# Patient Record
Sex: MALE | Race: BLACK OR AFRICAN AMERICAN | Employment: OTHER | URBAN - METROPOLITAN AREA
[De-identification: names, ages, dates, MRNs, and addresses within clinical notes are randomized per-mention and may not be internally consistent; named-entity substitution may affect disease eponyms.]

---

## 2017-04-27 PROBLEM — J96.90 RESPIRATORY FAILURE (HCC): Status: ACTIVE | Noted: 2017-04-27

## 2018-11-20 ENCOUNTER — HOSPITAL ENCOUNTER (OUTPATIENT)
Dept: GENERAL RADIOLOGY | Age: 68
Discharge: HOME | End: 2018-11-22
Payer: MEDICARE

## 2018-11-20 DIAGNOSIS — R06.09 OTHER FORM OF DYSPNEA: ICD-10-CM

## 2018-11-20 PROCEDURE — 76000 FLUOROSCOPY <1 HR PHYS/QHP: CPT

## 2019-02-07 DIAGNOSIS — N18.30 ANEMIA IN STAGE 3 CHRONIC KIDNEY DISEASE (HCC): Chronic | ICD-10-CM

## 2019-02-07 DIAGNOSIS — D63.1 ANEMIA IN STAGE 3 CHRONIC KIDNEY DISEASE (HCC): Chronic | ICD-10-CM

## 2019-02-07 RX ORDER — DIPHENHYDRAMINE HYDROCHLORIDE 50 MG/ML
50 INJECTION INTRAMUSCULAR; INTRAVENOUS ONCE
Status: CANCELLED | OUTPATIENT
Start: 2019-02-12 | End: 2019-02-07

## 2019-02-07 RX ORDER — SODIUM CHLORIDE 0.9 % (FLUSH) 0.9 %
10 SYRINGE (ML) INJECTION PRN
Status: CANCELLED | OUTPATIENT
Start: 2019-02-12

## 2019-02-07 RX ORDER — METHYLPREDNISOLONE SODIUM SUCCINATE 125 MG/2ML
125 INJECTION, POWDER, LYOPHILIZED, FOR SOLUTION INTRAMUSCULAR; INTRAVENOUS ONCE
Status: CANCELLED | OUTPATIENT
Start: 2019-02-12 | End: 2019-02-07

## 2019-02-12 ENCOUNTER — HOSPITAL ENCOUNTER (OUTPATIENT)
Dept: INFUSION THERAPY | Age: 69
Setting detail: INFUSION SERIES
Discharge: HOME | End: 2019-02-12
Payer: MEDICARE

## 2019-02-12 VITALS
RESPIRATION RATE: 20 BRPM | OXYGEN SATURATION: 99 % | SYSTOLIC BLOOD PRESSURE: 131 MMHG | HEART RATE: 55 BPM | TEMPERATURE: 98.6 F | DIASTOLIC BLOOD PRESSURE: 60 MMHG

## 2019-02-12 DIAGNOSIS — D63.1 ANEMIA IN STAGE 3 CHRONIC KIDNEY DISEASE (HCC): ICD-10-CM

## 2019-02-12 DIAGNOSIS — N18.30 ANEMIA IN STAGE 3 CHRONIC KIDNEY DISEASE (HCC): ICD-10-CM

## 2019-02-12 PROCEDURE — 2580000003 HC RX 258

## 2019-02-12 PROCEDURE — 2580000003 HC RX 258: Performed by: INTERNAL MEDICINE

## 2019-02-12 PROCEDURE — 6360000002 HC RX W HCPCS: Performed by: INTERNAL MEDICINE

## 2019-02-12 PROCEDURE — 96365 THER/PROPH/DIAG IV INF INIT: CPT

## 2019-02-12 RX ORDER — SODIUM CHLORIDE 0.9 % (FLUSH) 0.9 %
10 SYRINGE (ML) INJECTION PRN
Status: DISCONTINUED | OUTPATIENT
Start: 2019-02-12 | End: 2019-02-13 | Stop reason: HOSPADM

## 2019-02-12 RX ORDER — SODIUM CHLORIDE 0.9 % (FLUSH) 0.9 %
10 SYRINGE (ML) INJECTION PRN
Status: CANCELLED | OUTPATIENT
Start: 2019-02-12

## 2019-02-12 RX ORDER — DIPHENHYDRAMINE HYDROCHLORIDE 50 MG/ML
50 INJECTION INTRAMUSCULAR; INTRAVENOUS ONCE
Status: CANCELLED | OUTPATIENT
Start: 2019-02-12 | End: 2019-02-12

## 2019-02-12 RX ORDER — SODIUM CHLORIDE 0.9 % (FLUSH) 0.9 %
SYRINGE (ML) INJECTION
Status: COMPLETED
Start: 2019-02-12 | End: 2019-02-12

## 2019-02-12 RX ORDER — METHYLPREDNISOLONE SODIUM SUCCINATE 125 MG/2ML
125 INJECTION, POWDER, LYOPHILIZED, FOR SOLUTION INTRAMUSCULAR; INTRAVENOUS ONCE
Status: CANCELLED | OUTPATIENT
Start: 2019-02-12 | End: 2019-02-12

## 2019-02-12 RX ADMIN — Medication 10 ML: at 13:31

## 2019-02-12 RX ADMIN — FERUMOXYTOL 510 MG: 510 INJECTION INTRAVENOUS at 13:48

## 2019-02-20 ENCOUNTER — HOSPITAL ENCOUNTER (OUTPATIENT)
Dept: INFUSION THERAPY | Age: 69
Setting detail: INFUSION SERIES
End: 2019-02-20
Payer: MEDICARE

## 2019-02-26 ENCOUNTER — HOSPITAL ENCOUNTER (OUTPATIENT)
Dept: INFUSION THERAPY | Age: 69
Setting detail: INFUSION SERIES
Discharge: HOME | End: 2019-02-26
Payer: MEDICARE

## 2019-02-26 VITALS
DIASTOLIC BLOOD PRESSURE: 53 MMHG | RESPIRATION RATE: 18 BRPM | SYSTOLIC BLOOD PRESSURE: 121 MMHG | HEART RATE: 53 BPM | TEMPERATURE: 98.4 F

## 2019-02-26 DIAGNOSIS — D63.1 ANEMIA IN STAGE 3 CHRONIC KIDNEY DISEASE (HCC): ICD-10-CM

## 2019-02-26 DIAGNOSIS — N18.30 ANEMIA IN STAGE 3 CHRONIC KIDNEY DISEASE (HCC): ICD-10-CM

## 2019-02-26 PROCEDURE — 2580000003 HC RX 258: Performed by: INTERNAL MEDICINE

## 2019-02-26 PROCEDURE — 96365 THER/PROPH/DIAG IV INF INIT: CPT

## 2019-02-26 PROCEDURE — 6360000002 HC RX W HCPCS: Performed by: INTERNAL MEDICINE

## 2019-02-26 RX ORDER — METHYLPREDNISOLONE SODIUM SUCCINATE 125 MG/2ML
125 INJECTION, POWDER, LYOPHILIZED, FOR SOLUTION INTRAMUSCULAR; INTRAVENOUS ONCE
Status: CANCELLED | OUTPATIENT
Start: 2019-02-26 | End: 2019-02-26

## 2019-02-26 RX ORDER — SODIUM CHLORIDE 0.9 % (FLUSH) 0.9 %
10 SYRINGE (ML) INJECTION PRN
Status: DISCONTINUED | OUTPATIENT
Start: 2019-02-26 | End: 2019-02-27 | Stop reason: HOSPADM

## 2019-02-26 RX ORDER — DIPHENHYDRAMINE HYDROCHLORIDE 50 MG/ML
50 INJECTION INTRAMUSCULAR; INTRAVENOUS ONCE
Status: CANCELLED | OUTPATIENT
Start: 2019-02-26 | End: 2019-02-26

## 2019-02-26 RX ORDER — SODIUM CHLORIDE 0.9 % (FLUSH) 0.9 %
10 SYRINGE (ML) INJECTION PRN
Status: CANCELLED | OUTPATIENT
Start: 2019-02-26

## 2019-02-26 RX ADMIN — FERUMOXYTOL 510 MG: 510 INJECTION INTRAVENOUS at 13:26

## 2019-05-29 ENCOUNTER — APPOINTMENT (OUTPATIENT)
Dept: ULTRASOUND IMAGING | Age: 69
End: 2019-05-29
Payer: MEDICARE

## 2019-05-29 ENCOUNTER — APPOINTMENT (OUTPATIENT)
Dept: GENERAL RADIOLOGY | Age: 69
End: 2019-05-29
Payer: MEDICARE

## 2019-05-29 ENCOUNTER — HOSPITAL ENCOUNTER (EMERGENCY)
Age: 69
Discharge: HOME | End: 2019-05-29
Attending: EMERGENCY MEDICINE
Payer: MEDICARE

## 2019-05-29 VITALS
RESPIRATION RATE: 18 BRPM | HEIGHT: 70 IN | BODY MASS INDEX: 31.92 KG/M2 | SYSTOLIC BLOOD PRESSURE: 172 MMHG | HEART RATE: 50 BPM | WEIGHT: 223 LBS | TEMPERATURE: 98.2 F | DIASTOLIC BLOOD PRESSURE: 72 MMHG | OXYGEN SATURATION: 98 %

## 2019-05-29 DIAGNOSIS — R06.00 DYSPNEA AND RESPIRATORY ABNORMALITIES: ICD-10-CM

## 2019-05-29 DIAGNOSIS — D84.9 IMMUNOCOMPROMISED (HCC): ICD-10-CM

## 2019-05-29 DIAGNOSIS — R06.89 DYSPNEA AND RESPIRATORY ABNORMALITIES: ICD-10-CM

## 2019-05-29 DIAGNOSIS — M79.605 LEFT LEG PAIN: Primary | ICD-10-CM

## 2019-05-29 DIAGNOSIS — Z94.0 HISTORY OF KIDNEY TRANSPLANT: ICD-10-CM

## 2019-05-29 LAB
ALBUMIN SERPL-MCNC: 4 G/DL (ref 3.5–5.2)
ALP BLD-CCNC: 58 U/L (ref 40–129)
ALT SERPL-CCNC: 22 U/L (ref 0–40)
ANION GAP SERPL CALCULATED.3IONS-SCNC: 8 MMOL/L (ref 7–16)
AST SERPL-CCNC: 39 U/L (ref 0–39)
BASOPHILS ABSOLUTE: 0.02 E9/L (ref 0–0.2)
BASOPHILS RELATIVE PERCENT: 0.5 % (ref 0–2)
BILIRUB SERPL-MCNC: 0.2 MG/DL (ref 0–1.2)
BUN BLDV-MCNC: 36 MG/DL (ref 8–23)
CALCIUM SERPL-MCNC: 9 MG/DL (ref 8.6–10.2)
CHLORIDE BLD-SCNC: 105 MMOL/L (ref 98–107)
CO2: 25 MMOL/L (ref 22–29)
CREAT SERPL-MCNC: 2.2 MG/DL (ref 0.7–1.2)
EKG ATRIAL RATE: 47 BPM
EKG P AXIS: 8 DEGREES
EKG P-R INTERVAL: 178 MS
EKG Q-T INTERVAL: 480 MS
EKG QRS DURATION: 74 MS
EKG QTC CALCULATION (BAZETT): 424 MS
EKG R AXIS: -15 DEGREES
EKG T AXIS: 89 DEGREES
EKG VENTRICULAR RATE: 47 BPM
EOSINOPHILS ABSOLUTE: 0.12 E9/L (ref 0.05–0.5)
EOSINOPHILS RELATIVE PERCENT: 2.8 % (ref 0–6)
GFR AFRICAN AMERICAN: 36
GFR NON-AFRICAN AMERICAN: 36 ML/MIN/1.73
GLUCOSE BLD-MCNC: 110 MG/DL (ref 74–99)
HCT VFR BLD CALC: 39 % (ref 37–54)
HEMOGLOBIN: 12 G/DL (ref 12.5–16.5)
IMMATURE GRANULOCYTES #: 0.03 E9/L
IMMATURE GRANULOCYTES %: 0.7 % (ref 0–5)
LYMPHOCYTES ABSOLUTE: 0.78 E9/L (ref 1.5–4)
LYMPHOCYTES RELATIVE PERCENT: 18.3 % (ref 20–42)
MAGNESIUM: 2.3 MG/DL (ref 1.6–2.6)
MCH RBC QN AUTO: 27.7 PG (ref 26–35)
MCHC RBC AUTO-ENTMCNC: 30.8 % (ref 32–34.5)
MCV RBC AUTO: 90.1 FL (ref 80–99.9)
MONOCYTES ABSOLUTE: 0.5 E9/L (ref 0.1–0.95)
MONOCYTES RELATIVE PERCENT: 11.7 % (ref 2–12)
NEUTROPHILS ABSOLUTE: 2.82 E9/L (ref 1.8–7.3)
NEUTROPHILS RELATIVE PERCENT: 66 % (ref 43–80)
PDW BLD-RTO: 15.8 FL (ref 11.5–15)
PLATELET # BLD: 169 E9/L (ref 130–450)
PMV BLD AUTO: 9.6 FL (ref 7–12)
POTASSIUM SERPL-SCNC: 4.7 MMOL/L (ref 3.5–5)
POTASSIUM SERPL-SCNC: 5.9 MMOL/L (ref 3.5–5)
PRO-BNP: 1075 PG/ML (ref 0–125)
RBC # BLD: 4.33 E12/L (ref 3.8–5.8)
REASON FOR REJECTION: NORMAL
REJECTED TEST: NORMAL
SODIUM BLD-SCNC: 138 MMOL/L (ref 132–146)
TOTAL PROTEIN: 6.2 G/DL (ref 6.4–8.3)
TROPONIN: 0.01 NG/ML (ref 0–0.03)
WBC # BLD: 4.3 E9/L (ref 4.5–11.5)

## 2019-05-29 PROCEDURE — 83735 ASSAY OF MAGNESIUM: CPT

## 2019-05-29 PROCEDURE — 71045 X-RAY EXAM CHEST 1 VIEW: CPT

## 2019-05-29 PROCEDURE — 93005 ELECTROCARDIOGRAM TRACING: CPT | Performed by: EMERGENCY MEDICINE

## 2019-05-29 PROCEDURE — 84132 ASSAY OF SERUM POTASSIUM: CPT

## 2019-05-29 PROCEDURE — 36415 COLL VENOUS BLD VENIPUNCTURE: CPT

## 2019-05-29 PROCEDURE — 84484 ASSAY OF TROPONIN QUANT: CPT

## 2019-05-29 PROCEDURE — 94664 DEMO&/EVAL PT USE INHALER: CPT

## 2019-05-29 PROCEDURE — 83880 ASSAY OF NATRIURETIC PEPTIDE: CPT

## 2019-05-29 PROCEDURE — 93010 ELECTROCARDIOGRAM REPORT: CPT | Performed by: INTERNAL MEDICINE

## 2019-05-29 PROCEDURE — 99285 EMERGENCY DEPT VISIT HI MDM: CPT

## 2019-05-29 PROCEDURE — 80053 COMPREHEN METABOLIC PANEL: CPT

## 2019-05-29 PROCEDURE — 6370000000 HC RX 637 (ALT 250 FOR IP): Performed by: EMERGENCY MEDICINE

## 2019-05-29 PROCEDURE — 85025 COMPLETE CBC W/AUTO DIFF WBC: CPT

## 2019-05-29 PROCEDURE — 93971 EXTREMITY STUDY: CPT

## 2019-05-29 RX ORDER — SODIUM CHLORIDE 0.9 % (FLUSH) 0.9 %
10 SYRINGE (ML) INJECTION PRN
Status: DISCONTINUED | OUTPATIENT
Start: 2019-05-29 | End: 2019-05-29 | Stop reason: HOSPADM

## 2019-05-29 RX ORDER — IPRATROPIUM BROMIDE AND ALBUTEROL SULFATE 2.5; .5 MG/3ML; MG/3ML
1 SOLUTION RESPIRATORY (INHALATION) ONCE
Status: COMPLETED | OUTPATIENT
Start: 2019-05-29 | End: 2019-05-29

## 2019-05-29 RX ADMIN — IPRATROPIUM BROMIDE AND ALBUTEROL SULFATE 1 AMPULE: .5; 3 SOLUTION RESPIRATORY (INHALATION) at 17:17

## 2019-05-29 ASSESSMENT — ENCOUNTER SYMPTOMS
BACK PAIN: 0
BLOOD IN STOOL: 0
COUGH: 0
HEMOPTYSIS: 0
DIARRHEA: 0
WHEEZING: 0
CONSTIPATION: 0
SPUTUM PRODUCTION: 0
SHORTNESS OF BREATH: 1
NAUSEA: 0
VOMITING: 0
ABDOMINAL PAIN: 0

## 2019-05-29 ASSESSMENT — PAIN DESCRIPTION - ORIENTATION: ORIENTATION: LEFT

## 2019-05-29 ASSESSMENT — PAIN SCALES - GENERAL: PAINLEVEL_OUTOF10: 5

## 2019-05-29 ASSESSMENT — PAIN DESCRIPTION - PAIN TYPE
TYPE: ACUTE PAIN
TYPE: ACUTE PAIN

## 2019-05-29 ASSESSMENT — PAIN DESCRIPTION - LOCATION: LOCATION: LEG

## 2019-05-29 ASSESSMENT — PAIN DESCRIPTION - FREQUENCY: FREQUENCY: INTERMITTENT

## 2019-05-29 ASSESSMENT — PAIN DESCRIPTION - DESCRIPTORS: DESCRIPTORS: ACHING

## 2019-05-29 NOTE — ED PROVIDER NOTES
76 YOM with hx of renal transplant for diabetic nephropathy presents to ED with CC of left calf pain x 3 weeks. Says he's been SOB for one year, steadily worsening. Saw PCP today who instructed him to come to ED to rule out possible blood clot. Says he has been gaining more weight over the year. No SOB when lying flat. No cough or fever. Patient has no hx of blood. He is on anti rejection meds including Cellcept and prograf. Shortness of Breath   Severity:  Mild  Onset quality:  Gradual  Duration:  12 months  Timing:  Intermittent  Progression:  Worsening  Chronicity:  Chronic  Context: activity    Context: not URI    Relieved by:  Rest  Worsened by:  Nothing  Ineffective treatments:  None tried  Associated symptoms: no abdominal pain, no chest pain, no claudication, no cough, no diaphoresis, no fever, no headaches, no hemoptysis, no rash, no sputum production, no vomiting and no wheezing    Risk factors: no prolonged immobilization and no tobacco use        Review of Systems   Constitutional: Negative for chills, diaphoresis and fever. Respiratory: Positive for shortness of breath. Negative for cough, hemoptysis, sputum production and wheezing. Cardiovascular: Negative for chest pain and claudication. Gastrointestinal: Negative for abdominal pain, blood in stool, constipation, diarrhea, nausea and vomiting. Genitourinary: Negative for dysuria and frequency. Musculoskeletal: Negative for back pain. Left calf pain   Skin: Negative for rash and wound. Neurological: Negative for dizziness, weakness, light-headedness, numbness and headaches. Hematological: Negative for adenopathy. All other systems reviewed and are negative. Physical Exam   Constitutional: He is oriented to person, place, and time. He appears well-developed and well-nourished. No distress. HENT:   Head: Normocephalic and atraumatic. Eyes: Pupils are equal, round, and reactive to light.    Neck: Normal range of in his father. The patients home medications have been reviewed. Allergies: Patient has no known allergies.     -------------------------------------------------- RESULTS -------------------------------------------------  Labs:  Results for orders placed or performed during the hospital encounter of 05/29/19   CBC auto differential   Result Value Ref Range    WBC 4.3 (L) 4.5 - 11.5 E9/L    RBC 4.33 3.80 - 5.80 E12/L    Hemoglobin 12.0 (L) 12.5 - 16.5 g/dL    Hematocrit 39.0 37.0 - 54.0 %    MCV 90.1 80.0 - 99.9 fL    MCH 27.7 26.0 - 35.0 pg    MCHC 30.8 (L) 32.0 - 34.5 %    RDW 15.8 (H) 11.5 - 15.0 fL    Platelets 916 152 - 602 E9/L    MPV 9.6 7.0 - 12.0 fL    Neutrophils % 66.0 43.0 - 80.0 %    Immature Granulocytes % 0.7 0.0 - 5.0 %    Lymphocytes % 18.3 (L) 20.0 - 42.0 %    Monocytes % 11.7 2.0 - 12.0 %    Eosinophils % 2.8 0.0 - 6.0 %    Basophils % 0.5 0.0 - 2.0 %    Neutrophils # 2.82 1.80 - 7.30 E9/L    Immature Granulocytes # 0.03 E9/L    Lymphocytes # 0.78 (L) 1.50 - 4.00 E9/L    Monocytes # 0.50 0.10 - 0.95 E9/L    Eosinophils # 0.12 0.05 - 0.50 E9/L    Basophils # 0.02 0.00 - 0.20 E9/L   Comprehensive Metabolic Panel   Result Value Ref Range    Sodium 138 132 - 146 mmol/L    Potassium 5.9 (H) 3.5 - 5.0 mmol/L    Chloride 105 98 - 107 mmol/L    CO2 25 22 - 29 mmol/L    Anion Gap 8 7 - 16 mmol/L    Glucose 110 (H) 74 - 99 mg/dL    BUN 36 (H) 8 - 23 mg/dL    CREATININE 2.2 (H) 0.7 - 1.2 mg/dL    GFR Non-African American 36 >=60 mL/min/1.73    GFR African American 36     Calcium 9.0 8.6 - 10.2 mg/dL    Total Protein 6.2 (L) 6.4 - 8.3 g/dL    Alb 4.0 3.5 - 5.2 g/dL    Total Bilirubin 0.2 0.0 - 1.2 mg/dL    Alkaline Phosphatase 58 40 - 129 U/L    ALT 22 0 - 40 U/L    AST 39 0 - 39 U/L   Troponin   Result Value Ref Range    Troponin 0.01 0.00 - 0.03 ng/mL   Brain Natriuretic Peptide   Result Value Ref Range    Pro-BNP 1,075 (H) 0 - 125 pg/mL   Magnesium   Result Value Ref Range    Magnesium 2.3 1.6 - 2.6 mg/dL   SPECIMEN REJECTION   Result Value Ref Range    Rejected Test K     Reason for Rejection see below    Potassium   Result Value Ref Range    Potassium 4.7 3.5 - 5.0 mmol/L   EKG 12 Lead   Result Value Ref Range    Ventricular Rate 47 BPM    Atrial Rate 47 BPM    P-R Interval 178 ms    QRS Duration 74 ms    Q-T Interval 480 ms    QTc Calculation (Bazett) 424 ms    P Axis 8 degrees    R Axis -15 degrees    T Axis 89 degrees       Radiology:  XR CHEST PORTABLE   Final Result      NO ACUTE CARDIOPULMONARY PROCESS         US DUP LOWER EXTREMITY LEFT CHICO   Final Result   PATENT DEEP VENOUS SYSTEM OF THE LEFT LOWER EXTREMITY. NO EVIDENCE FOR DVT.          ------------------------- NURSING NOTES AND VITALS REVIEWED ---------------------------  Date / Time Roomed:  5/29/2019  2:31 PM  ED Bed Assignment:  23/23    The nursing notes within the ED encounter and vital signs as below have been reviewed. BP (!) 172/72   Pulse 50   Temp 98.2 °F (36.8 °C) (Oral)   Resp 18   Ht 5' 10\" (1.778 m)   Wt 223 lb (101.2 kg)   SpO2 98%   BMI 32.00 kg/m²   Oxygen Saturation Interpretation: Normal      ------------------------------------------ PROGRESS NOTES ------------------------------------------  ED Course as of May 31 0129   Wed May 29, 2019   1901 Ultrasound was unremarkable. Potassium within normal limits. BNP mildly elevated however patient has kidney transplant and chest x-ray showed no pleural effusions. Patient feels well and wants to go home. The patient is nontoxic appearing and stable for outpatient treatment and management. They were instructed to follow-up with their primary care physician and were given warning signs to return to the ED. All of their questions were answered at this time and are agreeable with discharge and early follow up.     [BM]      ED Course User Index  [BM] Brigette Wheeler DO         1:29 AM  I have spoken with the patient and discussed todays results, in addition to providing

## 2019-12-10 ENCOUNTER — HOSPITAL ENCOUNTER (OUTPATIENT)
Dept: INTERVENTIONAL RADIOLOGY/VASCULAR | Age: 69
Discharge: HOME | End: 2019-12-12
Payer: MEDICARE

## 2019-12-10 ENCOUNTER — HOSPITAL ENCOUNTER (OUTPATIENT)
Dept: ULTRASOUND IMAGING | Age: 69
Discharge: HOME | End: 2019-12-12
Payer: MEDICARE

## 2019-12-10 DIAGNOSIS — I73.9 PVD (PERIPHERAL VASCULAR DISEASE) (HCC): ICD-10-CM

## 2019-12-10 DIAGNOSIS — Z13.6 SCREENING FOR AAA (AORTIC ABDOMINAL ANEURYSM): ICD-10-CM

## 2019-12-10 DIAGNOSIS — I73.9 PERIPHERAL VASCULAR DISEASE, UNSPECIFIED (HCC): ICD-10-CM

## 2019-12-10 PROCEDURE — 76775 US EXAM ABDO BACK WALL LIM: CPT

## 2019-12-10 PROCEDURE — 93922 UPR/L XTREMITY ART 2 LEVELS: CPT

## 2020-08-19 ENCOUNTER — TELEPHONE (OUTPATIENT)
Dept: CARDIOLOGY CLINIC | Age: 70
End: 2020-08-19

## 2020-08-19 NOTE — TELEPHONE ENCOUNTER
[address] [place] [address]  to change his apt date. New apt given  Yearly visit (former Flaca Arreaga patient overdue) last seen by DR Flaca Arreaga 9 15 2017     He declined a NP visit for next monday. [facility] R Kidney transplant 12-12-14    Nephrology [facility]    [address] [place], [address] [place] [address], [address] [facility] [address]   [phone] (Fax)   Refill Request     DR Clinton locally for Nephrology. No recent labs in epic.    Was in ER 5 29 2019    Will request DR Clinton's last office note/ labs and med list.

## 2020-08-27 ENCOUNTER — HOSPITAL ENCOUNTER (OUTPATIENT)
Dept: NON INVASIVE DIAGNOSTICS | Age: 70
Discharge: HOME | End: 2020-08-27
Payer: MEDICARE

## 2020-08-27 VITALS
DIASTOLIC BLOOD PRESSURE: 77 MMHG | HEIGHT: 71 IN | SYSTOLIC BLOOD PRESSURE: 158 MMHG | TEMPERATURE: 99.8 F | BODY MASS INDEX: 34.3 KG/M2 | HEART RATE: 77 BPM | RESPIRATION RATE: 24 BRPM | WEIGHT: 245 LBS

## 2020-08-27 LAB
EKG ATRIAL RATE: 68 BPM
EKG P AXIS: 12 DEGREES
EKG P-R INTERVAL: 184 MS
EKG Q-T INTERVAL: 406 MS
EKG QRS DURATION: 74 MS
EKG QTC CALCULATION (BAZETT): 431 MS
EKG R AXIS: -8 DEGREES
EKG T AXIS: 93 DEGREES
EKG VENTRICULAR RATE: 68 BPM

## 2020-08-27 PROCEDURE — 93010 ELECTROCARDIOGRAM REPORT: CPT | Performed by: INTERNAL MEDICINE

## 2020-08-27 PROCEDURE — 93005 ELECTROCARDIOGRAM TRACING: CPT | Performed by: PHYSICIAN ASSISTANT

## 2020-08-27 PROCEDURE — 99211 OFF/OP EST MAY X REQ PHY/QHP: CPT

## 2020-08-27 PROCEDURE — 99214 OFFICE O/P EST MOD 30 MIN: CPT | Performed by: INTERNAL MEDICINE

## 2020-08-27 RX ORDER — ROSUVASTATIN CALCIUM 20 MG/1
20 TABLET, COATED ORAL DAILY
Qty: 90 TABLET | Refills: 3 | Status: SHIPPED
Start: 2020-08-27 | End: 2021-04-09

## 2020-08-27 NOTE — PROGRESS NOTES
[address] [facility]   Heart and V[facility] [place]   Clinic Note     Date: 08/27/20  Patient Skye Yeh  YOB: 1950  Age: 79 y.o. Primary Care Provider: DO Hermelinda Ramíerz     This is a 49-year-old -American male who was previously seen by Dr. Caitlyn Becerril. His main complaint is dyspnea on exertion with minimal activity such as walking a few steps or bending over to tie his shoes. This has been stable for the past few months although progressive over the last year. He underwent pulmonary work-up as below. He has no chest discomfort. He has no palpitations or syncope or presyncope. He has lower extremity edema but no orthopnea or PND. He reports compliance with his medications. A focused history review includes:  1. Chronic diastolic heart failure  2. History of abnormal Lexiscan MPS (with reported abnormalities in the anterior as well as lateral wall). There is mention of a normal coronary angiogram from January 2013 without the report being available. 3. Most recent TTE report from [facility] in April 2020: LVEF 67%, moderate LVH, normal RV size and systolic function, no significant valve disease, severely dilated left atrium  4. CKD status post kidney transplant in 2014 (underwent invasive evaluation of the transplanted renal artery at [facility] which was unremarkable)  1. Baseline creatinine 1.9-2.2 with an estimated GFR of 30-45  5. Type 2 diabetes  6. Mixed lung disease. Was seen by [facility] in April 2020.  1. Chest CT without contrast showed mild aortic and coronary calcification and a dilated main PA to 3.9 cm. There were no stigmata of interstitial lung disease but there was mild diffuse mosaic attenuation of the parenchyma and eventration of the right hemidiaphragm  2. PFTs showed a TLC of 48% and DLCO 48% predicted  7.  Hypertension    Past History    Past Medical History:         Diagnosis Date    Anemia mononitrate (IMDUR) 60 MG extended release tablet TAKE 1 TABLET DAILY 90 tablet 3    hydrALAZINE (APRESOLINE) 50 MG tablet Take 1 tablet by mouth every 8 hours (Patient taking differently: Take 75 mg by mouth every 8 hours ) 90 tablet 3    tacrolimus (PROGRAF) 1 MG capsule Take 2 capsules by mouth 2 times daily 60 capsule 3    metoprolol succinate (TOPROL XL) 50 MG extended release tablet Take 1 tablet by mouth 2 times daily 30 tablet 3    furosemide (LASIX) 40 MG tablet Take 1 tablet by mouth 2 times daily at 0800 and 1400 60 tablet 3    prednisoLONE 5 MG TABS Take 5 mg by mouth 2 times daily      sodium bicarbonate 650 MG tablet Take 650 mg by mouth daily      sulfamethoxazole-trimethoprim (BACTRIM;SEPTRA) 400-80 MG per tablet Take 1 tablet by mouth daily       Multiple Vitamin (MULTI VITAMIN DAILY PO) Take by mouth daily      Cholecalciferol (VITAMIN D3) 5000 UNITS TABS Take by mouth daily      famotidine (PEPCID) 20 MG tablet Take 20 mg by mouth daily      LANTUS SOLOSTAR 100 UNIT/ML SOPN Inject 15 Units into the skin daily       mycophenolate (CELLCEPT) 250 MG capsule   Take 1,000 mg by mouth 2 times daily       insulin lispro (HUMALOG KWIKPEN) 100 UNIT/ML SOPN Inject 5 Units into the skin daily (before lunch)      docusate sodium (COLACE) 100 MG capsule Take 1 capsule by mouth 2 times daily as needed for Constipation. (Patient taking differently: Take 100 mg by mouth daily as needed for Constipation ) 20 capsule 0    linagliptin (TRADJENTA) 5 MG tablet Take 5 mg by mouth daily.  oxyCODONE (OXY-IR) 15 MG immediate release tablet Take 15 mg by mouth every 6 hours as needed. Indications: Pain      aspirin 81 MG EC tablet Take 81 mg by mouth daily. No current facility-administered medications for this encounter.             Physical Examination      BP (!) 158/77 (Site: Left Upper Arm, Position: Sitting)   Pulse 77   Temp 99.8 °F (37.7 °C) (Temporal)   Resp 24   Ht 5' 11\" (1.803 m) Wt 245 lb (111.1 kg)   BMI 34.17 kg/m²     General: No acute distress, appears as stated age, nonicteric  Head: Atraumatic, no gross abnormalities or bruises  Neck: Supple and nontender, no carotid bruits. JVP is mildly elevated  Lungs: Clear to auscultation bilaterally, no wheezes, rales, or rhonchi  Heart: Regular rate and rhythm, no murmurs, rubs, or gallops  Abdomen: Soft, nontender, nondistended, normal bowel sounds  Extremities: No obvious deformities, no cyanosis. Trace pitting edema bilaterally  Neurological: Alert and oriented x3, EOMI, moving all extremities x4  Psychological: Normal mood and affect, cooperative  Skin: Color, texture, and turgor normal for age          Labs/Imaging/Diagnostics     Lab Results   Component Value Date     05/29/2019    K 4.7 05/29/2019     05/29/2019    CO2 25 05/29/2019    BUN 36 05/29/2019    CREATININE 2.2 05/29/2019    GLUCOSE 110 05/29/2019    GLUCOSE 124 02/21/2012    CALCIUM 9.0 05/29/2019        CrCl cannot be calculated (Patient's most recent lab result is older than the maximum 120 days allowed. ).     Lab Results   Component Value Date    WBC 4.3 (L) 05/29/2019    HGB 12.0 (L) 05/29/2019    HCT 39.0 05/29/2019    MCV 90.1 05/29/2019     05/29/2019       Lab Results   Component Value Date    ALT 22 05/29/2019    AST 39 05/29/2019    ALKPHOS 58 05/29/2019    BILITOT 0.2 05/29/2019       Lab Results   Component Value Date    LABALBU 4.0 05/29/2019       Lab Results   Component Value Date    CHOL 128 06/03/2014    CHOL 101 05/23/2013    CHOL 165 07/05/2011     Lab Results   Component Value Date    TRIG 50 06/03/2014    TRIG 160 (H) 05/23/2013    TRIG 72 07/05/2011     Lab Results   Component Value Date    HDL 51 06/03/2014    HDL 21.0 (A) 05/23/2013     Lab Results   Component Value Date    LDLCALC 67 06/03/2014    LDLCALC 48 05/23/2013     Lab Results   Component Value Date    LABVLDL 10 06/03/2014     No results found for: CHOLHDLRATIO    Lab Results   Component Value Date    CKTOTAL 197 07/25/2015    CKMB 4.5 07/25/2015    TROPONINI 0.01 05/29/2019       No results found for: BNP      Lab Results   Component Value Date    LABA1C 5.5 06/03/2014     No results found for: EAG     Pertinent Cardiovascular Studies:  EKG today personally reviewed:  Normal sinus rhythm with one junctional escape beat at the beginning of the tracing. Otherwise normal        Assessment and Plan: This is a 27-year-old -American male with limiting dyspnea on exertion without chest discomfort. He has history of chronic diastolic heart failure, status post kidney transplant in 2014 with baseline creatinine 1.9-2.2, mixed moderate lung disease, dilated PA on chest CT, hypertension, diabetes. He is slightly hypervolemic on exam.  I suspect he has pulmonary hypertension which may or may not be completely due to his left-sided diastolic heart failure. 1. Chronic diastolic heart failure, NYHA functional class III  2. Dilated PA  3. CKD stage IIIb in the setting of kidney transplant  4. Hypertension  5. Diabetes  6. Coronary atherosclerosis on CT    1. We will start with right heart cath given the suspicion for decompensated diastolic heart failure as well as pulmonary hypertension. His proBNP is elevated. 2. Start rosuvastatin 20 mg daily  3. no changes in BP medications at this point. He says his blood pressure at home is in the 796 systolic     Follow up with me in 6 months    We appreciate the opportunity to participate in His care!       Camryn Lees MD  Interventional Cardiology/Structural Heart Disease  Cell: [phone]

## 2020-08-31 ENCOUNTER — TELEPHONE (OUTPATIENT)
Dept: CARDIOLOGY CLINIC | Age: 70
End: 2020-08-31

## 2020-08-31 ENCOUNTER — HOSPITAL ENCOUNTER (OUTPATIENT)
Age: 70
Discharge: HOME | End: 2020-08-31
Payer: MEDICARE

## 2020-08-31 LAB
ALBUMIN SERPL-MCNC: 4.1 G/DL (ref 3.5–5.2)
ALP BLD-CCNC: 61 U/L (ref 40–129)
ALT SERPL-CCNC: 19 U/L (ref 0–40)
ANION GAP SERPL CALCULATED.3IONS-SCNC: 12 MMOL/L (ref 7–16)
AST SERPL-CCNC: 30 U/L (ref 0–39)
BILIRUB SERPL-MCNC: 0.3 MG/DL (ref 0–1.2)
BUN BLDV-MCNC: 32 MG/DL (ref 8–23)
CALCIUM SERPL-MCNC: 9.3 MG/DL (ref 8.6–10.2)
CHLORIDE BLD-SCNC: 106 MMOL/L (ref 98–107)
CO2: 23 MMOL/L (ref 22–29)
CREAT SERPL-MCNC: 2 MG/DL (ref 0.7–1.2)
GFR AFRICAN AMERICAN: 40
GFR NON-AFRICAN AMERICAN: 40 ML/MIN/1.73
GLUCOSE BLD-MCNC: 158 MG/DL (ref 74–99)
HCT VFR BLD CALC: 39.6 % (ref 37–54)
HEMOGLOBIN: 12 G/DL (ref 12.5–16.5)
INR BLD: 1
MCH RBC QN AUTO: 27.1 PG (ref 26–35)
MCHC RBC AUTO-ENTMCNC: 30.3 % (ref 32–34.5)
MCV RBC AUTO: 89.4 FL (ref 80–99.9)
PDW BLD-RTO: 14.5 FL (ref 11.5–15)
PLATELET # BLD: 219 E9/L (ref 130–450)
PMV BLD AUTO: 10 FL (ref 7–12)
POTASSIUM SERPL-SCNC: 4.9 MMOL/L (ref 3.5–5)
PROTHROMBIN TIME: 11.3 SEC (ref 9.3–12.4)
RBC # BLD: 4.43 E12/L (ref 3.8–5.8)
SODIUM BLD-SCNC: 141 MMOL/L (ref 132–146)
TOTAL PROTEIN: 6.4 G/DL (ref 6.4–8.3)
WBC # BLD: 4.5 E9/L (ref 4.5–11.5)

## 2020-08-31 PROCEDURE — 85027 COMPLETE CBC AUTOMATED: CPT

## 2020-08-31 PROCEDURE — 85610 PROTHROMBIN TIME: CPT

## 2020-08-31 PROCEDURE — 80053 COMPREHEN METABOLIC PANEL: CPT

## 2020-08-31 PROCEDURE — 36415 COLL VENOUS BLD VENIPUNCTURE: CPT

## 2020-08-31 NOTE — TELEPHONE ENCOUNTER
Called and reviewed  Addition of rosuvastatin (crestor) . He will go to [facility] to  med and also sit with pharmacist for consult for initial script . Covid prescreen questionnaire completed and faxed to Cath Lab. Carmen Null is managing [facility] auth, orders, instructions.

## 2020-09-02 ENCOUNTER — HOSPITAL ENCOUNTER (OUTPATIENT)
Dept: CARDIAC CATH/INVASIVE PROCEDURES | Age: 70
Discharge: HOME | End: 2020-09-02
Payer: MEDICARE

## 2020-09-02 VITALS
WEIGHT: 243 LBS | DIASTOLIC BLOOD PRESSURE: 71 MMHG | TEMPERATURE: 98.6 F | BODY MASS INDEX: 34.79 KG/M2 | SYSTOLIC BLOOD PRESSURE: 174 MMHG | RESPIRATION RATE: 20 BRPM | HEIGHT: 70 IN | OXYGEN SATURATION: 97 %

## 2020-09-02 PROCEDURE — 2500000003 HC RX 250 WO HCPCS

## 2020-09-02 PROCEDURE — C1751 CATH, INF, PER/CENT/MIDLINE: HCPCS

## 2020-09-02 PROCEDURE — 6360000002 HC RX W HCPCS

## 2020-09-02 PROCEDURE — 93451 RIGHT HEART CATH: CPT | Performed by: INTERNAL MEDICINE

## 2020-09-02 PROCEDURE — 93451 RIGHT HEART CATH: CPT

## 2020-09-02 PROCEDURE — C1894 INTRO/SHEATH, NON-LASER: HCPCS

## 2020-09-02 PROCEDURE — 2709999900 HC NON-CHARGEABLE SUPPLY

## 2020-09-02 RX ORDER — FUROSEMIDE 40 MG/1
60 TABLET ORAL 2 TIMES DAILY
Qty: 270 TABLET | Refills: 1 | Status: ON HOLD
Start: 2020-09-02 | End: 2021-04-20 | Stop reason: SDUPTHER

## 2020-09-02 NOTE — PROCEDURES
CARDIAC CATHETERIZATION REPORT    : Giuliana Patel     Date of procedure: 09/02/20       Indication:  1. Rule out pulmonary hypertension      Procedures:  Right heart catheterization     Sedation/analgesia:  none    Brief history:  78-year-old -American male with history of diastolic heart failure, normal angiogram 2013, kidney transplant in 2014, mixed lung disease, hypertension who has been having worsening shortness of breath that is not entirely explained by his comorbidities and his physical exam.  Of note he was noted to have a dilated PA on the CT chest.    Description of procedure: The patient presented to the Cath Lab in a(n) elective fashion. The patient was prepped and draped in a sterile manner. Timeout, airway and ASA assessment were completed. Local anesthesia with 1% lidocaine was administered and sedation/analgesia were administered as above. Access was obtained using the modified Seldinger technique and a micropuncture needle in the right internal jugular vein. A 7Fshort sheath was inserted over a wire. A [place] caheter was used for hem toodynamic measurements with the results below. The catheter was not left in place. RA: 13  RV: 91/14  PA: 91/25 with a mean of 48  PCW: 22  CO/CI (Jack): 5.6/2.5  CO/CI (thermodilution): 4.9/2.1    Hemostasis:  Manual pressure was used for Venous hemostasis. Complications: None  Estimated blood loss: 5 mL  Contrast used: 0 mL    Conclusions:  1. Moderately elevated right and left filling pressures  2. Severe pulmonary hypertension, predominantly precapillary. PVR approximately 5-5.5  3.  Gabrielle 5 consistent with normal RV function    PLAN:  · Increase Lasix to 60 mg p.o. twice daily and check BMP on Friday, September 4  · Refer to pulmonary hypertension center at [facility], MD  Interventional Cardiology/Structural Heart Disease  Cell: [phone]

## 2020-09-03 ENCOUNTER — TELEPHONE (OUTPATIENT)
Dept: CARDIOLOGY CLINIC | Age: 70
End: 2020-09-03

## 2020-09-03 LAB
B-TYPE NATRIURETIC PEPTIDE: NORMAL
BUN BLDV-MCNC: 30 MG/DL
CALCIUM SERPL-MCNC: NORMAL MG/DL
CHLORIDE BLD-SCNC: 108 MMOL/L
CO2: NORMAL
CREAT SERPL-MCNC: 2.03 MG/DL
GFR CALCULATED: NORMAL
GLUCOSE BLD-MCNC: NORMAL MG/DL
POTASSIUM SERPL-SCNC: 4.5 MMOL/L
SODIUM BLD-SCNC: 142 MMOL/L

## 2020-09-03 NOTE — TELEPHONE ENCOUNTER
Janna Borrero called this am  He spoke with you yesterday regarding labs he wanted done at [facility] lab. He did not have labs done ordered by DR Cira Carranza. Please address.     Vaishali Mares

## 2020-09-21 ENCOUNTER — TELEPHONE (OUTPATIENT)
Dept: CARDIOLOGY CLINIC | Age: 70
End: 2020-09-21

## 2020-09-21 NOTE — TELEPHONE ENCOUNTER
[facility] sent fax:  9 10 2020 11am apt with [facility]        09/28/2020 Office Visit CARDIOVASCULAR MEDICINE Abbie Renee [address] [place] [address] [phone] (Fax)

## 2021-04-09 ENCOUNTER — APPOINTMENT (OUTPATIENT)
Dept: GENERAL RADIOLOGY | Age: 71
DRG: 698 | End: 2021-04-09
Payer: MEDICARE

## 2021-04-09 ENCOUNTER — HOSPITAL ENCOUNTER (INPATIENT)
Age: 71
LOS: 10 days | Discharge: HOME | DRG: 698 | End: 2021-04-20
Attending: EMERGENCY MEDICINE | Admitting: INTERNAL MEDICINE
Payer: MEDICARE

## 2021-04-09 DIAGNOSIS — E86.0 DEHYDRATION: ICD-10-CM

## 2021-04-09 DIAGNOSIS — I50.31 ACUTE DIASTOLIC CONGESTIVE HEART FAILURE (HCC): ICD-10-CM

## 2021-04-09 DIAGNOSIS — N17.9 ACUTE KIDNEY INJURY (HCC): Primary | ICD-10-CM

## 2021-04-09 DIAGNOSIS — R50.9 FEVER, UNSPECIFIED FEVER CAUSE: ICD-10-CM

## 2021-04-09 LAB
ALBUMIN SERPL-MCNC: 3.6 G/DL (ref 3.5–5.2)
ALP BLD-CCNC: 49 U/L (ref 40–129)
ALT SERPL-CCNC: 16 U/L (ref 0–40)
ANION GAP SERPL CALCULATED.3IONS-SCNC: 11 MMOL/L (ref 7–16)
AST SERPL-CCNC: 44 U/L (ref 0–39)
BASOPHILS ABSOLUTE: 0.01 E9/L (ref 0–0.2)
BASOPHILS RELATIVE PERCENT: 0.3 % (ref 0–2)
BILIRUB SERPL-MCNC: 0.4 MG/DL (ref 0–1.2)
BUN BLDV-MCNC: 53 MG/DL (ref 8–23)
CALCIUM SERPL-MCNC: 8.7 MG/DL (ref 8.6–10.2)
CHLORIDE BLD-SCNC: 101 MMOL/L (ref 98–107)
CO2: 21 MMOL/L (ref 22–29)
CREAT SERPL-MCNC: 3.4 MG/DL (ref 0.7–1.2)
EOSINOPHILS ABSOLUTE: 0 E9/L (ref 0.05–0.5)
EOSINOPHILS RELATIVE PERCENT: 0 % (ref 0–6)
GFR AFRICAN AMERICAN: 22
GFR NON-AFRICAN AMERICAN: 22 ML/MIN/1.73
GLUCOSE BLD-MCNC: 63 MG/DL (ref 74–99)
HCT VFR BLD CALC: 36.6 % (ref 37–54)
HEMOGLOBIN: 11.4 G/DL (ref 12.5–16.5)
IMMATURE GRANULOCYTES #: 0.02 E9/L
IMMATURE GRANULOCYTES %: 0.5 % (ref 0–5)
INFLUENZA A BY PCR: NOT DETECTED
INFLUENZA B BY PCR: NOT DETECTED
LACTIC ACID: 0.8 MMOL/L (ref 0.5–2.2)
LIPASE: 52 U/L (ref 13–60)
LYMPHOCYTES ABSOLUTE: 0.67 E9/L (ref 1.5–4)
LYMPHOCYTES RELATIVE PERCENT: 17 % (ref 20–42)
MCH RBC QN AUTO: 25.9 PG (ref 26–35)
MCHC RBC AUTO-ENTMCNC: 31.1 % (ref 32–34.5)
MCV RBC AUTO: 83.2 FL (ref 80–99.9)
MONOCYTES ABSOLUTE: 0.67 E9/L (ref 0.1–0.95)
MONOCYTES RELATIVE PERCENT: 17 % (ref 2–12)
NEUTROPHILS ABSOLUTE: 2.57 E9/L (ref 1.8–7.3)
NEUTROPHILS RELATIVE PERCENT: 65.2 % (ref 43–80)
PDW BLD-RTO: 14.9 FL (ref 11.5–15)
PLATELET # BLD: 167 E9/L (ref 130–450)
PMV BLD AUTO: 9.9 FL (ref 7–12)
POTASSIUM REFLEX MAGNESIUM: 4.4 MMOL/L (ref 3.5–5)
RBC # BLD: 4.4 E12/L (ref 3.8–5.8)
REASON FOR REJECTION: NORMAL
REJECTED TEST: NORMAL
SODIUM BLD-SCNC: 133 MMOL/L (ref 132–146)
TOTAL PROTEIN: 6.3 G/DL (ref 6.4–8.3)
TROPONIN: 0.1 NG/ML (ref 0–0.03)
WBC # BLD: 3.9 E9/L (ref 4.5–11.5)

## 2021-04-09 PROCEDURE — 84484 ASSAY OF TROPONIN QUANT: CPT

## 2021-04-09 PROCEDURE — 2580000003 HC RX 258: Performed by: EMERGENCY MEDICINE

## 2021-04-09 PROCEDURE — 99284 EMERGENCY DEPT VISIT MOD MDM: CPT

## 2021-04-09 PROCEDURE — 83690 ASSAY OF LIPASE: CPT

## 2021-04-09 PROCEDURE — 96360 HYDRATION IV INFUSION INIT: CPT

## 2021-04-09 PROCEDURE — 85025 COMPLETE CBC W/AUTO DIFF WBC: CPT

## 2021-04-09 PROCEDURE — 6370000000 HC RX 637 (ALT 250 FOR IP): Performed by: EMERGENCY MEDICINE

## 2021-04-09 PROCEDURE — 71045 X-RAY EXAM CHEST 1 VIEW: CPT

## 2021-04-09 PROCEDURE — 93005 ELECTROCARDIOGRAM TRACING: CPT | Performed by: NURSE PRACTITIONER

## 2021-04-09 PROCEDURE — 80053 COMPREHEN METABOLIC PANEL: CPT

## 2021-04-09 PROCEDURE — 83605 ASSAY OF LACTIC ACID: CPT

## 2021-04-09 PROCEDURE — 87502 INFLUENZA DNA AMP PROBE: CPT

## 2021-04-09 RX ORDER — TIZANIDINE 4 MG/1
4 TABLET ORAL DAILY
COMMUNITY
End: 2021-06-11

## 2021-04-09 RX ORDER — 0.9 % SODIUM CHLORIDE 0.9 %
1000 INTRAVENOUS SOLUTION INTRAVENOUS ONCE
Status: COMPLETED | OUTPATIENT
Start: 2021-04-09 | End: 2021-04-09

## 2021-04-09 RX ORDER — ACETAMINOPHEN 500 MG
1000 TABLET ORAL ONCE
Status: COMPLETED | OUTPATIENT
Start: 2021-04-09 | End: 2021-04-09

## 2021-04-09 RX ORDER — SIMVASTATIN 10 MG
10 TABLET ORAL DAILY
COMMUNITY

## 2021-04-09 RX ADMIN — ACETAMINOPHEN 1000 MG: 500 TABLET ORAL at 22:18

## 2021-04-09 RX ADMIN — SODIUM CHLORIDE 1000 ML: 9 INJECTION, SOLUTION INTRAVENOUS at 22:18

## 2021-04-09 ASSESSMENT — PAIN SCALES - GENERAL
PAINLEVEL_OUTOF10: 0
PAINLEVEL_OUTOF10: 0

## 2021-04-10 PROBLEM — R19.7 VOMITING AND DIARRHEA: Status: ACTIVE | Noted: 2021-04-10

## 2021-04-10 PROBLEM — N17.9 AKI (ACUTE KIDNEY INJURY) (HCC): Status: ACTIVE | Noted: 2021-04-10

## 2021-04-10 PROBLEM — N17.9 ACUTE NONTRAUMATIC KIDNEY INJURY (HCC): Status: ACTIVE | Noted: 2021-04-10

## 2021-04-10 PROBLEM — R11.10 VOMITING AND DIARRHEA: Status: ACTIVE | Noted: 2021-04-10

## 2021-04-10 LAB
ANION GAP SERPL CALCULATED.3IONS-SCNC: 9 MMOL/L (ref 7–16)
BACTERIA: ABNORMAL /HPF
BILIRUBIN URINE: NEGATIVE
BLOOD, URINE: NEGATIVE
BUN BLDV-MCNC: 43 MG/DL (ref 8–23)
CALCIUM SERPL-MCNC: 8 MG/DL (ref 8.6–10.2)
CHLORIDE BLD-SCNC: 102 MMOL/L (ref 98–107)
CHLORIDE URINE RANDOM: <20 MMOL/L
CLARITY: CLEAR
CO2: 19 MMOL/L (ref 22–29)
COLOR: YELLOW
CREAT SERPL-MCNC: 2.4 MG/DL (ref 0.7–1.2)
CREATININE URINE: 143 MG/DL (ref 40–278)
CREATININE URINE: 96 MG/DL (ref 40–278)
EKG ATRIAL RATE: 82 BPM
EKG P AXIS: 59 DEGREES
EKG P-R INTERVAL: 134 MS
EKG Q-T INTERVAL: 378 MS
EKG QRS DURATION: 74 MS
EKG QTC CALCULATION (BAZETT): 441 MS
EKG R AXIS: -3 DEGREES
EKG T AXIS: 115 DEGREES
EKG VENTRICULAR RATE: 82 BPM
EPITHELIAL CELLS, UA: ABNORMAL /HPF
GFR AFRICAN AMERICAN: 32
GFR NON-AFRICAN AMERICAN: 32 ML/MIN/1.73
GLUCOSE BLD-MCNC: 87 MG/DL (ref 74–99)
GLUCOSE URINE: NEGATIVE MG/DL
HCT VFR BLD CALC: 33.7 % (ref 37–54)
HEMOGLOBIN: 10.4 G/DL (ref 12.5–16.5)
KETONES, URINE: NEGATIVE MG/DL
LEUKOCYTE ESTERASE, URINE: NEGATIVE
MCH RBC QN AUTO: 25.8 PG (ref 26–35)
MCHC RBC AUTO-ENTMCNC: 30.9 % (ref 32–34.5)
MCV RBC AUTO: 83.6 FL (ref 80–99.9)
METER GLUCOSE: 129 MG/DL (ref 74–99)
METER GLUCOSE: 73 MG/DL (ref 74–99)
METER GLUCOSE: 87 MG/DL (ref 74–99)
METER GLUCOSE: 97 MG/DL (ref 74–99)
NITRITE, URINE: NEGATIVE
OSMOLALITY URINE: 351 MOSM/KG (ref 300–900)
PDW BLD-RTO: 15.5 FL (ref 11.5–15)
PH UA: 5.5 (ref 5–9)
PHOSPHORUS: 3 MG/DL (ref 2.5–4.5)
PLATELET # BLD: 160 E9/L (ref 130–450)
PMV BLD AUTO: 9.7 FL (ref 7–12)
POTASSIUM SERPL-SCNC: 6.2 MMOL/L (ref 3.5–5)
PROTEIN UA: 100 MG/DL
RBC # BLD: 4.03 E12/L (ref 3.8–5.8)
RBC UA: ABNORMAL /HPF (ref 0–2)
REASON FOR REJECTION: NORMAL
REJECTED TEST: NORMAL
SODIUM BLD-SCNC: 130 MMOL/L (ref 132–146)
SODIUM URINE: 34 MMOL/L
SODIUM URINE: 39 MMOL/L
SPECIFIC GRAVITY UA: 1.02 (ref 1–1.03)
TROPONIN: 0.08 NG/ML (ref 0–0.03)
UROBILINOGEN, URINE: 0.2 E.U./DL
WBC # BLD: 2.7 E9/L (ref 4.5–11.5)
WBC UA: ABNORMAL /HPF (ref 0–5)

## 2021-04-10 PROCEDURE — 6360000002 HC RX W HCPCS: Performed by: PHYSICIAN ASSISTANT

## 2021-04-10 PROCEDURE — 81001 URINALYSIS AUTO W/SCOPE: CPT

## 2021-04-10 PROCEDURE — 87205 SMEAR GRAM STAIN: CPT

## 2021-04-10 PROCEDURE — 85027 COMPLETE CBC AUTOMATED: CPT

## 2021-04-10 PROCEDURE — 97161 PT EVAL LOW COMPLEX 20 MIN: CPT

## 2021-04-10 PROCEDURE — 6370000000 HC RX 637 (ALT 250 FOR IP): Performed by: PHYSICIAN ASSISTANT

## 2021-04-10 PROCEDURE — 36415 COLL VENOUS BLD VENIPUNCTURE: CPT

## 2021-04-10 PROCEDURE — 84100 ASSAY OF PHOSPHORUS: CPT

## 2021-04-10 PROCEDURE — 84484 ASSAY OF TROPONIN QUANT: CPT

## 2021-04-10 PROCEDURE — 2580000003 HC RX 258: Performed by: PHYSICIAN ASSISTANT

## 2021-04-10 PROCEDURE — 80048 BASIC METABOLIC PNL TOTAL CA: CPT

## 2021-04-10 PROCEDURE — 83935 ASSAY OF URINE OSMOLALITY: CPT

## 2021-04-10 PROCEDURE — 82436 ASSAY OF URINE CHLORIDE: CPT

## 2021-04-10 PROCEDURE — 6360000002 HC RX W HCPCS: Performed by: INTERNAL MEDICINE

## 2021-04-10 PROCEDURE — 93010 ELECTROCARDIOGRAM REPORT: CPT | Performed by: INTERNAL MEDICINE

## 2021-04-10 PROCEDURE — 82962 GLUCOSE BLOOD TEST: CPT

## 2021-04-10 PROCEDURE — 82570 ASSAY OF URINE CREATININE: CPT

## 2021-04-10 PROCEDURE — 84300 ASSAY OF URINE SODIUM: CPT

## 2021-04-10 PROCEDURE — 2060000000 HC ICU INTERMEDIATE R&B

## 2021-04-10 RX ORDER — SODIUM CHLORIDE 9 MG/ML
INJECTION, SOLUTION INTRAVENOUS CONTINUOUS
Status: DISCONTINUED | OUTPATIENT
Start: 2021-04-10 | End: 2021-04-12

## 2021-04-10 RX ORDER — SODIUM BICARBONATE 650 MG/1
650 TABLET ORAL DAILY
Status: DISCONTINUED | OUTPATIENT
Start: 2021-04-10 | End: 2021-04-12

## 2021-04-10 RX ORDER — ASPIRIN 81 MG/1
81 TABLET ORAL DAILY
Status: DISCONTINUED | OUTPATIENT
Start: 2021-04-10 | End: 2021-04-20 | Stop reason: HOSPADM

## 2021-04-10 RX ORDER — METOPROLOL SUCCINATE 25 MG/1
50 TABLET, EXTENDED RELEASE ORAL NIGHTLY
Status: DISCONTINUED | OUTPATIENT
Start: 2021-04-10 | End: 2021-04-20

## 2021-04-10 RX ORDER — PREDNISONE 1 MG/1
5 TABLET ORAL DAILY
Status: DISCONTINUED | OUTPATIENT
Start: 2021-04-10 | End: 2021-04-20 | Stop reason: HOSPADM

## 2021-04-10 RX ORDER — TIZANIDINE 4 MG/1
4 TABLET ORAL DAILY
Status: DISCONTINUED | OUTPATIENT
Start: 2021-04-10 | End: 2021-04-20 | Stop reason: HOSPADM

## 2021-04-10 RX ORDER — OXYCODONE HYDROCHLORIDE 5 MG/1
15 TABLET ORAL EVERY 6 HOURS PRN
Status: DISCONTINUED | OUTPATIENT
Start: 2021-04-10 | End: 2021-04-15

## 2021-04-10 RX ORDER — TACROLIMUS 1 MG/1
2 CAPSULE ORAL 2 TIMES DAILY
Status: DISCONTINUED | OUTPATIENT
Start: 2021-04-10 | End: 2021-04-20 | Stop reason: HOSPADM

## 2021-04-10 RX ORDER — SODIUM CHLORIDE 0.9 % (FLUSH) 0.9 %
5-40 SYRINGE (ML) INJECTION PRN
Status: DISCONTINUED | OUTPATIENT
Start: 2021-04-10 | End: 2021-04-20 | Stop reason: HOSPADM

## 2021-04-10 RX ORDER — ATORVASTATIN CALCIUM 10 MG/1
10 TABLET, FILM COATED ORAL NIGHTLY
Status: DISCONTINUED | OUTPATIENT
Start: 2021-04-10 | End: 2021-04-20 | Stop reason: HOSPADM

## 2021-04-10 RX ORDER — MYCOPHENOLATE MOFETIL 250 MG/1
1000 CAPSULE ORAL 2 TIMES DAILY
Status: DISCONTINUED | OUTPATIENT
Start: 2021-04-10 | End: 2021-04-20 | Stop reason: HOSPADM

## 2021-04-10 RX ORDER — ISOSORBIDE MONONITRATE 60 MG/1
60 TABLET, EXTENDED RELEASE ORAL DAILY
Status: DISCONTINUED | OUTPATIENT
Start: 2021-04-10 | End: 2021-04-12

## 2021-04-10 RX ORDER — ACETAMINOPHEN 325 MG/1
650 TABLET ORAL EVERY 6 HOURS PRN
Status: DISCONTINUED | OUTPATIENT
Start: 2021-04-10 | End: 2021-04-20 | Stop reason: HOSPADM

## 2021-04-10 RX ORDER — SODIUM CHLORIDE 9 MG/ML
25 INJECTION, SOLUTION INTRAVENOUS PRN
Status: DISCONTINUED | OUTPATIENT
Start: 2021-04-10 | End: 2021-04-20 | Stop reason: HOSPADM

## 2021-04-10 RX ORDER — ACETAMINOPHEN 650 MG/1
650 SUPPOSITORY RECTAL EVERY 6 HOURS PRN
Status: DISCONTINUED | OUTPATIENT
Start: 2021-04-10 | End: 2021-04-20 | Stop reason: HOSPADM

## 2021-04-10 RX ORDER — DEXTROSE MONOHYDRATE 25 G/50ML
12.5 INJECTION, SOLUTION INTRAVENOUS PRN
Status: DISCONTINUED | OUTPATIENT
Start: 2021-04-10 | End: 2021-04-20 | Stop reason: HOSPADM

## 2021-04-10 RX ORDER — POLYETHYLENE GLYCOL 3350 17 G/17G
17 POWDER, FOR SOLUTION ORAL DAILY PRN
Status: DISCONTINUED | OUTPATIENT
Start: 2021-04-10 | End: 2021-04-20 | Stop reason: HOSPADM

## 2021-04-10 RX ORDER — NICOTINE POLACRILEX 4 MG
15 LOZENGE BUCCAL PRN
Status: DISCONTINUED | OUTPATIENT
Start: 2021-04-10 | End: 2021-04-20 | Stop reason: HOSPADM

## 2021-04-10 RX ORDER — DEXTROSE MONOHYDRATE 50 MG/ML
100 INJECTION, SOLUTION INTRAVENOUS PRN
Status: DISCONTINUED | OUTPATIENT
Start: 2021-04-10 | End: 2021-04-20 | Stop reason: HOSPADM

## 2021-04-10 RX ORDER — SODIUM CHLORIDE 0.9 % (FLUSH) 0.9 %
5-40 SYRINGE (ML) INJECTION EVERY 12 HOURS SCHEDULED
Status: DISCONTINUED | OUTPATIENT
Start: 2021-04-10 | End: 2021-04-20 | Stop reason: HOSPADM

## 2021-04-10 RX ORDER — HEPARIN SODIUM 10000 [USP'U]/ML
5000 INJECTION, SOLUTION INTRAVENOUS; SUBCUTANEOUS EVERY 8 HOURS
Status: DISCONTINUED | OUTPATIENT
Start: 2021-04-10 | End: 2021-04-20 | Stop reason: HOSPADM

## 2021-04-10 RX ORDER — SIMVASTATIN 10 MG
10 TABLET ORAL DAILY
Status: DISCONTINUED | OUTPATIENT
Start: 2021-04-10 | End: 2021-04-10 | Stop reason: CLARIF

## 2021-04-10 RX ADMIN — Medication 10 ML: at 13:40

## 2021-04-10 RX ADMIN — TACROLIMUS 2 MG: 1 CAPSULE ORAL at 23:07

## 2021-04-10 RX ADMIN — METOPROLOL SUCCINATE 50 MG: 25 TABLET, EXTENDED RELEASE ORAL at 21:34

## 2021-04-10 RX ADMIN — ENOXAPARIN SODIUM 30 MG: 30 INJECTION, SOLUTION INTRAVENOUS; SUBCUTANEOUS at 09:26

## 2021-04-10 RX ADMIN — ISOSORBIDE MONONITRATE 60 MG: 60 TABLET, EXTENDED RELEASE ORAL at 10:19

## 2021-04-10 RX ADMIN — ATORVASTATIN CALCIUM 10 MG: 10 TABLET, FILM COATED ORAL at 21:34

## 2021-04-10 RX ADMIN — SODIUM CHLORIDE: 9 INJECTION, SOLUTION INTRAVENOUS at 03:02

## 2021-04-10 RX ADMIN — OXYCODONE HYDROCHLORIDE 15 MG: 5 TABLET ORAL at 21:35

## 2021-04-10 RX ADMIN — PREDNISONE 5 MG: 5 TABLET ORAL at 10:20

## 2021-04-10 RX ADMIN — MYCOPHENOLATE MOFETIL 1000 MG: 250 CAPSULE ORAL at 23:07

## 2021-04-10 RX ADMIN — ASPIRIN 81 MG: 81 TABLET, COATED ORAL at 09:26

## 2021-04-10 RX ADMIN — HYDRALAZINE HYDROCHLORIDE 75 MG: 50 TABLET ORAL at 17:14

## 2021-04-10 RX ADMIN — HYDRALAZINE HYDROCHLORIDE 75 MG: 50 TABLET ORAL at 06:25

## 2021-04-10 RX ADMIN — SODIUM BICARBONATE 650 MG: 650 TABLET ORAL at 10:19

## 2021-04-10 RX ADMIN — HYDRALAZINE HYDROCHLORIDE 75 MG: 50 TABLET ORAL at 21:34

## 2021-04-10 RX ADMIN — HEPARIN SODIUM 5000 UNITS: 10000 INJECTION INTRAVENOUS; SUBCUTANEOUS at 23:06

## 2021-04-10 RX ADMIN — MYCOPHENOLATE MOFETIL 1000 MG: 250 CAPSULE ORAL at 10:18

## 2021-04-10 ASSESSMENT — PAIN DESCRIPTION - LOCATION: LOCATION: BACK

## 2021-04-10 ASSESSMENT — PAIN SCALES - GENERAL
PAINLEVEL_OUTOF10: 8
PAINLEVEL_OUTOF10: 5

## 2021-04-10 ASSESSMENT — PAIN DESCRIPTION - FREQUENCY: FREQUENCY: INTERMITTENT

## 2021-04-10 ASSESSMENT — PAIN - FUNCTIONAL ASSESSMENT: PAIN_FUNCTIONAL_ASSESSMENT: PREVENTS OR INTERFERES SOME ACTIVE ACTIVITIES AND ADLS

## 2021-04-10 ASSESSMENT — PAIN DESCRIPTION - PROGRESSION: CLINICAL_PROGRESSION: GRADUALLY WORSENING

## 2021-04-10 ASSESSMENT — PAIN DESCRIPTION - PAIN TYPE: TYPE: CHRONIC PAIN

## 2021-04-10 ASSESSMENT — PAIN DESCRIPTION - ORIENTATION: ORIENTATION: LOWER

## 2021-04-10 NOTE — PROGRESS NOTES
Physical Therapy    Facility/Department: [address] INTERMEDIATE 1  Initial Assessment    NAME: Cortney Terry  : 1950  MRN: [id number]    Date of Service: 4/10/2021    Discharge Recommendations:           Assessment      REQUIRES PT FOLLOW UP: Yes       Patient Diagnosis(es): The primary encounter diagnosis was Acute kidney injury ([facility] [address] [address] Diagnoses of Dehydration and Fever, unspecified fever cause were also pertinent to this visit. has a past medical history of Anemia, Atrial fibrillation ([address] CHF (congestive heart failure) (HCC), Chronic diastolic congestive heart failure (HCC), Chronic foot pain, Chronic knee pain, Constipation, Depression, DM (diabetes mellitus) ([address] ESRD (end stage renal disease) ([address] H/O kidney transplant, Hyperlipidemia, Hypertension, Immunosuppression ([facility] [address] Kidney disease, and Pulmonary hypertension ([facility] [address] has a past surgical history that includes back surgery (); Upper gastrointestinal endoscopy (2007); [place] Gastric Bypass (2010); Cholecystectomy, laparoscopic (2013); ECHO Compl W Dop Color Flow (2013); Diagnostic Cardiac Cath Lab Procedure (01/15/2013); Foot surgery; Colonoscopy; Endoscopy, colon, diagnostic; Carpal tunnel release (Left, 2014); Kidney transplant (2014); Bladder surgery; and Kidney biopsy (2015). Evaluating Therapist: Lauri Man, PT     Referring Provider:   Dr. Zack Parker #: [id number]   DIAGNOSIS:  ALYSON  Additional Pertinent History: Admitted with fatigue   PRECAUTIONS:  Falls     Social:  Pt lives with  Spouse  in a  Bi level  floor plan   Prior to admission pt walked with  No AD, independent      Initial Evaluation  Date:  4/10/2021  Treatment      Short Term/ Long Term   Goals   Was pt agreeable to Eval/treatment? yes     Does pt have pain?  none reported      Bed Mobility  Rolling:  indepedent   Supine to sit:   Independent   Sit to supine: NT   Scooting:  Independent   Independent    Transfers Sit to medical history/social history and prior level of function, completion of standardized testing/informal observation of tasks, assessment of data and education on plan of care and goals.     CPT codes:  [x] Low Complexity PT evaluation 26734  [] Moderate Complexity PT evaluation 55492  [] High Complexity PT evaluation 17511  [] PT Re-evaluation 78431  [] Gait training 57484  minutes  [] Therapeutic activities 42290  minutes  [] Therapeutic exercises 46223  minutes  [] Neuromuscular reeducation 29274  minutes       Breann 18 number:  PT 5818

## 2021-04-10 NOTE — ED NOTES
Attempted to reconcile home medications with patient and wife, unable due to patient does not know medications and does not have his list from the [facility] with him.       Job Muñoz RN  04/09/21 2479

## 2021-04-10 NOTE — ED PROVIDER NOTES
HPI:  4/9/21,   Time: 8:49 PM EDT         Beulah Ormond is a 79 y.o. male presenting to the ED for nausea and weakness and fever and decreased appetite and increased urinary frequency and urgency, beginning 3 days ago. The complaint has been intermittent, moderate in severity, and worsened by nothing. No alleviating factors. No chest pain palpitations but complains of chronic shortness of breath but unchanged. No abdominal pain but does have upset stomach. ROS:   Pertinent positives and negatives are stated within HPI, all other systems reviewed and are negative.  --------------------------------------------- PAST HISTORY ---------------------------------------------  Past Medical History:  has a past medical history of Anemia, Atrial fibrillation ([facility] CHF (congestive heart failure) ([facility] [facility] Chronic foot pain, Chronic knee pain, Constipation, Depression, DM (diabetes mellitus) ([facility] [facility] ESRD (end stage renal disease) ([facility] [address] H/O kidney transplant, Hyperlipidemia, Hypertension, and Kidney disease. Past Surgical History:  has a past surgical history that includes back surgery (1982); Upper gastrointestinal endoscopy (12/21/2007); Tootie-en-Y Gastric Bypass (06/29/10); Cholecystectomy, laparoscopic (5-21-13); ECHO Compl W Dop Color Flow (5/22/2013); Diagnostic Cardiac Cath Lab Procedure (01/15/13 ); Foot surgery; Colonoscopy; Endoscopy, colon, diagnostic; Carpal tunnel release (Left, [id number]); Kidney transplant (12/12/2014); and Bladder surgery. Social History:  reports that he quit smoking about 31 years ago. His smoking use included cigarettes. He has a 20.00 pack-year smoking history. He has never used smokeless tobacco. He reports current alcohol use. He reports that he does not use drugs. Family History: family history includes Cancer in his father; Diabetes in his father and mother; Heart Disease in his father and mother; High Blood Pressure in his mother; Stroke in his father.      The patients home medications have been reviewed. Allergies: Patient has no known allergies. -------------------------------------------------- RESULTS -------------------------------------------------  All laboratory and radiology results have been personally reviewed by myself   LABS:  Results for orders placed or performed during the hospital encounter of 04/09/21   Rapid influenza A/B antigens    Specimen: Nares   Result Value Ref Range    Influenza A by PCR Not Detected Not Detected    Influenza B by PCR Not Detected Not Detected   CBC Auto Differential   Result Value Ref Range    WBC 3.9 (L) 4.5 - 11.5 E9/L    RBC 4.40 3.80 - 5.80 E12/L    Hemoglobin 11.4 (L) 12.5 - 16.5 g/dL    Hematocrit 36.6 (L) 37.0 - 54.0 %    MCV 83.2 80.0 - 99.9 fL    MCH 25.9 (L) 26.0 - 35.0 pg    MCHC 31.1 (L) 32.0 - 34.5 %    RDW 14.9 11.5 - 15.0 fL    Platelets 504 840 - 138 E9/L    MPV 9.9 7.0 - 12.0 fL    Neutrophils % 65.2 43.0 - 80.0 %    Immature Granulocytes % 0.5 0.0 - 5.0 %    Lymphocytes % 17.0 (L) 20.0 - 42.0 %    Monocytes % 17.0 (H) 2.0 - 12.0 %    Eosinophils % 0.0 0.0 - 6.0 %    Basophils % 0.3 0.0 - 2.0 %    Neutrophils Absolute 2.57 1.80 - 7.30 E9/L    Immature Granulocytes # 0.02 E9/L    Lymphocytes Absolute 0.67 (L) 1.50 - 4.00 E9/L    Monocytes Absolute 0.67 0.10 - 0.95 E9/L    Eosinophils Absolute 0.00 (L) 0.05 - 0.50 E9/L    Basophils Absolute 0.01 0.00 - 0.20 E9/L   Lactic Acid, Plasma   Result Value Ref Range    Lactic Acid 0.8 0.5 - 2.2 mmol/L   Lipase   Result Value Ref Range    Lipase 52 13 - 60 U/L   SPECIMEN REJECTION   Result Value Ref Range    Rejected Test cmpx. trop     Reason for Rejection see below    Comprehensive Metabolic Panel w/ Reflex to MG   Result Value Ref Range    Sodium 133 132 - 146 mmol/L    Potassium reflex Magnesium 4.4 3.5 - 5.0 mmol/L    Chloride 101 98 - 107 mmol/L    CO2 21 (L) 22 - 29 mmol/L    Anion Gap 11 7 - 16 mmol/L    Glucose 63 (L) 74 - 99 mg/dL    BUN 53 (H) 8 - 23 mg/dL    CREATININE MAKING----------------------  Medications   0.9 % sodium chloride bolus (0 mLs Intravenous Stopped 4/9/21 2303)   acetaminophen (TYLENOL) tablet 1,000 mg (1,000 mg Oral Given 4/9/21 2218)         Medical Decision Making:    Weakness and fever and urinary frequency rule out UTI or other source    Counseling: The emergency provider has spoken with the patient and discussed todays results, in addition to providing specific details for the plan of care and counseling regarding the diagnosis and prognosis. Questions are answered at this time and they are agreeable with the plan.      --------------------------------- IMPRESSION AND DISPOSITION ---------------------------------    IMPRESSION  1. Acute kidney injury ([facility] [address] New Problem   2. Dehydration New Problem   3.  Fever, unspecified fever cause New Problem       DISPOSITION  Disposition: Admit to telemetry  Patient condition is stable                  Anisha Dill MD  04/10/21 0010

## 2021-04-10 NOTE — ED NOTES
FIRST PROVIDER CONTACT ASSESSMENT NOTE      Department of Emergency Medicine   4/9/21  8:00 PM EDT    Chief Complaint: Fatigue (symptoms x2-3 days), Emesis, Diarrhea, and Anorexia      History of Present Illness:   Cory Corey is a 79 y.o. male who presents to the ED for weakness and fatigue for the last 2 to 3 days. He states he has no appetite had 1 episode of emesis and diarrhea. He denies any rectal bleeding, or chest pain. He denies any lightheadedness or dizziness. He denies any abdominal pain. Medical History:  has a past medical history of Anemia, Atrial fibrillation ([facility] CHF (congestive heart failure) ([facility] [address] Chronic foot pain, Chronic knee pain, Constipation, Depression, DM (diabetes mellitus) ([facility] [address] ESRD (end stage renal disease) ([facility] [address] H/O kidney transplant, Hyperlipidemia, Hypertension, and Kidney disease. Surgical History:  has a past surgical history that includes back surgery (1982); Upper gastrointestinal endoscopy (12/21/2007); Tootie-en-Y Gastric Bypass (06/29/10); Cholecystectomy, laparoscopic (5-21-13); ECHO Compl W Dop Color Flow (5/22/2013); Diagnostic Cardiac Cath Lab Procedure (01/15/13 ); Foot surgery; Colonoscopy; Endoscopy, colon, diagnostic; Carpal tunnel release (Left, [id number]); Kidney transplant (12/12/2014); and Bladder surgery. Social History:  reports that he quit smoking about 31 years ago. His smoking use included cigarettes. He has a 20.00 pack-year smoking history. He has never used smokeless tobacco. He reports current alcohol use. He reports that he does not use drugs. Family History: family history includes Cancer in his father; Diabetes in his father and mother; Heart Disease in his father and mother; High Blood Pressure in his mother; Stroke in his father. *ALLERGIES*     Patient has no known allergies.      Physical Exam:      VS:  /61   Pulse 85   Temp 100.9 °F (38.3 °C) (Oral)   Resp 17   Ht 5' 10\" (1.778 m)   Wt 238 lb (108 kg)   SpO2 94% BMI 34.15 kg/m²      Initial Plan of Care:  Initiate Treatment-Testing, Proceed toTreatment Area When Bed Available for ED Attending/MLP to Continue Care    -----------------END OF FIRST PROVIDER CONTACT ASSESSMENT NOTE--------------  Electronically signed by CHAI Liz CNP   DD: 4/9/21             CHAI Liz CNP  04/09/21 2001

## 2021-04-10 NOTE — H&P
History & Physical      PCP: GUIDO GERONIMO III, DO    Date of Admission: 4/9/2021    Date of Service: Pt seen/examined on 4/9/2021 and is   admitted to Inpatient with expected LOS greater than two midnights due to medical therapy. Chief Complaint:  had concerns including Fatigue (symptoms x2-3 days), Emesis, Diarrhea, and Anorexia. History Of Present Illness:    Mr. Mya Brooke, a 79y.o. year old male  who  has a past medical history of Anemia, Atrial fibrillation ([address] CHF (congestive heart failure) ([address] Chronic diastolic congestive heart failure ([address] Chronic foot pain, Chronic knee pain, Constipation, Depression, DM (diabetes mellitus) ([address] ESRD (end stage renal disease) ([address] H/O kidney transplant, Hyperlipidemia, Hypertension, Immunosuppression ([address] Kidney disease, and Pulmonary hypertension ([address] Patient has a known history of heart failure and he is followed with cardiology including having a heart catheterization last September. He has known history of chronic kidney disease stage III-IV. He had sudden decline in his appetite and appeared to not want to eat at all. He was seen in the ER and found to have a significant bump in his creatinine. He is now being brought in for further care.           Past Medical History:        Diagnosis Date    Anemia     Iron deficiency    Atrial fibrillation (HCC)     CHF (congestive heart failure) (HCC) 03/12/2008    Chronic diastolic congestive heart failure ([address] 03/12/2008    Chronic foot pain     Chronic knee pain     BILATERAL    Constipation     Depression     DM (diabetes mellitus) ([address]     ESRD (end stage renal disease) ([address]     H/O kidney transplant 12/12/2014    Hyperlipidemia     Hypertension     Immunosuppression ([address] 3/12/2008    Kidney disease     Pulmonary hypertension ([address]        Past Surgical History:        Procedure Laterality Date    BACK SURGERY  1982    BLADDER SURGERY      CARPAL TUNNEL RELEASE Left 06/23/2014    CHOLECYSTECTOMY, LAPAROSCOPIC  05/21/2013    COLONOSCOPY      DIAGNOSTIC CARDIAC CATH LAB PROCEDURE  01/15/2013    NORMAL    ECHO COMPL W DOP COLOR FLOW  05/22/2013         ENDOSCOPY, COLON, DIAGNOSTIC      FOOT SURGERY      BONE REMOVED    KIDNEY BIOPSY  06/09/2015    [facility] ; ALSO 4/14/2016, 4/5/2017    KIDNEY TRANSPLANT  12/12/2014    @ [facility]    EDUARDO-EN-Y GASTRIC BYPASS  06/29/2010    Laparoscopic / Dr. Ulysses Dauphin  12/21/2007    Dr. Yolie Calhoun       Medications Prior to Admission:      Prior to Admission medications    Medication Sig Start Date End Date Taking? Authorizing Provider   tiZANidine (ZANAFLEX) 4 MG tablet Take 4 mg by mouth daily   Yes Historical Provider, MD   simvastatin (ZOCOR) 10 MG tablet Take 10 mg by mouth daily   Yes Historical Provider, MD   furosemide (LASIX) 40 MG tablet Take 1.5 tablets by mouth 2 times daily  Patient taking differently: Take 60 mg by mouth daily Takes a second dose if needed for edema 9/2/20  Yes Rafa Blackmon MD   metoprolol succinate (TOPROL XL) 50 MG extended release tablet Take 1 tablet by mouth 2 times daily  Patient taking differently: Take 50 mg by mouth nightly  5/1/17  Yes Adrienne Pop MD   prednisoLONE 5 MG TABS Take 5 mg by mouth daily    Yes Historical Provider, MD   Multiple Vitamin (MULTI VITAMIN DAILY PO) Take by mouth daily   Yes Historical Provider, MD   Cholecalciferol (VITAMIN D3) 5000 UNITS TABS Take by mouth daily   Yes Historical Provider, MD   famotidine (PEPCID) 20 MG tablet Take 20 mg by mouth daily   Yes Historical Provider, MD   LANTUS SOLOSTAR 100 UNIT/ML SOPN Inject 17 Units into the skin daily  6/3/15  Yes Historical Provider, MD   insulin lispro (HUMALOG KWIKPEN) 100 UNIT/ML SOPN Inject 5 Units into the skin daily (before lunch)   Yes Historical Provider, MD   docusate sodium (COLACE) 100 MG capsule Take 1 capsule by mouth 2 times daily as needed for Constipation.   Patient taking differently: Take 100 mg by mouth daily as needed for Constipation  7/26/13  Yes Giorgio Nolasco MD   linagliptin (TRADJENTA) 5 MG tablet Take 5 mg by mouth daily. Yes Historical Provider, MD   aspirin 81 MG EC tablet Take 81 mg by mouth daily. Yes Historical Provider, MD   isosorbide mononitrate (IMDUR) 60 MG extended release tablet TAKE 1 TABLET DAILY 3/6/18   Randall Bee MD   hydrALAZINE (APRESOLINE) 50 MG tablet Take 1 tablet by mouth every 8 hours  Patient taking differently: Take 75 mg by mouth every 8 hours  5/1/17   Cee Kaiser MD   tacrolimus (PROGRAF) 1 MG capsule Take 2 capsules by mouth 2 times daily 5/1/17   Cee Kaiser MD   sodium bicarbonate 650 MG tablet Take 650 mg by mouth daily    Historical Provider, MD   mycophenolate (CELLCEPT) 250 MG capsule   Take 1,000 mg by mouth 2 times daily  6/18/15   Historical Provider, MD   oxyCODONE (OXY-IR) 15 MG immediate release tablet Take 15 mg by mouth every 6 hours as needed. Indications: Pain    Historical Provider, MD       Allergies:  Patient has no known allergies. Social History:    TOBACCO:   reports that he quit smoking about 31 years ago. His smoking use included cigarettes. He has a 20.00 pack-year smoking history. He has never used smokeless tobacco.  ETOH:   reports current alcohol use. Family History:    Reviewed in detail and negative for DM, CAD, Cancer, CVA. Positive as follows\"      Problem Relation Age of Onset    Diabetes Mother     High Blood Pressure Mother     Heart Disease Mother     Diabetes Father     Cancer Father     Stroke Father     Heart Disease Father        REVIEW OF SYSTEMS:   Pertinent positives as noted in the HPI. All other systems reviewed and negative.   Negative for fevers negative chills  Positive fatigue positive for weakness  Emesis positive diarrhea positive  Loss of appetite positive  Negative hemiparesis hemianesthesia    PHYSICAL EXAM:  BP (!) 143/83   Pulse 98   Temp 98 °F (36.7 °C) (Oral) Extended Emergency Contact Information  Primary Emergency Contact: Meghan Boucher  Address: [address] [place] [address], [address] [place] [address] [address] [facility] Phone: [phone]  Relation: Spouse  Secondary Emergency Contact: Savanah Odom [address] Phone: [phone]  Mobile Phone: [phone]  Relation: Child      DVT Prophylaxis: []Lovenox [x]Heparin []PCD [] [address] []Encouraged ambulation  Disposition: []Med/Surg [x] Intermediate [] ICU/CCU  Admit status: [] Observation [x] Inpatient     +++++++++++++++++++++++++++++++++++++++++++++++++  [address]  +++++++++++++++++++++++++++++++++++++++++++++++++  NOTE: This report was transcribed using voice recognition software. Every effort was made to ensure accuracy; however, inadvertent computerized transcription errors may be present.

## 2021-04-10 NOTE — ED NOTES
Assumed care of patient at this time, pt resting comfortably with no acute signs/symptoms of distress     Alvenia TARIK Prasad  04/10/21 6750

## 2021-04-11 ENCOUNTER — APPOINTMENT (OUTPATIENT)
Dept: ULTRASOUND IMAGING | Age: 71
DRG: 698 | End: 2021-04-11
Payer: MEDICARE

## 2021-04-11 LAB
ALBUMIN SERPL-MCNC: 3.5 G/DL (ref 3.5–5.2)
ALP BLD-CCNC: 47 U/L (ref 40–129)
ALT SERPL-CCNC: 15 U/L (ref 0–40)
ANION GAP SERPL CALCULATED.3IONS-SCNC: 10 MMOL/L (ref 7–16)
ANION GAP SERPL CALCULATED.3IONS-SCNC: 11 MMOL/L (ref 7–16)
AST SERPL-CCNC: 42 U/L (ref 0–39)
BASOPHILS ABSOLUTE: 0 E9/L (ref 0–0.2)
BASOPHILS RELATIVE PERCENT: 0 % (ref 0–2)
BILIRUB SERPL-MCNC: 0.4 MG/DL (ref 0–1.2)
BUN BLDV-MCNC: 43 MG/DL (ref 8–23)
BUN BLDV-MCNC: 43 MG/DL (ref 8–23)
CALCIUM SERPL-MCNC: 8.3 MG/DL (ref 8.6–10.2)
CALCIUM SERPL-MCNC: 8.4 MG/DL (ref 8.6–10.2)
CHLORIDE BLD-SCNC: 102 MMOL/L (ref 98–107)
CHLORIDE BLD-SCNC: 104 MMOL/L (ref 98–107)
CO2: 18 MMOL/L (ref 22–29)
CO2: 21 MMOL/L (ref 22–29)
CREAT SERPL-MCNC: 2.4 MG/DL (ref 0.7–1.2)
CREAT SERPL-MCNC: 2.5 MG/DL (ref 0.7–1.2)
EOSINOPHIL, URINE: 0 % (ref 0–1)
EOSINOPHILS ABSOLUTE: 0 E9/L (ref 0.05–0.5)
EOSINOPHILS RELATIVE PERCENT: 0 % (ref 0–6)
GFR AFRICAN AMERICAN: 31
GFR AFRICAN AMERICAN: 32
GFR NON-AFRICAN AMERICAN: 31 ML/MIN/1.73
GFR NON-AFRICAN AMERICAN: 32 ML/MIN/1.73
GLUCOSE BLD-MCNC: 157 MG/DL (ref 74–99)
GLUCOSE BLD-MCNC: 67 MG/DL (ref 74–99)
HCT VFR BLD CALC: 36.5 % (ref 37–54)
HEMOGLOBIN: 11.3 G/DL (ref 12.5–16.5)
IMMATURE GRANULOCYTES #: 0.02 E9/L
IMMATURE GRANULOCYTES %: 0.5 % (ref 0–5)
LYMPHOCYTES ABSOLUTE: 0.73 E9/L (ref 1.5–4)
LYMPHOCYTES RELATIVE PERCENT: 17.6 % (ref 20–42)
MAGNESIUM: 1.9 MG/DL (ref 1.6–2.6)
MCH RBC QN AUTO: 25.9 PG (ref 26–35)
MCHC RBC AUTO-ENTMCNC: 31 % (ref 32–34.5)
MCV RBC AUTO: 83.7 FL (ref 80–99.9)
METER GLUCOSE: 134 MG/DL (ref 74–99)
METER GLUCOSE: 155 MG/DL (ref 74–99)
METER GLUCOSE: 175 MG/DL (ref 74–99)
METER GLUCOSE: 61 MG/DL (ref 74–99)
MONOCYTES ABSOLUTE: 0.48 E9/L (ref 0.1–0.95)
MONOCYTES RELATIVE PERCENT: 11.6 % (ref 2–12)
NEUTROPHILS ABSOLUTE: 2.91 E9/L (ref 1.8–7.3)
NEUTROPHILS RELATIVE PERCENT: 70.3 % (ref 43–80)
PDW BLD-RTO: 14.9 FL (ref 11.5–15)
PHOSPHORUS: 2.7 MG/DL (ref 2.5–4.5)
PLATELET # BLD: 185 E9/L (ref 130–450)
PMV BLD AUTO: 10.2 FL (ref 7–12)
POTASSIUM REFLEX MAGNESIUM: 5.1 MMOL/L (ref 3.5–5)
POTASSIUM SERPL-SCNC: 4.9 MMOL/L (ref 3.5–5)
POTASSIUM SERPL-SCNC: 5.1 MMOL/L (ref 3.5–5)
PRO-BNP: 4233 PG/ML (ref 0–125)
RBC # BLD: 4.36 E12/L (ref 3.8–5.8)
SODIUM BLD-SCNC: 131 MMOL/L (ref 132–146)
SODIUM BLD-SCNC: 135 MMOL/L (ref 132–146)
TOTAL PROTEIN: 6.1 G/DL (ref 6.4–8.3)
WBC # BLD: 4.1 E9/L (ref 4.5–11.5)

## 2021-04-11 PROCEDURE — 36415 COLL VENOUS BLD VENIPUNCTURE: CPT

## 2021-04-11 PROCEDURE — 80197 ASSAY OF TACROLIMUS: CPT

## 2021-04-11 PROCEDURE — 6360000002 HC RX W HCPCS: Performed by: PHYSICIAN ASSISTANT

## 2021-04-11 PROCEDURE — 85025 COMPLETE CBC W/AUTO DIFF WBC: CPT

## 2021-04-11 PROCEDURE — 2060000000 HC ICU INTERMEDIATE R&B

## 2021-04-11 PROCEDURE — 84100 ASSAY OF PHOSPHORUS: CPT

## 2021-04-11 PROCEDURE — 83880 ASSAY OF NATRIURETIC PEPTIDE: CPT

## 2021-04-11 PROCEDURE — 83735 ASSAY OF MAGNESIUM: CPT

## 2021-04-11 PROCEDURE — 6370000000 HC RX 637 (ALT 250 FOR IP): Performed by: PHYSICIAN ASSISTANT

## 2021-04-11 PROCEDURE — 80048 BASIC METABOLIC PNL TOTAL CA: CPT

## 2021-04-11 PROCEDURE — 76770 US EXAM ABDO BACK WALL COMP: CPT

## 2021-04-11 PROCEDURE — 82962 GLUCOSE BLOOD TEST: CPT

## 2021-04-11 PROCEDURE — 2580000003 HC RX 258: Performed by: PHYSICIAN ASSISTANT

## 2021-04-11 PROCEDURE — 6360000002 HC RX W HCPCS: Performed by: INTERNAL MEDICINE

## 2021-04-11 PROCEDURE — 80053 COMPREHEN METABOLIC PANEL: CPT

## 2021-04-11 RX ADMIN — Medication 10 ML: at 08:59

## 2021-04-11 RX ADMIN — HEPARIN SODIUM 5000 UNITS: 10000 INJECTION INTRAVENOUS; SUBCUTANEOUS at 05:25

## 2021-04-11 RX ADMIN — Medication 10 ML: at 20:06

## 2021-04-11 RX ADMIN — PREDNISONE 5 MG: 5 TABLET ORAL at 08:59

## 2021-04-11 RX ADMIN — ACETAMINOPHEN 650 MG: 325 TABLET ORAL at 20:06

## 2021-04-11 RX ADMIN — TIZANIDINE 4 MG: 4 TABLET ORAL at 08:59

## 2021-04-11 RX ADMIN — HYDRALAZINE HYDROCHLORIDE 75 MG: 50 TABLET ORAL at 23:13

## 2021-04-11 RX ADMIN — ISOSORBIDE MONONITRATE 60 MG: 60 TABLET, EXTENDED RELEASE ORAL at 08:59

## 2021-04-11 RX ADMIN — SODIUM BICARBONATE 650 MG: 650 TABLET ORAL at 08:59

## 2021-04-11 RX ADMIN — TACROLIMUS 2 MG: 1 CAPSULE ORAL at 08:59

## 2021-04-11 RX ADMIN — HEPARIN SODIUM 5000 UNITS: 10000 INJECTION INTRAVENOUS; SUBCUTANEOUS at 20:08

## 2021-04-11 RX ADMIN — HYDRALAZINE HYDROCHLORIDE 75 MG: 50 TABLET ORAL at 06:47

## 2021-04-11 RX ADMIN — ATORVASTATIN CALCIUM 10 MG: 10 TABLET, FILM COATED ORAL at 20:06

## 2021-04-11 RX ADMIN — ASPIRIN 81 MG: 81 TABLET, COATED ORAL at 08:59

## 2021-04-11 RX ADMIN — TACROLIMUS 2 MG: 1 CAPSULE ORAL at 20:30

## 2021-04-11 RX ADMIN — HEPARIN SODIUM 5000 UNITS: 10000 INJECTION INTRAVENOUS; SUBCUTANEOUS at 12:03

## 2021-04-11 RX ADMIN — MYCOPHENOLATE MOFETIL 1000 MG: 250 CAPSULE ORAL at 08:59

## 2021-04-11 RX ADMIN — INSULIN LISPRO 1 UNITS: 100 INJECTION, SOLUTION INTRAVENOUS; SUBCUTANEOUS at 17:31

## 2021-04-11 RX ADMIN — HYDRALAZINE HYDROCHLORIDE 75 MG: 50 TABLET ORAL at 14:07

## 2021-04-11 RX ADMIN — METOPROLOL SUCCINATE 50 MG: 25 TABLET, EXTENDED RELEASE ORAL at 20:06

## 2021-04-11 RX ADMIN — MYCOPHENOLATE MOFETIL 1000 MG: 250 CAPSULE ORAL at 20:30

## 2021-04-11 RX ADMIN — INSULIN LISPRO 1 UNITS: 100 INJECTION, SOLUTION INTRAVENOUS; SUBCUTANEOUS at 12:04

## 2021-04-11 RX ADMIN — OXYCODONE HYDROCHLORIDE 15 MG: 5 TABLET ORAL at 23:13

## 2021-04-11 RX ADMIN — SODIUM CHLORIDE: 9 INJECTION, SOLUTION INTRAVENOUS at 01:34

## 2021-04-11 ASSESSMENT — PAIN SCALES - GENERAL
PAINLEVEL_OUTOF10: 6
PAINLEVEL_OUTOF10: 10
PAINLEVEL_OUTOF10: 0
PAINLEVEL_OUTOF10: 0

## 2021-04-11 ASSESSMENT — PAIN DESCRIPTION - LOCATION
LOCATION: BACK

## 2021-04-11 ASSESSMENT — PAIN DESCRIPTION - ORIENTATION
ORIENTATION: LOWER

## 2021-04-11 ASSESSMENT — PAIN DESCRIPTION - PAIN TYPE
TYPE: CHRONIC PAIN

## 2021-04-11 ASSESSMENT — PAIN DESCRIPTION - PROGRESSION
CLINICAL_PROGRESSION: GRADUALLY WORSENING
CLINICAL_PROGRESSION: GRADUALLY WORSENING

## 2021-04-11 ASSESSMENT — PAIN SCALES - WONG BAKER: WONGBAKER_NUMERICALRESPONSE: 0

## 2021-04-11 ASSESSMENT — PAIN DESCRIPTION - DESCRIPTORS
DESCRIPTORS: ACHING;DISCOMFORT;CONSTANT
DESCRIPTORS: CONSTANT;SHOOTING;TENDER
DESCRIPTORS: DISCOMFORT;ACHING;CONSTANT

## 2021-04-11 ASSESSMENT — PAIN DESCRIPTION - FREQUENCY: FREQUENCY: INTERMITTENT

## 2021-04-11 ASSESSMENT — PAIN - FUNCTIONAL ASSESSMENT
PAIN_FUNCTIONAL_ASSESSMENT: PREVENTS OR INTERFERES SOME ACTIVE ACTIVITIES AND ADLS
PAIN_FUNCTIONAL_ASSESSMENT: ACTIVITIES ARE NOT PREVENTED
PAIN_FUNCTIONAL_ASSESSMENT: PREVENTS OR INTERFERES SOME ACTIVE ACTIVITIES AND ADLS

## 2021-04-11 NOTE — PROGRESS NOTES
JESUS Moran   15 mg at 04/10/21 2135    sodium bicarbonate tablet 650 mg  650 mg Oral Daily Richy Garcia, [address]   650 mg at 04/11/21 0859    tacrolimus (PROGRAF) capsule 2 mg  2 mg Oral BID JESUS Moran   2 mg at 04/11/21 0859    tiZANidine (ZANAFLEX) tablet 4 mg  4 mg Oral Daily JESUS Moran   4 mg at 04/11/21 0859    0.9 % sodium chloride infusion   Intravenous Continuous Clifford Zhang  mL/hr at 04/11/21 0134 New Bag at 04/11/21 0134    sodium chloride flush 0.9 % injection 5-40 mL  5-40 mL Intravenous 2 times per day JESUS Moran   10 mL at 04/11/21 0859    sodium chloride flush 0.9 % injection 5-40 mL  5-40 mL Intravenous PRN JESUS Moran        0.9 % sodium chloride infusion  25 mL Intravenous PRN JESUS Moran        acetaminophen (TYLENOL) tablet 650 mg  650 mg Oral Q6H PRN JESUS Moran        Or    acetaminophen (TYLENOL) suppository 650 mg  650 mg Rectal Q6H PRN JESUS Moran        insulin lispro (HUMALOG) injection vial 0-6 Units  0-6 Units Subcutaneous TID WC JESUS Moran   1 Units at 04/11/21 1204    insulin lispro (HUMALOG) injection vial 0-3 Units  0-3 Units Subcutaneous Nightly JESUS Moran        glucose (GLUTOSE) 40 % oral gel 15 g  15 g Oral PRN JESUS Moran        dextrose 50 % IV solution  12.5 g Intravenous PRN JESUS Moran        glucagon (rDNA) injection 1 mg  1 mg Intramuscular PRN JESUS Moran        dextrose 5 % solution  100 mL/hr Intravenous PRN JESUS Moran        polyethylene glycol (GLYCOLAX) packet 17 g  17 g Oral Daily PRN JESUS Moran        trimethobenzamide (TIGAN) injection 200 mg  200 mg Intramuscular Q6H PRN JESUS Moran        predniSONE (DELTASONE) tablet 5 mg  5 mg Oral Daily JESUS Moran   5 mg at 04/11/21 0859    atorvastatin (LIPITOR) tablet 10 mg  10 mg Oral Nightly JESUS Moran   10 mg at 04/10/21 2134    heparin (porcine) injection 5,000 Units  5,000 Units Subcutaneous Q8H Derik Bedolla MD   5,000 Units at 04/11/21 1203       XR CHEST PORTABLE   Final Result   Possible left costophrenic angle opacity. US RETROPERITONEAL COMPLETE    (Results Pending)         Labs:    CBC:   Recent Labs     04/09/21  2102 04/10/21  2025 04/11/21  0322   WBC 3.9* 2.7* 4.1*   HGB 11.4* 10.4* 11.3*    160 185        Lab Results   Component Value Date    IRON 10 (L) 04/28/2017    TIBC 215 (L) 04/28/2017    FERRITIN 97 04/28/2017       Lab Results   Component Value Date     (H) 04/28/2017     (H) 06/03/2014    CALCIUM 8.4 (L) 04/11/2021    CALCIUM 8.0 (L) 04/10/2021    CALCIUM 8.7 04/09/2021    CAION 1.27 04/29/2017    CAION 1.21 04/28/2017    CAION 1.20 04/27/2017    PHOS 2.7 04/11/2021    PHOS 3.0 04/10/2021    PHOS 2.4 (L) 05/01/2017    MG 1.9 04/11/2021    MG 2.3 05/29/2019    MG 1.9 05/01/2017       BMP:   Recent Labs     04/09/21  2214 04/10/21  2209 04/11/21  0322    130* 135   K 4.4 6.2* 5.1*  5.1*    102 104   CO2 21* 19* 21*   BUN 53* 43* 43*   CREATININE 3.4* 2.4* 2.5*   GLUCOSE 63* 87 67*             Assessment: / Plan:    1. Acute kidney injury. Acute kidney injury secondary to volume depletion. Serum creatinine has improved. It is still above plateau. Will recheck again and if lower will scale back or discontinue IV fluids. 2.  Hypertension with chronic kidney disease stage G1 to G4. Blood pressure at target of less than 130/80 mmHg. 3.   Hyperkalemia. Hyperkalemia is resolved. 4.   Metabolic acidosis. Metabolic acidosis improved with improved renal function. Will follow. 5.  Renal transplant. Continue current immunosuppression. 6.   Anemia of chronic kidney disease. We'll check serum iron studies. Hemoglobin target 10 mg/dL. 7.   Hyponatremia. Patient with hypovolemic hyponatremia. Improved. Piotr Nieves MD  Nephrology  Electronically signed by Elyse Smith MD on 4/11/2021 at 2:57 PM      Note: This report was completed utilizing computer voice recognition software. Every effort has been made to ensure accuracy, however; inadvertent computerized transcription errors may be present.

## 2021-04-11 NOTE — PROGRESS NOTES
Internal Medicine Progress Note      Synopsis: Patient admitted on 4/9/2021    Subjective    Patient is in his room. IV fluids still on board. He is eating better he tells me  He wants to get out of here he tells me  Afebrile. Does not want a Suazo's catheter    Exam:  /70   Pulse 72   Temp 98.1 °F (36.7 °C) (Oral)   Resp 16   Ht 5' 10\" (1.778 m)   Wt 223 lb 8.7 oz (101.4 kg)   SpO2 94%   BMI 32.08 kg/m²   General appearance: No apparent distress, appears stated age and cooperative. HEENT: Normal cephalic, atraumatic without obvious deformity. Pupils equal, round, and reactive to light. Extra ocular muscles intact. Conjunctivae/corneas clear. Neck: Supple, with full range of motion. No jugular venous distention. Trachea midline. Respiratory: Decreased  Cardiovascular: Regular heart rate rhythm  Abdomen: Nontender  Musculoskeletal: No clubbing but skin is thick on bilateral lower extremities   skin: Normal skin color. No rashes or lesions. Neurologic:  Neurovascularly intact without any focal sensory/motor deficits.  Cranial nerves: II-XII intact, grossly non-focal.  Psychiatric: Alert and oriented, thought content appropriate, normal insight       Medications:  Reviewed    Infusion Medications    sodium chloride 100 mL/hr at 04/11/21 0134    sodium chloride      dextrose       Scheduled Medications    aspirin  81 mg Oral Daily    hydrALAZINE  75 mg Oral 3 times per day    isosorbide mononitrate  60 mg Oral Daily    metoprolol succinate  50 mg Oral Nightly    mycophenolate  1,000 mg Oral BID    sodium bicarbonate  650 mg Oral Daily    tacrolimus  2 mg Oral BID    tiZANidine  4 mg Oral Daily    sodium chloride flush  5-40 mL Intravenous 2 times per day    insulin lispro  0-6 Units Subcutaneous TID WC    insulin lispro  0-3 Units Subcutaneous Nightly    predniSONE  5 mg Oral Daily    atorvastatin  10 mg Oral Nightly    heparin (porcine)  5,000 Units Subcutaneous Q8H     PRN Meds: oxyCODONE, sodium chloride flush, sodium chloride, acetaminophen **OR** acetaminophen, glucose, dextrose, glucagon (rDNA), dextrose, polyethylene glycol, trimethobenzamide    I/O    Intake/Output Summary (Last 24 hours) at 4/11/2021 1431  Last data filed at 4/11/2021 1205  Gross per 24 hour   Intake 2054 ml   Output 600 ml   Net 1454 ml       Labs:   Recent Labs     04/09/21  2102 04/10/21  2025 04/11/21  0322   WBC 3.9* 2.7* 4.1*   HGB 11.4* 10.4* 11.3*   HCT 36.6* 33.7* 36.5*    160 185       Recent Labs     04/09/21  2214 04/10/21  2209 04/11/21  0322    130* 135   K 4.4 6.2* 5.1*  5.1*    102 104   CO2 21* 19* 21*   BUN 53* 43* 43*   CREATININE 3.4* 2.4* 2.5*   CALCIUM 8.7 8.0* 8.4*   PHOS  --  3.0 2.7       Recent Labs     04/09/21 2102 04/09/21 2214 04/11/21  0322   PROT  --  6.3* 6.1*   ALKPHOS  --  49 47   ALT  --  16 15   AST  --  44* 42*   BILITOT  --  0.4 0.4   LIPASE 52  --   --        No results for input(s): INR in the last 72 hours. Recent Labs     04/09/21  2214 04/10/21  0301   TROPONINI 0.10* 0.08*       Chronic labs:  Lab Results   Component Value Date    CHOL 128 06/03/2014    TRIG 50 06/03/2014    HDL 51 06/03/2014    LDLCALC 67 06/03/2014    TSH 0.996 06/03/2014    INR 1.0 08/31/2020    LABA1C 5.5 06/03/2014       Radiology:  Xr Chest Portable    Result Date: 4/9/2021  EXAMINATION: ONE XRAY VIEW OF THE CHEST 4/9/2021 8:59 pm COMPARISON: 05/29/2019 HISTORY: ORDERING SYSTEM PROVIDED HISTORY: Fever TECHNOLOGIST PROVIDED HISTORY: Reason for exam:->Fever FINDINGS: Possible left costophrenic angle opacity. There is no effusion or pneumothorax. The cardiomediastinal silhouette is without acute process. The osseous structures are without acute process. Possible left costophrenic angle opacity.      ASSESSMENT:    Principal Problem:    Acute nontraumatic kidney injury ([address]  Active Problems:    CKD (chronic kidney disease) stage 4, GFR 15-29 ml/min (HCC)    Diabetes mellitus, type 2 ([address]    HTN (hypertension), benign    ALYSON (acute kidney injury) ([address]    Vomiting and diarrhea    Chronic diastolic congestive heart failure (HCC)    Immunosuppression (HCC)  Resolved Problems:    * No resolved hospital problems. *       PLAN:    `1.  He has tolerated his iv fluids pretty well so far but we can decrease them with his known history of heart failure  2. Creatinine has normalized fairly fast close to his baseline  3. Taking an oral medications and food and fluids pretty well  4. May be able to discharge by tomorrow. 5.  Order renal ultrasound    Diet: DIET GENERAL; Low Potassium  Code Status: Full Code  PT/OT Eval Status:   Order  DVT Prophylaxis:   In place  Recommended disposition at discharge:   Home    +++++++++++++++++++++++++++++++++++++++++++++++++  Nova Mo MD   [facility].  +++++++++++++++++++++++++++++++++++++++++++++++++  NOTE: This report was transcribed using voice recognition software. Every effort was made to ensure accuracy; however, inadvertent computerized transcription errors may be present.

## 2021-04-11 NOTE — CARE COORDINATION
Met w/ patient. Explained role of  and plan of care. Lives alone in a bilevel house-no steps to entrance. Independent PTA. Drives. Has CPAP issued through [employer]. PCP is Dr. Graeme Tadeo and pharmacy is [facility]. PT am-pac 22. Per pt, plan is to return home on discharge- states has no needs.  Angelina Mckeon

## 2021-04-12 LAB
ANION GAP SERPL CALCULATED.3IONS-SCNC: 8 MMOL/L (ref 7–16)
BUN BLDV-MCNC: 41 MG/DL (ref 8–23)
CALCIUM SERPL-MCNC: 8.1 MG/DL (ref 8.6–10.2)
CHLORIDE BLD-SCNC: 108 MMOL/L (ref 98–107)
CO2: 19 MMOL/L (ref 22–29)
CREAT SERPL-MCNC: 2.3 MG/DL (ref 0.7–1.2)
GFR AFRICAN AMERICAN: 34
GFR NON-AFRICAN AMERICAN: 34 ML/MIN/1.73
GLUCOSE BLD-MCNC: 81 MG/DL (ref 74–99)
HCT VFR BLD CALC: 34.8 % (ref 37–54)
HEMOGLOBIN: 10.5 G/DL (ref 12.5–16.5)
MAGNESIUM: 1.9 MG/DL (ref 1.6–2.6)
MCH RBC QN AUTO: 25.7 PG (ref 26–35)
MCHC RBC AUTO-ENTMCNC: 30.2 % (ref 32–34.5)
MCV RBC AUTO: 85.1 FL (ref 80–99.9)
METER GLUCOSE: 125 MG/DL (ref 74–99)
METER GLUCOSE: 175 MG/DL (ref 74–99)
METER GLUCOSE: 224 MG/DL (ref 74–99)
METER GLUCOSE: 78 MG/DL (ref 74–99)
PDW BLD-RTO: 15.2 FL (ref 11.5–15)
PHOSPHORUS: 3 MG/DL (ref 2.5–4.5)
PLATELET # BLD: 187 E9/L (ref 130–450)
PMV BLD AUTO: 9.7 FL (ref 7–12)
POTASSIUM SERPL-SCNC: 4.6 MMOL/L (ref 3.5–5)
PRO-BNP: 4801 PG/ML (ref 0–125)
RBC # BLD: 4.09 E12/L (ref 3.8–5.8)
SODIUM BLD-SCNC: 135 MMOL/L (ref 132–146)
WBC # BLD: 3.4 E9/L (ref 4.5–11.5)

## 2021-04-12 PROCEDURE — 85027 COMPLETE CBC AUTOMATED: CPT

## 2021-04-12 PROCEDURE — 80048 BASIC METABOLIC PNL TOTAL CA: CPT

## 2021-04-12 PROCEDURE — 2580000003 HC RX 258: Performed by: PHYSICIAN ASSISTANT

## 2021-04-12 PROCEDURE — 83880 ASSAY OF NATRIURETIC PEPTIDE: CPT

## 2021-04-12 PROCEDURE — 2060000000 HC ICU INTERMEDIATE R&B

## 2021-04-12 PROCEDURE — 36415 COLL VENOUS BLD VENIPUNCTURE: CPT

## 2021-04-12 PROCEDURE — 6360000002 HC RX W HCPCS: Performed by: INTERNAL MEDICINE

## 2021-04-12 PROCEDURE — 82962 GLUCOSE BLOOD TEST: CPT

## 2021-04-12 PROCEDURE — 6370000000 HC RX 637 (ALT 250 FOR IP): Performed by: INTERNAL MEDICINE

## 2021-04-12 PROCEDURE — 84100 ASSAY OF PHOSPHORUS: CPT

## 2021-04-12 PROCEDURE — 6360000002 HC RX W HCPCS: Performed by: PHYSICIAN ASSISTANT

## 2021-04-12 PROCEDURE — 83735 ASSAY OF MAGNESIUM: CPT

## 2021-04-12 PROCEDURE — 2580000003 HC RX 258: Performed by: INTERNAL MEDICINE

## 2021-04-12 PROCEDURE — 6370000000 HC RX 637 (ALT 250 FOR IP): Performed by: PHYSICIAN ASSISTANT

## 2021-04-12 RX ORDER — HYDRALAZINE HYDROCHLORIDE 50 MG/1
100 TABLET, FILM COATED ORAL EVERY 8 HOURS SCHEDULED
Status: DISCONTINUED | OUTPATIENT
Start: 2021-04-12 | End: 2021-04-15

## 2021-04-12 RX ORDER — ISOSORBIDE MONONITRATE 60 MG/1
60 TABLET, EXTENDED RELEASE ORAL 2 TIMES DAILY
Status: DISCONTINUED | OUTPATIENT
Start: 2021-04-12 | End: 2021-04-20 | Stop reason: HOSPADM

## 2021-04-12 RX ORDER — SODIUM BICARBONATE 650 MG/1
1300 TABLET ORAL 2 TIMES DAILY
Status: DISCONTINUED | OUTPATIENT
Start: 2021-04-12 | End: 2021-04-20 | Stop reason: HOSPADM

## 2021-04-12 RX ADMIN — MYCOPHENOLATE MOFETIL 1000 MG: 250 CAPSULE ORAL at 13:56

## 2021-04-12 RX ADMIN — TACROLIMUS 2 MG: 1 CAPSULE ORAL at 21:16

## 2021-04-12 RX ADMIN — Medication 10 ML: at 22:14

## 2021-04-12 RX ADMIN — HEPARIN SODIUM 5000 UNITS: 10000 INJECTION INTRAVENOUS; SUBCUTANEOUS at 05:00

## 2021-04-12 RX ADMIN — SODIUM BICARBONATE 1300 MG: 650 TABLET ORAL at 21:16

## 2021-04-12 RX ADMIN — ASPIRIN 81 MG: 81 TABLET, COATED ORAL at 08:10

## 2021-04-12 RX ADMIN — OXYCODONE HYDROCHLORIDE 15 MG: 5 TABLET ORAL at 21:15

## 2021-04-12 RX ADMIN — HYDRALAZINE HYDROCHLORIDE 100 MG: 50 TABLET, FILM COATED ORAL at 22:14

## 2021-04-12 RX ADMIN — ISOSORBIDE MONONITRATE 60 MG: 60 TABLET, EXTENDED RELEASE ORAL at 08:10

## 2021-04-12 RX ADMIN — SODIUM BICARBONATE 650 MG: 650 TABLET ORAL at 08:12

## 2021-04-12 RX ADMIN — MYCOPHENOLATE MOFETIL 1000 MG: 250 CAPSULE ORAL at 22:14

## 2021-04-12 RX ADMIN — HYDRALAZINE HYDROCHLORIDE 75 MG: 50 TABLET ORAL at 06:44

## 2021-04-12 RX ADMIN — ISOSORBIDE MONONITRATE 60 MG: 60 TABLET, EXTENDED RELEASE ORAL at 21:16

## 2021-04-12 RX ADMIN — HEPARIN SODIUM 5000 UNITS: 10000 INJECTION INTRAVENOUS; SUBCUTANEOUS at 21:18

## 2021-04-12 RX ADMIN — SODIUM CHLORIDE: 9 INJECTION, SOLUTION INTRAVENOUS at 05:00

## 2021-04-12 RX ADMIN — ATORVASTATIN CALCIUM 10 MG: 10 TABLET, FILM COATED ORAL at 21:16

## 2021-04-12 RX ADMIN — PREDNISONE 5 MG: 5 TABLET ORAL at 08:10

## 2021-04-12 RX ADMIN — METOPROLOL SUCCINATE 50 MG: 25 TABLET, EXTENDED RELEASE ORAL at 21:15

## 2021-04-12 RX ADMIN — TACROLIMUS 2 MG: 1 CAPSULE ORAL at 08:10

## 2021-04-12 RX ADMIN — ACETAMINOPHEN 650 MG: 325 TABLET ORAL at 09:55

## 2021-04-12 RX ADMIN — INSULIN LISPRO 2 UNITS: 100 INJECTION, SOLUTION INTRAVENOUS; SUBCUTANEOUS at 15:54

## 2021-04-12 RX ADMIN — INSULIN LISPRO 1 UNITS: 100 INJECTION, SOLUTION INTRAVENOUS; SUBCUTANEOUS at 21:18

## 2021-04-12 RX ADMIN — HEPARIN SODIUM 5000 UNITS: 10000 INJECTION INTRAVENOUS; SUBCUTANEOUS at 13:55

## 2021-04-12 RX ADMIN — TIZANIDINE 4 MG: 4 TABLET ORAL at 08:10

## 2021-04-12 RX ADMIN — HYDRALAZINE HYDROCHLORIDE 100 MG: 50 TABLET, FILM COATED ORAL at 13:55

## 2021-04-12 ASSESSMENT — PAIN SCALES - GENERAL
PAINLEVEL_OUTOF10: 0
PAINLEVEL_OUTOF10: 7

## 2021-04-12 ASSESSMENT — PAIN DESCRIPTION - DESCRIPTORS: DESCRIPTORS: ACHING;CONSTANT;DISCOMFORT

## 2021-04-12 ASSESSMENT — PAIN DESCRIPTION - PAIN TYPE: TYPE: CHRONIC PAIN

## 2021-04-12 ASSESSMENT — PAIN SCALES - WONG BAKER: WONGBAKER_NUMERICALRESPONSE: 0

## 2021-04-12 ASSESSMENT — PAIN - FUNCTIONAL ASSESSMENT: PAIN_FUNCTIONAL_ASSESSMENT: PREVENTS OR INTERFERES SOME ACTIVE ACTIVITIES AND ADLS

## 2021-04-12 ASSESSMENT — PAIN DESCRIPTION - LOCATION: LOCATION: BACK

## 2021-04-12 ASSESSMENT — PAIN DESCRIPTION - PROGRESSION: CLINICAL_PROGRESSION: NOT CHANGED

## 2021-04-12 ASSESSMENT — PAIN DESCRIPTION - ONSET: ONSET: ON-GOING

## 2021-04-12 ASSESSMENT — PAIN DESCRIPTION - ORIENTATION: ORIENTATION: LOWER

## 2021-04-12 ASSESSMENT — PAIN DESCRIPTION - FREQUENCY: FREQUENCY: CONTINUOUS

## 2021-04-12 NOTE — PROGRESS NOTES
Internal Medicine Progress Note      Synopsis: Patient admitted on 4/9/2021    Subjective    Patient is in his room. IV fluids still on board. He is eating better he tells me  He wants to get out of here he tells me  Afebrile. Does not want a Suazo's catheter  April 12, 2021  Still requesting a discharge. Discussed with him regarding his right heart catheterization and he has no recollection. He thinks heart catheterization was not done due to concerns for IV dye having a negative renal impact. Exam:  BP (!) 191/92   Pulse 71   Temp 99 °F (37.2 °C) (Oral)   Resp 18   Ht 5' 10\" (1.778 m)   Wt 226 lb (102.5 kg)   SpO2 90%   BMI 32.43 kg/m²   General appearance: No apparent distress, appears stated age and cooperative. HEENT: Normal cephalic, atraumatic without obvious deformity. Pupils equal, round, and reactive to light. Extra ocular muscles intact. Conjunctivae/corneas clear. Periorbital edema noted  Neck: . Trachea midline. Respiratory: Decreased  Cardiovascular: Regular heart rate rhythm  Abdomen: Nontender  Musculoskeletal: No clubbing but skin is thick on bilateral lower extremities   skin: Normal skin color. No rashes or lesions. Neurologic:  Neurovascularly intact without any focal sensory/motor deficits.  Cranial nerves: II-XII intact, grossly non-focal.  Psychiatric: Alert and oriented, thought content appropriate, normal insight       Medications:  Reviewed    Infusion Medications    sodium chloride      dextrose       Scheduled Medications    aspirin  81 mg Oral Daily    hydrALAZINE  75 mg Oral 3 times per day    isosorbide mononitrate  60 mg Oral Daily    metoprolol succinate  50 mg Oral Nightly    mycophenolate  1,000 mg Oral BID    sodium bicarbonate  650 mg Oral Daily    tacrolimus  2 mg Oral BID    tiZANidine  4 mg Oral Daily    sodium chloride flush  5-40 mL Intravenous 2 times per day    insulin lispro  0-6 Units Subcutaneous TID WC    insulin lispro  0-3 Units Subcutaneous Nightly    predniSONE  5 mg Oral Daily    atorvastatin  10 mg Oral Nightly    heparin (porcine)  5,000 Units Subcutaneous Q8H     PRN Meds: oxyCODONE, sodium chloride flush, sodium chloride, acetaminophen **OR** acetaminophen, glucose, dextrose, glucagon (rDNA), dextrose, polyethylene glycol, trimethobenzamide    I/O    Intake/Output Summary (Last 24 hours) at 4/12/2021 1313  Last data filed at 4/12/2021 8704  Gross per 24 hour   Intake 1719 ml   Output 550 ml   Net 1169 ml       Labs:   Recent Labs     04/10/21  2025 04/11/21  0322 04/12/21  0510   WBC 2.7* 4.1* 3.4*   HGB 10.4* 11.3* 10.5*   HCT 33.7* 36.5* 34.8*    185 187       Recent Labs     04/10/21  2209 04/11/21  0322 04/11/21  1745 04/12/21  0510   * 135 131* 135   K 6.2* 5.1*  5.1* 4.9 4.6    104 102 108*   CO2 19* 21* 18* 19*   BUN 43* 43* 43* 41*   CREATININE 2.4* 2.5* 2.4* 2.3*   CALCIUM 8.0* 8.4* 8.3* 8.1*   PHOS 3.0 2.7  --  3.0       Recent Labs     04/09/21 2102 04/09/21 2214 04/11/21 0322   PROT  --  6.3* 6.1*   ALKPHOS  --  49 47   ALT  --  16 15   AST  --  44* 42*   BILITOT  --  0.4 0.4   LIPASE 52  --   --        No results for input(s): INR in the last 72 hours. Recent Labs     04/09/21  2214 04/10/21  0301   TROPONINI 0.10* 0.08*       Chronic labs:  Lab Results   Component Value Date    CHOL 128 06/03/2014    TRIG 50 06/03/2014    HDL 51 06/03/2014    LDLCALC 67 06/03/2014    TSH 0.996 06/03/2014    INR 1.0 08/31/2020    LABA1C 5.5 06/03/2014       Radiology:  Xr Chest Portable    Result Date: 4/9/2021  EXAMINATION: ONE XRAY VIEW OF THE CHEST 4/9/2021 8:59 pm COMPARISON: 05/29/2019 HISTORY: ORDERING SYSTEM PROVIDED HISTORY: Fever TECHNOLOGIST PROVIDED HISTORY: Reason for exam:->Fever FINDINGS: Possible left costophrenic angle opacity. There is no effusion or pneumothorax. The cardiomediastinal silhouette is without acute process. The osseous structures are without acute process.      Possible left costophrenic angle opacity. ASSESSMENT:    Principal Problem:    Acute nontraumatic kidney injury ([address]  Active Problems:    CKD (chronic kidney disease) stage 4, GFR 15-29 ml/min ([facility])    Diabetes mellitus, type 2 ([facility])    HTN (hypertension), benign    ALYSON (acute kidney injury) ([address]    Vomiting and diarrhea    Chronic diastolic congestive heart failure ([address]    Immunosuppression ([address]  Resolved Problems:    * No resolved hospital problems. *       PLAN:    `1.  He has tolerated his iv fluids pretty well so far but we can decrease them with his known history of heart failure  2. Creatinine has normalized fairly fast close to his baseline  3. Taking an oral medications and food and fluids pretty well  4. May be able to discharge by tomorrow. 5.  Order renal ultrasound  April 12, 2021  No overt signs and symptoms of heart failure. We can DC IV fluids. Labs from today show creatinine that is better yet. Slight leukopenia noted. Blood sugar is okay. BNP was elevated yesterday and another one has been ordered. Renal ultrasound only showed atrophic kidneys  His baseline creatinine has been reached. Wait for renal for final discharge planning. Cause of acute kidney insufficiency unclear yet  Diet: DIET GENERAL; Low Potassium  Code Status: Full Code  PT/OT Eval Status:   Order  DVT Prophylaxis:   In place  Recommended disposition at discharge:   Home    +++++++++++++++++++++++++++++++++++++++++++++++++  Derik Bedolla MD   [facility].  +++++++++++++++++++++++++++++++++++++++++++++++++  NOTE: This report was transcribed using voice recognition software. Every effort was made to ensure accuracy; however, inadvertent computerized transcription errors may be present.

## 2021-04-12 NOTE — PROGRESS NOTES
Progress Note  4/11/2021 8:56 PM  Subjective:   Admit Date: 4/9/2021  PCP: GUIDO GERONIMO III, DO  Interval History: Pt being seen for ESRD requiring KRT with LRD Allograft placed 12/12/14 4/12/21:Pt resting quietly in bed, no worsening of the SOB or the LE edema    Diet: DIET GENERAL; Low Potassium    Data:   Scheduled Meds:   aspirin  81 mg Oral Daily    hydrALAZINE  75 mg Oral 3 times per day    isosorbide mononitrate  60 mg Oral Daily    metoprolol succinate  50 mg Oral Nightly    mycophenolate  1,000 mg Oral BID    sodium bicarbonate  650 mg Oral Daily    tacrolimus  2 mg Oral BID    tiZANidine  4 mg Oral Daily    sodium chloride flush  5-40 mL Intravenous 2 times per day    insulin lispro  0-6 Units Subcutaneous TID WC    insulin lispro  0-3 Units Subcutaneous Nightly    predniSONE  5 mg Oral Daily    atorvastatin  10 mg Oral Nightly    heparin (porcine)  5,000 Units Subcutaneous Q8H     Continuous Infusions:   sodium chloride 50 mL/hr at 04/11/21 1518    sodium chloride      dextrose       PRN Meds:oxyCODONE, sodium chloride flush, sodium chloride, acetaminophen **OR** acetaminophen, glucose, dextrose, glucagon (rDNA), dextrose, polyethylene glycol, trimethobenzamide  I/O last 3 completed shifts: In: 2054 [P. O.:560; I.V.:1494]  Out: 600 [Urine:600]  I/O this shift:  In: 3948 [P.O.:120;  I.V.:909]  Out: -     Intake/Output Summary (Last 24 hours) at 4/11/2021 2056  Last data filed at 4/11/2021 2008  Gross per 24 hour   Intake 2459 ml   Output 300 ml   Net 2159 ml     CBC:   Recent Labs     04/09/21  2102 04/10/21  2025 04/11/21  0322   WBC 3.9* 2.7* 4.1*   HGB 11.4* 10.4* 11.3*    160 185     BMP:    Recent Labs     04/10/21  2209 04/11/21  0322 04/11/21  1745   * 135 131*   K 6.2* 5.1*  5.1* 4.9    104 102   CO2 19* 21* 18*   BUN 43* 43* 43*   CREATININE 2.4* 2.5* 2.4*   GLUCOSE 87 67* 157*     Hepatic:   Recent Labs     04/09/21  2214 04/11/21  0322   AST 44* 42* ALT 16 15   BILITOT 0.4 0.4   ALKPHOS 49 47     Troponin:   Recent Labs     04/09/21  2214 04/10/21  0301   TROPONINI 0.10* 0.08*     BNP: No results for input(s): BNP in the last 72 hours. Lipids: No results for input(s): CHOL, HDL in the last 72 hours. Invalid input(s): LDLCALCU  ABGs: No results found for: PHART, PO2ART, XHK8UAZ  INR: No results for input(s): INR in the last 72 hours. -----------------------------------------------------------------  RAD:   EXAMINATION:   RETROPERITONEAL ULTRASOUND OF THE KIDNEYS AND URINARY BLADDER       4/11/2021       COMPARISON:   None       HISTORY:   ORDERING SYSTEM PROVIDED HISTORY: arf   TECHNOLOGIST PROVIDED HISTORY:   Reason for exam:->arf   What reading provider will be dictating this exam?->CRC       FINDINGS:       Kidneys:       The right kidney measures 8.1 cm in length and the left kidney measures 7.9   cm in length.       Kidneys demonstrate increased cortical echogenicity.  No evidence of   hydronephrosis or intrarenal stones.           Bladder:       Unremarkable appearance of the bladder.  Bilateral ureteral jets are noted.           Impression   Atrophic and echogenic kidneys compatible with chronic renal parenchymal   disease.       Unremarkable bladder. EXAMINATION:   ONE XRAY VIEW OF THE CHEST       4/9/2021 8:59 pm       COMPARISON:   05/29/2019       HISTORY:   ORDERING SYSTEM PROVIDED HISTORY: Fever   TECHNOLOGIST PROVIDED HISTORY:   Reason for exam:->Fever       FINDINGS:   Possible left costophrenic angle opacity.  There is no effusion or   pneumothorax. The cardiomediastinal silhouette is without acute process. The   osseous structures are without acute process.           Impression   Possible left costophrenic angle opacity.          Objective:   Vitals: BP (!) 170/80   Pulse 74   Temp 98.2 °F (36.8 °C) (Oral)   Resp 16   Ht 5' 10\" (1.778 m)   Wt 223 lb 8.7 oz (101.4 kg)   SpO2 96%   BMI 32.08 kg/m²   General appearance: alert, appears stated age and cooperative   Neck: No JVD  Lungs: Breath sounds decreased at the bases. No rales or ronchi. Heart: Regular rate and rhythm. No S3 gallop. No  murmrur. Abdomen: Soft non distended, no tenderness over the renal allograft, non tender and normal bowel sounds. Extremeties: No edema, with skin to chronic venous stasis dermatitis    Assessment:   Patient Active Problem List:     GERD (gastroesophageal reflux disease)     CKD (chronic kidney disease) stage 4, GFR 15-29 ml/min ([facility])     Mixed hyperlipidemia     Diabetes mellitus, type 2 ([facility])     HTN (hypertension), benign     Junctional bradycardia     Anemia in stage 3 chronic kidney disease     CAD (coronary artery disease), native coronary artery     Acute CHF (congestive heart failure) ([facility])     Chest pain     Respiratory failure ([facility])     ALYSON (acute kidney injury) ([facility]     Vomiting and diarrhea     Acute nontraumatic kidney injury ([facility]     Chronic diastolic congestive heart failure ([facility]     H/O kidney transplant     Pulmonary hypertension ([facility]     Immunosuppression ([facility]    Plan:   1- CKD5D/ESRD with RRT as LRD Renal Allograft placed 12/12/14 with a baseline serum cr of 2.1-2.7mg/dl and an e-GFR=30-28ml/min. His current creatinine is at baseline  UA SG 1.020, protein 100mg/dl, blood (-). Ni (-), LE (-)  PLAN:1.Continue to monitor     2-Anemia in CKD-HgB at goal >/=10  PLAN:1.start  ADAM for HgB  <10  2. Follow H/H.    3-Anorexia-improved    4- HTN with CKD 5/ESRD-BP above goal <140/90   PLAN:1. Increase the hydralazine to 100mg po tid and isosorbide 60mg bid    5- Hyperkalemia in the setting of the DM2 with the CNI and possible hyporenin hypoaldo  K+ stable   PLAN:1. Continue to monitor. 6- Sec HPTH due to the CAN/CKD with Hypocalcemia  PO4 WNL  PLAN:1. Check PTH and Vit D     7-Non Anion Gap Metabolic Acidosis  HCO3 below goal of HCO3>/=22  PLAN:1.Increase the bicarbonate    8- Hyponatremia  Na+ improved  PLAN:1. Continue to

## 2021-04-12 NOTE — CONSULTS
[address] [address]                                  CONSULTATION    PATIENT NAME: Wanda Reynolds                      :        1950  MED REC NO:   [id number]                            ROOM:       0430  ACCOUNT NO:   [de-identified]                           ADMIT DATE: 2021  PROVIDER:     Malika Bonilla MD    CONSULT DATE:  04/10/2021    DATE OF ADMISSION:  2021    AGE OF THE PATIENT:  79years old. ADMITTING PHYSICIAN:  Beatrice Reddy MD.    REASON FOR CONSULTATION:  Acute kidney injury with underlying history of  renal transplant. HISTORY OF PRESENT ILLNESS:  The patient is being seen in consultation  at the request of Dr. Dionne Winters. I was not notified of the consult on this  patient and found the patient's name on my census when I came to the  hospital.  The patient is a 70-year-old Afro-American gentlemen who has  a prior history of living-related renal transplant in  and who has  chronic renal allograft dysfunction with a baseline serum creatinine of  over 2 mg/dL. He routinely follows with Dr. Adriana Clinton in our  practice as an outpatient on a longitudinal basis. His last serum  creatinine in the office notes was 2.72 mg/dL on 2021. The  patient presented to the emergency room at [address] [place] [address] with chief complaints of fatigue and weakness. The patient also gives history of having had very poor appetite for the  last three to four days prior to presentation along with emesis and  diarrhea. He continued to take his routine medications. He thinks he  may have noticed some fall in his urinary output. He denies any  associated abdominal pain. He denies any pain over the renal allograft. Upon presentation, the patient was found to have a serum creatinine of  3.4 mg/dL. He was started on IV fluids. No followup serum creatinine  has been done.   His lipase was normal.  His lactic acid was normal.    PAST MEDICAL HISTORY:  Significant for history of chronic kidney  disease, stage G-V, secondary to microvascular disease with diabetic  nephropathy. The patient is status post renal transplant in 2014. He  has history of chronic renal allograft dysfunction with a baseline serum  creatinine of 2.0 mg/dL reflecting chronic kidney disease, stage G-IIIB. In addition, the patient has a history of insulin-dependent diabetes  mellitus, history of obesity status post gastric bypass surgery, history  of secondary hyperparathyroidism due to renal insufficiency, history of  anemia of chronic kidney disease. He has history of hyperlipidemia,  history of depression, history of chronic atrial fibrillation. PAST SURGICAL HISTORY:  Significant for renal transplant in 2014. He  has had Tootie-en-Y gastric bypass surgery in 2010. History of  cholecystectomy. History of foot surgery. History of colonoscopy and  history of upper GI tract endoscopy. History of carpal tunnel release  surgery on the left. He has history of some form of surgery on his  bladder. SOCIAL HISTORY:  The patient is . He denies any tobacco or  alcohol use at the present time. He has a prior history of smoking and  has 20-pack-year history of smoking and quit smoking 31 years ago. FAMILY HISTORY:  Significant for diabetes in both his parents. Both his  parents also had atherosclerotic heart disease and hypertension. His  father had a cerebrovascular accident. ALLERGIES:  The patient has no known drug allergies. CURRENT MEDICATIONS:  The patient is on tacrolimus 2 mg twice a day.    The patient is on mycophenolate 1000 mg twice a day, Humalog insulin on  a sliding scale, hydralazine 75 mg three times a day, 0.9 normal saline  100 mL an hour, Zanaflex tablet 4 mg daily, prednisone 5 mg daily,  isosorbide mononitrate 60 mg daily, sodium bicarbonate 650 mg daily,  aspirin 81 mg daily, transplant. We will continue the patient current  immunosuppressant. Check tacrolimus levels keeping in mind possibility  of tacrolimus toxicity in a volume depleted patient. 3.  Hypertension with chronic kidney disease, stage I to stage IV. Blood pressure close to target of less than 130/80 mmHg. We will follow  closely. 4.  Anemia. Anemia of chronic kidney disease. Volume depletion may  have masked the severity of anemia. 5.  Mild metabolic acidosis. This may be reflection of chronic kidney  disease. We will follow. The patient is already on a bicarbonate  supplement. 6.  Mild hyponatremia. We will follow. 7.  Chronic kidney disease, stage G-IIIB, secondary to chronic renal  allograft dysfunction. PLAN:  Plan is to get a current set of labs. The patient wants to eat  as he is only on a clear liquid diet. We will advance his diet. Check  urine for sodium, creatinine, and chloride and urine for eosinophils. Rest of plans per orders. Thank you for allowing me to participate in the care of this patient. We will follow the patient with you.         Cole Ochoa MD    D: 04/10/2021 15:27:33       T: 04/10/2021 16:21:39     [id number]  Job#: [id number]     Doc#: [id number]

## 2021-04-12 NOTE — PROGRESS NOTES
Occupational Therapy  OT order received and chart reviewed. Pt and wife report pt is independent in all ADL/IADLs and has been managing independently in room/bathroom environment in the hospital. Pt declines OT needs, order discharger per pt request. Thank you.      Eric Pham OTR/L  [id number]

## 2021-04-13 ENCOUNTER — APPOINTMENT (OUTPATIENT)
Dept: CT IMAGING | Age: 71
DRG: 698 | End: 2021-04-13
Payer: MEDICARE

## 2021-04-13 ENCOUNTER — APPOINTMENT (OUTPATIENT)
Dept: GENERAL RADIOLOGY | Age: 71
DRG: 698 | End: 2021-04-13
Payer: MEDICARE

## 2021-04-13 LAB
ALBUMIN SERPL-MCNC: 3.1 G/DL (ref 3.5–5.2)
ALP BLD-CCNC: 38 U/L (ref 40–129)
ALT SERPL-CCNC: 10 U/L (ref 0–40)
ANION GAP SERPL CALCULATED.3IONS-SCNC: 11 MMOL/L (ref 7–16)
ANION GAP SERPL CALCULATED.3IONS-SCNC: 9 MMOL/L (ref 7–16)
AST SERPL-CCNC: 31 U/L (ref 0–39)
BILIRUB SERPL-MCNC: 0.3 MG/DL (ref 0–1.2)
BUN BLDV-MCNC: 44 MG/DL (ref 8–23)
BUN BLDV-MCNC: 45 MG/DL (ref 8–23)
CALCIUM IONIZED: 1.26 MMOL/L (ref 1.15–1.33)
CALCIUM SERPL-MCNC: 8.4 MG/DL (ref 8.6–10.2)
CALCIUM SERPL-MCNC: 8.4 MG/DL (ref 8.6–10.2)
CHLORIDE BLD-SCNC: 107 MMOL/L (ref 98–107)
CHLORIDE BLD-SCNC: 107 MMOL/L (ref 98–107)
CO2: 20 MMOL/L (ref 22–29)
CO2: 20 MMOL/L (ref 22–29)
CREAT SERPL-MCNC: 2.7 MG/DL (ref 0.7–1.2)
CREAT SERPL-MCNC: 2.7 MG/DL (ref 0.7–1.2)
GFR AFRICAN AMERICAN: 28
GFR AFRICAN AMERICAN: 28
GFR NON-AFRICAN AMERICAN: 28 ML/MIN/1.73
GFR NON-AFRICAN AMERICAN: 28 ML/MIN/1.73
GLUCOSE BLD-MCNC: 130 MG/DL (ref 74–99)
GLUCOSE BLD-MCNC: 131 MG/DL (ref 74–99)
HCT VFR BLD CALC: 32.2 % (ref 37–54)
HEMOGLOBIN: 9.8 G/DL (ref 12.5–16.5)
LV EF: 63 %
LVEF MODALITY: NORMAL
MAGNESIUM: 1.8 MG/DL (ref 1.6–2.6)
MCH RBC QN AUTO: 25.7 PG (ref 26–35)
MCHC RBC AUTO-ENTMCNC: 30.4 % (ref 32–34.5)
MCV RBC AUTO: 84.3 FL (ref 80–99.9)
METER GLUCOSE: 132 MG/DL (ref 74–99)
METER GLUCOSE: 178 MG/DL (ref 74–99)
METER GLUCOSE: 227 MG/DL (ref 74–99)
METER GLUCOSE: 90 MG/DL (ref 74–99)
PARATHYROID HORMONE INTACT: 279 PG/ML (ref 15–65)
PDW BLD-RTO: 15.3 FL (ref 11.5–15)
PHOSPHORUS: 2.6 MG/DL (ref 2.5–4.5)
PLATELET # BLD: 189 E9/L (ref 130–450)
PMV BLD AUTO: 9.7 FL (ref 7–12)
POTASSIUM SERPL-SCNC: 5.2 MMOL/L (ref 3.5–5)
POTASSIUM SERPL-SCNC: 5.2 MMOL/L (ref 3.5–5)
RBC # BLD: 3.82 E12/L (ref 3.8–5.8)
SODIUM BLD-SCNC: 136 MMOL/L (ref 132–146)
SODIUM BLD-SCNC: 138 MMOL/L (ref 132–146)
TOTAL PROTEIN: 5.5 G/DL (ref 6.4–8.3)
VITAMIN D 25-HYDROXY: 53 NG/ML (ref 30–100)
WBC # BLD: 5.1 E9/L (ref 4.5–11.5)

## 2021-04-13 PROCEDURE — 6360000002 HC RX W HCPCS: Performed by: PHYSICIAN ASSISTANT

## 2021-04-13 PROCEDURE — 6370000000 HC RX 637 (ALT 250 FOR IP): Performed by: PHYSICIAN ASSISTANT

## 2021-04-13 PROCEDURE — 6370000000 HC RX 637 (ALT 250 FOR IP): Performed by: INTERNAL MEDICINE

## 2021-04-13 PROCEDURE — 82330 ASSAY OF CALCIUM: CPT

## 2021-04-13 PROCEDURE — 93306 TTE W/DOPPLER COMPLETE: CPT

## 2021-04-13 PROCEDURE — 6360000002 HC RX W HCPCS: Performed by: INTERNAL MEDICINE

## 2021-04-13 PROCEDURE — 0202U NFCT DS 22 TRGT SARS-COV-2: CPT

## 2021-04-13 PROCEDURE — 83970 ASSAY OF PARATHORMONE: CPT

## 2021-04-13 PROCEDURE — 85027 COMPLETE CBC AUTOMATED: CPT

## 2021-04-13 PROCEDURE — 2580000003 HC RX 258: Performed by: PHYSICIAN ASSISTANT

## 2021-04-13 PROCEDURE — 80048 BASIC METABOLIC PNL TOTAL CA: CPT

## 2021-04-13 PROCEDURE — APPSS180 APP SPLIT SHARED TIME > 60 MINUTES: Performed by: NURSE PRACTITIONER

## 2021-04-13 PROCEDURE — 71250 CT THORAX DX C-: CPT

## 2021-04-13 PROCEDURE — 84100 ASSAY OF PHOSPHORUS: CPT

## 2021-04-13 PROCEDURE — 36415 COLL VENOUS BLD VENIPUNCTURE: CPT

## 2021-04-13 PROCEDURE — 71045 X-RAY EXAM CHEST 1 VIEW: CPT

## 2021-04-13 PROCEDURE — 82306 VITAMIN D 25 HYDROXY: CPT

## 2021-04-13 PROCEDURE — 6360000004 HC RX CONTRAST MEDICATION: Performed by: NURSE PRACTITIONER

## 2021-04-13 PROCEDURE — 82962 GLUCOSE BLOOD TEST: CPT

## 2021-04-13 PROCEDURE — 80053 COMPREHEN METABOLIC PANEL: CPT

## 2021-04-13 PROCEDURE — 2060000000 HC ICU INTERMEDIATE R&B

## 2021-04-13 PROCEDURE — 83735 ASSAY OF MAGNESIUM: CPT

## 2021-04-13 RX ORDER — FUROSEMIDE 10 MG/ML
20 INJECTION INTRAMUSCULAR; INTRAVENOUS ONCE
Status: COMPLETED | OUTPATIENT
Start: 2021-04-13 | End: 2021-04-13

## 2021-04-13 RX ADMIN — PREDNISONE 5 MG: 5 TABLET ORAL at 09:14

## 2021-04-13 RX ADMIN — OXYCODONE HYDROCHLORIDE 15 MG: 5 TABLET ORAL at 09:19

## 2021-04-13 RX ADMIN — FUROSEMIDE 20 MG: 10 INJECTION, SOLUTION INTRAVENOUS at 18:48

## 2021-04-13 RX ADMIN — METOPROLOL SUCCINATE 50 MG: 25 TABLET, EXTENDED RELEASE ORAL at 21:03

## 2021-04-13 RX ADMIN — MYCOPHENOLATE MOFETIL 1000 MG: 250 CAPSULE ORAL at 21:05

## 2021-04-13 RX ADMIN — HEPARIN SODIUM 5000 UNITS: 10000 INJECTION INTRAVENOUS; SUBCUTANEOUS at 21:09

## 2021-04-13 RX ADMIN — TACROLIMUS 2 MG: 1 CAPSULE ORAL at 21:04

## 2021-04-13 RX ADMIN — MYCOPHENOLATE MOFETIL 1000 MG: 250 CAPSULE ORAL at 09:15

## 2021-04-13 RX ADMIN — PERFLUTREN 1.65 MG: 6.52 INJECTION, SUSPENSION INTRAVENOUS at 15:01

## 2021-04-13 RX ADMIN — Medication 5 ML: at 21:05

## 2021-04-13 RX ADMIN — HYDRALAZINE HYDROCHLORIDE 100 MG: 50 TABLET, FILM COATED ORAL at 06:05

## 2021-04-13 RX ADMIN — SODIUM BICARBONATE 1300 MG: 650 TABLET ORAL at 09:15

## 2021-04-13 RX ADMIN — ISOSORBIDE MONONITRATE 60 MG: 60 TABLET, EXTENDED RELEASE ORAL at 09:15

## 2021-04-13 RX ADMIN — ASPIRIN 81 MG: 81 TABLET, COATED ORAL at 09:14

## 2021-04-13 RX ADMIN — ATORVASTATIN CALCIUM 10 MG: 10 TABLET, FILM COATED ORAL at 21:04

## 2021-04-13 RX ADMIN — SODIUM ZIRCONIUM CYCLOSILICATE 10 G: 10 POWDER, FOR SUSPENSION ORAL at 17:22

## 2021-04-13 RX ADMIN — HYDRALAZINE HYDROCHLORIDE 100 MG: 50 TABLET, FILM COATED ORAL at 21:04

## 2021-04-13 RX ADMIN — HEPARIN SODIUM 5000 UNITS: 10000 INJECTION INTRAVENOUS; SUBCUTANEOUS at 11:40

## 2021-04-13 RX ADMIN — INSULIN LISPRO 1 UNITS: 100 INJECTION, SOLUTION INTRAVENOUS; SUBCUTANEOUS at 21:09

## 2021-04-13 RX ADMIN — Medication 10 ML: at 09:15

## 2021-04-13 RX ADMIN — HEPARIN SODIUM 5000 UNITS: 10000 INJECTION INTRAVENOUS; SUBCUTANEOUS at 04:03

## 2021-04-13 RX ADMIN — ISOSORBIDE MONONITRATE 60 MG: 60 TABLET, EXTENDED RELEASE ORAL at 21:04

## 2021-04-13 RX ADMIN — INSULIN LISPRO 2 UNITS: 100 INJECTION, SOLUTION INTRAVENOUS; SUBCUTANEOUS at 17:25

## 2021-04-13 RX ADMIN — HYDRALAZINE HYDROCHLORIDE 100 MG: 50 TABLET, FILM COATED ORAL at 14:30

## 2021-04-13 RX ADMIN — TACROLIMUS 2 MG: 1 CAPSULE ORAL at 09:15

## 2021-04-13 RX ADMIN — OXYCODONE HYDROCHLORIDE 15 MG: 5 TABLET ORAL at 21:04

## 2021-04-13 RX ADMIN — SODIUM BICARBONATE 1300 MG: 650 TABLET ORAL at 21:04

## 2021-04-13 RX ADMIN — TIZANIDINE 4 MG: 4 TABLET ORAL at 09:15

## 2021-04-13 ASSESSMENT — PAIN DESCRIPTION - FREQUENCY: FREQUENCY: CONTINUOUS

## 2021-04-13 ASSESSMENT — PAIN DESCRIPTION - PROGRESSION: CLINICAL_PROGRESSION: NOT CHANGED

## 2021-04-13 ASSESSMENT — PAIN DESCRIPTION - ORIENTATION: ORIENTATION: LOWER

## 2021-04-13 ASSESSMENT — PAIN DESCRIPTION - DESCRIPTORS: DESCRIPTORS: ACHING;CONSTANT

## 2021-04-13 ASSESSMENT — PAIN DESCRIPTION - ONSET: ONSET: ON-GOING

## 2021-04-13 ASSESSMENT — PAIN DESCRIPTION - PAIN TYPE: TYPE: CHRONIC PAIN

## 2021-04-13 ASSESSMENT — PAIN SCALES - GENERAL
PAINLEVEL_OUTOF10: 8
PAINLEVEL_OUTOF10: 6

## 2021-04-13 ASSESSMENT — PAIN DESCRIPTION - LOCATION: LOCATION: BACK

## 2021-04-13 ASSESSMENT — PAIN - FUNCTIONAL ASSESSMENT: PAIN_FUNCTIONAL_ASSESSMENT: PREVENTS OR INTERFERES SOME ACTIVE ACTIVITIES AND ADLS

## 2021-04-13 NOTE — PLAN OF CARE
Problem: Pain:  Goal: Pain level will decrease  Description: Pain level will decrease  Outcome: Met This Shift  Goal: Control of chronic pain  Description: Control of chronic pain  Outcome: Met This Shift     Problem: Falls - Risk of:  Goal: Will remain free from falls  Description: Will remain free from falls  Outcome: Met This Shift  Goal: Absence of physical injury  Description: Absence of physical injury  Outcome: Met This Shift     Problem: Fluid Volume - Imbalance:  Goal: Absence of imbalanced fluid volume signs and symptoms  Description: Absence of imbalanced fluid volume signs and symptoms  Outcome: Met This Shift

## 2021-04-13 NOTE — CONSULTS
[facility]     H/O kidney transplant 12/12/2014    Hyperlipidemia     Hypertension     Immunosuppression ([facility] 3/12/2008    Kidney disease     Pulmonary hypertension ([facility]      Past Medical History:   1. Hypertension. 2. Diabetes. 3. GERD. 4. Hyperlipidemia. 5. NKDA. 6. Obesity. 7. End-stage renal disease. 8. Not a known smoker. 9. Echo 07/12/2012. LVH. EF 63%. Stage I diastolic dysfunction. No significant valvulopathy. 10. Stress Myoview 07/12/2012. Reversible ischemia in the mid anterior wall. EF 45%. 11. Lexiscan MPS 11/21/2013. Normal perfusion study. EF 63%.    12. Echo 07/20/2014. EF normal. Stage I diastolic dysfunction, otherwise no valvular heart disease. Mild LAE. 13. Status post kidney transplant 12/12/2014 at [facility]. 14. Echo, 07/21/2015. Stage I diastolic dysfunction. EF 65%. Normal sized LA. No valvular heart disease. Small circumferential pericardial effusion. 15. Lexiscan MPS, 07/26/2015: Small area of reversible ischemia in the lateral wall. EF 48%.    16. Renal artery US, [facility] - 08/13/2015: 60-99% stenosis right renal artery, may be overestimated due to vessel tortuosity.    17. Angiogram of the transplant right renal artery (anastomosis of the right common iliac artery). FFR and IVUS, 09/21/2015, Dr. Latrelle Aschoff, [facility]. The transplant renal artery was extremely tortuous, difficult to assess the severity of stenosis by angiography. IVUS of the transplant renal artery, proximally normal. Unable to pass into more distal segment.    18. Repeat procedure, 09/30/2015 from the left groin using an up-and-over approach. Dr. Latrelle Aschoff, [facility]. FFR 0.98. Peak-to-peak gradient of 8-10 mm. IVUS, no significant atherosclerosis, plaque, or fibrosis. 19. Echocardiogram, 4/27/2017, Dr. Svitlana Montalvo. EF 55-65%. Mild aortic sclerosis. No other significant valvular heart disease. 20. Mixed lung disease. Was seen by [facility] in April 2020.   · Chest CT without contrast showed mild aortic and coronary calcification and a dilated main PA to 3.9 cm. There were no stigmata of interstitial lung disease but there was mild diffuse mosaic attenuation of the parenchyma and eventration of the right hemidiaphragm  · PFTs showed a TLC of 48% and DLCO 48% predicted  21. OLINDA - on CPAP  22. Has a history of carpal tunnel (although he is unsure if it is) with a prior surgical procedure 6/2014. Nuclear imaging performed for cardiac amyloidosis ([facility]) and found to be negative with AL serologies unremarkable as well. 23. RHC (9/2/2020) RA: 13. RV: 91/14. PA: 91/25 with a mean of 48. PCW: 22. CO/CI (Jack): 5.6/2.5. CO/CI (thermodilution): 4.9/2.1 Conclusions: 1. Moderately elevated right and left filling pressures. 2. Severe pulmonary hypertension, predominantly precapillary. PVR approximately 5-5.5. 3. Gabrielle 5 consistent with normal RV function. Refer to pulmonary hypertension center at [facility]. Pulmonary hypertension evaluation at [facility] >> (CpC-PH, WHO group 2) - TPG 26 and PVR of 5. PCWP was 22 at time of cardiac catheterization. Given that this is largely a group 2 component, would first try to optimize his medications as best as possible (reduce beta blockade given bradycardia and likely take off ISMN given ill effects in HFpEF patients). PDE-5 inhibitors have limited data in HFpEF with none being beneficial.   25. TTE (4/3/2020, [facility]) CONCLUSIONS: Technically difficult exam due to body habitus. Exam indication: Evaluation of known heart failure to guide therapy. The left ventricle is normal in size. There is moderate left ventricular hypertrophy. Left ventricular systolic function is normal. EF = 67 ± 5% (2D   biplane) Grade II left ventricular diastolic dysfunction. Global strain not reported due to poor tracking. The right ventricle is normal in size. Right ventricular systolic function is normal. The left atrial cavity is severely dilated.  Exam was compared with the prior  echocardiographic exam performed on 10/19/2016. Similar findings. Past Surgical History:    Past Surgical History:   Procedure Laterality Date    BACK SURGERY  1982    BLADDER SURGERY      CARPAL TUNNEL RELEASE Left 06/23/2014    CHOLECYSTECTOMY, LAPAROSCOPIC  05/21/2013    COLONOSCOPY      DIAGNOSTIC CARDIAC CATH LAB PROCEDURE  01/15/2013    NORMAL    ECHO COMPL W DOP COLOR FLOW  05/22/2013         ENDOSCOPY, COLON, DIAGNOSTIC      FOOT SURGERY      BONE REMOVED    KIDNEY BIOPSY  06/09/2015    [facility] ; ALSO 4/14/2016, 4/5/2017    KIDNEY TRANSPLANT  12/12/2014    @ [facility]    EDUARDO-EN-Y GASTRIC BYPASS  06/29/2010    Laparoscopic / Dr. Pranay Oliveira  12/21/2007    Dr. Langston Brochure       Medications Prior to admit:  Prior to Admission medications    Medication Sig Start Date End Date Taking?  Authorizing Provider   tiZANidine (ZANAFLEX) 4 MG tablet Take 4 mg by mouth daily   Yes Historical Provider, MD   simvastatin (ZOCOR) 10 MG tablet Take 10 mg by mouth daily   Yes Historical Provider, MD   furosemide (LASIX) 40 MG tablet Take 1.5 tablets by mouth 2 times daily  Patient taking differently: Take 60 mg by mouth daily Takes a second dose if needed for edema 9/2/20  Yes Janet Kee MD   metoprolol succinate (TOPROL XL) 50 MG extended release tablet Take 1 tablet by mouth 2 times daily  Patient taking differently: Take 50 mg by mouth nightly  5/1/17  Yes Freddie Ty MD   prednisoLONE 5 MG TABS Take 5 mg by mouth daily    Yes Historical Provider, MD   Multiple Vitamin (MULTI VITAMIN DAILY PO) Take by mouth daily   Yes Historical Provider, MD   Cholecalciferol (VITAMIN D3) 5000 UNITS TABS Take by mouth daily   Yes Historical Provider, MD   famotidine (PEPCID) 20 MG tablet Take 20 mg by mouth daily   Yes Historical Provider, MD   LANTUS SOLOSTAR 100 UNIT/ML SOPN Inject 17 Units into the skin daily  6/3/15  Yes Historical Provider, MD   insulin lispro (HUMALOG KWIKPEN) 100 UNIT/ML SOPN Inject 5 Units into the skin daily (before lunch)   Yes Historical Provider, MD   docusate sodium (COLACE) 100 MG capsule Take 1 capsule by mouth 2 times daily as needed for Constipation. Patient taking differently: Take 100 mg by mouth daily as needed for Constipation  7/26/13  Yes Raffy Anderson MD   linagliptin (TRADJENTA) 5 MG tablet Take 5 mg by mouth daily. Yes Historical Provider, MD   aspirin 81 MG EC tablet Take 81 mg by mouth daily. Yes Historical Provider, MD   isosorbide mononitrate (IMDUR) 60 MG extended release tablet TAKE 1 TABLET DAILY 3/6/18   Carla Thurston MD   hydrALAZINE (APRESOLINE) 50 MG tablet Take 1 tablet by mouth every 8 hours  Patient taking differently: Take 75 mg by mouth every 8 hours  5/1/17   Michelle Stark MD   tacrolimus (PROGRAF) 1 MG capsule Take 2 capsules by mouth 2 times daily 5/1/17   Michelle Stark MD   sodium bicarbonate 650 MG tablet Take 650 mg by mouth daily    Historical Provider, MD   mycophenolate (CELLCEPT) 250 MG capsule   Take 1,000 mg by mouth 2 times daily  6/18/15   Historical Provider, MD   oxyCODONE (OXY-IR) 15 MG immediate release tablet Take 15 mg by mouth every 6 hours as needed.  Indications: Pain    Historical Provider, MD       Current Medications:    Current Facility-Administered Medications: sodium bicarbonate tablet 1,300 mg, 1,300 mg, Oral, BID  isosorbide mononitrate (IMDUR) extended release tablet 60 mg, 60 mg, Oral, BID  hydrALAZINE (APRESOLINE) tablet 100 mg, 100 mg, Oral, 3 times per day  aspirin EC tablet 81 mg, 81 mg, Oral, Daily  metoprolol succinate (TOPROL XL) extended release tablet 50 mg, 50 mg, Oral, Nightly  mycophenolate (CELLCEPT) capsule 1,000 mg, 1,000 mg, Oral, BID  oxyCODONE (ROXICODONE) immediate release tablet 15 mg, 15 mg, Oral, Q6H PRN  tacrolimus (PROGRAF) capsule 2 mg, 2 mg, Oral, BID  tiZANidine (ZANAFLEX) tablet 4 mg, 4 mg, Oral, Daily  sodium chloride flush 0.9 % injection 5-40 mL, 5-40 mL, Intravenous, 2 times per day  sodium chloride flush 0.9 % injection 5-40 mL, 5-40 mL, Intravenous, PRN  0.9 % sodium chloride infusion, 25 mL, Intravenous, PRN  acetaminophen (TYLENOL) tablet 650 mg, 650 mg, Oral, Q6H PRN **OR** acetaminophen (TYLENOL) suppository 650 mg, 650 mg, Rectal, Q6H PRN  insulin lispro (HUMALOG) injection vial 0-6 Units, 0-6 Units, Subcutaneous, TID WC  insulin lispro (HUMALOG) injection vial 0-3 Units, 0-3 Units, Subcutaneous, Nightly  glucose (GLUTOSE) 40 % oral gel 15 g, 15 g, Oral, PRN  dextrose 50 % IV solution, 12.5 g, Intravenous, PRN  glucagon (rDNA) injection 1 mg, 1 mg, Intramuscular, PRN  dextrose 5 % solution, 100 mL/hr, Intravenous, PRN  polyethylene glycol (GLYCOLAX) packet 17 g, 17 g, Oral, Daily PRN  trimethobenzamide (TIGAN) injection 200 mg, 200 mg, Intramuscular, Q6H PRN  predniSONE (DELTASONE) tablet 5 mg, 5 mg, Oral, Daily  atorvastatin (LIPITOR) tablet 10 mg, 10 mg, Oral, Nightly  heparin (porcine) injection 5,000 Units, 5,000 Units, Subcutaneous, Q8H    Allergies:  Patient has no known allergies. Social History: Former smoker. Quit   Denies etoh and illicit drugs     Social History     Socioeconomic History    Marital status:      Spouse name: Not on file    Number of children: Not on file    Years of education: Not on file    Highest education level: Not on file   Occupational History    Not on file   Social Needs    Financial resource strain: Not on file    Food insecurity     Worry: Not on file     Inability: Not on file    Transportation needs     Medical: Not on file     Non-medical: Not on file   Tobacco Use    Smoking status: Former Smoker     Packs/day: 1.00     Years: 20.00     Pack years: 20.00     Types: Cigarettes     Quit date: 1990     Years since quittin.3    Smokeless tobacco: Never Used   Substance and Sexual Activity    Alcohol use:  Yes     Alcohol/week: 0.0 standard drinks     Comment: rare glass of wine;  drinks 2-3 cups of coffee daily    Drug use: No    Sexual activity: Not Currently   Lifestyle    Physical activity     Days per week: Not on file     Minutes per session: Not on file    Stress: Not on file   Relationships    Social connections     Talks on phone: Not on file     Gets together: Not on file     Attends Hinduism service: Not on file     Active member of club or organization: Not on file     Attends meetings of clubs or organizations: Not on file     Relationship status: Not on file    Intimate partner violence     Fear of current or ex partner: Not on file     Emotionally abused: Not on file     Physically abused: Not on file     Forced sexual activity: Not on file   Other Topics Concern    Not on file   Social History Narrative    Not on file       Family History: Limited given advanced age   Family History   Problem Relation Age of Onset    Diabetes Mother     High Blood Pressure Mother     Heart Disease Mother     Diabetes Father     Cancer Father     Stroke Father     Heart Disease Father        REVIEW OF SYSTEMS:     · Constitutional: ++fatigue, fevers, denies chills or night sweats  · Eyes: Denies visual changes or drainage  · ENT: Denies headaches or hearing loss. No mouth sores or sore throat. No epistaxis   · Cardiovascular: Denies chest pain, pressure or palpitations. No lower extremity swelling. · Respiratory: Denies PINEDO, cough, orthopnea or PND. No hemoptysis   · Gastrointestinal: Denies hematemesis or anorexia. No hematochezia or melena    · Genitourinary: Denies urgency, dysuria or hematuria. · Musculoskeletal: Denies gait disturbance, ++weakness or joint complaints  · Integumentary: Denies rash, hives or pruritis   · Neurological: Denies dizziness, headaches or seizures. No numbness or tingling  · Psychiatric: Denies anxiety or depression. · Endocrine: Denies temperature intolerance. No recent weight change. · Hematologic/Lymphatic: Denies abnormal bruising or bleeding.  No swollen lymph nodes    PHYSICAL EXAM:   BP (!) 111/53   Pulse 66 Temp 99.1 °F (37.3 °C) (Temporal)   Resp 18   Ht 5' 10\" (1.778 m)   Wt 226 lb 8 oz (102.7 kg)   SpO2 97%   BMI 32.50 kg/m²      CONST:  Well developed, Obese  male who appears of stated age. Awake, alert but lethargic. HEENT:   Head- Normocephalic, atraumatic   Eyes- Conjunctivae pink, anicteric  Throat- Oral mucosa pink and moist  Neck-  No stridor, trachea midline, no jugular venous distention. No carotid bruit. CHEST: Chest symmetrical and non-tender to palpation. No accessory muscle use or intercostal retractions  RESPIRATORY: Lung sounds - clear throughout fields   CARDIOVASCULAR:     Heart Inspection- shows no noted pulsations  Heart Palpation- no heaves or thrills; PMI is non-displaced   Heart Ausculation- Regular rate and rhythm, no murmur. No s3, s4 or rub   PV: No lower extremity edema. No varicosities. Pedal pulses palpable, no clubbing or cyanosis   ABDOMEN: Obese, soft, non-tender to light palpation. Bowel sounds present. No palpable masses no organomegaly; no abdominal bruit  MS: Good muscle strength and tone. No atrophy or abnormal movements. : Deferred  SKIN: Warm and dry no statis dermatitis or ulcers   NEURO / PSYCH: Oriented to person and place. DATA:    ECG / Tele strips: please see HPI   Diagnostic:    CXR (4/9/2021)  FINDINGS:  Possible left costophrenic angle opacity.  There is no effusion or pneumothorax. The cardiomediastinal silhouette is without acute process. The osseous structures are without acute process. Impression:  Possible left costophrenic angle opacity. US Retroperitoneal (4/11/2021)  Impression:  Atrophic and echogenic kidneys compatible with chronic renal parenchymal disease. Unremarkable bladder.         Intake/Output Summary (Last 24 hours) at 4/13/2021 1246  Last data filed at 4/13/2021 1146  Gross per 24 hour   Intake 240 ml   Output 625 ml   Net -385 ml       Labs:   CBC:   Recent Labs     04/12/21  0510 04/13/21  1100   WBC 3.4* 5. 1   HGB 10.5* 9.8*   HCT 34.8* 32.2*    189     BMP:   Recent Labs     04/12/21  0510 04/13/21  1100    138  136   K 4.6 5.2*  5.2*   CO2 19* 20*  20*   BUN 41* 45*  44*   CREATININE 2.3* 2.7*  2.7*   LABGLOM 34 28  28   CALCIUM 8.1* 8.4*  8.4*     Mag:   Recent Labs     04/12/21  0510 04/13/21  1100   MG 1.9 1.8     Phos:   Recent Labs     04/12/21  0510 04/13/21  1100   PHOS 3.0 2.6     TFT:   Lab Results   Component Value Date    TSH 0.996 06/03/2014    FT3 1.6 (L) 05/22/2013    T4FREE 0.91 05/22/2013      HgA1c:   Lab Results   Component Value Date    LABA1C 5.5 06/03/2014     proBNP:   Recent Labs     04/11/21  1745 04/12/21  0510   PROBNP 4,233* 4,801*     FASTING LIPID PANEL:  Lab Results   Component Value Date    CHOL 128 06/03/2014    HDL 51 06/03/2014    LDLCALC 67 06/03/2014    TRIG 50 06/03/2014     LIVER PROFILE:  Recent Labs     04/11/21  0322 04/13/21  1100   AST 42* 31   ALT 15 10   LABALBU 3.5 3.1*     Assessment and plan to follow per Dr. Olivia Parker     Electronically signed by CHAI Sosa CNP on 4/13/2021 at 12:46 PM     ______________________________________________________________________  I independently interviewed and examined the patient. I have reviewed the above documentation completed by the AUDIE. Please see my additional contributions to the HPI, physical exam, and assessment / medical decision making.     HPI, ROS, PMH, PSH, FMH, SH, and medications independently reviewed (agree; see above documentation)    Review of Systems:  Cardiac: As per HPI  General: No fever, chills  Pulmonary: As per HPI  GI: No nausea, vomiting  Musculoskeletal: TREVINO x 4, no focal motor deficits  Skin: Intact, no rashes  Neuro/Psych: No headache or seizures    Physical Exam:  BP (!) 187/77   Pulse 85   Temp 99.5 °F (37.5 °C) (Oral)   Resp 20   Ht 5' 10\" (1.778 m)   Wt 235 lb 6.4 oz (106.8 kg)   SpO2 98%   BMI 33.78 kg/m²   Appearance: Awake, alert, no acute respiratory distress  Skin: Intact, no rash  Head: Normocephalic, atraumatic  Neck: Supple, no carotid bruits  Lungs: Clear to auscultation bilaterally. No wheezes, rales, or rhonchi. Cardiac: Regular rate and rhythm, +S1S2, no murmurs apparent  Abdomen: Soft, +bowel sounds  Extremities: Moves all extremities x 4, no lower extremity edema  Neurologic: No focal motor deficits apparent, normal mood and affect    Assessment/Plan:  A 79year old male with medical history of HFpEF, HTN, ESRD post kidney transplant in 2014 presents with nausea, weakness and poor oral intake found to have ALYSON improved initially with IVF. We are consulted over possible CHF. Recommendations:  Cardiac exam and echocardiographic findings show no signs of fluid overload.       Shaheen Ceron MD  [facility] Cardiology

## 2021-04-13 NOTE — PROGRESS NOTES
Progress Note  4/13/2021 1:25 PM  Subjective:   Admit Date: 4/9/2021  PCP: GUIDO GERONIMO III, DO  Interval History: Pt being seen for ESRD requiring KRT with LRD Allograft placed 12/12/14 4/13/21:Pt resting quietly in bed, no worsening of the SOB or the LE edema    Diet: DIET GENERAL; Low Potassium    Data:   Scheduled Meds:   sodium bicarbonate  1,300 mg Oral BID    isosorbide mononitrate  60 mg Oral BID    hydrALAZINE  100 mg Oral 3 times per day    aspirin  81 mg Oral Daily    metoprolol succinate  50 mg Oral Nightly    mycophenolate  1,000 mg Oral BID    tacrolimus  2 mg Oral BID    tiZANidine  4 mg Oral Daily    sodium chloride flush  5-40 mL Intravenous 2 times per day    insulin lispro  0-6 Units Subcutaneous TID WC    insulin lispro  0-3 Units Subcutaneous Nightly    predniSONE  5 mg Oral Daily    atorvastatin  10 mg Oral Nightly    heparin (porcine)  5,000 Units Subcutaneous Q8H     Continuous Infusions:   sodium chloride      dextrose       PRN Meds:oxyCODONE, sodium chloride flush, sodium chloride, acetaminophen **OR** acetaminophen, glucose, dextrose, glucagon (rDNA), dextrose, polyethylene glycol, trimethobenzamide  I/O last 3 completed shifts: In: 240 [P.O.:240]  Out: 625 [Urine:625]  No intake/output data recorded.     Intake/Output Summary (Last 24 hours) at 4/13/2021 1325  Last data filed at 4/13/2021 1146  Gross per 24 hour   Intake 240 ml   Output 625 ml   Net -385 ml     CBC:   Recent Labs     04/11/21  0322 04/12/21  0510 04/13/21  1100   WBC 4.1* 3.4* 5.1   HGB 11.3* 10.5* 9.8*    187 189     BMP:    Recent Labs     04/11/21  1745 04/12/21  0510 04/13/21  1100   * 135 138  136   K 4.9 4.6 5.2*  5.2*    108* 107  107   CO2 18* 19* 20*  20*   BUN 43* 41* 45*  44*   CREATININE 2.4* 2.3* 2.7*  2.7*   GLUCOSE 157* 81 131*  130*     Hepatic:   Recent Labs     04/11/21  0322 04/13/21  1100   AST 42* 31   ALT 15 10   BILITOT 0.4 0.3   ALKPHOS 47 38* Troponin:   No results for input(s): TROPONINI in the last 72 hours. BNP: No results for input(s): BNP in the last 72 hours. Lipids: No results for input(s): CHOL, HDL in the last 72 hours. Invalid input(s): LDLCALCU  ABGs: No results found for: PHART, PO2ART, FHU8IBP  INR: No results for input(s): INR in the last 72 hours. -----------------------------------------------------------------  RAD:   EXAMINATION:   RETROPERITONEAL ULTRASOUND OF THE KIDNEYS AND URINARY BLADDER       4/11/2021       COMPARISON:   None       HISTORY:   ORDERING SYSTEM PROVIDED HISTORY: arf   TECHNOLOGIST PROVIDED HISTORY:   Reason for exam:->arf   What reading provider will be dictating this exam?->CRC       FINDINGS:       Kidneys:       The right kidney measures 8.1 cm in length and the left kidney measures 7.9   cm in length.       Kidneys demonstrate increased cortical echogenicity.  No evidence of   hydronephrosis or intrarenal stones.           Bladder:       Unremarkable appearance of the bladder.  Bilateral ureteral jets are noted.           Impression   Atrophic and echogenic kidneys compatible with chronic renal parenchymal   disease.       Unremarkable bladder. EXAMINATION:   ONE XRAY VIEW OF THE CHEST       4/9/2021 8:59 pm       COMPARISON:   05/29/2019       HISTORY:   ORDERING SYSTEM PROVIDED HISTORY: Fever   TECHNOLOGIST PROVIDED HISTORY:   Reason for exam:->Fever       FINDINGS:   Possible left costophrenic angle opacity.  There is no effusion or   pneumothorax. The cardiomediastinal silhouette is without acute process. The   osseous structures are without acute process.           Impression   Possible left costophrenic angle opacity.          Objective:   Vitals: BP (!) 111/53   Pulse 66   Temp 99.1 °F (37.3 °C) (Temporal)   Resp 18   Ht 5' 10\" (1.778 m)   Wt 226 lb 8 oz (102.7 kg)   SpO2 97%   BMI 32.50 kg/m²   General appearance: alert, appears stated age and cooperative   Neck: No JVD  Lungs: Breath sounds decreased at the bases. No rales or ronchi. Heart: Regular rate and rhythm. No S3 gallop. No  murmrur. Abdomen: Soft non distended, no tenderness over the renal allograft, non tender and normal bowel sounds. Extremeties: No edema, with skin to chronic venous stasis dermatitis    Assessment:   Patient Active Problem List:     GERD (gastroesophageal reflux disease)     CKD (chronic kidney disease) stage 4, GFR 15-29 ml/min ([facility])     Mixed hyperlipidemia     Diabetes mellitus, type 2 ([facility])     HTN (hypertension), benign     Junctional bradycardia     Anemia in stage 3 chronic kidney disease     CAD (coronary artery disease), native coronary artery     Acute CHF (congestive heart failure) ([facility])     Chest pain     Respiratory failure ([facility])     ALYSON (acute kidney injury) ([facility]     Vomiting and diarrhea     Acute nontraumatic kidney injury ([facility]     Chronic diastolic congestive heart failure ([facility]     H/O kidney transplant     Pulmonary hypertension ([facility]     Immunosuppression ([facility]    Plan:   1- CKD5D/ESRD with RRT as LRD Renal Allograft placed 12/12/14 with a baseline serum cr of 2.1-2.7mg/dl and an e-GFR=30-28ml/min. His current creatinine is at upper limit of previous baseline  UA SG 1.020, protein 100mg/dl, blood (-). Ni (-), LE (-)  PLAN:1.Continue to monitor     2-Anemia in CKD-HgB at goal >/=10  PLAN:1. Continue ADAM for HgB  <10  2. Follow H/H.    3-Anorexia-improved    4- HTN with CKD 5/ESRD-BP above goal <140/90   PLAN:1. Follow BP on  the hydralazine to 100mg po tid and isosorbide 60mg bid, metoprolol succinate 50QHS    5- Hyperkalemia in the setting of the DM2 with the CNI and possible hyporenin hypoaldo  K+ 5.2  PLAN:1. Dose with lokelma     6- Sec HPTH due to the CAN/CKD with Hypocalcemia  PO4 WNL  PLAN:1.  Await PTH and Vit D     7-Non Anion Gap Metabolic Acidosis  HCO3 below goal of HCO3>/=22  PLAN:1. Continue the present dose of  bicarbonate    8- Hyponatremia  Na+ improved  PLAN:1. Continue to monitor    Thank you  for allowing us to participate in care of Juju Calloway

## 2021-04-13 NOTE — PROGRESS NOTES
Internal Medicine Progress Note      Synopsis: Patient admitted on 4/9/2021    Subjective    Patient is in his room. IV fluids still on board. He is eating better he tells me  He wants to get out of here he tells me  Afebrile. Does not want a Suazo's catheter  April 12, 2021  Still requesting a discharge. Discussed with him regarding his right heart catheterization and he has no recollection. He thinks heart catheterization was not done due to concerns for IV dye having a negative renal impact. April 13 th 2021   He wants to go home ! Patient has needed O2 this morning. He also has refused therapy and occupational and physical.  He was running a low-grade fever up to yesterday  Exam:  BP (!) 173/76   Pulse 82   Temp 98.9 °F (37.2 °C) (Oral)   Resp 16   Ht 5' 10\" (1.778 m)   Wt 226 lb 8 oz (102.7 kg)   SpO2 95%   BMI 32.50 kg/m²   General appearance: No apparent distress, appears stated age and cooperative. HEENT: Normal cephalic, atraumatic without obvious deformity. Pupils equal, round, and reactive to light. Extra ocular muscles intact. Conjunctivae/corneas clear. Periorbital edema notedAnd some conjunctival edema noted to  Neck: . Trachea midline. Respiratory: Decreased  Cardiovascular: Regular heart rate rhythm  Abdomen: Nontender  Musculoskeletal: No clubbing but skin is thick on bilateral lower extremities   skin: Normal skin color. No rashes or lesions. Neurologic:  Neurovascularly intact without any focal sensory/motor deficits.  Cranial nerves: II-XII intact, grossly non-focal.  Psychiatric: Alert and oriented, thought content appropriate, normal insight       Medications:  Reviewed    Infusion Medications    sodium chloride      dextrose       Scheduled Medications    sodium bicarbonate  1,300 mg Oral BID    isosorbide mononitrate  60 mg Oral BID    hydrALAZINE  100 mg Oral 3 times per day    aspirin  81 mg Oral Daily    metoprolol succinate  50 mg Oral Nightly    mycophenolate  1,000 mg Oral BID    tacrolimus  2 mg Oral BID    tiZANidine  4 mg Oral Daily    sodium chloride flush  5-40 mL Intravenous 2 times per day    insulin lispro  0-6 Units Subcutaneous TID WC    insulin lispro  0-3 Units Subcutaneous Nightly    predniSONE  5 mg Oral Daily    atorvastatin  10 mg Oral Nightly    heparin (porcine)  5,000 Units Subcutaneous Q8H     PRN Meds: oxyCODONE, sodium chloride flush, sodium chloride, acetaminophen **OR** acetaminophen, glucose, dextrose, glucagon (rDNA), dextrose, polyethylene glycol, trimethobenzamide    I/O    Intake/Output Summary (Last 24 hours) at 4/13/2021 1139  Last data filed at 4/13/2021 0404  Gross per 24 hour   Intake 240 ml   Output 625 ml   Net -385 ml       Labs:   Recent Labs     04/11/21  0322 04/12/21  0510 04/13/21  1100   WBC 4.1* 3.4* 5.1   HGB 11.3* 10.5* 9.8*   HCT 36.5* 34.8* 32.2*    187 189       Recent Labs     04/10/21  2209 04/11/21  0322 04/11/21  1745 04/12/21  0510   * 135 131* 135   K 6.2* 5.1*  5.1* 4.9 4.6    104 102 108*   CO2 19* 21* 18* 19*   BUN 43* 43* 43* 41*   CREATININE 2.4* 2.5* 2.4* 2.3*   CALCIUM 8.0* 8.4* 8.3* 8.1*   PHOS 3.0 2.7  --  3.0       Recent Labs     04/11/21 0322   PROT 6.1*   ALKPHOS 47   ALT 15   AST 42*   BILITOT 0.4       No results for input(s): INR in the last 72 hours. No results for input(s): Grace Au in the last 72 hours. Chronic labs:  Lab Results   Component Value Date    CHOL 128 06/03/2014    TRIG 50 06/03/2014    HDL 51 06/03/2014    LDLCALC 67 06/03/2014    TSH 0.996 06/03/2014    INR 1.0 08/31/2020    LABA1C 5.5 06/03/2014       Radiology:  Xr Chest Portable    Result Date: 4/9/2021  EXAMINATION: ONE XRAY VIEW OF THE CHEST 4/9/2021 8:59 pm COMPARISON: 05/29/2019 HISTORY: ORDERING SYSTEM PROVIDED HISTORY: Fever TECHNOLOGIST PROVIDED HISTORY: Reason for exam:->Fever FINDINGS: Possible left costophrenic angle opacity.   There is no effusion or pneumothorax. The cardiomediastinal silhouette is without acute process. The osseous structures are without acute process. Possible left costophrenic angle opacity. ASSESSMENT:    Principal Problem:    Acute nontraumatic kidney injury ([address]  Active Problems:    CKD (chronic kidney disease) stage 4, GFR 15-29 ml/min ([facility])    Diabetes mellitus, type 2 ([facility])    HTN (hypertension), benign    ALYSON (acute kidney injury) ([address]    Vomiting and diarrhea    Chronic diastolic congestive heart failure ([address]    Immunosuppression ([facility]  Resolved Problems:    * No resolved hospital problems. *       PLAN:    `1.  He has tolerated his iv fluids pretty well so far but we can decrease them with his known history of heart failure  2. Creatinine has normalized fairly fast close to his baseline  3. Taking an oral medications and food and fluids pretty well  4. May be able to discharge by tomorrow. 5.  Order renal ultrasound  April 12, 2021  No overt signs and symptoms of heart failure. We can DC IV fluids. Labs from today show creatinine that is better yet. Slight leukopenia noted. Blood sugar is okay. BNP was elevated yesterday and another one has been ordered. Renal ultrasound only showed atrophic kidneys  His baseline creatinine has been reached. Wait for renal for final discharge planning. Cause of acute kidney insufficiency unclear yet  April 13, 2021  Off of IV fluids he has increased need for O2 today. BNP remains elevated. I do feel he has early heart failure yet. When I questioned the patient it appears that he has no knowledge of any kind of heart catheterization or heart failure workup in the past.  He remains in a table and he has refused therapy occupational and physical.  Patient's blood pressure control is suboptimal.  His creatinine has returned to his normal  I do not feel patient is safe for discharge today he is seen by cardiology.   He may need diuresis which may bump up his creatinine yet again  For now at least he is eating  Diet: DIET GENERAL; Low Potassium  Code Status: Full Code  PT/OT Eval Status:   Order  DVT Prophylaxis:   In place  Recommended disposition at discharge:   Home    +++++++++++++++++++++++++++++++++++++++++++++++++  Damon Chowdhury MD   [facility].  +++++++++++++++++++++++++++++++++++++++++++++++++  NOTE: This report was transcribed using voice recognition software. Every effort was made to ensure accuracy; however, inadvertent computerized transcription errors may be present.

## 2021-04-13 NOTE — PLAN OF CARE
Problem: Pain:  Goal: Pain level will decrease  Description: Pain level will decrease  4/13/2021 1043 by Bartolome Ramirez RN  Outcome: Ongoing     Problem: Pain:  Goal: Control of acute pain  Description: Control of acute pain  Outcome: Ongoing     Problem: Pain:  Goal: Control of chronic pain  Description: Control of chronic pain  4/13/2021 1043 by Bratolome Ramirez RN  Outcome: Ongoing     Problem: Falls - Risk of:  Goal: Will remain free from falls  Description: Will remain free from falls  4/13/2021 1043 by Bartolome Ramirez RN  Outcome: Ongoing     Problem: Falls - Risk of:  Goal: Absence of physical injury  Description: Absence of physical injury  4/13/2021 1043 by Bartolome Ramirez RN  Outcome: Ongoing     Problem: Fluid Volume - Imbalance:  Goal: Absence of imbalanced fluid volume signs and symptoms  Description: Absence of imbalanced fluid volume signs and symptoms  4/13/2021 1043 by Bartolome Ramirez RN  Outcome: Ongoing

## 2021-04-14 LAB
ADENOVIRUS BY PCR: NOT DETECTED
ALBUMIN SERPL-MCNC: 3 G/DL (ref 3.5–5.2)
ALBUMIN SERPL-MCNC: 3.4 G/DL (ref 3.5–5.2)
ALP BLD-CCNC: 39 U/L (ref 40–129)
ALP BLD-CCNC: 42 U/L (ref 40–129)
ALT SERPL-CCNC: 11 U/L (ref 0–40)
ALT SERPL-CCNC: 13 U/L (ref 0–40)
ANION GAP SERPL CALCULATED.3IONS-SCNC: 10 MMOL/L (ref 7–16)
ANION GAP SERPL CALCULATED.3IONS-SCNC: 14 MMOL/L (ref 7–16)
AST SERPL-CCNC: 28 U/L (ref 0–39)
AST SERPL-CCNC: 39 U/L (ref 0–39)
BILIRUB SERPL-MCNC: 0.3 MG/DL (ref 0–1.2)
BILIRUB SERPL-MCNC: 0.4 MG/DL (ref 0–1.2)
BORDETELLA PARAPERTUSSIS BY PCR: NOT DETECTED
BORDETELLA PERTUSSIS BY PCR: NOT DETECTED
BUN BLDV-MCNC: 51 MG/DL (ref 8–23)
BUN BLDV-MCNC: 54 MG/DL (ref 8–23)
C-REACTIVE PROTEIN: 7.6 MG/DL (ref 0–0.4)
CALCIUM IONIZED: 1.31 MMOL/L (ref 1.15–1.33)
CALCIUM SERPL-MCNC: 8.4 MG/DL (ref 8.6–10.2)
CALCIUM SERPL-MCNC: 8.9 MG/DL (ref 8.6–10.2)
CHLAMYDOPHILIA PNEUMONIAE BY PCR: NOT DETECTED
CHLORIDE BLD-SCNC: 103 MMOL/L (ref 98–107)
CHLORIDE BLD-SCNC: 105 MMOL/L (ref 98–107)
CO2: 19 MMOL/L (ref 22–29)
CO2: 22 MMOL/L (ref 22–29)
CORONAVIRUS 229E BY PCR: NOT DETECTED
CORONAVIRUS HKU1 BY PCR: NOT DETECTED
CORONAVIRUS NL63 BY PCR: NOT DETECTED
CORONAVIRUS OC43 BY PCR: NOT DETECTED
CREAT SERPL-MCNC: 3.1 MG/DL (ref 0.7–1.2)
CREAT SERPL-MCNC: 3.1 MG/DL (ref 0.7–1.2)
D DIMER: 651 NG/ML DDU
GFR AFRICAN AMERICAN: 24
GFR AFRICAN AMERICAN: 24
GFR NON-AFRICAN AMERICAN: 24 ML/MIN/1.73
GFR NON-AFRICAN AMERICAN: 24 ML/MIN/1.73
GLUCOSE BLD-MCNC: 112 MG/DL (ref 74–99)
GLUCOSE BLD-MCNC: 175 MG/DL (ref 74–99)
HCT VFR BLD CALC: 37 % (ref 37–54)
HEMOGLOBIN: 11.1 G/DL (ref 12.5–16.5)
HUMAN METAPNEUMOVIRUS BY PCR: NOT DETECTED
HUMAN RHINOVIRUS/ENTEROVIRUS BY PCR: NOT DETECTED
INFLUENZA A BY PCR: NOT DETECTED
INFLUENZA B BY PCR: NOT DETECTED
MAGNESIUM: 2 MG/DL (ref 1.6–2.6)
MCH RBC QN AUTO: 25.5 PG (ref 26–35)
MCHC RBC AUTO-ENTMCNC: 30 % (ref 32–34.5)
MCV RBC AUTO: 84.9 FL (ref 80–99.9)
METER GLUCOSE: 109 MG/DL (ref 74–99)
METER GLUCOSE: 121 MG/DL (ref 74–99)
METER GLUCOSE: 158 MG/DL (ref 74–99)
METER GLUCOSE: 473 MG/DL (ref 74–99)
MYCOPLASMA PNEUMONIAE BY PCR: NOT DETECTED
PARAINFLUENZA VIRUS 1 BY PCR: NOT DETECTED
PARAINFLUENZA VIRUS 2 BY PCR: NOT DETECTED
PARAINFLUENZA VIRUS 3 BY PCR: NOT DETECTED
PARAINFLUENZA VIRUS 4 BY PCR: NOT DETECTED
PDW BLD-RTO: 15.6 FL (ref 11.5–15)
PHOSPHORUS: 3.5 MG/DL (ref 2.5–4.5)
PLATELET # BLD: 240 E9/L (ref 130–450)
PMV BLD AUTO: 9.9 FL (ref 7–12)
POTASSIUM REFLEX MAGNESIUM: 6 MMOL/L (ref 3.5–5)
POTASSIUM SERPL-SCNC: 5.2 MMOL/L (ref 3.5–5)
PROCALCITONIN: 0.37 NG/ML (ref 0–0.08)
PROCALCITONIN: 0.45 NG/ML (ref 0–0.08)
RBC # BLD: 4.36 E12/L (ref 3.8–5.8)
RESPIRATORY SYNCYTIAL VIRUS BY PCR: NOT DETECTED
SARS-COV-2, PCR: DETECTED
SEDIMENTATION RATE, ERYTHROCYTE: 20 MM/HR (ref 0–15)
SODIUM BLD-SCNC: 136 MMOL/L (ref 132–146)
SODIUM BLD-SCNC: 137 MMOL/L (ref 132–146)
TACROLIMUS BLOOD: 14.5 NG/ML
TOTAL PROTEIN: 5.9 G/DL (ref 6.4–8.3)
TOTAL PROTEIN: 5.9 G/DL (ref 6.4–8.3)
WBC # BLD: 5 E9/L (ref 4.5–11.5)

## 2021-04-14 PROCEDURE — 80053 COMPREHEN METABOLIC PANEL: CPT

## 2021-04-14 PROCEDURE — 83735 ASSAY OF MAGNESIUM: CPT

## 2021-04-14 PROCEDURE — 82962 GLUCOSE BLOOD TEST: CPT

## 2021-04-14 PROCEDURE — 84145 PROCALCITONIN (PCT): CPT

## 2021-04-14 PROCEDURE — 2580000003 HC RX 258: Performed by: SPECIALIST

## 2021-04-14 PROCEDURE — 85027 COMPLETE CBC AUTOMATED: CPT

## 2021-04-14 PROCEDURE — 2580000003 HC RX 258: Performed by: PHYSICIAN ASSISTANT

## 2021-04-14 PROCEDURE — 6360000002 HC RX W HCPCS: Performed by: INTERNAL MEDICINE

## 2021-04-14 PROCEDURE — 84100 ASSAY OF PHOSPHORUS: CPT

## 2021-04-14 PROCEDURE — 87305 ASPERGILLUS AG IA: CPT

## 2021-04-14 PROCEDURE — 86359 T CELLS TOTAL COUNT: CPT

## 2021-04-14 PROCEDURE — 99221 1ST HOSP IP/OBS SF/LOW 40: CPT | Performed by: STUDENT IN AN ORGANIZED HEALTH CARE EDUCATION/TRAINING PROGRAM

## 2021-04-14 PROCEDURE — 6370000000 HC RX 637 (ALT 250 FOR IP): Performed by: INTERNAL MEDICINE

## 2021-04-14 PROCEDURE — 85651 RBC SED RATE NONAUTOMATED: CPT

## 2021-04-14 PROCEDURE — 82330 ASSAY OF CALCIUM: CPT

## 2021-04-14 PROCEDURE — 87449 NOS EACH ORGANISM AG IA: CPT

## 2021-04-14 PROCEDURE — 86140 C-REACTIVE PROTEIN: CPT

## 2021-04-14 PROCEDURE — 6360000002 HC RX W HCPCS: Performed by: PHYSICIAN ASSISTANT

## 2021-04-14 PROCEDURE — XW033E5 INTRODUCTION OF REMDESIVIR ANTI-INFECTIVE INTO PERIPHERAL VEIN, PERCUTANEOUS APPROACH, NEW TECHNOLOGY GROUP 5: ICD-10-PCS | Performed by: STUDENT IN AN ORGANIZED HEALTH CARE EDUCATION/TRAINING PROGRAM

## 2021-04-14 PROCEDURE — 6370000000 HC RX 637 (ALT 250 FOR IP): Performed by: PHYSICIAN ASSISTANT

## 2021-04-14 PROCEDURE — 86360 T CELL ABSOLUTE COUNT/RATIO: CPT

## 2021-04-14 PROCEDURE — 2060000000 HC ICU INTERMEDIATE R&B

## 2021-04-14 PROCEDURE — 36415 COLL VENOUS BLD VENIPUNCTURE: CPT

## 2021-04-14 PROCEDURE — 2580000003 HC RX 258: Performed by: INTERNAL MEDICINE

## 2021-04-14 PROCEDURE — 85378 FIBRIN DEGRADE SEMIQUANT: CPT

## 2021-04-14 PROCEDURE — 82784 ASSAY IGA/IGD/IGG/IGM EACH: CPT

## 2021-04-14 PROCEDURE — 2500000003 HC RX 250 WO HCPCS: Performed by: SPECIALIST

## 2021-04-14 PROCEDURE — 2700000000 HC OXYGEN THERAPY PER DAY

## 2021-04-14 RX ORDER — DEXAMETHASONE 4 MG/1
4 TABLET ORAL EVERY 12 HOURS SCHEDULED
Status: DISCONTINUED | OUTPATIENT
Start: 2021-04-14 | End: 2021-04-18

## 2021-04-14 RX ORDER — DEXTROSE MONOHYDRATE 25 G/50ML
37.5 INJECTION, SOLUTION INTRAVENOUS ONCE
Status: COMPLETED | OUTPATIENT
Start: 2021-04-14 | End: 2021-04-14

## 2021-04-14 RX ORDER — 0.9 % SODIUM CHLORIDE 0.9 %
30 INTRAVENOUS SOLUTION INTRAVENOUS PRN
Status: DISCONTINUED | OUTPATIENT
Start: 2021-04-14 | End: 2021-04-20 | Stop reason: HOSPADM

## 2021-04-14 RX ADMIN — PREDNISONE 5 MG: 5 TABLET ORAL at 11:09

## 2021-04-14 RX ADMIN — DEXTROSE MONOHYDRATE 37.5 G: 25 INJECTION, SOLUTION INTRAVENOUS at 20:56

## 2021-04-14 RX ADMIN — SODIUM BICARBONATE 1300 MG: 650 TABLET ORAL at 11:09

## 2021-04-14 RX ADMIN — SODIUM CHLORIDE, PRESERVATIVE FREE 10 ML: 5 INJECTION INTRAVENOUS at 17:09

## 2021-04-14 RX ADMIN — HEPARIN SODIUM 5000 UNITS: 10000 INJECTION INTRAVENOUS; SUBCUTANEOUS at 20:54

## 2021-04-14 RX ADMIN — DEXAMETHASONE 4 MG: 4 TABLET ORAL at 20:55

## 2021-04-14 RX ADMIN — SODIUM ZIRCONIUM CYCLOSILICATE 10 G: 10 POWDER, FOR SUSPENSION ORAL at 16:14

## 2021-04-14 RX ADMIN — MYCOPHENOLATE MOFETIL 1000 MG: 250 CAPSULE ORAL at 11:14

## 2021-04-14 RX ADMIN — ISOSORBIDE MONONITRATE 60 MG: 60 TABLET, EXTENDED RELEASE ORAL at 11:09

## 2021-04-14 RX ADMIN — MYCOPHENOLATE MOFETIL 1000 MG: 250 CAPSULE ORAL at 20:53

## 2021-04-14 RX ADMIN — TACROLIMUS 2 MG: 1 CAPSULE ORAL at 20:53

## 2021-04-14 RX ADMIN — TIZANIDINE 4 MG: 4 TABLET ORAL at 11:09

## 2021-04-14 RX ADMIN — HYDRALAZINE HYDROCHLORIDE 100 MG: 50 TABLET, FILM COATED ORAL at 05:42

## 2021-04-14 RX ADMIN — ASPIRIN 81 MG: 81 TABLET, COATED ORAL at 11:09

## 2021-04-14 RX ADMIN — REMDESIVIR 200 MG: 100 INJECTION, POWDER, LYOPHILIZED, FOR SOLUTION INTRAVENOUS at 16:13

## 2021-04-14 RX ADMIN — INSULIN LISPRO 1 UNITS: 100 INJECTION, SOLUTION INTRAVENOUS; SUBCUTANEOUS at 16:14

## 2021-04-14 RX ADMIN — Medication 10 ML: at 11:09

## 2021-04-14 RX ADMIN — HYDRALAZINE HYDROCHLORIDE 100 MG: 50 TABLET, FILM COATED ORAL at 20:54

## 2021-04-14 RX ADMIN — Medication 10 ML: at 20:56

## 2021-04-14 RX ADMIN — TACROLIMUS 2 MG: 1 CAPSULE ORAL at 11:13

## 2021-04-14 RX ADMIN — INSULIN LISPRO 3 UNITS: 100 INJECTION, SOLUTION INTRAVENOUS; SUBCUTANEOUS at 20:54

## 2021-04-14 RX ADMIN — HYDRALAZINE HYDROCHLORIDE 100 MG: 50 TABLET, FILM COATED ORAL at 16:44

## 2021-04-14 RX ADMIN — ISOSORBIDE MONONITRATE 60 MG: 60 TABLET, EXTENDED RELEASE ORAL at 20:54

## 2021-04-14 RX ADMIN — HEPARIN SODIUM 5000 UNITS: 10000 INJECTION INTRAVENOUS; SUBCUTANEOUS at 16:13

## 2021-04-14 RX ADMIN — DEXAMETHASONE 4 MG: 4 TABLET ORAL at 11:09

## 2021-04-14 RX ADMIN — METOPROLOL SUCCINATE 50 MG: 25 TABLET, EXTENDED RELEASE ORAL at 20:54

## 2021-04-14 RX ADMIN — SODIUM CHLORIDE, PRESERVATIVE FREE 10 ML: 5 INJECTION INTRAVENOUS at 16:13

## 2021-04-14 RX ADMIN — ATORVASTATIN CALCIUM 10 MG: 10 TABLET, FILM COATED ORAL at 20:54

## 2021-04-14 RX ADMIN — SODIUM BICARBONATE 1300 MG: 650 TABLET ORAL at 20:54

## 2021-04-14 RX ADMIN — HEPARIN SODIUM 5000 UNITS: 10000 INJECTION INTRAVENOUS; SUBCUTANEOUS at 05:43

## 2021-04-14 RX ADMIN — ACETAMINOPHEN 650 MG: 325 TABLET ORAL at 11:09

## 2021-04-14 RX ADMIN — OXYCODONE HYDROCHLORIDE 15 MG: 5 TABLET ORAL at 11:08

## 2021-04-14 RX ADMIN — SODIUM CHLORIDE, PRESERVATIVE FREE 10 ML: 5 INJECTION INTRAVENOUS at 23:15

## 2021-04-14 RX ADMIN — Medication 6 UNITS: at 20:54

## 2021-04-14 ASSESSMENT — PAIN DESCRIPTION - LOCATION: LOCATION: BACK

## 2021-04-14 ASSESSMENT — PAIN DESCRIPTION - DIRECTION: RADIATING_TOWARDS: BLE

## 2021-04-14 ASSESSMENT — PAIN DESCRIPTION - PAIN TYPE: TYPE: CHRONIC PAIN

## 2021-04-14 ASSESSMENT — PAIN SCALES - GENERAL
PAINLEVEL_OUTOF10: 0
PAINLEVEL_OUTOF10: 7
PAINLEVEL_OUTOF10: 0
PAINLEVEL_OUTOF10: 7
PAINLEVEL_OUTOF10: 4
PAINLEVEL_OUTOF10: 7

## 2021-04-14 ASSESSMENT — PAIN DESCRIPTION - ORIENTATION: ORIENTATION: RIGHT;LEFT

## 2021-04-14 ASSESSMENT — PAIN DESCRIPTION - FREQUENCY: FREQUENCY: CONTINUOUS

## 2021-04-14 ASSESSMENT — PAIN DESCRIPTION - PROGRESSION: CLINICAL_PROGRESSION: NOT CHANGED

## 2021-04-14 ASSESSMENT — PAIN DESCRIPTION - ONSET: ONSET: ON-GOING

## 2021-04-14 NOTE — ACP (ADVANCE CARE PLANNING)
Advance Care Planning     Advance Care Planning Activator (Inpatient)  Conversation Note      Date of ACP Conversation: 4/9/2021    Conversation Conducted with: Luís Avitia (Wife)    ACP attempted with patient but he did not answer questions appropriately    ACP Activator: [id number] Decision Maker:     Current Designated Health Care Decision Maker:     Primary Decision Maker: Silvanaamy Mercer - [phone]    Secondary Decision Maker: Ginna Corrales - [facility] - [phone]  Click here to complete Healthcare Decision Makers including section of the Healthcare Decision Maker Relationship (ie \"Primary\")      Care Preferences    Ventilation: \"If you were in your present state of health and suddenly became very ill and were unable to breathe on your own, what would your preference be about the use of a ventilator (breathing machine) if it were available to you? \"      Would the patient desire the use of ventilator (breathing machine)?: yes    \"If your health worsens and it becomes clear that your chance of recovery is unlikely, what would your preference be about the use of a ventilator (breathing machine) if it were available to you? \"     Would the patient desire the use of ventilator (breathing machine)?: No      Resuscitation  \"CPR works best to restart the heart when there is a sudden event, like a heart attack, in someone who is otherwise healthy. Unfortunately, CPR does not typically restart the heart for people who have serious health conditions or who are very sick. \"    \"In the event your heart stopped as a result of an underlying serious health condition, would you want attempts to be made to restart your heart (answer \"yes\" for attempt to resuscitate) or would you prefer a natural death (answer \"no\" for do not attempt to resuscitate)? \" yes       [x] Yes   [] No   Educated Patient / Juan Luis Zamarripa regarding differences between Advance Directives and portable DNR orders.     Length of ACP Conversation in minutes:  15 minutes    Conversation Outcomes:  [x] ACP discussion completed  [] Existing advance directive reviewed with patient; no changes to patient's previously recorded wishes  [] New Advance Directive completed  [] Portable Do Not Rescitate prepared for Provider review and signature  [] POLST/POST/MOLST/MOST prepared for Provider review and signature      Follow-up plan:    [x] Schedule follow-up conversation to continue planning  [] Referred individual to Provider for additional questions/concerns   [] Advised patient/agent/surrogate to review completed ACP document and update if needed with changes in condition, patient preferences or care setting    [] This note routed to one or more involved healthcare providers

## 2021-04-14 NOTE — PROGRESS NOTES
P Quality Flow/Interdisciplinary Rounds Progress Note        Quality Flow Rounds held on April 14, 2021    Disciplines Attending:  Bedside Nurse, ,  and Nursing Unit [address] was admitted on 4/9/2021  8:31 PM    Anticipated Discharge Date:       Disposition:    Dariel Score:  Dariel Scale Score: 20    Readmission Risk              Risk of Unplanned Readmission:        26           Discussed patient goal for the day, patient clinical progression, and barriers to discharge.   The following Goal(s) of the Day/Commitment(s) have been identified:  wean oxygen      Anoop Linn  April 14, 2021

## 2021-04-14 NOTE — CONSULTS
[address] [place] [address] [facility]    Consultation Note     Admit Date: 4/9/2021  8:31 PM    Reason for Consult:   Covid pneumonia    Attending Physician:  Nova Mo MD     Chief Complaint: Shortness of breath and fatigue    HISTORY OF PRESENT ILLNESS:   The patient is a 79 y.o. back man  known to the Infectious Diseases service. The patient is   Admitted on 4/9/2021 with fatigue emesis loose bowel movements and anorexia. He is a renal transplant although his kidneys have suffered to some extent and his creatinine runs between 2 and a half to -3. He is not on hemodialysis. He also has other issues in occluding CHF depression diabetes and immunocompromised obviously on immunosuppressive drugs. Through his admission he has become progressively short of breath and his O2 sats were 9992 as low as 82 on room air. He is now on 3 L she is oxygenating fairly well and transferred to the Covid unit. ID was asked to evaluate.   I discussed the case with renal and they felt he was okay to get remdesivir      4/27/2017 seen by infectious disease to rule out pneumonia and immunocompromised host.  Patient was treated with Levaquin and continued on Bactrim at that time the urine was negative for histoplasmosis and CMV was not detected    Past Medical History:        Diagnosis Date    Anemia     Iron deficiency    Atrial fibrillation (HCC)     CHF (congestive heart failure) ([address] 03/12/2008    Chronic diastolic congestive heart failure ([address] 03/12/2008    Chronic foot pain     Chronic knee pain     BILATERAL    Constipation     Depression     DM (diabetes mellitus) ([address]     ESRD (end stage renal disease) ([address]     H/O kidney transplant 12/12/2014    Hyperlipidemia     Hypertension     Immunosuppression ([address] 3/12/2008    Kidney disease     Pulmonary hypertension ([address]      Past Surgical History:        Procedure Laterality Date    BACK SURGERY  1982    BLADDER SURGERY      CARPAL Tobacco Use    Smoking status: Former Smoker     Packs/day: 1.00     Years: 20.00     Pack years: 20.00     Types: Cigarettes     Quit date: 1990     Years since quittin.3    Smokeless tobacco: Never Used   Substance and Sexual Activity    Alcohol use: Yes     Alcohol/week: 0.0 standard drinks     Comment: rare glass of wine;  drinks 2-3 cups of coffee daily    Drug use: No    Sexual activity: Not Currently   Lifestyle    Physical activity     Days per week: None     Minutes per session: None    Stress: None   Relationships    Social connections     Talks on phone: None     Gets together: None     Attends Orthodox service: None     Active member of club or organization: None     Attends meetings of clubs or organizations: None     Relationship status: None    Intimate partner violence     Fear of current or ex partner: None     Emotionally abused: None     Physically abused: None     Forced sexual activity: None   Other Topics Concern    None   Social History Narrative    None     Family History:       Problem Relation Age of Onset    Diabetes Mother     High Blood Pressure Mother     Heart Disease Mother     Diabetes Father     Cancer Father     Stroke Father     Heart Disease Father    . Otherwise non-pertinent to the chief complaint. REVIEW OF SYSTEMS:    CONSTITUTIONAL:  No chills, fevers or night sweats. No loss of weight. EYES:  No double vision or drainage from eyes, ears or throat. HEENT:  No neck stiffness. No dysphagia. No drainage from eyes, ears or throat  RESPIRATORY: Positive cough, productive sputum or hemoptysis. CARDIOVASCULAR:  No chest pain, palpitations,+ orthopnea + dyspnea on exertion. GASTROINTESTINAL:  No nausea, vomiting, positive diarrhea or constipation or hematochezia   GENITOURINARY:  No frequency burning dysuria or hematuria. INTEGUMENT/BREAST:  No rash or breast masses. HEMATOLOGIC/LYMPHATIC:  No lymphadenopathy or blood dyscrasics. ALLERGIC/IMMUNOLOGIC:  No anaphylaxis. ENDOCRINE:  No polyuria or polydipsia or temperature intolerance. MUSCULOSKELETAL:  No myalgia or arthralgia. Full ROM. NEUROLOGICAL:  No focal motor sensory deficit. BEHAVIOR/PSYCH:  No psychosis. PHYSICAL EXAM:    Vitals:    BP (!) 155/72   Pulse 85   Temp 101.5 °F (38.6 °C) (Oral)   Resp 20   Ht 5' 10\" (1.778 m)   Wt 235 lb 6.4 oz (106.8 kg)   SpO2 98%   BMI 33.78 kg/m²   Constitutional: The patient is awake, alert, and oriented. Skin: Warm and dry. No rashes were noted. No jaundice. HEENT: Eyes show round, and reactive pupils. Moist mucous membranes, no ulcerations, no thrush. Neck: Supple to movements. No lymphadenopathy. Chest: No use of accessory muscles to breathe. Symmetrical expansion. Auscultation reveals no wheezing, crackles, or rhonchi. Poor aeration  Cardiovascular: S1 and S2 are rhythmic and regular. No murmurs appreciated. Abdomen: Positive bowel sounds to auscultation. Benign to palpation. No masses felt. No hepatosplenomegaly. Genitourinary: Male  Extremities: No clubbing, no cyanosis, no edema.   Musculoskeletal: Equal and symmetrical  Neurological: No focal  Lines: peripheral      CBC+dif:  Recent Labs     04/12/21  0510 04/13/21  1100 04/14/21  0707   WBC 3.4* 5.1 5.0   HGB 10.5* 9.8* 11.1*   HCT 34.8* 32.2* 37.0   MCV 85.1 84.3 84.9    189 240     Lab Results   Component Value Date    CRP 7.6 (H) 04/14/2021    CRP 2.1 (H) 06/03/2013    CRP 5.8 (H) 05/27/2013     No results found for: CRPHS  Lab Results   Component Value Date    SEDRATE 20 (H) 04/14/2021    SEDRATE 48 (H) 06/03/2013    SEDRATE 40 (H) 05/27/2013     Lab Results   Component Value Date    ALT 11 04/14/2021    AST 28 04/14/2021    ALKPHOS 42 04/14/2021    BILITOT 0.3 04/14/2021     Lab Results   Component Value Date     04/14/2021    K 5.2 04/14/2021    K 5.1 04/11/2021     04/14/2021    CO2 22 04/14/2021    BUN 51 04/14/2021    CREATININE 3.1 BC in the last 72 hours. No results for input(s): ORG in the last 72 hours. No results for input(s): Felecia Blazing in the last 72 hours. No results for input(s): STREPNEUMAGU in the last 72 hours. No results for input(s): LP1UAG in the last 72 hours. No results for input(s): ASO in the last 72 hours. No results for input(s): CULTRESP in the last 72 hours. Assessment:  · Covid pneumonia in a immunocompromised host  · WORK UP OTHER ETIOLOGIES      Plan:    · Cont remdesivir/ dexamethasone  · Check immunocompromise state  · galactomannen and fungitell  · IgG and CD4  · Check cultures  · Baseline ESR, CRP  · Monitor labs  · Will follow with you    Thank you for having us see this patient in consultation. I will be discussing this case with the treating physicians.       Electronically signed by Kaity Covarrubias MD on 4/14/2021 at 2:59 PM

## 2021-04-14 NOTE — CARE COORDINATION
Social Work/Discharge Planning:  Patient tested positive for COVID 4/13 and was moved to [place] [id number] today. This worker attempted to call patient room phone to confirm discharge plan and to complete Advanced Care Planning and no answer. Chart reviewed. Patient requiring three liters of oxygen. Will attempt to call patient at a later time.   Electronically signed by DARYL John on 4/14/2021 at 11:42 AM

## 2021-04-14 NOTE — PROGRESS NOTES
Internal Medicine Progress Note      Synopsis: Patient admitted on 4/9/2021    Subjective    Patient is in his room. IV fluids still on board. He is eating better he tells me  He wants to get out of here he tells me  Afebrile. Does not want a Suazo's catheter  April 12, 2021  Still requesting a discharge. Discussed with him regarding his right heart catheterization and he has no recollection. He thinks heart catheterization was not done due to concerns for IV dye having a negative renal impact. April 13 th 2021   He wants to go home ! Patient has needed O2 this morning. He also has refused therapy and occupational and physical.  He was running a low-grade fever up to yesterday  April 14, 2021  Patient turn positive for SARS-CoV-2  He is now on a separate floor. He still hypoxemic but only requiring 3 L  Exam:  BP (!) 143/68   Pulse 76   Temp 98 °F (36.7 °C) (Oral)   Resp 20   Ht 5' 10\" (1.778 m)   Wt 235 lb 6.4 oz (106.8 kg)   SpO2 98%   BMI 33.78 kg/m²   General appearance: No apparent distress, appears stated age and cooperative. HEENT: Normal cephalic, atraumatic without obvious deformity. Pupils equal, round, and reactive to light. Extra ocular muscles intact. Conjunctivae/corneas clear. Periorbital edema notedAnd some conjunctival edema noted to  Neck: . Trachea midline. Respiratory: Decreased  Cardiovascular: Regular heart rate rhythm  Abdomen: Nontender  Musculoskeletal: No clubbing but skin is thick on bilateral lower extremities   skin: Normal skin color. No rashes or lesions. Neurologic:  Neurovascularly intact without any focal sensory/motor deficits.  Cranial nerves: II-XII intact, grossly non-focal.  Psychiatric: Alert and oriented, thought content appropriate, normal insight       Medications:  Reviewed    Infusion Medications    sodium chloride      dextrose       Scheduled Medications    sodium bicarbonate  1,300 mg Oral BID    isosorbide mononitrate  60 mg Oral BID    hydrALAZINE  100 mg Oral 3 times per day    aspirin  81 mg Oral Daily    metoprolol succinate  50 mg Oral Nightly    mycophenolate  1,000 mg Oral BID    tacrolimus  2 mg Oral BID    tiZANidine  4 mg Oral Daily    sodium chloride flush  5-40 mL Intravenous 2 times per day    insulin lispro  0-6 Units Subcutaneous TID WC    insulin lispro  0-3 Units Subcutaneous Nightly    predniSONE  5 mg Oral Daily    atorvastatin  10 mg Oral Nightly    heparin (porcine)  5,000 Units Subcutaneous Q8H     PRN Meds: oxyCODONE, sodium chloride flush, sodium chloride, acetaminophen **OR** acetaminophen, glucose, dextrose, glucagon (rDNA), dextrose, polyethylene glycol, trimethobenzamide    I/O    Intake/Output Summary (Last 24 hours) at 4/14/2021 1058  Last data filed at 4/14/2021 0544  Gross per 24 hour   Intake 240 ml   Output 400 ml   Net -160 ml       Labs:   Recent Labs     04/12/21  0510 04/13/21  1100 04/14/21  0707   WBC 3.4* 5.1 5.0   HGB 10.5* 9.8* 11.1*   HCT 34.8* 32.2* 37.0    189 240       Recent Labs     04/12/21  0510 04/13/21  1100 04/14/21  0707    138  136 137   K 4.6 5.2*  5.2* 5.2*   * 107  107 105   CO2 19* 20*  20* 22   BUN 41* 45*  44* 51*   CREATININE 2.3* 2.7*  2.7* 3.1*   CALCIUM 8.1* 8.4*  8.4* 8.9   PHOS 3.0 2.6 3.5       Recent Labs     04/13/21  1100 04/14/21  0707   PROT 5.5* 5.9*   ALKPHOS 38* 42   ALT 10 11   AST 31 28   BILITOT 0.3 0.3       No results for input(s): INR in the last 72 hours. No results for input(s): Kennedy King in the last 72 hours.     Chronic labs:  Lab Results   Component Value Date    CHOL 128 06/03/2014    TRIG 50 06/03/2014    HDL 51 06/03/2014    LDLCALC 67 06/03/2014    TSH 0.996 06/03/2014    INR 1.0 08/31/2020    LABA1C 5.5 06/03/2014       Radiology:  Xr Chest Portable    Result Date: 4/9/2021  EXAMINATION: ONE XRAY VIEW OF THE CHEST 4/9/2021 8:59 pm COMPARISON: 05/29/2019 HISTORY: ORDERING SYSTEM PROVIDED HISTORY: Fever TECHNOLOGIST PROVIDED HISTORY: Reason for exam:->Fever FINDINGS: Possible left costophrenic angle opacity. There is no effusion or pneumothorax. The cardiomediastinal silhouette is without acute process. The osseous structures are without acute process. Possible left costophrenic angle opacity. ASSESSMENT:    Principal Problem:    Acute nontraumatic kidney injury ([address]  Active Problems:    CKD (chronic kidney disease) stage 4, GFR 15-29 ml/min ([facility])    Diabetes mellitus, type 2 ([facility])    HTN (hypertension), benign    ALYSON (acute kidney injury) ([address]    Vomiting and diarrhea    Chronic diastolic congestive heart failure ([address]    Immunosuppression ([address]  Resolved Problems:    * No resolved hospital problems. *       PLAN:    `1.  He has tolerated his iv fluids pretty well so far but we can decrease them with his known history of heart failure  2. Creatinine has normalized fairly fast close to his baseline  3. Taking an oral medications and food and fluids pretty well  4. May be able to discharge by tomorrow. 5.  Order renal ultrasound  April 12, 2021  No overt signs and symptoms of heart failure. We can DC IV fluids. Labs from today show creatinine that is better yet. Slight leukopenia noted. Blood sugar is okay. BNP was elevated yesterday and another one has been ordered. Renal ultrasound only showed atrophic kidneys  His baseline creatinine has been reached. Wait for renal for final discharge planning. Cause of acute kidney insufficiency unclear yet  April 13, 2021  Off of IV fluids he has increased need for O2 today. BNP remains elevated. I do feel he has early heart failure yet.   When I questioned the patient it appears that he has no knowledge of any kind of heart catheterization or heart failure workup in the past.  He remains in a table and he has refused therapy occupational and physical.  Patient's blood pressure control is suboptimal.  His creatinine has returned to his normal  I do not feel patient is safe for discharge today he is seen by cardiology. He may need diuresis which may bump up his creatinine yet again  For now at least he is eating  April 14, 2021  Await ID recommendations. Creatinine is still going back up. Pharmacy consult for appropriate treatment for COVID-19  Patient is chronically on prednisone. We can add a low-dose of Decadron on to allow for additional support for COVID-19  Diet: DIET GENERAL; Low Potassium  Code Status: Full Code  PT/OT Eval Status:   Order  DVT Prophylaxis:   In place  Recommended disposition at discharge:   Home    +++++++++++++++++++++++++++++++++++++++++++++++++  Nova Mo MD   [facility].  +++++++++++++++++++++++++++++++++++++++++++++++++  NOTE: This report was transcribed using voice recognition software. Every effort was made to ensure accuracy; however, inadvertent computerized transcription errors may be present.

## 2021-04-14 NOTE — PROGRESS NOTES
Progress Note  4/14/2021 2:39 PM  Subjective:   Admit Date: 4/9/2021  PCP: GUIDO GERONIMO III, DO  Interval History: Pt being seen for ESRD requiring KRT with LRD Allograft placed 12/12/14 4/14/21:Pt resting quietly in bed, SARs-COV-2 test (+) and pt now in isolation    Diet: DIET GENERAL; Low Potassium    Data:   Scheduled Meds:   dexamethasone  4 mg Oral 2 times per day    sodium bicarbonate  1,300 mg Oral BID    isosorbide mononitrate  60 mg Oral BID    hydrALAZINE  100 mg Oral 3 times per day    aspirin  81 mg Oral Daily    metoprolol succinate  50 mg Oral Nightly    mycophenolate  1,000 mg Oral BID    tacrolimus  2 mg Oral BID    tiZANidine  4 mg Oral Daily    sodium chloride flush  5-40 mL Intravenous 2 times per day    insulin lispro  0-6 Units Subcutaneous TID WC    insulin lispro  0-3 Units Subcutaneous Nightly    predniSONE  5 mg Oral Daily    atorvastatin  10 mg Oral Nightly    heparin (porcine)  5,000 Units Subcutaneous Q8H     Continuous Infusions:   sodium chloride      dextrose       PRN Meds:oxyCODONE, sodium chloride flush, sodium chloride, acetaminophen **OR** acetaminophen, glucose, dextrose, glucagon (rDNA), dextrose, polyethylene glycol, trimethobenzamide  I/O last 3 completed shifts: In: 240 [P.O.:240]  Out: 400 [Urine:400]  No intake/output data recorded.     Intake/Output Summary (Last 24 hours) at 4/14/2021 1439  Last data filed at 4/14/2021 0544  Gross per 24 hour   Intake 120 ml   Output 400 ml   Net -280 ml     CBC:   Recent Labs     04/12/21  0510 04/13/21  1100 04/14/21  0707   WBC 3.4* 5.1 5.0   HGB 10.5* 9.8* 11.1*    189 240     BMP:    Recent Labs     04/12/21  0510 04/13/21  1100 04/14/21  0707    138  136 137   K 4.6 5.2*  5.2* 5.2*   * 107  107 105   CO2 19* 20*  20* 22   BUN 41* 45*  44* 51*   CREATININE 2.3* 2.7*  2.7* 3.1*   GLUCOSE 81 131*  130* 112*     Hepatic:   Recent Labs     04/13/21  1100 04/14/21  0707   AST 31 28 ALT 10 11   BILITOT 0.3 0.3   ALKPHOS 38* 42     Troponin:   No results for input(s): TROPONINI in the last 72 hours. BNP: No results for input(s): BNP in the last 72 hours. Lipids: No results for input(s): CHOL, HDL in the last 72 hours. Invalid input(s): LDLCALCU  ABGs: No results found for: PHART, PO2ART, [id number]  INR: No results for input(s): INR in the last 72 hours. -----------------------------------------------------------------  RAD:   EXAMINATION:   RETROPERITONEAL ULTRASOUND OF THE KIDNEYS AND URINARY BLADDER       4/11/2021       COMPARISON:   None       HISTORY:   ORDERING SYSTEM PROVIDED HISTORY: arf   TECHNOLOGIST PROVIDED HISTORY:   Reason for exam:->arf   What reading provider will be dictating this exam?->CRC       FINDINGS:       Kidneys:       The right kidney measures 8.1 cm in length and the left kidney measures 7.9   cm in length.       Kidneys demonstrate increased cortical echogenicity.  No evidence of   hydronephrosis or intrarenal stones.           Bladder:       Unremarkable appearance of the bladder.  Bilateral ureteral jets are noted.           Impression   Atrophic and echogenic kidneys compatible with chronic renal parenchymal   disease.       Unremarkable bladder. EXAMINATION:   ONE XRAY VIEW OF THE CHEST       4/9/2021 8:59 pm       COMPARISON:   05/29/2019       HISTORY:   ORDERING SYSTEM PROVIDED HISTORY: Fever   TECHNOLOGIST PROVIDED HISTORY:   Reason for exam:->Fever       FINDINGS:   Possible left costophrenic angle opacity.  There is no effusion or   pneumothorax. The cardiomediastinal silhouette is without acute process. The   osseous structures are without acute process.           Impression   Possible left costophrenic angle opacity. 4/13/21 2 D ECHO       Summary    Normal left ventricular chamber size.    Normal left ventricular systolic function.    Visually estimated LVEF is 60-65 %.    No wall motion abnormalities.    Stage I diastolic function.  Normal right ventricle structure and function.    No significant valvular abnormalities. Objective:   Vitals: BP (!) 155/72   Pulse 85   Temp 101.5 °F (38.6 °C) (Oral)   Resp 20   Ht 5' 10\" (1.778 m)   Wt 235 lb 6.4 oz (106.8 kg)   SpO2 98%   BMI 33.78 kg/m²    PE from 4/13/21 visit  General appearance: alert, appears stated age and cooperative   Neck: No JVD  Lungs: Breath sounds decreased at the bases. No rales or ronchi. Heart: Regular rate and rhythm. No S3 gallop. No  murmrur. Abdomen: Soft non distended, no tenderness over the renal allograft, non tender and normal bowel sounds. Extremeties: No edema, with skin to chronic venous stasis dermatitis    Assessment:   Patient Active Problem List:     GERD (gastroesophageal reflux disease)     CKD (chronic kidney disease) stage 4, GFR 15-29 ml/min ([facility])     Mixed hyperlipidemia     Diabetes mellitus, type 2 ([facility])     HTN (hypertension), benign     Junctional bradycardia     Anemia in stage 3 chronic kidney disease     CAD (coronary artery disease), native coronary artery     Acute CHF (congestive heart failure) ([facility])     Chest pain     Respiratory failure ([facility])     ALYSON (acute kidney injury) ([address]     Vomiting and diarrhea     Acute nontraumatic kidney injury ([address]     Chronic diastolic congestive heart failure ([address]     H/O kidney transplant     Pulmonary hypertension ([address]     Immunosuppression ([address]    Plan:   1- CKD5D/ESRD with RRT as LRD Renal Allograft placed 12/12/14 with a baseline serum cr of 2.1-2.7mg/dl and an e-GFR=30-28ml/min. His current creatinine is above baseline  UA SG 1.020, protein 100mg/dl, blood (-). Ni (-), LE (-)  PLAN:1.Start IVF  2. Continue to monitor     2-Anemia in CKD-HgB at goal >/=10  PLAN:1. Continue ADAM for HgB  <10  2.  Follow H/H.    3-Anorexia-improved    4- HTN with CKD 5/ESRD-BP above goal <140/90   PLAN:1. Follow BP on  the hydralazine to 100mg po tid and isosorbide 60mg bid, metoprolol succinate 50QHS    5-

## 2021-04-14 NOTE — PROGRESS NOTES
Dr Phoenix Romero notified of positive covid result on respiratory array and pt to be transferred to [facility] when bed available.

## 2021-04-15 ENCOUNTER — APPOINTMENT (OUTPATIENT)
Dept: ULTRASOUND IMAGING | Age: 71
DRG: 698 | End: 2021-04-15
Payer: MEDICARE

## 2021-04-15 ENCOUNTER — APPOINTMENT (OUTPATIENT)
Dept: CT IMAGING | Age: 71
DRG: 698 | End: 2021-04-15
Payer: MEDICARE

## 2021-04-15 ENCOUNTER — APPOINTMENT (OUTPATIENT)
Dept: GENERAL RADIOLOGY | Age: 71
DRG: 698 | End: 2021-04-15
Payer: MEDICARE

## 2021-04-15 LAB
ALBUMIN SERPL-MCNC: 3 G/DL (ref 3.5–5.2)
ALP BLD-CCNC: 44 U/L (ref 40–129)
ALT SERPL-CCNC: 12 U/L (ref 0–40)
ANION GAP SERPL CALCULATED.3IONS-SCNC: 10 MMOL/L (ref 7–16)
ANION GAP SERPL CALCULATED.3IONS-SCNC: 12 MMOL/L (ref 7–16)
AST SERPL-CCNC: 27 U/L (ref 0–39)
BILIRUB SERPL-MCNC: 0.3 MG/DL (ref 0–1.2)
BUN BLDV-MCNC: 58 MG/DL (ref 8–23)
BUN BLDV-MCNC: 60 MG/DL (ref 8–23)
C-REACTIVE PROTEIN: 8.6 MG/DL (ref 0–0.4)
CALCIUM IONIZED: 1.24 MMOL/L (ref 1.15–1.33)
CALCIUM SERPL-MCNC: 8.3 MG/DL (ref 8.6–10.2)
CALCIUM SERPL-MCNC: 8.8 MG/DL (ref 8.6–10.2)
CHLORIDE BLD-SCNC: 103 MMOL/L (ref 98–107)
CHLORIDE BLD-SCNC: 105 MMOL/L (ref 98–107)
CO2: 21 MMOL/L (ref 22–29)
CO2: 22 MMOL/L (ref 22–29)
CREAT SERPL-MCNC: 3 MG/DL (ref 0.7–1.2)
CREAT SERPL-MCNC: 3.2 MG/DL (ref 0.7–1.2)
D DIMER: 414 NG/ML DDU
FERRITIN: 437 NG/ML
FIBRINOGEN: 617 MG/DL (ref 225–540)
GFR AFRICAN AMERICAN: 23
GFR AFRICAN AMERICAN: 25
GFR NON-AFRICAN AMERICAN: 23 ML/MIN/1.73
GFR NON-AFRICAN AMERICAN: 25 ML/MIN/1.73
GLUCOSE BLD-MCNC: 218 MG/DL (ref 74–99)
GLUCOSE BLD-MCNC: 250 MG/DL (ref 74–99)
HCT VFR BLD CALC: 33.2 % (ref 37–54)
HEMOGLOBIN: 10 G/DL (ref 12.5–16.5)
LACTATE DEHYDROGENASE: 373 U/L (ref 135–225)
MAGNESIUM: 2.1 MG/DL (ref 1.6–2.6)
MCH RBC QN AUTO: 25.5 PG (ref 26–35)
MCHC RBC AUTO-ENTMCNC: 30.1 % (ref 32–34.5)
MCV RBC AUTO: 84.7 FL (ref 80–99.9)
METER GLUCOSE: 213 MG/DL (ref 74–99)
METER GLUCOSE: 268 MG/DL (ref 74–99)
METER GLUCOSE: 277 MG/DL (ref 74–99)
METER GLUCOSE: 304 MG/DL (ref 74–99)
METER GLUCOSE: 344 MG/DL (ref 74–99)
PDW BLD-RTO: 15.7 FL (ref 11.5–15)
PHOSPHORUS: 4.5 MG/DL (ref 2.5–4.5)
PLATELET # BLD: 227 E9/L (ref 130–450)
PMV BLD AUTO: 9.9 FL (ref 7–12)
POTASSIUM REFLEX MAGNESIUM: 5.7 MMOL/L (ref 3.5–5)
POTASSIUM SERPL-SCNC: 5.2 MMOL/L (ref 3.5–5)
POTASSIUM SERPL-SCNC: 5.2 MMOL/L (ref 3.5–5)
POTASSIUM SERPL-SCNC: 5.7 MMOL/L (ref 3.5–5)
RBC # BLD: 3.92 E12/L (ref 3.8–5.8)
SODIUM BLD-SCNC: 135 MMOL/L (ref 132–146)
SODIUM BLD-SCNC: 138 MMOL/L (ref 132–146)
TOTAL PROTEIN: 5.5 G/DL (ref 6.4–8.3)
WBC # BLD: 4.1 E9/L (ref 4.5–11.5)

## 2021-04-15 PROCEDURE — 71045 X-RAY EXAM CHEST 1 VIEW: CPT

## 2021-04-15 PROCEDURE — 83735 ASSAY OF MAGNESIUM: CPT

## 2021-04-15 PROCEDURE — 82330 ASSAY OF CALCIUM: CPT

## 2021-04-15 PROCEDURE — 85384 FIBRINOGEN ACTIVITY: CPT

## 2021-04-15 PROCEDURE — 6360000002 HC RX W HCPCS

## 2021-04-15 PROCEDURE — 94660 CPAP INITIATION&MGMT: CPT

## 2021-04-15 PROCEDURE — 6360000002 HC RX W HCPCS: Performed by: INTERNAL MEDICINE

## 2021-04-15 PROCEDURE — 84100 ASSAY OF PHOSPHORUS: CPT

## 2021-04-15 PROCEDURE — 82962 GLUCOSE BLOOD TEST: CPT

## 2021-04-15 PROCEDURE — 86140 C-REACTIVE PROTEIN: CPT

## 2021-04-15 PROCEDURE — 2580000003 HC RX 258: Performed by: SPECIALIST

## 2021-04-15 PROCEDURE — 2060000000 HC ICU INTERMEDIATE R&B

## 2021-04-15 PROCEDURE — 6370000000 HC RX 637 (ALT 250 FOR IP): Performed by: INTERNAL MEDICINE

## 2021-04-15 PROCEDURE — 6360000002 HC RX W HCPCS: Performed by: PHYSICIAN ASSISTANT

## 2021-04-15 PROCEDURE — 2500000003 HC RX 250 WO HCPCS: Performed by: SPECIALIST

## 2021-04-15 PROCEDURE — 80048 BASIC METABOLIC PNL TOTAL CA: CPT

## 2021-04-15 PROCEDURE — 93970 EXTREMITY STUDY: CPT

## 2021-04-15 PROCEDURE — 36415 COLL VENOUS BLD VENIPUNCTURE: CPT

## 2021-04-15 PROCEDURE — 2580000003 HC RX 258: Performed by: PHYSICIAN ASSISTANT

## 2021-04-15 PROCEDURE — 2700000000 HC OXYGEN THERAPY PER DAY

## 2021-04-15 PROCEDURE — 85378 FIBRIN DEGRADE SEMIQUANT: CPT

## 2021-04-15 PROCEDURE — 70450 CT HEAD/BRAIN W/O DYE: CPT

## 2021-04-15 PROCEDURE — 85027 COMPLETE CBC AUTOMATED: CPT

## 2021-04-15 PROCEDURE — 80053 COMPREHEN METABOLIC PANEL: CPT

## 2021-04-15 PROCEDURE — 83615 LACTATE (LD) (LDH) ENZYME: CPT

## 2021-04-15 PROCEDURE — 84132 ASSAY OF SERUM POTASSIUM: CPT

## 2021-04-15 PROCEDURE — 82728 ASSAY OF FERRITIN: CPT

## 2021-04-15 PROCEDURE — 2580000003 HC RX 258: Performed by: INTERNAL MEDICINE

## 2021-04-15 PROCEDURE — 6370000000 HC RX 637 (ALT 250 FOR IP): Performed by: PHYSICIAN ASSISTANT

## 2021-04-15 RX ORDER — HYDRALAZINE HYDROCHLORIDE 50 MG/1
50 TABLET, FILM COATED ORAL EVERY 8 HOURS SCHEDULED
Status: DISCONTINUED | OUTPATIENT
Start: 2021-04-15 | End: 2021-04-19

## 2021-04-15 RX ORDER — NALOXONE HYDROCHLORIDE 0.4 MG/ML
INJECTION, SOLUTION INTRAMUSCULAR; INTRAVENOUS; SUBCUTANEOUS
Status: COMPLETED
Start: 2021-04-15 | End: 2021-04-15

## 2021-04-15 RX ORDER — NALOXONE HYDROCHLORIDE 0.4 MG/ML
0.4 INJECTION, SOLUTION INTRAMUSCULAR; INTRAVENOUS; SUBCUTANEOUS PRN
Status: DISCONTINUED | OUTPATIENT
Start: 2021-04-15 | End: 2021-04-20 | Stop reason: HOSPADM

## 2021-04-15 RX ORDER — NALOXONE HYDROCHLORIDE 0.4 MG/ML
0.4 INJECTION, SOLUTION INTRAMUSCULAR; INTRAVENOUS; SUBCUTANEOUS ONCE
Status: COMPLETED | OUTPATIENT
Start: 2021-04-15 | End: 2021-04-15

## 2021-04-15 RX ORDER — SODIUM CHLORIDE, SODIUM LACTATE, POTASSIUM CHLORIDE, CALCIUM CHLORIDE 600; 310; 30; 20 MG/100ML; MG/100ML; MG/100ML; MG/100ML
INJECTION, SOLUTION INTRAVENOUS CONTINUOUS
Status: DISCONTINUED | OUTPATIENT
Start: 2021-04-15 | End: 2021-04-20 | Stop reason: HOSPADM

## 2021-04-15 RX ADMIN — NALXONE HYDROCHLORIDE 0.4 MG: 0.4 INJECTION INTRAMUSCULAR; INTRAVENOUS; SUBCUTANEOUS at 12:39

## 2021-04-15 RX ADMIN — INSULIN LISPRO 2 UNITS: 100 INJECTION, SOLUTION INTRAVENOUS; SUBCUTANEOUS at 22:11

## 2021-04-15 RX ADMIN — HYDRALAZINE HYDROCHLORIDE 100 MG: 50 TABLET, FILM COATED ORAL at 13:59

## 2021-04-15 RX ADMIN — ATORVASTATIN CALCIUM 10 MG: 10 TABLET, FILM COATED ORAL at 22:06

## 2021-04-15 RX ADMIN — REMDESIVIR 100 MG: 100 INJECTION, POWDER, LYOPHILIZED, FOR SOLUTION INTRAVENOUS at 13:54

## 2021-04-15 RX ADMIN — INSULIN LISPRO 2 UNITS: 100 INJECTION, SOLUTION INTRAVENOUS; SUBCUTANEOUS at 08:36

## 2021-04-15 RX ADMIN — DEXAMETHASONE 4 MG: 4 TABLET ORAL at 08:38

## 2021-04-15 RX ADMIN — TACROLIMUS 2 MG: 1 CAPSULE ORAL at 22:08

## 2021-04-15 RX ADMIN — HEPARIN SODIUM 5000 UNITS: 10000 INJECTION INTRAVENOUS; SUBCUTANEOUS at 06:03

## 2021-04-15 RX ADMIN — TIZANIDINE 4 MG: 4 TABLET ORAL at 08:38

## 2021-04-15 RX ADMIN — INSULIN LISPRO 3 UNITS: 100 INJECTION, SOLUTION INTRAVENOUS; SUBCUTANEOUS at 17:32

## 2021-04-15 RX ADMIN — PREDNISONE 5 MG: 5 TABLET ORAL at 08:38

## 2021-04-15 RX ADMIN — ASPIRIN 81 MG: 81 TABLET, COATED ORAL at 08:38

## 2021-04-15 RX ADMIN — INSULIN LISPRO 4 UNITS: 100 INJECTION, SOLUTION INTRAVENOUS; SUBCUTANEOUS at 12:39

## 2021-04-15 RX ADMIN — METOPROLOL SUCCINATE 50 MG: 25 TABLET, EXTENDED RELEASE ORAL at 22:06

## 2021-04-15 RX ADMIN — MYCOPHENOLATE MOFETIL 1000 MG: 250 CAPSULE ORAL at 22:08

## 2021-04-15 RX ADMIN — SODIUM ZIRCONIUM CYCLOSILICATE 10 G: 10 POWDER, FOR SUSPENSION ORAL at 13:59

## 2021-04-15 RX ADMIN — SODIUM CHLORIDE, POTASSIUM CHLORIDE, SODIUM LACTATE AND CALCIUM CHLORIDE: 600; 310; 30; 20 INJECTION, SOLUTION INTRAVENOUS at 15:20

## 2021-04-15 RX ADMIN — ISOSORBIDE MONONITRATE 60 MG: 60 TABLET, EXTENDED RELEASE ORAL at 08:38

## 2021-04-15 RX ADMIN — DEXAMETHASONE 4 MG: 4 TABLET ORAL at 22:06

## 2021-04-15 RX ADMIN — SODIUM ZIRCONIUM CYCLOSILICATE 10 G: 10 POWDER, FOR SUSPENSION ORAL at 08:38

## 2021-04-15 RX ADMIN — HYDRALAZINE HYDROCHLORIDE 100 MG: 50 TABLET, FILM COATED ORAL at 05:41

## 2021-04-15 RX ADMIN — ISOSORBIDE MONONITRATE 60 MG: 60 TABLET, EXTENDED RELEASE ORAL at 22:06

## 2021-04-15 RX ADMIN — NALXONE HYDROCHLORIDE 0.4 MG: 0.4 INJECTION INTRAMUSCULAR; INTRAVENOUS; SUBCUTANEOUS at 10:03

## 2021-04-15 RX ADMIN — TACROLIMUS 2 MG: 1 CAPSULE ORAL at 08:38

## 2021-04-15 RX ADMIN — SODIUM BICARBONATE 1300 MG: 650 TABLET ORAL at 22:07

## 2021-04-15 RX ADMIN — SODIUM BICARBONATE 1300 MG: 650 TABLET ORAL at 08:37

## 2021-04-15 RX ADMIN — NALOXONE HYDROCHLORIDE 0.4 MG: 0.4 INJECTION, SOLUTION INTRAMUSCULAR; INTRAVENOUS; SUBCUTANEOUS at 10:03

## 2021-04-15 RX ADMIN — NALOXONE HYDROCHLORIDE 0.4 MG: 0.4 INJECTION, SOLUTION INTRAMUSCULAR; INTRAVENOUS; SUBCUTANEOUS at 12:39

## 2021-04-15 RX ADMIN — OXYCODONE HYDROCHLORIDE 15 MG: 5 TABLET ORAL at 08:37

## 2021-04-15 RX ADMIN — HEPARIN SODIUM 5000 UNITS: 10000 INJECTION INTRAVENOUS; SUBCUTANEOUS at 22:13

## 2021-04-15 RX ADMIN — Medication 10 ML: at 08:38

## 2021-04-15 RX ADMIN — SODIUM CHLORIDE 25 ML: 9 INJECTION, SOLUTION INTRAVENOUS at 13:55

## 2021-04-15 RX ADMIN — MYCOPHENOLATE MOFETIL 1000 MG: 250 CAPSULE ORAL at 08:37

## 2021-04-15 RX ADMIN — HEPARIN SODIUM 5000 UNITS: 10000 INJECTION INTRAVENOUS; SUBCUTANEOUS at 13:59

## 2021-04-15 ASSESSMENT — PAIN SCALES - GENERAL: PAINLEVEL_OUTOF10: 6

## 2021-04-15 ASSESSMENT — PAIN DESCRIPTION - LOCATION: LOCATION: BACK;LEG

## 2021-04-15 ASSESSMENT — PAIN DESCRIPTION - PAIN TYPE: TYPE: CHRONIC PAIN

## 2021-04-15 NOTE — CONSULTS
2015    [facility] ; ALSO 2016, 2017    KIDNEY TRANSPLANT  2014    @ [facility]    EDUARDO-EN-Y GASTRIC BYPASS  2010    Laparoscopic / Dr. Victoriano Ortega  2007    Dr. Alecia Ferrell       Family History   Problem Relation Age of Onset    Diabetes Mother     High Blood Pressure Mother     Heart Disease Mother     Diabetes Father     Cancer Father     Stroke Father     Heart Disease Father        Social History:   Social History     Socioeconomic History    Marital status:      Spouse name: Not on file    Number of children: Not on file    Years of education: Not on file    Highest education level: Not on file   Occupational History    Not on file   Social Needs    Financial resource strain: Not on file    Food insecurity     Worry: Not on file     Inability: Not on file    Transportation needs     Medical: Not on file     Non-medical: Not on file   Tobacco Use    Smoking status: Former Smoker     Packs/day: 1.00     Years: 20.00     Pack years: 20.00     Types: Cigarettes     Quit date: 1990     Years since quittin.3    Smokeless tobacco: Never Used   Substance and Sexual Activity    Alcohol use:  Yes     Alcohol/week: 0.0 standard drinks     Comment: rare glass of wine;  drinks 2-3 cups of coffee daily    Drug use: No    Sexual activity: Not Currently   Lifestyle    Physical activity     Days per week: Not on file     Minutes per session: Not on file    Stress: Not on file   Relationships    Social connections     Talks on phone: Not on file     Gets together: Not on file     Attends Christianity service: Not on file     Active member of club or organization: Not on file     Attends meetings of clubs or organizations: Not on file     Relationship status: Not on file    Intimate partner violence     Fear of current or ex partner: Not on file     Emotionally abused: Not on file     Physically abused: Not on file     Forced sexual activity: Not taking differently: Take 75 mg by mouth every 8 hours )  tacrolimus (PROGRAF) 1 MG capsule, Take 2 capsules by mouth 2 times daily  sodium bicarbonate 650 MG tablet, Take 650 mg by mouth daily  mycophenolate (CELLCEPT) 250 MG capsule,  Take 1,000 mg by mouth 2 times daily   oxyCODONE (OXY-IR) 15 MG immediate release tablet, Take 15 mg by mouth every 6 hours as needed. Indications: Pain    CURRENT MEDS :  Scheduled Meds:   dexamethasone  4 mg Oral 2 times per day    sodium zirconium cyclosilicate  10 g Oral Daily    remdesivir IVPB  100 mg Intravenous Q24H    sodium bicarbonate  1,300 mg Oral BID    isosorbide mononitrate  60 mg Oral BID    hydrALAZINE  100 mg Oral 3 times per day    aspirin  81 mg Oral Daily    metoprolol succinate  50 mg Oral Nightly    mycophenolate  1,000 mg Oral BID    tacrolimus  2 mg Oral BID    tiZANidine  4 mg Oral Daily    sodium chloride flush  5-40 mL Intravenous 2 times per day    insulin lispro  0-6 Units Subcutaneous TID WC    insulin lispro  0-3 Units Subcutaneous Nightly    predniSONE  5 mg Oral Daily    atorvastatin  10 mg Oral Nightly    heparin (porcine)  5,000 Units Subcutaneous Q8H       Continuous Infusions:   sodium chloride      dextrose         No Known Allergies    REVIEW OF SYSTEMS:  Constitutional: Denies fever, weight loss, night sweats, +weakness and fatigue  Skin: Denies pigmentation, dark lesions, and rashes   HEENT: Denies hearing loss, tinnitus, ear drainage, epistaxis, sore throat, and hoarseness. Cardiovascular: Denies palpitations, chest pain, and chest pressure.   Respiratory: +cough, dyspnea  Gastrointestinal: +nausea, decreased appetite  Genitourinary: Denies dysuria, frequency, urgency or hematuria  Musculoskeletal: Denies myalgias, muscle weakness, and bone pain  Neurological: Denies dizziness, vertigo, headache, and focal weakness  Psychological: Denies anxiety and depression  Endocrine: Denies heat intolerance and cold intolerance  Hematopoietic/Lymphatic: Denies bleeding problems and blood transfusions    OBJECTIVE:   BP (!) 146/70   Pulse 90   Temp 98.3 °F (36.8 °C) (Oral)   Resp 20   Ht 5' 10\" (1.778 m)   Wt 235 lb 6.4 oz (106.8 kg)   SpO2 97%   BMI 33.78 kg/m²   SpO2 Readings from Last 1 Encounters:   04/14/21 97%        I/O:    Intake/Output Summary (Last 24 hours) at 4/15/2021 0812  Last data filed at 4/14/2021 1730  Gross per 24 hour   Intake 240 ml   Output --   Net 240 ml     Vent Information  SpO2: 97 %                Physical Exam:  General: The patient is lying in bed comfortably without any distress. Breathing is not labored  HEENT: Pupils are equal round and reactive to light, there are no oral lesions and no post-nasal drip   Neck: supple without adenopathy  Cardiovascular: irregular rate and rhythm a fib without murmur or gallop  Respiratory: Clear to auscultation bilaterally without wheezing or crackles. Air entry is symmetric  Abdomen: soft, non-tender, non-distended, normal bowel sounds  Extremities: warm, no edema, no clubbing  Skin: no rash or lesion  Neurologic: CN II-XII grossly intact, no focal deficits    Pulmonary Function Testing personally reviewed and interpreted      Imaging personally reviewed:  FINDINGS:   Exam is limited without intravenous contrast.  Small pericardial effusion. Heart size is normal.  No significant pleural effusion.  Scattered vascular   calcifications.  Normal-size lymph nodes without adenopathy by size criteria. Visualized portions of the upper abdomen demonstrate bilateral renal atrophy.    5 mm nonobstructing left renal stone.  Postoperative changes of previous   gastric bypass.  Cholecystectomy clips.       No pneumothorax.  There are areas of linear atelectasis in the lower lung   zones.  Multifocal airspace and ground-glass opacities are otherwise   scattered in each lung relatively diffusely and slightly greater towards the   left.  These demonstrate no significant change compared to the recent chest   radiograph.  Degenerative changes are scattered in the spine.           Impression   Bilateral parenchymal infiltrates.  Again, findings are nonspecific and could   be related to pulmonary edema or multifocal pneumonia.  Developing ARDS not   excluded.  Continued follow-up recommended.       Small pericardial effusion.             Echo:      Summary   Normal left ventricular chamber size. Normal left ventricular systolic function. Visually estimated LVEF is 60-65 %. No wall motion abnormalities. Stage I diastolic function. Normal right ventricle structure and function. No significant valvular abnormalities. RHC (9/2/2020) RA: 13. RV: 91/14. PA: 91/25 with a mean of 48. PCW: 22. CO/CI (Jack): 5.6/2.5. CO/CI (thermodilution): 4.9/2.1 Conclusions: 1. Moderately elevated right and left filling pressures. 2. Severe pulmonary hypertension, predominantly precapillary. PVR approximately 5-5.5. 3. Gabrielle 5 consistent with normal RV function. Refer to pulmonary hypertension center at [address] [place] [address] study 2018  Severe REM predominant OLINDA  AHI 8.6. Labs:  Lab Results   Component Value Date    WBC 4.1 04/15/2021    HGB 10.0 04/15/2021    HCT 33.2 04/15/2021    MCV 84.7 04/15/2021    MCH 25.5 04/15/2021    MCHC 30.1 04/15/2021    RDW 15.7 04/15/2021     04/15/2021    MPV 9.9 04/15/2021     Lab Results   Component Value Date     04/15/2021    K 5.7 04/15/2021    K 5.7 04/15/2021     04/15/2021    CO2 21 04/15/2021    BUN 60 04/15/2021    CREATININE 3.2 04/15/2021    LABALBU 3.0 04/15/2021    LABALBU 3.8 07/05/2011    CALCIUM 8.8 04/15/2021    GFRAA 23 04/15/2021    LABGLOM 23 04/15/2021     Lab Results   Component Value Date    PROTIME 11.3 08/31/2020    INR 1.0 08/31/2020     No results for input(s): PROBNP in the last 72 hours. No results for input(s): TROPONINI in the last 72 hours.   Recent Labs     04/14/21  9000 PROCAL 0.37*     This SmartLink has not been configured with any valid records. Micro:  No results for input(s): CULTRESP in the last 72 hours. No results for input(s): LABGRAM in the last 72 hours. No results for input(s): LEGUR in the last 72 hours. No results for input(s): STREPNEUMAGU in the last 72 hours. No results for input(s): LP1UAG in the last 72 hours. Assessment:  1. Acute respiratory failure with hypoxia  2. Covid 19 pneumonia moderate     3. 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 Shweta Luna at [facility]  4. Mild OLINDA on auto CPAP 5-12 at Home, compliant  5. Chronic diastolic heart failure, HFpEF Stage I DD follows with Dr. Escobar Frankel at [facility]  6. Hx Junctional bradycardia  7. Worsening ALYSON-Hx renal transplant at [facility]  8. Immunocompromised host  9. Nausea, vomiting, diarrhea  10. Hx nicotine dependence, in remission. 20 pk yr smoker     Plan:  1. Oxygen therapy 5 liters wean to keep >92% discussed with nursing  2. Nocturnal CPAP   3. Incentive spirometer  4. Dillon Hernandez for remdesivir per nephrology. ID following. fungitell galactomannan pending  5. Decadron 4 mg po bid  6. US BLE assess for DVT, pending  7. Immunosuppression-prograf, bactrim, cellcept, and daily pred 5 mg per nephrology  8. DVT, GI prophylaxis   9. Limit narcotic use would recommend stopping oxycodone. Received Narcan x2 today. Thank you for allowing me to participate in the care of Teresa Nuno. Please feel free to call with questions. This plan of care was reviewed in collaboration with Dr. Maile Oleary  Electronically signed by CHAI Gandara CNP on 4/15/2021 at 8:12 AM      Note: This report was completed utilizing computer voice recognition software.  Every effort has been made to ensure accuracy, however; inadvertent computerized transcription errors may be present      Addendum:    I personally saw, examined and

## 2021-04-15 NOTE — PROGRESS NOTES
Newly placed Left FA IV site assessed. Found to have brisk blood return and flush without difficulty. Pt tolerated well and no complaints were voiced. Line is cleared for use. Primary RN notified. Tanvir Pop

## 2021-04-15 NOTE — PROGRESS NOTES
Date: 4/15/2021    Time: 12:53 PM    Patient Placed On BIPAP/CPAP/ Non-Invasive Ventilation? Yes    If no must comment. Facial area red/color change? No           If YES are Blister/Lesion present? No   If yes must notify nursing staff  BIPAP/CPAP skin barrier? Yes    Skin barrier type:mepilexlite       Comments:  Pt tried on CPAP 10 and was not getting appropriate volumes, patient was then switched to bipap 12/8 and 40%.  Pt is getting volumes and saturation is 100%      Krystal Santana

## 2021-04-15 NOTE — CARE COORDINATION
CM NOTE: Per QFR---covid positive. Day #2 IV RDV. On O2 3L which he does not have at home. Plan is home at discharge. Declines [facility].

## 2021-04-15 NOTE — PROGRESS NOTES
Upon arriving to deliver lunch time meds and lunch patient found lethargic again and would not follow any commands. Vital signs stable glucose elevated respiratory notified to place patient on BIPAP. Dr. Babs Banegas notified orders obtained one time dose of Narcan given. Patient A&OX4. Dr. Vida Howell present at bedside to see consult instructed to place patient on bipap check patient in 30 minutes to check mentation if mentation declines again instructed to obtain ABGs.  Notified Dr. Babs Banegas of patients improvement ok to change CT to routine and ordered PRN Narcan

## 2021-04-15 NOTE — PROGRESS NOTES
[address] [place] [address] [facility]  Progress Note      Chief Complaint   Patient presents with    Fatigue     symptoms x2-3 days    Emesis    Diarrhea    Anorexia       SUBJECTIVE:  Patient is tolerating medications. No reported adverse drug reactions. No nausea, vomiting, diarrhea. Patient was obtunded when I entered the room today and spilled juice all over him and after investigating with nursing he apparently had some OxyContin. 1 amp of Narcan revised him; his blood sugars were 300. Review of systems:  As stated above in the chief complaint, otherwise negative. Medications:  Scheduled Meds:   dexamethasone  4 mg Oral 2 times per day    sodium zirconium cyclosilicate  10 g Oral Daily    remdesivir IVPB  100 mg Intravenous Q24H    sodium bicarbonate  1,300 mg Oral BID    isosorbide mononitrate  60 mg Oral BID    hydrALAZINE  100 mg Oral 3 times per day    aspirin  81 mg Oral Daily    metoprolol succinate  50 mg Oral Nightly    mycophenolate  1,000 mg Oral BID    tacrolimus  2 mg Oral BID    tiZANidine  4 mg Oral Daily    sodium chloride flush  5-40 mL Intravenous 2 times per day    insulin lispro  0-6 Units Subcutaneous TID WC    insulin lispro  0-3 Units Subcutaneous Nightly    predniSONE  5 mg Oral Daily    atorvastatin  10 mg Oral Nightly    heparin (porcine)  5,000 Units Subcutaneous Q8H     Continuous Infusions:   sodium chloride      dextrose       PRN Meds:sodium chloride, oxyCODONE, sodium chloride flush, sodium chloride, acetaminophen **OR** acetaminophen, glucose, dextrose, glucagon (rDNA), dextrose, polyethylene glycol, trimethobenzamide    OBJECTIVE:  /63   Pulse 61   Temp 97.7 °F (36.5 °C) (Oral)   Resp 16   Ht 5' 10\" (1.778 m)   Wt 235 lb 6.4 oz (106.8 kg)   SpO2 100%   BMI 33.78 kg/m²   Temp  Av.2 °F (37.3 °C)  Min: 97.7 °F (36.5 °C)  Max: 101.5 °F (38.6 °C)  Constitutional: The patient is awake, alert, and oriented.    Skin: Warm and dry. No rashes were noted. HEENT: Round and reactive pupils. Moist mucous membranes. No ulcerations or thrush. Neck: Supple to movements. Chest: No use of accessory muscles to breathe. Symmetrical expansion. No wheezing, crackles or rhonchi. Poor air exchange some coarse breath sounds upper airways  Cardiovascular: S1 and S2 are rhythmic and regular. No murmurs appreciated. Abdomen: Positive bowel sounds to auscultation. Benign to palpation. No masses felt. No hepatosplenomegaly. Genitourinary: Male  Extremities: No clubbing, no cyanosis, no edema.   Lines: peripheral    Laboratory and Tests Review:  Lab Results   Component Value Date    WBC 4.1 (L) 04/15/2021    WBC 5.0 04/14/2021    WBC 5.1 04/13/2021    HGB 10.0 (L) 04/15/2021    HCT 33.2 (L) 04/15/2021    MCV 84.7 04/15/2021     04/15/2021     Lab Results   Component Value Date    NEUTROABS 2.91 04/11/2021    NEUTROABS 2.57 04/09/2021    NEUTROABS 2.82 05/29/2019     No results found for: [facility]  Lab Results   Component Value Date    ALT 12 04/15/2021    AST 27 04/15/2021    ALKPHOS 44 04/15/2021    BILITOT 0.3 04/15/2021     Lab Results   Component Value Date     04/15/2021    K 5.7 04/15/2021    K 5.7 04/15/2021     04/15/2021    CO2 21 04/15/2021    BUN 60 04/15/2021    CREATININE 3.2 04/15/2021    CREATININE 3.0 04/15/2021    CREATININE 3.1 04/14/2021    GFRAA 23 04/15/2021    LABGLOM 23 04/15/2021    GLUCOSE 218 04/15/2021    GLUCOSE 124 02/21/2012    PROT 5.5 04/15/2021    LABALBU 3.0 04/15/2021    LABALBU 3.8 07/05/2011    CALCIUM 8.8 04/15/2021    BILITOT 0.3 04/15/2021    ALKPHOS 44 04/15/2021    AST 27 04/15/2021    ALT 12 04/15/2021     Lab Results   Component Value Date    CRP 8.6 (H) 04/15/2021    CRP 7.6 (H) 04/14/2021    CRP 2.1 (H) 06/03/2013     Lab Results   Component Value Date    SEDRATE 20 (H) 04/14/2021    SEDRATE 48 (H) 06/03/2013    SEDRATE 40 (H) 05/27/2013     Radiology:  Reviewed    Microbiology:   Lab Results   Component Value Date    BC 5 Days- no growth 04/27/2017     Lab Results   Component Value Date    BLOODCULT2 5 Days- no growth 04/27/2017     No results found for: WNDABS  Smear, Respiratory   Date Value Ref Range Status   04/27/2017   Final    Group 6: <25 PMN's/LPF and <25 Epithelial cells/LPF  Few Polymorphonuclear leukocytes  Epithelial cells not seen  No organisms seen       No results found for: MPNEUMO, CLAMYDCU, LABLEGI, AFBCX, FUNGSM, LABFUNG  CULTURE, RESPIRATORY   Date Value Ref Range Status   04/27/2017 Oral Pharyngeal [place] reduced  Final     No results found for: CXCATHTIP  No results found for: BFCS  No results found for: CXSURG  Urine Culture, Routine   Date Value Ref Range Status   04/27/2017 Growth not present  Final   07/27/2015 <10,000 CFU/mL  Gram positive cocci    Final   06/02/2014 <10,000 CFU/mL  Mixed gram positive organisms  Final     MRSA Culture Only   Date Value Ref Range Status   04/27/2017 Methicillin resistant Staph aureus not isolated  Final       ASSESSMENT:  · Covid pneumonia  · Immunocompromised host status post kidney transplant  · Chronic kidney injury  · Narcotic overdose    PLAN:  · Continue  remdesivir  · Stat chest x-ray rule out aspiration  · Check final cultures  · Monitor labs    Lea Herbert  10:08 AM  4/15/2021

## 2021-04-15 NOTE — PROGRESS NOTES
Patient was given Oxycodone 15mg at 0837 patient was found extremely lethargic would only arouse to painful stimuli. Patient was given Narcan at 1003 per Dr. Luis Alberto Barraza order, patient recovered and is A&Ox4 vitals stable.  Dr. Gumaro Flores notified of above and d/c order

## 2021-04-15 NOTE — PROGRESS NOTES
lesions. Neurologic:  Neurovascularly intact without any focal sensory/motor deficits.  Cranial nerves: II-XII intact, grossly non-focal.         Medications:  Reviewed    Infusion Medications    lactated ringers 75 mL/hr at 04/15/21 1520    sodium chloride 25 mL (04/15/21 1355)    dextrose       Scheduled Medications    hydrALAZINE  50 mg Oral 3 times per day    dexamethasone  4 mg Oral 2 times per day    sodium zirconium cyclosilicate  10 g Oral Daily    remdesivir IVPB  100 mg Intravenous Q24H    sodium bicarbonate  1,300 mg Oral BID    isosorbide mononitrate  60 mg Oral BID    aspirin  81 mg Oral Daily    metoprolol succinate  50 mg Oral Nightly    mycophenolate  1,000 mg Oral BID    tacrolimus  2 mg Oral BID    tiZANidine  4 mg Oral Daily    sodium chloride flush  5-40 mL Intravenous 2 times per day    insulin lispro  0-6 Units Subcutaneous TID WC    insulin lispro  0-3 Units Subcutaneous Nightly    predniSONE  5 mg Oral Daily    atorvastatin  10 mg Oral Nightly    heparin (porcine)  5,000 Units Subcutaneous Q8H     PRN Meds: naloxone, sodium chloride, sodium chloride flush, sodium chloride, acetaminophen **OR** acetaminophen, glucose, dextrose, glucagon (rDNA), dextrose, polyethylene glycol, trimethobenzamide    I/O  No intake or output data in the 24 hours ending 04/15/21 1750    Labs:   Recent Labs     04/13/21  1100 04/14/21  0707 04/15/21  0550   WBC 5.1 5.0 4.1*   HGB 9.8* 11.1* 10.0*   HCT 32.2* 37.0 33.2*    240 227       Recent Labs     04/13/21  1100 04/14/21  0707 04/14/21  1650 04/15/21  0115 04/15/21  0550     136 137 136 135 138   K 5.2*  5.2* 5.2* 6.0* 5.2* 5.7*  5.7*     107 105 103 103 105   CO2 20*  20* 22 19* 22 21*   BUN 45*  44* 51* 54* 58* 60*   CREATININE 2.7*  2.7* 3.1* 3.1* 3.0* 3.2*   CALCIUM 8.4*  8.4* 8.9 8.4* 8.3* 8.8   PHOS 2.6 3.5  --   --  4.5       Recent Labs     04/14/21  0707 04/14/21  1650 04/15/21  0550   PROT 5.9* 5.9* 5.5* ALKPHOS 42 39* 44   ALT 11 13 12   AST 28 39 27   BILITOT 0.3 0.4 0.3       No results for input(s): INR in the last 72 hours. No results for input(s): Brandan Journey in the last 72 hours. Chronic labs:  Lab Results   Component Value Date    CHOL 128 06/03/2014    TRIG 50 06/03/2014    HDL 51 06/03/2014    LDLCALC 67 06/03/2014    TSH 0.996 06/03/2014    INR 1.0 08/31/2020    LABA1C 5.5 06/03/2014       Radiology:  Xr Chest Portable    Result Date: 4/9/2021  EXAMINATION: ONE XRAY VIEW OF THE CHEST 4/9/2021 8:59 pm COMPARISON: 05/29/2019 HISTORY: ORDERING SYSTEM PROVIDED HISTORY: Fever TECHNOLOGIST PROVIDED HISTORY: Reason for exam:->Fever FINDINGS: Possible left costophrenic angle opacity. There is no effusion or pneumothorax. The cardiomediastinal silhouette is without acute process. The osseous structures are without acute process. Possible left costophrenic angle opacity. ASSESSMENT:    Principal Problem:    Acute nontraumatic kidney injury ([address]  Active Problems:    CKD (chronic kidney disease) stage 4, GFR 15-29 ml/min ([facility])    Diabetes mellitus, type 2 ([facility])    HTN (hypertension), benign    Acute kidney injury ([address]    Vomiting and diarrhea    Chronic diastolic congestive heart failure ([address]    Immunosuppression ([address]  Resolved Problems:    * No resolved hospital problems. *  Sedation due to narcotics     PLAN:    `1.  He has tolerated his iv fluids pretty well so far but we can decrease them with his known history of heart failure  2. Creatinine has normalized fairly fast close to his baseline  3. Taking an oral medications and food and fluids pretty well  4. May be able to discharge by tomorrow. 5.  Order renal ultrasound  April 12, 2021  No overt signs and symptoms of heart failure. We can DC IV fluids. Labs from today show creatinine that is better yet. Slight leukopenia noted. Blood sugar is okay. BNP was elevated yesterday and another one has been ordered.   Renal ultrasound only showed atrophic kidneys  His baseline creatinine has been reached. Wait for renal for final discharge planning. Cause of acute kidney insufficiency unclear yet  April 13, 2021  Off of IV fluids he has increased need for O2 today. BNP remains elevated. I do feel he has early heart failure yet. When I questioned the patient it appears that he has no knowledge of any kind of heart catheterization or heart failure workup in the past.  He remains in a table and he has refused therapy occupational and physical.  Patient's blood pressure control is suboptimal.  His creatinine has returned to his normal  I do not feel patient is safe for discharge today he is seen by cardiology. He may need diuresis which may bump up his creatinine yet again  For now at least he is eating  April 14, 2021  Await ID recommendations. Creatinine is still going back up. Pharmacy consult for appropriate treatment for COVID-19  Patient is chronically on prednisone. We can add a low-dose of Decadron on to allow for additional support for COVID-19    April 15, 2021  CT scan is negative which was done urgently after he had 2 episodes of decreased alertness reversed with Narcan  He has been given remdesivir as per infectious disease recommendations  Pulmonary critical care input much appreciated  [place] to have Covid pneumonia as the main cause but being watched closely by infectious disease  Blood sugars are running higher. These will need controlled  Bilateral lower extremities are negative  Monitor for D-dimer  Diet: DIET GENERAL; Low Potassium  Code Status: Full Code  PT/OT Eval Status:   Order  DVT Prophylaxis:   In place  Recommended disposition at discharge:   Home    +++++++++++++++++++++++++++++++++++++++++++++++++  Yara Gomez MD   [facility].  +++++++++++++++++++++++++++++++++++++++++++++++++  NOTE: This report was transcribed using voice recognition software.  Every effort was made to ensure accuracy; however, inadvertent computerized transcription errors may be present.

## 2021-04-15 NOTE — PROGRESS NOTES
Progress Note  4/15/2021 3:06 PM  Subjective:   Admit Date: 4/9/2021  PCP: GUIDO GERONIMO III, DO  Interval History: Pt being seen for ESRD requiring KRT with LRD Allograft placed 12/12/14    4/15/21:Pt resting quietly in bed, SARs-COV-2 test (+) and pt now in isolation-this AM pt with decreased LOC. He was treated with Narcan and transiently improved and then again with a decreased LOC and retreated with Narcan. CT Scan of the brain no acute changes. Opioids placed on hold    Diet: DIET GENERAL; Low Potassium    Data:   Scheduled Meds:   dexamethasone  4 mg Oral 2 times per day    sodium zirconium cyclosilicate  10 g Oral Daily    remdesivir IVPB  100 mg Intravenous Q24H    sodium bicarbonate  1,300 mg Oral BID    isosorbide mononitrate  60 mg Oral BID    hydrALAZINE  100 mg Oral 3 times per day    aspirin  81 mg Oral Daily    metoprolol succinate  50 mg Oral Nightly    mycophenolate  1,000 mg Oral BID    tacrolimus  2 mg Oral BID    tiZANidine  4 mg Oral Daily    sodium chloride flush  5-40 mL Intravenous 2 times per day    insulin lispro  0-6 Units Subcutaneous TID WC    insulin lispro  0-3 Units Subcutaneous Nightly    predniSONE  5 mg Oral Daily    atorvastatin  10 mg Oral Nightly    heparin (porcine)  5,000 Units Subcutaneous Q8H     Continuous Infusions:   sodium chloride 25 mL (04/15/21 1355)    dextrose       PRN Meds:naloxone, sodium chloride, sodium chloride flush, sodium chloride, acetaminophen **OR** acetaminophen, glucose, dextrose, glucagon (rDNA), dextrose, polyethylene glycol, trimethobenzamide  No intake/output data recorded. No intake/output data recorded.     Intake/Output Summary (Last 24 hours) at 4/15/2021 1506  Last data filed at 4/14/2021 1730  Gross per 24 hour   Intake 0 ml   Output --   Net 0 ml     CBC:   Recent Labs     04/13/21  1100 04/14/21  0707 04/15/21  0550   WBC 5.1 5.0 4.1*   HGB 9.8* 11.1* 10.0*    240 227     BMP:    Recent Labs 04/14/21  1650 04/15/21  0115 04/15/21  0550    135 138   K 6.0* 5.2* 5.7*  5.7*    103 105   CO2 19* 22 21*   BUN 54* 58* 60*   CREATININE 3.1* 3.0* 3.2*   GLUCOSE 175* 250* 218*     Hepatic:   Recent Labs     04/14/21  0707 04/14/21  1650 04/15/21  0550   AST 28 39 27   ALT 11 13 12   BILITOT 0.3 0.4 0.3   ALKPHOS 42 39* 44     Troponin:   No results for input(s): TROPONINI in the last 72 hours. BNP: No results for input(s): BNP in the last 72 hours. Lipids: No results for input(s): CHOL, HDL in the last 72 hours. Invalid input(s): LDLCALCU  ABGs: No results found for: PHART, PO2ART, XRH4UUU  INR: No results for input(s): INR in the last 72 hours. -----------------------------------------------------------------  RAD:   EXAMINATION:   RETROPERITONEAL ULTRASOUND OF THE KIDNEYS AND URINARY BLADDER       4/11/2021       COMPARISON:   None       HISTORY:   ORDERING SYSTEM PROVIDED HISTORY: arf   TECHNOLOGIST PROVIDED HISTORY:   Reason for exam:->arf   What reading provider will be dictating this exam?->CRC       FINDINGS:       Kidneys:       The right kidney measures 8.1 cm in length and the left kidney measures 7.9   cm in length.       Kidneys demonstrate increased cortical echogenicity.  No evidence of   hydronephrosis or intrarenal stones.           Bladder:       Unremarkable appearance of the bladder.  Bilateral ureteral jets are noted.           Impression   Atrophic and echogenic kidneys compatible with chronic renal parenchymal   disease.       Unremarkable bladder. EXAMINATION:   ONE XRAY VIEW OF THE CHEST       4/9/2021 8:59 pm       COMPARISON:   05/29/2019       HISTORY:   ORDERING SYSTEM PROVIDED HISTORY: Fever   TECHNOLOGIST PROVIDED HISTORY:   Reason for exam:->Fever       FINDINGS:   Possible left costophrenic angle opacity.  There is no effusion or   pneumothorax. The cardiomediastinal silhouette is without acute process.  The   osseous structures are without acute process.     IVF  2. Continue to monitor     2-Anemia in CKD-HgB at goal >/=10  PLAN:1. Continue ADAM for HgB  <10  2. Follow H/H.    3-Anorexia-improved    4- HTN with CKD 5/ESRD-BP above goal <140/90   PLAN:1. Decrease hydralazine to 50mg tid    5- Hyperkalemia in the setting of the DM2 with the CNI and possible hyporenin hypoaldo-possible exacerbated by increased cell turnover in the setting of the SARs-CoV-2  K+ 5.7  PLAN:1. Dose with lokelma 10 x 2 today  2.  Recheck K+ this PM    6- Sec HPTH due to the CAN/CKD with Hypocalcemia  PO4 WNL    Vit D 53  PLAN:1. Follow Ca++ &  q Po4    7-Non Anion Gap Metabolic Acidosis  HCO3 at goal of HCO3>/=22  PLAN:1. Continue the present dose of  bicarbonate    8- Hyponatremia  Na+ WNL  PLAN:1. Continue to monitor    Thank you  for allowing us to participate in care of Wanda Calloway

## 2021-04-15 NOTE — PLAN OF CARE
Problem: Falls - Risk of:  Goal: Will remain free from falls  Description: Will remain free from falls  4/15/2021 1100 by Reji Frias RN  Outcome: Met This Shift  4/15/2021 0209 by Olesya Hinton RN  Outcome: Met This Shift  Goal: Absence of physical injury  Description: Absence of physical injury  4/15/2021 0209 by Olesya Hinton RN  Outcome: Met This Shift     Problem: Airway Clearance - Ineffective  Goal: Achieve or maintain patent airway  4/15/2021 1100 by Reji Frias RN  Outcome: Met This Shift  4/15/2021 0209 by Olesya Hinton RN  Outcome: Met This Shift     Problem: Gas Exchange - Impaired  Goal: Absence of hypoxia  4/15/2021 1100 by Reji Frias RN  Outcome: Met This Shift  4/15/2021 0209 by Olesya Hinton RN  Outcome: Met This Shift  Goal: Promote optimal lung function  4/15/2021 1100 by Reji Frias RN  Outcome: Met This Shift  4/15/2021 0209 by Olesya Hinton RN  Outcome: Met This Shift     Problem: Breathing Pattern - Ineffective  Goal: Ability to achieve and maintain a regular respiratory rate  4/15/2021 0209 by Olesya Hinton RN  Outcome: Met This Shift     Problem:  Body Temperature -  Risk of, Imbalanced  Goal: Ability to maintain a body temperature within defined limits  4/15/2021 1100 by Reji Frias RN  Outcome: Met This Shift  4/15/2021 0209 by Olesya Hinton RN  Outcome: Met This Shift     Problem: Isolation Precautions - Risk of Spread of Infection  Goal: Prevent transmission of infection  4/15/2021 0209 by Olesya Hinton RN  Outcome: Met This Shift

## 2021-04-16 LAB
ALBUMIN SERPL-MCNC: 2.7 G/DL (ref 3.5–5.2)
ALP BLD-CCNC: 41 U/L (ref 40–129)
ALT SERPL-CCNC: 10 U/L (ref 0–40)
ANION GAP SERPL CALCULATED.3IONS-SCNC: 12 MMOL/L (ref 7–16)
AST SERPL-CCNC: 23 U/L (ref 0–39)
BILIRUB SERPL-MCNC: 0.3 MG/DL (ref 0–1.2)
BUN BLDV-MCNC: 73 MG/DL (ref 8–23)
C-REACTIVE PROTEIN: 5.5 MG/DL (ref 0–0.4)
CALCIUM SERPL-MCNC: 8.4 MG/DL (ref 8.6–10.2)
CHLORIDE BLD-SCNC: 105 MMOL/L (ref 98–107)
CO2: 20 MMOL/L (ref 22–29)
CREAT SERPL-MCNC: 3.3 MG/DL (ref 0.7–1.2)
D DIMER: 466 NG/ML DDU
GFR AFRICAN AMERICAN: 22
GFR NON-AFRICAN AMERICAN: 22 ML/MIN/1.73
GLUCOSE BLD-MCNC: 352 MG/DL (ref 74–99)
HCT VFR BLD CALC: 32.3 % (ref 37–54)
HEMOGLOBIN: 10 G/DL (ref 12.5–16.5)
IGA: 191 MG/DL (ref 70–400)
IGG: 593 MG/DL (ref 700–1600)
IGM: 33 MG/DL (ref 40–230)
MAGNESIUM: 2.1 MG/DL (ref 1.6–2.6)
MCH RBC QN AUTO: 25.4 PG (ref 26–35)
MCHC RBC AUTO-ENTMCNC: 31 % (ref 32–34.5)
MCV RBC AUTO: 82.2 FL (ref 80–99.9)
METER GLUCOSE: 229 MG/DL (ref 74–99)
METER GLUCOSE: 317 MG/DL (ref 74–99)
METER GLUCOSE: 344 MG/DL (ref 74–99)
METER GLUCOSE: 355 MG/DL (ref 74–99)
PDW BLD-RTO: 15.6 FL (ref 11.5–15)
PHOSPHORUS: 4.9 MG/DL (ref 2.5–4.5)
PLATELET # BLD: 300 E9/L (ref 130–450)
PMV BLD AUTO: 9.5 FL (ref 7–12)
POTASSIUM REFLEX MAGNESIUM: 4.9 MMOL/L (ref 3.5–5)
RBC # BLD: 3.93 E12/L (ref 3.8–5.8)
SODIUM BLD-SCNC: 137 MMOL/L (ref 132–146)
TOTAL PROTEIN: 5.1 G/DL (ref 6.4–8.3)
WBC # BLD: 5.6 E9/L (ref 4.5–11.5)

## 2021-04-16 PROCEDURE — 2580000003 HC RX 258: Performed by: PHYSICIAN ASSISTANT

## 2021-04-16 PROCEDURE — 2500000003 HC RX 250 WO HCPCS: Performed by: SPECIALIST

## 2021-04-16 PROCEDURE — 86140 C-REACTIVE PROTEIN: CPT

## 2021-04-16 PROCEDURE — 2580000003 HC RX 258: Performed by: INTERNAL MEDICINE

## 2021-04-16 PROCEDURE — 2060000000 HC ICU INTERMEDIATE R&B

## 2021-04-16 PROCEDURE — 6360000002 HC RX W HCPCS: Performed by: PHYSICIAN ASSISTANT

## 2021-04-16 PROCEDURE — 2700000000 HC OXYGEN THERAPY PER DAY

## 2021-04-16 PROCEDURE — 36415 COLL VENOUS BLD VENIPUNCTURE: CPT

## 2021-04-16 PROCEDURE — 6360000002 HC RX W HCPCS: Performed by: INTERNAL MEDICINE

## 2021-04-16 PROCEDURE — 85027 COMPLETE CBC AUTOMATED: CPT

## 2021-04-16 PROCEDURE — 84100 ASSAY OF PHOSPHORUS: CPT

## 2021-04-16 PROCEDURE — 6370000000 HC RX 637 (ALT 250 FOR IP): Performed by: INTERNAL MEDICINE

## 2021-04-16 PROCEDURE — 99291 CRITICAL CARE FIRST HOUR: CPT | Performed by: INTERNAL MEDICINE

## 2021-04-16 PROCEDURE — 83735 ASSAY OF MAGNESIUM: CPT

## 2021-04-16 PROCEDURE — 6360000002 HC RX W HCPCS

## 2021-04-16 PROCEDURE — 82962 GLUCOSE BLOOD TEST: CPT

## 2021-04-16 PROCEDURE — 80053 COMPREHEN METABOLIC PANEL: CPT

## 2021-04-16 PROCEDURE — 94660 CPAP INITIATION&MGMT: CPT

## 2021-04-16 PROCEDURE — 6370000000 HC RX 637 (ALT 250 FOR IP): Performed by: PHYSICIAN ASSISTANT

## 2021-04-16 PROCEDURE — 85378 FIBRIN DEGRADE SEMIQUANT: CPT

## 2021-04-16 PROCEDURE — 2580000003 HC RX 258: Performed by: SPECIALIST

## 2021-04-16 RX ORDER — HYDRALAZINE HYDROCHLORIDE 20 MG/ML
INJECTION INTRAMUSCULAR; INTRAVENOUS
Status: COMPLETED
Start: 2021-04-16 | End: 2021-04-16

## 2021-04-16 RX ADMIN — MYCOPHENOLATE MOFETIL 1000 MG: 250 CAPSULE ORAL at 10:28

## 2021-04-16 RX ADMIN — NALXONE HYDROCHLORIDE 0.4 MG: 0.4 INJECTION INTRAMUSCULAR; INTRAVENOUS; SUBCUTANEOUS at 21:15

## 2021-04-16 RX ADMIN — HEPARIN SODIUM 5000 UNITS: 10000 INJECTION INTRAVENOUS; SUBCUTANEOUS at 21:28

## 2021-04-16 RX ADMIN — HEPARIN SODIUM 5000 UNITS: 10000 INJECTION INTRAVENOUS; SUBCUTANEOUS at 12:52

## 2021-04-16 RX ADMIN — SODIUM BICARBONATE 1300 MG: 650 TABLET ORAL at 10:28

## 2021-04-16 RX ADMIN — ISOSORBIDE MONONITRATE 60 MG: 60 TABLET, EXTENDED RELEASE ORAL at 10:28

## 2021-04-16 RX ADMIN — ASPIRIN 81 MG: 81 TABLET, COATED ORAL at 10:29

## 2021-04-16 RX ADMIN — Medication 10 ML: at 21:25

## 2021-04-16 RX ADMIN — DEXAMETHASONE 4 MG: 4 TABLET ORAL at 21:23

## 2021-04-16 RX ADMIN — INSULIN LISPRO 2 UNITS: 100 INJECTION, SOLUTION INTRAVENOUS; SUBCUTANEOUS at 09:00

## 2021-04-16 RX ADMIN — INSULIN LISPRO 4 UNITS: 100 INJECTION, SOLUTION INTRAVENOUS; SUBCUTANEOUS at 12:00

## 2021-04-16 RX ADMIN — MYCOPHENOLATE MOFETIL 1000 MG: 250 CAPSULE ORAL at 21:23

## 2021-04-16 RX ADMIN — HYDRALAZINE HYDROCHLORIDE 50 MG: 50 TABLET ORAL at 14:56

## 2021-04-16 RX ADMIN — SODIUM CHLORIDE, POTASSIUM CHLORIDE, SODIUM LACTATE AND CALCIUM CHLORIDE: 600; 310; 30; 20 INJECTION, SOLUTION INTRAVENOUS at 04:59

## 2021-04-16 RX ADMIN — ATORVASTATIN CALCIUM 10 MG: 10 TABLET, FILM COATED ORAL at 21:24

## 2021-04-16 RX ADMIN — INSULIN LISPRO 5 UNITS: 100 INJECTION, SOLUTION INTRAVENOUS; SUBCUTANEOUS at 17:22

## 2021-04-16 RX ADMIN — ISOSORBIDE MONONITRATE 60 MG: 60 TABLET, EXTENDED RELEASE ORAL at 21:24

## 2021-04-16 RX ADMIN — TACROLIMUS 2 MG: 1 CAPSULE ORAL at 21:24

## 2021-04-16 RX ADMIN — DEXAMETHASONE 4 MG: 4 TABLET ORAL at 10:29

## 2021-04-16 RX ADMIN — SODIUM BICARBONATE 1300 MG: 650 TABLET ORAL at 21:23

## 2021-04-16 RX ADMIN — METOPROLOL SUCCINATE 50 MG: 25 TABLET, EXTENDED RELEASE ORAL at 21:23

## 2021-04-16 RX ADMIN — HYDRALAZINE HYDROCHLORIDE 20 MG: 20 INJECTION INTRAMUSCULAR; INTRAVENOUS at 21:17

## 2021-04-16 RX ADMIN — HEPARIN SODIUM 5000 UNITS: 10000 INJECTION INTRAVENOUS; SUBCUTANEOUS at 04:59

## 2021-04-16 RX ADMIN — TIZANIDINE 4 MG: 4 TABLET ORAL at 10:29

## 2021-04-16 RX ADMIN — TACROLIMUS 2 MG: 1 CAPSULE ORAL at 10:28

## 2021-04-16 RX ADMIN — SODIUM CHLORIDE, POTASSIUM CHLORIDE, SODIUM LACTATE AND CALCIUM CHLORIDE: 600; 310; 30; 20 INJECTION, SOLUTION INTRAVENOUS at 17:17

## 2021-04-16 RX ADMIN — HYDRALAZINE HYDROCHLORIDE 50 MG: 50 TABLET ORAL at 04:59

## 2021-04-16 RX ADMIN — SODIUM ZIRCONIUM CYCLOSILICATE 10 G: 10 POWDER, FOR SUSPENSION ORAL at 10:33

## 2021-04-16 RX ADMIN — Medication 10 ML: at 10:29

## 2021-04-16 RX ADMIN — INSULIN LISPRO 2 UNITS: 100 INJECTION, SOLUTION INTRAVENOUS; SUBCUTANEOUS at 21:29

## 2021-04-16 RX ADMIN — REMDESIVIR 100 MG: 100 INJECTION, POWDER, LYOPHILIZED, FOR SOLUTION INTRAVENOUS at 14:56

## 2021-04-16 ASSESSMENT — PAIN SCALES - GENERAL
PAINLEVEL_OUTOF10: 0
PAINLEVEL_OUTOF10: 0

## 2021-04-16 NOTE — PROGRESS NOTES
movements. Chest: No use of accessory muscles to breathe. Symmetrical expansion. No wheezing, crackles or rhonchi. Poor air exchange some coarse breath sounds upper airways  Cardiovascular: S1 and S2 are rhythmic and regular. No murmurs appreciated. Abdomen: Positive bowel sounds to auscultation. Benign to palpation. No masses felt. No hepatosplenomegaly. Genitourinary: Male  Extremities: No clubbing, no cyanosis, no edema.   Lines: peripheral    Laboratory and Tests Review:  Lab Results   Component Value Date    WBC 5.6 04/16/2021    WBC 4.1 (L) 04/15/2021    WBC 5.0 04/14/2021    HGB 10.0 (L) 04/16/2021    HCT 32.3 (L) 04/16/2021    MCV 82.2 04/16/2021     04/16/2021     Lab Results   Component Value Date    NEUTROABS 2.91 04/11/2021    NEUTROABS 2.57 04/09/2021    NEUTROABS 2.82 05/29/2019     No results found for: [facility]  Lab Results   Component Value Date    ALT 10 04/16/2021    AST 23 04/16/2021    ALKPHOS 41 04/16/2021    BILITOT 0.3 04/16/2021     Lab Results   Component Value Date     04/16/2021    K 4.9 04/16/2021     04/16/2021    CO2 20 04/16/2021    BUN 73 04/16/2021    CREATININE 3.3 04/16/2021    CREATININE 3.2 04/15/2021    CREATININE 3.0 04/15/2021    GFRAA 22 04/16/2021    LABGLOM 22 04/16/2021    GLUCOSE 352 04/16/2021    GLUCOSE 124 02/21/2012    PROT 5.1 04/16/2021    LABALBU 2.7 04/16/2021    LABALBU 3.8 07/05/2011    CALCIUM 8.4 04/16/2021    BILITOT 0.3 04/16/2021    ALKPHOS 41 04/16/2021    AST 23 04/16/2021    ALT 10 04/16/2021     Lab Results   Component Value Date    CRP 5.5 (H) 04/16/2021    CRP 8.6 (H) 04/15/2021    CRP 7.6 (H) 04/14/2021     Lab Results   Component Value Date    SEDRATE 20 (H) 04/14/2021    SEDRATE 48 (H) 06/03/2013    SEDRATE 40 (H) 05/27/2013     Radiology:  Reviewed    Microbiology:   Lab Results   Component Value Date    BC 5 Days- no growth 04/27/2017     Lab Results   Component Value Date    BLOODCULT2 5 Days- no growth 04/27/2017     No results found for: WNDABS  Smear, Respiratory   Date Value Ref Range Status   04/27/2017   Final    Group 6: <25 PMN's/LPF and <25 Epithelial cells/LPF  Few Polymorphonuclear leukocytes  Epithelial cells not seen  No organisms seen       No results found for: MPNEUMO, CLAMYDCU, LABLEGI, AFBCX, FUNGSM, LABFUNG  CULTURE, RESPIRATORY   Date Value Ref Range Status   04/27/2017 Oral Pharyngeal [place] reduced  Final     No results found for: CXCATHTIP  No results found for: BFCS  No results found for: CXSURG  Urine Culture, Routine   Date Value Ref Range Status   04/27/2017 Growth not present  Final   07/27/2015 <10,000 CFU/mL  Gram positive cocci    Final   06/02/2014 <10,000 CFU/mL  Mixed gram positive organisms  Final     MRSA Culture Only   Date Value Ref Range Status   04/27/2017 Methicillin resistant Staph aureus not isolated  Final       ASSESSMENT:  · Covid pneumonia-some improvement  · Immunocompromised host status post kidney transplant  · Chronic kidney injury  · Narcotic overdose    PLAN:  · Continue  remdesivir day 3  · Check final cultures occluding Fungitell and galactomannan  · Monitor labs    Hernandez Marie  4:40 PM  4/16/2021

## 2021-04-16 NOTE — PROGRESS NOTES
[facility] Quality Flow/Interdisciplinary Rounds Progress Note        Quality Flow Rounds held on April 16, 2021    Disciplines Attending:  Bedside Nurse, ,  and Nursing Unit [address] was admitted on 4/9/2021  8:31 PM    Anticipated Discharge Date:       Disposition:    Dariel Score:  Dariel Scale Score: 20    Readmission Risk              Risk of Unplanned Readmission:        27           Discussed patient goal for the day, patient clinical progression, and barriers to discharge.   The following Goal(s) of the Day/Commitment(s) have been identified:  monitor labs, iv fluids, wean O2, discharge planning      Az Melendez  April 16, 2021

## 2021-04-16 NOTE — PROGRESS NOTES
Aaron Jenkins M.D.,[facility]  Daniel Beck D.O., F.A.C.O.I., Marielos Recinos M.D. Marco Ricketts M.D., Ghassan Farrar M.D. Ruth Ramirez D.O. Daily Pulmonary Progress Note    Patient:  Brooke Obrien 79 y.o. male MRN: [id number]     Date of Service: 4/16/2021      Synopsis     We are following patient for increased O2 needs with covid pneumonia    \"CC\" shortness of breath     Code status: full     Subjective      Patient was seen and examined. Sitting up in chair. No complaints. Oxygen now weaned to 2 liters saturation 97%. Used bipap last HS for respiratory support. Mentation improved. Narcan  yesterday after oxycodone given. IV fluids for ALYSON. Review of Systems:   Constitutional: Denies fever, weight loss, night sweats, +weakness and fatigue  Skin: Denies pigmentation, dark lesions, and rashes   HEENT: Denies hearing loss, tinnitus, ear drainage, epistaxis, sore throat, and hoarseness. Cardiovascular: Denies palpitations, chest pain, and chest pressure.   Respiratory: +cough, dyspnea  Gastrointestinal: +nausea, decreased appetite  Genitourinary: Denies dysuria, frequency, urgency or hematuria  Musculoskeletal: Denies myalgias, muscle weakness, and bone pain  Neurological: Denies dizziness, vertigo, headache, and focal weakness  Psychological: Denies anxiety and depression  Endocrine: Denies heat intolerance and cold intolerance  Hematopoietic/Lymphatic: Denies bleeding problems and blood transfusions    24-hour events:  None     Objective   Vitals: /61   Pulse 60   Temp 98 °F (36.7 °C) (Oral)   Resp 16   Ht 5' 10\" (1.778 m)   Wt 235 lb 6.4 oz (106.8 kg)   SpO2 97%   BMI 33.78 kg/m²     I/O:  No intake or output data in the 24 hours ending 04/16/21 1515    Vent Information  Skin Assessment: Clean, dry, & intact  FiO2 : 40 %  SpO2: 97 %  Mask Type: Full face mask  Mask Size: Medium       IPAP: 12 cmH20  CPAP/EPAP: 8 cmH2O     CURRENT MEDS :  Scheduled Meds:   04/16/2021     Lab Results   Component Value Date    PROTIME 11.3 08/31/2020    INR 1.0 08/31/2020     No results for input(s): PROBNP in the last 72 hours. Recent Labs     04/14/21  1650   PROCAL 0.37*     This SmartLink has not been configured with any valid records. Micro:  No results for input(s): CULTRESP in the last 72 hours. No results for input(s): LABGRAM in the last 72 hours. No results for input(s): LEGUR in the last 72 hours. No results for input(s): STREPNEUMAGU in the last 72 hours. No results for input(s): LP1UAG in the last 72 hours. Assessment:    1. Acute respiratory failure with hypoxia  2. Covid 19 pneumonia moderate     3. 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 Paris Oliva at [facility]  4. Mild OLINDA on auto CPAP 5-12 at Home, compliant  5. Chronic diastolic heart failure, HFpEF Stage I DD follows with Dr. Gregorio Izaguirre at [facility]  6. Hx Junctional bradycardia  7. Worsening ALYSON-Hx renal transplant at [facility]  8. Immunocompromised host  9. Nausea, vomiting, diarrhea  10. Hx nicotine dependence, in remission. 20 pk yr smoker      Plan:   11. Oxygen therapy  down to 2 liters wean to keep >92% discussed with nursing  12. Nocturnal CPAP   13. Incentive spirometer  14. [id number] Liz Rosario for remdesivir per nephrology. ID following. 15. Decadron 4 mg po bid  16. US BLE assess for DVT, negative. D dimer 466  17. Immunosuppression-prograf, bactrim, cellcept, and daily pred 5 mg per nephrology- IV fluids for ALYSON  18. DVT, GI prophylaxis   19. Limit narcotic use stopped oxycodone. Received Narcan 4/15      This plan of care was reviewed in collaboration with Dr. Patrizia Rivero   Electronically signed by CHAI Blake - CNP on 4/16/2021 at 3:15 PM    I personally saw, examined, and cared for the patient. Labs, medications, radiographs reviewed. I agree with history exam and plans detailed in NP note.   Robert FORBES

## 2021-04-16 NOTE — PROGRESS NOTES
Internal Medicine Progress Note      Synopsis: Patient admitted on 4/9/2021    Subjective    Patient is in his room. IV fluids still on board. He is eating better he tells me  He wants to get out of here he tells me  Afebrile. Does not want a Suazo's catheter  April 12, 2021  Still requesting a discharge. Discussed with him regarding his right heart catheterization and he has no recollection. He thinks heart catheterization was not done due to concerns for IV dye having a negative renal impact. April 13 th 2021   He wants to go home ! Patient has needed O2 this morning. He also has refused therapy and occupational and physical.  He was running a low-grade fever up to yesterday  April 14, 2021  Patient turned positive for SARS-CoV-2  He is now on a separate floor. He still hypoxemic but only requiring 3 L  April 15, 1981  Patient was seen by me in the early hours of morning about 6:45 in the morning. As the day has gone on he has had significant events including significant sedation and altered mental status. He was given Narcan with significant success and then needed another dose of Narcan. Narcotics have been [address]. Pulmonary services were also asked to consult due to hypoxemia that had gone from 3 L to 6 L  April 16, 2021  Patient seen at about [phone] this morning. He did not appear to have any confusion or agitation. States he was feeling okay  Exam:  /61   Pulse 60   Temp 98 °F (36.7 °C) (Oral)   Resp 16   Ht 5' 10\" (1.778 m)   Wt 235 lb 6.4 oz (106.8 kg)   SpO2 97%   BMI 33.78 kg/m²   General appearance: No apparent distress, appears stated age and cooperative. HEENT: Normal cephalic, atraumatic without obvious deformity. Pupils equal, round, and reactive to light. Extra ocular muscles intact. Conjunctivae/corneas clear. Periorbital edema notedAnd some conjunctival edema noted to  Neck: . Trachea midline.   Respiratory: Decreased  Cardiovascular: Regular heart rate rhythm  Abdomen: Nontender  Musculoskeletal: No clubbing but skin is thick on bilateral lower extremities   skin: Normal skin color. No rashes or lesions. Neurologic:  Neurovascularly intact without any focal sensory/motor deficits.  Cranial nerves: II-XII intact, grossly non-focal.         Medications:  Reviewed    Infusion Medications    lactated ringers 75 mL/hr at 04/16/21 1717    sodium chloride 25 mL (04/15/21 1355)    dextrose       Scheduled Medications    hydrALAZINE  50 mg Oral 3 times per day    dexamethasone  4 mg Oral 2 times per day    remdesivir IVPB  100 mg Intravenous Q24H    sodium bicarbonate  1,300 mg Oral BID    isosorbide mononitrate  60 mg Oral BID    aspirin  81 mg Oral Daily    metoprolol succinate  50 mg Oral Nightly    mycophenolate  1,000 mg Oral BID    tacrolimus  2 mg Oral BID    tiZANidine  4 mg Oral Daily    sodium chloride flush  5-40 mL Intravenous 2 times per day    insulin lispro  0-6 Units Subcutaneous TID WC    insulin lispro  0-3 Units Subcutaneous Nightly    [Held by provider] predniSONE  5 mg Oral Daily    atorvastatin  10 mg Oral Nightly    heparin (porcine)  5,000 Units Subcutaneous Q8H     PRN Meds: naloxone, sodium chloride, sodium chloride flush, sodium chloride, acetaminophen **OR** acetaminophen, glucose, dextrose, glucagon (rDNA), dextrose, polyethylene glycol, trimethobenzamide    I/O    Intake/Output Summary (Last 24 hours) at 4/16/2021 1812  Last data filed at 4/16/2021 1759  Gross per 24 hour   Intake 2356 ml   Output --   Net 2356 ml       Labs:   Recent Labs     04/14/21  0707 04/15/21  0550 04/16/21  1315   WBC 5.0 4.1* 5.6   HGB 11.1* 10.0* 10.0*   HCT 37.0 33.2* 32.3*    227 300       Recent Labs     04/14/21  0707 04/14/21  0707 04/15/21  0115 04/15/21  0550 04/15/21  1734 04/16/21  1315      < > 135 138  --  137   K 5.2*   < > 5.2* 5.7*  5.7* 5.2* 4.9      < > 103 105  --  105   CO2 22   < > 22 21*  --  20*   BUN 51*   < > 58* 60*  -- 73*   CREATININE 3.1*   < > 3.0* 3.2*  --  3.3*   CALCIUM 8.9   < > 8.3* 8.8  --  8.4*   PHOS 3.5  --   --  4.5  --  4.9*    < > = values in this interval not displayed. Recent Labs     04/14/21  1650 04/15/21  0550 04/16/21  1315   PROT 5.9* 5.5* 5.1*   ALKPHOS 39* 44 41   ALT 13 12 10   AST 39 27 23   BILITOT 0.4 0.3 0.3       No results for input(s): INR in the last 72 hours. No results for input(s): Javier Killings in the last 72 hours. Chronic labs:  Lab Results   Component Value Date    CHOL 128 06/03/2014    TRIG 50 06/03/2014    HDL 51 06/03/2014    LDLCALC 67 06/03/2014    TSH 0.996 06/03/2014    INR 1.0 08/31/2020    LABA1C 5.5 06/03/2014       Radiology:  Xr Chest Portable    Result Date: 4/9/2021  EXAMINATION: ONE XRAY VIEW OF THE CHEST 4/9/2021 8:59 pm COMPARISON: 05/29/2019 HISTORY: ORDERING SYSTEM PROVIDED HISTORY: Fever TECHNOLOGIST PROVIDED HISTORY: Reason for exam:->Fever FINDINGS: Possible left costophrenic angle opacity. There is no effusion or pneumothorax. The cardiomediastinal silhouette is without acute process. The osseous structures are without acute process. Possible left costophrenic angle opacity. ASSESSMENT:    Principal Problem:    Acute nontraumatic kidney injury ([address]  Active Problems:    CKD (chronic kidney disease) stage 4, GFR 15-29 ml/min ([facility])    Diabetes mellitus, type 2 ([facility])    HTN (hypertension), benign    Acute kidney injury ([address]    Vomiting and diarrhea    Chronic diastolic congestive heart failure ([address]    Immunosuppression ([address]  Resolved Problems:    * No resolved hospital problems. *  Sedation due to narcotics     PLAN:    `1.  He has tolerated his iv fluids pretty well so far but we can decrease them with his known history of heart failure  2. Creatinine has normalized fairly fast close to his baseline  3. Taking an oral medications and food and fluids pretty well  4. May be able to discharge by tomorrow.   5.  Order renal ultrasound  April 12, 2021  No overt signs and symptoms of heart failure. We can DC IV fluids. Labs from today show creatinine that is better yet. Slight leukopenia noted. Blood sugar is okay. BNP was elevated yesterday and another one has been ordered. Renal ultrasound only showed atrophic kidneys  His baseline creatinine has been reached. Wait for renal for final discharge planning. Cause of acute kidney insufficiency unclear yet  April 13, 2021  Off of IV fluids he has increased need for O2 today. BNP remains elevated. I do feel he has early heart failure yet. When I questioned the patient it appears that he has no knowledge of any kind of heart catheterization or heart failure workup in the past.  He remains in a table and he has refused therapy occupational and physical.  Patient's blood pressure control is suboptimal.  His creatinine has returned to his normal  I do not feel patient is safe for discharge today he is seen by cardiology. He may need diuresis which may bump up his creatinine yet again  For now at least he is eating  April 14, 2021  Await ID recommendations. Creatinine is still going back up. Pharmacy consult for appropriate treatment for COVID-19  Patient is chronically on prednisone. We can add a low-dose of Decadron on to allow for additional support for COVID-19    April 15, 2021  CT scan is negative which was done urgently after he had 2 episodes of decreased alertness reversed with Narcan  He has been given remdesivir as per infectious disease recommendations  Pulmonary critical care input much appreciated  Shepherd to have Covid pneumonia as the main cause but being watched closely by infectious disease  Blood sugars are running higher. These will need controlled  Bilateral lower extremities are negative  Monitor for D-dimer  April 16, 2021  Maintain on remdesivir  Maintained on steroids  O2 needs appear to be slightly less. Decadron will stay so we can add low-dose Lantus.   Continue to monitor creatinine which is state of  Patient's OxyIR has been completely discontinued  D-dimer noted. Diet: DIET GENERAL; Low Potassium  Code Status: Full Code  PT/OT Eval Status:   Order  DVT Prophylaxis:   In place  Recommended disposition at discharge:   Home    +++++++++++++++++++++++++++++++++++++++++++++++++  Clifford Zhang MD   [facility].  +++++++++++++++++++++++++++++++++++++++++++++++++  NOTE: This report was transcribed using voice recognition software. Every effort was made to ensure accuracy; however, inadvertent computerized transcription errors may be present.

## 2021-04-16 NOTE — CARE COORDINATION
CM NOTE: covid positive in droplet plus isolation. Day #3 IV RDV. Using O2 3L which he does not have at home. WILL NEED O2 TESTING PRIOR TO DISCHARGE. RENAL following---IVF started yesterday. Plan is home. Declines HHC. Will continue to follow pt.

## 2021-04-16 NOTE — PROGRESS NOTES
04/14/21  1650 04/15/21  0550 04/16/21  1315   AST 39 27 23   ALT 13 12 10   BILITOT 0.4 0.3 0.3   ALKPHOS 39* 44 41     Troponin:   No results for input(s): TROPONINI in the last 72 hours. BNP: No results for input(s): BNP in the last 72 hours. Lipids: No results for input(s): CHOL, HDL in the last 72 hours. Invalid input(s): LDLCALCU  ABGs: No results found for: PHART, PO2ART, FHG7RWI  INR: No results for input(s): INR in the last 72 hours. -----------------------------------------------------------------  RAD:   EXAMINATION:   RETROPERITONEAL ULTRASOUND OF THE KIDNEYS AND URINARY BLADDER       4/11/2021       COMPARISON:   None       HISTORY:   ORDERING SYSTEM PROVIDED HISTORY: arf   TECHNOLOGIST PROVIDED HISTORY:   Reason for exam:->arf   What reading provider will be dictating this exam?->CRC       FINDINGS:       Kidneys:       The right kidney measures 8.1 cm in length and the left kidney measures 7.9   cm in length.       Kidneys demonstrate increased cortical echogenicity.  No evidence of   hydronephrosis or intrarenal stones.           Bladder:       Unremarkable appearance of the bladder.  Bilateral ureteral jets are noted.           Impression   Atrophic and echogenic kidneys compatible with chronic renal parenchymal   disease.       Unremarkable bladder. EXAMINATION:   ONE XRAY VIEW OF THE CHEST       4/9/2021 8:59 pm       COMPARISON:   05/29/2019       HISTORY:   ORDERING SYSTEM PROVIDED HISTORY: Fever   TECHNOLOGIST PROVIDED HISTORY:   Reason for exam:->Fever       FINDINGS:   Possible left costophrenic angle opacity.  There is no effusion or   pneumothorax. The cardiomediastinal silhouette is without acute process. The   osseous structures are without acute process.           Impression   Possible left costophrenic angle opacity. 4/13/21 2 D ECHO       Summary    Normal left ventricular chamber size.    Normal left ventricular systolic function.    Visually estimated LVEF is 60-65 %.  No wall motion abnormalities.    Stage I diastolic function.    Normal right ventricle structure and function.    No significant valvular abnormalities. Objective:   Vitals: /61   Pulse 60   Temp 98 °F (36.7 °C) (Oral)   Resp 16   Ht 5' 10\" (1.778 m)   Wt 235 lb 6.4 oz (106.8 kg)   SpO2 97%   BMI 33.78 kg/m²      NAD  Confused  No edema  Overall exam limited by COVID19    Assessment:   Patient Active Problem List:     GERD (gastroesophageal reflux disease)     CKD (chronic kidney disease) stage 4, GFR 15-29 ml/min ([facility])     Mixed hyperlipidemia     Diabetes mellitus, type 2 ([facility])     HTN (hypertension), benign     Junctional bradycardia     Anemia in stage 3 chronic kidney disease     CAD (coronary artery disease), native coronary artery     Acute CHF (congestive heart failure) ([facility])     Chest pain     Respiratory failure ([facility])     ALYSON (acute kidney injury) ([facility])     Vomiting and diarrhea     Acute nontraumatic kidney injury ([address]     Chronic diastolic congestive heart failure ([facility]     H/O kidney transplant     Pulmonary hypertension ([facility]     Immunosuppression ([facility]    Plan:   1- CKD5D/ESRD with RRT as LRD Renal Allograft placed 12/12/14 with a baseline serum cr of 2.1-2.7mg/dl and an e-GFR=30-28ml/min. His current creatinine is above baseline  UA SG 1.020, protein 100mg/dl, blood (-). Ni (-), LE (-)    Give IV fluids  Check prograf level  Follow renal function      2-Anemia in CKD-HgB at goal >/=10  PLAN:1. Give ADAM as needed  2.  Follow H/H.    3- HTN with CKD 5/ESRD  Follow BP on decreased dose of hydralazine at 50mg tid    4- Hyperkalemia in the setting of the DM2 with the CNI and possible hyporenin hypoaldo-possible exacerbated by increased cell turnover in the setting of the SARs-CoV-2    Follow potassium and treat medically further as needed    5- Sec HPTH due to the CAN/CKD with Hypocalcemia  PO4 WNL    Vit D 53  PLAN:1. Follow Ca++ and Po4    6-Non Anion Gap Metabolic Acidosis  HCO3 at goal of HCO3>/=22  PLAN:1. Continue the present dose of  bicarbonate    7- Hyponatremia  Na+ WNL  PLAN:1. Continue to monitor        Thank you  for allowing us to participate in care of Ashish Cui MD

## 2021-04-17 ENCOUNTER — APPOINTMENT (OUTPATIENT)
Dept: ULTRASOUND IMAGING | Age: 71
DRG: 698 | End: 2021-04-17
Payer: MEDICARE

## 2021-04-17 LAB
(1,3)-BETA-D-GLUCAN (FUNGITELL) INTERPRETATION: NEGATIVE
(1,3)-BETA-D-GLUCAN (FUNGITELL): <31 PG/ML
ABSOLUTE CD 3: 415 CELLS/UL (ref 660–2200)
ABSOLUTE CD 4 HELPER: 59 CELLS/UL (ref 490–1600)
ABSOLUTE CD 8 (SUPP): 348 CELLS/UL (ref 150–1050)
ALBUMIN SERPL-MCNC: 3.1 G/DL (ref 3.5–5.2)
ALP BLD-CCNC: 50 U/L (ref 40–129)
ALT SERPL-CCNC: 12 U/L (ref 0–40)
AMMONIA: 20.3 UMOL/L (ref 16–60)
ANION GAP SERPL CALCULATED.3IONS-SCNC: 12 MMOL/L (ref 7–16)
ASPERGILLUS GALACTO AG: NEGATIVE
ASPERGILLUS GALACTO INDEX: 0.05
AST SERPL-CCNC: 26 U/L (ref 0–39)
B.E.: -4.2 MMOL/L (ref -3–3)
BILIRUB SERPL-MCNC: 0.4 MG/DL (ref 0–1.2)
BUN BLDV-MCNC: 78 MG/DL (ref 8–23)
C-REACTIVE PROTEIN: 4.4 MG/DL (ref 0–0.4)
CALCIUM SERPL-MCNC: 8.8 MG/DL (ref 8.6–10.2)
CD4/CD8 RATIO: 0.17 RATIO (ref 0.8–6.17)
CHLORIDE BLD-SCNC: 105 MMOL/L (ref 98–107)
CO2: 21 MMOL/L (ref 22–29)
COHB: 0.8 % (ref 0–1.5)
CREAT SERPL-MCNC: 3.4 MG/DL (ref 0.7–1.2)
CRITICAL: ABNORMAL
D DIMER: 433 NG/ML DDU
DATE ANALYZED: ABNORMAL
DATE OF COLLECTION: ABNORMAL
GFR AFRICAN AMERICAN: 22
GFR NON-AFRICAN AMERICAN: 22 ML/MIN/1.73
GLUCOSE BLD-MCNC: 255 MG/DL (ref 74–99)
HCO3: 20.5 MMOL/L (ref 22–26)
HCT VFR BLD CALC: 30.7 % (ref 37–54)
HEMOGLOBIN: 9.3 G/DL (ref 12.5–16.5)
HHB: 3.7 % (ref 0–5)
LAB: ABNORMAL
LYMPH SUBSET INFORMATION: ABNORMAL
Lab: ABNORMAL
MAGNESIUM: 2.2 MG/DL (ref 1.6–2.6)
MCH RBC QN AUTO: 25.4 PG (ref 26–35)
MCHC RBC AUTO-ENTMCNC: 30.3 % (ref 32–34.5)
MCV RBC AUTO: 83.9 FL (ref 80–99.9)
METER GLUCOSE: 235 MG/DL (ref 74–99)
METER GLUCOSE: 252 MG/DL (ref 74–99)
METER GLUCOSE: 263 MG/DL (ref 74–99)
METER GLUCOSE: 298 MG/DL (ref 74–99)
METER GLUCOSE: 298 MG/DL (ref 74–99)
METHB: 0.3 % (ref 0–1.5)
MODE: ABNORMAL
O2 CONTENT: 14.2 ML/DL
O2 SATURATION: 96.3 % (ref 92–98.5)
O2HB: 95.2 % (ref 94–97)
OPERATOR ID: 3114
PATIENT TEMP: 37 C
PCO2: 35.8 MMHG (ref 35–45)
PDW BLD-RTO: 15.5 FL (ref 11.5–15)
PH BLOOD GAS: 7.38 (ref 7.35–7.45)
PHOSPHORUS: 4.2 MG/DL (ref 2.5–4.5)
PLATELET # BLD: 263 E9/L (ref 130–450)
PMV BLD AUTO: 8.8 FL (ref 7–12)
PO2: 94.1 MMHG (ref 75–100)
POTASSIUM REFLEX MAGNESIUM: 4.7 MMOL/L (ref 3.5–5)
POTASSIUM SERPL-SCNC: 4.7 MMOL/L (ref 3.5–5)
RBC # BLD: 3.66 E12/L (ref 3.8–5.8)
SODIUM BLD-SCNC: 138 MMOL/L (ref 132–146)
SOURCE, BLOOD GAS: ABNORMAL
THB: 10.5 G/DL (ref 11.5–16.5)
TIME ANALYZED: 1309
TOTAL PROTEIN: 5.5 G/DL (ref 6.4–8.3)
WBC # BLD: 5.9 E9/L (ref 4.5–11.5)

## 2021-04-17 PROCEDURE — 82140 ASSAY OF AMMONIA: CPT

## 2021-04-17 PROCEDURE — 2580000003 HC RX 258: Performed by: SPECIALIST

## 2021-04-17 PROCEDURE — 80053 COMPREHEN METABOLIC PANEL: CPT

## 2021-04-17 PROCEDURE — 85378 FIBRIN DEGRADE SEMIQUANT: CPT

## 2021-04-17 PROCEDURE — 6370000000 HC RX 637 (ALT 250 FOR IP): Performed by: PHYSICIAN ASSISTANT

## 2021-04-17 PROCEDURE — 6360000002 HC RX W HCPCS: Performed by: PHYSICIAN ASSISTANT

## 2021-04-17 PROCEDURE — 84100 ASSAY OF PHOSPHORUS: CPT

## 2021-04-17 PROCEDURE — 82805 BLOOD GASES W/O2 SATURATION: CPT

## 2021-04-17 PROCEDURE — 36415 COLL VENOUS BLD VENIPUNCTURE: CPT

## 2021-04-17 PROCEDURE — 86140 C-REACTIVE PROTEIN: CPT

## 2021-04-17 PROCEDURE — 6370000000 HC RX 637 (ALT 250 FOR IP): Performed by: INTERNAL MEDICINE

## 2021-04-17 PROCEDURE — 2500000003 HC RX 250 WO HCPCS: Performed by: SPECIALIST

## 2021-04-17 PROCEDURE — 6360000002 HC RX W HCPCS: Performed by: INTERNAL MEDICINE

## 2021-04-17 PROCEDURE — 94660 CPAP INITIATION&MGMT: CPT

## 2021-04-17 PROCEDURE — 80197 ASSAY OF TACROLIMUS: CPT

## 2021-04-17 PROCEDURE — 76770 US EXAM ABDO BACK WALL COMP: CPT

## 2021-04-17 PROCEDURE — 2580000003 HC RX 258: Performed by: PHYSICIAN ASSISTANT

## 2021-04-17 PROCEDURE — 85027 COMPLETE CBC AUTOMATED: CPT

## 2021-04-17 PROCEDURE — 2700000000 HC OXYGEN THERAPY PER DAY

## 2021-04-17 PROCEDURE — 82962 GLUCOSE BLOOD TEST: CPT

## 2021-04-17 PROCEDURE — 83735 ASSAY OF MAGNESIUM: CPT

## 2021-04-17 PROCEDURE — 2580000003 HC RX 258: Performed by: INTERNAL MEDICINE

## 2021-04-17 PROCEDURE — 80048 BASIC METABOLIC PNL TOTAL CA: CPT

## 2021-04-17 PROCEDURE — 2060000000 HC ICU INTERMEDIATE R&B

## 2021-04-17 RX ORDER — FUROSEMIDE 10 MG/ML
80 INJECTION INTRAMUSCULAR; INTRAVENOUS ONCE
Status: COMPLETED | OUTPATIENT
Start: 2021-04-17 | End: 2021-04-17

## 2021-04-17 RX ADMIN — TACROLIMUS 2 MG: 1 CAPSULE ORAL at 22:18

## 2021-04-17 RX ADMIN — MYCOPHENOLATE MOFETIL 1000 MG: 250 CAPSULE ORAL at 22:18

## 2021-04-17 RX ADMIN — ASPIRIN 81 MG: 81 TABLET, COATED ORAL at 10:22

## 2021-04-17 RX ADMIN — HEPARIN SODIUM 5000 UNITS: 10000 INJECTION INTRAVENOUS; SUBCUTANEOUS at 05:32

## 2021-04-17 RX ADMIN — INSULIN LISPRO 3 UNITS: 100 INJECTION, SOLUTION INTRAVENOUS; SUBCUTANEOUS at 10:27

## 2021-04-17 RX ADMIN — DEXAMETHASONE 4 MG: 4 TABLET ORAL at 10:23

## 2021-04-17 RX ADMIN — SODIUM CHLORIDE, PRESERVATIVE FREE 10 ML: 5 INJECTION INTRAVENOUS at 19:13

## 2021-04-17 RX ADMIN — REMDESIVIR 100 MG: 100 INJECTION, POWDER, LYOPHILIZED, FOR SOLUTION INTRAVENOUS at 19:16

## 2021-04-17 RX ADMIN — TACROLIMUS 2 MG: 1 CAPSULE ORAL at 10:22

## 2021-04-17 RX ADMIN — HEPARIN SODIUM 5000 UNITS: 10000 INJECTION INTRAVENOUS; SUBCUTANEOUS at 22:31

## 2021-04-17 RX ADMIN — ISOSORBIDE MONONITRATE 60 MG: 60 TABLET, EXTENDED RELEASE ORAL at 10:23

## 2021-04-17 RX ADMIN — FUROSEMIDE 80 MG: 10 INJECTION, SOLUTION INTRAMUSCULAR; INTRAVENOUS at 05:28

## 2021-04-17 RX ADMIN — HEPARIN SODIUM 5000 UNITS: 10000 INJECTION INTRAVENOUS; SUBCUTANEOUS at 13:02

## 2021-04-17 RX ADMIN — SODIUM CHLORIDE, POTASSIUM CHLORIDE, SODIUM LACTATE AND CALCIUM CHLORIDE: 600; 310; 30; 20 INJECTION, SOLUTION INTRAVENOUS at 05:28

## 2021-04-17 RX ADMIN — METOPROLOL SUCCINATE 50 MG: 25 TABLET, EXTENDED RELEASE ORAL at 22:17

## 2021-04-17 RX ADMIN — HYDRALAZINE HYDROCHLORIDE 50 MG: 50 TABLET ORAL at 22:17

## 2021-04-17 RX ADMIN — SODIUM BICARBONATE 1300 MG: 650 TABLET ORAL at 22:17

## 2021-04-17 RX ADMIN — SODIUM BICARBONATE 1300 MG: 650 TABLET ORAL at 10:23

## 2021-04-17 RX ADMIN — POLYETHYLENE GLYCOL 3350 17 G: 17 POWDER, FOR SOLUTION ORAL at 17:32

## 2021-04-17 RX ADMIN — SODIUM CHLORIDE, PRESERVATIVE FREE 10 ML: 5 INJECTION INTRAVENOUS at 13:01

## 2021-04-17 RX ADMIN — INSULIN LISPRO 3 UNITS: 100 INJECTION, SOLUTION INTRAVENOUS; SUBCUTANEOUS at 13:02

## 2021-04-17 RX ADMIN — TIZANIDINE 4 MG: 4 TABLET ORAL at 10:23

## 2021-04-17 RX ADMIN — DEXAMETHASONE 4 MG: 4 TABLET ORAL at 22:18

## 2021-04-17 RX ADMIN — INSULIN LISPRO 3 UNITS: 100 INJECTION, SOLUTION INTRAVENOUS; SUBCUTANEOUS at 17:38

## 2021-04-17 RX ADMIN — HYDRALAZINE HYDROCHLORIDE 50 MG: 50 TABLET ORAL at 15:51

## 2021-04-17 RX ADMIN — Medication 10 ML: at 10:24

## 2021-04-17 RX ADMIN — ATORVASTATIN CALCIUM 10 MG: 10 TABLET, FILM COATED ORAL at 22:17

## 2021-04-17 RX ADMIN — Medication 10 ML: at 22:18

## 2021-04-17 RX ADMIN — ISOSORBIDE MONONITRATE 60 MG: 60 TABLET, EXTENDED RELEASE ORAL at 22:18

## 2021-04-17 RX ADMIN — MYCOPHENOLATE MOFETIL 1000 MG: 250 CAPSULE ORAL at 10:22

## 2021-04-17 RX ADMIN — HYDRALAZINE HYDROCHLORIDE 50 MG: 50 TABLET ORAL at 05:27

## 2021-04-17 RX ADMIN — INSULIN LISPRO 1 UNITS: 100 INJECTION, SOLUTION INTRAVENOUS; SUBCUTANEOUS at 22:30

## 2021-04-17 ASSESSMENT — PAIN SCALES - GENERAL: PAINLEVEL_OUTOF10: 0

## 2021-04-17 NOTE — PROGRESS NOTES
Date: 4/17/2021    Time: 3:09 PM    Patient Placed On BIPAP/CPAP/ Non-Invasive Ventilation? No    If no must comment. Facial area red/color change? No           If YES are Blister/Lesion present? No   If yes must notify nursing staff  BIPAP/CPAP skin barrier? Yes    Skin barrier type:mepilexlite       Comments: found on at this time.         Roberto Spangler

## 2021-04-17 NOTE — PROGRESS NOTES
Flu consent signed by patient. Flu vaccine administered.   Notified Dr. Velma Reyes regarding the patient not voiding and on fluids via secure message. A bladder scan read \"12ml\" in his bladder. Dr. Velma Reyes read the message at 1772.  Awaiting response

## 2021-04-17 NOTE — PROGRESS NOTES
Date: 4/16/2021    Time: 9:27 PM    Patient Placed On BIPAP/CPAP/ Non-Invasive Ventilation? No    If no must comment. Facial area red/color change? No           If YES are Blister/Lesion present? No   If yes must notify nursing staff  BIPAP/CPAP skin barrier?   Yes    Skin barrier type:na       Comments: Pt continues to refuse his BiPAP        Ryane Pu

## 2021-04-17 NOTE — PROGRESS NOTES
Nephrology Progress Note  4/17/2021 11:19 AM  Subjective:   Admit Date: 4/9/2021  PCP: GUIDO GERONIMO III, DO  Interval History: Pt being seen for ESRD requiring KRT with LRD Allograft placed 12/12/14 4/16/21  Seen and examined. Awake. Sitting in chair. Does not answer most questions. 4/17/21: seen through window as he is +covid, report obtained from nurse and chart reviewed. No current concerns per nursing. Diet: DIET GENERAL; Low Potassium    Data:   Scheduled Meds:   hydrALAZINE  50 mg Oral 3 times per day    dexamethasone  4 mg Oral 2 times per day    remdesivir IVPB  100 mg Intravenous Q24H    sodium bicarbonate  1,300 mg Oral BID    isosorbide mononitrate  60 mg Oral BID    aspirin  81 mg Oral Daily    metoprolol succinate  50 mg Oral Nightly    mycophenolate  1,000 mg Oral BID    tacrolimus  2 mg Oral BID    tiZANidine  4 mg Oral Daily    sodium chloride flush  5-40 mL Intravenous 2 times per day    insulin lispro  0-6 Units Subcutaneous TID WC    insulin lispro  0-3 Units Subcutaneous Nightly    [Held by provider] predniSONE  5 mg Oral Daily    atorvastatin  10 mg Oral Nightly    heparin (porcine)  5,000 Units Subcutaneous Q8H     Continuous Infusions:   lactated ringers 75 mL/hr at 04/17/21 0528    sodium chloride 25 mL (04/15/21 1355)    dextrose       PRN Meds:naloxone, sodium chloride, sodium chloride flush, sodium chloride, acetaminophen **OR** acetaminophen, glucose, dextrose, glucagon (rDNA), dextrose, polyethylene glycol, trimethobenzamide  I/O last 3 completed shifts: In: 3681 [I.V.:2356]  Out: 550 [Urine:550]  No intake/output data recorded.     Intake/Output Summary (Last 24 hours) at 4/17/2021 1118  Last data filed at 4/17/2021 0603  Gross per 24 hour   Intake 2356 ml   Output 550 ml   Net 1806 ml     CBC:   Recent Labs     04/15/21  0550 04/16/21  1315 04/17/21  0510   WBC 4.1* 5.6 5.9   HGB 10.0* 10.0* 9.3*    300 263     BMP:    Recent Labs 04/15/21  0550 04/15/21  0550 04/16/21  1315 04/17/21  0510     --  137 138   K 5.7*  5.7*   < > 4.9 4.7  4.7     --  105 105   CO2 21*  --  20* 21*   BUN 60*  --  73* 78*   CREATININE 3.2*  --  3.3* 3.4*   GLUCOSE 218*  --  352* 255*    < > = values in this interval not displayed. Hepatic:   Recent Labs     04/15/21  0550 04/16/21  1315 04/17/21  0510   AST 27 23 26   ALT 12 10 12   BILITOT 0.3 0.3 0.4   ALKPHOS 44 41 50     Troponin:   No results for input(s): TROPONINI in the last 72 hours. BNP: No results for input(s): BNP in the last 72 hours. Lipids: No results for input(s): CHOL, HDL in the last 72 hours. Invalid input(s): LDLCALCU  ABGs: No results found for: PHART, PO2ART, QWB2BFA  INR: No results for input(s): INR in the last 72 hours. -----------------------------------------------------------------  RAD:   EXAMINATION:   RETROPERITONEAL ULTRASOUND OF THE KIDNEYS AND URINARY BLADDER       4/11/2021       COMPARISON:   None       HISTORY:   ORDERING SYSTEM PROVIDED HISTORY: arf   TECHNOLOGIST PROVIDED HISTORY:   Reason for exam:->arf   What reading provider will be dictating this exam?->CRC       FINDINGS:       Kidneys:       The right kidney measures 8.1 cm in length and the left kidney measures 7.9   cm in length.       Kidneys demonstrate increased cortical echogenicity.  No evidence of   hydronephrosis or intrarenal stones.           Bladder:       Unremarkable appearance of the bladder.  Bilateral ureteral jets are noted.           Impression   Atrophic and echogenic kidneys compatible with chronic renal parenchymal   disease.       Unremarkable bladder. EXAMINATION:   ONE XRAY VIEW OF THE CHEST       4/9/2021 8:59 pm       COMPARISON:   05/29/2019       HISTORY:   ORDERING SYSTEM PROVIDED HISTORY: Fever   TECHNOLOGIST PROVIDED HISTORY:   Reason for exam:->Fever       FINDINGS:   Possible left costophrenic angle opacity.  There is no effusion or   pneumothorax.  The cardiomediastinal silhouette is without acute process. The   osseous structures are without acute process.           Impression   Possible left costophrenic angle opacity. 4/13/21 2 D ECHO       Summary    Normal left ventricular chamber size.    Normal left ventricular systolic function.    Visually estimated LVEF is 60-65 %.    No wall motion abnormalities.    Stage I diastolic function.    Normal right ventricle structure and function.    No significant valvular abnormalities. Objective:   Vitals: BP (!) 142/74   Pulse 92   Temp 98.2 °F (36.8 °C) (Oral)   Resp 18   Ht 5' 10\" (1.778 m)   Wt 235 lb 6.4 oz (106.8 kg)   SpO2 95%   BMI 33.78 kg/m²      NAD  Confused  No edema  Overall exam limited by COVID19    Assessment:   Patient Active Problem List:     GERD (gastroesophageal reflux disease)     CKD (chronic kidney disease) stage 4, GFR 15-29 ml/min ([facility])     Mixed hyperlipidemia     Diabetes mellitus, type 2 (HCC)     HTN (hypertension), benign     Junctional bradycardia     Anemia in stage 3 chronic kidney disease     CAD (coronary artery disease), native coronary artery     Acute CHF (congestive heart failure) ([facility])     Chest pain     Respiratory failure ([facility])     ALYSON (acute kidney injury) ([facility])     Vomiting and diarrhea     Acute nontraumatic kidney injury ([address]     Chronic diastolic congestive heart failure ([address]     H/O kidney transplant     Pulmonary hypertension ([address]     Immunosuppression ([address]    Plan:   1- CKD5D/ESRD with RRT as LRD Renal Allograft placed 12/12/14 with a baseline serum cr of 2.1-2.7mg/dl and an e-GFR=30-28ml/min. His current creatinine is above baseline  UA SG 1.020, protein 100mg/dl, blood (-). Ni (-), LE (-)    Give IV fluids  Check prograf level  Follow renal function    2-Anemia in CKD-HgB at goal >/=10  PLAN:1. Give ADAM as needed  2.  Follow H/H.    3- HTN with CKD 5/ESRD  Follow BP on decreased dose of hydralazine at 50mg tid    4- Hyperkalemia in the setting of the DM2 with the CNI and possible hyporenin hypoaldo-possible exacerbated by increased cell turnover in the setting of the SARs-CoV-2    Follow potassium and treat medically further as needed    5- Sec HPTH due to the CAN/CKD with Hypocalcemia  PO4 WNL    Vit D 53  Ca+ 8.8,  PO4 4.2  PLAN:1. Follow     6-Non Anion Gap Metabolic Acidosis  HCO3 at/near goal of HCO3>/=22  PLAN:1. Continue the present dose of  bicarbonate    7- Hyponatremia  Na+ WNL  PLAN:1. Continue to monitor        Thank you  for allowing us to participate in care of Max SteeleCHAI-CNP  4/17/2021  11:30 AM    ===============    Renal Attending Addendum  Seen via window today - resting.   Discussed with primary nurse  Full exam deferred due to [address]    Renal function with mild worsening  Continue IV fluids  Had decreased urine output but responded to dose of IV lasix  Nurse reports that some urine was spilled and thus not documented  Repeat tacrolimus level pending - may need to decrease dose       Gus Cook MD

## 2021-04-17 NOTE — CODE DOCUMENTATION
RRT called for unresponsiveness, His vitals normal and oxygen saturation normal, he received oxycodone 15 mg PO day before yesterday, narcan 0.4 mg I given. He responded to my command. His BP was high and hydralazine iv given 10 mg. He was feeling cold, 2 warm blankets given. I spent 35 minutes.

## 2021-04-17 NOTE — PROGRESS NOTES
When this RN entered the patient's room the patient stated that he felt \"weird\". At this time he was sitting in the chair. The patient was returned to the bed where his mentation worsen and was not following commands. Upon assessment of his vitals his blood pressure was elevated. At this times an RRT was called.

## 2021-04-17 NOTE — PROGRESS NOTES
Internal Medicine Progress Note      Synopsis: Patient admitted on 4/9/2021    Subjective    Patient is in his room. IV fluids still on board. He is eating better he tells me  He wants to get out of here he tells me  Afebrile. Does not want a Suazo's catheter  April 12, 2021  Still requesting a discharge. Discussed with him regarding his right heart catheterization and he has no recollection. He thinks heart catheterization was not done due to concerns for IV dye having a negative renal impact. April 13 th 2021   He wants to go home ! Patient has needed O2 this morning. He also has refused therapy and occupational and physical.  He was running a low-grade fever up to yesterday  April 14, 2021  Patient turned positive for SARS-CoV-2  He is now on a separate floor. He still hypoxemic but only requiring 3 L  April 15, 1981  Patient was seen by me in the early hours of morning about 6:45 in the morning. As the day has gone on he has had significant events including significant sedation and altered mental status. He was given Narcan with significant success and then needed another dose of Narcan. Narcotics have been [facility]. Pulmonary services were also asked to consult due to hypoxemia that had gone from 3 L to 6 L  April 16, 2021  Patient seen at about [phone] this morning. He did not appear to have any confusion or agitation. States he was feeling okay  April 17, 2021  Appeared to have an RRT late in the night. Had extremely elevated blood pressure. 1 dose of IV hydralazine was given. Patient has not worn his BiPAP as much and he now appears to be a little bit more lethargic. He does wake up  And his vitals are stable    Exam:  BP (!) 142/74   Pulse 92   Temp 98.2 °F (36.8 °C) (Oral)   Resp 18   Ht 5' 10\" (1.778 m)   Wt 235 lb 6.4 oz (106.8 kg)   SpO2 95%   BMI 33.78 kg/m²   General appearance: No apparent distress, appears stated age and cooperative.   Though appears slow  HEENT: Normal cephalic, atraumatic without obvious deformity. Pupils equal, round, and reactive to light. Extra ocular muscles intact. Conjunctivae/corneas clear. Periorbital edema notedAnd some conjunctival edema noted to  Neck: . Trachea midline. Respiratory: Decreased  Cardiovascular: Regular heart rate rhythm  Abdomen: Nontender  Musculoskeletal: No clubbing but skin is thick on bilateral lower extremities   skin: Normal skin color. No rashes or lesions. Neurologic:  Neurovascularly intact without any focal sensory/motor deficits.  Cranial nerves: II-XII intact, grossly non-focal.  Though he is weak         Medications:  Reviewed    Infusion Medications    lactated ringers 75 mL/hr at 04/17/21 1308    sodium chloride 25 mL (04/15/21 1355)    dextrose       Scheduled Medications    hydrALAZINE  50 mg Oral 3 times per day    dexamethasone  4 mg Oral 2 times per day    remdesivir IVPB  100 mg Intravenous Q24H    sodium bicarbonate  1,300 mg Oral BID    isosorbide mononitrate  60 mg Oral BID    aspirin  81 mg Oral Daily    metoprolol succinate  50 mg Oral Nightly    mycophenolate  1,000 mg Oral BID    tacrolimus  2 mg Oral BID    tiZANidine  4 mg Oral Daily    sodium chloride flush  5-40 mL Intravenous 2 times per day    insulin lispro  0-6 Units Subcutaneous TID WC    insulin lispro  0-3 Units Subcutaneous Nightly    [Held by provider] predniSONE  5 mg Oral Daily    atorvastatin  10 mg Oral Nightly    heparin (porcine)  5,000 Units Subcutaneous Q8H     PRN Meds: naloxone, sodium chloride, sodium chloride flush, sodium chloride, acetaminophen **OR** acetaminophen, glucose, dextrose, glucagon (rDNA), dextrose, polyethylene glycol, trimethobenzamide    I/O    Intake/Output Summary (Last 24 hours) at 4/17/2021 1329  Last data filed at 4/17/2021 1254  Gross per 24 hour   Intake 2356 ml   Output 500 ml   Net 1856 ml       Labs:   Recent Labs     04/15/21  0550 04/16/21  1315 04/17/21  0510   WBC 4.1* 5.6 5.9   HGB 10.0* 10.0* 9.3*   HCT 33.2* 32.3* 30.7*    300 263       Recent Labs     04/15/21  0550 04/15/21  0550 04/16/21  1315 04/17/21  0510     --  137 138   K 5.7*  5.7*   < > 4.9 4.7  4.7     --  105 105   CO2 21*  --  20* 21*   BUN 60*  --  73* 78*   CREATININE 3.2*  --  3.3* 3.4*   CALCIUM 8.8  --  8.4* 8.8   PHOS 4.5  --  4.9* 4.2    < > = values in this interval not displayed. Recent Labs     04/15/21  0550 04/16/21  1315 04/17/21  0510   PROT 5.5* 5.1* 5.5*   ALKPHOS 44 41 50   ALT 12 10 12   AST 27 23 26   BILITOT 0.3 0.3 0.4       No results for input(s): INR in the last 72 hours. No results for input(s): Rejeana Kemps in the last 72 hours. Chronic labs:  Lab Results   Component Value Date    CHOL 128 06/03/2014    TRIG 50 06/03/2014    HDL 51 06/03/2014    LDLCALC 67 06/03/2014    TSH 0.996 06/03/2014    INR 1.0 08/31/2020    LABA1C 5.5 06/03/2014       Radiology:  Xr Chest Portable    Result Date: 4/9/2021  EXAMINATION: ONE XRAY VIEW OF THE CHEST 4/9/2021 8:59 pm COMPARISON: 05/29/2019 HISTORY: ORDERING SYSTEM PROVIDED HISTORY: Fever TECHNOLOGIST PROVIDED HISTORY: Reason for exam:->Fever FINDINGS: Possible left costophrenic angle opacity. There is no effusion or pneumothorax. The cardiomediastinal silhouette is without acute process. The osseous structures are without acute process. Possible left costophrenic angle opacity. ASSESSMENT:    Principal Problem:    Acute nontraumatic kidney injury ([facility]  Active Problems:    CKD (chronic kidney disease) stage 4, GFR 15-29 ml/min (HCC)    Diabetes mellitus, type 2 (HCC)    HTN (hypertension), benign    Acute kidney injury ([address]    Vomiting and diarrhea    Chronic diastolic congestive heart failure ([facility]    Immunosuppression ([facility]  Resolved Problems:    * No resolved hospital problems.  *  Sedation due to narcotics     PLAN:    `1.  He has tolerated his iv fluids pretty well so far but we can decrease them with his known history of heart failure  2. Creatinine has normalized fairly fast close to his baseline  3. Taking an oral medications and food and fluids pretty well  4. May be able to discharge by tomorrow. 5.  Order renal ultrasound  April 12, 2021  No overt signs and symptoms of heart failure. We can DC IV fluids. Labs from today show creatinine that is better yet. Slight leukopenia noted. Blood sugar is okay. BNP was elevated yesterday and another one has been ordered. Renal ultrasound only showed atrophic kidneys  His baseline creatinine has been reached. Wait for renal for final discharge planning. Cause of acute kidney insufficiency unclear yet  April 13, 2021  Off of IV fluids he has increased need for O2 today. BNP remains elevated. I do feel he has early heart failure yet. When I questioned the patient it appears that he has no knowledge of any kind of heart catheterization or heart failure workup in the past.  He remains in a table and he has refused therapy occupational and physical.  Patient's blood pressure control is suboptimal.  His creatinine has returned to his normal  I do not feel patient is safe for discharge today he is seen by cardiology. He may need diuresis which may bump up his creatinine yet again  For now at least he is eating  April 14, 2021  Await ID recommendations. Creatinine is still going back up. Pharmacy consult for appropriate treatment for COVID-19  Patient is chronically on prednisone. We can add a low-dose of Decadron on to allow for additional support for COVID-19    April 15, 2021  CT scan is negative which was done urgently after he had 2 episodes of decreased alertness reversed with Narcan  He has been given remdesivir as per infectious disease recommendations  Pulmonary critical care input much appreciated  [place] to have Covid pneumonia as the main cause but being watched closely by infectious disease  Blood sugars are running higher.   These will need controlled  Bilateral lower extremities are negative  Monitor for D-dimer  April 16, 2021  Maintain on remdesivir  Maintained on steroids  O2 needs appear to be slightly less. Decadron will stay so we can add low-dose Lantus. Continue to monitor creatinine which is state of  Patient's OxyIR has been completely discontinued  D-dimer noted. April 17, 2021  Maintaining on Decadron and remdesivir. He appears to be more somnolent. Gases did not reveal significant hypoxemia. D-dimer is not high. IV fluids need to be resumed since he took his IV fluid catheter out and they have been off. Creatinine slightly higher. Discussed with nursing staff to please let pulmonary know  if he starts to refuse to wear his BiPAP  Diet: DIET GENERAL; Low Potassium  Code Status: Full Code  PT/OT Eval Status:   Order  DVT Prophylaxis:   In place  Recommended disposition at discharge:   Home    +++++++++++++++++++++++++++++++++++++++++++++++++  Cecilia Mejia MD   [facility].  +++++++++++++++++++++++++++++++++++++++++++++++++  NOTE: This report was transcribed using voice recognition software. Every effort was made to ensure accuracy; however, inadvertent computerized transcription errors may be present.

## 2021-04-17 NOTE — PROGRESS NOTES
Comprehensive Nutrition Assessment    Type and Reason for Visit:  Initial, RD Nutrition Re-Screen/LOS    Nutrition Recommendations/Plan: Continue Diet. Will Start Glucerna Diabetic ONS BID. Nutrition Assessment:  Pt adm w/ n/v/d and fatigue x ~2-3d pta likely 2/2 COVID19+ w/ ALYSON on CKD (5) and Metabolic Acidosis per EMR review. Noted possible narcotic overdose w/ multiple RRT's and further decline x past few days d/t lethargy/AMS and worsening respiratory function. At risk d/t poor appetite/intake w/ lethargy/AMS/SOB x past few days of adm. Will Start ONS to aid in PO intake and will monitor. Malnutrition Assessment:  Malnutrition Status: At risk for malnutrition (Comment)    Context:  Acute Illness     Findings of the 6 clinical characteristics of malnutrition:  Energy Intake:  1 - 75% or less of estimated energy requirements for 7 or more days  Weight Loss:  Unable to assess(2/2 poor EMR wt hx pta)     Body Fat Loss:  Unable to assess(2/2 COVID19 Iso)     Muscle Mass Loss:  Unable to assess    Fluid Accumulation:  No significant fluid accumulation     Strength:  Not Performed    Estimated Daily Nutrient Needs:  Energy (kcal):  8572-9347(MSJ x 1.3 SF); Weight Used for Energy Requirements:  Admission     Protein (g):  (1.2-1.3 gm/kg per IBW as tolerated w/ ALYSON/CKD5); Weight Used for Protein Requirements:  Ideal        Fluid (ml/day):  1072-3067; Method Used for Fluid Requirements:  1 ml/kcal      Nutrition Related Findings:   AMSx3-4, somnolent, dentition WNL, Abd/BS WDL, No Edema, +BiPAP, +I/O's      Wounds:  None       Current Nutrition Therapies:    DIET GENERAL; Low Potassium    Anthropometric Measures:  · Height: 5' 10\" (177.8 cm)  · Current Body Weight: 235 lb (106.6 kg)(bed 4/14)   · Admission Body Weight: 222 lb (100.7 kg)(stand scale 4/10)    · Usual Body Weight: (UTO UBW 2/2 poor EMR wt hx pta; unknown wt 243# 9/2/20; possible fluid shifts likely as well)     · Ideal Body Weight:

## 2021-04-17 NOTE — PROGRESS NOTES
Reginald Bentley M.D.,[facility]  Bety Figueroa D.O., F.A.C.O.I., Dariana Padilla M.D. Erick Varela M.D., Vince Alvarez M.D. Solo Terrell D.O. Daily Pulmonary Progress Note    Patient:  Mindy Merritt 79 y.o. male MRN: [id number]     Date of Service: 4/17/2021      Synopsis     We are following patient for increased O2 needs with covid pneumonia    \"CC\" shortness of breath     Code status: full     Subjective      Patient was seen and examined. He is laying in bed lethargic but opens eyes upon calling his name is able to answer questions yes and no. I did obtain an ABG myself at the bedside. Results came back pH 7.375/PCO2 of 35.8/PO2 of 94.1 with a bicarb of 20.5. Review of Systems:   Constitutional: Denies fever, weight loss, night sweats, +weakness and fatigue  Skin: Denies pigmentation, dark lesions, and rashes   HEENT: Denies hearing loss, tinnitus, ear drainage, epistaxis, sore throat, and hoarseness. Cardiovascular: Denies palpitations, chest pain, and chest pressure. Respiratory: Intermittent cough.   Denies any shortness of breath  Gastrointestinal: +nausea, decreased appetite  Genitourinary: Denies dysuria, frequency, urgency or hematuria  Musculoskeletal: Denies myalgias, muscle weakness, and bone pain  Neurological: Denies dizziness, vertigo, headache, and focal weakness  Psychological: Denies anxiety and depression  Endocrine: Denies heat intolerance and cold intolerance  Hematopoietic/Lymphatic: Denies bleeding problems and blood transfusions    24-hour events:  None     Objective   Vitals: BP (!) 142/74   Pulse 92   Temp 98.2 °F (36.8 °C) (Oral)   Resp 18   Ht 5' 10\" (1.778 m)   Wt 235 lb 6.4 oz (106.8 kg)   SpO2 95%   BMI 33.78 kg/m²     I/O:    Intake/Output Summary (Last 24 hours) at 4/17/2021 1339  Last data filed at 4/17/2021 1254  Gross per 24 hour   Intake 2356 ml   Output 500 ml   Net 1856 ml       Vent Information  Skin Assessment: Clean, dry, & intact  FiO2 : 40 %  SpO2: 95 %  Mask Type: Full face mask  Mask Size: Medium       IPAP: 12 cmH20  CPAP/EPAP: 8 cmH2O     CURRENT MEDS :  Scheduled Meds:   hydrALAZINE  50 mg Oral 3 times per day    dexamethasone  4 mg Oral 2 times per day    remdesivir IVPB  100 mg Intravenous Q24H    sodium bicarbonate  1,300 mg Oral BID    isosorbide mononitrate  60 mg Oral BID    aspirin  81 mg Oral Daily    metoprolol succinate  50 mg Oral Nightly    mycophenolate  1,000 mg Oral BID    tacrolimus  2 mg Oral BID    tiZANidine  4 mg Oral Daily    sodium chloride flush  5-40 mL Intravenous 2 times per day    insulin lispro  0-6 Units Subcutaneous TID WC    insulin lispro  0-3 Units Subcutaneous Nightly    [Held by provider] predniSONE  5 mg Oral Daily    atorvastatin  10 mg Oral Nightly    heparin (porcine)  5,000 Units Subcutaneous Q8H       Physical Exam:  General Appearance: appears comfortable in no acute distress. Lethargic but arousable  HEENT: Normocephalic atraumatic without obvious abnormality   Neck: Lips, mucosa, and tongue normal.  Supple, symmetrical, trachea midline, no adenopathy;thyroid:  no enlargement/tenderness/nodules or JVD. Lung: Breath sounds CTA. Respirations   unlabored. Symmetrical expansion. Heart: RRR, normal S1, S2. No MRG  Abdomen: Soft, NT, ND. BS present x 4 quadrants. No bruit or organomegaly. Extremities: Pedal pulses 2+ symmetric b/l. Extremities normal, no cyanosis, clubbing, or edema. Musculokeletal: No joint swelling, no muscle tenderness. ROM normal in all joints of extremities. Neurologic: Cranial nerves II through XII are grossly intact  . Pertinent/ New Labs and Imaging Studies     Imaging Personally Reviewed:  FINDINGS:   Exam is limited without intravenous contrast.  Small pericardial effusion. Heart size is normal.  No significant pleural effusion.  Scattered vascular   calcifications.  Normal-size lymph nodes without adenopathy by size criteria. 04/17/2021     04/17/2021    CO2 21 04/17/2021    BUN 78 04/17/2021    CREATININE 3.4 04/17/2021    LABALBU 3.1 04/17/2021    LABALBU 3.8 07/05/2011    CALCIUM 8.8 04/17/2021    GFRAA 22 04/17/2021    LABGLOM 22 04/17/2021     Lab Results   Component Value Date    PROTIME 11.3 08/31/2020    INR 1.0 08/31/2020     No results for input(s): PROBNP in the last 72 hours. Recent Labs     04/14/21  1650   PROCAL 0.37*     This SmartLink has not been configured with any valid records. Micro:  No results for input(s): CULTRESP in the last 72 hours. No results for input(s): LABGRAM in the last 72 hours. No results for input(s): LEGUR in the last 72 hours. No results for input(s): STREPNEUMAGU in the last 72 hours. No results for input(s): LP1UAG in the last 72 hours. Assessment:    1. Acute respiratory failure with hypoxia  2. Covid 19 pneumonia moderate     3. 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 Donato Mcadams at [facility]  4. Mild OLINDA on auto CPAP 5-12 at Home, compliant  5. Chronic diastolic heart failure, HFpEF Stage I DD follows with Dr. Adrian Armenta at [facility]  6. Hx Junctional bradycardia  7. Worsening ALYSON-Hx renal transplant at [facility]  8. Immunocompromised host  9. Nausea, vomiting, diarrhea  10. Hx nicotine dependence, in remission. 20 pk yr smoker      Plan:   11.  We will continue to use BiPAP as needed and nightly. 12. Oxygen therapy  down to 2 liters wean to keep >92% discussed with nursing  13. Incentive spirometer  14. Continue remdesivir per infectious disease today is day 4  15. Decadron 4 mg po bid  16. US BLE assess for DVT, negative. D dimer 466  17. Immunosuppression-prograf, bactrim, cellcept, and daily pred 5 mg per nephrology- IV fluids for ALYSON  18. DVT, GI prophylaxis   19. Limit narcotic use stopped oxycodone.   Received Narcan 4/15        Electronically signed by Mina Tavarez MD on 4/17/2021 at 1:39 PM

## 2021-04-17 NOTE — PROGRESS NOTES
[address] [place] [address] [facility]  Progress Note    Face to face encounter   Chief Complaint   Patient presents with    Fatigue     symptoms x2-3 days    Emesis    Diarrhea    Anorexia       SUBJECTIVE:  Patient is tolerating medications. No reported adverse drug reactions. No nausea, vomiting, diarrhea. Oxygenating at 95% on 2 L  Afebrile  Laying on right side. No acute distress    Review of systems:  As stated above in the chief complaint, otherwise negative. Medications:  Scheduled Meds:   hydrALAZINE  50 mg Oral 3 times per day    dexamethasone  4 mg Oral 2 times per day    remdesivir IVPB  100 mg Intravenous Q24H    sodium bicarbonate  1,300 mg Oral BID    isosorbide mononitrate  60 mg Oral BID    aspirin  81 mg Oral Daily    metoprolol succinate  50 mg Oral Nightly    mycophenolate  1,000 mg Oral BID    tacrolimus  2 mg Oral BID    tiZANidine  4 mg Oral Daily    sodium chloride flush  5-40 mL Intravenous 2 times per day    insulin lispro  0-6 Units Subcutaneous TID WC    insulin lispro  0-3 Units Subcutaneous Nightly    [Held by provider] predniSONE  5 mg Oral Daily    atorvastatin  10 mg Oral Nightly    heparin (porcine)  5,000 Units Subcutaneous Q8H     Continuous Infusions:   lactated ringers 75 mL/hr at 21 0528    sodium chloride 25 mL (04/15/21 1355)    dextrose       PRN Meds:naloxone, sodium chloride, sodium chloride flush, sodium chloride, acetaminophen **OR** acetaminophen, glucose, dextrose, glucagon (rDNA), dextrose, polyethylene glycol, trimethobenzamide    OBJECTIVE:  BP (!) 142/74   Pulse 92   Temp 98.2 °F (36.8 °C) (Oral)   Resp 18   Ht 5' 10\" (1.778 m)   Wt 235 lb 6.4 oz (106.8 kg)   SpO2 95%   BMI 33.78 kg/m²   Temp  Av.9 °F (36.6 °C)  Min: 97.6 °F (36.4 °C)  Max: 98.2 °F (36.8 °C)  Constitutional: The patient is awake, alert, laying on right side. Skin: Warm and dry. No rashes were noted. HEENT: Round and reactive pupils. Moist mucous membranes. No ulcerations or thrush. Neck: Supple to movements. Chest: No use of accessory muscles to breathe. Symmetrical expansion. No wheezing, crackles or rhonchi. Poor air exchange some coarse breath sounds upper airways  Cardiovascular: S1 and S2 are rhythmic and regular. No murmurs appreciated. Abdomen: Positive bowel sounds to auscultation. Benign to palpation. Genitourinary: Male  Extremities: No clubbing, no cyanosis, no edema.   Lines: peripheral    Laboratory and Tests Review:  Lab Results   Component Value Date    WBC 5.9 04/17/2021    WBC 5.6 04/16/2021    WBC 4.1 (L) 04/15/2021    HGB 9.3 (L) 04/17/2021    HCT 30.7 (L) 04/17/2021    MCV 83.9 04/17/2021     04/17/2021     Lab Results   Component Value Date    NEUTROABS 2.91 04/11/2021    NEUTROABS 2.57 04/09/2021    NEUTROABS 2.82 05/29/2019     No results found for: [facility]  Lab Results   Component Value Date    ALT 12 04/17/2021    AST 26 04/17/2021    ALKPHOS 50 04/17/2021    BILITOT 0.4 04/17/2021     Lab Results   Component Value Date     04/17/2021    K 4.7 04/17/2021    K 4.7 04/17/2021     04/17/2021    CO2 21 04/17/2021    BUN 78 04/17/2021    CREATININE 3.4 04/17/2021    CREATININE 3.3 04/16/2021    CREATININE 3.2 04/15/2021    GFRAA 22 04/17/2021    LABGLOM 22 04/17/2021    GLUCOSE 255 04/17/2021    GLUCOSE 124 02/21/2012    PROT 5.5 04/17/2021    LABALBU 3.1 04/17/2021    LABALBU 3.8 07/05/2011    CALCIUM 8.8 04/17/2021    BILITOT 0.4 04/17/2021    ALKPHOS 50 04/17/2021    AST 26 04/17/2021    ALT 12 04/17/2021     Lab Results   Component Value Date    CRP 4.4 (H) 04/17/2021    CRP 5.5 (H) 04/16/2021    CRP 8.6 (H) 04/15/2021     Lab Results   Component Value Date    SEDRATE 20 (H) 04/14/2021    SEDRATE 48 (H) 06/03/2013    SEDRATE 40 (H) 05/27/2013      SARS-COV-2 biomarkers    Recent Labs     04/14/21  1650 04/14/21  1650 04/15/21  0550 04/16/21  1315 04/17/21  0510   CRP  --   --  8.6* 5.5* 4.4* advanced practice nurse. Labs, cultures, and radiographs reviewed. Face to Face encounter occurred. Changes made as necessary.      Larissa Aburto MD

## 2021-04-18 LAB
ALBUMIN SERPL-MCNC: 3 G/DL (ref 3.5–5.2)
ALP BLD-CCNC: 47 U/L (ref 40–129)
ALT SERPL-CCNC: 16 U/L (ref 0–40)
ANION GAP SERPL CALCULATED.3IONS-SCNC: 12 MMOL/L (ref 7–16)
AST SERPL-CCNC: 35 U/L (ref 0–39)
BILIRUB SERPL-MCNC: 0.4 MG/DL (ref 0–1.2)
BUN BLDV-MCNC: 82 MG/DL (ref 8–23)
CALCIUM SERPL-MCNC: 8.6 MG/DL (ref 8.6–10.2)
CHLORIDE BLD-SCNC: 104 MMOL/L (ref 98–107)
CO2: 23 MMOL/L (ref 22–29)
CREAT SERPL-MCNC: 2.8 MG/DL (ref 0.7–1.2)
GFR AFRICAN AMERICAN: 27
GFR NON-AFRICAN AMERICAN: 27 ML/MIN/1.73
GLUCOSE BLD-MCNC: 179 MG/DL (ref 74–99)
HCT VFR BLD CALC: 31.9 % (ref 37–54)
HEMOGLOBIN: 9.9 G/DL (ref 12.5–16.5)
MAGNESIUM: 2.1 MG/DL (ref 1.6–2.6)
MCH RBC QN AUTO: 25.2 PG (ref 26–35)
MCHC RBC AUTO-ENTMCNC: 31 % (ref 32–34.5)
MCV RBC AUTO: 81.2 FL (ref 80–99.9)
METER GLUCOSE: 198 MG/DL (ref 74–99)
METER GLUCOSE: 200 MG/DL (ref 74–99)
METER GLUCOSE: 202 MG/DL (ref 74–99)
METER GLUCOSE: 219 MG/DL (ref 74–99)
METER GLUCOSE: 235 MG/DL (ref 74–99)
PDW BLD-RTO: 15.5 FL (ref 11.5–15)
PHOSPHORUS: 3.6 MG/DL (ref 2.5–4.5)
PLATELET # BLD: 307 E9/L (ref 130–450)
PMV BLD AUTO: 9.2 FL (ref 7–12)
POTASSIUM REFLEX MAGNESIUM: 4.8 MMOL/L (ref 3.5–5)
POTASSIUM SERPL-SCNC: 4.8 MMOL/L (ref 3.5–5)
PROCALCITONIN: 0.34 NG/ML (ref 0–0.08)
RBC # BLD: 3.93 E12/L (ref 3.8–5.8)
SODIUM BLD-SCNC: 139 MMOL/L (ref 132–146)
TOTAL PROTEIN: 5.2 G/DL (ref 6.4–8.3)
WBC # BLD: 7.1 E9/L (ref 4.5–11.5)

## 2021-04-18 PROCEDURE — 84100 ASSAY OF PHOSPHORUS: CPT

## 2021-04-18 PROCEDURE — 82962 GLUCOSE BLOOD TEST: CPT

## 2021-04-18 PROCEDURE — 2700000000 HC OXYGEN THERAPY PER DAY

## 2021-04-18 PROCEDURE — 2500000003 HC RX 250 WO HCPCS: Performed by: SPECIALIST

## 2021-04-18 PROCEDURE — 80053 COMPREHEN METABOLIC PANEL: CPT

## 2021-04-18 PROCEDURE — 36415 COLL VENOUS BLD VENIPUNCTURE: CPT

## 2021-04-18 PROCEDURE — 6370000000 HC RX 637 (ALT 250 FOR IP): Performed by: PHYSICIAN ASSISTANT

## 2021-04-18 PROCEDURE — 84145 PROCALCITONIN (PCT): CPT

## 2021-04-18 PROCEDURE — 80048 BASIC METABOLIC PNL TOTAL CA: CPT

## 2021-04-18 PROCEDURE — 6360000002 HC RX W HCPCS: Performed by: PHYSICIAN ASSISTANT

## 2021-04-18 PROCEDURE — 2580000003 HC RX 258: Performed by: SPECIALIST

## 2021-04-18 PROCEDURE — 6370000000 HC RX 637 (ALT 250 FOR IP): Performed by: INTERNAL MEDICINE

## 2021-04-18 PROCEDURE — 6360000002 HC RX W HCPCS: Performed by: INTERNAL MEDICINE

## 2021-04-18 PROCEDURE — 83735 ASSAY OF MAGNESIUM: CPT

## 2021-04-18 PROCEDURE — 85027 COMPLETE CBC AUTOMATED: CPT

## 2021-04-18 PROCEDURE — 94660 CPAP INITIATION&MGMT: CPT

## 2021-04-18 PROCEDURE — 2060000000 HC ICU INTERMEDIATE R&B

## 2021-04-18 RX ORDER — DEXAMETHASONE 4 MG/1
4 TABLET ORAL DAILY
Status: DISCONTINUED | OUTPATIENT
Start: 2021-04-19 | End: 2021-04-20 | Stop reason: HOSPADM

## 2021-04-18 RX ADMIN — MYCOPHENOLATE MOFETIL 1000 MG: 250 CAPSULE ORAL at 20:59

## 2021-04-18 RX ADMIN — INSULIN LISPRO 2 UNITS: 100 INJECTION, SOLUTION INTRAVENOUS; SUBCUTANEOUS at 09:13

## 2021-04-18 RX ADMIN — INSULIN LISPRO 2 UNITS: 100 INJECTION, SOLUTION INTRAVENOUS; SUBCUTANEOUS at 12:30

## 2021-04-18 RX ADMIN — METOPROLOL SUCCINATE 50 MG: 25 TABLET, EXTENDED RELEASE ORAL at 20:58

## 2021-04-18 RX ADMIN — TACROLIMUS 2 MG: 1 CAPSULE ORAL at 20:59

## 2021-04-18 RX ADMIN — INSULIN LISPRO 1 UNITS: 100 INJECTION, SOLUTION INTRAVENOUS; SUBCUTANEOUS at 21:00

## 2021-04-18 RX ADMIN — HYDRALAZINE HYDROCHLORIDE 50 MG: 50 TABLET ORAL at 21:30

## 2021-04-18 RX ADMIN — SODIUM BICARBONATE 1300 MG: 650 TABLET ORAL at 09:06

## 2021-04-18 RX ADMIN — ASPIRIN 81 MG: 81 TABLET, COATED ORAL at 09:06

## 2021-04-18 RX ADMIN — HEPARIN SODIUM 5000 UNITS: 10000 INJECTION INTRAVENOUS; SUBCUTANEOUS at 22:00

## 2021-04-18 RX ADMIN — SODIUM BICARBONATE 1300 MG: 650 TABLET ORAL at 20:58

## 2021-04-18 RX ADMIN — REMDESIVIR 100 MG: 100 INJECTION, POWDER, LYOPHILIZED, FOR SOLUTION INTRAVENOUS at 18:56

## 2021-04-18 RX ADMIN — HYDRALAZINE HYDROCHLORIDE 50 MG: 50 TABLET ORAL at 14:28

## 2021-04-18 RX ADMIN — TACROLIMUS 2 MG: 1 CAPSULE ORAL at 09:06

## 2021-04-18 RX ADMIN — MYCOPHENOLATE MOFETIL 1000 MG: 250 CAPSULE ORAL at 09:06

## 2021-04-18 RX ADMIN — ISOSORBIDE MONONITRATE 60 MG: 60 TABLET, EXTENDED RELEASE ORAL at 20:59

## 2021-04-18 RX ADMIN — HEPARIN SODIUM 5000 UNITS: 10000 INJECTION INTRAVENOUS; SUBCUTANEOUS at 06:19

## 2021-04-18 RX ADMIN — HEPARIN SODIUM 5000 UNITS: 10000 INJECTION INTRAVENOUS; SUBCUTANEOUS at 14:00

## 2021-04-18 RX ADMIN — HYDRALAZINE HYDROCHLORIDE 50 MG: 50 TABLET ORAL at 06:19

## 2021-04-18 RX ADMIN — DEXAMETHASONE 4 MG: 4 TABLET ORAL at 09:06

## 2021-04-18 RX ADMIN — INSULIN LISPRO 2 UNITS: 100 INJECTION, SOLUTION INTRAVENOUS; SUBCUTANEOUS at 16:17

## 2021-04-18 RX ADMIN — TIZANIDINE 4 MG: 4 TABLET ORAL at 09:06

## 2021-04-18 RX ADMIN — ATORVASTATIN CALCIUM 10 MG: 10 TABLET, FILM COATED ORAL at 20:58

## 2021-04-18 RX ADMIN — ISOSORBIDE MONONITRATE 60 MG: 60 TABLET, EXTENDED RELEASE ORAL at 09:06

## 2021-04-18 ASSESSMENT — PAIN SCALES - GENERAL
PAINLEVEL_OUTOF10: 0

## 2021-04-18 NOTE — PROGRESS NOTES
Internal Medicine Progress Note      Synopsis: Patient admitted on 4/9/2021    Subjective    Patient is in his room. IV fluids still on board. He is eating better he tells me  He wants to get out of here he tells me  Afebrile. Does not want a Suazo's catheter  April 12, 2021  Still requesting a discharge. Discussed with him regarding his right heart catheterization and he has no recollection. He thinks heart catheterization was not done due to concerns for IV dye having a negative renal impact. April 13 th 2021   He wants to go home ! Patient has needed O2 this morning. He also has refused therapy and occupational and physical.  He was running a low-grade fever up to yesterday  April 14, 2021  Patient turned positive for SARS-CoV-2  He is now on a separate floor. He still hypoxemic but only requiring 3 L  April 15, 1981  Patient was seen by me in the early hours of morning about 6:45 in the morning. As the day has gone on he has had significant events including significant sedation and altered mental status. He was given Narcan with significant success and then needed another dose of Narcan. Narcotics have been [address]. Pulmonary services were also asked to consult due to hypoxemia that had gone from 3 L to 6 L  April 16, 2021  Patient seen at about [phone] this morning. He did not appear to have any confusion or agitation. States he was feeling okay  April 17, 2021  Appeared to have an RRT late in the night. Had extremely elevated blood pressure. 1 dose of IV hydralazine was given. Patient has not worn his BiPAP as much and he now appears to be a little bit more lethargic. He does wake up  And his vitals are stable  April 18 2021  Labs look slightly better. Less hypoxemic. Finishing his remdesivir today. Still on O2 only 2 L now. More alert.       Exam:  BP (!) 140/68   Pulse 70   Temp 96.8 °F (36 °C) (Axillary)   Resp 20   Ht 5' 10\" (1.778 m)   Wt 237 lb 3.2 oz (107.6 kg)   SpO2 95%   BMI Output 1150 ml   Net 598 ml       Labs:   Recent Labs     04/16/21  1315 04/17/21  0510 04/18/21  0310   WBC 5.6 5.9 7.1   HGB 10.0* 9.3* 9.9*   HCT 32.3* 30.7* 31.9*    263 307       Recent Labs     04/16/21  1315 04/17/21  0510 04/18/21  0310    138 139   K 4.9 4.7  4.7 4.8  4.8    105 104   CO2 20* 21* 23   BUN 73* 78* 82*   CREATININE 3.3* 3.4* 2.8*   CALCIUM 8.4* 8.8 8.6   PHOS 4.9* 4.2 3.6       Recent Labs     04/16/21  1315 04/17/21  0510 04/18/21  0310   PROT 5.1* 5.5* 5.2*   ALKPHOS 41 50 47   ALT 10 12 16   AST 23 26 35   BILITOT 0.3 0.4 0.4       No results for input(s): INR in the last 72 hours. No results for input(s): Joanie Union in the last 72 hours. Chronic labs:  Lab Results   Component Value Date    CHOL 128 06/03/2014    TRIG 50 06/03/2014    HDL 51 06/03/2014    LDLCALC 67 06/03/2014    TSH 0.996 06/03/2014    INR 1.0 08/31/2020    LABA1C 5.5 06/03/2014       Radiology:  Xr Chest Portable    Result Date: 4/9/2021  EXAMINATION: ONE XRAY VIEW OF THE CHEST 4/9/2021 8:59 pm COMPARISON: 05/29/2019 HISTORY: ORDERING SYSTEM PROVIDED HISTORY: Fever TECHNOLOGIST PROVIDED HISTORY: Reason for exam:->Fever FINDINGS: Possible left costophrenic angle opacity. There is no effusion or pneumothorax. The cardiomediastinal silhouette is without acute process. The osseous structures are without acute process. Possible left costophrenic angle opacity. ASSESSMENT:    Principal Problem:    Acute nontraumatic kidney injury ([address]  Active Problems:    CKD (chronic kidney disease) stage 4, GFR 15-29 ml/min (HCC)    Diabetes mellitus, type 2 (HCC)    HTN (hypertension), benign    Acute kidney injury ([address]    Vomiting and diarrhea    Chronic diastolic congestive heart failure ([facility]    Immunosuppression ([facility]  Resolved Problems:    * No resolved hospital problems.  *  Sedation due to narcotics     PLAN:    `1.  He has tolerated his iv fluids pretty well so far but we can decrease need controlled  Bilateral lower extremities are negative  Monitor for D-dimer  April 16, 2021  Maintain on remdesivir  Maintained on steroids  O2 needs appear to be slightly less. Decadron will stay so we can add low-dose Lantus. Continue to monitor creatinine which is state of  Patient's OxyIR has been completely discontinued  D-dimer noted. April 17, 2021  Maintaining on Decadron and remdesivir. He appears to be more somnolent. Gases did not reveal significant hypoxemia. D-dimer is not high. IV fluids need to be resumed since he took his IV fluid catheter out and they have been off. Creatinine slightly higher. Discussed with nursing staff to please let pulmonary know  if he starts to refuse to wear his BiPAP   April 18, 2021  Long discussion with his wife. Likely the steroids are making him slightly emotional.  He states he would like to go home before he has a catastrophic event! Assured him that his labs look better and we have 24 to 48 hours that we should be able to discharge him. Patient was immunized completely for COVID-19 in the month of January and February  Follow-up D-dimer today  Over a lot better. Slight hyperglycemia from steroids. We can lower his steroids      Diet: DIET GENERAL; Low Potassium  Dietary Nutrition Supplements: Diabetic Oral Supplement  Code Status: Full Code  PT/OT Eval Status:   Order  DVT Prophylaxis:   In place  Recommended disposition at discharge:   Home    +++++++++++++++++++++++++++++++++++++++++++++++++  Mellisa Abad MD   [facility].  +++++++++++++++++++++++++++++++++++++++++++++++++  NOTE: This report was transcribed using voice recognition software. Every effort was made to ensure accuracy; however, inadvertent computerized transcription errors may be present. 9

## 2021-04-18 NOTE — PROGRESS NOTES
IPAP: 12 cmH20  CPAP/EPAP: 6 cmH2O     CURRENT MEDS :  Scheduled Meds:   [START ON 4/19/2021] dexamethasone  4 mg Oral Daily    hydrALAZINE  50 mg Oral 3 times per day    remdesivir IVPB  100 mg Intravenous Q24H    sodium bicarbonate  1,300 mg Oral BID    isosorbide mononitrate  60 mg Oral BID    aspirin  81 mg Oral Daily    metoprolol succinate  50 mg Oral Nightly    mycophenolate  1,000 mg Oral BID    tacrolimus  2 mg Oral BID    tiZANidine  4 mg Oral Daily    sodium chloride flush  5-40 mL Intravenous 2 times per day    insulin lispro  0-6 Units Subcutaneous TID WC    insulin lispro  0-3 Units Subcutaneous Nightly    [Held by provider] predniSONE  5 mg Oral Daily    atorvastatin  10 mg Oral Nightly    heparin (porcine)  5,000 Units Subcutaneous Q8H       Physical Exam:  General Appearance: appears comfortable in no acute distress. Lethargic but arousable  HEENT: Normocephalic atraumatic without obvious abnormality   Neck: Lips, mucosa, and tongue normal.  Supple, symmetrical, trachea midline, no adenopathy;thyroid:  no enlargement/tenderness/nodules or JVD. Lung: Breath sounds CTA. Respirations   unlabored. Symmetrical expansion. Heart: RRR, normal S1, S2. No MRG  Abdomen: Soft, NT, ND. BS present x 4 quadrants. No bruit or organomegaly. Extremities: Pedal pulses 2+ symmetric b/l. Extremities normal, no cyanosis, clubbing, or edema. Musculokeletal: No joint swelling, no muscle tenderness. ROM normal in all joints of extremities. Neurologic: Cranial nerves II through XII are grossly intact  . Pertinent/ New Labs and Imaging Studies     Imaging Personally Reviewed:  FINDINGS:   Exam is limited without intravenous contrast.  Small pericardial effusion. Heart size is normal.  No significant pleural effusion.  Scattered vascular   calcifications.  Normal-size lymph nodes without adenopathy by size criteria. Visualized portions of the upper abdomen demonstrate bilateral renal atrophy. 5 mm nonobstructing left renal stone.  Postoperative changes of previous   gastric bypass.  Cholecystectomy clips.       No pneumothorax.  There are areas of linear atelectasis in the lower lung   zones.  Multifocal airspace and ground-glass opacities are otherwise   scattered in each lung relatively diffusely and slightly greater towards the   left.  These demonstrate no significant change compared to the recent chest   radiograph.  Degenerative changes are scattered in the spine.           Impression   Bilateral parenchymal infiltrates.  Again, findings are nonspecific and could   be related to pulmonary edema or multifocal pneumonia.  Developing ARDS not   excluded.  Continued follow-up recommended.       Small pericardial effusion.               Echo:      Summary   Normal left ventricular chamber size.   Normal left ventricular systolic function.   Visually estimated LVEF is 60-65 %.   No wall motion abnormalities.   Stage I diastolic function.   Normal right ventricle structure and function.   No significant valvular abnormalities.        RHC (9/2/2020) RA: 13. RV: 91/14. PA: 91/25 with a mean of 48. PCW: 22. CO/CI (Jack): 5.6/2.5. CO/CI (thermodilution): 4.9/2.1 Conclusions: 1. Moderately elevated right and left filling pressures. 2. Severe pulmonary hypertension, predominantly precapillary. PVR approximately 5-5.5. 3. Gabrielle 5 consistent with normal RV function. Refer to pulmonary hypertension center at [facility] study 2018  Severe REM predominant OLINDA  AHI 8.6.         Labs:  Lab Results   Component Value Date    WBC 7.1 04/18/2021    HGB 9.9 04/18/2021    HCT 31.9 04/18/2021    MCV 81.2 04/18/2021    MCH 25.2 04/18/2021    MCHC 31.0 04/18/2021    RDW 15.5 04/18/2021     04/18/2021    MPV 9.2 04/18/2021     Lab Results   Component Value Date     04/18/2021    K 4.8 04/18/2021    K 4.8 04/18/2021     04/18/2021    CO2 23 04/18/2021    BUN 82 04/18/2021

## 2021-04-18 NOTE — PROGRESS NOTES
[address] [place] [address] [facility]  Progress Note    Face to face encounter   Chief Complaint   Patient presents with    Fatigue     symptoms x2-3 days    Emesis    Diarrhea    Anorexia       SUBJECTIVE:  Patient is tolerating medications. No reported adverse drug reactions. No nausea, vomiting, diarrhea. Confused and slow to respond. + sob. Little cough. No pain. No fevers overnight. Review of systems:  As stated above in the chief complaint, otherwise negative. Medications:  Scheduled Meds:   hydrALAZINE  50 mg Oral 3 times per day    dexamethasone  4 mg Oral 2 times per day    remdesivir IVPB  100 mg Intravenous Q24H    sodium bicarbonate  1,300 mg Oral BID    isosorbide mononitrate  60 mg Oral BID    aspirin  81 mg Oral Daily    metoprolol succinate  50 mg Oral Nightly    mycophenolate  1,000 mg Oral BID    tacrolimus  2 mg Oral BID    tiZANidine  4 mg Oral Daily    sodium chloride flush  5-40 mL Intravenous 2 times per day    insulin lispro  0-6 Units Subcutaneous TID WC    insulin lispro  0-3 Units Subcutaneous Nightly    [Held by provider] predniSONE  5 mg Oral Daily    atorvastatin  10 mg Oral Nightly    heparin (porcine)  5,000 Units Subcutaneous Q8H     Continuous Infusions:   lactated ringers 75 mL/hr at 21 1308    sodium chloride 25 mL (04/15/21 1355)    dextrose       PRN Meds:naloxone, sodium chloride, sodium chloride flush, sodium chloride, acetaminophen **OR** acetaminophen, glucose, dextrose, glucagon (rDNA), dextrose, polyethylene glycol, trimethobenzamide    OBJECTIVE:  BP (!) 150/70   Pulse 78   Temp 96.8 °F (36 °C) (Axillary)   Resp 22   Ht 5' 10\" (1.778 m)   Wt 237 lb 3.2 oz (107.6 kg)   SpO2 95%   BMI 34.03 kg/m²   Temp  Av.8 °F (36.6 °C)  Min: 96.8 °F (36 °C)  Max: 98.4 °F (36.9 °C)  Constitutional: The patient is awake, alert, oriented only to person and place. very slow to respond. lying in bed in NAD. Skin: Warm and dry. No rashes were noted. HEENT: Round and reactive pupils. Moist mucous membranes. No ulcerations or thrush. Neck: Supple to movements. Chest: No use of accessory muscles to breathe. Symmetrical expansion. Diminished b/l with crackles bases  Cardiovascular: S1 and S2 are rhythmic and regular. No murmurs appreciated. Abdomen: Positive bowel sounds to auscultation. Benign to palpation. Extremities: No clubbing, no cyanosis, no edema.  Peripheral pulses intact  Lines: peripheral    Laboratory and Tests Review:  Lab Results   Component Value Date    WBC 7.1 04/18/2021    WBC 5.9 04/17/2021    WBC 5.6 04/16/2021    HGB 9.9 (L) 04/18/2021    HCT 31.9 (L) 04/18/2021    MCV 81.2 04/18/2021     04/18/2021     Lab Results   Component Value Date    NEUTROABS 2.91 04/11/2021    NEUTROABS 2.57 04/09/2021    NEUTROABS 2.82 05/29/2019     No results found for: [facility]  Lab Results   Component Value Date    ALT 16 04/18/2021    AST 35 04/18/2021    ALKPHOS 47 04/18/2021    BILITOT 0.4 04/18/2021     Lab Results   Component Value Date     04/18/2021    K 4.8 04/18/2021    K 4.8 04/18/2021     04/18/2021    CO2 23 04/18/2021    BUN 82 04/18/2021    CREATININE 2.8 04/18/2021    CREATININE 3.4 04/17/2021    CREATININE 3.3 04/16/2021    GFRAA 27 04/18/2021    LABGLOM 27 04/18/2021    GLUCOSE 179 04/18/2021    GLUCOSE 124 02/21/2012    PROT 5.2 04/18/2021    LABALBU 3.0 04/18/2021    LABALBU 3.8 07/05/2011    CALCIUM 8.6 04/18/2021    BILITOT 0.4 04/18/2021    ALKPHOS 47 04/18/2021    AST 35 04/18/2021    ALT 16 04/18/2021     Lab Results   Component Value Date    CRP 4.4 (H) 04/17/2021    CRP 5.5 (H) 04/16/2021    CRP 8.6 (H) 04/15/2021     Lab Results   Component Value Date    SEDRATE 20 (H) 04/14/2021    SEDRATE 48 (H) 06/03/2013    SEDRATE 40 (H) 05/27/2013      SARS-COV-2 biomarkers    Recent Labs     04/16/21  1315 04/17/21  0510 04/18/21  0310   CRP 5.5* 4.4*  --    DDIMER 466 433  --    AST 23 26 35   ALT 10 12 16     Lab Results   Component Value Date    CHOL 128 06/03/2014    TRIG 50 06/03/2014    HDL 51 06/03/2014    LDLCALC 67 06/03/2014    LABVLDL 10 06/03/2014     Lab Results   Component Value Date/Time    VITD25 53 04/13/2021 11:00 AM      IGG- 593  IGM- 33     Radiology:  Renal u/s   Impression:        1.  The native kidneys are atrophied.  No renal masses or cysts in this area. No hydronephrosis. 2.  Right lower quadrant transplant kidney.  14 mm cystic structure appears   to be present in the lower pole.  No hydronephrosis or perinephric fluid   collection.  No abnormal vascularity given the limitations described. RECOMMENDATIONS:   Somewhat limited evaluation of the right lower quadrant transplant kidney. No gross abnormality.  Suggest complete repeat transplant kidney ultrasound   in the next 3-5 days or sooner if clinical presentation continues to worsen.           Microbiology:   Lab Results   Component Value Date    BC 5 Days- no growth 04/27/2017     Lab Results   Component Value Date    BLOODCULT2 5 Days- no growth 04/27/2017     No results found for: WNDABS  Smear, Respiratory   Date Value Ref Range Status   04/27/2017   Final    Group 6: <25 PMN's/LPF and <25 Epithelial cells/LPF  Few Polymorphonuclear leukocytes  Epithelial cells not seen  No organisms seen       No results found for: MPNEUMO, CLAMYDCU, LABLEGI, AFBCX, FUNGSM, LABFUNG  CULTURE, RESPIRATORY   Date Value Ref Range Status   04/27/2017 Oral Pharyngeal [place] reduced  Final     No results found for: CXCATHTIP  No results found for: BFCS  No results found for: CXSURG  Urine Culture, Routine   Date Value Ref Range Status   04/27/2017 Growth not present  Final   07/27/2015 <10,000 CFU/mL  Gram positive cocci    Final   06/02/2014 <10,000 CFU/mL  Mixed gram positive organisms  Final     MRSA Culture Only   Date Value Ref Range Status   04/27/2017 Methicillin resistant Staph aureus not isolated  Final     ASSESSMENT:  · Covid pneumonia-some improvement  · Immunocompromised host status post kidney transplant  · Chronic kidney injury  · Narcotic overdose in house    PLAN:  · Completing 5 days remdesivir today  · Fungitell- negative  · galactomannan neg  · Monitor labs     April [place] Sunshine Denson  10:27 AM  4/18/2021   Pt seen and examined. Above discussed agree with advanced practice nurse. Labs, cultures, and radiographs reviewed. Face to Face encounter occurred. Changes made as necessary.      Rachana Paul MD

## 2021-04-18 NOTE — PROGRESS NOTES
recorded. Intake/Output Summary (Last 24 hours) at 4/18/2021 1339  Last data filed at 4/17/2021 2229  Gross per 24 hour   Intake 1748 ml   Output 1150 ml   Net 598 ml     CBC:   Recent Labs     04/16/21  1315 04/17/21  0510 04/18/21  0310   WBC 5.6 5.9 7.1   HGB 10.0* 9.3* 9.9*    263 307     BMP:    Recent Labs     04/16/21  1315 04/17/21  0510 04/18/21  0310    138 139   K 4.9 4.7  4.7 4.8  4.8    105 104   CO2 20* 21* 23   BUN 73* 78* 82*   CREATININE 3.3* 3.4* 2.8*   GLUCOSE 352* 255* 179*     Hepatic:   Recent Labs     04/16/21  1315 04/17/21  0510 04/18/21  0310   AST 23 26 35   ALT 10 12 16   BILITOT 0.3 0.4 0.4   ALKPHOS 41 50 47     Troponin:   No results for input(s): TROPONINI in the last 72 hours. BNP: No results for input(s): BNP in the last 72 hours. Lipids: No results for input(s): CHOL, HDL in the last 72 hours. Invalid input(s): LDLCALCU  ABGs: No results found for: PHART, PO2ART, CDU6LJJ  INR: No results for input(s): INR in the last 72 hours. -----------------------------------------------------------------  RAD:   EXAMINATION:   RETROPERITONEAL ULTRASOUND OF THE KIDNEYS AND URINARY BLADDER       4/11/2021       COMPARISON:   None       HISTORY:   ORDERING SYSTEM PROVIDED HISTORY: arf   TECHNOLOGIST PROVIDED HISTORY:   Reason for exam:->arf   What reading provider will be dictating this exam?->CRC       FINDINGS:       Kidneys:       The right kidney measures 8.1 cm in length and the left kidney measures 7.9   cm in length.       Kidneys demonstrate increased cortical echogenicity.  No evidence of   hydronephrosis or intrarenal stones.           Bladder:       Unremarkable appearance of the bladder.  Bilateral ureteral jets are noted.           Impression   Atrophic and echogenic kidneys compatible with chronic renal parenchymal   disease.       Unremarkable bladder.      EXAMINATION:   ONE XRAY VIEW OF THE CHEST       4/9/2021 8:59 pm       COMPARISON:   05/29/2019     HISTORY:   ORDERING SYSTEM PROVIDED HISTORY: Fever   TECHNOLOGIST PROVIDED HISTORY:   Reason for exam:->Fever       FINDINGS:   Possible left costophrenic angle opacity.  There is no effusion or   pneumothorax. The cardiomediastinal silhouette is without acute process. The   osseous structures are without acute process.           Impression   Possible left costophrenic angle opacity. 4/13/21 2 D ECHO       Summary    Normal left ventricular chamber size.    Normal left ventricular systolic function.    Visually estimated LVEF is 60-65 %.    No wall motion abnormalities.    Stage I diastolic function.    Normal right ventricle structure and function.    No significant valvular abnormalities. Objective:   Vitals: BP (!) 140/68   Pulse 70   Temp 96.8 °F (36 °C) (Axillary)   Resp 20   Ht 5' 10\" (1.778 m)   Wt 237 lb 3.2 oz (107.6 kg)   SpO2 95%   BMI 34.03 kg/m²      NAD  Confused  No edema  Overall exam limited by COVID19    Assessment:   Patient Active Problem List:     GERD (gastroesophageal reflux disease)     CKD (chronic kidney disease) stage 4, GFR 15-29 ml/min (HCC)     Mixed hyperlipidemia     Diabetes mellitus, type 2 (HCC)     HTN (hypertension), benign     Junctional bradycardia     Anemia in stage 3 chronic kidney disease     CAD (coronary artery disease), native coronary artery     Acute CHF (congestive heart failure) (HCC)     Chest pain     Respiratory failure (HCC)     ALYSON (acute kidney injury) (HCC)     Vomiting and diarrhea     Acute nontraumatic kidney injury (HCC)     Chronic diastolic congestive heart failure (HCC)     H/O kidney transplant     Pulmonary hypertension ([facility] [address]     Immunosuppression ([facility] [address]    Plan:   1- CKD5D/ESRD with RRT as LRD Renal Allograft placed 12/12/14 with a baseline serum cr of 2.1-2.7mg/dl and an e-GFR=30-28ml/min. His current creatinine is improving, slightly above baseline  UA SG 1.020, protein 100mg/dl, blood (-).  Ni (-), LE (-)  Repeat tacrolimus level pending from Friday. Cont  IV fluids  Follow renal function    2-Anemia in CKD-HgB at/near goal >/=10  PLAN:1. Give ADAM as needed  2. Follow H/H.    3- HTN with CKD 5/ESRD  Follow BP on decreased dose of hydralazine at 50mg tid    4- Hyperkalemia in the setting of the DM2 with the CNI and possible hyporenin hypoaldo-possible exacerbated by increased cell turnover in the setting of the SARs-CoV-2    Follow potassium and treat medically further as needed    5- Sec HPTH due to the CAN/CKD with Hypocalcemia  PO4 WNL    Vit D 53  Ca+ 8.8,  PO4 4.2  PLAN:1. Follow     6-Non Anion Gap Metabolic Acidosis  HCO3 at goal of HCO3>/=22  PLAN:1. Continue the present dose of  bicarbonate    7- Hyponatremia  Na+ WNL  PLAN:1. Continue to monitor        Thank you  for allowing us to participate in care of Socorro Campos, CHAI-CNP  4/17/2021  11:30 AM    ===============    Renal Attending Addendum  Seen via window today - resting.   Discussed with primary nurse  Full exam deferred due to [address]     Renal function starting to improve with improving urine output   Continue IV fluids   Repeat tacrolimus level pending - may need to decrease dose   Renal ultrasound from April 17 with possible cystic structure in transplanted kidney and recommendation of repeat renal ultrasound - will repeat at this time          Noy Ribera MD

## 2021-04-19 ENCOUNTER — APPOINTMENT (OUTPATIENT)
Dept: ULTRASOUND IMAGING | Age: 71
DRG: 698 | End: 2021-04-19
Payer: MEDICARE

## 2021-04-19 PROBLEM — J12.82 PNEUMONIA DUE TO COVID-19 VIRUS: Status: ACTIVE | Noted: 2021-04-19

## 2021-04-19 PROBLEM — U07.1 PNEUMONIA DUE TO COVID-19 VIRUS: Status: ACTIVE | Noted: 2021-04-19

## 2021-04-19 LAB
ALBUMIN SERPL-MCNC: 3.1 G/DL (ref 3.5–5.2)
ALP BLD-CCNC: 51 U/L (ref 40–129)
ALT SERPL-CCNC: 18 U/L (ref 0–40)
ANION GAP SERPL CALCULATED.3IONS-SCNC: 12 MMOL/L (ref 7–16)
AST SERPL-CCNC: 31 U/L (ref 0–39)
BILIRUB SERPL-MCNC: 0.5 MG/DL (ref 0–1.2)
BUN BLDV-MCNC: 76 MG/DL (ref 8–23)
CALCIUM SERPL-MCNC: 8.9 MG/DL (ref 8.6–10.2)
CHLORIDE BLD-SCNC: 105 MMOL/L (ref 98–107)
CO2: 24 MMOL/L (ref 22–29)
CREAT SERPL-MCNC: 2.4 MG/DL (ref 0.7–1.2)
GFR AFRICAN AMERICAN: 32
GFR NON-AFRICAN AMERICAN: 32 ML/MIN/1.73
GLUCOSE BLD-MCNC: 175 MG/DL (ref 74–99)
HCT VFR BLD CALC: 32 % (ref 37–54)
HEMOGLOBIN: 10.1 G/DL (ref 12.5–16.5)
MAGNESIUM: 2 MG/DL (ref 1.6–2.6)
MCH RBC QN AUTO: 25.6 PG (ref 26–35)
MCHC RBC AUTO-ENTMCNC: 31.6 % (ref 32–34.5)
MCV RBC AUTO: 81.2 FL (ref 80–99.9)
METER GLUCOSE: 196 MG/DL (ref 74–99)
METER GLUCOSE: 204 MG/DL (ref 74–99)
METER GLUCOSE: 205 MG/DL (ref 74–99)
METER GLUCOSE: 206 MG/DL (ref 74–99)
PDW BLD-RTO: 15.5 FL (ref 11.5–15)
PHOSPHORUS: 2.8 MG/DL (ref 2.5–4.5)
PLATELET # BLD: 336 E9/L (ref 130–450)
PMV BLD AUTO: 9.3 FL (ref 7–12)
POTASSIUM REFLEX MAGNESIUM: 4.8 MMOL/L (ref 3.5–5)
POTASSIUM SERPL-SCNC: 4.8 MMOL/L (ref 3.5–5)
RBC # BLD: 3.94 E12/L (ref 3.8–5.8)
SODIUM BLD-SCNC: 141 MMOL/L (ref 132–146)
TOTAL PROTEIN: 5.4 G/DL (ref 6.4–8.3)
WBC # BLD: 9.3 E9/L (ref 4.5–11.5)

## 2021-04-19 PROCEDURE — 6360000002 HC RX W HCPCS: Performed by: PHYSICIAN ASSISTANT

## 2021-04-19 PROCEDURE — 6360000002 HC RX W HCPCS: Performed by: INTERNAL MEDICINE

## 2021-04-19 PROCEDURE — 2580000003 HC RX 258: Performed by: PHYSICIAN ASSISTANT

## 2021-04-19 PROCEDURE — 6370000000 HC RX 637 (ALT 250 FOR IP): Performed by: INTERNAL MEDICINE

## 2021-04-19 PROCEDURE — 6370000000 HC RX 637 (ALT 250 FOR IP): Performed by: NURSE PRACTITIONER

## 2021-04-19 PROCEDURE — 83735 ASSAY OF MAGNESIUM: CPT

## 2021-04-19 PROCEDURE — 84100 ASSAY OF PHOSPHORUS: CPT

## 2021-04-19 PROCEDURE — 76776 US EXAM K TRANSPL W/DOPPLER: CPT

## 2021-04-19 PROCEDURE — 6370000000 HC RX 637 (ALT 250 FOR IP): Performed by: PHYSICIAN ASSISTANT

## 2021-04-19 PROCEDURE — 2580000003 HC RX 258: Performed by: INTERNAL MEDICINE

## 2021-04-19 PROCEDURE — 80048 BASIC METABOLIC PNL TOTAL CA: CPT

## 2021-04-19 PROCEDURE — 2060000000 HC ICU INTERMEDIATE R&B

## 2021-04-19 PROCEDURE — 80053 COMPREHEN METABOLIC PANEL: CPT

## 2021-04-19 PROCEDURE — 93975 VASCULAR STUDY: CPT

## 2021-04-19 PROCEDURE — 85027 COMPLETE CBC AUTOMATED: CPT

## 2021-04-19 PROCEDURE — 94660 CPAP INITIATION&MGMT: CPT

## 2021-04-19 PROCEDURE — 82962 GLUCOSE BLOOD TEST: CPT

## 2021-04-19 PROCEDURE — 2700000000 HC OXYGEN THERAPY PER DAY

## 2021-04-19 PROCEDURE — 36415 COLL VENOUS BLD VENIPUNCTURE: CPT

## 2021-04-19 PROCEDURE — 97161 PT EVAL LOW COMPLEX 20 MIN: CPT

## 2021-04-19 RX ADMIN — INSULIN LISPRO 1 UNITS: 100 INJECTION, SOLUTION INTRAVENOUS; SUBCUTANEOUS at 21:21

## 2021-04-19 RX ADMIN — HEPARIN SODIUM 5000 UNITS: 10000 INJECTION INTRAVENOUS; SUBCUTANEOUS at 14:25

## 2021-04-19 RX ADMIN — Medication 10 ML: at 07:54

## 2021-04-19 RX ADMIN — ISOSORBIDE MONONITRATE 60 MG: 60 TABLET, EXTENDED RELEASE ORAL at 07:55

## 2021-04-19 RX ADMIN — SODIUM CHLORIDE, POTASSIUM CHLORIDE, SODIUM LACTATE AND CALCIUM CHLORIDE: 600; 310; 30; 20 INJECTION, SOLUTION INTRAVENOUS at 00:53

## 2021-04-19 RX ADMIN — ISOSORBIDE MONONITRATE 60 MG: 60 TABLET, EXTENDED RELEASE ORAL at 21:16

## 2021-04-19 RX ADMIN — INSULIN LISPRO 2 UNITS: 100 INJECTION, SOLUTION INTRAVENOUS; SUBCUTANEOUS at 07:55

## 2021-04-19 RX ADMIN — HEPARIN SODIUM 5000 UNITS: 10000 INJECTION INTRAVENOUS; SUBCUTANEOUS at 05:49

## 2021-04-19 RX ADMIN — INSULIN LISPRO 2 UNITS: 100 INJECTION, SOLUTION INTRAVENOUS; SUBCUTANEOUS at 11:34

## 2021-04-19 RX ADMIN — ASPIRIN 81 MG: 81 TABLET, COATED ORAL at 07:55

## 2021-04-19 RX ADMIN — ATORVASTATIN CALCIUM 10 MG: 10 TABLET, FILM COATED ORAL at 21:16

## 2021-04-19 RX ADMIN — MYCOPHENOLATE MOFETIL 1000 MG: 250 CAPSULE ORAL at 07:54

## 2021-04-19 RX ADMIN — HEPARIN SODIUM 5000 UNITS: 10000 INJECTION INTRAVENOUS; SUBCUTANEOUS at 21:22

## 2021-04-19 RX ADMIN — TIZANIDINE 4 MG: 4 TABLET ORAL at 07:55

## 2021-04-19 RX ADMIN — METOPROLOL SUCCINATE 50 MG: 25 TABLET, EXTENDED RELEASE ORAL at 21:16

## 2021-04-19 RX ADMIN — HYDRALAZINE HYDROCHLORIDE 50 MG: 50 TABLET ORAL at 05:51

## 2021-04-19 RX ADMIN — SODIUM CHLORIDE, POTASSIUM CHLORIDE, SODIUM LACTATE AND CALCIUM CHLORIDE: 600; 310; 30; 20 INJECTION, SOLUTION INTRAVENOUS at 14:09

## 2021-04-19 RX ADMIN — SODIUM BICARBONATE 1300 MG: 650 TABLET ORAL at 07:55

## 2021-04-19 RX ADMIN — SODIUM BICARBONATE 1300 MG: 650 TABLET ORAL at 21:16

## 2021-04-19 RX ADMIN — HYDRALAZINE HYDROCHLORIDE 75 MG: 50 TABLET, FILM COATED ORAL at 14:08

## 2021-04-19 RX ADMIN — MYCOPHENOLATE MOFETIL 1000 MG: 250 CAPSULE ORAL at 21:16

## 2021-04-19 RX ADMIN — INSULIN LISPRO 1 UNITS: 100 INJECTION, SOLUTION INTRAVENOUS; SUBCUTANEOUS at 16:42

## 2021-04-19 RX ADMIN — TACROLIMUS 2 MG: 1 CAPSULE ORAL at 21:17

## 2021-04-19 RX ADMIN — TACROLIMUS 2 MG: 1 CAPSULE ORAL at 07:54

## 2021-04-19 RX ADMIN — DEXAMETHASONE 4 MG: 4 TABLET ORAL at 07:55

## 2021-04-19 RX ADMIN — HYDRALAZINE HYDROCHLORIDE 75 MG: 50 TABLET, FILM COATED ORAL at 21:16

## 2021-04-19 ASSESSMENT — PAIN SCALES - GENERAL
PAINLEVEL_OUTOF10: 0

## 2021-04-19 NOTE — PROGRESS NOTES
[address] [place] [address] [facility]  Progress Note    Face to face encounter   Chief Complaint   Patient presents with    Fatigue     symptoms x2-3 days    Emesis    Diarrhea    Anorexia       SUBJECTIVE:  Patient is tolerating medications. No N/V/D. Afebrile overnight  2LNC. No cough. Minimal dyspnea. Says he feels good today. Review of systems:  As stated above in the chief complaint, otherwise negative. Medications:  Scheduled Meds:   dexamethasone  4 mg Oral Daily    hydrALAZINE  50 mg Oral 3 times per day    sodium bicarbonate  1,300 mg Oral BID    isosorbide mononitrate  60 mg Oral BID    aspirin  81 mg Oral Daily    metoprolol succinate  50 mg Oral Nightly    mycophenolate  1,000 mg Oral BID    tacrolimus  2 mg Oral BID    tiZANidine  4 mg Oral Daily    sodium chloride flush  5-40 mL Intravenous 2 times per day    insulin lispro  0-6 Units Subcutaneous TID WC    insulin lispro  0-3 Units Subcutaneous Nightly    [Held by provider] predniSONE  5 mg Oral Daily    atorvastatin  10 mg Oral Nightly    heparin (porcine)  5,000 Units Subcutaneous Q8H     Continuous Infusions:   lactated ringers 75 mL/hr at 21 0053    sodium chloride 25 mL (04/15/21 1355)    dextrose       PRN Meds:naloxone, sodium chloride, sodium chloride flush, sodium chloride, acetaminophen **OR** acetaminophen, glucose, dextrose, glucagon (rDNA), dextrose, polyethylene glycol, trimethobenzamide    OBJECTIVE:  BP (!) 175/90   Pulse 90   Temp 98.9 °F (37.2 °C) (Oral)   Resp 22   Ht 5' 10\" (1.778 m)   Wt 237 lb 3.2 oz (107.6 kg)   SpO2 93%   BMI 34.03 kg/m²   Temp  Av.8 °F (36.6 °C)  Min: 96.8 °F (36 °C)  Max: 98.9 °F (37.2 °C)  Constitutional: The patient is awake, alert. Speech is slow but appropriate. No distress. Skin: Warm and dry. No rashes were noted. HEENT: Round and reactive pupils. Moist mucous membranes. No ulcerations or thrush. Neck: Supple to movements.    Chest: No use of accessory muscles to breathe. diminished bibasilar. 2LNC  Cardiovascular: S1 and S2 are rhythmic and regular. No murmurs appreciated. Abdomen: Positive bowel sounds to auscultation. Benign to palpation. Extremities: No edema.  Peripheral pulses intact  Lines: peripheral    Laboratory and Tests Review:  Lab Results   Component Value Date    WBC 9.3 04/19/2021    WBC 7.1 04/18/2021    WBC 5.9 04/17/2021    HGB 10.1 (L) 04/19/2021    HCT 32.0 (L) 04/19/2021    MCV 81.2 04/19/2021     04/19/2021     Lab Results   Component Value Date    NEUTROABS 2.91 04/11/2021    NEUTROABS 2.57 04/09/2021    NEUTROABS 2.82 05/29/2019     No results found for: [facility]  Lab Results   Component Value Date    ALT 18 04/19/2021    AST 31 04/19/2021    ALKPHOS 51 04/19/2021    BILITOT 0.5 04/19/2021     Lab Results   Component Value Date     04/19/2021    K 4.8 04/19/2021    K 4.8 04/19/2021     04/19/2021    CO2 24 04/19/2021    BUN 76 04/19/2021    CREATININE 2.4 04/19/2021    CREATININE 2.8 04/18/2021    CREATININE 3.4 04/17/2021    GFRAA 32 04/19/2021    LABGLOM 32 04/19/2021    GLUCOSE 175 04/19/2021    GLUCOSE 124 02/21/2012    PROT 5.4 04/19/2021    LABALBU 3.1 04/19/2021    LABALBU 3.8 07/05/2011    CALCIUM 8.9 04/19/2021    BILITOT 0.5 04/19/2021    ALKPHOS 51 04/19/2021    AST 31 04/19/2021    ALT 18 04/19/2021     Lab Results   Component Value Date    CRP 4.4 (H) 04/17/2021    CRP 5.5 (H) 04/16/2021    CRP 8.6 (H) 04/15/2021     Lab Results   Component Value Date    SEDRATE 20 (H) 04/14/2021    SEDRATE 48 (H) 06/03/2013    SEDRATE 40 (H) 05/27/2013      SARS-COV-2 biomarkers    Recent Labs     04/16/21  1315 04/17/21  0510 04/18/21  0310 04/19/21  0210   CRP 5.5* 4.4*  --   --    PROCAL  --   --  0.34*  --    DDIMER 466 433  --   --    AST 23 26 35 31   ALT 10 12 16 18     Lab Results   Component Value Date    CHOL 128 06/03/2014    TRIG 50 06/03/2014    HDL 51 06/03/2014    LDLCALC 67 06/03/2014 LABVLDL 10 06/03/2014     Lab Results   Component Value Date/Time    VITD25 53 04/13/2021 11:00 AM      IGG- 593  IGM- 33     Radiology:  Renal u/s   Impression:        1.  The native kidneys are atrophied.  No renal masses or cysts in this area. No hydronephrosis. 2.  Right lower quadrant transplant kidney.  14 mm cystic structure appears   to be present in the lower pole.  No hydronephrosis or perinephric fluid   collection.  No abnormal vascularity given the limitations described. RECOMMENDATIONS:   Somewhat limited evaluation of the right lower quadrant transplant kidney. No gross abnormality.  Suggest complete repeat transplant kidney ultrasound   in the next 3-5 days or sooner if clinical presentation continues to worsen.           Microbiology:   Lab Results   Component Value Date    BC 5 Days- no growth 04/27/2017     Lab Results   Component Value Date    BLOODCULT2 5 Days- no growth 04/27/2017     No results found for: WNDABS  Smear, Respiratory   Date Value Ref Range Status   04/27/2017   Final    Group 6: <25 PMN's/LPF and <25 Epithelial cells/LPF  Few Polymorphonuclear leukocytes  Epithelial cells not seen  No organisms seen       No results found for: MPNEUMO, CLAMYDCU, LABLEGI, AFBCX, FUNGSM, LABFUNG  CULTURE, RESPIRATORY   Date Value Ref Range Status   04/27/2017 Oral Pharyngeal [place] reduced  Final     No results found for: CXCATHTIP  No results found for: BFCS  No results found for: CXSURG  Urine Culture, Routine   Date Value Ref Range Status   04/27/2017 Growth not present  Final   07/27/2015 <10,000 CFU/mL  Gram positive cocci    Final   06/02/2014 <10,000 CFU/mL  Mixed gram positive organisms  Final     MRSA Culture Only   Date Value Ref Range Status   04/27/2017 Methicillin resistant Staph aureus not isolated  Final     ASSESSMENT:  · Covid pneumonia-some improvement  · Immunocompromised host status post kidney transplant  · Chronic kidney injury - labs improving   · Narcotic overdose in house    PLAN:  · Completed 5 days of Remdesivir  · Continues on Decadron   · Supportive care  · Monitor labs     Loraine Cargo  9:51 AM  4/19/2021       Pt seen and examined. Above discussed agree with advanced practice nurse. Labs, cultures, and radiographs reviewed. Face to Face encounter occurred. Changes made as necessary.      Michael Rodriguez MD

## 2021-04-19 NOTE — CARE COORDINATION
Attempted to call pt via phone to discuss plan of care and discharge planning- no answer. Call placed to pt's wife- no answer. Per previous note from care coordination- pt resides at home with his wife and discharge plan remains for him to transition back home. Pt's wife previously chose [employer] for home O2. Call placed to [facility] [place] [facility] ([phone])- referral made. [facility] [place] will need notified when pt is going to be discharged so that O2 tank can be delivered. Pulse ox testing and Home O2 orders needed.

## 2021-04-19 NOTE — PROGRESS NOTES
transplant kidney. No gross abnormality. Suggest complete repeat transplant kidney ultrasound   in the next 3-5 days or sooner if clinical presentation continues to worsen. CT HEAD WO CONTRAST   Final Result   No acute intracranial abnormality. There is age-appropriate atrophy and small-vessel ischemic disease. There is mucosal thickening in the right maxillary and ethmoid sinuses. US DUP LOWER EXTREMITIES BILATERAL VENOUS   Final Result   No evidence of DVT in either lower extremity. XR CHEST PORTABLE   Final Result   There is no interval change in the multifocal bilateral pneumonia when   compared with the prior CT scan of 04/13/2021. CT CHEST WO CONTRAST   Final Result   Bilateral parenchymal infiltrates. Again, findings are nonspecific and could   be related to pulmonary edema or multifocal pneumonia. Developing ARDS not   excluded. Continued follow-up recommended. Small pericardial effusion. XR CHEST PORTABLE   Final Result   New moderate-to-severe diffuse interstitial and alveolar opacities throughout   the lungs bilaterally. The appearance is nonspecific and may be due to   pulmonary edema, ARDS, or multilobar pneumonia, including an active viral   pneumonitis. Cardiomegaly. No pneumothorax or significant pleural effusion. US RETROPERITONEAL COMPLETE   Final Result   Atrophic and echogenic kidneys compatible with chronic renal parenchymal   disease. Unremarkable bladder. XR CHEST PORTABLE   Final Result   Possible left costophrenic angle opacity.          US RETROPERITONEAL COMPLETE    (Results Pending)       Assessment    Principal Problem:    Acute nontraumatic kidney injury ([address]  Active Problems:    CKD (chronic kidney disease) stage 4, GFR 15-29 ml/min (HCC)    Diabetes mellitus, type 2 (HCC)    HTN (hypertension), benign    Acute kidney injury ([address]    Vomiting and diarrhea    Chronic diastolic congestive heart failure ([facility] [address]    H/O kidney transplant    Immunosuppression ([facility] [address]    Pneumonia due to COVID-19 virus  Resolved Problems:    * No resolved hospital problems.  *      Plan:  77-year-old male history of kidney transplant admitted with ALYSON COVID-19 pneumonia    Completed remdesivir  Continue Decadron  Continue immunosuppressive medications for kidney transplant  ID, nephrology, pulmonology consult reviewed and appreciated    PT/OT  DVT PPx  DC planning anticipate discharge 24 to 48 hours plus or minus home O2    Electronically signed by Joe Corado MD on 4/19/2021 at 8:30 AM

## 2021-04-19 NOTE — PROGRESS NOTES
Graham Vega M.D.,[facility]  Reggie Vasquez D.O., F.A.C.OAnaisI., Dilcia Valenzuela M.D. Corrina Fermin M.D., Tello Leslie M.D. Ferdinand Mooney D.O. Daily Pulmonary Progress Note    Patient:  Montserrat Mullins 79 y.o. male MRN: [id number]     Date of Service: 4/19/2021      Synopsis     We are following patient for increased O2 needs with covid pneumonia    \"CC\" shortness of breath     Code status: full     Subjective      Patient was seen and examined. Sitting up in the bed. Awake and alert at his baseline . Remains on oxygen 2 L nasal cannula. Occasional cough no mucus. Review of Systems:   Constitutional: Denies fever, weight loss, night sweats, +weakness and fatigue  Skin: Denies pigmentation, dark lesions, and rashes   HEENT: Denies hearing loss, tinnitus, ear drainage, epistaxis, sore throat, and hoarseness. Cardiovascular: Denies palpitations, chest pain, and chest pressure. Respiratory: Intermittent cough.   Denies any shortness of breath  Gastrointestinal:  Has any nausea vomiting no abdominal pain  Genitourinary: Denies dysuria, frequency, urgency or hematuria  Musculoskeletal: Denies myalgias, muscle weakness, and bone pain  Neurological: Denies dizziness, vertigo, headache, and focal weakness  Psychological: Denies anxiety and depression  Endocrine: Denies heat intolerance and cold intolerance  Hematopoietic/Lymphatic: Denies bleeding problems and blood transfusions    24-hour events:  None     Objective   Vitals: BP (!) 170/89   Pulse 65   Temp 98.2 °F (36.8 °C) (Oral)   Resp 20   Ht 5' 10\" (1.778 m)   Wt 237 lb 3.2 oz (107.6 kg)   SpO2 95%   BMI 34.03 kg/m²     I/O:    Intake/Output Summary (Last 24 hours) at 4/19/2021 1631  Last data filed at 4/19/2021 1405  Gross per 24 hour   Intake 1034 ml   Output 1650 ml   Net -616 ml       Vent Information  Skin Assessment: Clean, dry, & intact  Vt Ordered: 500 mL  FiO2 : 40 %  SpO2: 95 %  SpO2/FiO2 ratio: 250  Mask Type: Full face mask  Mask Size: Medium       IPAP: 12 cmH20  CPAP/EPAP: 6 cmH2O     CURRENT MEDS :  Scheduled Meds:   hydrALAZINE  75 mg Oral 3 times per day    dexamethasone  4 mg Oral Daily    sodium bicarbonate  1,300 mg Oral BID    isosorbide mononitrate  60 mg Oral BID    aspirin  81 mg Oral Daily    metoprolol succinate  50 mg Oral Nightly    mycophenolate  1,000 mg Oral BID    tacrolimus  2 mg Oral BID    tiZANidine  4 mg Oral Daily    sodium chloride flush  5-40 mL Intravenous 2 times per day    insulin lispro  0-6 Units Subcutaneous TID WC    insulin lispro  0-3 Units Subcutaneous Nightly    [Held by provider] predniSONE  5 mg Oral Daily    atorvastatin  10 mg Oral Nightly    heparin (porcine)  5,000 Units Subcutaneous Q8H       Physical Exam:  General Appearance: appears comfortable in no acute distress. Lethargic but arousable  HEENT: Normocephalic atraumatic without obvious abnormality   Neck: Lips, mucosa, and tongue normal.  Supple, symmetrical, trachea midline, no adenopathy;thyroid:  no enlargement/tenderness/nodules or JVD. Lung: Breath sounds CTA. Respirations   unlabored. Symmetrical expansion. Heart: RRR, normal S1, S2. No MRG  Abdomen: Soft, NT, ND. BS present x 4 quadrants. No bruit or organomegaly. Extremities: Pedal pulses 2+ symmetric b/l. Extremities normal, no cyanosis, clubbing, or edema. Musculokeletal: No joint swelling, no muscle tenderness. ROM normal in all joints of extremities. Neurologic: Cranial nerves II through XII are grossly intact  . Pertinent/ New Labs and Imaging Studies     Imaging Personally Reviewed:  FINDINGS:   Exam is limited without intravenous contrast.  Small pericardial effusion. Heart size is normal.  No significant pleural effusion.  Scattered vascular   calcifications.  Normal-size lymph nodes without adenopathy by size criteria. Visualized portions of the upper abdomen demonstrate bilateral renal atrophy.    5 mm nonobstructing left

## 2021-04-19 NOTE — PROGRESS NOTES
left ventricular chamber size.    Normal left ventricular systolic function.    Visually estimated LVEF is 60-65 %.    No wall motion abnormalities.    Stage I diastolic function.    Normal right ventricle structure and function.    No significant valvular abnormalities. Objective:   Vitals: BP (!) 175/90   Pulse 90   Temp 98.9 °F (37.2 °C) (Oral)   Resp 22   Ht 5' 10\" (1.778 m)   Wt 237 lb 3.2 oz (107.6 kg)   SpO2 93%   BMI 34.03 kg/m²      Not personally examined due to Covid +     Assessment:   Patient Active Problem List:     GERD (gastroesophageal reflux disease)     CKD (chronic kidney disease) stage 4, GFR 15-29 ml/min ([facility])     Mixed hyperlipidemia     Diabetes mellitus, type 2 ([facility])     HTN (hypertension), benign     Junctional bradycardia     Anemia in stage 3 chronic kidney disease     CAD (coronary artery disease), native coronary artery     Acute CHF (congestive heart failure) ([facility])     Chest pain     Respiratory failure ([facility])     ALYSON (acute kidney injury) ([facility])     Vomiting and diarrhea     Acute nontraumatic kidney injury ([address]     Chronic diastolic congestive heart failure ([address]     H/O kidney transplant     Pulmonary hypertension ([address]     Immunosuppression ([address]    Plan:   1- CKD5D/ESRD with RRT as LRD Renal Allograft placed 12/12/14 with a baseline serum cr of 2.1-2.7mg/dl and an e-GFR=30-28ml/min. His current creatinine s now at baseline  UA SG 1.020, protein 100mg/dl, blood (-). Ni (-), LE (-)  Renal ultrasound from April 17 with possible cystic structure in transplanted kidney and recommendation of repeat renal ultrasound - repeat pending  at this time  Repeat tacrolimus level pending from 4/16. Last from 4/11 was 14.5  Cont  IV fluids as po intake is poor as recorded  Follow renal function    2-Anemia in CKD-HgB at/near goal >/=10  PLAN:1. Give ADAM as needed  2.  Follow H/H.    3- HTN with CKD 5/ESRD  BP above goal <130/80  Had been on lower dose hydralazine, will increase to 75 mg po tid and follow      4- Hyperkalemia in the setting of the DM2 with the CNI and possible hyporenin hypoaldo-possible exacerbated by increased cell turnover in the setting of the SARs-CoV-2  Follow potassium and treat medically further as needed    5- Sec HPTH due to the CAN/CKD with Hypocalcemia  PO4 WNL    Vit D 53  Ca+ 8.9,  PO4 2.8  PLAN:1. Follow     6-Non Anion Gap Metabolic Acidosis  HCO3 at goal of HCO3>/=22  PLAN:1. Continue the present dose of  bicarbonate    7- Hyponatremia  Na+ WNL  Resolved       Thank you  for allowing us to participate in care of 79 W[address] Mirandaalokotilioluis ane, APRN - CNP   Pt remains in COVID Isolation  All Labs reviewed  A/P:  1.  CKD G5/ESRD with KRT as LRD with Chronic Allograft dysfunction  Cr down to 2.4mg/dl which is in usual baseline range  TXP US  No hydro with 1cm complex renal cyst, no MARI  PLAN:1. Continue to monitor    Agree with the remainder as above    YOSELIN Calloway MD

## 2021-04-19 NOTE — PLAN OF CARE
Problem: Pain:  Goal: Pain level will decrease  Description: Pain level will decrease  Outcome: Met This Shift     Problem: Pain:  Goal: Control of acute pain  Description: Control of acute pain  Outcome: Met This Shift     Problem: Pain:  Goal: Control of chronic pain  Description: Control of chronic pain  Outcome: Met This Shift     Problem: Falls - Risk of:  Goal: Will remain free from falls  Description: Will remain free from falls  Outcome: Met This Shift     Problem: Falls - Risk of:  Goal: Absence of physical injury  Description: Absence of physical injury  Outcome: Met This Shift     Problem: Fluid Volume - Imbalance:  Goal: Absence of imbalanced fluid volume signs and symptoms  Description: Absence of imbalanced fluid volume signs and symptoms  Outcome: Met This Shift     Problem: Airway Clearance - Ineffective  Goal: Achieve or maintain patent airway  Outcome: Met This Shift

## 2021-04-19 NOTE — PROGRESS NOTES
Physical Therapy    Facility/Department: [address] INTERMEDIATE  Initial Assessment    NAME: Lisa Núñez  : 1950  MRN: [id number]    Date of Service: 2021      Patient Diagnosis(es): The primary encounter diagnosis was Acute kidney injury [facility]). Diagnoses of Dehydration and Fever, unspecified fever cause were also pertinent to this visit. has a past medical history of Anemia, Atrial fibrillation ([facility] CHF (congestive heart failure) (HCC), Chronic diastolic congestive heart failure (HCC), Chronic foot pain, Chronic knee pain, Constipation, Depression, DM (diabetes mellitus) ([facility] ESRD (end stage renal disease) ([facility] H/O kidney transplant, Hyperlipidemia, Hypertension, Immunosuppression ([facility] Kidney disease, and Pulmonary hypertension ([facility] has a past surgical history that includes back surgery (); Upper gastrointestinal endoscopy (2007); [place] Gastric Bypass (2010); Cholecystectomy, laparoscopic (2013); ECHO Compl W Dop Color Flow (2013); Diagnostic Cardiac Cath Lab Procedure (01/15/2013); Foot surgery; Colonoscopy; Endoscopy, colon, diagnostic; Carpal tunnel release (Left, 2014); Kidney transplant (2014); Bladder surgery; and Kidney biopsy (2015). Referring Provider:  Delmy Huff MD      Evaluating Therapist: Marti HERNANDEZ        Room #:633  DIAGNOSIS: Acute Kidney Injury  Additional Pertinent History:CHF, A-fib  PRECAUTIONS: falls, O2, droplet plus isolation    Social:  Pt lives with  Spouse  in a  Bi level  floor plan   Prior to admission pt walked with  No AD, independent      Initial Evaluation  Date: 21 Treatment      Short Term/ Long Term   Goals   Was pt agreeable to Eval/treatment? yes     Does pt have pain?  No c/o pain     Bed Mobility  Rolling: SBA  Supine to sit: min assist  Sit to supine: min assist  Scooting: min assist  independent   Transfers Sit to stand: CGA  Stand to sit: CGA  Stand pivot: NT  independent [id number]

## 2021-04-19 NOTE — PROGRESS NOTES
P Quality Flow/Interdisciplinary Rounds Progress Note        Quality Flow Rounds held on April 19, 2021    Disciplines Attending:  Bedside Nurse and     La Connelly was admitted on 4/9/2021  8:31 PM    Anticipated Discharge Date:       Disposition:    Dariel Score:  Dariel Scale Score: 16    Readmission Risk              Risk of Unplanned Readmission:        28           Discussed patient goal for the day, patient clinical progression, and barriers to discharge. The following Goal(s) of the Day/Commitment(s) have been identified:  wean oxygen.       Tequila Rice  April 19, 2021

## 2021-04-20 VITALS
DIASTOLIC BLOOD PRESSURE: 68 MMHG | HEIGHT: 70 IN | HEART RATE: 83 BPM | BODY MASS INDEX: 33.96 KG/M2 | OXYGEN SATURATION: 98 % | TEMPERATURE: 98 F | SYSTOLIC BLOOD PRESSURE: 154 MMHG | WEIGHT: 237.2 LBS | RESPIRATION RATE: 16 BRPM

## 2021-04-20 LAB
ALBUMIN SERPL-MCNC: 3 G/DL (ref 3.5–5.2)
ALP BLD-CCNC: 49 U/L (ref 40–129)
ALT SERPL-CCNC: 15 U/L (ref 0–40)
ANION GAP SERPL CALCULATED.3IONS-SCNC: 13 MMOL/L (ref 7–16)
AST SERPL-CCNC: 26 U/L (ref 0–39)
BILIRUB SERPL-MCNC: 0.7 MG/DL (ref 0–1.2)
BUN BLDV-MCNC: 60 MG/DL (ref 8–23)
CALCIUM SERPL-MCNC: 8.9 MG/DL (ref 8.6–10.2)
CHLORIDE BLD-SCNC: 106 MMOL/L (ref 98–107)
CO2: 24 MMOL/L (ref 22–29)
CREAT SERPL-MCNC: 2 MG/DL (ref 0.7–1.2)
GFR AFRICAN AMERICAN: 40
GFR NON-AFRICAN AMERICAN: 40 ML/MIN/1.73
GLUCOSE BLD-MCNC: 204 MG/DL (ref 74–99)
HCT VFR BLD CALC: 30.9 % (ref 37–54)
HEMOGLOBIN: 9.3 G/DL (ref 12.5–16.5)
MAGNESIUM: 2 MG/DL (ref 1.6–2.6)
MCH RBC QN AUTO: 25.1 PG (ref 26–35)
MCHC RBC AUTO-ENTMCNC: 30.1 % (ref 32–34.5)
MCV RBC AUTO: 83.3 FL (ref 80–99.9)
METER GLUCOSE: 206 MG/DL (ref 74–99)
METER GLUCOSE: 264 MG/DL (ref 74–99)
PDW BLD-RTO: 15.5 FL (ref 11.5–15)
PHOSPHORUS: 2.4 MG/DL (ref 2.5–4.5)
PLATELET # BLD: 279 E9/L (ref 130–450)
PMV BLD AUTO: 9.8 FL (ref 7–12)
POTASSIUM REFLEX MAGNESIUM: 4.8 MMOL/L (ref 3.5–5)
POTASSIUM SERPL-SCNC: 4.8 MMOL/L (ref 3.5–5)
RBC # BLD: 3.71 E12/L (ref 3.8–5.8)
SODIUM BLD-SCNC: 143 MMOL/L (ref 132–146)
TOTAL PROTEIN: 5.3 G/DL (ref 6.4–8.3)
WBC # BLD: 6.6 E9/L (ref 4.5–11.5)

## 2021-04-20 PROCEDURE — 2580000003 HC RX 258: Performed by: PHYSICIAN ASSISTANT

## 2021-04-20 PROCEDURE — 6370000000 HC RX 637 (ALT 250 FOR IP): Performed by: PHYSICIAN ASSISTANT

## 2021-04-20 PROCEDURE — 82962 GLUCOSE BLOOD TEST: CPT

## 2021-04-20 PROCEDURE — 6360000002 HC RX W HCPCS: Performed by: INTERNAL MEDICINE

## 2021-04-20 PROCEDURE — 83735 ASSAY OF MAGNESIUM: CPT

## 2021-04-20 PROCEDURE — 36415 COLL VENOUS BLD VENIPUNCTURE: CPT

## 2021-04-20 PROCEDURE — 84100 ASSAY OF PHOSPHORUS: CPT

## 2021-04-20 PROCEDURE — 80053 COMPREHEN METABOLIC PANEL: CPT

## 2021-04-20 PROCEDURE — 2500000003 HC RX 250 WO HCPCS: Performed by: NURSE PRACTITIONER

## 2021-04-20 PROCEDURE — 6360000002 HC RX W HCPCS: Performed by: PHYSICIAN ASSISTANT

## 2021-04-20 PROCEDURE — 2700000000 HC OXYGEN THERAPY PER DAY

## 2021-04-20 PROCEDURE — 94660 CPAP INITIATION&MGMT: CPT

## 2021-04-20 PROCEDURE — 6360000002 HC RX W HCPCS: Performed by: NURSE PRACTITIONER

## 2021-04-20 PROCEDURE — 2580000003 HC RX 258: Performed by: NURSE PRACTITIONER

## 2021-04-20 PROCEDURE — 85027 COMPLETE CBC AUTOMATED: CPT

## 2021-04-20 PROCEDURE — 80048 BASIC METABOLIC PNL TOTAL CA: CPT

## 2021-04-20 PROCEDURE — 6370000000 HC RX 637 (ALT 250 FOR IP): Performed by: NURSE PRACTITIONER

## 2021-04-20 PROCEDURE — 97165 OT EVAL LOW COMPLEX 30 MIN: CPT

## 2021-04-20 PROCEDURE — 97535 SELF CARE MNGMENT TRAINING: CPT

## 2021-04-20 PROCEDURE — 6370000000 HC RX 637 (ALT 250 FOR IP): Performed by: INTERNAL MEDICINE

## 2021-04-20 RX ORDER — DEXAMETHASONE 4 MG/1
4 TABLET ORAL DAILY
Qty: 5 TABLET | Refills: 0 | Status: ON HOLD
Start: 2021-04-21 | End: 2021-04-29 | Stop reason: HOSPADM

## 2021-04-20 RX ORDER — HYDRALAZINE HYDROCHLORIDE 50 MG/1
100 TABLET, FILM COATED ORAL EVERY 8 HOURS SCHEDULED
Status: DISCONTINUED | OUTPATIENT
Start: 2021-04-20 | End: 2021-04-20 | Stop reason: HOSPADM

## 2021-04-20 RX ORDER — METOPROLOL SUCCINATE 100 MG/1
100 TABLET, EXTENDED RELEASE ORAL NIGHTLY
Status: DISCONTINUED | OUTPATIENT
Start: 2021-04-20 | End: 2021-04-20 | Stop reason: HOSPADM

## 2021-04-20 RX ORDER — FUROSEMIDE 40 MG/1
40 TABLET ORAL DAILY
Qty: 60 TABLET | Refills: 1 | Status: ON HOLD | OUTPATIENT
Start: 2021-04-20 | End: 2021-06-14 | Stop reason: HOSPADM

## 2021-04-20 RX ORDER — METOPROLOL SUCCINATE 100 MG/1
100 TABLET, EXTENDED RELEASE ORAL NIGHTLY
Qty: 30 TABLET | Refills: 3 | Status: SHIPPED | OUTPATIENT
Start: 2021-04-20 | End: 2021-06-11

## 2021-04-20 RX ORDER — CLONIDINE HYDROCHLORIDE 0.1 MG/1
0.1 TABLET ORAL ONCE
Status: COMPLETED | OUTPATIENT
Start: 2021-04-20 | End: 2021-04-20

## 2021-04-20 RX ORDER — SODIUM BICARBONATE 650 MG/1
1300 TABLET ORAL 2 TIMES DAILY
Qty: 120 TABLET | Refills: 2 | Status: SHIPPED | OUTPATIENT
Start: 2021-04-20 | End: 2021-04-20 | Stop reason: HOSPADM

## 2021-04-20 RX ADMIN — DEXAMETHASONE 4 MG: 4 TABLET ORAL at 09:33

## 2021-04-20 RX ADMIN — CLONIDINE HYDROCHLORIDE 0.1 MG: 0.1 TABLET ORAL at 09:51

## 2021-04-20 RX ADMIN — ASPIRIN 81 MG: 81 TABLET, COATED ORAL at 09:34

## 2021-04-20 RX ADMIN — MYCOPHENOLATE MOFETIL 1000 MG: 250 CAPSULE ORAL at 09:34

## 2021-04-20 RX ADMIN — INSULIN LISPRO 3 UNITS: 100 INJECTION, SOLUTION INTRAVENOUS; SUBCUTANEOUS at 11:46

## 2021-04-20 RX ADMIN — HEPARIN SODIUM 5000 UNITS: 10000 INJECTION INTRAVENOUS; SUBCUTANEOUS at 14:25

## 2021-04-20 RX ADMIN — HYDRALAZINE HYDROCHLORIDE 75 MG: 50 TABLET, FILM COATED ORAL at 05:23

## 2021-04-20 RX ADMIN — TACROLIMUS 2 MG: 1 CAPSULE ORAL at 09:35

## 2021-04-20 RX ADMIN — INSULIN LISPRO 2 UNITS: 100 INJECTION, SOLUTION INTRAVENOUS; SUBCUTANEOUS at 09:33

## 2021-04-20 RX ADMIN — Medication 10 ML: at 09:35

## 2021-04-20 RX ADMIN — SODIUM PHOSPHATE, MONOBASIC, MONOHYDRATE AND SODIUM PHOSPHATE, DIBASIC, ANHYDROUS 15 MMOL: 276; 142 INJECTION, SOLUTION INTRAVENOUS at 09:33

## 2021-04-20 RX ADMIN — SODIUM BICARBONATE 1300 MG: 650 TABLET ORAL at 09:34

## 2021-04-20 RX ADMIN — HEPARIN SODIUM 5000 UNITS: 10000 INJECTION INTRAVENOUS; SUBCUTANEOUS at 05:24

## 2021-04-20 RX ADMIN — HYDRALAZINE HYDROCHLORIDE 100 MG: 50 TABLET, FILM COATED ORAL at 14:24

## 2021-04-20 RX ADMIN — TIZANIDINE 4 MG: 4 TABLET ORAL at 09:34

## 2021-04-20 RX ADMIN — EPOETIN ALFA-EPBX 10000 UNITS: 10000 INJECTION, SOLUTION INTRAVENOUS; SUBCUTANEOUS at 09:35

## 2021-04-20 RX ADMIN — ISOSORBIDE MONONITRATE 60 MG: 60 TABLET, EXTENDED RELEASE ORAL at 09:34

## 2021-04-20 ASSESSMENT — PAIN SCALES - GENERAL: PAINLEVEL_OUTOF10: 0

## 2021-04-20 NOTE — PROGRESS NOTES
Nephrology Progress Note  4/20/2021 8:38 AM  Subjective:   Admit Date: 4/9/2021  PCP: GUIDO GERONIMO III, DO  Interval History: Pt being seen for ESRD requiring KRT with LRD Allograft placed 12/12/14 4/20/21- not personally examined due to Covid +. Discussed with nurse taking care of him. Poor po intake remains an issue     Diet: DIET GENERAL; Low Potassium  Dietary Nutrition Supplements: Diabetic Oral Supplement    Data:   Scheduled Meds:   hydrALAZINE  75 mg Oral 3 times per day    dexamethasone  4 mg Oral Daily    sodium bicarbonate  1,300 mg Oral BID    isosorbide mononitrate  60 mg Oral BID    aspirin  81 mg Oral Daily    metoprolol succinate  50 mg Oral Nightly    mycophenolate  1,000 mg Oral BID    tacrolimus  2 mg Oral BID    tiZANidine  4 mg Oral Daily    sodium chloride flush  5-40 mL Intravenous 2 times per day    insulin lispro  0-6 Units Subcutaneous TID WC    insulin lispro  0-3 Units Subcutaneous Nightly    [Held by provider] predniSONE  5 mg Oral Daily    atorvastatin  10 mg Oral Nightly    heparin (porcine)  5,000 Units Subcutaneous Q8H     Continuous Infusions:   lactated ringers 75 mL/hr at 04/19/21 1409    sodium chloride 25 mL (04/15/21 1355)    dextrose       PRN Meds:naloxone, sodium chloride, sodium chloride flush, sodium chloride, acetaminophen **OR** acetaminophen, glucose, dextrose, glucagon (rDNA), dextrose, polyethylene glycol, trimethobenzamide  I/O last 3 completed shifts: In: 680 [I.V.:680]  Out: 900 [Urine:900]  No intake/output data recorded.     Intake/Output Summary (Last 24 hours) at 4/20/2021 0838  Last data filed at 4/19/2021 2110  Gross per 24 hour   Intake 680 ml   Output 900 ml   Net -220 ml     CBC:   Recent Labs     04/18/21  0310 04/19/21  0210 04/20/21  0530   WBC 7.1 9.3 6.6   HGB 9.9* 10.1* 9.3*    336 279     BMP:    Recent Labs     04/18/21  0310 04/19/21  0210 04/20/21  0530    141 143   K 4.8  4.8 4.8  4.8 4.8  4.8     Normal left ventricular chamber size.    Normal left ventricular systolic function.    Visually estimated LVEF is 60-65 %.    No wall motion abnormalities.    Stage I diastolic function.    Normal right ventricle structure and function.    No significant valvular abnormalities. Objective:   Vitals: BP (!) 178/84   Pulse 76   Temp 97.7 °F (36.5 °C) (Oral)   Resp 16   Ht 5' 10\" (1.778 m)   Wt 237 lb 3.2 oz (107.6 kg)   SpO2 97%   BMI 34.03 kg/m²      Not personally examined due to Covid +     Assessment:   Patient Active Problem List:     GERD (gastroesophageal reflux disease)     CKD (chronic kidney disease) stage 4, GFR 15-29 ml/min ([facility])     Mixed hyperlipidemia     Diabetes mellitus, type 2 ([facility])     HTN (hypertension), benign     Junctional bradycardia     Anemia in stage 3 chronic kidney disease     CAD (coronary artery disease), native coronary artery     Acute CHF (congestive heart failure) ([facility])     Chest pain     Respiratory failure ([facility])     ALYSON (acute kidney injury) ([facility])     Vomiting and diarrhea     Acute nontraumatic kidney injury ([address]     Chronic diastolic congestive heart failure ([address]     H/O kidney transplant     Pulmonary hypertension ([address]     Immunosuppression ([address]    Plan:   1- CKD5D/ESRD with RRT as LRD Renal Allograft placed 12/12/14 with a baseline serum cr of 2.1-2.7mg/dl and an e-GFR=30-28ml/min. His current creatinine s now at baseline  UA SG 1.020, protein 100mg/dl, blood (-). Ni (-), LE (-)  TXP US  No hydro with 1cm complex renal cyst, no MARI  Repeat tacrolimus level pending from 4/16. Last from 4/11 was 14.5  Cont  IV fluids as po intake is poor as recorded and per nursing  Creatinine 2.0    2-Anemia in CKD-HgB below goal >/=10  PLAN:1. Dose  ADAM as Hgb 9.3  2.  Follow H/H.    3- HTN with CKD 5/ESRD  BP above goal <130/80  Increase hydralazine to 100 mg po tid    4- Hyperkalemia in the setting of the DM2 with the CNI and possible hyporenin hypoaldo-possible exacerbated by increased cell turnover in the setting of the SARs-CoV-2  Follow potassium and treat medically further as needed  K stable past several days    5- Sec HPTH due to the CAN/CKD with Hypocalcemia  PO4 WNL    Vit D 53  Ca+ 8.9,  PO4 2.4- supplement 15 mmol sodium phos as trying to avoid K load with po replete  PLAN:1. Follow     6-Non Anion Gap Metabolic Acidosis  HCO3 at goal of HCO3>/=22  PLAN:1. Continue the present dose of  bicarbonate    7- Hyponatremia  Na+ WNL  Resolved       Thank you  for allowing us to participate in care of 79 Wanda Samuelsla nenane, APRN - CNP   Pt remains in COVID Isolation. He is for D/C this PM. Reviewed with Dr. Davion Garcia and will restart Furosemide at 40mg po QD.  Creatinine down to 2.0mg/dl  Will followup labs as an outpt    Agree with the remainder as above    YOSELIN Calloway MD

## 2021-04-20 NOTE — PROGRESS NOTES
Medium       IPAP: 12 cmH20  CPAP/EPAP: 6 cmH2O     CURRENT MEDS :  Scheduled Meds:   hydrALAZINE  100 mg Oral 3 times per day    epoetin rowan-epbx  10,000 Units Subcutaneous Q7 Days    metoprolol succinate  100 mg Oral Nightly    dexamethasone  4 mg Oral Daily    sodium bicarbonate  1,300 mg Oral BID    isosorbide mononitrate  60 mg Oral BID    aspirin  81 mg Oral Daily    mycophenolate  1,000 mg Oral BID    tacrolimus  2 mg Oral BID    tiZANidine  4 mg Oral Daily    sodium chloride flush  5-40 mL Intravenous 2 times per day    insulin lispro  0-6 Units Subcutaneous TID WC    insulin lispro  0-3 Units Subcutaneous Nightly    [Held by provider] predniSONE  5 mg Oral Daily    atorvastatin  10 mg Oral Nightly    heparin (porcine)  5,000 Units Subcutaneous Q8H       Physical Exam:  Due to the current efforts to prevent transmission of COVID-19 and also the need to preserve PPE for other caregivers, a face-to-face encounter with the patient was not performed. That being said, all relevant records and diagnostic tests were reviewed, including laboratory results and imaging. Please reference any relevant documentation elsewhere. Care will be coordinated with the primary service. Pertinent/ New Labs and Imaging Studies     Imaging Personally Reviewed:  FINDINGS:   Exam is limited without intravenous contrast.  Small pericardial effusion. Heart size is normal.  No significant pleural effusion.  Scattered vascular   calcifications.  Normal-size lymph nodes without adenopathy by size criteria. Visualized portions of the upper abdomen demonstrate bilateral renal atrophy.    5 mm nonobstructing left renal stone.  Postoperative changes of previous   gastric bypass.  Cholecystectomy clips.       No pneumothorax.  There are areas of linear atelectasis in the lower lung   zones.  Multifocal airspace and ground-glass opacities are otherwise   scattered in each lung relatively diffusely and slightly greater towards the   left.  These demonstrate no significant change compared to the recent chest   radiograph.  Degenerative changes are scattered in the spine.           Impression   Bilateral parenchymal infiltrates.  Again, findings are nonspecific and could   be related to pulmonary edema or multifocal pneumonia.  Developing ARDS not   excluded.  Continued follow-up recommended.       Small pericardial effusion.               Echo:      Summary   Normal left ventricular chamber size.   Normal left ventricular systolic function.   Visually estimated LVEF is 60-65 %.   No wall motion abnormalities.   Stage I diastolic function.   Normal right ventricle structure and function.   No significant valvular abnormalities.        RHC (9/2/2020) RA: 13. RV: 91/14. PA: 91/25 with a mean of 48. PCW: 22. CO/CI (Jack): 5.6/2.5. CO/CI (thermodilution): 4.9/2.1 Conclusions: 1. Moderately elevated right and left filling pressures. 2. Severe pulmonary hypertension, predominantly precapillary. PVR approximately 5-5.5. 3. Gabrielle 5 consistent with normal RV function. Refer to pulmonary hypertension center at [facility] study 2018  Severe REM predominant OLINDA  AHI 8.6. Labs:  Lab Results   Component Value Date    WBC 6.6 04/20/2021    HGB 9.3 04/20/2021    HCT 30.9 04/20/2021    MCV 83.3 04/20/2021    MCH 25.1 04/20/2021    MCHC 30.1 04/20/2021    RDW 15.5 04/20/2021     04/20/2021    MPV 9.8 04/20/2021     Lab Results   Component Value Date     04/20/2021    K 4.8 04/20/2021    K 4.8 04/20/2021     04/20/2021    CO2 24 04/20/2021    BUN 60 04/20/2021    CREATININE 2.0 04/20/2021    LABALBU 3.0 04/20/2021    LABALBU 3.8 07/05/2011    CALCIUM 8.9 04/20/2021    GFRAA 40 04/20/2021    LABGLOM 40 04/20/2021     Lab Results   Component Value Date    PROTIME 11.3 08/31/2020    INR 1.0 08/31/2020     No results for input(s): PROBNP in the last 72 hours.   Recent Labs     04/18/21  0310   PROCAL PM    I personally saw, examined, and cared for the patient. Labs, medications, radiographs reviewed. I agree with history exam and plans detailed in NP note.         Electronically signed by Ezequiel Vivas DO on 4/20/2021 at 10:37 PM

## 2021-04-20 NOTE — PROGRESS NOTES
[address] [place] [address] [facility]  Progress Note    Face to face encounter   Chief Complaint   Patient presents with    Fatigue     symptoms x2-3 days    Emesis    Diarrhea    Anorexia       SUBJECTIVE:  Patient is up to chair. Resting head on bedside table, would not look up during my exam but answered my questions. 2LNC. No fevers. Review of systems:  As stated above in the chief complaint, otherwise negative. Medications:  Scheduled Meds:   hydrALAZINE  100 mg Oral 3 times per day    epoetin rowan-epbx  10,000 Units Subcutaneous Q7 Days    sodium phosphate IVPB  15 mmol Intravenous Once    metoprolol succinate  100 mg Oral Nightly    dexamethasone  4 mg Oral Daily    sodium bicarbonate  1,300 mg Oral BID    isosorbide mononitrate  60 mg Oral BID    aspirin  81 mg Oral Daily    mycophenolate  1,000 mg Oral BID    tacrolimus  2 mg Oral BID    tiZANidine  4 mg Oral Daily    sodium chloride flush  5-40 mL Intravenous 2 times per day    insulin lispro  0-6 Units Subcutaneous TID WC    insulin lispro  0-3 Units Subcutaneous Nightly    [Held by provider] predniSONE  5 mg Oral Daily    atorvastatin  10 mg Oral Nightly    heparin (porcine)  5,000 Units Subcutaneous Q8H     Continuous Infusions:   lactated ringers 75 mL/hr at 21 1409    sodium chloride 25 mL (04/15/21 1355)    dextrose       PRN Meds:naloxone, sodium chloride, sodium chloride flush, sodium chloride, acetaminophen **OR** acetaminophen, glucose, dextrose, glucagon (rDNA), dextrose, polyethylene glycol, trimethobenzamide    OBJECTIVE:  BP (!) 180/82   Pulse 83   Temp 98 °F (36.7 °C) (Oral)   Resp 16   Ht 5' 10\" (1.778 m)   Wt 237 lb 3.2 oz (107.6 kg)   SpO2 98%   BMI 34.03 kg/m²   Temp  Av °F (36.7 °C)  Min: 97.7 °F (36.5 °C)  Max: 98.2 °F (36.8 °C)  Constitutional: The patient is sitting up in chair, resting head on bedside table. Answers questions   Skin: Warm and dry.  No rashes were 67 06/03/2014    LABVLDL 10 06/03/2014     Lab Results   Component Value Date/Time    VITD25 53 04/13/2021 11:00 AM      IGG- 593  IGM- 33     Radiology:  Renal u/s   Impression:        1.  The native kidneys are atrophied.  No renal masses or cysts in this area. No hydronephrosis. 2.  Right lower quadrant transplant kidney.  14 mm cystic structure appears   to be present in the lower pole.  No hydronephrosis or perinephric fluid   collection.  No abnormal vascularity given the limitations described. RECOMMENDATIONS:   Somewhat limited evaluation of the right lower quadrant transplant kidney. No gross abnormality.  Suggest complete repeat transplant kidney ultrasound   in the next 3-5 days or sooner if clinical presentation continues to worsen.           Microbiology:   Lab Results   Component Value Date    BC 5 Days- no growth 04/27/2017     Lab Results   Component Value Date    BLOODCULT2 5 Days- no growth 04/27/2017     No results found for: WNDABS  Smear, Respiratory   Date Value Ref Range Status   04/27/2017   Final    Group 6: <25 PMN's/LPF and <25 Epithelial cells/LPF  Few Polymorphonuclear leukocytes  Epithelial cells not seen  No organisms seen       No results found for: MPNEUMO, CLAMYDCU, LABLEGI, AFBCX, FUNGSM, LABFUNG  CULTURE, RESPIRATORY   Date Value Ref Range Status   04/27/2017 Oral Pharyngeal [place] reduced  Final     No results found for: CXCATHTIP  No results found for: BFCS  No results found for: CXSURG  Urine Culture, Routine   Date Value Ref Range Status   04/27/2017 Growth not present  Final   07/27/2015 <10,000 CFU/mL  Gram positive cocci    Final   06/02/2014 <10,000 CFU/mL  Mixed gram positive organisms  Final     MRSA Culture Only   Date Value Ref Range Status   04/27/2017 Methicillin resistant Staph aureus not isolated  Final     ASSESSMENT:  · Covid pneumonia- improved  · Immunocompromised host status post kidney transplant  · Chronic kidney injury - labs improving · Narcotic overdose in house    PLAN:  · Completed 5 days of Remdesivir  · Continues on Decadron   · [facility]  · Monitor labs   · Can discharge from ID standpoint. Sherra Mohs  11:20 AM  4/20/2021   Pt seen and examined. Above discussed agree with advanced practice nurse. Labs, cultures, and radiographs reviewed. Face to Face encounter occurred. Changes made as necessary.      Karen Hahn MD

## 2021-04-20 NOTE — PROGRESS NOTES
Occupational Therapy  OCCUPATIONAL THERAPY INITIAL EVALUATION      Date:2021  Patient Name: Beulah Ormond  MRN: [id number]  : 1950  Room: [address] [place] [address]    Referring Provider: Abby Jones MD    Evaluating OT: Flex Sanchez OTR/L [id number]    AM-PAC Daily Activity Raw Score: 16    Recommended Adaptive Equipment: shower chair    Diagnosis: non traumatic ALYSON. COVID +     Pertinent Medical History: CHF, DM, HTN, afib   Precautions:  Falls, droplet plus isolation COVID +, O2     Home Living: Pt lives with spouse in a bilevel home with B/B on upper level. Bathroom setup: walk in shower, standard commode     Prior Level of Function: Independent with ADLs, Independent with IADLs; completed functional mobility no AD. No home O2. Driving: Yes    Pain Level: no reported pain just extreme fatigue/exhaustion    Cognition: A&O: 3-4/4. Pt is oriented but easily confused. Lethargic throughout session. Increased processing time.     Problem solving:  fair   Judgement/safety:  fair     Functional Assessment:   Initial Eval Status  Date: 21 Treatment session:  Short Term Goals     Feeding Set up     Grooming Min A  Set up   [facility] gown  Set up   LB Dressing Mod A  Management of B socks seated in armchair  SBA    Bathing Mod A  SBA   Toileting Min A  Incontinent of BM upon standing at EOB  Minimal assist to complete will care to ensure cleanliness  SBA   Bed Mobility  Sit to Supine: SBA     Functional Transfers STS: CGA  Independent   Functional Mobility CGA with HHA  Household distance  Limited further d/t SOB and extreme fatigue  Mod I during ADLs   Balance Sitting: fair plus    Standing: fair     Activity Tolerance Poor plus  O2 2L  Restin%  With exertion: 90%  Min recovery time in sitting with education on pursed lip breathing  Standing ami x6-7 min with fair plus balance during self care tasks           ADL comments: Pt educated on pacing, ECT and fall prevention in home environment. Pt reports wife available to assist PRN. Educated pt [facility] for night use as needed with verbalized understanding. Recommended seat for shower initially for energy conservation with verbalized understanding reporting his wife can get this. Treatment: Patient educated on techniques for completion of ADL, safe functional transfers and functional mobility. Patient required cues for follow through with proper hand/foot placement, pacing, safety, compensatory strategies, breathing and technique in bed mobility, functional transfers, functional mobility, post toileting care and LB dressing in preparation for maximum independence in all self care tasks.      Hand Dominance: Right []  Left []   Strength ROM Additional Info:    RUE  4/5 WFL good  and FMC/dexterity noted during ADL tasks     LUE 4/5 WFL good  and FMC/dexterity noted during ADL tasks         Hearing: [place]   Vision: WFL   Sensation:  No c/o numbness or tingling   Tone: WFL   Edema: none                             Long Term Goal (1-3 wks): Pt will maximize functional performance in all self care tasks/functional transfers with good follow through of all trained techniques for safe transition to next level of care    Assessment of current deficits   Functional mobility [x]  ADLs [x] Strength [x]  Cognition []  Functional transfers  [x] IADLs [x] Safety Awareness [x]  Endurance [x]  Fine Motor Coordination [] Balance [x] Vision/perception [] Sensation []   Gross Motor Coordination [] ROM [] Delirium []                  Motor Control []    Plan of Care: 1-4 days/week for 1-2 weeks PRN   [x]ADL retraining/adaptive techniques and AE recommendations to increase functional independence within precautions                    [x]Energy conservation techniques to improve tolerance for ADL/IADLs  [x]Functional transfer/mobility training/DME recommendations for increased independence, safety and fall prevention [x]Patient/family education to increase safety and functional independence during daily routine          [x]Environmental modifications for safe mobility and completion of ADLs                             []Cognitive retraining to improve problem solving skills & safe participation in ADLs/IADLs     []Sensory re-education techniques to improve extremity awareness, maintain skin integrity and improve hand function                             []Visual/Perceptual retraining to improve body awareness and safety during transfers and ADLs  []Splinting/positioning needs to maintain joint/skin integrity and contracture prevention  [x]Therapeutic activity to improve functional performance during ADLs                                        [x]Therapeutic exercise to improve tolerance and functional strength for ADLs   [x]Balance retraining/tolerance tasks for facilitation of postural control with dynamic challenges during ADLs  []Neuromuscular re-education to facilitate righting/equilibrium reactions, midline orientation, scapular stability/mobility, normalize muscle tone and facilitate active functional movement                        []Delirium prevention/treatment    [x]Positioning to improve functional independence and decrease risk of skin breakdown  []Other:     Rehab Potential: Good for established goals     Patient/Family Goal: To get home. Patient and/or family were instructed on functional diagnosis, prognosis/goals and OT plan of care. Pt verbalized understanding. Upon arrival, patient seated in reclining chair. At end of session, patient supine in bed with call light and phone within reach, all lines and tubes intact. Pt would benefit from continued skilled OT to increase safety and independence with completion of ADL/IADL tasks for functional independence and quality of life.  Bed/chair alarm: ON    Low Evaluation 63068  Time In: 1320   Time Out: 1345       Min Units   Therapeutic Ex 57845 Therapeutic Activities 68218 3    ADL/Self Care 51484 9 1   Orthotic Management 26787     Neuro Re-Ed 24757     TOTAL TIMED TREATMENT 12 1       Evaluation time includes thorough review of current medical information, gathering information on past medical history/social history and prior level of function, completion of standardized testing/informal observation of tasks, assessment of data, and development of POC/Goals    Eligha Osgood OTR/L  [id number]

## 2021-04-20 NOTE — PROGRESS NOTES
Message sent to Dr. Lili Segovia via [employer] re: discharge recommendations. Response- patient okay for discharge from nephro POV.

## 2021-04-20 NOTE — PROGRESS NOTES
P Quality Flow/Interdisciplinary Rounds Progress Note        Quality Flow Rounds held on April 20, 2021    Disciplines Attending:  Bedside Nurse and     Cortney Terry was admitted on 4/9/2021  8:31 PM    Anticipated Discharge Date:       Disposition:    Dariel Score:  Dariel Scale Score: 17    Readmission Risk              Risk of Unplanned Readmission:        29           Discussed patient goal for the day, patient clinical progression, and barriers to discharge. The following Goal(s) of the Day/Commitment(s) have been identified:  Wean oxygen.       Aurora Herrera  April 20, 2021

## 2021-04-20 NOTE — PROGRESS NOTES
Date: 4/19/2021    Time: 8:52 PM    Patient Placed On BIPAP/CPAP/ Non-Invasive Ventilation? No    If no must comment. Facial area red/color change? No           If YES are Blister/Lesion present? No   If yes must notify nursing staff  BIPAP/CPAP skin barrier? No    Skin barrier type:none       Comments:    Pt refused bipap at this time.     Mickael Fabry

## 2021-04-20 NOTE — CARE COORDINATION
Spoke to Nova Perdue at [facility]-informed him that patient will discharge home today. Home O2 orders and Pulse ox testing documentation in Epic. Nova Perdue stated O2 will be delivered to hospital this afternoon.  Orders and documentation faxed to [facility] [phone]    Charge TARIK Patrick notified

## 2021-04-20 NOTE — PROGRESS NOTES
Subjective:  Feeling better   No CP or SOB  No fever or chills   No uncontrolled pain  No vomiting or diarrhea     Objective:    BP (!) 178/84   Pulse 76   Temp 97.7 °F (36.5 °C) (Oral)   Resp 16   Ht 5' 10\" (1.778 m)   Wt 237 lb 3.2 oz (107.6 kg)   SpO2 97%   BMI 34.03 kg/m²     24HR INTAKE/OUTPUT:      Intake/Output Summary (Last 24 hours) at 4/20/2021 0755  Last data filed at 4/19/2021 2110  Gross per 24 hour   Intake 680 ml   Output 900 ml   Net -220 ml       General appearance: NAD, conversant  Neck: FROM, supple   Lungs: Clear bilaterally no wheezes, no rhonchi, no crackles  CV: RRR, no MRGs; normal carotid upstroke and amplitude without Bruits  Abdomen: Soft, non-tender; no masses or HSM  Extremities: No edema, no cyanosis, no clubbing  Skin: Intact no rash, no lesions, no ulcers    Psych: Alert and oriented normal affect  Neuro: Nonfocal  Most Recent Labs  Lab Results   Component Value Date    WBC 6.6 04/20/2021    HGB 9.3 (L) 04/20/2021    HCT 30.9 (L) 04/20/2021     04/20/2021     04/20/2021    K 4.8 04/20/2021    K 4.8 04/20/2021     04/20/2021    CREATININE 2.0 (H) 04/20/2021    BUN 60 (H) 04/20/2021    CO2 24 04/20/2021    GLUCOSE 204 (H) 04/20/2021    ALT 15 04/20/2021    AST 26 04/20/2021    INR 1.0 08/31/2020    TSH 0.996 06/03/2014    LABA1C 5.5 06/03/2014    LABMICR 150.7 (H) 06/02/2014     Recent Labs     04/20/21  0530   MG 2.0     Lab Results   Component Value Date    CALCIUM 8.9 04/20/2021    PHOS 2.4 (L) 04/20/2021        US KIDNEY TRANSPLANT WITH COLOR DOPPLER   Final Result   Evaluation remains limited by poor sonographic window. Transplant kidney is without hydronephrosis, perinephric collection, or   notable nephrolithiasis. 1 cm complex cyst suggested at the lower pole. Interrogated arterial and venous vasculature remains patent without   thrombosis. No elevated arterial peak systolic velocities to suggest renal   artery stenosis.   Elevated

## 2021-04-20 NOTE — CARE COORDINATION
Attempted to speak to pt via phone to discuss plan of care and discharge planning- no answer. Call placed to pt's wife- no answer- Voicemail left- wait return call.

## 2021-04-20 NOTE — DISCHARGE SUMMARY
Physician Discharge Summary     Patient ID:  Brant Simon  [id number]  79 y.o.  1950    Admit date: 4/9/2021    Discharge date and time:  4/20/2021    Discharge Diagnoses: Principal Problem:    Acute nontraumatic kidney injury [facility])  Active Problems:    CKD (chronic kidney disease) stage 4, GFR 15-29 ml/min ([facility])    Diabetes mellitus, type 2 (HCC)    HTN (hypertension), benign    Acute kidney injury ([facility]    Vomiting and diarrhea    Chronic diastolic congestive heart failure ([facility] [address] [facility]    H/O kidney transplant    Immunosuppression ([facility]    Pneumonia due to COVID-19 virus  Resolved Problems:    * No resolved hospital problems.  *      Consults: IP CONSULT TO INTERNAL MEDICINE  IP CONSULT TO NEPHROLOGY  IP CONSULT TO SOCIAL WORK  IP CONSULT TO IV TEAM  IP CONSULT TO IV TEAM  IP CONSULT TO CARDIOLOGY  IP CONSULT TO INFECTIOUS DISEASES  IP CONSULT TO IV TEAM  IP CONSULT TO IV TEAM  IP CONSULT TO PHARMACY  IP CONSULT TO PHARMACY  IP CONSULT TO IV TEAM  IP CONSULT TO IV TEAM  IP CONSULT TO PULMONOLOGY  IP CONSULT TO IV TEAM  IP CONSULT TO IV TEAM  IP CONSULT TO IV TEAM    Procedures: See below    Hospital Course:   70-year-old male history of kidney transplant admitted with ALYSON COVID-19 pneumonia    Completed remdesivir  Continue Decadron for 5 more days then resume chronic prednisone dose  Continue immunosuppressive medications for kidney transplant  ID, nephrology, pulmonology consult reviewed and appreciated    PT/OT  DVT PPx  DC planning anticipate discharge 24 to 48 hours with home O2      Discharge Exam:  See progress note from today    Condition:  Stable    Disposition: home    Patient Instructions:   Current Discharge Medication List      START taking these medications    Details   dexamethasone (DECADRON) 4 MG tablet Take 1 tablet by mouth daily for 5 days  Qty: 5 tablet, Refills: 0         CONTINUE these medications which have CHANGED    Details   !! sodium bicarbonate 650 MG tablet Take 2 tablets by mouth 2 times medications       rosuvastatin (CRESTOR) 20 MG tablet Comments:   Reason for Stopping:         oxyCODONE (OXY-IR) 15 MG immediate release tablet Comments:   Reason for Stopping:             Activity: activity as tolerated  Diet: renal diet    Follow-up with 1 week PCP 2 weeks nephrology    Note that over 30 minutes was spent in preparing discharge papers, discussing discharge with patient, staff, consultants, medication review, arranging follow up, etc.    Signed:  Aditya Ann MD  4/20/2021  2:58 PM

## 2021-04-21 ENCOUNTER — CARE COORDINATION (OUTPATIENT)
Dept: CASE MANAGEMENT | Age: 71
End: 2021-04-21

## 2021-04-21 LAB — TACROLIMUS BLOOD: 18.8 NG/ML

## 2021-04-21 NOTE — CARE COORDINATION
[facility] Transitions Initial Follow Up Call    Call within 2 business days of discharge: Yes    Patient: Gaby Choi Patient : 1950   MRN: [id number]  Reason for Admission: Acute nontraumatic kidney injury/COVID-19+  Discharge Date: 21 RARS: Readmission Risk Score: 28      Last Discharge [address] [address]       Complaint Diagnosis Description Type Department Provider    21 Fatigue; Emesis; Diarrhea; Anorexia Acute kidney injury ([facility] . .. ED to Hosp-Admission (Discharged) (ADMITTED) TANIA JustinS Valarie Roldan MD; Paige Crouch. .. Challenges to be reviewed by the provider   Additional needs identified to be addressed with provider No  none             Method of communication with provider : none    Discussed COVID-19 related testing which was available at this time. Test results were positive. Patient informed of results, if available? Yes    Advance Care Planning:   Does patient have an Advance Directive:  decision maker updated. Was this a readmission? No  Patient stated reason for admission: weakness, nausea/vomiting, and diarrhea  Patients top risk factors for readmission: functional physical ability, medical condition-CKD, DM, CHF, PNA, Immunosupressed, and Hx of kidney transplant and polypharmacy    Care Transition Nurse (CTN) contacted the family by telephone to perform post hospital discharge assessment. Verified name and  with family as identifiers. Provided introduction to self, and explanation of the CTN role. CTN reviewed discharge instructions, medical action plan and red flags with family who verbalized understanding. Family given an opportunity to ask questions and does not have any further questions or concerns at this time. Were discharge instructions available to patient? Yes. Reviewed appropriate site of care based on symptoms and resources available to patient including: PCP, Specialist, Urgent care clinics, When to call 911 and Condition related references. The family agrees to contact the PCP office for questions related to their healthcare. Medication reconciliation was performed with family, who verbalizes understanding of administration of home medications. Advised obtaining a 90-day supply of all daily and as-needed medications. Covid Risk Education    Patient has following risk factors of: heart failure, pneumonia, immunocompromised, diabetes, chronic kidney disease and History of kidney transplant. Education provided regarding infection prevention, and signs and symptoms of COVID-19 and when to seek medical attention with family who verbalized understanding. Discussed exposure protocols and quarantine From CDC: Are you at higher risk for severe illness?   and given an opportunity for questions and concerns. The family agrees to contact the COVID-19 hotline [phone] or PCP office for questions related to COVID-19. For more information on steps you can take to protect yourself, see CDC's How to Protect Yourself     Was patient discharged with a pulse oximeter? Yes Discussed and confirmed pulse oximeter discharge instructions and when to notify provider or seek emergency care. Patient/family/caregiver given information for [facility] and agrees to enroll no  Patient's preferred e-mail: declines  Patient's preferred phone number: declines    Discussed follow-up appointments. If no appointment was previously scheduled, appointment scheduling offered: Yes. Is follow up appointment scheduled within 7 days of discharge? No  Non-[facility] follow up appointment(s): CTN confirmed with DTR Ana Holley) her mother will call and scheduled follow up with Dr. Isidor Dubin (PCP). Declines needing CTN assistance. Plan for follow-up call in 7-10 days based on severity of symptoms and risk factors. Plan for next call: symptom management-Has weakness, SOB and poor appetite improved? and follow up appointment-Did patient get scheudled for PCP follow up? Non-face-to-face services provided:  Scheduled appointment with PCP-CTN confirmed with DTR Primitivo Baum) that her Mom will call and schedule follow up with Dr. Patti Murdock (PCP) and declines needing any CTN assistance. Obtained and reviewed discharge summary and/or continuity of care documents  Education of patient/family/caregiver/guardian to support self-management-Discussed DM/CHF zone tools and knowing when to seek medical attention,   Assessment and support for treatment adherence and medication management-Advised for patient to take Decadron as directed until completely finished     Care Transitions 24 Hour Call    Schedule Follow Up Appointment with PCP: Declined  Do you have any ongoing symptoms?: Yes  Patient-reported symptoms: Fatigue, Shortness of Breath, Other (Comment: poor appetite)  Do you have a copy of your discharge instructions?: Yes  Do you have all of your prescriptions and are they filled?: Yes  Have you been contacted by a [facility] Pharmacist?: No  Have you scheduled your follow up appointment?: No  Were you discharged with any Home Care or Post Acute Services: Yes  Post Acute Services: Outpatient/Community Services (Comment: [employer]- Home oxygen)  Do you feel like you have everything you need to keep you well at home?: Yes  Care Transitions Interventions    Registered Dietician: Completed         Spoke with patient DTR Primitivo Baum) today 4/21/21 for hospital discharge/COVID-19+ follow up. Ruthanna Ormond reports patient having an ongoing poor appetite. [facility] patient is tolerating drinking fluids to keep hydrated. Advised to try protein shake for supplementation and CTN will make referral for clinical dietician for further assistance. States patient is up ambulating independently without issues but still is fatigued and sleeping a lot. States patient has shortness of breath with exertion and obtained home oxygen last night which he is wearing 2 lpm continuous.  Denies any chest pain, chest discomfort, abdominal pain, LE swelling, sudden weight gain,  nausea, vomiting, diarrhea, chills or fever. Noted patient tested positive for COVID on 4/13/21. Confirmed with Luz Corporal that patient obtained Decadron newly ordered on discharge. Advised for patient to take Decadron as directed until completely finished. States wife monitors BG three times daily and admits FBS today was 136. CTN advised that Decadron may increase BG d/t being a steroid which [place] understanding. Discussed DM/CHF zone tools and COVID precautions knowing when to seek medical attention. Confirmed patient wife will call and schedule follow up with Dr. Henry Telles (PCP) and declines needing any CTN assistance. Denies any other complaints or concerns at this time. CTN will continue to follow for Care Transition. Follow Up  No future appointments. CTN provided contact information for future needs.     CHAI Vinson

## 2021-04-23 ENCOUNTER — CARE COORDINATION (OUTPATIENT)
Dept: CARE COORDINATION | Age: 71
End: 2021-04-23

## 2021-04-23 NOTE — CARE COORDINATION
[address] and left voicemail regarding Dietitian referral. Left call back number and will follow up as appropriate.          [address] [facility], [facility] [address]

## 2021-04-26 ENCOUNTER — APPOINTMENT (OUTPATIENT)
Dept: GENERAL RADIOLOGY | Age: 71
DRG: 177 | End: 2021-04-26
Payer: MEDICARE

## 2021-04-26 ENCOUNTER — HOSPITAL ENCOUNTER (INPATIENT)
Age: 71
LOS: 3 days | Discharge: HOME | DRG: 177 | End: 2021-04-29
Attending: EMERGENCY MEDICINE | Admitting: INTERNAL MEDICINE
Payer: MEDICARE

## 2021-04-26 DIAGNOSIS — J12.82 PNEUMONIA DUE TO COVID-19 VIRUS: ICD-10-CM

## 2021-04-26 DIAGNOSIS — R06.00 DYSPNEA, UNSPECIFIED TYPE: ICD-10-CM

## 2021-04-26 DIAGNOSIS — K92.1 MELENA: ICD-10-CM

## 2021-04-26 DIAGNOSIS — U07.1 PNEUMONIA DUE TO COVID-19 VIRUS: ICD-10-CM

## 2021-04-26 DIAGNOSIS — K92.2 GASTROINTESTINAL HEMORRHAGE, UNSPECIFIED GASTROINTESTINAL HEMORRHAGE TYPE: ICD-10-CM

## 2021-04-26 DIAGNOSIS — N28.9 ACUTE RENAL INSUFFICIENCY: Primary | ICD-10-CM

## 2021-04-26 LAB
ABO/RH: NORMAL
ACANTHOCYTES: ABNORMAL
ALBUMIN SERPL-MCNC: 3 G/DL (ref 3.5–5.2)
ALP BLD-CCNC: 45 U/L (ref 40–129)
ALT SERPL-CCNC: 11 U/L (ref 0–40)
ANION GAP SERPL CALCULATED.3IONS-SCNC: 9 MMOL/L (ref 7–16)
ANISOCYTOSIS: ABNORMAL
ANTIBODY SCREEN: NORMAL
APTT: 24.4 SEC (ref 24.5–35.1)
AST SERPL-CCNC: 19 U/L (ref 0–39)
BASOPHILS ABSOLUTE: 0.01 E9/L (ref 0–0.2)
BASOPHILS RELATIVE PERCENT: 0.1 % (ref 0–2)
BILIRUB SERPL-MCNC: 0.4 MG/DL (ref 0–1.2)
BUN BLDV-MCNC: 36 MG/DL (ref 6–23)
CALCIUM SERPL-MCNC: 8.9 MG/DL (ref 8.6–10.2)
CHLORIDE BLD-SCNC: 99 MMOL/L (ref 98–107)
CO2: 27 MMOL/L (ref 22–29)
CREAT SERPL-MCNC: 2.6 MG/DL (ref 0.7–1.2)
EOSINOPHILS ABSOLUTE: 0.03 E9/L (ref 0.05–0.5)
EOSINOPHILS RELATIVE PERCENT: 0.4 % (ref 0–6)
GFR AFRICAN AMERICAN: 30
GFR NON-AFRICAN AMERICAN: 30 ML/MIN/1.73
GLUCOSE BLD-MCNC: 396 MG/DL (ref 74–99)
HCT VFR BLD CALC: 25.5 % (ref 37–54)
HEMOGLOBIN: 7.5 G/DL (ref 12.5–16.5)
IMMATURE GRANULOCYTES #: 0.18 E9/L
IMMATURE GRANULOCYTES %: 2.4 % (ref 0–5)
LIPASE: 24 U/L (ref 13–60)
LYMPHOCYTES ABSOLUTE: 0.53 E9/L (ref 1.5–4)
LYMPHOCYTES RELATIVE PERCENT: 7 % (ref 20–42)
MCH RBC QN AUTO: 25.8 PG (ref 26–35)
MCHC RBC AUTO-ENTMCNC: 29.4 % (ref 32–34.5)
MCV RBC AUTO: 87.6 FL (ref 80–99.9)
METER GLUCOSE: 359 MG/DL (ref 74–99)
MONOCYTES ABSOLUTE: 0.55 E9/L (ref 0.1–0.95)
MONOCYTES RELATIVE PERCENT: 7.2 % (ref 2–12)
NEUTROPHILS ABSOLUTE: 6.31 E9/L (ref 1.8–7.3)
NEUTROPHILS RELATIVE PERCENT: 82.9 % (ref 43–80)
OVALOCYTES: ABNORMAL
PDW BLD-RTO: 15.9 FL (ref 11.5–15)
PLATELET # BLD: 207 E9/L (ref 130–450)
PMV BLD AUTO: 11 FL (ref 7–12)
POIKILOCYTES: ABNORMAL
POLYCHROMASIA: ABNORMAL
POTASSIUM REFLEX MAGNESIUM: 4.9 MMOL/L (ref 3.5–5)
PRO-BNP: 5086 PG/ML (ref 0–125)
RBC # BLD: 2.91 E12/L (ref 3.8–5.8)
SARS-COV-2, NAAT: DETECTED
SCHISTOCYTES: ABNORMAL
SODIUM BLD-SCNC: 135 MMOL/L (ref 132–146)
TOTAL PROTEIN: 5.1 G/DL (ref 6.4–8.3)
TROPONIN: 0.07 NG/ML (ref 0–0.03)
WBC # BLD: 7.6 E9/L (ref 4.5–11.5)

## 2021-04-26 PROCEDURE — 2700000000 HC OXYGEN THERAPY PER DAY

## 2021-04-26 PROCEDURE — 93005 ELECTROCARDIOGRAM TRACING: CPT | Performed by: STUDENT IN AN ORGANIZED HEALTH CARE EDUCATION/TRAINING PROGRAM

## 2021-04-26 PROCEDURE — 6360000002 HC RX W HCPCS: Performed by: STUDENT IN AN ORGANIZED HEALTH CARE EDUCATION/TRAINING PROGRAM

## 2021-04-26 PROCEDURE — 87635 SARS-COV-2 COVID-19 AMP PRB: CPT

## 2021-04-26 PROCEDURE — 83880 ASSAY OF NATRIURETIC PEPTIDE: CPT

## 2021-04-26 PROCEDURE — 71045 X-RAY EXAM CHEST 1 VIEW: CPT

## 2021-04-26 PROCEDURE — 99283 EMERGENCY DEPT VISIT LOW MDM: CPT

## 2021-04-26 PROCEDURE — 85025 COMPLETE CBC W/AUTO DIFF WBC: CPT

## 2021-04-26 PROCEDURE — 2140000000 HC CCU INTERMEDIATE R&B

## 2021-04-26 PROCEDURE — 86901 BLOOD TYPING SEROLOGIC RH(D): CPT

## 2021-04-26 PROCEDURE — P9016 RBC LEUKOCYTES REDUCED: HCPCS

## 2021-04-26 PROCEDURE — 86923 COMPATIBILITY TEST ELECTRIC: CPT

## 2021-04-26 PROCEDURE — 2580000003 HC RX 258: Performed by: FAMILY MEDICINE

## 2021-04-26 PROCEDURE — C9113 INJ PANTOPRAZOLE SODIUM, VIA: HCPCS | Performed by: STUDENT IN AN ORGANIZED HEALTH CARE EDUCATION/TRAINING PROGRAM

## 2021-04-26 PROCEDURE — 83690 ASSAY OF LIPASE: CPT

## 2021-04-26 PROCEDURE — 86900 BLOOD TYPING SEROLOGIC ABO: CPT

## 2021-04-26 PROCEDURE — 2580000003 HC RX 258: Performed by: STUDENT IN AN ORGANIZED HEALTH CARE EDUCATION/TRAINING PROGRAM

## 2021-04-26 PROCEDURE — 84484 ASSAY OF TROPONIN QUANT: CPT

## 2021-04-26 PROCEDURE — 80053 COMPREHEN METABOLIC PANEL: CPT

## 2021-04-26 PROCEDURE — 82962 GLUCOSE BLOOD TEST: CPT

## 2021-04-26 PROCEDURE — 85730 THROMBOPLASTIN TIME PARTIAL: CPT

## 2021-04-26 PROCEDURE — 86850 RBC ANTIBODY SCREEN: CPT

## 2021-04-26 RX ORDER — METOPROLOL SUCCINATE 50 MG/1
50 TABLET, EXTENDED RELEASE ORAL NIGHTLY
Status: DISCONTINUED | OUTPATIENT
Start: 2021-04-27 | End: 2021-04-27

## 2021-04-26 RX ORDER — DEXAMETHASONE 4 MG/1
4 TABLET ORAL DAILY
Status: DISCONTINUED | OUTPATIENT
Start: 2021-04-27 | End: 2021-04-27

## 2021-04-26 RX ORDER — PROMETHAZINE HYDROCHLORIDE 25 MG/1
12.5 TABLET ORAL EVERY 6 HOURS PRN
Status: DISCONTINUED | OUTPATIENT
Start: 2021-04-26 | End: 2021-04-29 | Stop reason: HOSPADM

## 2021-04-26 RX ORDER — PANTOPRAZOLE SODIUM 40 MG/1
40 TABLET, DELAYED RELEASE ORAL
Status: DISCONTINUED | OUTPATIENT
Start: 2021-04-27 | End: 2021-04-29 | Stop reason: HOSPADM

## 2021-04-26 RX ORDER — ACETAMINOPHEN 325 MG/1
650 TABLET ORAL EVERY 6 HOURS PRN
Status: DISCONTINUED | OUTPATIENT
Start: 2021-04-26 | End: 2021-04-29 | Stop reason: HOSPADM

## 2021-04-26 RX ORDER — SODIUM CHLORIDE 9 MG/ML
INJECTION, SOLUTION INTRAVENOUS CONTINUOUS
Status: DISCONTINUED | OUTPATIENT
Start: 2021-04-26 | End: 2021-04-29 | Stop reason: HOSPADM

## 2021-04-26 RX ORDER — ACETAMINOPHEN 650 MG/1
650 SUPPOSITORY RECTAL EVERY 6 HOURS PRN
Status: DISCONTINUED | OUTPATIENT
Start: 2021-04-26 | End: 2021-04-29 | Stop reason: HOSPADM

## 2021-04-26 RX ORDER — SODIUM CHLORIDE 0.9 % (FLUSH) 0.9 %
5-40 SYRINGE (ML) INJECTION EVERY 12 HOURS SCHEDULED
Status: DISCONTINUED | OUTPATIENT
Start: 2021-04-26 | End: 2021-04-29 | Stop reason: HOSPADM

## 2021-04-26 RX ORDER — INSULIN GLARGINE 100 [IU]/ML
17 INJECTION, SOLUTION SUBCUTANEOUS NIGHTLY
Status: DISCONTINUED | OUTPATIENT
Start: 2021-04-27 | End: 2021-04-29 | Stop reason: HOSPADM

## 2021-04-26 RX ORDER — TACROLIMUS 1 MG/1
2 CAPSULE ORAL 2 TIMES DAILY
Status: DISCONTINUED | OUTPATIENT
Start: 2021-04-26 | End: 2021-04-29 | Stop reason: HOSPADM

## 2021-04-26 RX ORDER — SODIUM BICARBONATE 650 MG/1
650 TABLET ORAL DAILY
Status: DISCONTINUED | OUTPATIENT
Start: 2021-04-27 | End: 2021-04-29 | Stop reason: HOSPADM

## 2021-04-26 RX ORDER — ATORVASTATIN CALCIUM 10 MG/1
10 TABLET, FILM COATED ORAL DAILY
Status: DISCONTINUED | OUTPATIENT
Start: 2021-04-27 | End: 2021-04-29 | Stop reason: HOSPADM

## 2021-04-26 RX ORDER — SODIUM CHLORIDE 0.9 % (FLUSH) 0.9 %
5-40 SYRINGE (ML) INJECTION PRN
Status: DISCONTINUED | OUTPATIENT
Start: 2021-04-26 | End: 2021-04-29 | Stop reason: HOSPADM

## 2021-04-26 RX ORDER — HYDRALAZINE HYDROCHLORIDE 25 MG/1
100 TABLET, FILM COATED ORAL EVERY 8 HOURS SCHEDULED
Status: DISCONTINUED | OUTPATIENT
Start: 2021-04-27 | End: 2021-04-29 | Stop reason: HOSPADM

## 2021-04-26 RX ORDER — NICOTINE POLACRILEX 4 MG
15 LOZENGE BUCCAL PRN
Status: DISCONTINUED | OUTPATIENT
Start: 2021-04-26 | End: 2021-04-29 | Stop reason: HOSPADM

## 2021-04-26 RX ORDER — HYDRALAZINE HYDROCHLORIDE 25 MG/1
50 TABLET, FILM COATED ORAL EVERY 8 HOURS SCHEDULED
Status: DISCONTINUED | OUTPATIENT
Start: 2021-04-26 | End: 2021-04-26

## 2021-04-26 RX ORDER — INSULIN GLARGINE 100 [IU]/ML
17 INJECTION, SOLUTION SUBCUTANEOUS NIGHTLY
Status: DISCONTINUED | OUTPATIENT
Start: 2021-04-26 | End: 2021-04-26

## 2021-04-26 RX ORDER — DEXTROSE MONOHYDRATE 50 MG/ML
100 INJECTION, SOLUTION INTRAVENOUS PRN
Status: DISCONTINUED | OUTPATIENT
Start: 2021-04-26 | End: 2021-04-29 | Stop reason: HOSPADM

## 2021-04-26 RX ORDER — SODIUM CHLORIDE 9 MG/ML
25 INJECTION, SOLUTION INTRAVENOUS PRN
Status: DISCONTINUED | OUTPATIENT
Start: 2021-04-26 | End: 2021-04-29 | Stop reason: HOSPADM

## 2021-04-26 RX ORDER — MYCOPHENOLATE MOFETIL 250 MG/1
1000 CAPSULE ORAL 2 TIMES DAILY
Status: DISCONTINUED | OUTPATIENT
Start: 2021-04-26 | End: 2021-04-29 | Stop reason: HOSPADM

## 2021-04-26 RX ORDER — ISOSORBIDE MONONITRATE 60 MG/1
60 TABLET, EXTENDED RELEASE ORAL DAILY
Status: DISCONTINUED | OUTPATIENT
Start: 2021-04-27 | End: 2021-04-29 | Stop reason: HOSPADM

## 2021-04-26 RX ORDER — METOPROLOL SUCCINATE 100 MG/1
100 TABLET, EXTENDED RELEASE ORAL NIGHTLY
Status: DISCONTINUED | OUTPATIENT
Start: 2021-04-26 | End: 2021-04-26

## 2021-04-26 RX ORDER — ONDANSETRON 2 MG/ML
4 INJECTION INTRAMUSCULAR; INTRAVENOUS EVERY 6 HOURS PRN
Status: DISCONTINUED | OUTPATIENT
Start: 2021-04-26 | End: 2021-04-29 | Stop reason: HOSPADM

## 2021-04-26 RX ORDER — DEXTROSE MONOHYDRATE 25 G/50ML
12.5 INJECTION, SOLUTION INTRAVENOUS PRN
Status: DISCONTINUED | OUTPATIENT
Start: 2021-04-26 | End: 2021-04-29 | Stop reason: HOSPADM

## 2021-04-26 RX ORDER — FAMOTIDINE 20 MG/1
20 TABLET, FILM COATED ORAL DAILY
Status: DISCONTINUED | OUTPATIENT
Start: 2021-04-27 | End: 2021-04-26

## 2021-04-26 RX ORDER — DOCUSATE SODIUM 100 MG/1
100 CAPSULE, LIQUID FILLED ORAL 2 TIMES DAILY PRN
Status: DISCONTINUED | OUTPATIENT
Start: 2021-04-26 | End: 2021-04-29 | Stop reason: HOSPADM

## 2021-04-26 RX ADMIN — PANTOPRAZOLE SODIUM 80 MG: 40 INJECTION, POWDER, FOR SOLUTION INTRAVENOUS at 20:56

## 2021-04-26 RX ADMIN — SODIUM CHLORIDE: 9 INJECTION, SOLUTION INTRAVENOUS at 23:18

## 2021-04-26 ASSESSMENT — ENCOUNTER SYMPTOMS
CONSTIPATION: 0
EYE PAIN: 0
PHOTOPHOBIA: 0
WHEEZING: 0
NAUSEA: 0
COUGH: 0
SHORTNESS OF BREATH: 1
SORE THROAT: 0
APNEA: 0
RHINORRHEA: 0
EYE REDNESS: 0
DIARRHEA: 0
ABDOMINAL PAIN: 0
TROUBLE SWALLOWING: 0
BACK PAIN: 0
CHEST TIGHTNESS: 0
VOMITING: 0

## 2021-04-26 ASSESSMENT — PAIN SCALES - GENERAL: PAINLEVEL_OUTOF10: 0

## 2021-04-26 NOTE — ED NOTES
Bed: 18B-18  Expected date: 4/26/21  Expected time:   Means of arrival: [facility]  Comments:  [facility]  04/26/21 0935

## 2021-04-26 NOTE — ED PROVIDER NOTES
[address]      Pt Name: Shamika Valle  MRN: [id number]  [place] 1950  Date of evaluation: 4/26/2021      CHIEF COMPLAINT       Chief Complaint   Patient presents with    Shortness of Breath     Started 4 days ago when he was discharged from the hospital.         HPI  Shamika Valle is a 79 y.o. male with a history of COVID-19, on 2 L nasal cannula at baseline. Severe pulmonary hypertension, diastolic heart failure, status post renal transplant from [facility], recently discharged from the hospital after presenting in renal failure and worsening respiratory status with recent COVID-19 viral pneumonia who presents to the emergency department for worsening shortness of breath. Patient reports that since his discharge home has had gradually worsening dyspnea. He states that he feels short of breath even at rest.  He has not required increased nasal cannula oxygen. He states that he feels a tightness in his chest.  He describes symptoms as constant, worsening with time, nothing makes it better or worse. He states he has had mild leg swelling which is unchanged. He denies any headache, injury, fall. Patient reports that he is having black stools also. He states he has multiple black bowel movements every day since his discharge. He reports associated fatigue and lightheadedness. Except as noted above the remainder of the review of systems was reviewed and negative. Review of Systems   Constitutional: Positive for fatigue. Negative for activity change, chills, diaphoresis and fever. HENT: Negative for rhinorrhea, sore throat and trouble swallowing. Eyes: Negative for photophobia, pain and redness. Respiratory: Positive for shortness of breath. Negative for apnea, cough, chest tightness and wheezing. Cardiovascular: Positive for chest pain and leg swelling. Negative for palpitations.    Gastrointestinal: Negative for abdominal pain, constipation, diarrhea, nausea and vomiting. Endocrine: Negative for polyuria. Genitourinary: Negative for difficulty urinating and dysuria. Musculoskeletal: Negative for back pain, neck pain and neck stiffness. Skin: Negative for pallor and rash. Neurological: Negative for dizziness, syncope, weakness, light-headedness and numbness. Psychiatric/Behavioral: Negative for confusion. The patient is not nervous/anxious. Physical Exam  Vitals signs and nursing note reviewed. Constitutional:       General: He is not in acute distress. Appearance: He is well-developed. Comments: Awake and alert. Sitting in the gurney in no obvious distress. HENT:      Head: Normocephalic and atraumatic. Mouth/Throat:      Mouth: Mucous membranes are moist.      Pharynx: No oropharyngeal exudate. Eyes:      General: No scleral icterus. Pupils: Pupils are equal, round, and reactive to light. Neck:      Musculoskeletal: Normal range of motion and neck supple. Cardiovascular:      Rate and Rhythm: Normal rate and regular rhythm. Heart sounds: Normal heart sounds. No murmur. Comments: 2+ radial and dorsal pedis pulses bilaterally  Pulmonary:      Effort: No respiratory distress. Breath sounds: No wheezing. Comments: Decreased breath sounds bilaterally. Nonhypoxic on 3 L nasal cannula. Abdominal:      Palpations: Abdomen is soft. Tenderness: There is no abdominal tenderness. There is no guarding or rebound. Musculoskeletal: Normal range of motion. General: No tenderness or deformity. Right lower leg: No edema. Left lower leg: No edema. Skin:     General: Skin is warm and dry. Capillary Refill: Capillary refill takes less than 2 seconds. Findings: No rash. Neurological:      General: No focal deficit present. Mental Status: He is alert and oriented to person, place, and time. Cranial Nerves:  No cranial nerve deficit. Sensory: No sensory deficit. Motor: No weakness or abnormal muscle tone. Psychiatric:         Mood and Affect: Mood normal.         Behavior: Behavior normal.          Procedures     MDM   This is a 66-year-old male with a history of GERD, CKD, hypertension, coronary artery disease, CHF, pulmonary hypertension, recent COVID-19 viral pneumonia on 2 L nasal cannula oxygen since discharge several days ago who presents with increased weakness dyspnea and melena. In the emergency department the patient is awake and alert, hemodynamic stable. He is afebrile. He is in no respiratory distress on baseline 2 L nasal cannula oxygen. Lab work notable for drop in hemoglobin to 7.5 from previous. Patient with melena. Creatinine 2.6 elevated from previous concerning for acute renal insufficiency. Electrolytes otherwise normal.  BNP 5000, troponin 0.07. EKG showed nonspecific T wave changes. No ST segment elevation or depression. Patient typed and screened. Concern for GI bleed. Started patient on Protonix. No leukocytosis or signs of underlying bacterial etiology of his symptoms. Discussed location with [place] practitioner working with Dr. Ashly Telles exacerbation for further evaluation in the hospital.                  --------------------------------------------- PAST HISTORY ---------------------------------------------  Past Medical History:  has a past medical history of Anemia, Atrial fibrillation ([address] CHF (congestive heart failure) ([address] Chronic diastolic congestive heart failure ([address] Chronic foot pain, Chronic knee pain, Constipation, Depression, DM (diabetes mellitus) ([address] ESRD (end stage renal disease) ([address] H/O kidney transplant, Hyperlipidemia, Hypertension, Immunosuppression ([address] Kidney disease, and Pulmonary hypertension ([address] Past Surgical History:  has a past surgical history that includes back surgery (1982);  Upper gastrointestinal endoscopy (12/21/2007); E9/L    Monocytes Absolute 0.55 0.10 - 0.95 E9/L    Eosinophils Absolute 0.03 (L) 0.05 - 0.50 E9/L    Basophils Absolute 0.01 0.00 - 0.20 E9/L    Anisocytosis 1+     Polychromasia 1+     Poikilocytes 2+     Schistocytes 1+     Acanthocytes 2+     Ovalocytes 1+    Comprehensive Metabolic Panel w/ Reflex to MG   Result Value Ref Range    Sodium 135 132 - 146 mmol/L    Potassium reflex Magnesium 4.9 3.5 - 5.0 mmol/L    Chloride 99 98 - 107 mmol/L    CO2 27 22 - 29 mmol/L    Anion Gap 9 7 - 16 mmol/L    Glucose 396 (H) 74 - 99 mg/dL    BUN 36 (H) 6 - 23 mg/dL    CREATININE 2.6 (H) 0.7 - 1.2 mg/dL    GFR Non-African American 30 >=60 mL/min/1.73    GFR African American 30     Calcium 8.9 8.6 - 10.2 mg/dL    Total Protein 5.1 (L) 6.4 - 8.3 g/dL    Albumin 3.0 (L) 3.5 - 5.2 g/dL    Total Bilirubin 0.4 0.0 - 1.2 mg/dL    Alkaline Phosphatase 45 40 - 129 U/L    ALT 11 0 - 40 U/L    AST 19 0 - 39 U/L   Lipase   Result Value Ref Range    Lipase 24 13 - 60 U/L   Troponin   Result Value Ref Range    Troponin 0.07 (H) 0.00 - 0.03 ng/mL   Brain Natriuretic Peptide   Result Value Ref Range    Pro-BNP 5,086 (H) 0 - 125 pg/mL   APTT   Result Value Ref Range    aPTT 24.4 (L) 24.5 - 35.1 sec   TYPE AND SCREEN   Result Value Ref Range    ABO/Rh O POS     Antibody Screen NEG        RADIOLOGY:  XR CHEST PORTABLE   Final Result   Decreased bilateral airspace opacities suggesting decreasing edema or   pneumonia      Bibasilar hypoaeration             EKG:  This EKG is signed and interpreted by me. Rate: 102bpm  Rhythm: sinus  Interpretation: Normal axis. Nonspecific T wave changes in high lateral and lateral leads. Nonspecific inversion in lead II. No ST segment elevation or depression. LVH by criteria in aVL. Comparison: Compared to previous from April 9, 2021 nonspecific T wave changes in lateral leads.       ------------------------- NURSING NOTES AND VITALS REVIEWED ---------------------------  Date / Time Roomed:  4/26/2021  5:51 PM  ED Bed Assignment:  LINDA/LINDA    The nursing notes within the ED encounter and vital signs as below have been reviewed. Patient Vitals for the past 24 hrs:   BP Temp Temp src Pulse Resp SpO2 Weight   04/26/21 2123 133/83 97.1 °F (36.2 °C) -- 92 20 100 % --   04/26/21 1753 129/65 97.1 °F (36.2 °C) Temporal 88 19 100 % 237 lb (107.5 kg)       Oxygen Saturation Interpretation: Abnormal - but at baseline    ------------------------------------------ PROGRESS NOTES ------------------------------------------    Counseling:  I have spoken with the patient and discussed todays results, in addition to providing specific details for the plan of care and counseling regarding the diagnosis and prognosis. Their questions are answered at this time and they are agreeable with the plan of admission.    --------------------------------- ADDITIONAL PROVIDER NOTES ---------------------------------  Consultations:  Spoke with Shala Sandoval np working with Dr. Caryn Roberts. Discussed case. They will admit the patient. This patient's ED course included: a personal history and physicial examination, re-evaluation prior to disposition, multiple bedside re-evaluations, IV medications, cardiac monitoring and continuous pulse oximetry    This patient has remained hemodynamically stable during their ED course. Diagnosis:  1. Acute renal insufficiency    2. Dyspnea, unspecified type    3. Melena    4. Gastrointestinal hemorrhage, unspecified gastrointestinal hemorrhage type    5. Pneumonia due to COVID-19 virus        Disposition:  Patient's disposition: Admit to telemetry  Patient's condition is stable.          Deshawn Quiroga DO  Resident  04/26/21 9565

## 2021-04-27 PROBLEM — D62 ACUTE BLOOD LOSS ANEMIA: Status: ACTIVE | Noted: 2021-04-27

## 2021-04-27 LAB
ALBUMIN SERPL-MCNC: 2.7 G/DL (ref 3.5–5.2)
ANION GAP SERPL CALCULATED.3IONS-SCNC: 6 MMOL/L (ref 7–16)
BUN BLDV-MCNC: 29 MG/DL (ref 6–23)
CALCIUM SERPL-MCNC: 8.8 MG/DL (ref 8.6–10.2)
CHLORIDE BLD-SCNC: 108 MMOL/L (ref 98–107)
CO2: 29 MMOL/L (ref 22–29)
CREAT SERPL-MCNC: 2.4 MG/DL (ref 0.7–1.2)
CREATININE URINE: 77 MG/DL (ref 40–278)
EKG ATRIAL RATE: 102 BPM
EKG Q-T INTERVAL: 360 MS
EKG QRS DURATION: 70 MS
EKG QTC CALCULATION (BAZETT): 469 MS
EKG T AXIS: -177 DEGREES
EKG VENTRICULAR RATE: 102 BPM
GFR AFRICAN AMERICAN: 32
GFR NON-AFRICAN AMERICAN: 32 ML/MIN/1.73
GLUCOSE BLD-MCNC: 147 MG/DL (ref 74–99)
HCT VFR BLD CALC: 26.1 % (ref 37–54)
HEMOGLOBIN: 7.8 G/DL (ref 12.5–16.5)
MCH RBC QN AUTO: 26.5 PG (ref 26–35)
MCHC RBC AUTO-ENTMCNC: 29.9 % (ref 32–34.5)
MCV RBC AUTO: 88.8 FL (ref 80–99.9)
METER GLUCOSE: 141 MG/DL (ref 74–99)
METER GLUCOSE: 169 MG/DL (ref 74–99)
METER GLUCOSE: 192 MG/DL (ref 74–99)
METER GLUCOSE: 246 MG/DL (ref 74–99)
METER GLUCOSE: 307 MG/DL (ref 74–99)
PDW BLD-RTO: 16.2 FL (ref 11.5–15)
PHOSPHORUS: 2.3 MG/DL (ref 2.5–4.5)
PLATELET # BLD: 177 E9/L (ref 130–450)
PMV BLD AUTO: 10.8 FL (ref 7–12)
POTASSIUM SERPL-SCNC: 4.6 MMOL/L (ref 3.5–5)
RBC # BLD: 2.94 E12/L (ref 3.8–5.8)
SODIUM BLD-SCNC: 143 MMOL/L (ref 132–146)
SODIUM URINE: 87 MMOL/L
UREA NITROGEN, UR: 511 MG/DL (ref 800–1666)
WBC # BLD: 6.1 E9/L (ref 4.5–11.5)

## 2021-04-27 PROCEDURE — 84540 ASSAY OF URINE/UREA-N: CPT

## 2021-04-27 PROCEDURE — 6370000000 HC RX 637 (ALT 250 FOR IP): Performed by: STUDENT IN AN ORGANIZED HEALTH CARE EDUCATION/TRAINING PROGRAM

## 2021-04-27 PROCEDURE — 6360000002 HC RX W HCPCS: Performed by: FAMILY MEDICINE

## 2021-04-27 PROCEDURE — 2700000000 HC OXYGEN THERAPY PER DAY

## 2021-04-27 PROCEDURE — 84300 ASSAY OF URINE SODIUM: CPT

## 2021-04-27 PROCEDURE — 97535 SELF CARE MNGMENT TRAINING: CPT

## 2021-04-27 PROCEDURE — 97166 OT EVAL MOD COMPLEX 45 MIN: CPT

## 2021-04-27 PROCEDURE — 36415 COLL VENOUS BLD VENIPUNCTURE: CPT

## 2021-04-27 PROCEDURE — 80069 RENAL FUNCTION PANEL: CPT

## 2021-04-27 PROCEDURE — 97530 THERAPEUTIC ACTIVITIES: CPT

## 2021-04-27 PROCEDURE — 6370000000 HC RX 637 (ALT 250 FOR IP): Performed by: NURSE PRACTITIONER

## 2021-04-27 PROCEDURE — 6370000000 HC RX 637 (ALT 250 FOR IP): Performed by: FAMILY MEDICINE

## 2021-04-27 PROCEDURE — 82570 ASSAY OF URINE CREATININE: CPT

## 2021-04-27 PROCEDURE — 2580000003 HC RX 258: Performed by: FAMILY MEDICINE

## 2021-04-27 PROCEDURE — 2140000000 HC CCU INTERMEDIATE R&B

## 2021-04-27 PROCEDURE — 97162 PT EVAL MOD COMPLEX 30 MIN: CPT

## 2021-04-27 PROCEDURE — 82962 GLUCOSE BLOOD TEST: CPT

## 2021-04-27 PROCEDURE — 6360000002 HC RX W HCPCS: Performed by: NURSE PRACTITIONER

## 2021-04-27 PROCEDURE — 85027 COMPLETE CBC AUTOMATED: CPT

## 2021-04-27 RX ORDER — HYDRALAZINE HYDROCHLORIDE 20 MG/ML
5 INJECTION INTRAMUSCULAR; INTRAVENOUS EVERY 6 HOURS PRN
Status: DISCONTINUED | OUTPATIENT
Start: 2021-04-27 | End: 2021-04-29 | Stop reason: HOSPADM

## 2021-04-27 RX ORDER — METOPROLOL SUCCINATE 50 MG/1
50 TABLET, EXTENDED RELEASE ORAL NIGHTLY
Status: DISCONTINUED | OUTPATIENT
Start: 2021-04-27 | End: 2021-04-29 | Stop reason: HOSPADM

## 2021-04-27 RX ADMIN — INSULIN LISPRO 3 UNITS: 100 INJECTION, SOLUTION INTRAVENOUS; SUBCUTANEOUS at 00:00

## 2021-04-27 RX ADMIN — METOPROLOL SUCCINATE 50 MG: 50 TABLET, EXTENDED RELEASE ORAL at 22:21

## 2021-04-27 RX ADMIN — INSULIN LISPRO 1 UNITS: 100 INJECTION, SOLUTION INTRAVENOUS; SUBCUTANEOUS at 12:23

## 2021-04-27 RX ADMIN — SODIUM BICARBONATE 650 MG: 650 TABLET ORAL at 10:07

## 2021-04-27 RX ADMIN — TACROLIMUS 2 MG: 1 CAPSULE ORAL at 22:10

## 2021-04-27 RX ADMIN — ACETAMINOPHEN 650 MG: 325 TABLET ORAL at 10:56

## 2021-04-27 RX ADMIN — HYDRALAZINE HYDROCHLORIDE 5 MG: 20 INJECTION INTRAMUSCULAR; INTRAVENOUS at 03:10

## 2021-04-27 RX ADMIN — PANTOPRAZOLE SODIUM 40 MG: 40 TABLET, DELAYED RELEASE ORAL at 15:56

## 2021-04-27 RX ADMIN — PANTOPRAZOLE SODIUM 40 MG: 40 TABLET, DELAYED RELEASE ORAL at 06:11

## 2021-04-27 RX ADMIN — TACROLIMUS 2 MG: 1 CAPSULE ORAL at 00:27

## 2021-04-27 RX ADMIN — INSULIN LISPRO 2 UNITS: 100 INJECTION, SOLUTION INTRAVENOUS; SUBCUTANEOUS at 15:52

## 2021-04-27 RX ADMIN — MYCOPHENOLATE MOFETIL 1000 MG: 250 CAPSULE ORAL at 00:28

## 2021-04-27 RX ADMIN — TACROLIMUS 2 MG: 1 CAPSULE ORAL at 10:07

## 2021-04-27 RX ADMIN — MYCOPHENOLATE MOFETIL 1000 MG: 250 CAPSULE ORAL at 10:07

## 2021-04-27 RX ADMIN — ATORVASTATIN CALCIUM 10 MG: 10 TABLET, FILM COATED ORAL at 10:56

## 2021-04-27 RX ADMIN — HYDRALAZINE HYDROCHLORIDE 100 MG: 25 TABLET, FILM COATED ORAL at 14:51

## 2021-04-27 RX ADMIN — HYDRALAZINE HYDROCHLORIDE 100 MG: 25 TABLET, FILM COATED ORAL at 06:07

## 2021-04-27 RX ADMIN — INSULIN GLARGINE 17 UNITS: 100 INJECTION, SOLUTION SUBCUTANEOUS at 12:25

## 2021-04-27 RX ADMIN — METOPROLOL SUCCINATE 50 MG: 50 TABLET, EXTENDED RELEASE ORAL at 00:44

## 2021-04-27 RX ADMIN — SODIUM CHLORIDE: 9 INJECTION, SOLUTION INTRAVENOUS at 10:56

## 2021-04-27 RX ADMIN — ISOSORBIDE MONONITRATE 60 MG: 60 TABLET ORAL at 10:07

## 2021-04-27 RX ADMIN — MYCOPHENOLATE MOFETIL 1000 MG: 250 CAPSULE ORAL at 21:39

## 2021-04-27 RX ADMIN — SODIUM CHLORIDE: 9 INJECTION, SOLUTION INTRAVENOUS at 21:39

## 2021-04-27 RX ADMIN — SODIUM CHLORIDE, PRESERVATIVE FREE 10 ML: 5 INJECTION INTRAVENOUS at 10:07

## 2021-04-27 RX ADMIN — HYDRALAZINE HYDROCHLORIDE 100 MG: 25 TABLET, FILM COATED ORAL at 22:10

## 2021-04-27 ASSESSMENT — PAIN SCALES - WONG BAKER
WONGBAKER_NUMERICALRESPONSE: 0

## 2021-04-27 ASSESSMENT — PAIN SCALES - GENERAL
PAINLEVEL_OUTOF10: 0

## 2021-04-27 ASSESSMENT — PAIN DESCRIPTION - PAIN TYPE: TYPE: ACUTE PAIN;CHRONIC PAIN

## 2021-04-27 NOTE — PROGRESS NOTES
Called and Updated patients wife \"G\" @ [phone], verified patients name and , vital signs stable at this time, planning an EGD in AM, suspected GI bleed, positive fecal occult, denies further questions or concerns at this time, will continue to monitor.

## 2021-04-27 NOTE — CARE COORDINATION
SOCIAL WORK/DISCHARGE PLANNING;  Pt is a readmission. He was discharged from [facility] after a stay from 04/-09 to 04-20-21. Pt was able to return home at that time with home 02 from [facility]. Pt was offered HHC  But declined. I again spoke with pt about HHC and he declined. He does not feel it's needed. Pt reports that he lives alone in a bi-level home. He denied having any problems with steps. Pt is independent. He states plan is again to return home. His son or daughter will transport at discharge. Pt's pcp is Dr. Sonia Rivero and pharmacy is [facility].    [facility]  [phone]

## 2021-04-27 NOTE — PROGRESS NOTES
Dr. Antoinette Fitzpatrick  Attending on call for [facility] and Dr. Shabana Barnes resident on call notified of consult for GI bleed via perfect serve.   Spoke to Antoinette Fitzpatrick telephone with read back

## 2021-04-27 NOTE — CONSULTS
60 tablet 1    tiZANidine (ZANAFLEX) 4 MG tablet Take 4 mg by mouth daily      simvastatin (ZOCOR) 10 MG tablet Take 10 mg by mouth daily      isosorbide mononitrate (IMDUR) 60 MG extended release tablet TAKE 1 TABLET DAILY 90 tablet 3    hydrALAZINE (APRESOLINE) 50 MG tablet Take 1 tablet by mouth every 8 hours (Patient taking differently: Take 100 mg by mouth every 8 hours ) 90 tablet 3    tacrolimus (PROGRAF) 1 MG capsule Take 2 capsules by mouth 2 times daily 60 capsule 3    prednisoLONE 5 MG TABS Take 5 mg by mouth daily       sodium bicarbonate 650 MG tablet Take 650 mg by mouth daily      Multiple Vitamin (MULTI VITAMIN DAILY PO) Take by mouth daily      Cholecalciferol (VITAMIN D3) 5000 UNITS TABS Take by mouth daily      famotidine (PEPCID) 20 MG tablet Take 20 mg by mouth daily      LANTUS SOLOSTAR 100 UNIT/ML SOPN Inject 17 Units into the skin daily At noon   Had today      mycophenolate (CELLCEPT) 250 MG capsule   Take 1,000 mg by mouth 2 times daily       insulin lispro (HUMALOG KWIKPEN) 100 UNIT/ML SOPN Inject 5 Units into the skin daily (before lunch) Patient takes with his biggest meal      docusate sodium (COLACE) 100 MG capsule Take 1 capsule by mouth 2 times daily as needed for Constipation. (Patient taking differently: Take 100 mg by mouth daily as needed for Constipation ) 20 capsule 0    linagliptin (TRADJENTA) 5 MG tablet Take 5 mg by mouth daily.  aspirin 81 MG EC tablet Take 81 mg by mouth daily. Allergies:    Patient has no known allergies. Social History:     reports that he quit smoking about 31 years ago. His smoking use included cigarettes. He has a 20.00 pack-year smoking history. He has never used smokeless tobacco. He reports current alcohol use. He reports that he does not use drugs.     Family History:         Problem Relation Age of Onset    Diabetes Mother     High Blood Pressure Mother     Heart Disease Mother     Diabetes Father     Cancer Father     Stroke Father     Heart Disease Father        ROS:     General: no fever, chills   Heent: no nasal congestion, sore throat   Resp: no cough, sob , hemoptysis  Cardiac: no cp , le edema, palpitations  Gi: no nausea, vomiting, positive for melena  Gu: no hematuria, dysuria   Neruo: no numbness, weakness, headache, blurry vision   Endocrine:  no h/o dm  Derm: no rash , petechia  Heme: no epistaxis, bruising  All other sx negative     Physical Exam:      Patient Vitals for the past 24 hrs:   BP Temp Temp src Pulse Resp SpO2 Height Weight   04/27/21 0607 (!) 167/81 -- -- 97 -- -- -- --   04/27/21 0304 (!) 174/85 98.4 °F (36.9 °C) Infrared 96 20 97 % -- --   04/27/21 0019 137/84 98.8 °F (37.1 °C) Oral 96 20 95 % -- --   04/26/21 2200 (!) 162/88 97.6 °F (36.4 °C) Infrared 93 20 96 % 5' 11\" (1.803 m) 212 lb 11.9 oz (96.5 kg)   04/26/21 2123 133/83 97.1 °F (36.2 °C) -- 92 20 100 % -- --   04/26/21 1753 129/65 97.1 °F (36.2 °C) Temporal 88 19 100 % -- 237 lb (107.5 kg)         Intake/Output Summary (Last 24 hours) at 4/27/2021 0906  Last data filed at 4/27/2021 9389  Gross per 24 hour   Intake 780 ml   Output 275 ml   Net 505 ml       Physical exam: Deferred due to strict Covid isolation      Data:    Recent Labs     04/26/21  1817 04/27/21  0808   WBC 7.6 6.1   HGB 7.5* 7.8*   HCT 25.5* 26.1*   MCV 87.6 88.8    177       Recent Labs     04/26/21 1817      K 4.9   CL 99   CO2 27   CREATININE 2.6*   BUN 36*   LABGLOM 30   GLUCOSE 396*   CALCIUM 8.9       Vit D, 25-Hydroxy   Date Value Ref Range Status   04/13/2021 53 30 - 100 ng/mL Final     Comment:     <20 ng/mL. ........... [employer] Deficient  20-30 ng/mL. ......... [employer] Insufficient   ng/mL. ........ [employer] Sufficient  >100 ng/mL. .......... [employer] Toxic         PTH   Date Value Ref Range Status   04/13/2021 279 (H) 15 - 65 pg/mL Final       Recent Labs     04/26/21 1817   ALT 11   AST 19   ALKPHOS 45   BILITOT 0.4       Recent Labs     04/26/21 1817   LABALBU 3.0* Ferritin   Date Value Ref Range Status   04/15/2021 437 ng/mL Final     Comment:     FERRITIN Reference Ranges:  Adult Males   20 - 60 years:    30 - 400 ng/mL  Adult females 16 - 61 years:    15 - 150 ng/mL  Adults greater than 60 years:   no established reference range  Pediatrics:                     no established reference range       Iron   Date Value Ref Range Status   04/28/2017 10 (L) 59 - 158 mcg/mL Final     TIBC   Date Value Ref Range Status   04/28/2017 215 (L) 250 - 450 mcg/dL Final       Vitamin B-12   Date Value Ref Range Status   04/28/2017 281 211 - 946 pg/mL Final       Folate   Date Value Ref Range Status   04/28/2017 7.4 4.8 - 24.2 ng/mL Final         Lab Results   Component Value Date    COLORU Yellow 04/10/2021    NITRU Negative 04/10/2021    GLUCOSEU Negative 04/10/2021    KETUA Negative 04/10/2021    UROBILINOGEN 0.2 04/10/2021    BILIRUBINUR Negative 04/10/2021       Lab Results   Component Value Date/Time    OSMOU 351 04/10/2021 08:25 PM       No components found for: URIC    No results found for: LIPIDPAN      Assessment and Plan:    1- CKD5D/ESRD with RRT as LRD Renal Allograft placed 12/12/14 with a baseline serum cr of 2.1-2.7mg/dl and an e-GFR=30-28ml/min.   UA (4/10/21)SG 1.020, protein 100mg/dl, blood (-).  Ni (-), LE (-)  TXP US (4/19/21) No hydro with 1cm complex renal cyst, no MARI  Tacrolimus level 18.8 (from 4/17/21)  Cont  IV fluids as po intake is poor as recorded and per nursing  Strict intake and output, daily labs     2 -Anemia in CKD - HgB below goal >/=10  Concern for GI bleeding  Surgery consulted  EGD pending     3- HTN with CKD 5/ESRD  BP above goal <130/80  Follow until condition stabilizes     4- Sec HPTH due to the CAN/CKD with Hypocalcemia  PO4 WNL   (4/13/21)  Vit D 53 (4/13/21)  Follow      5- Hyponatremia  Na+ WNL  Resolved         Thank you  for allowing us to participate in care of Chris Acuña   Pt seen and examined agree with above  Renal fn stable at baseline  Sp kidney txp 2014  covid pna  Trial of ivf  Ellis Dickens MD

## 2021-04-27 NOTE — PROGRESS NOTES
Pt has been NPO and vitals are stable. No bleeding reported overnight. Will recheck CBC and if stable plan for clears today and EGD tomorrow.     Electronically signed by Maisha Dong MD on 4/27/2021 at 6:55 AM  Electronically signed by Janna Rodarte MD on 4/27/2021 at 7:11 AM

## 2021-04-27 NOTE — PROGRESS NOTES
Sri Miguel notified that medication list was updated and apresoline and toprol XL doses have changed. Patient's POCT blood glucose is elevated at 359. Heart rate is high 90's to 108. The plan is for EGD in am with general surgery.   The patient is NPO  Please see new orders

## 2021-04-27 NOTE — PROGRESS NOTES
Occupational Therapy  OCCUPATIONAL THERAPY INITIAL EVALUATION      Date:2021  Patient Name: Ginny Anguiano  MRN: [id number]  : 1950  Room: [address] [place] [address]    Referring Provider:  CHAI Ricks CNP    Evaluating OT: Thelma Daniels OTR/L #[id number]     AM-PAC Daily Activity Raw Score:     Recommended Adaptive Equipment:  TBD     Diagnosis: Acute Renal Insufficiency      Patient presented to the hospital d/t SOB    Pertinent Medical History:    Past Medical History:   Diagnosis Date    Anemia     Iron deficiency    At[facility] ([facility]     CHF (congestive heart failure) ([facility] 2008    Chronic diastolic congestive heart failure ([facility] 2008    Chronic foot pain     Chronic knee pain     BILATERAL    Constipation     Depression     DM (diabetes mellitus) ([facility]     ESRD (end stage renal disease) ([facility]     H/O kidney transplant 2014    Hyperlipidemia     Hypertension     Immunosuppression ([facility] 3/12/2008    Kidney disease     Pulmonary hypertension ([facility]       Precautions:  Falls, Droplet plus (COVID-19),  O2, TAPS, bed alarm     Home Living: Pt lives alone talib bi-level home with 1 FILOMENA and no hand rails.  5 steps with 1 hand rail to 2nd floor  Bathroom setup: tub/shower combo   Equipment owned: shower chair    Prior Level of Function: Independent with ADLs , Independent with IADLs; ambulated without AD  Driving: yes  Occupation: Not Stated    Pain Level: Pt denies pain this session    Cognition: A&O: 4/4; Follows 2 step directions   Memory:  good   Sequencing:  good   Problem solving:  fair   Judgement/safety:  fair     Functional Assessment:   Initial Eval Status  Date: 21 Treatment Status  Date: Short Term Goals = Long Term Goals  Treatment frequency: 1-4x/wk; PRN   Feeding Independent       Grooming Stand by Assist     Seated EOB  Independent    UB Dressing Stand by Assist   Independent    LB Dressing Minimal Assist   Mod I   Bathing Minimal Assist  Independent Toileting Minimal Assist   Independent    Bed Mobility  Supine to sit: Stand by Assist   Sit to supine: NT   Supine to sit: Independent   Sit to supine: Independent    Functional Transfers Minimal Assist     Sit<>Stand to/from chair/bed/toilet  Modified [place]     Functional Mobility Minimal Assist     Functional ambulation with w/w to/from bathroom  Modified [place]    Balance Sitting:     Static:  Supervision    Dynamic:SBA  Standing: Min A  Sitting:     Static:  Independent    Dynamic:Independent  Standing: Mod I   Activity Tolerance Fair  Good   Visual/  Perceptual Glasses: at times  wfl                  Vitals:  3L O2 via NC  Rest: O2 sat 95%, HR 79 bpm  Activity: O2 sat 89-92%, HR  bpm  Rest: O2 sat 94%, HR 80 bpm      Hand dominance: right     Strength ROM Additional Info:    RUE  4+/5  WFL good  and wfl FMC/dexterity noted during ADL tasks       LUE 4+/5  WFL good  and wfl FMC/dexterity noted during ADL tasks         Hearing: WFL   Sensation:  No c/o numbness or tingling   Tone: WFL   Edema: WFL                            Comments: Nursing clearance obtained prior to session. Upon arrival, patient supine in bed and agreeable to OT session. Pt demonstrating fair understanding of education/techniques, requiring additional training / education. At end of session, patient seated in chair with call light and phone within reach, all lines and tubes intact. Pt would benefit from continued skilled OT to increase safety,  independence and quality of life. Treatment:  OT services provided: Therapist educated pt on role of OT. Therapist facilitated bed mobility (supine to sit, scooting), functional transfers from multiple surfaces (sit<>stand to/from bed, chair, toilet), graded functional activities (static/dynamic sitting at EOB/chair, static/dynamic standing, functional reaching) to address activity tolerance, and functional ambulation using w/w (to/from bathroom).  Therapist provided moderate verbal cueing on w/w management, hand placement, body mechanics, posture, breathing techniques, energy conservation, and safety. Therapist facilitated ADL retraining (UB dressing (don/doff robe), LB dressing (don/doff sock), grooming task (B standing to wipe hands), toileting in bathroom). Therapist provided moderate verbal cueing on w/w management, hand placement, body mechanics, posture, breathing techniques, energy conservation, and safety. Therapist educated pt on energy conservation techniques, breathing techniques, and safety. Skilled monitoring of O2 sats, HR, and pt response throughout treatment.      Assessment of current deficits    Functional mobility [x]  ADLs [x] Strength [x]  Cognition []  Functional transfers  [x] IADLs [x] Safety Awareness [x]  Endurance [x]  Fine Motor Coordination [] Balance [x] Vision/perception [] Sensation []   Gross Motor Coordination [] ROM [] Delirium []                  Motor Control []      Plan of Care:  1-4 days/wk for 1-2 weeks PRN   Instruction/training on adapted ADL techniques and AE recommendations to increase functional independence within precautions  Training on energy conservation strategies/techniques to improve independence/tolerance for self-care routine  Functional transfer/mobility training/DME recommendations for increased independence, safety, and fall prevention  Patient/Family education to increase follow through with safety techniques and functional independence  Recommendation of environmental modifications for increased safety with functional transfers/mobility and ADLs  Therapeutic exercise to improve motor endurance, ROM, and functional strength for ADLs/functional transfers  Therapeutic activities to facilitate/challenge dynamic balance, stand tolerance, fine motor dexterity/in-hand manipulation for increased independence with ADLs      Rehab Potential: Good for established goals     Patient / Family Goal: Not Stated      Patient and/or

## 2021-04-27 NOTE — PROGRESS NOTES
Comprehensive Nutrition Assessment    Type and Reason for Visit:  Initial, Consult    Nutrition Recommendations/Plan: Advance diet when medically appropriate. Recommend and start Glucerna BID and [place] wound healing supplement BID when diet advances to help meet increased nutritional needs and to help assist with proper wound healing. Nutrition Assessment:  Patient adm w/ poor po intake PTA 2/2 fatigue and SOB ; currently on clear liquids for planned EGD ; adm w/ upper GI bleed ; pt also COVID-19 positive ; pt at nutritional risk d/t increased needs from wound healing ; hx of DM/CHF ; noted ALYSON on CKD ; hx of renal transplant ; will start ONS when diet advances    Malnutrition Assessment:  Malnutrition Status: At risk for malnutrition (Comment)    Context:  Acute Illness     Findings of the 6 clinical characteristics of malnutrition:  Energy Intake:  Mild decrease in energy intake (Comment)(x 2 day since admission)  Weight Loss:  Unable to assess(d/t possible fluid shifts ; hx of CHF)     Body Fat Loss:  Unable to assess(d/t COVID isolation)     Muscle Mass Loss:  Unable to assess(d/t COVID isolation)    Fluid Accumulation:  No significant fluid accumulation     Strength:  Not Performed    Estimated Daily Nutrient Needs:  Energy (kcal):  4443-7636 (REE 1746 x 1.2 SF); Weight Used for Energy Requirements:  Current     Protein (g):  100-120 (1.3-1.5g/kg IBW ; noted ALYSON);  Weight Used for Protein Requirements:  Ideal        Fluid (ml/day):  per renal; Method Used for Fluid Requirements:  Standard Renal      Nutrition Related Findings:  I&Os WNL, 1+ edema, active BS, rounded/distended abd, U/L dentures, A&O x 4, [place], SOB, fatigue, redness to heels, excoriation to buttocks      Wounds:  Multiple, Open Wounds, Wound Consult Pending(wounds x 2)       Current Nutrition Therapies:    DIET CLEAR LIQUID;  Diet NPO, After Midnight Exceptions are: Sips of Water with Meds    Anthropometric Measures:  · Height: 5' 11\" (180.3 cm)  · Current Body Weight: 212 lb (96.2 kg)(4/26, bedscale)   · Usual Body Weight: (EMR shows past weights of 237# bedscale on 4/9/21 and 245# no method on 8/27/20)     · Ideal Body Weight: 172 lbs; % Ideal Body Weight 123.3 %   · BMI: 29.6   · BMI Categories: Overweight (BMI 25.0-29. 9)       Nutrition Diagnosis:   · Inadequate oral intake related to inadequate protein-energy intake(2/2 COVID-19) as evidenced by poor intake prior to admission, GI abnormality(adm w/ GI bleed)      Nutrition Interventions:   Food and/or Nutrient Delivery:  Continue Current Diet  Nutrition Education/Counseling:  Education not indicated   Coordination of Nutrition Care:  Continue to monitor while inpatient    Goals:  Advance diet when medically appropriate       Nutrition Monitoring and Evaluation:   Behavioral-Environmental Outcomes:  Beliefs and Attitutes   Food/Nutrient Intake Outcomes:  Diet Advancement/Tolerance  Physical Signs/Symptoms Outcomes:  Biochemical Data, Chewing or Swallowing, GI Status, Fluid Status or Edema, Hemodynamic Status, Meal Time Behavior, Nutrition Focused Physical Findings, Skin, Weight     Discharge Planning:     Too soon to determine     Electronically signed by Jaymie Torres RD, LD on 4/27/21 at 12:37 PM EDT    Contact: [phone]

## 2021-04-27 NOTE — PLAN OF CARE
Problem: Airway Clearance - Ineffective  Goal: Achieve or maintain patent airway  Outcome: Met This Shift     Problem: Gas Exchange - Impaired  Goal: Absence of hypoxia  Outcome: Met This Shift  Goal: Promote optimal lung function  Outcome: Met This Shift     Problem: Gas Exchange - Impaired  Goal: Promote optimal lung function  Outcome: Met This Shift

## 2021-04-27 NOTE — PROGRESS NOTES
Physical Therapy  Physical Therapy Initial Assessment     Name: Mindy Merritt  : 1950  MRN: [id number]    Referring Provider: CHAI Foley CNP    Date of Service: 2021    Evaluating PT:  Taniya Tate, PT   Room #:  4405/0860-Z  Diagnosis: Acute renal insufficiency [N28.9]     PMHx/PSHx:   has a past medical history of Anemia, Atrial fibrillation ([facility] [address] CHF (congestive heart failure) ([facility] [address] Chronic diastolic congestive heart failure ([facility] [address] Chronic foot pain, Chronic knee pain, Constipation, Depression, DM (diabetes mellitus) ([facility] [address] ESRD (end stage renal disease) ([facility] [address] H/O kidney transplant, Hyperlipidemia, Hypertension, Immunosuppression ([facility] [address] Kidney disease, and Pulmonary hypertension ([facility] [address] has a past surgical history that includes back surgery (); Upper gastrointestinal endoscopy (2007); [place] Gastric Bypass (2010); Cholecystectomy, laparoscopic (2013); ECHO Compl W Dop Color Flow (2013); Diagnostic Cardiac Cath Lab Procedure (01/15/2013); Foot surgery; Colonoscopy; Endoscopy, colon, diagnostic; Carpal tunnel release (Left, 2014); Kidney transplant (2014); Bladder surgery; and Kidney biopsy (2015). Procedure/Surgery:  None  Precautions:  Falls, Droplet plus (COVID-19),  O2, TAPS, bed alarm  Equipment Needs: To be determined      SUBJECTIVE:    Patient lives alone  in a bilevel home  with 1 step to enter Rail  Bed is on 2 floor and bath is on 2 floor requires 5 steps to 2nd floor. Patient ambulated independently  PTA. Equipment owned: None,      OBJECTIVE:   Initial Evaluation  Date: 21 Treatment Short Term/ Long Term   Goals   AM-PAC 6 Clicks 83/07     Was pt agreeable to Eval/treatment? yes     Does pt have pain? None noted. Bed Mobility  Rolling: SBA  Supine to sit: SBA  Sit to supine: SBA  Scooting: SBA  Rolling: Ind  Supine to sit:  Ind  Sit to supine: Ind  Scooting: Ind   Transfers Sit to stand: Min  Stand to sit: Min  Stand pivot: Min  Sit to stand: Mod Ind  Stand to sit: Mod Ind  Stand pivot: Mod Ind   Ambulation    25 feet with fww for support Min completed 2 reps. 50+ feet with device if needed Ind   Stair negotiation: ascended and descended  NT   7 steps with 1 rail Ind   ROM BUE: wfl  BLE:  wfl     Strength BUE: 4+/5  RLE:  4+/5  LLE:   4+/5  5/5   Balance Sitting EOB:  Ind  Dynamic Standing:  Min   Sitting EOB:  Ind  Dynamic Standing:  Ind     Patient is Alert & Oriented x person, place, time and situation and follows directions   Sensation:  Pt denies numbness and tingling to extremities  Edema:  none    Vitals:  3L  Rest: O2 sat 95%, HR 82 bpm  Seated unsupported: O2 sat 92%, HR 93 bpm  Gait 25 feet:  O2 90% recover to 92% 30 seconds    recover to 80bpm  Gait 25 feet with w/w: O2 89% recover to 93% in 1 minute  Rest: O2 sat 94%, HR 79 bpm  Therapeutic Exercises:  AROM for BLE while seated EOB. Patient education  Patient educated on role of Physical Therapy, risks of immobility, safety and plan of care, energy conservation, importance of positional changes for oxygen exchange and  importance of mobility while in hospital      Patient response to education:   Pt verbalized understanding Pt demonstrated skill Pt requires further education in this area   yes yes reminders     ASSESSMENT:    Comments:  Patient was seen this date for PT evaluation. . Patient was agreeable to intervention. Results of the functional assessment are noted above. Upon entering the room patient was found supine in bed. Sat EOB x 10 minutes to increase dynamic sitting balance and activity tolerance. Gait completed in room with fww for support. Cues required for sequencing. At end of session, patient in bed with  call light and phone within reach,  all lines and tubes intact, nursing notified. This patient can benefit from the continuation of skilled PT  to maximize functional level and return to PLOF.      Treatment:  Patient Re-evaluation [id number]  [] Gait training 63775 - minutes  [] Manual therapy 70020 - minutes  [x] Therapeutic activities 69050 23 minutes  [] Therapeutic exercises 97481 - minutes  [] Neuromuscular reeducation 73221 - minutes       Ashlie Govea, [id number] [facility]

## 2021-04-27 NOTE — PROGRESS NOTES
Patient remains NPO   Dr. Lexi Borrego notified EGD likely cancelled for today. Awaiting CBC stat for surgery to review H&H and progress diet. At this time insulin held for FBS  169.

## 2021-04-27 NOTE — CONSULTS
GENERAL SURGERY  CONSULT NOTE  4/26/2021    Physician Consulted: Dr. Hiram Toro  Reason for Consult: Melena, anemia    CC: SOB    HPI  Anahi Felder is a 79 y.o. male who presents for evaluation of increased fatigue and shortness of breath. Patient has a history of renal transplant at the [facility] and is on tacrolimus and mycophenolate. Patient was recently admitted with Covid pneumonia on 4/9-4/20. Patient did receive a course of steroids during that admission. Patient started noticing black stool from that point on and at home. Hemoglobin on discharge was 9.3. Today hemoglobin is 7.5. Patient denies history of GERD but does take Pepcid he states ever since he had his kidney transplant. Patient reports previous colonoscopy about 4 years ago at [facility] surgery, significant for 3 polyps which were tubular adenoma and villous adenoma. Patient has never had an EGD in the past. Patient denies abdominal pain nausea or vomiting. Patient denies pain with defecation.       Past Medical History:   Diagnosis Date    Anemia     Iron deficiency    Atrial fibrillation (HCC)     CHF (congestive heart failure) (HCC) 03/12/2008    Chronic diastolic congestive heart failure ([address] 03/12/2008    Chronic foot pain     Chronic knee pain     BILATERAL    Constipation     Depression     DM (diabetes mellitus) ([address]     ESRD (end stage renal disease) ([address]     H/O kidney transplant 12/12/2014    Hyperlipidemia     Hypertension     Immunosuppression ([address] 3/12/2008    Kidney disease     Pulmonary hypertension ([facility] [address]        Past Surgical History:   Procedure Laterality Date    BACK SURGERY  1982    BLADDER SURGERY      CARPAL TUNNEL RELEASE Left 06/23/2014    CHOLECYSTECTOMY, LAPAROSCOPIC  05/21/2013    COLONOSCOPY      DIAGNOSTIC CARDIAC CATH LAB PROCEDURE  01/15/2013    NORMAL    ECHO COMPL W DOP COLOR FLOW  05/22/2013         ENDOSCOPY, COLON, DIAGNOSTIC      FOOT SURGERY      BONE REMOVED    KIDNEY BIOPSY 06/09/2015    [facility] ; ALSO 4/14/2016, 4/5/2017    KIDNEY TRANSPLANT  12/12/2014    @ [facility]    EDUARDO-EN-Y GASTRIC BYPASS  06/29/2010    Laparoscopic / Dr. Lindsey Rollins  12/21/2007    Dr. Silver Shaw       Medications Prior to Admission:    Prior to Admission medications    Medication Sig Start Date End Date Taking?  Authorizing Provider   metoprolol succinate (TOPROL XL) 100 MG extended release tablet Take 1 tablet by mouth nightly  Patient taking differently: Take 50 mg by mouth nightly  4/20/21  Yes Hawa Thomas MD   dexamethasone (DECADRON) 4 MG tablet Take 1 tablet by mouth daily for 5 days 4/21/21 4/26/21 Yes Hawa Thomas MD   furosemide (LASIX) 40 MG tablet Take 1 tablet by mouth daily Monitor weight and swelling may take additional dose as needed 4/20/21  Yes Hawa Thomas MD   tiZANidine (ZANAFLEX) 4 MG tablet Take 4 mg by mouth daily   Yes Historical Provider, MD   simvastatin (ZOCOR) 10 MG tablet Take 10 mg by mouth daily   Yes Historical Provider, MD   isosorbide mononitrate (IMDUR) 60 MG extended release tablet TAKE 1 TABLET DAILY 3/6/18  Yes Jonathon Rosa MD   hydrALAZINE (APRESOLINE) 50 MG tablet Take 1 tablet by mouth every 8 hours  Patient taking differently: Take 100 mg by mouth every 8 hours  5/1/17  Yes Esteban Kemp MD   tacrolimus (PROGRAF) 1 MG capsule Take 2 capsules by mouth 2 times daily 5/1/17  Yes Esteban Kemp MD   prednisoLONE 5 MG TABS Take 5 mg by mouth daily    Yes Historical Provider, MD   sodium bicarbonate 650 MG tablet Take 650 mg by mouth daily   Yes Historical Provider, MD   Multiple Vitamin (MULTI VITAMIN DAILY PO) Take by mouth daily   Yes Historical Provider, MD   Cholecalciferol (VITAMIN D3) 5000 UNITS TABS Take by mouth daily   Yes Historical Provider, MD   famotidine (PEPCID) 20 MG tablet Take 20 mg by mouth daily   Yes Historical Provider, MD   LANTUS SOLOSTAR 100 UNIT/ML SOPN Inject 17 Units into the skin daily At noon   Had today 6/3/15  Yes Historical Provider, MD   mycophenolate (CELLCEPT) 250 MG capsule   Take 1,000 mg by mouth 2 times daily  6/18/15  Yes Historical Provider, MD   insulin lispro (HUMALOG KWIKPEN) 100 UNIT/ML SOPN Inject 5 Units into the skin daily (before lunch) Patient takes with his biggest meal   Yes Historical Provider, MD   docusate sodium (COLACE) 100 MG capsule Take 1 capsule by mouth 2 times daily as needed for Constipation. Patient taking differently: Take 100 mg by mouth daily as needed for Constipation  13  Yes Carlos Thompson MD   linagliptin (TRADJENTA) 5 MG tablet Take 5 mg by mouth daily. Yes Historical Provider, MD   aspirin 81 MG EC tablet Take 81 mg by mouth daily. Yes Historical Provider, MD       No Known Allergies    Family History   Problem Relation Age of Onset    Diabetes Mother     High Blood Pressure Mother     Heart Disease Mother     Diabetes Father     Cancer Father     Stroke Father     Heart Disease Father        Social History     Tobacco Use    Smoking status: Former Smoker     Packs/day: 1.00     Years: 20.00     Pack years: 20.00     Types: Cigarettes     Quit date: 1990     Years since quittin.3    Smokeless tobacco: Never Used   Substance Use Topics    Alcohol use: Yes     Alcohol/week: 0.0 standard drinks     Comment: rare glass of wine;  drinks 2-3 cups of coffee daily    Drug use: No         Review of Systems   General ROS: positive for  - fatigue  negative for - chills or fever  Hematological and Lymphatic ROS: Patient does take aspirin daily  Respiratory ROS: Recent Covid pneumonia, still shedding virus.  Increased shortness of breath  Cardiovascular ROS: positive for - dyspnea on exertion  Gastrointestinal ROS: SEE HPI  Genito-Urinary ROS: no dysuria, trouble voiding, or hematuria  Musculoskeletal ROS: negative for - joint swelling or muscle pain      PHYSICAL EXAM:    Vitals:    21 2200   BP: (!) 162/88   Pulse: 93   Resp: 20   Temp: 97.6 °F (36.4 °C)   SpO2: 96%       General Appearance:  awake, alert, oriented, in no acute distress  Skin:  Skin color, texture, turgor normal. No rashes or lesions. Head/face:  NCAT  Eyes:  No gross abnormalities. Lungs: No acute respiratory distress on 2 L nasal cannula  Heart:  Heart regular rate and rhythm  Abdomen: Soft, nondistended, not tender to palpation. No guarding rigidity  Extremities: Extremities warm to touch, pink, with no edema. Male Rectal: External hemorrhoids present, not tender. Anal tone within normal limits. No masses appreciated within the rectal vault. Enlarged prostate appreciated. Stool melanotic and FOBT positive      LABS:    CBC  Recent Labs     04/26/21 1817   WBC 7.6   HGB 7.5*   HCT 25.5*        BMP  Recent Labs     04/26/21 1817      K 4.9   CL 99   CO2 27   BUN 36*   CREATININE 2.6*   CALCIUM 8.9     Liver Function  Recent Labs     04/26/21 1817   LIPASE 24   BILITOT 0.4   AST 19   ALT 11   ALKPHOS 45   PROT 5.1*   LABALBU 3.0*     No results for input(s): LACTATE in the last 72 hours. No results for input(s): INR, PTT in the last 72 hours. Invalid input(s): PT    RADIOLOGY    Xr Chest Portable    Result Date: 4/26/2021  EXAMINATION: ONE XRAY VIEW OF THE CHEST 4/26/2021 6:11 pm COMPARISON: 04/15/2021 HISTORY: ORDERING SYSTEM PROVIDED HISTORY: shortness of breath TECHNOLOGIST PROVIDED HISTORY: Reason for exam:->shortness of breath What reading provider will be dictating this exam?->CRC FINDINGS: The heart size is stable. Decreased bilateral airspace opacities. Persistent residual bilateral pulmonary opacities. Bibasilar hypoaeration.      Decreased bilateral airspace opacities suggesting decreasing edema or pneumonia Bibasilar hypoaeration         ASSESSMENT:  79 y.o. male with upper gastrointestinal hemorrhage, likely from recent steroid use    PLAN:    -NPO  -EGD tomorrow on 4/27  -PPI twice daily  -Monitor H&H transfuse for hemoglobin less than 7    Plan

## 2021-04-27 NOTE — H&P
31 years ago. His smoking use included cigarettes. He has a 20.00 pack-year smoking history. He has never used smokeless tobacco. He reports current alcohol use. He reports that he does not use drugs. Family History:   family history includes Cancer in his father; Diabetes in his father and mother; Heart Disease in his father and mother; High Blood Pressure in his mother; Stroke in his father. REVIEW OF SYSTEMS:  As above in the HPI, otherwise negative    PHYSICAL EXAM:    Vitals:  BP (!) 166/72   Pulse 72   Temp 100.1 °F (37.8 °C) (Oral)   Resp 18   Ht 5' 11\" (1.803 m)   Wt 212 lb 11.9 oz (96.5 kg)   SpO2 95%   BMI 29.67 kg/m²     General:  Awake, alert, oriented X 3. Well developed, well nourished, well groomed. No apparent distress. HEENT:  Normocephalic, atraumatic. Pupils equal, round, reactive to light. No scleral icterus. No conjunctival injection. Normal lips, teeth, and gums. No nasal discharge. Neck:  Supple  Heart:  RRR, no murmurs, gallops, rubs  Lungs:  CTA bilaterally, bilat symmetrical expansion, no wheeze, rales, or rhonchi  Abdomen:   Bowel sounds present, soft, nontender, no masses, no organomegaly, no peritoneal signs  Extremities:  No clubbing, cyanosis, or edema  Skin:  Warm and dry, no open lesions or rash  Neuro:  Cranial nerves 2-12 intact, no focal deficits  Breast: deferred  Rectal: deferred  Genitalia:  deferred    LABS:    CBC with Differential:    Lab Results   Component Value Date    WBC 6.1 04/27/2021    RBC 2.94 04/27/2021    HGB 7.8 04/27/2021    HCT 26.1 04/27/2021     04/27/2021    MCV 88.8 04/27/2021    MCH 26.5 04/27/2021    MCHC 29.9 04/27/2021    RDW 16.2 04/27/2021    SEGSPCT 85 06/03/2013    METASPCT 1 05/21/2013    LYMPHOPCT 7.0 04/26/2021    MONOPCT 7.2 04/26/2021    MYELOPCT 2 05/21/2013    BASOPCT 0.1 04/26/2021    MONOSABS 0.55 04/26/2021    LYMPHSABS 0.53 04/26/2021    EOSABS 0.03 04/26/2021    BASOSABS 0.01 04/26/2021     CMP:    Lab Results Component Value Date     04/27/2021    K 4.6 04/27/2021    K 4.9 04/26/2021     04/27/2021    CO2 29 04/27/2021    BUN 29 04/27/2021    CREATININE 2.4 04/27/2021    GFRAA 32 04/27/2021    LABGLOM 32 04/27/2021    GLUCOSE 147 04/27/2021    GLUCOSE 124 02/21/2012    PROT 5.1 04/26/2021    LABALBU 2.7 04/27/2021    LABALBU 3.8 07/05/2011    CALCIUM 8.8 04/27/2021    BILITOT 0.4 04/26/2021    ALKPHOS 45 04/26/2021    AST 19 04/26/2021    ALT 11 04/26/2021     Magnesium:    Lab Results   Component Value Date    MG 2.0 04/20/2021     Phosphorus:    Lab Results   Component Value Date    PHOS 2.3 04/27/2021     PT/INR:    Lab Results   Component Value Date    PROTIME 11.3 08/31/2020    INR 1.0 08/31/2020     Last 3 Troponin:    Lab Results   Component Value Date    TROPONINI 0.07 04/26/2021    TROPONINI 0.08 04/10/2021    TROPONINI 0.10 04/09/2021     U/A:    Lab Results   Component Value Date    COLORU Yellow 04/10/2021    PROTEINU 100 04/10/2021    PHUR 5.5 04/10/2021    WBCUA 0-1 04/10/2021    RBCUA 0-1 04/10/2021    RBCUA NONE 05/18/2013    BACTERIA RARE 04/10/2021    CLARITYU Clear 04/10/2021    SPECGRAV 1.020 04/10/2021    LEUKOCYTESUR Negative 04/10/2021    UROBILINOGEN 0.2 04/10/2021    BILIRUBINUR Negative 04/10/2021    BLOODU Negative 04/10/2021    GLUCOSEU Negative 04/10/2021    AMORPHOUS FEW 05/18/2013     ABG:    Lab Results   Component Value Date    PH 7.375 04/17/2021    PCO2 35.8 04/17/2021    PO2 94.1 04/17/2021    HCO3 20.5 04/17/2021    BE -4.2 04/17/2021    O2SAT 96.3 04/17/2021     HgBA1c:    Lab Results   Component Value Date    LABA1C 5.5 06/03/2014     FLP:    Lab Results   Component Value Date    TRIG 50 06/03/2014    HDL 51 06/03/2014    LDLCALC 67 06/03/2014    LABVLDL 10 06/03/2014     TSH:    Lab Results   Component Value Date    TSH 0.996 06/03/2014       XR CHEST PORTABLE   Final Result   Decreased bilateral airspace opacities suggesting decreasing edema or   pneumonia Bibasilar hypoaeration             ASSESSMENT:      Principal Problem:    GI bleed  Active Problems:    CKD (chronic kidney disease) stage 4, GFR 15-29 ml/min ([facility])    H/O kidney transplant    Immunosuppression ([facility] [address]    Pneumonia due to COVID-19 virus    Acute blood loss anemia  Resolved Problems:    * No resolved hospital problems.  *      PLAN:    70-year-old male history of end-stage renal disease status post kidney transplant 2014, CKD 4, recent COVID-19 pneumonia admitted with suspected GI bleeding-plan for EGD tomorrow    Admit  IV fluids  NPO  Monitor H&H  Transfuse PRN maintain hemoglobin >= 7  IV PPI  Gen surgical Consult appreciated   Nephrology consult  Medications for other co morbidities cont as appropriate w dosage adjustments as necessary  DVT PPx SCD  DC planning           Electronically signed by Cherie Griffith MD on 4/27/2021 at 1:20 PM

## 2021-04-28 ENCOUNTER — ANESTHESIA (OUTPATIENT)
Dept: ENDOSCOPY | Age: 71
DRG: 177 | End: 2021-04-28
Payer: MEDICARE

## 2021-04-28 ENCOUNTER — ANESTHESIA EVENT (OUTPATIENT)
Dept: ENDOSCOPY | Age: 71
DRG: 177 | End: 2021-04-28
Payer: MEDICARE

## 2021-04-28 VITALS — OXYGEN SATURATION: 91 % | DIASTOLIC BLOOD PRESSURE: 77 MMHG | SYSTOLIC BLOOD PRESSURE: 157 MMHG

## 2021-04-28 LAB
BLOOD BANK DISPENSE STATUS: NORMAL
BLOOD BANK DISPENSE STATUS: NORMAL
BLOOD BANK PRODUCT CODE: NORMAL
BLOOD BANK PRODUCT CODE: NORMAL
BPU ID: NORMAL
BPU ID: NORMAL
DESCRIPTION BLOOD BANK: NORMAL
DESCRIPTION BLOOD BANK: NORMAL
HCT VFR BLD CALC: 24.5 % (ref 37–54)
HEMOGLOBIN: 6.9 G/DL (ref 12.5–16.5)
MCH RBC QN AUTO: 25.2 PG (ref 26–35)
MCHC RBC AUTO-ENTMCNC: 28.2 % (ref 32–34.5)
MCV RBC AUTO: 89.4 FL (ref 80–99.9)
METER GLUCOSE: 102 MG/DL (ref 74–99)
METER GLUCOSE: 105 MG/DL (ref 74–99)
METER GLUCOSE: 110 MG/DL (ref 74–99)
METER GLUCOSE: 76 MG/DL (ref 74–99)
PDW BLD-RTO: 16.5 FL (ref 11.5–15)
PLATELET # BLD: 163 E9/L (ref 130–450)
PMV BLD AUTO: 10.9 FL (ref 7–12)
RBC # BLD: 2.74 E12/L (ref 3.8–5.8)
WBC # BLD: 4.2 E9/L (ref 4.5–11.5)

## 2021-04-28 PROCEDURE — 3700000001 HC ADD 15 MINUTES (ANESTHESIA): Performed by: SURGERY

## 2021-04-28 PROCEDURE — 85027 COMPLETE CBC AUTOMATED: CPT

## 2021-04-28 PROCEDURE — 2580000003 HC RX 258: Performed by: FAMILY MEDICINE

## 2021-04-28 PROCEDURE — 36430 TRANSFUSION BLD/BLD COMPNT: CPT

## 2021-04-28 PROCEDURE — 6370000000 HC RX 637 (ALT 250 FOR IP): Performed by: STUDENT IN AN ORGANIZED HEALTH CARE EDUCATION/TRAINING PROGRAM

## 2021-04-28 PROCEDURE — 2580000003 HC RX 258: Performed by: NURSE ANESTHETIST, CERTIFIED REGISTERED

## 2021-04-28 PROCEDURE — 6370000000 HC RX 637 (ALT 250 FOR IP): Performed by: NURSE PRACTITIONER

## 2021-04-28 PROCEDURE — 2140000000 HC CCU INTERMEDIATE R&B

## 2021-04-28 PROCEDURE — 7100000001 HC PACU RECOVERY - ADDTL 15 MIN: Performed by: SURGERY

## 2021-04-28 PROCEDURE — 2709999900 HC NON-CHARGEABLE SUPPLY: Performed by: SURGERY

## 2021-04-28 PROCEDURE — P9016 RBC LEUKOCYTES REDUCED: HCPCS

## 2021-04-28 PROCEDURE — 82962 GLUCOSE BLOOD TEST: CPT

## 2021-04-28 PROCEDURE — 3609017100 HC EGD: Performed by: SURGERY

## 2021-04-28 PROCEDURE — 3700000000 HC ANESTHESIA ATTENDED CARE: Performed by: SURGERY

## 2021-04-28 PROCEDURE — 36415 COLL VENOUS BLD VENIPUNCTURE: CPT

## 2021-04-28 PROCEDURE — 2700000000 HC OXYGEN THERAPY PER DAY

## 2021-04-28 PROCEDURE — 6360000002 HC RX W HCPCS: Performed by: NURSE ANESTHETIST, CERTIFIED REGISTERED

## 2021-04-28 PROCEDURE — 6370000000 HC RX 637 (ALT 250 FOR IP): Performed by: FAMILY MEDICINE

## 2021-04-28 PROCEDURE — 6360000002 HC RX W HCPCS: Performed by: FAMILY MEDICINE

## 2021-04-28 PROCEDURE — 0DJ08ZZ INSPECTION OF UPPER INTESTINAL TRACT, VIA NATURAL OR ARTIFICIAL OPENING ENDOSCOPIC: ICD-10-PCS | Performed by: SURGERY

## 2021-04-28 PROCEDURE — 7100000000 HC PACU RECOVERY - FIRST 15 MIN: Performed by: SURGERY

## 2021-04-28 RX ORDER — PROPOFOL 10 MG/ML
INJECTION, EMULSION INTRAVENOUS PRN
Status: DISCONTINUED | OUTPATIENT
Start: 2021-04-28 | End: 2021-04-28 | Stop reason: SDUPTHER

## 2021-04-28 RX ORDER — DEXTROSE MONOHYDRATE 50 MG/ML
INJECTION, SOLUTION INTRAVENOUS CONTINUOUS PRN
Status: DISCONTINUED | OUTPATIENT
Start: 2021-04-28 | End: 2021-04-28 | Stop reason: SDUPTHER

## 2021-04-28 RX ORDER — PREDNISOLONE SODIUM PHOSPHATE 15 MG/5ML
5 SOLUTION ORAL DAILY
Status: DISCONTINUED | OUTPATIENT
Start: 2021-04-28 | End: 2021-04-29 | Stop reason: HOSPADM

## 2021-04-28 RX ORDER — SODIUM CHLORIDE 9 MG/ML
INJECTION, SOLUTION INTRAVENOUS PRN
Status: DISCONTINUED | OUTPATIENT
Start: 2021-04-28 | End: 2021-04-29 | Stop reason: HOSPADM

## 2021-04-28 RX ADMIN — SODIUM CHLORIDE: 9 INJECTION, SOLUTION INTRAVENOUS at 09:32

## 2021-04-28 RX ADMIN — PANTOPRAZOLE SODIUM 40 MG: 40 TABLET, DELAYED RELEASE ORAL at 05:39

## 2021-04-28 RX ADMIN — DEXTROSE MONOHYDRATE 100 ML/HR: 50 INJECTION, SOLUTION INTRAVENOUS at 06:40

## 2021-04-28 RX ADMIN — TACROLIMUS 2 MG: 1 CAPSULE ORAL at 20:25

## 2021-04-28 RX ADMIN — PROPOFOL 70 MG: 10 INJECTION, EMULSION INTRAVENOUS at 07:25

## 2021-04-28 RX ADMIN — SODIUM BICARBONATE 650 MG: 650 TABLET ORAL at 11:12

## 2021-04-28 RX ADMIN — PANTOPRAZOLE SODIUM 40 MG: 40 TABLET, DELAYED RELEASE ORAL at 18:47

## 2021-04-28 RX ADMIN — METOPROLOL SUCCINATE 50 MG: 50 TABLET, EXTENDED RELEASE ORAL at 20:25

## 2021-04-28 RX ADMIN — MYCOPHENOLATE MOFETIL 1000 MG: 250 CAPSULE ORAL at 20:24

## 2021-04-28 RX ADMIN — MYCOPHENOLATE MOFETIL 1000 MG: 250 CAPSULE ORAL at 11:12

## 2021-04-28 RX ADMIN — INSULIN GLARGINE 17 UNITS: 100 INJECTION, SOLUTION SUBCUTANEOUS at 20:25

## 2021-04-28 RX ADMIN — ATORVASTATIN CALCIUM 10 MG: 10 TABLET, FILM COATED ORAL at 11:12

## 2021-04-28 RX ADMIN — HYDRALAZINE HYDROCHLORIDE 100 MG: 25 TABLET, FILM COATED ORAL at 05:41

## 2021-04-28 RX ADMIN — DEXTROSE MONOHYDRATE: 50 INJECTION, SOLUTION INTRAVENOUS at 07:23

## 2021-04-28 RX ADMIN — HYDRALAZINE HYDROCHLORIDE 100 MG: 25 TABLET, FILM COATED ORAL at 20:25

## 2021-04-28 RX ADMIN — ISOSORBIDE MONONITRATE 60 MG: 60 TABLET ORAL at 11:12

## 2021-04-28 RX ADMIN — HYDRALAZINE HYDROCHLORIDE 100 MG: 25 TABLET, FILM COATED ORAL at 14:47

## 2021-04-28 RX ADMIN — TACROLIMUS 2 MG: 1 CAPSULE ORAL at 11:12

## 2021-04-28 RX ADMIN — PREDNISOLONE SODIUM PHOSPHATE 5 MG: 15 SOLUTION ORAL at 14:33

## 2021-04-28 ASSESSMENT — PULMONARY FUNCTION TESTS
PIF_VALUE: 1

## 2021-04-28 ASSESSMENT — PAIN DESCRIPTION - PROGRESSION: CLINICAL_PROGRESSION: NOT CHANGED

## 2021-04-28 NOTE — OP NOTE
ESOPHAGOGASTRODUODENOSCOPY PROCEDURE NOTE    DATE OF PROCEDURE: 4/28/2021    PREOPERATIVE DIAGNOSIS:  gib     POSTOPERATIVE DIAGNOSIS/FINDINGS:  Marginal ulcer, non bleeding     SURGEON: Nidhi Hudson MD    ASSISTANT: None    OPERATION: Esophagogastroduodenoscopy     ANESTHESIA: Local monitored anesthesia. CONDITION: Stable    COMPLICATIONS: None. Specimens Removed: as above    EBL: None    BRIEF HISTORY:  This is a 79 y.o. male who presents with the complaint of gib. It was recommended the patient undergo an esophagogastrduodenoscopy. The risks/benefits/alternatives/expected outcomes were explained the the patient. The patient verbalized understanding and agreed to proceed. PROCEDURE:  The patient was brought into the endoscopy suite and placed in the left lateral decubitus position. A bite block was placed in the patients mouth. After the initiation of LMAC anesthesia, a gastroscope was inserted into the patient's mouth and passed into the esophagus and into the stomach. Immediately upon entering the stomach the note of marginal ulcer was made. Scope was advanced through anastomosis without any other abnormalities noted. Marginal ulcer was not bleeding.        Nidhi Hudson MD  4/28/2021

## 2021-04-28 NOTE — CARE COORDINATION
Care Coordination: Recent dc with covid 19 , back to ed with sob and dark tarry stools which were positive. Surgery consult seen on 4/27/21 655 am, resumed diet to clear and plan for egd on 4/28- delay entered. Marginal ulcer noted without bleed. H&H 6.9/24.5    Renal following for ALYSON. Bun 29/2.5. Per renal note : baseline serum cr of 2.1-2.7mg/dl and -GFR= 30-28ml/min. Remains covid positive. Awaiting further plan regarding H&H. Will follow for dc needs.  Plan is home    Lucina Rinconodilia

## 2021-04-28 NOTE — PROGRESS NOTES
For EGD today. No active signs of GI bleeding per nursing staff. Patient has been NPO and respiratory status is stable. Plan for EGD this morning.     Electronically signed by Leeanna Trent MD on 4/28/2021 at 6:32 AM

## 2021-04-28 NOTE — PROGRESS NOTES
Subjective:  Feeling better   No CP or SOB  No fever or chills   No uncontrolled pain  No vomiting or diarrhea     Objective:    BP (!) 144/82   Pulse 77   Temp 98.1 °F (36.7 °C) (Oral)   Resp 20   Ht 5' 11\" (1.803 m)   Wt 217 lb 2.5 oz (98.5 kg)   SpO2 97%   BMI 30.29 kg/m²     24HR INTAKE/OUTPUT:      Intake/Output Summary (Last 24 hours) at 4/28/2021 0747  Last data filed at 4/28/2021 0729  Gross per 24 hour   Intake 2736 ml   Output 375 ml   Net 2361 ml       General appearance: NAD, conversant  Neck: FROM, supple   Lungs: Clear bilaterally no wheezes, no rhonchi, no crackles  CV: RRR, no MRGs; normal carotid upstroke and amplitude without Bruits  Abdomen: Soft, non-tender; no masses or HSM  Extremities: No edema, no cyanosis, no clubbing  Skin: Intact no rash, no lesions, no ulcers    Psych: Alert and oriented normal affect  Neuro: Nonfocal  Most Recent Labs  Lab Results   Component Value Date    WBC 6.1 04/27/2021    HGB 7.8 (L) 04/27/2021    HCT 26.1 (L) 04/27/2021     04/27/2021     04/27/2021    K 4.6 04/27/2021     (H) 04/27/2021    CREATININE 2.4 (H) 04/27/2021    BUN 29 (H) 04/27/2021    CO2 29 04/27/2021    GLUCOSE 147 (H) 04/27/2021    ALT 11 04/26/2021    AST 19 04/26/2021    INR 1.0 08/31/2020    TSH 0.996 06/03/2014    LABA1C 5.5 06/03/2014    LABMICR 150.7 (H) 06/02/2014     No results for input(s): MG in the last 72 hours. Lab Results   Component Value Date    CALCIUM 8.8 04/27/2021    PHOS 2.3 (L) 04/27/2021        XR CHEST PORTABLE   Final Result   Decreased bilateral airspace opacities suggesting decreasing edema or   pneumonia      Bibasilar hypoaeration             Assessment    Principal Problem:    GI bleed  Active Problems:    CKD (chronic kidney disease) stage 4, GFR 15-29 ml/min ([facility])    H/O kidney transplant    Immunosuppression ([facility] [address]    Pneumonia due to COVID-19 virus    Acute blood loss anemia  Resolved Problems:    * No resolved hospital problems. *      Plan:  42-year-old male history of end-stage renal disease status post kidney transplant 2014, CKD 4, recent COVID-19 pneumonia admitted with suspected GI bleeding-plan for EGD      Admit  IV fluids  NPO  Monitor H&H  Transfuse PRN maintain hemoglobin >= 7  IV PPI  Gen surgical Consult appreciated   Nephrology consult  Medications for other co morbidities cont as appropriate w dosage adjustments as necessary  DVT PPx SCD  DC planning        Electronically signed by Linda Mendes MD on 4/28/2021 at 7:47 AM

## 2021-04-28 NOTE — ANESTHESIA POSTPROCEDURE EVALUATION
Department of Anesthesiology  Postprocedure Note    Patient: Ezequiel Petit  MRN: [id number]  YOB: 1950  Date of evaluation: 4/28/2021  Time:  10:30 AM     Procedure Summary     Date: 04/28/21 Room / Location: [address] / [facility]    Anesthesia Start: 6392 Anesthesia Stop: 6146    Procedure: EGD in OR 12--COVID (N/A ) Diagnosis: (GI BLEED)    Surgeons: Sven Griffin MD Responsible Provider: Jonel León MD    Anesthesia Type: MAC ASA Status: 4          Anesthesia Type: No value filed. Regi Phase I: Regi Score: 9    Regi Phase II:      Last vitals: Reviewed and per EMR flowsheets. Anesthesia Post Evaluation    Patient location: OR 12 COVID.   Patient participation: complete - patient participated  Level of consciousness: awake  Pain score: 3  Airway patency: patent  Nausea & Vomiting: no nausea and no vomiting  Complications: no  Cardiovascular status: blood pressure returned to baseline  Respiratory status: acceptable  Hydration status: euvolemic

## 2021-04-28 NOTE — PROGRESS NOTES
Pt IV self removed on accident right before blood admin. Unable to re-start IV before blood product timed out, had to be sent back to blood bank. Will call [facility] to see if someone can come to start a new IV because IV team was gone when IV was self-removed.

## 2021-04-28 NOTE — ANESTHESIA PRE PROCEDURE
Department of Anesthesiology  Preprocedure Note       Name:  Rommel Joseph   Age:  79 y.o.  :  1950                                          MRN:  [id number]         Date:  2021      Surgeon: Teo Whelan):  Darrel Reeder MD    Procedure: Procedure(s):  EGD in OR 12--COVID    Medications prior to admission:   Prior to Admission medications    Medication Sig Start Date End Date Taking?  Authorizing Provider   metoprolol succinate (TOPROL XL) 100 MG extended release tablet Take 1 tablet by mouth nightly  Patient taking differently: Take 50 mg by mouth nightly  21  Yes Vince Rees MD   furosemide (LASIX) 40 MG tablet Take 1 tablet by mouth daily Monitor weight and swelling may take additional dose as needed 21  Yes Vince Rees MD   tiZANidine (ZANAFLEX) 4 MG tablet Take 4 mg by mouth daily   Yes Historical Provider, MD   simvastatin (ZOCOR) 10 MG tablet Take 10 mg by mouth daily   Yes Historical Provider, MD   isosorbide mononitrate (IMDUR) 60 MG extended release tablet TAKE 1 TABLET DAILY 3/6/18  Yes Herb Villarreal MD   hydrALAZINE (APRESOLINE) 50 MG tablet Take 1 tablet by mouth every 8 hours  Patient taking differently: Take 100 mg by mouth every 8 hours  17  Yes Marlene Herrera MD   tacrolimus (PROGRAF) 1 MG capsule Take 2 capsules by mouth 2 times daily 17  Yes Marlene Herrera MD   prednisoLONE 5 MG TABS Take 5 mg by mouth daily    Yes Historical Provider, MD   sodium bicarbonate 650 MG tablet Take 650 mg by mouth daily   Yes Historical Provider, MD   Multiple Vitamin (MULTI VITAMIN DAILY PO) Take by mouth daily   Yes Historical Provider, MD   Cholecalciferol (VITAMIN D3) 5000 UNITS TABS Take by mouth daily   Yes Historical Provider, MD   famotidine (PEPCID) 20 MG tablet Take 20 mg by mouth daily   Yes Historical Provider, MD   LANTUS SOLOSTAR 100 UNIT/ML SOPN Inject 17 Units into the skin daily At noon   Had today 6/3/15  Yes Historical Provider, MD   mycophenolate (CELLCEPT) - CNP   2 Units at 04/27/21 1552    insulin lispro (HUMALOG) injection vial 0-3 Units  0-3 Units Subcutaneous Nightly [facility], APRN - CNP   3 Units at 04/27/21 0000    glucose (GLUTOSE) 40 % oral gel 15 g  15 g Oral PRN [facility], APRN - CNP        dextrose 50 % IV solution  12.5 g Intravenous PRN [facility], APRN - CNP        glucagon (rDNA) injection 1 mg  1 mg Intramuscular PRN [facility], APRN - CNP        dextrose 5 % solution  100 mL/hr Intravenous PRN [facility], APRN -  mL/hr at 04/28/21 0640 100 mL/hr at 04/28/21 0640    sodium chloride flush 0.9 % injection 5-40 mL  5-40 mL Intravenous 2 times per day [facility], APRN - CNP   10 mL at 04/27/21 1007    sodium chloride flush 0.9 % injection 5-40 mL  5-40 mL Intravenous PRN [facility], APRN - CNP        0.9 % sodium chloride infusion  25 mL Intravenous PRN [facility] Learn, APRN - CNP        promethazine (PHENERGAN) tablet 12.5 mg  12.5 mg Oral Q6H PRN [facility], APRN - CNP        Or    ondansetron (ZOFRAN) injection 4 mg  4 mg Intravenous Q6H PRN [facility], APRN - CNP        acetaminophen (TYLENOL) tablet 650 mg  650 mg Oral Q6H PRN [facility], APRN - CNP   650 mg at 04/27/21 1056    Or    acetaminophen (TYLENOL) suppository 650 mg  650 mg Rectal Q6H PRN [facility], APRN - CNP        0.9 % sodium chloride infusion   Intravenous Continuous [facility], APRN -  mL/hr at 04/27/21 2139 New Bag at 04/27/21 2139    hydrALAZINE (APRESOLINE) tablet 100 mg  100 mg Oral 3 times per day April Emma, APRN - CNP   100 mg at 04/28/21 0541    insulin glargine (LANTUS) injection vial 17 Units  17 Units Subcutaneous Nightly April Emma, APRN - CNP   17 Units at 04/27/21 1225    pantoprazole (PROTONIX) tablet 40 mg  40 mg Oral BID AC Jaime Pulido DO   40 mg at 04/28/21 9170       Allergies:  No Known Allergies    Problem List: Patient Active Problem List   Diagnosis Code    GERD (gastroesophageal reflux disease) K21.9    CKD (chronic kidney disease) stage 4, GFR 15-29 ml/min ([facility]) N18.4    Mixed hyperlipidemia E78.2    Diabetes mellitus, type 2 ([facility]) E11.9    HTN (hypertension), benign I10    Junctional bradycardia R00.1    Anemia in stage 3 chronic kidney disease N18.30, D63.1    CAD (coronary artery disease), native coronary artery I25.10    Acute CHF (congestive heart failure) ([facility]) I50.9    Chest pain R07.9    Respiratory failure ([facility]) J96.90    Acute kidney injury ([facility] N17.9    Vomiting and diarrhea R11.10, R19.7    Acute nontraumatic kidney injury ([facility] [address] N17.9    Chronic diastolic congestive heart failure ([facility]) I50.32    H/O kidney transplant Z94.0    Pulmonary hypertension ([facility]) I27.20    Immunosuppression ([facility]) D84.9    Pneumonia due to COVID-19 virus U07.1, J12.82    Acute renal insufficiency N28.9    GI bleed K92.2    Acute blood loss anemia D62       Past Medical History:        Diagnosis Date    Anemia     Iron deficiency    Atrial fibrillation ([facility])     CHF (congestive heart failure) ([facility]) 03/12/2008    Chronic diastolic congestive heart failure ([facility]) 03/12/2008    Chronic foot pain     Chronic knee pain     BILATERAL    Constipation     Depression     DM (diabetes mellitus) ([facility])     ESRD (end stage renal disease) ([facility] [address] [facility]     H/O kidney transplant 12/12/2014    Hyperlipidemia     Hypertension     Immunosuppression ([facility] [address] 3/12/2008    Kidney disease     Pulmonary hypertension ([facility] [address]        Past Surgical History:        Procedure Laterality Date    BACK SURGERY  1982    BLADDER SURGERY      CARPAL TUNNEL RELEASE Left 06/23/2014    CHOLECYSTECTOMY, LAPAROSCOPIC  05/21/2013    COLONOSCOPY      DIAGNOSTIC CARDIAC CATH LAB PROCEDURE  01/15/2013    NORMAL    ECHO COMPL W DOP COLOR FLOW  05/22/2013         ENDOSCOPY, COLON, DIAGNOSTIC      FOOT SURGERY      BONE REMOVED    KIDNEY [facility]OPSY 2015    [facility] ; ALSO 2016, 2017    KIDNEY TRANSPLANT  2014    @ [facility]    EDUARDO-EN-Y GASTRIC BYPASS  2010    Laparoscopic / Dr. Deepti Schaffer  2007    Dr. Kennedy Toro       Social History:    Social History     Tobacco Use    Smoking status: Former Smoker     Packs/day: 1.00     Years: 20.00     Pack years: 20.00     Types: Cigarettes     Quit date: 1990     Years since quittin.3    Smokeless tobacco: Never Used   Substance Use Topics    Alcohol use: Yes     Alcohol/week: 0.0 standard drinks     Comment: rare glass of wine;  drinks 2-3 cups of coffee daily                                Counseling given: Not Answered      Vital Signs (Current):   Vitals:    21 1445 21 2230 21 0530 21 0701   BP: 122/79 124/74 (!) 144/82    Pulse: 80 92 77    Resp: 20 20     Temp: 36.7 °C (98.1 °F) 36.7 °C (98.1 °F)     TempSrc: Oral Oral     SpO2: 98% 97%     Weight:    217 lb 2.5 oz (98.5 kg)   Height:                                                  BP Readings from Last 3 Encounters:   21 (!) 144/82   21 (!) 157/77   21 (!) 154/68       NPO Status:                                                                                 BMI:   Wt Readings from Last 3 Encounters:   21 217 lb 2.5 oz (98.5 kg)   21 237 lb 3.2 oz (107.6 kg)   20 243 lb (110.2 kg)     Body mass index is 30.29 kg/m².     CBC:   Lab Results   Component Value Date    WBC 6.1 2021    RBC 2.94 2021    HGB 7.8 2021    HCT 26.1 2021    MCV 88.8 2021    RDW 16.2 2021     2021       CMP:   Lab Results   Component Value Date     2021    K 4.6 2021    K 4.9 2021     2021    CO2 29 2021    BUN 29 2021    CREATININE 2.4 2021    GFRAA 32 2021    LABGLOM 32 2021    GLUCOSE 147 2021    GLUCOSE 124 2012    PROT 5.1 2021 CALCIUM 8.8 04/27/2021    BILITOT 0.4 04/26/2021    ALKPHOS 45 04/26/2021    AST 19 04/26/2021    ALT 11 04/26/2021       POC Tests: No results for input(s): POCGLU, POCNA, POCK, POCCL, POCBUN, POCHEMO, POCHCT in the last 72 hours. Coags:   Lab Results   Component Value Date    PROTIME 11.3 08/31/2020    INR 1.0 08/31/2020    APTT 24.4 04/26/2021       HCG (If Applicable): No results found for: PREGTESTUR, PREGSERUM, HCG, HCGQUANT     ABGs: No results found for: PHART, PO2ART, MIN9XGK, VOE1ZOZ, BEART, H9NIBHCD     Type & Screen (If Applicable):  No results found for: LABABO, LABRH    Drug/Infectious Status (If Applicable):  No results found for: HIV, HEPCAB    COVID-19 Screening (If Applicable):   Lab Results   Component Value Date    COVID19 DETECTED 04/26/2021    COVID19 DETECTED 04/13/2021       Echo Complete    Result Date: 4/13/2021  Transthoracic Echocardiography Report (TTE)  Demographics   Patient Name    Josh Dodge  Gender            Male                  C   Medical Record  [id number]     Room Number       0430  Number   Account #       [de-identified]    Procedure Date    04/13/2021   Corporate ID                 Ordering          Merle Mendez MD                               Physician   Accession       [id number]   Referring         [facility]  Number                       Physician   Date of Birth   1950   Sonographer       Samson Isa [facility]   Age             79 year(s)   Interpreting      [address] [place] [address]                               Physician         Physician Cardiology                                                 Merle Mendez MD                                Any Other  Procedure Type of Study   TTE procedure:Echo Complete W/Doppler & Color Flow. Procedure Date Date: 04/13/2021 Start: 02:36 PM Study Location: Portable Technical Quality: Poor visualization due to body habitus. Indications:LV function. Patient Status: Routine Contrast Medium: Definity.  Height: 70 inches Weight: Ascending Aorta: 3.3 cm  Systolic: 1.3                                LA volume/Index: 85 ml  cm              LVOT: 2.1 cm                 /38.51ml/m^2  CO: 4.24 l/min                               RA Area: 15.2 cm^2  CI: 1.93  l/m*m^2  LV Mass: 194.16  g  Doppler Measurements & Calculations   MV Peak E-Wave: 0.65 AV Peak Velocity: 1.33 LVOT Peak Velocity: 0.98 m/s  m/s                  m/s                    LVOT Mean Velocity: 0.58 m/s  MV Peak A-Wave: 0.86 AV Peak Gradient: 7.02 LVOT Peak Gradient: 3.9  m/s                  mmHg                   mmHgLVOT Mean Gradient: 1.7  MV E/A Ratio: 0.76   AV Mean Velocity: 0.95 mmHg  MV Peak Gradient:    m/s  2.8 mmHg             AV Mean Gradient: 4.1  MV Mean Gradient:    mmHg  0.9 mmHg             AV VTI: 26.8 cm  MV Mean Velocity:    AV Area  0.44 m/s             (Continuity):2.05 cm^2 PV Peak Velocity: 1 m/s  MV Deceleration                             PV Peak Gradient: 3.99 mmHg  Time: 259.3 msec     LVOT VTI: 15.9 cm      PV Mean Velocity: 0.69 m/s  MV P1/2t: 62.1 msec  IVRT: 96.9 msec        PV Mean Gradient: 2.1 mmHg  MVA by PHT:3.54 cm^2  MV Area  (continuity): 1.9  cm^2  MV E' Septal  Velocity: 0.04 m/s  MV E' Lateral  Velocity: 5 m/s  [email or web address] [id number]    Ct Head Wo Contrast    Result Date: 4/15/2021  EXAMINATION: CT OF THE HEAD WITHOUT CONTRAST  4/15/2021 1:46 pm TECHNIQUE: CT of the head was performed without the administration of intravenous contrast. Dose modulation, iterative reconstruction, and/or weight based adjustment of the mA/kV was utilized to reduce the radiation dose to as low as reasonably achievable. COMPARISON: None. HISTORY: ORDERING SYSTEM PROVIDED HISTORY: Altered mental status TECHNOLOGIST PROVIDED HISTORY: Reason for exam:->Altered mental status Has a \"code stroke\" or \"stroke alert\" been called? ->No FINDINGS: BRAIN/VENTRICLES: There is no acute intracranial hemorrhage, mass effect or midline shift. No abnormal extra-axial fluid collection. The gray-white differentiation is maintained without evidence of an acute infarct. There is no evidence of hydrocephalus. ORBITS: The visualized portion of the orbits demonstrate no acute abnormality. SINUSES: The visualized paranasal sinuses and mastoid air cells demonstrate no acute abnormality. SOFT TISSUES/SKULL:  No acute abnormality of the visualized skull or soft tissues. No acute intracranial abnormality. There is age-appropriate atrophy and small-vessel ischemic disease. There is mucosal thickening in the right maxillary and ethmoid sinuses. Ct Chest Wo Contrast    Result Date: 4/13/2021  EXAMINATION: CT OF THE CHEST WITHOUT CONTRAST 4/13/2021 8:06 pm TECHNIQUE: CT of the chest was performed without the administration of intravenous contrast. Multiplanar reformatted images are provided for review. Dose modulation, iterative reconstruction, and/or weight based adjustment of the mA/kV was utilized to reduce the radiation dose to as low as reasonably achievable. COMPARISON: Portable chest performed earlier today at 1338 hours HISTORY: ORDERING SYSTEM PROVIDED HISTORY: lung infiltrates TECHNOLOGIST PROVIDED HISTORY: Reason for exam:->lung infiltrates FINDINGS: Exam is limited without intravenous contrast.  Small pericardial effusion. Heart size is normal.  No significant pleural effusion. Scattered vascular calcifications. Normal-size lymph nodes without adenopathy by size criteria. Visualized portions of the upper abdomen demonstrate bilateral renal atrophy. 5 mm nonobstructing left renal stone. Postoperative changes of previous gastric bypass. Cholecystectomy clips. No pneumothorax. There are areas of linear atelectasis in the lower lung zones.   Multifocal airspace and ground-glass opacities are otherwise scattered in each lung relatively diffusely and slightly greater towards the left. These demonstrate no significant change compared to the recent chest radiograph. Degenerative changes are scattered in the spine. Bilateral parenchymal infiltrates. Again, findings are nonspecific and could be related to pulmonary edema or multifocal pneumonia. Developing ARDS not excluded. Continued follow-up recommended. Small pericardial effusion. Xr Chest Portable    Result Date: 4/26/2021  EXAMINATION: ONE XRAY VIEW OF THE CHEST 4/26/2021 6:11 pm COMPARISON: 04/15/2021 HISTORY: ORDERING SYSTEM PROVIDED HISTORY: shortness of breath TECHNOLOGIST PROVIDED HISTORY: Reason for exam:->shortness of breath What reading provider will be dictating this exam?->CRC FINDINGS: The heart size is stable. Decreased bilateral airspace opacities. Persistent residual bilateral pulmonary opacities. Bibasilar hypoaeration. Decreased bilateral airspace opacities suggesting decreasing edema or pneumonia Bibasilar hypoaeration     Xr Chest Portable    Result Date: 4/15/2021  EXAMINATION: ONE XRAY VIEW OF THE CHEST 4/15/2021 10:41 am COMPARISON: None. HISTORY: ORDERING SYSTEM PROVIDED HISTORY: Follow-up on pneumonia TECHNOLOGIST PROVIDED HISTORY: Reason for exam:->Follow-up on pneumonia FINDINGS: The cardiac silhouette is at upper limits of normal. Multifocal bilateral pulmonary infiltrates are noted most consistent with pneumonia. There is chronic elevation of the right hemidiaphragm. There is no right or left pleural effusion. There is no interval change in the multifocal bilateral pneumonia when compared with the prior CT scan of 04/13/2021. Xr Chest Portable    Result Date: 4/13/2021  EXAMINATION: ONE XRAY VIEW OF THE CHEST 4/13/2021 12:57 pm HISTORY: ORDERING SYSTEM PROVIDED HISTORY: Lethargy/SOB TECHNOLOGIST PROVIDED HISTORY: Reason for exam:->Lethargy/SOB FINDINGS: A portable frontal view chest radiograph was obtained. The heart is enlarged.  The mediastinal contour and pleural spaces are within normal limits. Diffuse interstitial and alveolar opacities are present throughout the lungs bilaterally. No pneumothorax or significant pleural effusion. No acute thoracic osseous abnormality. New moderate-to-severe diffuse interstitial and alveolar opacities throughout the lungs bilaterally. The appearance is nonspecific and may be due to pulmonary edema, ARDS, or multilobar pneumonia, including an active viral pneumonitis. Cardiomegaly. No pneumothorax or significant pleural effusion. Xr Chest Portable    Result Date: 4/9/2021  EXAMINATION: ONE XRAY VIEW OF THE CHEST 4/9/2021 8:59 pm COMPARISON: 05/29/2019 HISTORY: ORDERING SYSTEM PROVIDED HISTORY: Fever TECHNOLOGIST PROVIDED HISTORY: Reason for exam:->Fever FINDINGS: Possible left costophrenic angle opacity. There is no effusion or pneumothorax. The cardiomediastinal silhouette is without acute process. The osseous structures are without acute process. Possible left costophrenic angle opacity. Us Dup Abd Pel Retro Scrot Complete    Result Date: 4/19/2021  EXAMINATION: ULTRASOUND OF RENAL TRANPLANT; DOPPLER EVALUATION OF THE PELVIS 4/19/2021 7:21 am; 4/19/2021 9:31 am COMPARISON: 04/17/2021 HISTORY: ORDERING SYSTEM PROVIDED HISTORY: renal US as recommended to evaluate cyst TECHNOLOGIST PROVIDED HISTORY: Reason for exam:->renal US as recommended to evaluate cyst What reading provider will be dictating this exam?->CRC; ORDERING SYSTEM PROVIDED HISTORY: REPEAT TRANSPLANT EVAL, WORSENING RENAL FUNCTION TECHNOLOGIST PROVIDED HISTORY: Reason for exam:->REPEAT TRANSPLANT EVAL, WORSENING RENAL FUNCTION What reading provider will be dictating this exam?->CRC FINDINGS: Examination is limited by body habitus and poor sonographic window. Right lower quadrant transplant kidney is poorly visualized due to sonographic window.   It appears to measure 9.5 x 5.2 x 4.6 cm with cortical thickness of 1.2 cm and no hydronephrosis or gross perinephric fluid collection. There are no sizable stones identified, though evaluation is relatively insensitive due to the sonographic window. A 1 cm complex cyst is suggested in the inferior pole. Renal transplant vascular evaluation is as follows: Transplant renal artery proximal: PSV 62.6 centimeters/second, resistive index 0.76 Transplant renal artery mid: PSV 71.9 centimeters/second, resistive index 1.00 Transplant renal artery distal: .0 centimeters/second, resistive index 0.90 Upper pole segmental artery: PSV 18.2 centimeters/second, resistive index 0.63 Upper pole arcuate artery: PSV 18.8 centimeters/second, resistive index 1.00 Midpole segmental artery: PSV 13.3 centimeters/second, resistive index 0.62 Midpole arcuate artery: PSV 17.7 centimeters/second, resistive index 0.68 Lower pole segmental artery: PSV 13.8 centimeters/second, resistive index 0.44 Lower pole arcuate artery: PSV 13.3 centimeters/second, resistive index 0.45 Renal vein: Peak systolic velocity 67.4 centimeters/second. Right iliac vein peak systolic velocity 62.6 centimeters/second. Right iliac artery at anastomosis: Peak systolic velocity 934.6 centimeters/second, resistive index 1.0. Prevoid urinary bladder appears grossly within normal limits. Postvoid residual of 132 mL. Evaluation remains limited by poor sonographic window. Transplant kidney is without hydronephrosis, perinephric collection, or notable nephrolithiasis. 1 cm complex cyst suggested at the lower pole. Interrogated arterial and venous vasculature remains patent without thrombosis. No elevated arterial peak systolic velocities to suggest renal artery stenosis. Elevated resistive indices are present in the mid and distal transplant renal artery as well as within the interrogated upper pole arcuate artery.      Us Kidney Transplant With Color Doppler    Result Date: 4/19/2021  EXAMINATION: ULTRASOUND OF RENAL TRANPLANT; DOPPLER EVALUATION OF THE PELVIS 4/19/2021 7:21 centimeters/second, resistive index 1.0. Prevoid urinary bladder appears grossly within normal limits. Postvoid residual of 132 mL. Evaluation remains limited by poor sonographic window. Transplant kidney is without hydronephrosis, perinephric collection, or notable nephrolithiasis. 1 cm complex cyst suggested at the lower pole. Interrogated arterial and venous vasculature remains patent without thrombosis. No elevated arterial peak systolic velocities to suggest renal artery stenosis. Elevated resistive indices are present in the mid and distal transplant renal artery as well as within the interrogated upper pole arcuate artery. Us Retroperitoneal Complete    Result Date: 4/17/2021  EXAMINATION: RETROPERITONEAL ULTRASOUND OF THE KIDNEYS AND URINARY BLADDER 4/17/2021 COMPARISON: Renal ultrasound from April 11, 2021. HISTORY: ORDERING SYSTEM PROVIDED HISTORY: leonel TECHNOLOGIST PROVIDED HISTORY: Reason for exam:->leonel FINDINGS: This exam was somewhat limited by overlying bowel gas and also by patient condition. Heavy breathing made accurate vascular evaluation difficult. Kidneys: The native kidneys are atrophic and poorly visualized. Right kidney measured 9.6 cm. Left kidney measured 7.7 cm. No concerning renal masses or cysts. No hydronephrosis in these areas. Right lower quadrant transplant kidney: Renal length was 10.3 cm. The corticomedullary differentiation and cortical thickness is maintained. No intrarenal calcifications or hydronephrosis. There appears to be a cystic structure in the lower aspect of the right transplant kidney measuring 14 mm. Arcuate arteries cannot be assessed. Segmental arteries as follows: Superior: 19.3 centimeters/second with resistive index of 0.71 Mid: 45.8 centimeters/second with a resistive index of 0.82 Inferior: 78.0 centimeters/second with resistive index of 0.93 The transplant right renal vein was not evaluated.  The transplant right renal artery had a peak velocity (+) DiabetesType II DM, using insulin, . Pt had no PAT visit        ROS comment: Anemia Abdominal:           Vascular:                                        Anesthesia Plan      MAC     ASA 4       Induction: intravenous. Anesthetic plan and risks discussed with patient. Plan discussed with attending. CHAI Faria - ELIZA   4/28/2021      Pt seen, examined, chart reviewed, plan discussed.   Mariza Mayfield  4/28/2021  7:49 AM

## 2021-04-28 NOTE — PROGRESS NOTES
[facility]  Nephrology Attending Progress Note  Ruthy Panda. Phyllis Lebron MD    HPI:   Lia Galarza is a 79 y.o. male with a history of COVID-19, on 2 L nasal cannula at baseline; as well as severe pulmonary hypertension, diastolic heart failure, status post renal transplant from [facility], recently discharged from the hospital after presenting in renal failure and worsening respiratory status with recent COVID-19 viral pneumonia who presents to the emergency department for worsening shortness of breath. Vitals upon arrival included blood pressure 129/65, temperature 36.2, pulse 88, respirations 19 and 100% O2 saturation. Initial CBC revealed WBC 7.6, hemoglobin 7.5, platelet count 217. Initial BMP revealed sodium 135, potassium 4.9, chloride 99, CO2 27, BUN 36 and creatinine 2.6. Chest x-ray showed decreased bilateral airspace opacities suggesting decreasing edema or pneumonia. Patient subsequently admitted with ALYSON, dyspnea and anemia. SUBJECTIVE:     4/28/21: Patient not directly examined due to strict Covid isolation. All aspects of care reviewed with the nursing staff. Just returned from EGD earlier this a.m.       PROBLEM LIST:    Patient Active Problem List   Diagnosis    GERD (gastroesophageal reflux disease)    CKD (chronic kidney disease) stage 4, GFR 15-29 ml/min ([facility])    Mixed hyperlipidemia    Diabetes mellitus, type 2 ([address]    HTN (hypertension), benign    Junctional bradycardia    Anemia in stage 3 chronic kidney disease    CAD (coronary artery disease), native coronary artery    Acute CH[facility]congestive heart failure) ([facility]    Chest pain    Respiratory failure ([facility])    Acute kidney injury ([address]    Vomiting and diarrhea    Acute nontraumatic kidney injury ([address]    Chronic diastolic congestive heart failure ([address]    H/O kidney transplant    Pulmonary hypertension ([address]    Immunosuppression ([address]    Pneumonia due to COVID-19 virus    Acute renal insufficiency    GI bleed    Acute blood loss anemia        PAST MEDICAL HISTORY:    Past Medical History:   Diagnosis Date    Anemia     Iron deficiency    Atrial fibrillation (HCC)     CHF [facility]congestive heart failure) ([facility] 03/12/2008    Chronic diastolic congestive heart failure ([facility] 03/12/2008    Chronic foot pain     Chronic knee pain     BILATERAL    Constipation     Depression     DM (diabetes mellitus) ([facility]     ESRD (end stage renal disease) ([place] [address]     H/O kidney transplant 12/12/2014    Hyperlipidemia     Hypertension     Immunosuppression ([place] [address] 3/12/2008    Kidney disease     Pulmonary hypertension ([facility])        DIET:    Diet NPO, After Midnight Exceptions are: Sips of Water with Meds     PHYSICAL EXAM:     Patient Vitals for the past 24 hrs:   BP Temp Temp src Pulse Resp SpO2 Height Weight   04/28/21 0800 (!) 160/76 -- -- 82 20 100 % -- --   04/28/21 0747 132/71 -- -- 82 19 95 % -- --   04/28/21 0735 117/80 -- -- 83 20 93 % -- --   04/28/21 0701 -- -- -- -- -- -- -- 217 lb 2.5 oz (98.5 kg)   04/28/21 0530 (!) 144/82 -- -- 77 -- -- -- --   04/27/21 2230 124/74 98.1 °F (36.7 °C) Oral 92 20 97 % -- --   04/27/21 1445 122/79 98.1 °F (36.7 °C) Oral 80 20 98 % -- --   04/27/21 1222 -- -- -- -- -- -- 5' 11\" (1.803 m) --   04/27/21 1000 (!) 166/72 100.1 °F (37.8 °C) Oral 72 18 95 % -- --   @      Intake/Output Summary (Last 24 hours) at 4/28/2021 0835  Last data filed at 4/28/2021 0729  Gross per 24 hour   Intake 2736 ml   Output 375 ml   Net 2361 ml         Wt Readings from Last 3 Encounters:   04/28/21 217 lb 2.5 oz (98.5 kg)   04/18/21 237 lb 3.2 oz (107.6 kg)   09/02/20 243 lb (110.2 kg)       Phsical Exam: Deferred due to strict Covid Isolation      MEDS (scheduled):    metoprolol succinate  50 mg Oral Nightly    isosorbide mononitrate  60 mg Oral Daily    mycophenolate  1,000 mg Oral BID    prednisoLONE  5 mg Oral Daily    atorvastatin  10 mg Oral Daily    sodium bicarbonate  650 mg Oral Daily    tacrolimus  2 mg Oral BID    insulin lispro  0-6 Units Subcutaneous TID WC    insulin lispro  0-3 Units Subcutaneous Nightly    sodium chloride flush  5-40 mL Intravenous 2 times per day    hydrALAZINE  100 mg Oral 3 times per day    insulin glargine  17 Units Subcutaneous Nightly    pantoprazole  40 mg Oral BID AC       MEDS (infusions):   dextrose 100 mL/hr (04/28/21 0640)    sodium chloride      sodium chloride 100 mL/hr at 04/27/21 2139       MEDS (prn):  hydrALAZINE, docusate sodium, glucose, dextrose, glucagon (rDNA), dextrose, sodium chloride flush, sodium chloride, promethazine **OR** ondansetron, acetaminophen **OR** acetaminophen    DATA:    Recent Labs     04/26/21 1817 04/27/21  0808   WBC 7.6 6.1   HGB 7.5* 7.8*   HCT 25.5* 26.1*   MCV 87.6 88.8    177     Recent Labs     04/26/21 1817 04/27/21  0808    143   K 4.9 4.6   CL 99 108*   CO2 27 29   BUN 36* 29*   CREATININE 2.6* 2.4*   LABGLOM 30 32   GLUCOSE 396* 147*   CALCIUM 8.9 8.8   ALT 11  --    AST 19  --    BILITOT 0.4  --    ALKPHOS 45  --    PHOS  --  2.3*       Lab Results   Component Value Date    LABALBU 2.7 (L) 04/27/2021    LABALBU 3.0 (L) 04/26/2021    LABALBU 3.0 (L) 04/20/2021     Lab Results   Component Value Date    TSH 0.996 06/03/2014       Iron Studies  Lab Results   Component Value Date    IRON 10 (L) 04/28/2017    TIBC 215 (L) 04/28/2017    FERRITIN 437 04/15/2021     Vitamin B-12   Date Value Ref Range Status   04/28/2017 281 211 - 946 pg/mL Final     Folate   Date Value Ref Range Status   04/28/2017 7.4 4.8 - 24.2 ng/mL Final       Vit D, 25-Hydroxy   Date Value Ref Range Status   04/13/2021 53 30 - 100 ng/mL Final     Comment:     <20 ng/mL. ........... Shawna Colder Deficient  20-30 ng/mL. ......... Shawna Colder Insufficient   ng/mL. ........ Maunie Colder Sufficient  >100 ng/mL. .......... Maunie Colder Toxic       PTH   Date Value Ref Range Status   04/13/2021 279 (H) 15 - 65 pg/mL Final       No components found for: URIC    Lab Results   Component Value Date    COLORU Yellow 04/10/2021    NITRU Negative 04/10/2021    GLUCOSEU Negative 04/10/2021    KETUA Negative 04/10/2021    UROBILINOGEN 0.2 04/10/2021    BILIRUBINUR Negative 04/10/2021       No results found for: Betty Richard      IMPRESSION/RECOMMENDATIONS:      1- CKD5D/ESRD with RRT as LRD Renal Allograft placed 12/12/14 with a baseline serum cr of 2.1-2.7mg/dl and an e-GFR=30-28ml/min.   UA (4/10/21)SG 1.020, protein 100mg/dl, blood (-).  Ni (-), LE (-)  TXP US (4/19/21) No hydro with 1cm complex renal cyst, no MARI  Tacrolimus level 18.8 (from 4/17/21)  Cont  IV fluids as po intake is poor as recorded and per nursing  Strict intake and output, daily labs     2 -Anemia in CKD - HgB below goal >/=10  Concern for GI bleeding  Surgery consulted  EGD 4/28/21     3- HTN with CKD 5/ESRD  BP above goal <130/80  Follow until condition stabilizes     4- Sec HPTH due to the CAN/CKD with Hypocalcemia  PO4 WNL   (4/13/21)  Vit D 53 (4/13/21)  Follow      5- Hyponatremia  Na+ WNL  Resolved        Taniya Click NP-C  Pt not seen due to covid  Cr stable at baseline  Sp kidney transplant  Janeth Burkett MD

## 2021-04-28 NOTE — PLAN OF CARE
Problem: Airway Clearance - Ineffective  Goal: Achieve or maintain patent airway  Outcome: Met This Shift     Problem: Airway Clearance - Ineffective  Goal: Achieve or maintain patent airway  Outcome: Met This Shift     Problem: Gas Exchange - Impaired  Goal: Absence of hypoxia  Outcome: Met This Shift  Goal: Promote optimal lung function  Outcome: Met This Shift     Problem: Breathing Pattern - Ineffective  Goal: Ability to achieve and maintain a regular respiratory rate  Outcome: Met This Shift

## 2021-04-29 VITALS
OXYGEN SATURATION: 97 % | BODY MASS INDEX: 30.35 KG/M2 | SYSTOLIC BLOOD PRESSURE: 141 MMHG | HEIGHT: 71 IN | HEART RATE: 75 BPM | TEMPERATURE: 97.9 F | WEIGHT: 216.76 LBS | RESPIRATION RATE: 18 BRPM | DIASTOLIC BLOOD PRESSURE: 74 MMHG

## 2021-04-29 LAB
ANION GAP SERPL CALCULATED.3IONS-SCNC: 9 MMOL/L (ref 7–16)
BUN BLDV-MCNC: 21 MG/DL (ref 6–23)
CALCIUM SERPL-MCNC: 8.5 MG/DL (ref 8.6–10.2)
CHLORIDE BLD-SCNC: 107 MMOL/L (ref 98–107)
CO2: 21 MMOL/L (ref 22–29)
CREAT SERPL-MCNC: 2.1 MG/DL (ref 0.7–1.2)
GFR AFRICAN AMERICAN: 38
GFR NON-AFRICAN AMERICAN: 38 ML/MIN/1.73
GLUCOSE BLD-MCNC: 75 MG/DL (ref 74–99)
HCT VFR BLD CALC: 32.8 % (ref 37–54)
HEMOGLOBIN: 9.6 G/DL (ref 12.5–16.5)
MCH RBC QN AUTO: 27.4 PG (ref 26–35)
MCHC RBC AUTO-ENTMCNC: 29.3 % (ref 32–34.5)
MCV RBC AUTO: 93.4 FL (ref 80–99.9)
METER GLUCOSE: 103 MG/DL (ref 74–99)
METER GLUCOSE: 51 MG/DL (ref 74–99)
PDW BLD-RTO: 16.1 FL (ref 11.5–15)
PLATELET # BLD: 178 E9/L (ref 130–450)
PMV BLD AUTO: 10.7 FL (ref 7–12)
POTASSIUM SERPL-SCNC: 4.8 MMOL/L (ref 3.5–5)
RBC # BLD: 3.51 E12/L (ref 3.8–5.8)
SODIUM BLD-SCNC: 137 MMOL/L (ref 132–146)
WBC # BLD: 6.3 E9/L (ref 4.5–11.5)

## 2021-04-29 PROCEDURE — 6360000002 HC RX W HCPCS: Performed by: FAMILY MEDICINE

## 2021-04-29 PROCEDURE — 6370000000 HC RX 637 (ALT 250 FOR IP): Performed by: STUDENT IN AN ORGANIZED HEALTH CARE EDUCATION/TRAINING PROGRAM

## 2021-04-29 PROCEDURE — 36415 COLL VENOUS BLD VENIPUNCTURE: CPT

## 2021-04-29 PROCEDURE — 6370000000 HC RX 637 (ALT 250 FOR IP): Performed by: NURSE PRACTITIONER

## 2021-04-29 PROCEDURE — 82962 GLUCOSE BLOOD TEST: CPT

## 2021-04-29 PROCEDURE — 85027 COMPLETE CBC AUTOMATED: CPT

## 2021-04-29 PROCEDURE — 6370000000 HC RX 637 (ALT 250 FOR IP): Performed by: FAMILY MEDICINE

## 2021-04-29 PROCEDURE — 80048 BASIC METABOLIC PNL TOTAL CA: CPT

## 2021-04-29 RX ORDER — PANTOPRAZOLE SODIUM 40 MG/1
40 TABLET, DELAYED RELEASE ORAL
Qty: 60 TABLET | Refills: 3 | Status: SHIPPED | OUTPATIENT
Start: 2021-04-29 | End: 2021-06-11

## 2021-04-29 RX ADMIN — SODIUM BICARBONATE 650 MG: 650 TABLET ORAL at 10:43

## 2021-04-29 RX ADMIN — MYCOPHENOLATE MOFETIL 1000 MG: 250 CAPSULE ORAL at 10:43

## 2021-04-29 RX ADMIN — ISOSORBIDE MONONITRATE 60 MG: 60 TABLET ORAL at 10:43

## 2021-04-29 RX ADMIN — TACROLIMUS 2 MG: 1 CAPSULE ORAL at 10:43

## 2021-04-29 RX ADMIN — ATORVASTATIN CALCIUM 10 MG: 10 TABLET, FILM COATED ORAL at 10:43

## 2021-04-29 RX ADMIN — HYDRALAZINE HYDROCHLORIDE 100 MG: 25 TABLET, FILM COATED ORAL at 05:12

## 2021-04-29 RX ADMIN — PREDNISOLONE SODIUM PHOSPHATE 5 MG: 15 SOLUTION ORAL at 10:44

## 2021-04-29 RX ADMIN — PANTOPRAZOLE SODIUM 40 MG: 40 TABLET, DELAYED RELEASE ORAL at 05:12

## 2021-04-29 NOTE — PLAN OF CARE
Problem: Airway Clearance - Ineffective  Goal: Achieve or maintain patent airway  Outcome: Ongoing     Problem: Gas Exchange - Impaired  Goal: Absence of hypoxia  Outcome: Ongoing  Goal: Promote optimal lung function  Outcome: Ongoing

## 2021-04-29 NOTE — CARE COORDINATION
Discharge order is in. Confirmed with Pt by room phone that he has no needs at discharge and returning home with family providing transport.    Barbara Dai, L.S.W.  [phone]

## 2021-04-29 NOTE — PROGRESS NOTES
GENERAL SURGERY  DAILY PROGRESS NOTE  4/29/2021    Chief Complaint   Patient presents with    Shortness of Breath     Started 4 days ago when he was discharged from the hospital.        Subjective:  Per nursing staff, no active signs of GI bleeding since EGD. Objective:  BP (!) 141/74   Pulse 75   Temp 97.9 °F (36.6 °C) (Oral)   Resp 18   Ht 5' 11\" (1.803 m)   Wt 216 lb 12.1 oz (98.3 kg)   SpO2 97%   BMI 30.23 kg/m²     Due to the current efforts to prevent transmission of COVID-19 and also the need to preserve PPE for other caregivers, a face-to-face encounter with the patient was not performed. That being said, all relevant records and diagnostic tests were reviewed, including laboratory results and imaging. Please reference any relevant documentation elsewhere. Care will be coordinated with the primary service. Assessment/Plan:  79 y.o. male with Hx Tootie-en-Y gastric bypass found to have anemia, possible upper GI bleed.  EGD 4/28 showed nonbleeding marginal ulcer    - Okay for diet from surgical POV, advance as tolerated  - PPI BID  - Monitor H/H, transfuse per primary  - Hgb slightly decreased from 7.8 to 6.9, likely dilutional, receiving 1u PRBC  - No plans for further surgical intervention at this time    Electronically signed by Annamarie Ho MD on 4/29/2021 at 5:27 AM

## 2021-04-29 NOTE — DISCHARGE SUMMARY
Physician Discharge Summary     Patient ID:  Chris Acuña  [id number]  79 y.o.  1950    Admit date: 4/26/2021    Discharge date and time:  4/29/2021    Discharge Diagnoses: Principal Problem:    GI bleed  Active Problems:    CKD (chronic kidney disease) stage 4, GFR 15-29 ml/min ([facility])    H/O kidney transplant    Immunosuppression ([address]    Pneumonia due to COVID-19 virus    Acute blood loss anemia  Resolved Problems:    * No resolved hospital problems.  *      Consults: IP CONSULT TO INTERNAL MEDICINE  IP CONSULT TO DIETITIAN  IP CONSULT TO SOCIAL WORK  IP CONSULT TO GENERAL SURGERY  IP CONSULT TO NEPHROLOGY    Procedures: See below    Hospital Course: 66-year-old male history of end-stage renal disease status post kidney transplant 2014, CKD 4, Tootie-en-Y gastric bypass recent COVID-19 pneumonia admitted with suspected GI bleeding-status post EGD showing marginal ulcer    IV fluids completed   diet advanced and tolerated  Monitor H&H-- Hg 9.6 after 1 unit PRBC  Transfuse PRN maintain hemoglobin >= 7  IV PPI-->PO  Gen surgical Consult appreciated   Nephrology Consult appreciated   Medications for other co morbidities cont as appropriate w dosage adjustments as necessary  DVT PPx SCD  DC planning  DC home         Discharge Exam:  See progress note from today    Condition:  Stable    Disposition: home    Patient Instructions:   Current Discharge Medication List      START taking these medications    Details   pantoprazole (PROTONIX) 40 MG tablet Take 1 tablet by mouth 2 times daily (before meals)  Qty: 60 tablet, Refills: 3         CONTINUE these medications which have NOT CHANGED    Details   metoprolol succinate (TOPROL XL) 100 MG extended release tablet Take 1 tablet by mouth nightly  Qty: 30 tablet, Refills: 3      furosemide (LASIX) 40 MG tablet Take 1 tablet by mouth daily Monitor weight and swelling may take additional dose as needed  Qty: 60 tablet, Refills: 1    Associated Diagnoses: Acute diastolic congestive heart failure (HCC)      tiZANidine (ZANAFLEX) 4 MG tablet Take 4 mg by mouth daily      simvastatin (ZOCOR) 10 MG tablet Take 10 mg by mouth daily      isosorbide mononitrate (IMDUR) 60 MG extended release tablet TAKE 1 TABLET DAILY  Qty: 90 tablet, Refills: 3      hydrALAZINE (APRESOLINE) 50 MG tablet Take 1 tablet by mouth every 8 hours  Qty: 90 tablet, Refills: 3      tacrolimus (PROGRAF) 1 MG capsule Take 2 capsules by mouth 2 times daily  Qty: 60 capsule, Refills: 3      prednisoLONE 5 MG TABS Take 5 mg by mouth daily       sodium bicarbonate 650 MG tablet Take 650 mg by mouth daily      Multiple Vitamin (MULTI VITAMIN DAILY PO) Take by mouth daily      Cholecalciferol (VITAMIN D3) 5000 UNITS TABS Take by mouth daily      LANTUS SOLOSTAR 100 UNIT/ML SOPN Inject 17 Units into the skin daily At noon   Had today      mycophenolate (CELLCEPT) 250 MG capsule   Take 1,000 mg by mouth 2 times daily       insulin lispro (HUMALOG KWIKPEN) 100 UNIT/ML SOPN Inject 5 Units into the skin daily (before lunch) Patient takes with his biggest meal      docusate sodium (COLACE) 100 MG capsule Take 1 capsule by mouth 2 times daily as needed for Constipation. Qty: 20 capsule, Refills: 0      linagliptin (TRADJENTA) 5 MG tablet Take 5 mg by mouth daily. aspirin 81 MG EC tablet Take 81 mg by mouth daily.          STOP taking these medications       dexamethasone (DECADRON) 4 MG tablet Comments:   Reason for Stopping:         famotidine (PEPCID) 20 MG tablet Comments:   Reason for Stopping:             Activity: activity as tolerated  Diet: renal diet diabetic    Follow-up with 1wk PCP    Note that over 30 minutes was spent in preparing discharge papers, discussing discharge with patient, staff, consultants, medication review, arranging follow up, etc.    Signed:  Wallene Hodgkins, MD  4/29/2021  9:42 AM

## 2021-04-29 NOTE — PROGRESS NOTES
Nursing supervisor Fior Chung notified that patient still does not have an IV and has blood waiting to be given.

## 2021-04-29 NOTE — PROGRESS NOTES
Subjective:  Feeling better   No CP or SOB  No fever or chills   No uncontrolled pain  No vomiting or diarrhea     Objective:    BP (!) 141/74   Pulse 75   Temp 97.9 °F (36.6 °C) (Oral)   Resp 18   Ht 5' 11\" (1.803 m)   Wt 216 lb 12.1 oz (98.3 kg)   SpO2 97%   BMI 30.23 kg/m²     24HR INTAKE/OUTPUT:      Intake/Output Summary (Last 24 hours) at 4/29/2021 0759  Last data filed at 4/29/2021 0212  Gross per 24 hour   Intake 480 ml   Output 1050 ml   Net -570 ml       General appearance: NAD, conversant  Neck: FROM, supple   Lungs: Clear bilaterally no wheezes, no rhonchi, no crackles  CV: RRR, no MRGs; normal carotid upstroke and amplitude without Bruits  Abdomen: Soft, non-tender; no masses or HSM  Extremities: No edema, no cyanosis, no clubbing  Skin: Intact no rash, no lesions, no ulcers    Psych: Alert and oriented normal affect  Neuro: Nonfocal  Most Recent Labs  Lab Results   Component Value Date    WBC 4.2 (L) 04/28/2021    HGB 6.9 (LL) 04/28/2021    HCT 24.5 (L) 04/28/2021     04/28/2021     04/29/2021    K 4.8 04/29/2021     04/29/2021    CREATININE 2.1 (H) 04/29/2021    BUN 21 04/29/2021    CO2 21 (L) 04/29/2021    GLUCOSE 75 04/29/2021    ALT 11 04/26/2021    AST 19 04/26/2021    INR 1.0 08/31/2020    TSH 0.996 06/03/2014    LABA1C 5.5 06/03/2014    LABMICR 150.7 (H) 06/02/2014     No results for input(s): MG in the last 72 hours. Lab Results   Component Value Date    CALCIUM 8.5 (L) 04/29/2021    PHOS 2.3 (L) 04/27/2021        XR CHEST PORTABLE   Final Result   Decreased bilateral airspace opacities suggesting decreasing edema or   pneumonia      Bibasilar hypoaeration             Assessment    Principal Problem:    GI bleed  Active Problems:    CKD (chronic kidney disease) stage 4, GFR 15-29 ml/min ([facility])    H/O kidney transplant    Immunosuppression ([facility]    Pneumonia due to COVID-19 virus    Acute blood loss anemia  Resolved Problems:    * No resolved hospital problems. *      Plan:  77-year-old male history of end-stage renal disease status post kidney transplant 2014, CKD 4, Tootie-en-Y gastric bypass recent COVID-19 pneumonia admitted with suspected GI bleeding-status post EGD showing marginal ulcer    IV fluids  NPO diet advanced and tolerated  Monitor H&H  Transfuse PRN maintain hemoglobin >= 7  IV PPI  Gen surgical Consult appreciated   Nephrology consult  Medications for other co morbidities cont as appropriate w dosage adjustments as necessary  DVT PPx SCD  DC planning  DC home       Electronically signed by Lewis Hager MD on 4/29/2021 at 7:59 AM

## 2021-04-29 NOTE — PROGRESS NOTES
[facility]  Nephrology Attending Progress Note  Rula Mayberry. Reg Lambert MD    HPI:   Marleen Clifford is a 79 y.o. male with a history of COVID-19, on 2 L nasal cannula at baseline; as well as severe pulmonary hypertension, diastolic heart failure, status post renal transplant from [facility], recently discharged from the hospital after presenting in renal failure and worsening respiratory status with recent COVID-19 viral pneumonia who presents to the emergency department for worsening shortness of breath. Vitals upon arrival included blood pressure 129/65, temperature 36.2, pulse 88, respirations 19 and 100% O2 saturation. Initial CBC revealed WBC 7.6, hemoglobin 7.5, platelet count 191. Initial BMP revealed sodium 135, potassium 4.9, chloride 99, CO2 27, BUN 36 and creatinine 2.6. Chest x-ray showed decreased bilateral airspace opacities suggesting decreasing edema or pneumonia. Patient subsequently admitted with ALYSON, dyspnea and anemia. SUBJECTIVE:     4/28/21: Patient not directly examined due to strict Covid isolation. All aspects of care reviewed with the nursing staff.   Just returned from EGD earlier this a.m.  4/29/21: Remains in Covid isolation - s/p EGD - no intervention planned per surgery - difficult IV access last fabby which delayed PRBC administration       PROBLEM LIST:    Patient Active Problem List   Diagnosis    GERD (gastroesophageal reflux disease)    CKD (chronic kidney disease) stage 4, GFR 15-29 ml/min [facility]    Mixed hyperlipidemia    Diabetes mellitus, type 2 ([address]    HTN (hypertension), benign    Junctional bradycardia    Anemia in stage 3 chronic kidney disease    CAD (coronary artery disease), native coronary artery    Acute CHF (congestive heart failure) ([facility]    Chest pain    Respiratory failure ([facility])    Acute kidney injury ([address]    Vomiting and diarrhea    Acute nontraumatic kidney injury ([address]    Chronic diastolic congestive heart failure ([address]    H/O kidney transplant 10 mg Oral Daily    sodium bicarbonate  650 mg Oral Daily    tacrolimus  2 mg Oral BID    insulin lispro  0-6 Units Subcutaneous TID WC    insulin lispro  0-3 Units Subcutaneous Nightly    sodium chloride flush  5-40 mL Intravenous 2 times per day    hydrALAZINE  100 mg Oral 3 times per day    insulin glargine  17 Units Subcutaneous Nightly    pantoprazole  40 mg Oral BID AC       MEDS (infusions):   sodium chloride      sodium chloride      dextrose Stopped (04/28/21 0939)    sodium chloride      sodium chloride 100 mL/hr at 04/28/21 0932       MEDS (prn):  sodium chloride, sodium chloride, hydrALAZINE, docusate sodium, glucose, dextrose, glucagon (rDNA), dextrose, sodium chloride flush, sodium chloride, promethazine **OR** ondansetron, acetaminophen **OR** acetaminophen    DATA:    Recent Labs     04/27/21  0808 04/28/21  0907 04/29/21  0620   WBC 6.1 4.2* 6.3   HGB 7.8* 6.9* 9.6*   HCT 26.1* 24.5* 32.8*   MCV 88.8 89.4 93.4    163 178     Recent Labs     04/26/21  1817 04/27/21  0808 04/29/21  0620    143 137   K 4.9 4.6 4.8   CL 99 108* 107   CO2 27 29 21*   BUN 36* 29* 21   CREATININE 2.6* 2.4* 2.1*   LABGLOM 30 32 38   GLUCOSE 396* 147* 75   CALCIUM 8.9 8.8 8.5*   ALT 11  --   --    AST 19  --   --    BILITOT 0.4  --   --    ALKPHOS 45  --   --    PHOS  --  2.3*  --        Lab Results   Component Value Date    LABALBU 2.7 (L) 04/27/2021    LABALBU 3.0 (L) 04/26/2021    LABALBU 3.0 (L) 04/20/2021     Lab Results   Component Value Date    TSH 0.996 06/03/2014       Iron Studies  Lab Results   Component Value Date    IRON 10 (L) 04/28/2017    TIBC 215 (L) 04/28/2017    FERRITIN 437 04/15/2021     Vitamin B-12   Date Value Ref Range Status   04/28/2017 281 211 - 946 pg/mL Final     Folate   Date Value Ref Range Status   04/28/2017 7.4 4.8 - 24.2 ng/mL Final       Vit D, 25-Hydroxy   Date Value Ref Range Status   04/13/2021 53 30 - 100 ng/mL Final     Comment:     <20 ng/mL............ Hope Mynor Deficient  20-30 ng/mL. ......... Hope Mynor Insufficient   ng/mL. ........ Hope Mynor Sufficient  >100 ng/mL. .......... Hope Mynor Toxic       PTH   Date Value Ref Range Status   04/13/2021 279 (H) 15 - 65 pg/mL Final       No components found for: URIC    Lab Results   Component Value Date    COLORU Yellow 04/10/2021    NITRU Negative 04/10/2021    GLUCOSEU Negative 04/10/2021    KETUA Negative 04/10/2021    UROBILINOGEN 0.2 04/10/2021    BILIRUBINUR Negative 04/10/2021       No results found for: Katharine Arambula      IMPRESSION/RECOMMENDATIONS:      1- CKD5D/ESRD with RRT as LRD Renal Allograft placed 12/12/14 with a baseline serum cr of 2.1-2.7mg/dl and an e-GFR=30-28ml/min.   UA (4/10/21)SG 1.020, protein 100mg/dl, blood (-).  Ni (-), LE (-)  TXP US (4/19/21) No hydro with 1cm complex renal cyst, no MARI  Tacrolimus level 18.8 (from 4/17/21)  IV fluids as po intake is poor   Renal function stable at recent baseline  Strict intake and output, daily labs     2- Anemia in CKD - HgB below goal >/=10  Concern for GI bleeding  Surgery consulted  EGD 4/28/21  PRBC 4/28/21     3- HTN with CKD 5/ESRD  BP at goal <130/80  Follow     4- Sec HPTH due to the CAN/CKD with Hypocalcemia  PO4 WNL   (4/13/21)  Vit D 53 (4/13/21)  Follow      5- Hyponatremia  Na+ WNL  Resolved        Jassi Brennan NP-C

## 2021-04-30 ENCOUNTER — CARE COORDINATION (OUTPATIENT)
Dept: CARE COORDINATION | Age: 71
End: 2021-04-30

## 2021-04-30 ENCOUNTER — CARE COORDINATION (OUTPATIENT)
Dept: CASE MANAGEMENT | Age: 71
End: 2021-04-30

## 2021-05-03 ENCOUNTER — CARE COORDINATION (OUTPATIENT)
Dept: CASE MANAGEMENT | Age: 71
End: 2021-05-03

## 2021-05-03 NOTE — CARE COORDINATION
[facility] Trans[facility] Initial Follow Up Call    Call within 2 business days of discharge: Yes    Patient: Christine Pino Patient : 1950   MRN: [id number]  Reason for Admission: GI bleed  Discharge Date: 21 RARS: Readmission Risk Score: 28      [facility]       Complaint Diagnosis Description Type Department Provider    21 Shortness of Breath Acute renal insufficiency . .. ED to Hosp-Admission (Discharged) (ADMITTED) Atul Banda MD; Rosalva Berger. .. Spoke with: No one    [place] [facility] [place] to reach the patient for Covid-19 Monitoring Care Transition call post hospital discharge. Voicemail box is full, unable to leave a message. Per HIPAA form on file, do not provide information to others. No further outreach scheduled at this time. Episode to be resolved and CTN to sign off due to inability to contact patient. Follow Up  No future appointments.     Oskar Nevarez RN

## 2021-06-11 ENCOUNTER — APPOINTMENT (OUTPATIENT)
Dept: GENERAL RADIOLOGY | Age: 71
DRG: 291 | End: 2021-06-11
Payer: MEDICARE

## 2021-06-11 ENCOUNTER — HOSPITAL ENCOUNTER (INPATIENT)
Age: 71
LOS: 3 days | Discharge: HOME | DRG: 291 | End: 2021-06-14
Attending: EMERGENCY MEDICINE | Admitting: INTERNAL MEDICINE
Payer: MEDICARE

## 2021-06-11 DIAGNOSIS — I50.9 DECOMPENSATED HEART FAILURE (HCC): Primary | ICD-10-CM

## 2021-06-11 PROBLEM — R77.8 ELEVATED TROPONIN: Status: ACTIVE | Noted: 2021-06-11

## 2021-06-11 PROBLEM — I50.41 ACUTE COMBINED SYSTOLIC AND DIASTOLIC CHF, NYHA CLASS 1 (HCC): Status: ACTIVE | Noted: 2021-06-11

## 2021-06-11 PROBLEM — E87.5 HYPERKALEMIA: Status: ACTIVE | Noted: 2021-06-11

## 2021-06-11 PROBLEM — I10 ESSENTIAL HYPERTENSION: Status: ACTIVE | Noted: 2021-06-11

## 2021-06-11 PROBLEM — E66.9 OBESITY (BMI 30-39.9): Status: ACTIVE | Noted: 2021-06-11

## 2021-06-11 PROBLEM — Z79.4 TYPE 2 DIABETES MELLITUS, WITH LONG-TERM CURRENT USE OF INSULIN (HCC): Status: ACTIVE | Noted: 2021-06-11

## 2021-06-11 PROBLEM — R79.89 ELEVATED TROPONIN: Status: ACTIVE | Noted: 2021-06-11

## 2021-06-11 PROBLEM — D63.8 ANEMIA OF CHRONIC DISEASE: Status: ACTIVE | Noted: 2021-06-11

## 2021-06-11 PROBLEM — E11.9 TYPE 2 DIABETES MELLITUS, WITH LONG-TERM CURRENT USE OF INSULIN (HCC): Status: ACTIVE | Noted: 2021-06-11

## 2021-06-11 PROBLEM — N18.9 CHRONIC KIDNEY DISEASE: Status: ACTIVE | Noted: 2021-06-11

## 2021-06-11 LAB
ACANTHOCYTES: ABNORMAL
ALBUMIN SERPL-MCNC: 3.7 G/DL (ref 3.5–5.2)
ALP BLD-CCNC: 51 U/L (ref 40–129)
ALT SERPL-CCNC: 13 U/L (ref 0–40)
ANION GAP SERPL CALCULATED.3IONS-SCNC: 10 MMOL/L (ref 7–16)
ANISOCYTOSIS: ABNORMAL
AST SERPL-CCNC: 23 U/L (ref 0–39)
BASOPHILS ABSOLUTE: 0 E9/L (ref 0–0.2)
BASOPHILS RELATIVE PERCENT: 0 % (ref 0–2)
BILIRUB SERPL-MCNC: 0.3 MG/DL (ref 0–1.2)
BUN BLDV-MCNC: 45 MG/DL (ref 6–23)
CALCIUM SERPL-MCNC: 8.9 MG/DL (ref 8.6–10.2)
CHLORIDE BLD-SCNC: 105 MMOL/L (ref 98–107)
CO2: 27 MMOL/L (ref 22–29)
CREAT SERPL-MCNC: 3 MG/DL (ref 0.7–1.2)
EKG ATRIAL RATE: 69 BPM
EKG P AXIS: 20 DEGREES
EKG P-R INTERVAL: 180 MS
EKG Q-T INTERVAL: 436 MS
EKG QRS DURATION: 74 MS
EKG QTC CALCULATION (BAZETT): 467 MS
EKG T AXIS: 73 DEGREES
EKG VENTRICULAR RATE: 69 BPM
EOSINOPHILS ABSOLUTE: 0 E9/L (ref 0.05–0.5)
EOSINOPHILS RELATIVE PERCENT: 0.7 % (ref 0–6)
GFR AFRICAN AMERICAN: 25
GFR NON-AFRICAN AMERICAN: 25 ML/MIN/1.73
GLUCOSE BLD-MCNC: 99 MG/DL (ref 74–99)
HCT VFR BLD CALC: 29.5 % (ref 37–54)
HEMOGLOBIN: 8.4 G/DL (ref 12.5–16.5)
HYPOCHROMIA: ABNORMAL
LYMPHOCYTES ABSOLUTE: 0.14 E9/L (ref 1.5–4)
LYMPHOCYTES RELATIVE PERCENT: 2.6 % (ref 20–42)
MCH RBC QN AUTO: 26.5 PG (ref 26–35)
MCHC RBC AUTO-ENTMCNC: 28.5 % (ref 32–34.5)
MCV RBC AUTO: 93.1 FL (ref 80–99.9)
METER GLUCOSE: 169 MG/DL (ref 74–99)
MONOCYTES ABSOLUTE: 0.18 E9/L (ref 0.1–0.95)
MONOCYTES RELATIVE PERCENT: 3.5 % (ref 2–12)
NEUTROPHILS ABSOLUTE: 4.23 E9/L (ref 1.8–7.3)
NEUTROPHILS RELATIVE PERCENT: 93.9 % (ref 43–80)
OVALOCYTES: ABNORMAL
PDW BLD-RTO: 17.3 FL (ref 11.5–15)
PLATELET # BLD: 226 E9/L (ref 130–450)
PMV BLD AUTO: 8.7 FL (ref 7–12)
POIKILOCYTES: ABNORMAL
POLYCHROMASIA: ABNORMAL
POTASSIUM REFLEX MAGNESIUM: 5.2 MMOL/L (ref 3.5–5)
POTASSIUM SERPL-SCNC: 5.6 MMOL/L (ref 3.5–5)
POTASSIUM SERPL-SCNC: 5.8 MMOL/L (ref 3.5–5)
PRO-BNP: 9928 PG/ML (ref 0–125)
RBC # BLD: 3.17 E12/L (ref 3.8–5.8)
SCHISTOCYTES: ABNORMAL
SODIUM BLD-SCNC: 142 MMOL/L (ref 132–146)
TEAR DROP CELLS: ABNORMAL
TOTAL CK: 71 U/L (ref 20–200)
TOTAL PROTEIN: 5.9 G/DL (ref 6.4–8.3)
TROPONIN, HIGH SENSITIVITY: 104 NG/L (ref 0–11)
TROPONIN, HIGH SENSITIVITY: 105 NG/L (ref 0–11)
TROPONIN, HIGH SENSITIVITY: 115 NG/L (ref 0–11)
WBC # BLD: 4.5 E9/L (ref 4.5–11.5)

## 2021-06-11 PROCEDURE — 93005 ELECTROCARDIOGRAM TRACING: CPT | Performed by: STUDENT IN AN ORGANIZED HEALTH CARE EDUCATION/TRAINING PROGRAM

## 2021-06-11 PROCEDURE — 83880 ASSAY OF NATRIURETIC PEPTIDE: CPT

## 2021-06-11 PROCEDURE — 82962 GLUCOSE BLOOD TEST: CPT

## 2021-06-11 PROCEDURE — 2580000003 HC RX 258: Performed by: NURSE PRACTITIONER

## 2021-06-11 PROCEDURE — 2140000000 HC CCU INTERMEDIATE R&B

## 2021-06-11 PROCEDURE — 36415 COLL VENOUS BLD VENIPUNCTURE: CPT

## 2021-06-11 PROCEDURE — 85025 COMPLETE CBC W/AUTO DIFF WBC: CPT

## 2021-06-11 PROCEDURE — 6360000002 HC RX W HCPCS: Performed by: NURSE PRACTITIONER

## 2021-06-11 PROCEDURE — 84132 ASSAY OF SERUM POTASSIUM: CPT

## 2021-06-11 PROCEDURE — 93010 ELECTROCARDIOGRAM REPORT: CPT | Performed by: INTERNAL MEDICINE

## 2021-06-11 PROCEDURE — 80053 COMPREHEN METABOLIC PANEL: CPT

## 2021-06-11 PROCEDURE — 6360000002 HC RX W HCPCS: Performed by: EMERGENCY MEDICINE

## 2021-06-11 PROCEDURE — 99283 EMERGENCY DEPT VISIT LOW MDM: CPT

## 2021-06-11 PROCEDURE — 71045 X-RAY EXAM CHEST 1 VIEW: CPT

## 2021-06-11 PROCEDURE — 6370000000 HC RX 637 (ALT 250 FOR IP): Performed by: NURSE PRACTITIONER

## 2021-06-11 PROCEDURE — 84484 ASSAY OF TROPONIN QUANT: CPT

## 2021-06-11 PROCEDURE — 82550 ASSAY OF CK (CPK): CPT

## 2021-06-11 PROCEDURE — 2700000000 HC OXYGEN THERAPY PER DAY

## 2021-06-11 RX ORDER — METOPROLOL SUCCINATE 100 MG/1
100 TABLET, EXTENDED RELEASE ORAL DAILY
COMMUNITY
End: 2022-02-21

## 2021-06-11 RX ORDER — DOCUSATE SODIUM 100 MG/1
100 CAPSULE, LIQUID FILLED ORAL DAILY PRN
Status: DISCONTINUED | OUTPATIENT
Start: 2021-06-11 | End: 2021-06-15 | Stop reason: HOSPADM

## 2021-06-11 RX ORDER — ATORVASTATIN CALCIUM 10 MG/1
10 TABLET, FILM COATED ORAL NIGHTLY
Status: DISCONTINUED | OUTPATIENT
Start: 2021-06-11 | End: 2021-06-15 | Stop reason: HOSPADM

## 2021-06-11 RX ORDER — SULFAMETHOXAZOLE AND TRIMETHOPRIM 400; 80 MG/1; MG/1
1 TABLET ORAL DAILY
Status: ON HOLD | COMMUNITY
End: 2021-10-03

## 2021-06-11 RX ORDER — OXYCODONE HYDROCHLORIDE 20 MG/1
20 TABLET ORAL EVERY 8 HOURS PRN
COMMUNITY

## 2021-06-11 RX ORDER — SODIUM CHLORIDE 9 MG/ML
25 INJECTION, SOLUTION INTRAVENOUS PRN
Status: DISCONTINUED | OUTPATIENT
Start: 2021-06-11 | End: 2021-06-15 | Stop reason: HOSPADM

## 2021-06-11 RX ORDER — ASPIRIN 81 MG/1
81 TABLET ORAL DAILY
Status: DISCONTINUED | OUTPATIENT
Start: 2021-06-12 | End: 2021-06-15 | Stop reason: HOSPADM

## 2021-06-11 RX ORDER — DEXTROSE MONOHYDRATE 25 G/50ML
12.5 INJECTION, SOLUTION INTRAVENOUS PRN
Status: DISCONTINUED | OUTPATIENT
Start: 2021-06-11 | End: 2021-06-15 | Stop reason: HOSPADM

## 2021-06-11 RX ORDER — ISOSORBIDE MONONITRATE 60 MG/1
60 TABLET, EXTENDED RELEASE ORAL DAILY
Status: DISCONTINUED | OUTPATIENT
Start: 2021-06-12 | End: 2021-06-15 | Stop reason: HOSPADM

## 2021-06-11 RX ORDER — INSULIN GLARGINE 100 [IU]/ML
17 INJECTION, SOLUTION SUBCUTANEOUS DAILY
Status: DISCONTINUED | OUTPATIENT
Start: 2021-06-12 | End: 2021-06-15 | Stop reason: HOSPADM

## 2021-06-11 RX ORDER — NICOTINE POLACRILEX 4 MG
15 LOZENGE BUCCAL PRN
Status: DISCONTINUED | OUTPATIENT
Start: 2021-06-11 | End: 2021-06-15 | Stop reason: HOSPADM

## 2021-06-11 RX ORDER — TIZANIDINE 4 MG/1
4 TABLET ORAL DAILY
Status: DISCONTINUED | OUTPATIENT
Start: 2021-06-12 | End: 2021-06-15 | Stop reason: HOSPADM

## 2021-06-11 RX ORDER — HYDRALAZINE HYDROCHLORIDE 50 MG/1
100 TABLET, FILM COATED ORAL EVERY 8 HOURS SCHEDULED
Status: DISCONTINUED | OUTPATIENT
Start: 2021-06-11 | End: 2021-06-15 | Stop reason: HOSPADM

## 2021-06-11 RX ORDER — CHOLECALCIFEROL (VITAMIN D3) 50 MCG
5000 TABLET ORAL DAILY
Status: DISCONTINUED | OUTPATIENT
Start: 2021-06-12 | End: 2021-06-15 | Stop reason: HOSPADM

## 2021-06-11 RX ORDER — SODIUM CHLORIDE 0.9 % (FLUSH) 0.9 %
5-40 SYRINGE (ML) INJECTION PRN
Status: DISCONTINUED | OUTPATIENT
Start: 2021-06-11 | End: 2021-06-15 | Stop reason: HOSPADM

## 2021-06-11 RX ORDER — POLYETHYLENE GLYCOL 3350 17 G/17G
17 POWDER, FOR SOLUTION ORAL DAILY PRN
Status: DISCONTINUED | OUTPATIENT
Start: 2021-06-11 | End: 2021-06-15 | Stop reason: HOSPADM

## 2021-06-11 RX ORDER — DEXTROSE MONOHYDRATE 50 MG/ML
100 INJECTION, SOLUTION INTRAVENOUS PRN
Status: DISCONTINUED | OUTPATIENT
Start: 2021-06-11 | End: 2021-06-15 | Stop reason: HOSPADM

## 2021-06-11 RX ORDER — FUROSEMIDE 10 MG/ML
40 INJECTION INTRAMUSCULAR; INTRAVENOUS ONCE
Status: COMPLETED | OUTPATIENT
Start: 2021-06-11 | End: 2021-06-11

## 2021-06-11 RX ORDER — SODIUM CHLORIDE 0.9 % (FLUSH) 0.9 %
5-40 SYRINGE (ML) INJECTION EVERY 12 HOURS SCHEDULED
Status: DISCONTINUED | OUTPATIENT
Start: 2021-06-11 | End: 2021-06-15 | Stop reason: HOSPADM

## 2021-06-11 RX ORDER — METOPROLOL SUCCINATE 50 MG/1
50 TABLET, EXTENDED RELEASE ORAL NIGHTLY
Status: DISCONTINUED | OUTPATIENT
Start: 2021-06-11 | End: 2021-06-15 | Stop reason: HOSPADM

## 2021-06-11 RX ORDER — SODIUM BICARBONATE 650 MG/1
650 TABLET ORAL DAILY
Status: DISCONTINUED | OUTPATIENT
Start: 2021-06-12 | End: 2021-06-15 | Stop reason: HOSPADM

## 2021-06-11 RX ORDER — MULTIVITAMIN WITH IRON
1 TABLET ORAL DAILY
Status: DISCONTINUED | OUTPATIENT
Start: 2021-06-12 | End: 2021-06-15 | Stop reason: HOSPADM

## 2021-06-11 RX ORDER — MYCOPHENOLATE MOFETIL 250 MG/1
1000 CAPSULE ORAL 2 TIMES DAILY
Status: DISCONTINUED | OUTPATIENT
Start: 2021-06-11 | End: 2021-06-15 | Stop reason: HOSPADM

## 2021-06-11 RX ORDER — OXYCODONE HYDROCHLORIDE 5 MG/1
5 TABLET ORAL EVERY 4 HOURS PRN
Status: DISCONTINUED | OUTPATIENT
Start: 2021-06-11 | End: 2021-06-15 | Stop reason: HOSPADM

## 2021-06-11 RX ORDER — HYDRALAZINE HYDROCHLORIDE 100 MG/1
100 TABLET, FILM COATED ORAL 3 TIMES DAILY
COMMUNITY

## 2021-06-11 RX ORDER — ACETAMINOPHEN 325 MG/1
650 TABLET ORAL EVERY 6 HOURS PRN
Status: DISCONTINUED | OUTPATIENT
Start: 2021-06-11 | End: 2021-06-15 | Stop reason: HOSPADM

## 2021-06-11 RX ORDER — TACROLIMUS 1 MG/1
2 CAPSULE ORAL 2 TIMES DAILY
Status: DISCONTINUED | OUTPATIENT
Start: 2021-06-11 | End: 2021-06-15 | Stop reason: HOSPADM

## 2021-06-11 RX ORDER — PANTOPRAZOLE SODIUM 40 MG/1
40 TABLET, DELAYED RELEASE ORAL
Status: DISCONTINUED | OUTPATIENT
Start: 2021-06-12 | End: 2021-06-15 | Stop reason: HOSPADM

## 2021-06-11 RX ORDER — BUMETANIDE 0.25 MG/ML
1 INJECTION, SOLUTION INTRAMUSCULAR; INTRAVENOUS EVERY 12 HOURS
Status: DISCONTINUED | OUTPATIENT
Start: 2021-06-12 | End: 2021-06-13

## 2021-06-11 RX ORDER — PREDNISONE 1 MG/1
5 TABLET ORAL DAILY
COMMUNITY
End: 2022-03-10

## 2021-06-11 RX ORDER — ACETAMINOPHEN 650 MG/1
650 SUPPOSITORY RECTAL EVERY 6 HOURS PRN
Status: DISCONTINUED | OUTPATIENT
Start: 2021-06-11 | End: 2021-06-15 | Stop reason: HOSPADM

## 2021-06-11 RX ORDER — HEPARIN SODIUM 10000 [USP'U]/ML
5000 INJECTION, SOLUTION INTRAVENOUS; SUBCUTANEOUS EVERY 8 HOURS SCHEDULED
Status: DISCONTINUED | OUTPATIENT
Start: 2021-06-11 | End: 2021-06-15 | Stop reason: HOSPADM

## 2021-06-11 RX ORDER — PREDNISONE 1 MG/1
5 TABLET ORAL DAILY
Status: DISCONTINUED | OUTPATIENT
Start: 2021-06-12 | End: 2021-06-15 | Stop reason: HOSPADM

## 2021-06-11 RX ADMIN — METOPROLOL SUCCINATE 50 MG: 50 TABLET, EXTENDED RELEASE ORAL at 23:20

## 2021-06-11 RX ADMIN — OXYCODONE 5 MG: 5 TABLET ORAL at 23:16

## 2021-06-11 RX ADMIN — Medication 10 ML: at 23:20

## 2021-06-11 RX ADMIN — HEPARIN SODIUM 5000 UNITS: 10000 INJECTION INTRAVENOUS; SUBCUTANEOUS at 23:16

## 2021-06-11 RX ADMIN — ATORVASTATIN CALCIUM 10 MG: 10 TABLET, FILM COATED ORAL at 23:20

## 2021-06-11 RX ADMIN — MYCOPHENOLATE MOFETIL 1000 MG: 250 CAPSULE ORAL at 23:21

## 2021-06-11 RX ADMIN — FUROSEMIDE 40 MG: 10 INJECTION, SOLUTION INTRAMUSCULAR; INTRAVENOUS at 14:25

## 2021-06-11 RX ADMIN — INSULIN LISPRO 1 UNITS: 100 INJECTION, SOLUTION INTRAVENOUS; SUBCUTANEOUS at 23:18

## 2021-06-11 RX ADMIN — TACROLIMUS 2 MG: 1 CAPSULE ORAL at 23:21

## 2021-06-11 RX ADMIN — DOCUSATE SODIUM 100 MG: 100 CAPSULE ORAL at 23:16

## 2021-06-11 RX ADMIN — HYDRALAZINE HYDROCHLORIDE 100 MG: 50 TABLET, FILM COATED ORAL at 23:20

## 2021-06-11 ASSESSMENT — ENCOUNTER SYMPTOMS
SINUS PAIN: 1
SHORTNESS OF BREATH: 1
ABDOMINAL PAIN: 0
COLOR CHANGE: 0
CHEST TIGHTNESS: 0
EYE PAIN: 0
DIARRHEA: 0
NAUSEA: 0
SORE THROAT: 0
RHINORRHEA: 1
WHEEZING: 0
COUGH: 0
SINUS PRESSURE: 0
VOMITING: 0

## 2021-06-11 ASSESSMENT — PAIN SCALES - GENERAL: PAINLEVEL_OUTOF10: 6

## 2021-06-11 NOTE — ED PROVIDER NOTES
[facility] AT [facility] Emergency Room          Pt Name: Lisa Núñez  MRN: [id number]  [place] 1950  Date of evaluation: 6/11/2021      CHIEF COMPLAINT  Chief Complaint   Patient presents with    Congestive Heart Failure     sent in by Dr. Juany Walker for evaluation. BLE edema, BUE edema, and edema on chest.         Lisa Núñez is a 79 y.o. male presenting to the ED with chief complaint of congestive heart failure. Patient was sent in by his primary care physician for concern of CHF exacerbation. Patient is on 2 L nasal cannula at home and increased edema in bilateral upper and lower extremities. He is also gained 12 pounds over the past 2 weeks. He admits to this morning around 2 AM shortness of breath and diaphoresis while he is lying flat. He states he is also had episode of chest pain earlier this week when he was going up the stairs but none currently. He has associated symptoms of rhinorrhea. Patient's complaints have been constant without alleviating or exacerbating factors and mild in severity.     HPI       Patient has a medical history as listed below:   Past Medical History:   Diagnosis Date    Anemia     Iron deficiency    Atrial fibrillation (HCC)     CHF (congestive heart failure) ([address] 03/12/2008    Chronic diastolic congestive heart failure ([address] 03/12/2008    Chronic foot pain     Chronic knee pain     BILATERAL    Constipation     Depression     DM (diabetes mellitus) ([address]     ESRD (end stage renal disease) ([address]     H/O kidney transplant 12/12/2014    Hyperlipidemia     Hypertension     Immunosuppression ([address] 3/12/2008    Kidney disease     Pulmonary hypertension ([address]      Patient Active Problem List    Diagnosis Date Noted    Acute combined systolic and diastolic CHF, NYHA class 1 ([address] 06/11/2021    Chronic kidney disease 06/11/2021    Anemia of chronic disease 06/11/2021    Elevated troponin 06/11/2021    Hyperkalemia 06/11/2021    Essential hypertension 06/11/2021    Type 2 diabetes mellitus, with long-term current use of insulin ([facility] 06/11/2021    Obesity (BMI 30-39.9) 06/11/2021    Acute blood loss anemia 04/27/2021    Acute renal insufficiency 04/26/2021    GI bleed 04/26/2021    Pneumonia due to COVID-19 virus 04/19/2021    Acute kidney injury ([address] 04/10/2021    Vomiting and diarrhea 04/10/2021    Acute nontraumatic kidney injury ([address] 04/10/2021    Pulmonary hypertension ([address]     Respiratory failure ([facility] 04/27/2017    Acute CHF (congestive heart failure) ([facility] 07/24/2015    Chest pain 07/24/2015    H/O kidney transplant 12/12/2014    CKD (chronic kidney disease) stage 4, GFR 15-29 ml/min ([facility]) 06/02/2014    Mixed hyperlipidemia 06/02/2014    Diabetes mellitus, type 2 ([address] 06/02/2014    HTN (hypertension), benign 06/02/2014    Junctional bradycardia 06/02/2014    Anemia in stage 3 chronic kidney disease ([facility] 06/02/2014    CAD (coronary artery disease), native coronary artery 06/02/2014    GERD (gastroesophageal reflux disease) 12/02/2010    Chronic diastolic congestive heart failure ([address] 03/12/2008    Immunosuppression ([facility] 03/12/2008       Review of Systems   Constitutional: Positive for diaphoresis and unexpected weight change (Gained 12 pounds over 2 weeks). Negative for chills and fever. HENT: Positive for rhinorrhea and sinus pain. Negative for congestion, postnasal drip, sinus pressure and sore throat. Eyes: Negative for pain and visual disturbance. Respiratory: Positive for shortness of breath. Negative for cough, chest tightness and wheezing. O2 nasal cannula in place   Cardiovascular: Positive for chest pain (None currently) and leg swelling (And arm swelling bilateral). Swelling in upper arms   Gastrointestinal: Negative for abdominal pain, diarrhea, nausea and vomiting. Genitourinary: Negative for dysuria, frequency and hematuria.    Musculoskeletal: Negative for arthralgias and neck pain.   Skin: Negative for color change and rash. Neurological: Negative for dizziness, weakness, numbness and headaches. Physical Exam  Vitals and nursing note reviewed. Constitutional:       General: He is not in acute distress. Appearance: Normal appearance. He is well-developed. He is not ill-appearing. HENT:      Head: Normocephalic and atraumatic. Nose: Nose normal.      Mouth/Throat:      Pharynx: Oropharynx is clear. Eyes:      Conjunctiva/sclera: Conjunctivae normal.      Pupils: Pupils are equal, round, and reactive to light. Cardiovascular:      Rate and Rhythm: Normal rate and regular rhythm. Heart sounds: Normal heart sounds. Pulmonary:      Effort: Pulmonary effort is normal. No respiratory distress. Breath sounds: Normal breath sounds. No wheezing, rhonchi or rales. Chest:      Chest wall: No tenderness. Abdominal:      General: Abdomen is flat. There is no distension. Palpations: Abdomen is soft. There is no mass. Tenderness: There is no abdominal tenderness. Musculoskeletal:      Cervical back: Normal range of motion. No rigidity. Right lower leg: Edema present. Left lower leg: Edema present. Comments: +3 pitting edema bilateral lower extremities   Skin:     General: Skin is warm and dry. Findings: No rash. Neurological:      Mental Status: He is alert. Mental status is at baseline. Comments: Neurovascular intact in bilateral upper and lower extremities. Procedures     [facility]     ED Course as of Jun 11 1949 Fri Jun 11, 2021   1159 EKG read by me. Sinus rhythm with PACs. No STEMI. When compared to prior EKG lateral biphasic T waves no longer present. Peak T waves now present isolated in V2.    [WL]      ED Course User Index  [WL] Alea Ambriz DO        Patient presents to the ED for evaluation. This patient was seen and evaluated with the attending.   Work-up was performed with concerns for but not limited to ACS, arrhythmia, Pneumonia, Acute CHF and Decompensated heart failure  Lab work and imaging showed findings consistent with CHF exacerbation. Plan be to diurese the patient and admit to medicine for further diuresis. Labs significant for elevated troponin, BNP, ALYSON likely from demand ischemia from poor perfusion due to the heart failure. Patient requires continued workup and management of their symptoms and will be admitted to the hospital for further evaluation and treatment.           --------------------------------------------- PAST HISTORY ---------------------------------------------  Past Medical History:  has a past medical history of Anemia, Atrial fibrillation ([facility] CHF (congestive heart failure) ([facility] Chronic diastolic congestive heart failure ([facility] Chronic foot pain, Chronic knee pain, Constipation, Depression, DM (diabetes mellitus) ([address] ESRD (end stage renal disease) ([facility] H/O kidney transplant, Hyperlipidemia, Hypertension, Immunosuppression ([address] Kidney disease, and Pulmonary hypertension ([facility] Past Surgical History:  has a past surgical history that includes back surgery (1982); Upper gastrointestinal endoscopy (12/21/2007); Tootie-en-Y Gastric Bypass (06/29/2010); Cholecystectomy, laparoscopic (05/21/2013); ECHO Compl W Dop Color Flow (05/22/2013); Diagnostic Cardiac Cath Lab Procedure (01/15/2013); Foot surgery; Colonoscopy; Endoscopy, colon, diagnostic; Carpal tunnel release (Left, 06/23/2014); Kidney transplant (12/12/2014); Bladder surgery; Kidney biopsy (06/09/2015); and Upper gastrointestinal endoscopy (N/A, 4/28/2021). Social History:  reports that he quit smoking about 31 years ago. His smoking use included cigarettes. He has a 20.00 pack-year smoking history. He has never used smokeless tobacco. He reports current alcohol use. He reports that he does not use drugs.     Family History: family history includes Cancer in his father; Diabetes in his father and mother; mg/dL    Total Protein 5.9 (L) 6.4 - 8.3 g/dL    Albumin 3.7 3.5 - 5.2 g/dL    Total Bilirubin 0.3 0.0 - 1.2 mg/dL    Alkaline Phosphatase 51 40 - 129 U/L    ALT 13 0 - 40 U/L    AST 23 0 - 39 U/L   Troponin   Result Value Ref Range    Troponin, High Sensitivity 105 (H) 0 - 11 ng/L   EKG 12 Lead   Result Value Ref Range    Ventricular Rate 69 BPM    Atrial Rate 69 BPM    P-R Interval 180 ms    QRS Duration 74 ms    Q-T Interval 436 ms    QTc Calculation (Bazett) 467 ms    P Axis 20 degrees    T Axis 73 degrees       RADIOLOGY:  XR CHEST PORTABLE   Final Result   1. Multifocal bilateral patchy pulmonary infiltrates more prominent within   the lower lobes. 2. Trace bilateral pleural effusions.                   ------------------------- NURSING NOTES AND VITALS REVIEWED ---------------------------  Date / Time Roomed:  6/11/2021 11:35 AM  ED Bed Assignment:  [id number]    The nursing notes within the ED encounter and vital signs as below have been reviewed. Patient Vitals for the past 24 hrs:   BP Temp Pulse Resp SpO2 Height Weight   06/11/21 1944 (!) 159/55 97.7 °F (36.5 °C) 62 20 99 % -- --   06/11/21 1616 (!) 161/71 -- 73 20 100 % -- --   06/11/21 1128 (!) 124/49 98 °F (36.7 °C) 69 16 97 % 5' 11\" (1.803 m) 224 lb (101.6 kg)   06/11/21 1122 -- 98 °F (36.7 °C) 66 -- 97 % -- --           Counseling:  I have spoken with the patient/family members and discussed todays results, in addition to providing specific details for the plan of care and counseling regarding the diagnosis and prognosis. Their questions are answered at this time and they are agreeable with the plan of admission. This patient has remained hemodynamically stable during their ED course. Diagnosis:  1. Decompensated heart failure (HCC)        Disposition:  Patient's disposition: Admit  Patient's condition is stable. NOTE:  This report was transcribed using voice recognition software.   Efforts were made to ensure accuracy; however, inadvertent computerized transcription errors may be present.           Antoinette Finney DO  Resident  06/11/21 1949

## 2021-06-11 NOTE — ED NOTES
Pt unable to remember dosage for oxycodone. Does not have updated list. All other meds are correct.      Silverio Solis RN  06/11/21 [id number]

## 2021-06-12 LAB
ANION GAP SERPL CALCULATED.3IONS-SCNC: 11 MMOL/L (ref 7–16)
BUN BLDV-MCNC: 45 MG/DL (ref 6–23)
CALCIUM SERPL-MCNC: 8.8 MG/DL (ref 8.6–10.2)
CHLORIDE BLD-SCNC: 102 MMOL/L (ref 98–107)
CO2: 25 MMOL/L (ref 22–29)
CREAT SERPL-MCNC: 2.8 MG/DL (ref 0.7–1.2)
GFR AFRICAN AMERICAN: 27
GFR NON-AFRICAN AMERICAN: 27 ML/MIN/1.73
GLUCOSE BLD-MCNC: 69 MG/DL (ref 74–99)
HCT VFR BLD CALC: 29.5 % (ref 37–54)
HEMOGLOBIN: 8.3 G/DL (ref 12.5–16.5)
MCH RBC QN AUTO: 27.1 PG (ref 26–35)
MCHC RBC AUTO-ENTMCNC: 28.1 % (ref 32–34.5)
MCV RBC AUTO: 96.4 FL (ref 80–99.9)
METER GLUCOSE: 106 MG/DL (ref 74–99)
METER GLUCOSE: 175 MG/DL (ref 74–99)
METER GLUCOSE: 251 MG/DL (ref 74–99)
METER GLUCOSE: 55 MG/DL (ref 74–99)
METER GLUCOSE: 83 MG/DL (ref 74–99)
PDW BLD-RTO: 17.5 FL (ref 11.5–15)
PLATELET # BLD: 218 E9/L (ref 130–450)
PMV BLD AUTO: 10 FL (ref 7–12)
POTASSIUM REFLEX MAGNESIUM: 5.4 MMOL/L (ref 3.5–5)
RBC # BLD: 3.06 E12/L (ref 3.8–5.8)
SODIUM BLD-SCNC: 138 MMOL/L (ref 132–146)
TOTAL CK: 36 U/L (ref 20–200)
TROPONIN, HIGH SENSITIVITY: 114 NG/L (ref 0–11)
WBC # BLD: 4.1 E9/L (ref 4.5–11.5)

## 2021-06-12 PROCEDURE — 2580000003 HC RX 258: Performed by: INTERNAL MEDICINE

## 2021-06-12 PROCEDURE — 2580000003 HC RX 258: Performed by: NURSE PRACTITIONER

## 2021-06-12 PROCEDURE — 6360000002 HC RX W HCPCS: Performed by: NURSE PRACTITIONER

## 2021-06-12 PROCEDURE — 85027 COMPLETE CBC AUTOMATED: CPT

## 2021-06-12 PROCEDURE — 36415 COLL VENOUS BLD VENIPUNCTURE: CPT

## 2021-06-12 PROCEDURE — 99222 1ST HOSP IP/OBS MODERATE 55: CPT | Performed by: INTERNAL MEDICINE

## 2021-06-12 PROCEDURE — 6360000002 HC RX W HCPCS: Performed by: INTERNAL MEDICINE

## 2021-06-12 PROCEDURE — 2140000000 HC CCU INTERMEDIATE R&B

## 2021-06-12 PROCEDURE — 82550 ASSAY OF CK (CPK): CPT

## 2021-06-12 PROCEDURE — 80048 BASIC METABOLIC PNL TOTAL CA: CPT

## 2021-06-12 PROCEDURE — 84484 ASSAY OF TROPONIN QUANT: CPT

## 2021-06-12 PROCEDURE — 2500000003 HC RX 250 WO HCPCS: Performed by: NURSE PRACTITIONER

## 2021-06-12 PROCEDURE — 6370000000 HC RX 637 (ALT 250 FOR IP): Performed by: NURSE PRACTITIONER

## 2021-06-12 PROCEDURE — 2700000000 HC OXYGEN THERAPY PER DAY

## 2021-06-12 PROCEDURE — 82962 GLUCOSE BLOOD TEST: CPT

## 2021-06-12 PROCEDURE — APPSS180 APP SPLIT SHARED TIME > 60 MINUTES: Performed by: NURSE PRACTITIONER

## 2021-06-12 RX ADMIN — ASPIRIN 81 MG: 81 TABLET, COATED ORAL at 09:22

## 2021-06-12 RX ADMIN — BUMETANIDE 1 MG: 0.25 INJECTION INTRAMUSCULAR; INTRAVENOUS at 06:21

## 2021-06-12 RX ADMIN — TIZANIDINE 4 MG: 4 TABLET ORAL at 23:15

## 2021-06-12 RX ADMIN — SODIUM CHLORIDE, PRESERVATIVE FREE 10 ML: 5 INJECTION INTRAVENOUS at 14:18

## 2021-06-12 RX ADMIN — MYCOPHENOLATE MOFETIL 1000 MG: 250 CAPSULE ORAL at 22:28

## 2021-06-12 RX ADMIN — TACROLIMUS 2 MG: 1 CAPSULE ORAL at 11:28

## 2021-06-12 RX ADMIN — OXYCODONE 5 MG: 5 TABLET ORAL at 22:31

## 2021-06-12 RX ADMIN — INSULIN LISPRO 5 UNITS: 100 INJECTION, SOLUTION INTRAVENOUS; SUBCUTANEOUS at 17:52

## 2021-06-12 RX ADMIN — PANTOPRAZOLE SODIUM 40 MG: 40 TABLET, DELAYED RELEASE ORAL at 06:21

## 2021-06-12 RX ADMIN — INSULIN GLARGINE 17 UNITS: 100 INJECTION, SOLUTION SUBCUTANEOUS at 13:06

## 2021-06-12 RX ADMIN — TACROLIMUS 2 MG: 1 CAPSULE ORAL at 22:28

## 2021-06-12 RX ADMIN — MYCOPHENOLATE MOFETIL 1000 MG: 250 CAPSULE ORAL at 09:26

## 2021-06-12 RX ADMIN — SODIUM BICARBONATE 650 MG: 650 TABLET ORAL at 09:24

## 2021-06-12 RX ADMIN — INSULIN LISPRO 3 UNITS: 100 INJECTION, SOLUTION INTRAVENOUS; SUBCUTANEOUS at 17:53

## 2021-06-12 RX ADMIN — Medication 10 ML: at 22:31

## 2021-06-12 RX ADMIN — BUMETANIDE 1 MG: 0.25 INJECTION INTRAMUSCULAR; INTRAVENOUS at 17:45

## 2021-06-12 RX ADMIN — TIZANIDINE 4 MG: 4 TABLET ORAL at 23:14

## 2021-06-12 RX ADMIN — Medication 1 TABLET: at 09:24

## 2021-06-12 RX ADMIN — INSULIN LISPRO 1 UNITS: 100 INJECTION, SOLUTION INTRAVENOUS; SUBCUTANEOUS at 13:08

## 2021-06-12 RX ADMIN — CHLOROTHIAZIDE SODIUM 125 MG: 500 INJECTION, POWDER, LYOPHILIZED, FOR SOLUTION INTRAVENOUS at 14:18

## 2021-06-12 RX ADMIN — Medication 5000 UNITS: at 11:28

## 2021-06-12 RX ADMIN — HYDRALAZINE HYDROCHLORIDE 100 MG: 50 TABLET, FILM COATED ORAL at 06:21

## 2021-06-12 RX ADMIN — PANTOPRAZOLE SODIUM 40 MG: 40 TABLET, DELAYED RELEASE ORAL at 17:54

## 2021-06-12 RX ADMIN — DOCUSATE SODIUM 100 MG: 100 CAPSULE ORAL at 22:31

## 2021-06-12 RX ADMIN — HYDRALAZINE HYDROCHLORIDE 100 MG: 50 TABLET, FILM COATED ORAL at 15:16

## 2021-06-12 RX ADMIN — HEPARIN SODIUM 5000 UNITS: 10000 INJECTION INTRAVENOUS; SUBCUTANEOUS at 15:15

## 2021-06-12 RX ADMIN — HYDRALAZINE HYDROCHLORIDE 100 MG: 50 TABLET, FILM COATED ORAL at 22:29

## 2021-06-12 RX ADMIN — Medication 5 ML: at 11:29

## 2021-06-12 RX ADMIN — CHLOROTHIAZIDE SODIUM 125 MG: 500 INJECTION, POWDER, LYOPHILIZED, FOR SOLUTION INTRAVENOUS at 22:35

## 2021-06-12 RX ADMIN — HEPARIN SODIUM 5000 UNITS: 10000 INJECTION INTRAVENOUS; SUBCUTANEOUS at 06:21

## 2021-06-12 RX ADMIN — METOPROLOL SUCCINATE 50 MG: 50 TABLET, EXTENDED RELEASE ORAL at 22:29

## 2021-06-12 RX ADMIN — PREDNISONE 5 MG: 5 TABLET ORAL at 09:27

## 2021-06-12 RX ADMIN — ATORVASTATIN CALCIUM 10 MG: 10 TABLET, FILM COATED ORAL at 22:28

## 2021-06-12 RX ADMIN — HEPARIN SODIUM 5000 UNITS: 10000 INJECTION INTRAVENOUS; SUBCUTANEOUS at 22:29

## 2021-06-12 RX ADMIN — ISOSORBIDE MONONITRATE 60 MG: 60 TABLET ORAL at 09:24

## 2021-06-12 ASSESSMENT — PAIN SCALES - GENERAL
PAINLEVEL_OUTOF10: 6
PAINLEVEL_OUTOF10: 0
PAINLEVEL_OUTOF10: 0
PAINLEVEL_OUTOF10: 6
PAINLEVEL_OUTOF10: 0
PAINLEVEL_OUTOF10: 0

## 2021-06-12 NOTE — PLAN OF CARE
Problem: Pain:  Goal: Pain level will decrease  Description: Pain level will decrease  Outcome: Met This Shift     Problem: Falls - Risk of:  Goal: Will remain free from falls  Description: Will remain free from falls  Outcome: Met This Shift     Problem: Gas Exchange - Impaired:  Goal: Levels of oxygenation will improve  Description: Levels of oxygenation will improve  Outcome: Met This Shift     Problem: Fluid Volume:  Goal: Hemodynamic stability will improve  Description: Hemodynamic stability will improve  Outcome: Met This Shift  Goal: Ability to maintain a balanced intake and output will improve  Description: Ability to maintain a balanced intake and output will improve  Outcome: Met This Shift     Problem: Respiratory:  Goal: Respiratory status will improve  Description: Respiratory status will improve  Outcome: Met This Shift

## 2021-06-12 NOTE — CONSULTS
Nephrology Consult Note  Patient's Name: Raya De La Rosa  12:53 PM  6/12/2021        Reason for Consult: CKD, ALYSON  Requesting Physician:  Michael Ortiz III, DO    Chief Complaint: Shortness of breath, lower extremity edema  History Obtained From:  patient, spouse/SO and EHR    History of Present Ilness:    Raya De La Rosa is a 79 y.o. male with a history of chronic kidney disease stage IIIb, chronic HFrEF, S/P LRD kidney transplant, type 2 diabetes mellitus and several other medical problems listed below. He presented to the hospital with complaints of shortness of breath and increasing lower extremity edema over the course of the past week. He had a visit with his PCP on the day of his admission and was instructed to proceed to ED for further evaluation and possible admission. He denies any cough, no fever or chills. He was recently discharged from [place] [id number] following an admission for COVID-19. At the time of discharge his creatinine was about 2.1 his (his usual baseline is 2-2.5). ED vitals was essentially unremarkable. Laboratory data was significant for a WBC of 4.5, hemoglobin 8.4, sodium 142, potassium 5.2, BUN 45 and creatinine of 3.0.  proBNP was in excess of 9000. Chest x-ray showed multifocal bilateral patchy pulmonary infiltrates more prominent within the lower lobe. Trace bilateral pleural effusion was also noted. Patient was admitted to telemetry and was started on IV diuretics. He has been seen in consultation by the cardiology service.     Past Medical History:   Diagnosis Date    Anemia     Iron deficiency    Atrial fibrillation (HCC)     [facility]congest[facility] failure) [facility] 03/12/2008    Chronic diastolic congestive heart failure ([facility] 03/12/2008    Chronic foot pain     Chronic knee pain     BILATERAL    Constipation     Depression     DM (diabetes mellitus) ([facility]     [facility]     H/O kidney transplant 12/12/2014    Hyperlipidemia     Hypertension  Immunosuppression ([facility] 3/12/2008    Kidney disease     Pulmonary hypertension ([facility]        Past Surgical History:   Procedure Laterality Date    BACK SURGERY  1982    BLADDER SURGERY      CARPAL TUNNEL RELEASE Left 06/23/2014    CHOLECYSTECTOMY, LAPAROSCOPIC  05/21/2013    COLONOSCOPY      DIAGNOSTIC CARDIAC CATH LAB PROCEDURE  01/15/2013    NORMAL    ECHO COMPL W DOP COLOR FLOW  05/22/2013         ENDOSCOPY, COLON, DIAGNOSTIC      FOOT SURGERY      BONE REMOVED    KIDNEY BIOPSY  06/09/2015    [facility] ; ALSO 4/14/2016, 4/5/2017    KIDNEY TRANSPLANT  12/12/2014    @ [facility]    EDUARDO-EN-Y GASTRIC BYPASS  06/29/2010    Laparoscopic / Dr. Joanie Claros  12/21/2007    Dr. Soraida Oneal 4/28/2021    EGD in OR 12--COVID performed by Astrid Vásquez MD at [facility] ENDOSCOPY       Family History   Problem Relation Age of Onset    Diabetes Mother     High Blood Pressure Mother     Heart Disease Mother     Diabetes Father     Cancer Father     Stroke Father     Heart Disease Father         reports that he quit smoking about 31 years ago. His smoking use included cigarettes. He has a 20.00 pack-year smoking history. He has never used smokeless tobacco. He reports current alcohol use. He reports that he does not use drugs. Allergies:  Patient has no known allergies.     Current Medications:    sodium chloride flush 0.9 % injection 5-40 mL, 2 times per day  sodium chloride flush 0.9 % injection 5-40 mL, PRN  0.9 % sodium chloride infusion, PRN  polyethylene glycol (GLYCOLAX) packet 17 g, Daily PRN  acetaminophen (TYLENOL) tablet 650 mg, Q6H PRN   Or  acetaminophen (TYLENOL) suppository 650 mg, Q6H PRN  trimethobenzamide (TIGAN) injection 200 mg, Q6H PRN  heparin (porcine) injection 5,000 Units, 3 times per day  glucose (GLUTOSE) 40 % oral gel 15 g, PRN  dextrose 50 % IV solution, PRN  glucagon (rDNA) injection 1 mg, PRN  dextrose 5 % solution, PRN  bumetanide (BUMEX) injection 1 mg, Q12H  mycophenolate (CELLCEPT) capsule 1,000 mg, BID  aspirin EC tablet 81 mg, Daily  vitamin D (CHOLECALCIFEROL) tablet 5,000 Units, Daily  docusate sodium (COLACE) capsule 100 mg, Daily PRN  hydrALAZINE (APRESOLINE) tablet 100 mg, 3 times per day  insulin lispro (HUMALOG) injection vial 5 Units, Daily before lunch  isosorbide mononitrate (IMDUR) extended release tablet 60 mg, Daily  insulin glargine (LANTUS) injection vial 17 Units, Daily  metoprolol succinate (TOPROL XL) extended release tablet 50 mg, Nightly  multivitamin 1 tablet, Daily  oxyCODONE (ROXICODONE) immediate release tablet 5 mg, Q4H PRN  pantoprazole (PROTONIX) tablet 40 mg, BID AC  predniSONE (DELTASONE) tablet 5 mg, Daily  atorvastatin (LIPITOR) tablet 10 mg, Nightly  sodium bicarbonate tablet 650 mg, Daily  tacrolimus (PROGRAF) capsule 2 mg, BID  tiZANidine (ZANAFLEX) tablet 4 mg, Daily  insulin lispro (HUMALOG) injection vial 0-6 Units, TID WC  insulin lispro (HUMALOG) injection vial 0-3 Units, Nightly        Review of Systems:   Pertinent items are noted in HPI. Physical exam:  Vitals:    06/12/21 1138   BP: 129/72   Pulse: 72   Resp: 20   Temp: 98.4 °F (36.9 °C)   SpO2:            General: No distress. Alert. Eyes: PERRL. No sclera icterus. No conjunctival injection. ENT: No discharge. Pharynx clear. Neck: Trachea midline. Normal thyroid. Lungs: decreased breath sounds bases   CV: Regular rate. Regular rhythm. No murmur or rub. .   Abd: Non-tender. Non-distended. No masses. No organmegaly. Normal bowel sounds; renal allograft RLQ  Skin: Warm and dry. No nodule on exposed extremities. No rash on exposed extremities. Ext: No cyanosis, clubbing, 2+ pitting bilateral legs edema  Neuro: Awake. Follows commands. Positive pupils/gag/corneals. Normal pain response.       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 Hypertension with CKD  -Continue his antihypertensive meds    5.   Anemia due to CKD  -Check iron stores  -Begin ADAM  -Monitor H&H      Patient's wife at bedside and updated    Will follow  Thanks                Blayne Ndiaye MD  12:53 PM  6/12/2021

## 2021-06-12 NOTE — H&P
Lupe Garza M.D. History and Physical      CHIEF COMPLAINT: Shortness of breath, chest pain, worsening edema    Reason for Admission: Elevated troponins/acute CHF    History Obtained From: Patient/EMR    HISTORY OF PRESENT ILLNESS:      The patient is a 79 y.o. male of [address] [place] [address] with significant past medical history of atrial fibrillation on anticoagulation, chronic CHF, diabetes , status post kidney transplant 2014,Tootie-en-Y gastric bypass recent COVID-19 pneumonia. He was recently admitted at the end of April for questionable GI bleed. He now presents with complaints of shortness of breath, weight gain and chest discomfort which has been exertional.  Patient came into the ER and was subsequently admitted secondary to high sensitivity troponin elevation. He is not on dialysis. He indicates his edema has come down significantly since admission since he received couple of doses of diuretic. He does take Lasix 40 p.o. daily and has been compliant with this.     All labs personally reviewed   All imaging personally reviewed   Chest x-ray with multifocal bilateral pulmonary infiltrates    Past Medical History:        Diagnosis Date    Anemia     Iron deficiency    Atrial fibrillation (HCC)     CHF (congestive heart failure) (HCC) 03/12/2008    Chronic diastolic congestive heart failure ([address] 03/12/2008    Chronic foot pain     Chronic knee pain     BILATERAL    Constipation     Depression     DM (diabetes mellitus) ([address]     ESRD (end stage renal disease) ([address]     H/O kidney transplant 12/12/2014    Hyperlipidemia     Hypertension     Immunosuppression ([address] 3/12/2008    Kidney disease     Pulmonary hypertension ([address]      Past Surgical History:        Procedure Laterality Date    BACK SURGERY  1982    BLADDER SURGERY      CARPAL TUNNEL RELEASE Left 06/23/2014    CHOLECYSTECTOMY, LAPAROSCOPIC  05/21/2013    COLONOSCOPY      DIAGNOSTIC CARDIAC CATH LAB PROCEDURE  01/15/2013    NORMAL    ECHO COMPL W DOP COLOR FLOW  05/22/2013         ENDOSCOPY, COLON, DIAGNOSTIC      FOOT SURGERY      BONE REMOVED    KIDNEY BIOPSY  06/09/2015    [facility] ; ALSO 4/14/2016, 4/5/2017    KIDNEY TRANSPLANT  12/12/2014    @ [facility]    EDUARDO-EN-Y GASTRIC BYPASS  06/29/2010    Laparoscopic / Dr. Judi Dubois  12/21/2007    Dr. Hillary Dao 4/28/2021    EGD in OR 12--COVID performed by Lowell Leung MD at [address] [place] [address]         Medications Prior to Admission:    Medications Prior to Admission: oxyCODONE (ROXICODONE) 20 MG immediate release tablet, Take 20 mg by mouth every 8 hours as needed for Pain.    Cholecalciferol (VITAMIN D3) 125 MCG (5000 UT) TABS, Take 5,000 Units by mouth daily  hydrALAZINE (APRESOLINE) 100 MG tablet, Take 100 mg by mouth 3 times daily  metoprolol succinate (TOPROL XL) 100 MG extended release tablet, Take 100 mg by mouth daily  sulfamethoxazole-trimethoprim (BACTRIM;SEPTRA) 400-80 MG per tablet, Take 1 tablet by mouth daily  predniSONE (DELTASONE) 5 MG tablet, Take 5 mg by mouth daily  furosemide (LASIX) 40 MG tablet, Take 1 tablet by mouth daily Monitor weight and swelling may take additional dose as needed  simvastatin (ZOCOR) 10 MG tablet, Take 10 mg by mouth daily  isosorbide mononitrate (IMDUR) 60 MG extended release tablet, TAKE 1 TABLET DAILY  tacrolimus (PROGRAF) 1 MG capsule, Take 2 capsules by mouth 2 times daily  sodium bicarbonate 650 MG tablet, Take 650 mg by mouth 4 times daily   Multiple Vitamin (MULTI VITAMIN DAILY PO), Take by mouth daily  LANTUS SOLOSTAR 100 UNIT/ML SOPN, Inject 17 Units into the skin daily At noon   Had today  mycophenolate (CELLCEPT) 250 MG capsule,  Take 1,000 mg by mouth 2 times daily   insulin lispro (HUMALOG KWIKPEN) 100 UNIT/ML SOPN, Inject 5 Units into the skin daily (before lunch) Patient takes with his biggest meal  docusate sodium (COLACE) 100 MG rhonchi  Abdomen: Bowel sounds present, soft, nontender, no masses, no organomegaly, no peritoneal signs  Extremities:  No clubbing, cyanosis, or edema  Skin:  Warm and dry, no open lesions or rash  Neuro:  Cranial nerves 2-12 intact, no focal deficits  Vascular: Radial and pedal Pulses 2+  Breast: deferred  Rectal: deferred  Genitalia:  deferred      DATA:     Recent Labs     06/11/21  1201 06/12/21  0628   WBC 4.5 4.1*   HGB 8.4* 8.3*    218     Recent Labs     06/11/21  1201 06/11/21 2020 06/11/21 2136 06/12/21  0628     --   --  138   K 5.2* 5.8* 5.6* 5.4*   BUN 45*  --   --  45*   CREATININE 3.0*  --   --  2.8*     Recent Labs     06/11/21  1201   PROT 5.9*     Recent Labs     06/11/21  1201   AST 23   ALT 13   ALKPHOS 51   BILITOT 0.3     No results for input(s): BNP in the last 72 hours. Recent Labs     06/11/21 2136 06/12/21  0628   CKTOTAL 71 36       ASSESSMENT:      Principal Problem:    Acute combined systolic and diastolic CHF, NYHA class 1 ([facility])  Active Problems:    GERD (gastroesophageal reflux disease)    Mixed hyperlipidemia    CAD (coronary artery disease), native coronary artery    H/O kidney transplant    Immunosuppression ([facility])    Chronic kidney disease    Anemia of chronic disease    Elevated troponin    Hyperkalemia    Essential hypertension    Type 2 diabetes mellitus, with long-term current use of insulin ([facility])    Obesity (BMI 30-39. 9)  Resolved Problems:    * No resolved hospital problems.  *        PLAN:    Admit to telemetry for evaluation of heart failure/elevated troponins  IV diuresis with caution secondary to transplanted kidney and CKD stage IV   follow strict I's and O's, daily weights  Monitor oxygen requirement, try to wean off  Review most recent echocardiogram    Renal consultation if creatinine continues to climb in patient with transplanted kidney  Currently his BUN and creatinine are at baseline  Cardiology following for elevated troponins     DVT prophylaxis  PT OT  Discharge plan    Marlene Herrera MD  6/12/2021  10:43 AM

## 2021-06-12 NOTE — CONSULTS
Inpatient Cardiology Consultation      Reason for Consult:  CHF, Troponin elevation     Consulting Physician: Dr. Lucila Virk     Requesting Physician:  Sri PAULA     Date of Consultation: 6/12/2021    HISTORY OF PRESENT ILLNESS:   Mr. Talya Santiago is a 79year old  male who follows with Dr. Keon Gonzalez. He was most recently seen in consultation with Dr. Neela Calix on 04/13/2021. His medical history includes chronic HFpEF, combined pre/post capillary pulmonary hypertension, hypertension, obesity, DM, GERD, hyperlipidemia, ESRD s/p kidney transplant in 12/2014 ([facility]), OLINDA with compliance with CPAP. Mr. Talya Santiago presented to [facility] ED on 06/11/2021 with complaints of \"all over swelling\". He states that prior to presentation over the course of the past week he has been experiencing edema to include his abdomen and BLE. He states that over the course of the past week he has also been having PINEDO, orthopnea and PND. In addition, he has been having midsternal chest pain that has been happening intermittently lasting several seconds in duration that is worsened with exertion and occurs with and without exertion. He states that he followed up with his PCP and was instructed to come to the ER for management of fluid overload. Upon arrival to the ER his VS were 98-66-16-97% RA-124/49. EKG SR with PACs. WBC 4.5. H/H 8.4/29.5. Troponin 115. ProBNP 9928. CXR with bilateral infiltrates and trace bilateral pleural effusions. He received Lasix 40 mg IV and was admitted to a telemetry monitored unit. Cardiology was consulted for management of CHF and troponin elevation. Please note: past medical records were reviewed per electronic medical record (EMR) - see detailed reports under Past Medical/ Surgical History. Past Medical and Surgical History:    Hypertension. Diabetes. GERD. Hyperlipidemia. NKDA. Obesity. End-stage renal disease. Not a known smoker. Echo 07/12/2012. LVH. EF 63%. Stage I diastolic dysfunction. No significant valvulopathy. Stress Myoview 07/12/2012. Reversible ischemia in the mid anterior wall. EF 45%. Lexiscan MPS 11/21/2013. Normal perfusion study. EF 63%. Echo 07/20/2014. EF normal. Stage I diastolic dysfunction, otherwise no valvular heart disease. Mild LAE. Status post kidney transplant 12/12/2014 at [facility]. Echo, 07/21/2015. Stage I diastolic dysfunction. EF 65%. Normal sized LA. No valvular heart disease. Small circumferential pericardial effusion. Lexiscan MPS, 07/26/2015: Small area of reversible ischemia in the lateral wall. EF 48%. Renal artery US, [facility] - 08/13/2015: 60-99% stenosis right renal artery, may be overestimated due to vessel tortuosity. Angiogram of the transplant right renal artery (anastomosis of the right common iliac artery). FFR and IVUS, 09/21/2015, Dr. Medardo Stacy, [address] [place] [address]. The transplant renal artery was extremely tortuous, difficult to assess the severity of stenosis by angiography. IVUS of the transplant renal artery, proximally normal. Unable to pass into more distal segment. Repeat procedure, 09/30/2015 from the left groin using an up-and-over approach. Dr. Medardo Stacy, [facility]. FFR 0.98. Peak-to-peak gradient of 8-10 mm. IVUS, no significant atherosclerosis, plaque, or fibrosis. Echocardiogram, 4/27/2017, Dr. Mary Anne Gerard. EF 55-65%. Mild aortic sclerosis. No other significant valvular heart disease. Mixed lung disease. Was seen by [facility] in April 2020. Chest CT without contrast showed mild aortic and coronary calcification and a dilated main PA to 3.9 cm. There were no stigmata of interstitial lung disease but there was mild diffuse mosaic attenuation of the parenchyma and eventration of the right hemidiaphragm  PFTs showed a TLC of 48% and DLCO 48% predicted  OLINDA - on CPAP  Has a history of carpal tunnel (although he is unsure if it is) with a prior surgical procedure 6/2014.  Nuclear imaging performed for cardiac amyloidosis ([facility]) and found to be negative with AL serologies unremarkable as well. RHC (9/2/2020) RA: 13. RV: 91/14. PA: 91/25 with a mean of 48. PCW: 22. CO/CI (Jack): 5.6/2.5. CO/CI (thermodilution): 4.9/2.1 Conclusions: 1. Moderately elevated right and left filling pressures. 2. Severe pulmonary hypertension, predominantly precapillary. PVR approximately 5-5.5. 3. Gabrielle 5 consistent with normal RV function. Refer to pulmonary hypertension center at [facility]   Pulmonary hypertension evaluation at [facility] [place] >> (CpC-PH, WHO group 2) - TPG 26 and PVR of 5. PCWP was 22 at time of cardiac catheterization. Given that this is largely a group 2 component, would first try to optimize his medications as best as possible (reduce beta blockade given bradycardia and likely take off ISMN given ill effects in HFpEF patients). PDE-5 inhibitors have limited data in HFpEF with none being beneficial.   TTE (4/3/2020, [facility]) CONCLUSIONS: Technically difficult exam due to body habitus. Exam indication: Evaluation of known heart failure to guide therapy. The left ventricle is normal in size. There is moderate left ventricular hypertrophy. Left ventricular systolic function is normal. EF = 67 ± 5% (2D   biplane) Grade II left ventricular diastolic dysfunction. Global strain not reported due to poor tracking. The right ventricle is normal in size. Right ventricular systolic function is normal. The left atrial cavity is severely dilated. Exam was compared with the prior  echocardiographic exam performed on 10/19/2016. Similar findings. Echocardiogram 04/13/2021 (Dr. Sam Pedraza): EF 60-65%. Stage I Diastolic Dysfunction. EGD 04/28/2021: Marginal Ulcer, nonbleeding           Medications Prior to admit:  Prior to Admission medications    Medication Sig Start Date End Date Taking? Authorizing Provider   oxyCODONE (ROXICODONE) 20 MG immediate release tablet Take 20 mg by mouth every 8 hours as needed for Pain.     Yes Historical Provider, MD   Cholecalciferol (VITAMIN D3) 125 MCG (5000 UT) TABS Take 5,000 Units by mouth daily   Yes Historical Provider, MD   hydrALAZINE (APRESOLINE) 100 MG tablet Take 100 mg by mouth 3 times daily   Yes Historical Provider, MD   metoprolol succinate (TOPROL XL) 100 MG extended release tablet Take 100 mg by mouth daily   Yes Historical Provider, MD   sulfamethoxazole-trimethoprim (BACTRIM;SEPTRA) 400-80 MG per tablet Take 1 tablet by mouth daily   Yes Historical Provider, MD   predniSONE (DELTASONE) 5 MG tablet Take 5 mg by mouth daily   Yes Historical Provider, MD   furosemide (LASIX) 40 MG tablet Take 1 tablet by mouth daily Monitor weight and swelling may take additional dose as needed 4/20/21  Yes Elsy Sampson MD   simvastatin (ZOCOR) 10 MG tablet Take 10 mg by mouth daily   Yes Historical Provider, MD   isosorbide mononitrate (IMDUR) 60 MG extended release tablet TAKE 1 TABLET DAILY 3/6/18  Yes Pearletha Gaucher, MD   tacrolimus (PROGRAF) 1 MG capsule Take 2 capsules by mouth 2 times daily 5/1/17  Yes Herman Josue MD   sodium bicarbonate 650 MG tablet Take 650 mg by mouth 4 times daily    Yes Historical Provider, MD   Multiple Vitamin (MULTI VITAMIN DAILY PO) Take by mouth daily   Yes Historical Provider, MD   LANKRISTINA SOLOSTAR 100 UNIT/ML SOPN Inject 17 Units into the skin daily At noon   Had today 6/3/15  Yes Historical Provider, MD   mycophenolate (CELLCEPT) 250 MG capsule   Take 1,000 mg by mouth 2 times daily  6/18/15  Yes Historical Provider, MD   insulin lispro (HUMALOG KWIKPEN) 100 UNIT/ML SOPN Inject 5 Units into the skin daily (before lunch) Patient takes with his biggest meal   Yes Historical Provider, MD   docusate sodium (COLACE) 100 MG capsule Take 1 capsule by mouth 2 times daily as needed for Constipation. Patient taking differently: Take 100 mg by mouth daily as needed for Constipation  7/26/13  Yes Meryl Pederson MD   linagliptin (TRADJENTA) 5 MG tablet Take 5 mg by mouth daily.    Yes Historical Provider, MD   aspirin 81 MG EC tablet Take 81 mg by mouth daily.    Yes Historical Provider, MD       Current Medications:    Current Facility-Administered Medications: sodium chloride flush 0.9 % injection 5-40 mL, 5-40 mL, Intravenous, 2 times per day  sodium chloride flush 0.9 % injection 5-40 mL, 5-40 mL, Intravenous, PRN  0.9 % sodium chloride infusion, 25 mL, Intravenous, PRN  polyethylene glycol (GLYCOLAX) packet 17 g, 17 g, Oral, Daily PRN  acetaminophen (TYLENOL) tablet 650 mg, 650 mg, Oral, Q6H PRN **OR** acetaminophen (TYLENOL) suppository 650 mg, 650 mg, Rectal, Q6H PRN  trimethobenzamide (TIGAN) injection 200 mg, 200 mg, Intramuscular, Q6H PRN  heparin (porcine) injection 5,000 Units, 5,000 Units, Subcutaneous, 3 times per day  glucose (GLUTOSE) 40 % oral gel 15 g, 15 g, Oral, PRN  dextrose 50 % IV solution, 12.5 g, Intravenous, PRN  glucagon (rDNA) injection 1 mg, 1 mg, Intramuscular, PRN  dextrose 5 % solution, 100 mL/hr, Intravenous, PRN  bumetanide (BUMEX) injection 1 mg, 1 mg, Intravenous, Q12H  mycophenolate (CELLCEPT) capsule 1,000 mg, 1,000 mg, Oral, BID  aspirin EC tablet 81 mg, 81 mg, Oral, Daily  vitamin D (CHOLECALCIFEROL) tablet 5,000 Units, 5,000 Units, Oral, Daily  docusate sodium (COLACE) capsule 100 mg, 100 mg, Oral, Daily PRN  hydrALAZINE (APRESOLINE) tablet 100 mg, 100 mg, Oral, 3 times per day  insulin lispro (HUMALOG) injection vial 5 Units, 5 Units, Subcutaneous, Daily before lunch  isosorbide mononitrate (IMDUR) extended release tablet 60 mg, 60 mg, Oral, Daily  insulin glargine (LANTUS) injection vial 17 Units, 17 Units, Subcutaneous, Daily  metoprolol succinate (TOPROL XL) extended release tablet 50 mg, 50 mg, Oral, Nightly  multivitamin 1 tablet, 1 tablet, Oral, Daily  oxyCODONE (ROXICODONE) immediate release tablet 5 mg, 5 mg, Oral, Q4H PRN  pantoprazole (PROTONIX) tablet 40 mg, 40 mg, Oral, BID AC  predniSONE (DELTASONE) tablet 5 mg, 5 mg, Oral, Daily  atorvastatin (LIPITOR) tablet 10 mg, 10 mg, Oral, Nightly  sodium bicarbonate tablet 650 mg, 650 mg, Oral, Daily  tacrolimus (PROGRAF) capsule 2 mg, 2 mg, Oral, BID  tiZANidine (ZANAFLEX) tablet 4 mg, 4 mg, Oral, Daily  insulin lispro (HUMALOG) injection vial 0-6 Units, 0-6 Units, Subcutaneous, TID WC  insulin lispro (HUMALOG) injection vial 0-3 Units, 0-3 Units, Subcutaneous, Nightly    Allergies:  Patient has no known allergies. Social History:    Denies tobacco, alcohol, illicit drug use. Caffeine intake includes 2 cups of coffee daily. Family History:   Please note this information was not obtained at this time as it is limited in nature due to the patient's advanced age. REVIEW OF SYSTEMS:     Constitutional: Denies fevers, chills, night sweats, and fatigue  HEENT: Denies headaches, nose bleeds, and blurred vision,oral pain, abscess. Complains of bumps on the roof of his mouth and states that this is related to medications. Musculoskeletal: Denies falls, pain to BLE with ambulation and edema to BLE. Neurological: Denies dizziness and lightheadedness, complains of BLE numbness and tingling  Cardiovascular: Complains of chest pain. Denies palpitations, and feelings of heart racing. Respiratory: Denies orthopnea and PND  Gastrointestinal: Denies heartburn, nausea/vomiting, diarrhea and constipation, black/bloody, and tarry stools. Genitourinary: Denies dysuria and hematuria  Hematologic: Denies excessive bruising or bleeding  Lymphatic: Denies lumps and bumps to neck, axilla, breast, and groin  Endocrine: Denies excessive thirst. Denies intolerance to hot and cold  Psychiatric: Denies anxiety and depression. PHYSICAL EXAM:   /64   Pulse 56   Temp 98.4 °F (36.9 °C) (Oral)   Resp 20   Ht 5' 11\" (1.803 m)   Wt 220 lb 9.6 oz (100.1 kg)   SpO2 94%   BMI 30.77 kg/m²   CONST:  Well developed, obese elderly male who appears stated age.  Awake, alert, cooperative, no apparent distress  HEENT:   Head- Normocephalic, atraumatic   Eyes- Conjunctivae pink, anicteric  Throat- Oral mucosa pink and moist  Neck-  No stridor, trachea midline, no jugular venous distention. No adenopathy   CHEST: Chest symmetrical and non-tender to palpation. No accessory muscle use or intercostal retractions  RESPIRATORY: Lung sounds -scattered crackles throughout fields   CARDIOVASCULAR:     No carotid bruit  Heart Inspection- shows no noted pulsations  Heart Palpation- no heaves or thrills; PMI is non-displaced   Heart Ausculation- Regular rate and rhythm, no murmur. No s3, s4 or rub   PV: 2-3+ pitting bilateral lower extremity edema. No varicosities. Pedal pulses palpable, no clubbing or cyanosis   ABDOMEN: Soft, obese, non-tender to light palpation. Bowel sounds present. No palpable masses no organomegaly; no abdominal bruit  MS: Good muscle strength and tone. No atrophy or abnormal movements. : Deferred  SKIN: Warm and dry no statis dermatitis or ulcers   NEURO / PSYCH: Oriented to person, place and time. Speech clear and appropriate. Follows all commands.  Pleasant affect     DATA:    ECG: As above  Tele strips: As above  Diagnostic:      Intake/Output Summary (Last 24 hours) at 6/12/2021 1041  Last data filed at 6/12/2021 8049  Gross per 24 hour   Intake 100 ml   Output 1700 ml   Net -1600 ml       Labs:   CBC:   Recent Labs     06/11/21  1201 06/12/21  0628   WBC 4.5 4.1*   HGB 8.4* 8.3*   HCT 29.5* 29.5*    218     BMP:   Recent Labs     06/11/21  1201 06/11/21 2136 06/12/21  0628     --  138   K 5.2* 5.6* 5.4*   CO2 27  --  25   BUN 45*  --  45*   CREATININE 3.0*  --  2.8*   LABGLOM 25  --  27   CALCIUM 8.9  --  8.8   HgA1c:   Lab Results   Component Value Date    LABA1C 5.5 06/03/2014   CARDIAC ENZYMES:  Recent Labs     06/11/21 2136 06/12/21  0628   CKTOTAL 71 36     FASTING LIPID PANEL:  Lab Results   Component Value Date    CHOL 128 06/03/2014    HDL 51 06/03/2014    LDLCALC 67 06/03/2014    TRIG 50 06/03/2014     LIVER PROFILE:  Recent Labs     06/11/21  1201   AST 23   ALT 13   LABALBU 3.7     06/11/2021 CXR:   1. Multifocal bilateral patchy pulmonary infiltrates more prominent within the lower lobes. 2. Trace bilateral pleural effusions. IMPRESSION and PLAN to follow per Dr. Lucila Virk     Electronically signed by CHAI Gr CNP on 6/12/2021 at 10:41 AM     Signed                         I have personally seen and evaluated the patient. I personally obtained the history and performed the physical exam.  I personally reviewed all of the above labs, history, review of systems, and data. All of the assessments and recommendations are from me. All of the above cardiac medical decisions are from me. Please see my additional contributions to the history, physical exam, assessment, and recommendations below. History of chief complaint:  He states that he has been having increasing lower extremity edema, dyspnea, orthopnea over the past week. He has intermittent midsternal chest pains that are only for seconds and not associated with any physical activities. He did have Covid and in April and states that he was not eating or drinking well during and after this. Review of systems:                Heart: as above              Lungs: as above              Eyes: denies changes in vision or discharge. Ears: denies changes in hearing or pain. Nose: denies epistaxis or masses              Throat: denies sore throat or trouble swallowing. Neuro: denies numbness, tingling, tremors. Skin: denies rashes or itching. : denies hematuria, dysuria              GI: denies vomiting, diarrhea              Psych: denies mood changed, anxiety, depression.                    Physical exam:  /72   Pulse 72   Temp 98.4 °F (36.9 °C) (Temporal)   Resp 20   Ht 5' 11\" (1.803 m)   Wt 220 lb 9.6 oz (100.1 kg)   SpO2 94%   BMI 30.77 kg/m² transcription errors may be present.

## 2021-06-12 NOTE — PLAN OF CARE
Problem: Pain:  Goal: Pain level will decrease  Description: Pain level will decrease  6/12/2021 0830 by iLly Sharpe RN  Outcome: Met This Shift     Problem: Falls - Risk of:  Goal: Will remain free from falls  Description: Will remain free from falls  6/12/2021 0830 by Lily Sharpe RN  Outcome: Met This Shift     Problem: Gas Exchange - Impaired:  Goal: Levels of oxygenation will improve  Description: Levels of oxygenation will improve  6/12/2021 1857 by Samantha Block RN  Outcome: Ongoing  6/12/2021 0830 by Lily Sharpe RN  Outcome: Met This Shift     Problem: Fluid Volume:  Goal: Hemodynamic stability will improve  Description: Hemodynamic stability will improve  6/12/2021 1857 by Samantha Block RN  Outcome: Ongoing  6/12/2021 0830 by Lily Sharpe RN  Outcome: Met This Shift  Goal: Ability to maintain a balanced intake and output will improve  Description: Ability to maintain a balanced intake and output will improve  6/12/2021 1857 by Samantha Block RN  Outcome: Met This Shift  6/12/2021 0830 by Lily Sharpe RN  Outcome: Met This Shift

## 2021-06-12 NOTE — CONSULTS
4, EOMI, no tremors. EXT: Moderate bilateral lower extremity edema. Skin: warm, dry, intact. Good turgor. Psych: A&O x 3, normal behavior, not anxious. Patient seen and examined. Chart, labs & data reviewed. A:  1. Echo in April shows an ejection fraction of 60 to 65% with no significant valvular heart disease. He has stage I diastolic dysfunction with is consistent with his age. 2. Shortness of breath/pulmonary edema which appears to be due to his acute on chronic chronic renal insufficiency. 3. Acute on chronic renal insufficiency. Creatinine was 3 on admission. Now 2.8. His baseline appears to be around 2.1.  4. End-stage renal disease. Status post renal transplant. 5. Sleep apnea. 6. Elevated troponin in the setting of acute on chronic renal insufficiency. He has had prior elevated troponins as well. 7. Hypertension  8. Diabetes  9. Hypercholesterolemia  10. Obesity  11. GERD      Rec:  1. Continue IV diuresis. 2. Follow lites and creatinine. 3. Echo was just done 2 months ago. No need to repeat. 4. I recommend consulting nephrology for further recommendations for fluid management and diuresis. 5. No active cardiac issues. Cardiology will sign off. Electronically signed by Taniya Porter DO on 6/12/2021 at 12:23 PM    Note: This report was completed using computerized voice recognition software. Every effort has been made to ensure accuracy, however; and invert and computerized transcription errors may be present.

## 2021-06-13 LAB
ANION GAP SERPL CALCULATED.3IONS-SCNC: 10 MMOL/L (ref 7–16)
ANISOCYTOSIS: ABNORMAL
BASOPHILS ABSOLUTE: 0 E9/L (ref 0–0.2)
BASOPHILS RELATIVE PERCENT: 0.3 % (ref 0–2)
BUN BLDV-MCNC: 43 MG/DL (ref 6–23)
CALCIUM SERPL-MCNC: 8.6 MG/DL (ref 8.6–10.2)
CHLORIDE BLD-SCNC: 105 MMOL/L (ref 98–107)
CO2: 27 MMOL/L (ref 22–29)
CREAT SERPL-MCNC: 2.8 MG/DL (ref 0.7–1.2)
EOSINOPHILS ABSOLUTE: 0.15 E9/L (ref 0.05–0.5)
EOSINOPHILS RELATIVE PERCENT: 4.4 % (ref 0–6)
FERRITIN: 77 NG/ML
GFR AFRICAN AMERICAN: 27
GFR NON-AFRICAN AMERICAN: 27 ML/MIN/1.73
GLUCOSE BLD-MCNC: 37 MG/DL (ref 74–99)
HCT VFR BLD CALC: 28.7 % (ref 37–54)
HEMOGLOBIN: 8.2 G/DL (ref 12.5–16.5)
HYPOCHROMIA: ABNORMAL
IRON SATURATION: 16 % (ref 20–55)
IRON: 29 MCG/DL (ref 59–158)
LYMPHOCYTES ABSOLUTE: 0.53 E9/L (ref 1.5–4)
LYMPHOCYTES RELATIVE PERCENT: 14.9 % (ref 20–42)
MCH RBC QN AUTO: 26.5 PG (ref 26–35)
MCHC RBC AUTO-ENTMCNC: 28.6 % (ref 32–34.5)
MCV RBC AUTO: 92.9 FL (ref 80–99.9)
METER GLUCOSE: 100 MG/DL (ref 74–99)
METER GLUCOSE: 124 MG/DL (ref 74–99)
METER GLUCOSE: 147 MG/DL (ref 74–99)
METER GLUCOSE: 148 MG/DL (ref 74–99)
METER GLUCOSE: 157 MG/DL (ref 74–99)
METER GLUCOSE: 224 MG/DL (ref 74–99)
METER GLUCOSE: 66 MG/DL (ref 74–99)
MONOCYTES ABSOLUTE: 0.35 E9/L (ref 0.1–0.95)
MONOCYTES RELATIVE PERCENT: 9.6 % (ref 2–12)
NEUTROPHILS ABSOLUTE: 2.49 E9/L (ref 1.8–7.3)
NEUTROPHILS RELATIVE PERCENT: 71.1 % (ref 43–80)
OVALOCYTES: ABNORMAL
PDW BLD-RTO: 17.1 FL (ref 11.5–15)
PLATELET # BLD: 212 E9/L (ref 130–450)
PMV BLD AUTO: 9.6 FL (ref 7–12)
POIKILOCYTES: ABNORMAL
POLYCHROMASIA: ABNORMAL
POTASSIUM SERPL-SCNC: 4.8 MMOL/L (ref 3.5–5)
RBC # BLD: 3.09 E12/L (ref 3.8–5.8)
SODIUM BLD-SCNC: 142 MMOL/L (ref 132–146)
TOTAL IRON BINDING CAPACITY: 183 MCG/DL (ref 250–450)
WBC # BLD: 3.5 E9/L (ref 4.5–11.5)

## 2021-06-13 PROCEDURE — 6360000002 HC RX W HCPCS: Performed by: NURSE PRACTITIONER

## 2021-06-13 PROCEDURE — 85025 COMPLETE CBC W/AUTO DIFF WBC: CPT

## 2021-06-13 PROCEDURE — 2500000003 HC RX 250 WO HCPCS: Performed by: NURSE PRACTITIONER

## 2021-06-13 PROCEDURE — 6370000000 HC RX 637 (ALT 250 FOR IP): Performed by: INTERNAL MEDICINE

## 2021-06-13 PROCEDURE — 83550 IRON BINDING TEST: CPT

## 2021-06-13 PROCEDURE — 80048 BASIC METABOLIC PNL TOTAL CA: CPT

## 2021-06-13 PROCEDURE — 6360000002 HC RX W HCPCS: Performed by: INTERNAL MEDICINE

## 2021-06-13 PROCEDURE — 2500000003 HC RX 250 WO HCPCS: Performed by: INTERNAL MEDICINE

## 2021-06-13 PROCEDURE — 83540 ASSAY OF IRON: CPT

## 2021-06-13 PROCEDURE — 82962 GLUCOSE BLOOD TEST: CPT

## 2021-06-13 PROCEDURE — 6370000000 HC RX 637 (ALT 250 FOR IP): Performed by: NURSE PRACTITIONER

## 2021-06-13 PROCEDURE — 36415 COLL VENOUS BLD VENIPUNCTURE: CPT

## 2021-06-13 PROCEDURE — 82728 ASSAY OF FERRITIN: CPT

## 2021-06-13 PROCEDURE — 2580000003 HC RX 258: Performed by: INTERNAL MEDICINE

## 2021-06-13 PROCEDURE — 2580000003 HC RX 258: Performed by: NURSE PRACTITIONER

## 2021-06-13 PROCEDURE — 2140000000 HC CCU INTERMEDIATE R&B

## 2021-06-13 RX ORDER — MAGNESIUM GLUCONATE 27 MG(500)
500 TABLET ORAL 2 TIMES DAILY
Status: DISCONTINUED | OUTPATIENT
Start: 2021-06-13 | End: 2021-06-13

## 2021-06-13 RX ORDER — BUMETANIDE 0.25 MG/ML
2 INJECTION, SOLUTION INTRAMUSCULAR; INTRAVENOUS EVERY 12 HOURS
Status: DISCONTINUED | OUTPATIENT
Start: 2021-06-13 | End: 2021-06-15 | Stop reason: HOSPADM

## 2021-06-13 RX ADMIN — TACROLIMUS 2 MG: 1 CAPSULE ORAL at 21:26

## 2021-06-13 RX ADMIN — PANTOPRAZOLE SODIUM 40 MG: 40 TABLET, DELAYED RELEASE ORAL at 06:55

## 2021-06-13 RX ADMIN — MYCOPHENOLATE MOFETIL 1000 MG: 250 CAPSULE ORAL at 09:23

## 2021-06-13 RX ADMIN — MYCOPHENOLATE MOFETIL 1000 MG: 250 CAPSULE ORAL at 21:26

## 2021-06-13 RX ADMIN — Medication 10 ML: at 22:19

## 2021-06-13 RX ADMIN — HYDRALAZINE HYDROCHLORIDE 100 MG: 50 TABLET, FILM COATED ORAL at 06:55

## 2021-06-13 RX ADMIN — DOCUSATE SODIUM 100 MG: 100 CAPSULE ORAL at 09:28

## 2021-06-13 RX ADMIN — ASPIRIN 81 MG: 81 TABLET, COATED ORAL at 09:23

## 2021-06-13 RX ADMIN — HEPARIN SODIUM 5000 UNITS: 10000 INJECTION INTRAVENOUS; SUBCUTANEOUS at 06:55

## 2021-06-13 RX ADMIN — HEPARIN SODIUM 5000 UNITS: 10000 INJECTION INTRAVENOUS; SUBCUTANEOUS at 15:36

## 2021-06-13 RX ADMIN — CHLOROTHIAZIDE SODIUM 125 MG: 500 INJECTION, POWDER, LYOPHILIZED, FOR SOLUTION INTRAVENOUS at 22:18

## 2021-06-13 RX ADMIN — SODIUM CHLORIDE 125 MG: 9 INJECTION, SOLUTION INTRAVENOUS at 16:09

## 2021-06-13 RX ADMIN — INSULIN LISPRO 5 UNITS: 100 INJECTION, SOLUTION INTRAVENOUS; SUBCUTANEOUS at 17:30

## 2021-06-13 RX ADMIN — PREDNISONE 5 MG: 5 TABLET ORAL at 09:25

## 2021-06-13 RX ADMIN — Medication 5 ML: at 09:24

## 2021-06-13 RX ADMIN — METOPROLOL SUCCINATE 50 MG: 50 TABLET, EXTENDED RELEASE ORAL at 21:26

## 2021-06-13 RX ADMIN — ATORVASTATIN CALCIUM 10 MG: 10 TABLET, FILM COATED ORAL at 21:27

## 2021-06-13 RX ADMIN — BUMETANIDE 2 MG: 0.25 INJECTION INTRAMUSCULAR; INTRAVENOUS at 18:25

## 2021-06-13 RX ADMIN — INSULIN GLARGINE 17 UNITS: 100 INJECTION, SOLUTION SUBCUTANEOUS at 13:07

## 2021-06-13 RX ADMIN — HYDRALAZINE HYDROCHLORIDE 100 MG: 50 TABLET, FILM COATED ORAL at 15:36

## 2021-06-13 RX ADMIN — ISOSORBIDE MONONITRATE 60 MG: 60 TABLET ORAL at 09:25

## 2021-06-13 RX ADMIN — TIZANIDINE 4 MG: 4 TABLET ORAL at 21:27

## 2021-06-13 RX ADMIN — OXYCODONE 5 MG: 5 TABLET ORAL at 21:25

## 2021-06-13 RX ADMIN — BUMETANIDE 1 MG: 0.25 INJECTION INTRAMUSCULAR; INTRAVENOUS at 06:55

## 2021-06-13 RX ADMIN — HEPARIN SODIUM 5000 UNITS: 10000 INJECTION INTRAVENOUS; SUBCUTANEOUS at 21:27

## 2021-06-13 RX ADMIN — PANTOPRAZOLE SODIUM 40 MG: 40 TABLET, DELAYED RELEASE ORAL at 15:36

## 2021-06-13 RX ADMIN — CHLOROTHIAZIDE SODIUM 125 MG: 500 INJECTION, POWDER, LYOPHILIZED, FOR SOLUTION INTRAVENOUS at 15:35

## 2021-06-13 RX ADMIN — HYDRALAZINE HYDROCHLORIDE 100 MG: 50 TABLET, FILM COATED ORAL at 21:26

## 2021-06-13 RX ADMIN — SODIUM BICARBONATE 650 MG: 650 TABLET ORAL at 09:25

## 2021-06-13 RX ADMIN — Medication 5000 UNITS: at 09:24

## 2021-06-13 RX ADMIN — TACROLIMUS 2 MG: 1 CAPSULE ORAL at 09:25

## 2021-06-13 RX ADMIN — CHLOROTHIAZIDE SODIUM 125 MG: 500 INJECTION, POWDER, LYOPHILIZED, FOR SOLUTION INTRAVENOUS at 07:59

## 2021-06-13 RX ADMIN — Medication 1 TABLET: at 09:23

## 2021-06-13 RX ADMIN — SODIUM CHLORIDE, PRESERVATIVE FREE 10 ML: 5 INJECTION INTRAVENOUS at 15:35

## 2021-06-13 ASSESSMENT — PAIN SCALES - GENERAL
PAINLEVEL_OUTOF10: 5
PAINLEVEL_OUTOF10: 5
PAINLEVEL_OUTOF10: 0
PAINLEVEL_OUTOF10: 6
PAINLEVEL_OUTOF10: 5

## 2021-06-13 NOTE — PROGRESS NOTES
Nephrology Consult Note  Patient's Name: Shamika Valle  2:11 PM  6/13/2021        Reason for Consult: CKD, ALYSON  Requesting Physician:  Jewel Roberts III, DO    Chief Complaint: Shortness of breath, lower extremity edema  History Obtained From:  patient, spouse/SO and EHR    History of Present Ilness:    Shamika Valle is a 79 y.o. male with a history of chronic kidney disease stage IIIb, chronic HFrEF, S/P LRD kidney transplant, type 2 diabetes mellitus and several other medical problems listed below. He presented to the hospital with complaints of shortness of breath and increasing lower extremity edema over the course of the past week. He had a visit with his PCP on the day of his admission and was instructed to proceed to ED for further evaluation and possible admission. He denies any cough, no fever or chills. He was recently discharged from [place] [address] following an admission for COVID-19. At the time of discharge his creatinine was about 2.1 his (his usual baseline is 2-2.5). ED vitals was essentially unremarkable. Laboratory data was significant for a WBC of 4.5, hemoglobin 8.4, sodium 142, potassium 5.2, BUN 45 and creatinine of 3.0.  proBNP was in excess of 9000. Chest x-ray showed multifocal bilateral patchy pulmonary infiltrates more prominent within the lower lobe. Trace bilateral pleural effusion was also noted. Patient was admitted to telemetry and was started on IV diuretics. He has been seen in consultation by the cardiology service. Subjective     6/13: reports some exertional dyspnea; no chest pain    Allergies:  Patient has no known allergies.     Current Medications:    insulin lispro (HUMALOG) injection vial 5 Units, Dinner  bumetanide (BUMEX) injection 2 mg, Q12H  chlorothiazide (DIURIL) 125 mg in sodium chloride 0.9 % 50 mL IVPB, 3 times per day  epoetin rowan-epbx (RETACRIT) injection 3,000 Units, Once per day on Mon Wed Fri  sodium chloride flush 0.9 % injection 5-40 mL, 2 times per day  sodium chloride flush 0.9 % injection 5-40 mL, PRN  0.9 % sodium chloride infusion, PRN  polyethylene glycol (GLYCOLAX) packet 17 g, Daily PRN  acetaminophen (TYLENOL) tablet 650 mg, Q6H PRN   Or  acetaminophen (TYLENOL) suppository 650 mg, Q6H PRN  trimethobenzamide (TIGAN) injection 200 mg, Q6H PRN  heparin (porcine) injection 5,000 Units, 3 times per day  glucose (GLUTOSE) 40 % oral gel 15 g, PRN  dextrose 50 % IV solution, PRN  glucagon (rDNA) injection 1 mg, PRN  dextrose 5 % solution, PRN  mycophenolate (CELLCEPT) capsule 1,000 mg, BID  aspirin EC tablet 81 mg, Daily  vitamin D (CHOLECALCIFEROL) tablet 5,000 Units, Daily  docusate sodium (COLACE) capsule 100 mg, Daily PRN  hydrALAZINE (APRESOLINE) tablet 100 mg, 3 times per day  isosorbide mononitrate (IMDUR) extended release tablet 60 mg, Daily  insulin glargine (LANTUS) injection vial 17 Units, Daily  metoprolol succinate (TOPROL XL) extended release tablet 50 mg, Nightly  multivitamin 1 tablet, Daily  oxyCODONE (ROXICODONE) immediate release tablet 5 mg, Q4H PRN  pantoprazole (PROTONIX) tablet 40 mg, BID AC  predniSONE (DELTASONE) tablet 5 mg, Daily  atorvastatin (LIPITOR) tablet 10 mg, Nightly  sodium bicarbonate tablet 650 mg, Daily  tacrolimus (PROGRAF) capsule 2 mg, BID  tiZANidine (ZANAFLEX) tablet 4 mg, Daily        Review of Systems:   Pertinent items are noted in HPI. Physical exam:  Vitals:    06/13/21 1135   BP: (!) 148/70   Pulse: 85   Resp:    Temp: 98.2 °F (36.8 °C)   SpO2:            General: No distress. Alert. Eyes: PERRL. No sclera icterus. No conjunctival injection. ENT: No discharge. Pharynx clear. Neck: Trachea midline. Normal thyroid. Lungs: decreased breath sounds bases   CV: Regular rate. Regular rhythm. No murmur or rub. .   Abd: Non-tender. Non-distended. No masses. No organmegaly. Normal bowel sounds; renal allograft RLQ  Skin: Warm and dry. No nodule on exposed extremities. No rash on exposed extremities.   Ext: No cyanosis, clubbing, 2+ pitting bilateral legs edema  Neuro: Awake. Follows commands. Positive pupils/gag/corneals. Normal pain response. Data:   Labs:  Lab Results   Component Value Date     06/13/2021     06/12/2021     06/11/2021    K 4.8 06/13/2021    K 5.4 (H) 06/12/2021    K 5.6 (H) 06/11/2021     06/13/2021    CO2 27 06/13/2021    CO2 25 06/12/2021    CO2 27 06/11/2021    CREATININE 2.8 (H) 06/13/2021    CREATININE 2.8 (H) 06/12/2021    CREATININE 3.0 (H) 06/11/2021    BUN 43 (H) 06/13/2021    BUN 45 (H) 06/12/2021    BUN 45 (H) 06/11/2021    GLUCOSE 37 (LL) 06/13/2021    GLUCOSE 69 (L) 06/12/2021    GLUCOSE 99 06/11/2021    PHOS 2.3 (L) 04/27/2021    PHOS 2.4 (L) 04/20/2021    PHOS 2.8 04/19/2021    WBC 3.5 (L) 06/13/2021    WBC 4.1 (L) 06/12/2021    WBC 4.5 06/11/2021    HGB 8.2 (L) 06/13/2021    HGB 8.3 (L) 06/12/2021    HGB 8.4 (L) 06/11/2021    HCT 28.7 (L) 06/13/2021    HCT 29.5 (L) 06/12/2021    HCT 29.5 (L) 06/11/2021    MCV 92.9 06/13/2021     06/13/2021         Imaging:  XR CHEST PORTABLE    Result Date: 6/11/2021  EXAMINATION: ONE XRAY VIEW OF THE CHEST 6/11/2021 11:55 am COMPARISON: 04/26/2021 HISTORY: ORDERING SYSTEM PROVIDED HISTORY: chest pain TECHNOLOGIST PROVIDED HISTORY: Reason for exam:->chest pain What reading provider will be dictating this exam?->CRC FINDINGS: Multifocal bilateral patchy pulmonary infiltrates are noted more prominent within the lung bases. There are trace bilateral pleural effusions. The cardiac silhouette is within normals. 1. Multifocal bilateral patchy pulmonary infiltrates more prominent within the lower lobes. 2. Trace bilateral pleural effusions. Assessment/Plans    1.   Acute exacerbation of HFpEF; review of his previous admission showed at least an 8 pound weight gain since his last admission; still with significant peripheral edema  -will increase IV bumex; transition to oral bumex tomorrow  -Fluid and salt restriction  -wt is down 5lbs over past 24hrs    2. Acute kidney injury stage I in the setting of acute exacerbation of CHF. This is due to decreased effective renal perfusion; some creatinine elevation can be ascribed to his diuresis  -Cr with with signs of plateau  -Monitor I's and O's  -Adjust all meds for level of kidney function    3. Chronic kidney disease stage IIIb on the basis of chronic allograft nephropathy and diabetes mellitus    4. Hypertension with CKD  -Continue his antihypertensive meds    5.   Anemia due to CKD  -Low iron stores; give few doses of ferrlecit prior to discharge  -ADAM started  -Monitor H&H      Discharge planning  anticipae next 24 hrs    Updated patients wife at bedside    Will follow  Thanks                Pamela Miner MD  2:11 PM  6/13/2021

## 2021-06-13 NOTE — PROGRESS NOTES
Patient had hypoglycemia this am.  Spoke with patient and wife regarding insulin regimen. Patient does not do a SSI at home. Patient checks blood sugar in the am but does not cover, checks around lunch and covers with lantus, and then takes 5 units of humalog with dinner. This routine was established with endocrinology as outpatient. Dr. Nikkie Wang updated. Changes made.

## 2021-06-13 NOTE — PROGRESS NOTES
oxygen requirement, try to wean off--able to ambulate without oxygen  Review most recent echocardiogram      History of kidney transplant  Renal service following  Patient on Diuril per renal  Currently his BUN and creatinine are at baseline  Cardiology following for elevated troponins       DVT Prophylaxis   PT/OT  Discharge Doris Meek MD  10:08 AM  6/13/2021

## 2021-06-13 NOTE — PROGRESS NOTES
Pulse ox on room air sittin%  Pulse ox on room air ambulatin%    Pulse ox on 2L sitting recovery:93%  Pulse ox on 2L ambulating recovery: 92%

## 2021-06-13 NOTE — PLAN OF CARE
Problem: Pain:  Goal: Pain level will decrease  Description: Pain level will decrease  Outcome: Met This Shift     Problem: Falls - Risk of:  Goal: Will remain free from falls  Description: Will remain free from falls  Outcome: Met This Shift  Goal: Absence of physical injury  Description: Absence of physical injury  Outcome: Met This Shift     Problem: Gas Exchange - Impaired:  Goal: Levels of oxygenation will improve  Description: Levels of oxygenation will improve  6/13/2021 0122 by Nadya Esquivel RN  Outcome: Met This Shift  6/12/2021 1857 by Kavon Au RN  Outcome: Ongoing     Problem: Fluid Volume:  Goal: Ability to maintain a balanced intake and output will improve  Description: Ability to maintain a balanced intake and output will improve  6/12/2021 1857 by Kavon Au RN  Outcome: Met This Shift     Problem: Health Behavior:  Goal: Identification of resources available to assist in meeting health care needs will improve  Description: Identification of resources available to assist in meeting health care needs will improve  6/12/2021 1857 by Kavon Au RN  Outcome: Met This Shift

## 2021-06-13 NOTE — PROGRESS NOTES
Patient and wife educated on need to follow up with pulmonology due to patient still being on home oxygen after previous discharge from hospital from [facility].

## 2021-06-14 VITALS
OXYGEN SATURATION: 99 % | BODY MASS INDEX: 30.44 KG/M2 | TEMPERATURE: 98.2 F | WEIGHT: 217.4 LBS | RESPIRATION RATE: 28 BRPM | HEART RATE: 77 BPM | HEIGHT: 71 IN | DIASTOLIC BLOOD PRESSURE: 63 MMHG | SYSTOLIC BLOOD PRESSURE: 135 MMHG

## 2021-06-14 LAB
ACANTHOCYTES: ABNORMAL
ANION GAP SERPL CALCULATED.3IONS-SCNC: 6 MMOL/L (ref 7–16)
ANISOCYTOSIS: ABNORMAL
BASOPHILS ABSOLUTE: 0.06 E9/L (ref 0–0.2)
BASOPHILS RELATIVE PERCENT: 1.7 % (ref 0–2)
BUN BLDV-MCNC: 41 MG/DL (ref 6–23)
BURR CELLS: ABNORMAL
CALCIUM SERPL-MCNC: 8.6 MG/DL (ref 8.6–10.2)
CHLORIDE BLD-SCNC: 101 MMOL/L (ref 98–107)
CO2: 30 MMOL/L (ref 22–29)
CREAT SERPL-MCNC: 2.6 MG/DL (ref 0.7–1.2)
EOSINOPHILS ABSOLUTE: 0.13 E9/L (ref 0.05–0.5)
EOSINOPHILS RELATIVE PERCENT: 3.5 % (ref 0–6)
GFR AFRICAN AMERICAN: 30
GFR NON-AFRICAN AMERICAN: 30 ML/MIN/1.73
GLUCOSE BLD-MCNC: 74 MG/DL (ref 74–99)
HCT VFR BLD CALC: 28.6 % (ref 37–54)
HEMOGLOBIN: 8.2 G/DL (ref 12.5–16.5)
HYPOCHROMIA: ABNORMAL
LYMPHOCYTES ABSOLUTE: 0.37 E9/L (ref 1.5–4)
LYMPHOCYTES RELATIVE PERCENT: 9.6 % (ref 20–42)
MCH RBC QN AUTO: 26.8 PG (ref 26–35)
MCHC RBC AUTO-ENTMCNC: 28.7 % (ref 32–34.5)
MCV RBC AUTO: 93.5 FL (ref 80–99.9)
METER GLUCOSE: 109 MG/DL (ref 74–99)
METER GLUCOSE: 152 MG/DL (ref 74–99)
METER GLUCOSE: 218 MG/DL (ref 74–99)
METER GLUCOSE: 58 MG/DL (ref 74–99)
MONOCYTES ABSOLUTE: 0.26 E9/L (ref 0.1–0.95)
MONOCYTES RELATIVE PERCENT: 7 % (ref 2–12)
MYELOCYTE PERCENT: 1.7 % (ref 0–0)
NEUTROPHILS ABSOLUTE: 2.89 E9/L (ref 1.8–7.3)
NEUTROPHILS RELATIVE PERCENT: 76.5 % (ref 43–80)
OVALOCYTES: ABNORMAL
PDW BLD-RTO: 16.8 FL (ref 11.5–15)
PLATELET # BLD: 224 E9/L (ref 130–450)
PMV BLD AUTO: 9.4 FL (ref 7–12)
POIKILOCYTES: ABNORMAL
POLYCHROMASIA: ABNORMAL
POTASSIUM SERPL-SCNC: 5 MMOL/L (ref 3.5–5)
RBC # BLD: 3.06 E12/L (ref 3.8–5.8)
SCHISTOCYTES: ABNORMAL
SODIUM BLD-SCNC: 137 MMOL/L (ref 132–146)
WBC # BLD: 3.7 E9/L (ref 4.5–11.5)

## 2021-06-14 PROCEDURE — 2700000000 HC OXYGEN THERAPY PER DAY

## 2021-06-14 PROCEDURE — 6360000002 HC RX W HCPCS: Performed by: NURSE PRACTITIONER

## 2021-06-14 PROCEDURE — 6370000000 HC RX 637 (ALT 250 FOR IP): Performed by: NURSE PRACTITIONER

## 2021-06-14 PROCEDURE — 82962 GLUCOSE BLOOD TEST: CPT

## 2021-06-14 PROCEDURE — 36415 COLL VENOUS BLD VENIPUNCTURE: CPT

## 2021-06-14 PROCEDURE — 97535 SELF CARE MNGMENT TRAINING: CPT

## 2021-06-14 PROCEDURE — 80048 BASIC METABOLIC PNL TOTAL CA: CPT

## 2021-06-14 PROCEDURE — 2580000003 HC RX 258: Performed by: NURSE PRACTITIONER

## 2021-06-14 PROCEDURE — 97165 OT EVAL LOW COMPLEX 30 MIN: CPT

## 2021-06-14 PROCEDURE — 2500000003 HC RX 250 WO HCPCS: Performed by: INTERNAL MEDICINE

## 2021-06-14 PROCEDURE — 85025 COMPLETE CBC W/AUTO DIFF WBC: CPT

## 2021-06-14 PROCEDURE — 97161 PT EVAL LOW COMPLEX 20 MIN: CPT

## 2021-06-14 PROCEDURE — 6370000000 HC RX 637 (ALT 250 FOR IP): Performed by: INTERNAL MEDICINE

## 2021-06-14 PROCEDURE — 6360000002 HC RX W HCPCS: Performed by: INTERNAL MEDICINE

## 2021-06-14 PROCEDURE — 2580000003 HC RX 258: Performed by: INTERNAL MEDICINE

## 2021-06-14 RX ORDER — CHLOROTHIAZIDE 250 MG/1
250 TABLET ORAL EVERY MORNING
Qty: 30 TABLET | Refills: 3 | Status: ON HOLD | OUTPATIENT
Start: 2021-06-14 | End: 2021-10-07 | Stop reason: HOSPADM

## 2021-06-14 RX ORDER — BUMETANIDE 2 MG/1
2 TABLET ORAL DAILY
Qty: 30 TABLET | Refills: 3 | Status: SHIPPED | OUTPATIENT
Start: 2021-06-14 | End: 2022-02-21

## 2021-06-14 RX ORDER — INSULIN GLARGINE 100 [IU]/ML
12 INJECTION, SOLUTION SUBCUTANEOUS DAILY
Qty: 5 PEN | Refills: 3 | Status: SHIPPED | OUTPATIENT
Start: 2021-06-14

## 2021-06-14 RX ADMIN — TACROLIMUS 2 MG: 1 CAPSULE ORAL at 08:22

## 2021-06-14 RX ADMIN — Medication 10 ML: at 08:21

## 2021-06-14 RX ADMIN — SODIUM CHLORIDE, PRESERVATIVE FREE 10 ML: 5 INJECTION INTRAVENOUS at 16:57

## 2021-06-14 RX ADMIN — ASPIRIN 81 MG: 81 TABLET, COATED ORAL at 10:05

## 2021-06-14 RX ADMIN — CHLOROTHIAZIDE SODIUM 125 MG: 500 INJECTION, POWDER, LYOPHILIZED, FOR SOLUTION INTRAVENOUS at 06:09

## 2021-06-14 RX ADMIN — HEPARIN SODIUM 5000 UNITS: 10000 INJECTION INTRAVENOUS; SUBCUTANEOUS at 14:45

## 2021-06-14 RX ADMIN — HEPARIN SODIUM 5000 UNITS: 10000 INJECTION INTRAVENOUS; SUBCUTANEOUS at 06:10

## 2021-06-14 RX ADMIN — SODIUM CHLORIDE 125 MG: 9 INJECTION, SOLUTION INTRAVENOUS at 08:24

## 2021-06-14 RX ADMIN — MYCOPHENOLATE MOFETIL 1000 MG: 250 CAPSULE ORAL at 08:22

## 2021-06-14 RX ADMIN — Medication 5000 UNITS: at 08:22

## 2021-06-14 RX ADMIN — SODIUM BICARBONATE 650 MG: 650 TABLET ORAL at 08:21

## 2021-06-14 RX ADMIN — BUMETANIDE 2 MG: 0.25 INJECTION INTRAMUSCULAR; INTRAVENOUS at 06:10

## 2021-06-14 RX ADMIN — BUMETANIDE 2 MG: 0.25 INJECTION INTRAMUSCULAR; INTRAVENOUS at 16:57

## 2021-06-14 RX ADMIN — INSULIN LISPRO 5 UNITS: 100 INJECTION, SOLUTION INTRAVENOUS; SUBCUTANEOUS at 16:57

## 2021-06-14 RX ADMIN — PANTOPRAZOLE SODIUM 40 MG: 40 TABLET, DELAYED RELEASE ORAL at 16:57

## 2021-06-14 RX ADMIN — HYDRALAZINE HYDROCHLORIDE 100 MG: 50 TABLET, FILM COATED ORAL at 14:45

## 2021-06-14 RX ADMIN — HYDRALAZINE HYDROCHLORIDE 100 MG: 50 TABLET, FILM COATED ORAL at 06:09

## 2021-06-14 RX ADMIN — CHLOROTHIAZIDE SODIUM 125 MG: 500 INJECTION, POWDER, LYOPHILIZED, FOR SOLUTION INTRAVENOUS at 14:45

## 2021-06-14 RX ADMIN — Medication 1 TABLET: at 08:21

## 2021-06-14 RX ADMIN — PANTOPRAZOLE SODIUM 40 MG: 40 TABLET, DELAYED RELEASE ORAL at 06:10

## 2021-06-14 RX ADMIN — PREDNISONE 5 MG: 5 TABLET ORAL at 08:21

## 2021-06-14 RX ADMIN — EPOETIN ALFA-EPBX 3000 UNITS: 3000 INJECTION, SOLUTION INTRAVENOUS; SUBCUTANEOUS at 08:24

## 2021-06-14 RX ADMIN — ISOSORBIDE MONONITRATE 60 MG: 60 TABLET ORAL at 08:22

## 2021-06-14 ASSESSMENT — PAIN SCALES - GENERAL
PAINLEVEL_OUTOF10: 0
PAINLEVEL_OUTOF10: 5
PAINLEVEL_OUTOF10: 5

## 2021-06-14 ASSESSMENT — PAIN DESCRIPTION - PAIN TYPE: TYPE: ACUTE PAIN

## 2021-06-14 ASSESSMENT — PAIN DESCRIPTION - DESCRIPTORS: DESCRIPTORS: ACHING;DISCOMFORT;SORE

## 2021-06-14 ASSESSMENT — PAIN DESCRIPTION - LOCATION: LOCATION: GENERALIZED

## 2021-06-14 NOTE — PROGRESS NOTES
Physical Therapy  Physical Therapy Initial Assessment     Name: Beulah Ormond  : 1950  MRN: [id number]      Date of Service: 2021    Evaluating PT:  Brenda Franco PT, DPT  [id number]     Room #:  1042/9552-S  Diagnosis:  Acute combined systolic and diastolic CHF, NYHA class 1 (HCC) [I50.41]  PMHx/PSHx: anemia, Afib, CHF, chronic foot pain, chronic knee pain, constipation, depression, chronic diastolic congestive heart failure, DM,  ERSD, hyperlipidemia, hypertension, immunosuppression, kidney disease, pulmonary hypertension   Precautions:  Falls, O2  Equipment Needs:  n/a    SUBJECTIVE:    Pt lives alone in a spilt level home with 1 stairs to enter. Bedroom and bathroom are on the 2nd level with 5 steps and handrails. Pt ambulated with no AD PTA. OBJECTIVE:   Initial Evaluation  Date: 21 Treatment Short Term/ Long Term   Goals   AM-PAC 6 Clicks 89/75     Was pt agreeable to Eval/treatment? YES     Does pt have pain?  Yes, back and legs      Bed Mobility  Rolling: NT  Supine to sit: NT  Sit to supine: NT  Scooting: NT  Rolling: independent   Supine to sit: independent   Sit to supine: independent   Scooting: independent    Transfers Sit to stand: SBA  Stand to sit: SBA  Stand pivot: NT  Sit to stand: independent   Stand to sit: independent   Stand pivot: independent    Ambulation    75 feet X2 with no AD CGA   >200 feet with no AD independnently    Stair negotiation: ascended and descended  4 steps with B hand rail CGA  >4 steps with B hand rail independently   ROM BUE:  Per OT eval  BLE:  WFL     Strength BUE:  Per OT eval   BLE: WFL     Balance Sitting EOB:  NT  Dynamic Standing:  CGA  Sitting EOB:  independent   Dynamic Standing:  independent      Pt is A & O x location, year, month, self  Sensation:  Pt reports numbness and tingling in B hands and feet d/t neuropathy   Edema:  Unremarkable    Therapeutic Exercises:    BLE AROM    Patient education  Pt educated on PT role, safety during functional mobility. Patient response to education:   Pt verbalized understanding Pt demonstrated skill Pt requires further education in this area   yes yes no     ASSESSMENT:    Conditions Requiring Skilled Therapeutic Intervention:    []Decreased strength     []Decreased ROM  [x]Decreased functional mobility  [x]Decreased balance   [x]Decreased endurance   []Decreased posture  []Decreased sensation  []Decreased coordination   []Decreased vision  [x]Decreased safety awareness   []Increased pain       Comments:  Pt received in chair and agreeable to PT evaluation. Vitals monitored during session. Pt performed strength testing while sitting upright in chair. Pt agreed to ambulating and performing stairs. Pt ambulated 75 ft x2 with a bout of stairs in between. Pt demonstrated decreased gait speed and forward flexed posture during ambulation. Pt was able to ascend/descend 4 steps with B hand rails and reciprocal step pattern. Pt required standing rest back after completing stairs. He had noticeable SOB but O2 levels were 95%. Pt ambulated back to room. Pt left in chair with call button in reach, lines attached, and needs met. Treatment:  Patient practiced and was instructed in the following treatment:     Stair training - Pt educated on proper safety and sequencing during stair ascension and descension. Verbal cues were given to facilitate proper hand and foot placement and hands on assistance provided for balance and fall prevention.  Gait training- pt was given verbal and tactile cues to facilitate upright posture during ambulation as well as provided with physical assistance to complete task.  STS and pivot transfer training - pt educated on proper hand and foot placement, safety and sequencing to safely complete sit<>stand and pivot transfers with hands on assistance to complete task safely     Pt's/ family goals   1. Return home vs rehab    Prognosis is good for reaching above PT goals.     Patient 03916 -- minutes  [] Manual therapy 88828 -- minutes  [] Therapeutic activities 74672 -- minutes  [] Therapeutic exercises 25172 -- minutes  [] Neuromuscular reeducation 02285 -- minutes     Lakeshia Mcelroy, RUSSELL Franco, PT, DPT  [id number]

## 2021-06-14 NOTE — CONSULTS
Patient currently admitted with diagnosis of combined heart failure. Patient resting in bed at time of consult. He was engaging an asked appropriate questions during discussion. Sharif Guardado is agreeable to attend the CHF clinic, appointment scheduled see below. He states he will call his PCP to schedule 7 day hospital follow up appointment. Future Appointments   Date Time Provider Alfa Moore   6/23/2021  8:15 AM [facility] CHF ROOM 1 [facility] John Monterroso   6/24/2021  8:00 AM [facility]            We reviewed the introduction to Heart Failure, the HF zones, signs and symptoms to report on day 1 of onset, medications, medication compliance, the importance of obtaining daily weights, following a low sodium diet, reading food labels for the sodium content, keeping physician appointments, and smoking cessation. We discussed writing / tracking daily weights on a calendar / log because a 5 pound gain in 1 week can sneak up if you are not tracking it. You can also take your charted weights to your doctor appointments to be reviewed. Contributing risk factors for Heart Failure are identified as none. I advised patient they can reduce the risk for Heart Failure exacerbations by modifying / controlling the risk factors. We discussed self-managed care which includes the following:  to take medications as prescribed, report any intolerable side effects of medications to the cardiologist / doctor, do not just stop taking the medication; follow a cardiac heart healthy / low sodium diet; weigh yourself daily, exercise regularly- per doctor recommendation and not to smoke or use an excess amount of alcohol. We discussed calling the cardiologist / doctor with a weight gain of 3 pounds in one day or a total of 5 pounds or more in one week.  Also, if you should have a significant weight loss of 3# or more in one day to call the doctor, they may need to decrease or hold the diuretic dose. On days you feels nauseated and not eating / drinking, having emesis or diarrhea,  informed to call the cardiologist  / doctor, they may need to decrease or hold diuretic to avoid dehydration. I stressed the importance of informing their cardiologist the first day of onset of any of the signs and symptoms in the \"Yellow Zone\" of the HF Zones. Patient verbalizes understanding. Greater than 30 minutes was spent educating patient. Echocardiogram: 4/13/2021    BNP:   Lab Results   Component Value Date    PROBNP 9,928 (H) 06/11/2021       History of:    has a past medical history of Anemia, Atrial fibrillation ([address] CHF (congestive heart failure) ([facility]), Chronic diastolic congestive heart failure ([address] Chronic foot pain, Chronic knee pain, Constipation, Depression, DM (diabetes mellitus) ([address] ESRD (end stage renal disease) ([address] H/O kidney transplant, Hyperlipidemia, Hypertension, Immunosuppression ([address] Kidney disease, and Pulmonary hypertension ([address] has a past surgical history that includes back surgery (1982); Upper gastrointestinal endoscopy (12/21/2007); [place] Gastric Bypass (06/29/2010); Cholecystectomy, laparoscopic (05/21/2013); ECHO Compl W Dop Color Flow (05/22/2013); Diagnostic Cardiac Cath Lab Procedure (01/15/2013); Foot surgery; Colonoscopy; Endoscopy, colon, diagnostic; Carpal tunnel release (Left, 06/23/2014); Kidney transplant (12/12/2014); Bladder surgery; Kidney biopsy (06/09/2015); and Upper gastrointestinal endoscopy (N/A, 4/28/2021).     Medications:    insulin lispro  5 Units Subcutaneous Dinner    bumetanide  2 mg Intravenous Q12H    ferric gluconate (FERRLECIT) IVPB  125 mg Intravenous Daily    chlorothiazide (DIURIL) IVPB  125 mg Intravenous 3 times per day    epoetin rowan-epbx  3,000 Units Subcutaneous Once per day on Mon Wed Fri    sodium chloride flush  5-40 mL Intravenous 2 times per day    heparin (porcine)  5,000 Units Subcutaneous 3 times per day    mycophenolate  1,000 mg Oral BID    aspirin  81 mg Oral Daily    vitamin D  5,000 Units Oral Daily    hydrALAZINE  100 mg Oral 3 times per day    isosorbide mononitrate  60 mg Oral Daily    insulin glargine  17 Units Subcutaneous Daily    metoprolol succinate  50 mg Oral Nightly    multivitamin  1 tablet Oral Daily    pantoprazole  40 mg Oral BID AC    predniSONE  5 mg Oral Daily    atorvastatin  10 mg Oral Nightly    sodium bicarbonate  650 mg Oral Daily    tacrolimus  2 mg Oral BID    tiZANidine  4 mg Oral Daily      sodium chloride      dextrose         Code Status:Full Code    The patient is ordered:  Diet: ADULT DIET; Regular; Low Sodium (2 gm)   Sodium controlled diet Yes  Fluid restriction daily ordered (fluid restriction recommended if patient is hyponatremic and/or diuretic is initiated or increased) No  FR:   Daily Weights:   Patient Vitals for the past 96 hrs (Last 3 readings):   Weight   06/14/21 0459 217 lb 6.4 oz (98.6 kg)   06/13/21 0658 219 lb (99.3 kg)   06/12/21 0621 220 lb 9.6 oz (100.1 kg)     I/O:     Intake/Output Summary (Last 24 hours) at 6/14/2021 1258  Last data filed at 6/14/2021 1019  Gross per 24 hour   Intake 560 ml   Output 2485 ml   Net -1925 ml        The Heart Failure Booklet given to the patient with additional patient education addressing:  · What is Heart Failure?   · Things You Can Do to Live Well with HF  · How to Take Your Medications  · How to Eat Less Salt  · Exercising Well with Heart Failure  · Signs and symptoms of HF to report  · Weight Yourself Each Day  · Home Self Management- activity, weight tracking, taking medications as prescribed, meals /diet planning (sodium and fluid restriction), how to read food labels, keeping physician follow ups, smoking cessation, follow the Heart Failure Zones  · The Heart Failure zones  · Sodium content pamphlet  · What foods I should avoid tip sheet    Instructed  to call 911 if you have any of the following symptoms: ·    Struggling to breathe unrelieved with rest,  ·    Having chest pain, confusion or can't think clearly  ·    Have confusion or cant think clearly    Readmission Risk Score: 34        Discharge Plan:  I placed the Heart Failure Home Instructions in patient's discharge instructions. Per Heart Failure GWTG, the patient should have a follow-up appointment made within 7 days of discharge. Diane Lopes RN BSN, RN  Heart Failure Navigator        CONGESTIVE HEART FAILURE (CHF) GUIDELINES  (Must be completed for Primary Diagnosis CHF or History of CHF)    Type of CHF:    [x] Acute   [] Chronic     [] Acute on Chronic     Ventricular Function Assessment (check):             [x] HFpEF  [] HFpEF, borderline  [] HFpEF, improved  [] HFrEF  Ejection Fraction (%):    60%                                                                  Angiotensin-Converting-Enzyme (ACE) inhibitor ordered:  [] Yes  [x] No (specify contraindication):  [] Renal Insufficiency  [] Cough  [] Hypotension  [] Allergy/angioedema  [] No left ventricular systolic dysfunction (LVSD)  [] Hyperkalemia  [] Moderate to severe aortic stenosis  [] Other (Specify):     Angiotensin II receptor blockers (ARB) ordered:  [] Yes  [x] No (specify contraindication):  [] Renal Insufficiency  [] Hypotension  [] Allergy/angioedema  [] No LVSD  [] Hyperkalemia  [] Moderate to severe aortic stenosis  [] Other (Specify):      ARNI - Angiotensin Receptor Neprilysin Inhibitor ordered:  [] Yes  [x] No      ACC/AHA Guidelines Beta Blocker (Carvedilol, Metoprolol Succinate, or Bisoprolol) ordered:    [x] Yes  [] No (specify contraindication):  [] Bradycardia  [] Hypotension  [] LVD  [] 2nd or 3rd degree heart block  [] Bronchospastic airway disease  [] Decompensated CHF  [] Other (Specify):

## 2021-06-14 NOTE — PROGRESS NOTES
awareness, maintain/improve skin integrity, and improve hand/UE motor function  * Therapeutic exercise to improve motor endurance, ROM, and functional strength for ADLs/functional transfers  * Therapeutic activities to facilitate/challenge dynamic balance, stand tolerance for increased safety and independence with ADLs    Recommended Adaptive Equipment:  TBD     Home Living: Pt lives alone in a bi-level home with  1 FILOMENA and no hand rails. 5 steps with 1 hand rail to bed/bath   Bathroom setup: tub/shower combo    Equipment owned: shower chair, Home O2    Prior Level of Function: Independent with ADLs , Independent with IADLs; ambulated without AD   Driving: yes   Occupation: na    Pain Level: Pt reports 4-5/10 chronic back and LE pain; Therapist facilitated repositioning to address pain      Cognition: A&O: 4/4; Follows 2 step directions   Memory:  good   Sequencing:  good   Problem solving:  good   Judgement/safety:  fair     Functional Assessment:  AM-PAC Daily Activity Raw Score: 20/24   Initial Eval Status  Date: 6/14/21 Treatment Status  Date: STGs = LTGs  Time frame: 10-14 days   Feeding Independent      Grooming Supervision     Standing at sink   Independent    UB Dressing Independent      LB Dressing Stand by Assist   Independent    Bathing Stand by Assist  Independent    Toileting Stand by Assist   Independent    Bed Mobility  Supine to sit: Supervision   Sit to supine: Supervision   Supine to sit:  Independent   Sit to supine: Independent    Functional Transfers Stand by Assist   Independent    Functional Mobility Stand by Assist     Ambulated in room including to/from bathroom without AD; + desaturation on RA  Modified [place]    Balance Sitting:     Static:  indep    Dynamic:sup  Standing: Sup / SBA     Activity Tolerance F  G   Visual/  Perceptual Glasses: yes  wfl                  Hand Dominance right    Strength ROM Additional Info:    RUE   4/5 wfl good  and wfl FMC/dexterity noted during ADL tasks     LUE 4/5 wfl good  and wfl FMC/dexterity noted during ADL tasks     Hearing: wfl  Sensation:wfl  Tone: wfl  Edema:none noted     Comments: Upon arrival patient supine in bed and agreeable to OT Session. Therapist educated pt on role of OT. At end of session, patient seated in chair with call light and phone within reach, all lines intact. Overall patient demonstrated decreased independence and safety during completion of ADL/functional transfer/mobility tasks. Pt would benefit from continued skilled OT to increase safety and independence with completion of ADL/IADL tasks for functional independence and quality of life. Treatment: OT treatment provided this date includes: Facilitation of bed mobility, functional transfers (various surfaces: bed, chair, toilet), standing tolerance tasks (addressing posture, balance and activity tolerance while incorporating light functional reaching; impacting ADLs and functional activity) and functional ambulation tasks without AD (+ desaturation to 89-90% while ambulating to/ from bathroom and in preparation for item retrieval tasks; cuing on posture and safety) - skilled cuing on posture, body mechanics, energy conservation techniques and safety. Therapist facilitated self-care retraining: UB/LB self-care tasks (gown, socks), toileting task (all aspects) and standing grooming tasks at sink while educating pt on modified techniques, posture, safety and energy conservation techniques. Skilled monitoring of HR, O2 sats and pts response to treatment (95% at rest on 1L; 89% following ambulation to bathroom - cuing on pursed lip breathing. Desaturation to 82% following toileting task, standing grooming task and ambulation back to chair; 1/5 minutes to recover to 95% on 1L). Pt educated on energy conservation techniques and pursed lip breathing techniques.         Rehab Potential: Good for established goals     Patient / Family Goal: return home      Patient and/or family were instructed on functional diagnosis, prognosis/goals and OT plan of care. Demonstrated fair+ understanding. Eval Complexity: Low    Time In: 1010  Time Out: 1035  Total Treatment Time: 10 minutes    Min Units   OT Eval Low 97165  x  1   OT Eval Medium 01696      OT Eval High 22988      OT Re-Eval [id number]       Therapeutic Ex 04783       Therapeutic Activities 26437  2     ADL/Self Care 82553  8  1   Orthotic Management 03651       Manual [id number]     Neuro Re-Ed [id number]       Non-Billable Time          Evaluation Time additionally includes thorough review of current medical information, gathering information on past medical history/social history and prior level of function, interpretation of standardized testing/informal observation of tasks, assessment of data and development of plan of care and goals.           Natalie Plaza OTR/L #[id number]

## 2021-06-14 NOTE — PROGRESS NOTES
Spoke to Dr. Ashly Telles on unit regarding Renal's note to continue to IV diuresis. Also asked about Lantus (daily order).

## 2021-06-14 NOTE — DISCHARGE SUMMARY
Physician Discharge Summary     Patient ID:  Brooke Obrien  [id number]  79 y.o.  1950    Admit date: 6/11/2021    Discharge date and time: 6/14/2021    Admission Diagnoses:   Patient Active Problem List   Diagnosis    GERD (gastroesophageal reflux disease)    CKD (chronic kidney disease) stage 4, GFR 15-29 ml/min ([facility]    Mixed hyperlipidemia    Diabetes mellitus, type 2 ([facility]    HTN (hypertension), benign    Junctional bradycardia    Anemia in stage 3 chronic kidney disease ([address]    CAD (coronary artery disease), native coronary artery    Acute CHF (congestive heart failure) ([facility])    Chest pain    Respiratory failure ([facility])    Acute kidney injury ([facility]    Vomiting and diarrhea    Acute nontraumatic kidney injury ([facility]    Chronic diastolic congestive heart failure ([facility]    H/O kidney transplant    Pulmonary hypertension ([facility] [facility]    Immunosuppression ([facility]    Pneumonia due to COVID-19 virus    Acute renal insufficiency    GI bleed    Acute blood loss anemia    Acute combined systolic and diastolic CHF, NYHA class 1 ([facility])    Chronic kidney disease    Anemia of chronic disease    Elevated troponin    Hyperkalemia    Essential hypertension    Type 2 diabetes mellitus, with long-term current use of insulin ([facility])    Obesity (BMI 30-39. 9)       Discharge Diagnoses: volume overload     Consults:cards and renal     Procedures: none     Hospital Course: Admit to telemetry for evaluation of heart failure/elevated troponins  IV diuresis with caution secondary to transplanted kidney and CKD stage IV   follow strict I's and O's, daily weights  Monitor oxygen requirement, try to wean off--able to ambulate without oxygen  Review most recent echocardiogram  He has been on 02 since dx of covid - not sure he needs it any more       History of kidney transplant  Renal service following  Patient on Diuril and bumex per renal  Currently his BUN and creatinine are at baseline  [facility] for home     Recent Labs 06/12/21 0628 06/13/21  0519 06/14/21  0425   WBC 4.1* 3.5* 3.7*   HGB 8.3* 8.2* 8.2*   HCT 29.5* 28.7* 28.6*    212 224       Recent Labs     06/12/21  0628 06/13/21  0519 06/14/21  0425    142 137   K 5.4* 4.8 5.0    105 101   CO2 25 27 30*   BUN 45* 43* 41*   CREATININE 2.8* 2.8* 2.6*   CALCIUM 8.8 8.6 8.6       XR CHEST PORTABLE    Result Date: 6/11/2021  EXAMINATION: ONE XRAY VIEW OF THE CHEST 6/11/2021 11:55 am COMPARISON: 04/26/2021 HISTORY: ORDERING SYSTEM PROVIDED HISTORY: chest pain TECHNOLOGIST PROVIDED HISTORY: Reason for exam:->chest pain What reading provider will be dictating this exam?->CRC FINDINGS: Multifocal bilateral patchy pulmonary infiltrates are noted more prominent within the lung bases. There are trace bilateral pleural effusions. The cardiac silhouette is within normals. 1. Multifocal bilateral patchy pulmonary infiltrates more prominent within the lower lobes. 2. Trace bilateral pleural effusions. Discharge Exam:    HEENT: NCAT,  PERRLA, No JVD  Heart:  RRR, no murmurs, gallops, or rubs. Lungs:  CTA bilaterally, no wheeze, rales or rhonchi  Abd: bowel sounds present, nontender, nondistended, no masses  Extrem:  No clubbing, cyanosis, or edema    Disposition: home     Patient Condition at Discharge: stable     Patient Instructions:      Medication List      START taking these medications    bumetanide 2 MG tablet  Commonly known as: BUMEX  Take 1 tablet by mouth daily     chlorothiazide 250 MG tablet  Commonly known as: DIURIL  Take 1 tablet by mouth every morning        CHANGE how you take these medications    docusate sodium 100 MG capsule  Commonly known as: Colace  Take 1 capsule by mouth 2 times daily as needed for Constipation.   What changed: when to take this     Lantus SoloStar 100 UNIT/ML injection pen  Generic drug: insulin glargine  Inject 12 Units into the skin daily At noon   Had today  What changed: how much to take        CONTINUE taking these

## 2021-06-14 NOTE — PROGRESS NOTES
4.8 5.0    102 105 101   CO2 27 25 27 30*   BUN 45* 45* 43* 41*   CREATININE 3.0* 2.8* 2.8* 2.6*   LABGLOM 25 27 27 30   GLUCOSE 99 69* 37* 74   CALCIUM 8.9 8.8 8.6 8.6   ALT 13  --   --   --    AST 23  --   --   --    BILITOT 0.3  --   --   --    ALKPHOS 51  --   --   --        Lab Results   Component Value Date    LABALBU 3.7 06/11/2021    LABALBU 2.7 (L) 04/27/2021    LABALBU 3.0 (L) 04/26/2021     Lab Results   Component Value Date    TSH 0.996 06/03/2014       Iron Studies  Lab Results   Component Value Date    IRON 29 (L) 06/13/2021    TIBC 183 (L) 06/13/2021    FERRITIN 77 06/13/2021     Vitamin B-12   Date Value Ref Range Status   04/28/2017 281 211 - 946 pg/mL Final     Folate   Date Value Ref Range Status   04/28/2017 7.4 4.8 - 24.2 ng/mL Final       Vit D, 25-Hydroxy   Date Value Ref Range Status   04/13/2021 53 30 - 100 ng/mL Final     Comment:     <20 ng/mL. ........... Ga Sebastian Deficient  20-30 ng/mL. ......... Ga Sebastian Insufficient   ng/mL. ........ Ga Sebastian Sufficient  >100 ng/mL. .......... Ga Sebastian Toxic       PTH   Date Value Ref Range Status   04/13/2021 279 (H) 15 - 65 pg/mL Final       No components found for: URIC    Lab Results   Component Value Date    COLORU Yellow 04/10/2021    NITRU Negative 04/10/2021    GLUCOSEU Negative 04/10/2021    KETUA Negative 04/10/2021    UROBILINOGEN 0.2 04/10/2021    BILIRUBINUR Negative 04/10/2021       No results found for: Alia Cope      IMPRESSION/RECOMMENDATIONS:      1. 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 HFpEF-  Exacerbation  Diuresing new negative 5.3 liters    3. Hypertension with CKD I-IV-  At goal <130/80    4.  Anemia of CKD-  On IV iron load and ADAM    Brooke Conroy, APRN - CNP   Pt seen and examined agree with above  Cr approaching baseline  Continue diuresis  Vamshi Wilkinson MD

## 2021-06-14 NOTE — PLAN OF CARE
Patient's chart updated to reflect:      . - HF care plan, HF education points and HF discharge instructions.  -Orders: 2 gram sodium diet, daily weights, I/O.  -PCP and cardiology follow up appointments to be scheduled within 7 days of hospital discharge. Secure email sent to [facility] cardiology to schedule hospital follow up appointment. Perfect serve message received that pt is interested in CHF clinic, will discuss with patient at consult. -CHF education session will be provided to the patient prior to hospital discharge.     Min Camargo RN, BSN  Heart Failure Navigator

## 2021-06-14 NOTE — PATIENT CARE CONFERENCE
P Quality Flow/Interdisciplinary Rounds Progress Note        Quality Flow Rounds held on June 14, 2021    Disciplines Attending:  Bedside Nurse, ,  and Nursing Unit [address] was admitted on 6/11/2021 11:35 AM    Anticipated Discharge Date:  Expected Discharge Date: 06/14/21    Disposition:    Dariel Score:  Dariel Scale Score: 20    Readmission Risk              Risk of Unplanned Readmission:  34           Discussed patient goal for the day, patient clinical progression, and barriers to discharge.   The following Goal(s) of the Day/Commitment(s) have been identified:  keep blood sugar 70 -[address], RN  June 14, 2021

## 2021-06-14 NOTE — PLAN OF CARE
Problem: Pain:  Goal: Pain level will decrease  Description: Pain level will decrease  Outcome: Met This Shift  Goal: Control of acute pain  Description: Control of acute pain  Outcome: Met This Shift     Problem: Falls - Risk of:  Goal: Will remain free from falls  Description: Will remain free from falls  Outcome: Met This Shift     Problem: Gas Exchange - Impaired:  Goal: Levels of oxygenation will improve  Description: Levels of oxygenation will improve  Outcome: Met This Shift     Problem: Fluid Volume:  Goal: Hemodynamic stability will improve  Description: Hemodynamic stability will improve  Outcome: Met This Shift

## 2021-06-14 NOTE — PROGRESS NOTES
to wean off--able to ambulate without oxygen  Review most recent echocardiogram  He has been on 02 since dx of covid - not sure he needs it any more       History of kidney transplant  Renal service following  Patient on Diuril and bumex per renal  Currently his BUN and creatinine are at baseline  Cardiology following for elevated troponins       DVT Prophylaxis   PT/OT  Discharge planning - ok for dc if ok with renal           Edward Ferrari MD  6:34 PM  6/14/2021

## 2021-06-16 ENCOUNTER — TELEPHONE (OUTPATIENT)
Dept: CARDIOLOGY CLINIC | Age: 71
End: 2021-06-16

## 2021-06-16 NOTE — TELEPHONE ENCOUNTER
April 2021 Montserrat Mullins was going to exclusively follow with cardiology  At [facility] for his cardiac care needs. Spoke with Montserrat Mullins   He is going to talk to DR Clinton his nephrologist prior to [facility]. Nephrology managing his fluid volume status and diruetics (per patient)   6/24 is set with NP Aurelia Sherman at [place] cardiology office. (followed with DR Judy Nguyen then Natalya Zacarias then was gong to f/u with [facility] exclusively)     He is going to call [facility] and he will get back to our office if he will f/u locally here or just with [facility] ([facility] who group II and CHF was being managed at [facility]). Says he is going to [facility] for his kidneys (transplant in 2014 @ [facility]) also  Jeronimo Vaughn CNP for kidney transplant f/u . He wants to think about it and will call our office if he wants to cancel local f/u for CHF.           03/0821 [address] [place] [address], [address]   [facility], [facility], [address] [place] [address]   [phone] (Fax)   Pulmonary hypertension due to left heart disease ([facility] [facility] (Primary Dx); Chronic heart failure with preserved ejection fraction ([address]    Renovascular hypertension

## 2021-07-01 NOTE — PROGRESS NOTES
[address]     Telemedicine Visit     Name: Tim Pierson    Age: 79 y.o. Primary Cardiologist: Dr. Juan May  Primary Care Physician: Nohemi Goff III, DO    Date of Service: 7/1/21     Chief Complaint: HFpEF, pulmonary hypertension     Interim History:    Mr. Leydi Smith is a 79year old gentleman who is followed by Dr. Juan May. .His medical history includes chronic HFpEF, combined pre/post capillary pulmonary hypertension, and ESRD s/p kidney transplant in 12/2014 ([facility]). He presented to the ER on June 11 with generalized edema, as well as exertional dyspnea, orthopnea, and PND, with at least 8 lbs weight gain. . On arrival, BNP was 9928, troponin 115, Cr 3.0, and CXR showed bilateral infiltrates and trace effusions. He was treated with IV Lasix and seen in consultation by Dr. Heidi Russell as well as nephrology. He diuresed over five liters with improvement in creatinine to 2.6. He is being evaluated today in post acute heart failure hospitalization follow up. Since returning home, his weight has been stable at 210 lbs. He has had only mild lower extremity edema and wears support stockings to help control this. He reports ongoing chronic dyspnea, which has been present for over one year, and now relates he is able to walk up three stairs in his bilevel home before stopping to rest for ~ twenty seconds. Prior to his recent admission and when he first returned home he was only able to walk up one step without having to stop. He denies chest discomfort, orthopnea, or PND and sleeps on two pillows with use of 2 liters of oxygen at night. Review of Systems:   Cardiovascular: Denies chest pain or palpitations. Lower extremity edema as above. Respiratory: Denies cough, orthopnea, PND or wheezing. Exertional dyspnea as above. Consitutional: Denies fevers or chills. Positive for fatigue and weakness. HEENMT: Denies headaches, bleeding gums or epistaxis.    Musculoskeletal: Denies myalgias or arthralgias. Neurological: Denies dizziness, syncope, numbness or tingling. Integumentary: Denies rash or ulcerations. Gastrointestinal: Denies nausea, vomiting, abdominal pain, constipation/diarrhea, or dark/bloody stools. Genitourinary: Denies dysuria or blood in urine  Hematologic/Lymphatic: Denies abnormal bruising or bleeding. Endocrine: Denies temperature intolerance or excessive thirst.   Psychiatric: Denies anxiety or depression. Past Medical History:   1. Hypertension. 2. Diabetes. 3. GERD. 4. Hyperlipidemia. 5. NKDA. 6. Obesity. 7. End-stage renal disease. 8. Not a known smoker. 9. Echo 07/12/2012. LVH. EF 63%. Stage I diastolic dysfunction. No significant valvulopathy. 10. Stress Myoview 07/12/2012. Reversible ischemia in the mid anterior wall. EF 45%. 11. Lexiscan MPS 11/21/2013. Normal perfusion study. EF 63%.    12. Echo 07/20/2014. EF normal. Stage I diastolic dysfunction, otherwise no valvular heart disease. Mild LAE. 13. Status post kidney transplant 12/12/2014 at [facility]. 14. Echo, 07/21/2015. Stage I diastolic dysfunction. EF 65%. Normal sized LA. No valvular heart disease. Small circumferential pericardial effusion. 15. Lexiscan MPS, 07/26/2015: Small area of reversible ischemia in the lateral wall. EF 48%.    16. Renal artery US, [facility] - 08/13/2015: 60-99% stenosis right renal artery, may be overestimated due to vessel tortuosity.    17. Angiogram of the transplant right renal artery (anastomosis of the right common iliac artery). FFR and IVUS, 09/21/2015, Dr. Uziel Rodriguez, [facility]. The transplant renal artery was extremely tortuous, difficult to assess the severity of stenosis by angiography. IVUS of the transplant renal artery, proximally normal. Unable to pass into more distal segment.    18. Repeat procedure, 09/30/2015 from the left groin using an up-and-over approach. Dr. Uziel Rodriguez, [facility]. FFR 0.98. Peak-to-peak gradient of 8-10 mm.  IVUS, no significant atherosclerosis, plaque, or fibrosis.   19. Echocardiogram, 4/27/2017, Dr. Lisbeth Robles. EF 55-65%.  Mild aortic sclerosis. No other significant valvular heart disease.   20. Mixed lung disease.  Was seen by [facility] in April 2020. ? Chest CT without contrast showed mild aortic and coronary calcification and a dilated main PA to 3.9 cm.  There were no stigmata of interstitial lung disease but there was mild diffuse mosaic attenuation of the parenchyma and eventration of the right hemidiaphragm  ? PFTs showed a TLC of 48% and DLCO 48% predicted  21. OLINDA - on CPAP  22. Has a history of carpal tunnel (although he is unsure if it is) with a prior surgical procedure 6/2014. Nuclear imaging performed for cardiac amyloidosis ([facility]) and found to be negative with AL serologies unremarkable as well. 23. RHC (9/2/2020) RA: 13. RV: 91/14. PA: 91/25 with a mean of 48. PCW: 22. CO/CI (Jack): 5.6/2.5. CO/CI (thermodilution): 4.9/2.1 Conclusions: 1. Moderately elevated right and left filling pressures. 2. Severe pulmonary hypertension, predominantly precapillary. PVR approximately 5-5.5. 3. Gabrielle 5 consistent with normal RV function. Refer to pulmonary hypertension center at [facility]  24. Pulmonary hypertension evaluation at [facility] >> (CpC-PH, WHO group 2) - TPG 26 and PVR of 5. PCWP was 22 at time of cardiac catheterization. Given that this is largely a group 2 component, would first try to optimize his medications as best as possible (reduce beta blockade given bradycardia and likely take off ISMN given ill effects in HFpEF patients). PDE-5 inhibitors have limited data in HFpEF with none being beneficial.   25. TTE (4/3/2020, [facility]) CONCLUSIONS: Technically difficult exam due to body habitus. Exam indication: Evaluation of known heart failure to guide therapy. The left ventricle is normal in size. There is moderate left ventricular hypertrophy.  Left ventricular systolic function is normal. EF = 67 ± 5% (2D biplane) Grade II left ventricular diastolic dysfunction. Global strain not reported due to poor tracking. The right ventricle is normal in size. Right ventricular systolic function is normal. The left atrial cavity is severely dilated. Exam was compared with the prior  echocardiographic exam performed on 10/19/2016. Similar findings. 26. Echocardiogram 2021 (Dr. Maki Guaman): EF 60-65%. Stage I Diastolic Dysfunction. Normal RV size and function. No significant valve disease. 27. EGD 2021: Marginal Ulcer, nonbleeding  28. COVID 19 infection in 2021, has required supplemental oxygen since. 34. Admitted 2021 with acute on chronic heart failure and ALYSON on CKD. Family History:  Family History   Problem Relation Age of Onset    Diabetes Mother     High Blood Pressure Mother     Heart Disease Mother     Diabetes Father     Cancer Father     Stroke Father     Heart Disease Father        Social History:  Social History     Socioeconomic History    Marital status:      Spouse name: Not on file    Number of children: Not on file    Years of education: Not on file    Highest education level: Not on file   Occupational History    Not on file   Tobacco Use    Smoking status: Former Smoker     Packs/day: 1.00     Years: 20.00     Pack years: 20.00     Types: Cigarettes     Quit date: 1990     Years since quittin.5    Smokeless tobacco: Never Used   Substance and Sexual Activity    Alcohol use:  Yes     Alcohol/week: 0.0 standard drinks     Comment: rare glass of wine;  drinks 2-3 cups of coffee daily    Drug use: No    Sexual activity: Not Currently   Other Topics Concern    Not on file   Social History Narrative    Not on file     Social Determinants of Health     Financial Resource Strain:     Difficulty of Paying Living Expenses:    Food Insecurity:     Worried About Running Out of Food in the Last Year:     [address] in the Last Year:    Transportation Needs:     Lack of Transportation (Medical):  Lack of Transportation (Non-Medical):    Physical Activity:     Days of Exercise per Week:     Minutes of Exercise per Session:    Stress:     Feeling of Stress :    Social Connections:     Frequency of Communication with Friends and Family:     Frequency of Social Gatherings with Friends and Family:     Attends [employer] Services:     Active Member of Clubs or Organizations:     Attends Club or Organization Meetings:     Marital Status:    Intimate Partner Violence:     Fear of Current or Ex-Partner:     Emotionally Abused:     Physically Abused:     Sexually Abused: Allergies:  No Known Allergies    Current Medications:  Current Outpatient Medications   Medication Sig Dispense Refill    insulin glargine (LANTUS SOLOSTAR) 100 UNIT/ML injection pen Inject 12 Units into the skin daily At noon   Had today 5 pen 3    bumetanide (BUMEX) 2 MG tablet Take 1 tablet by mouth daily 30 tablet 3    chlorothiazide (DIURIL) 250 MG tablet Take 1 tablet by mouth every morning 30 tablet 3    oxyCODONE (ROXICODONE) 20 MG immediate release tablet Take 20 mg by mouth every 8 hours as needed for Pain.        Cholecalciferol (VITAMIN D3) 125 MCG (5000 UT) TABS Take 5,000 Units by mouth daily      hydrALAZINE (APRESOLINE) 100 MG tablet Take 100 mg by mouth 3 times daily      metoprolol succinate (TOPROL XL) 100 MG extended release tablet Take 100 mg by mouth daily      sulfamethoxazole-trimethoprim (BACTRIM;SEPTRA) 400-80 MG per tablet Take 1 tablet by mouth daily      predniSONE (DELTASONE) 5 MG tablet Take 5 mg by mouth daily      simvastatin (ZOCOR) 10 MG tablet Take 10 mg by mouth daily      isosorbide mononitrate (IMDUR) 60 MG extended release tablet TAKE 1 TABLET DAILY 90 tablet 3    tacrolimus (PROGRAF) 1 MG capsule Take 2 capsules by mouth 2 times daily 60 capsule 3    sodium bicarbonate 650 MG tablet Take 650 mg by mouth 4 times daily       Multiple Vitamin (MULTI VITAMIN DAILY PO) Take by mouth daily      mycophenolate (CELLCEPT) 250 MG capsule   Take 1,000 mg by mouth 2 times daily       insulin lispro (HUMALOG KWIKPEN) 100 UNIT/ML SOPN Inject 5 Units into the skin daily (before lunch) Patient takes with his biggest meal      docusate sodium (COLACE) 100 MG capsule Take 1 capsule by mouth 2 times daily as needed for Constipation. (Patient taking differently: Take 100 mg by mouth daily as needed for Constipation ) 20 capsule 0    linagliptin (TRADJENTA) 5 MG tablet Take 5 mg by mouth daily.  aspirin 81 MG EC tablet Take 81 mg by mouth daily. No current facility-administered medications for this visit. Physical Exam:  Patient reported vitals: 134/73, pulse 63, height 5'11\", weight 210   Wt Readings from Last 3 Encounters:   06/24/21 215 lb (97.5 kg)   06/14/21 217 lb 6.4 oz (98.6 kg)   04/29/21 216 lb 12.1 oz (98.3 kg)   Physical exam not performed as this was a telephone visit. He was speaking in complete sentences without dyspnea or apparent distress and answered all questions appropriately. Diagnostics:  Component      Latest Ref Rng & Units 6/11/2021 4/26/2021 4/12/2021 4/11/2021          12:01 PM  6:17 PM  5:10 AM  5:45 PM   Pro-BNP      0 - 125 pg/mL 9,928 (H) 5,086 (H) 4,801 (H) 4,233 (H)       Assessment:   1. Chronic HFpEF  · ACC stage C, NYHA class III  2. Pulmonary hypertension (CpC-PH, WHO group 2)  3. CKD stage IIIB with history of renal transplant. Recent ALYSON in the setting of acute heart failure  4. Hypertension  5. Type II diabetes mellitus   4. Chronic anemia : recently started on Epoetin     Treatment Plan:   1. Continue current medications for now: may consider reducing/stopping Imdur due to [place] 30  2. Continue to follow at [facility] for pulmonary hypertension   3.  Follow up with Dr. Xenia Worthington in one month or as needed    - Weigh yourself daily    -Stay Hydrated    -Diet should sodium restricted to 2 grams    -Again watch your daily weight trends and if you gain water weight please follow below instructions.    -If at any time you feel that you are retaining fluid, your medications are not working, or you feel ill in anyway, then please call us for either same day appointment or the next day, and for instructions. Our goal is to keep you out of the emergency room and the hospital    -If you become sick for other reasons, and notice that you are not urinating as much, the urine is very dark, you have significant diarrhea or vomiting, then please DO NOT take your water pill and CALL US immediately. The patient's current medication list, allergies, problem list and results of all previously ordered testing were reviewed at today's visit.     Electronically signed by CHAI Jerome on 7/1/2021 at 3:56 PM  [facility]) Cardiology

## 2021-07-02 ENCOUNTER — HOSPITAL ENCOUNTER (OUTPATIENT)
Dept: GENERAL RADIOLOGY | Age: 71
Discharge: HOME | End: 2021-07-04
Payer: MEDICARE

## 2021-07-02 ENCOUNTER — VIRTUAL VISIT (OUTPATIENT)
Dept: CARDIOLOGY CLINIC | Age: 71
End: 2021-07-02
Payer: MEDICARE

## 2021-07-02 ENCOUNTER — HOSPITAL ENCOUNTER (OUTPATIENT)
Age: 71
Discharge: HOME | End: 2021-07-04
Payer: MEDICARE

## 2021-07-02 DIAGNOSIS — I50.32 CHRONIC HEART FAILURE WITH PRESERVED EJECTION FRACTION (HFPEF) (HCC): Primary | ICD-10-CM

## 2021-07-02 DIAGNOSIS — I27.20 PULMONARY HYPERTENSION (HCC): ICD-10-CM

## 2021-07-02 DIAGNOSIS — J18.9 PNEUMONIA DUE TO INFECTIOUS ORGANISM, UNSPECIFIED LATERALITY, UNSPECIFIED PART OF LUNG: ICD-10-CM

## 2021-07-02 PROCEDURE — 99442 PR PHYS/QHP TELEPHONE EVALUATION 11-20 MIN: CPT | Performed by: CLINICAL NURSE SPECIALIST

## 2021-07-02 PROCEDURE — 71046 X-RAY EXAM CHEST 2 VIEWS: CPT

## 2021-07-02 NOTE — PATIENT INSTRUCTIONS
- Weigh yourself daily    -Stay Hydrated    -Diet should sodium restricted to 2 grams    -Again watch your daily weight trends and if you gain water weight please follow below instructions.    -If at any time you feel that you are retaining fluid, your medications are not working, or you feel ill in anyway, then please call us for either same day appointment or the next day, and for instructions. Our goal is to keep you out of the emergency room and the hospital    -If you become sick for other reasons, and notice that you are not urinating as much, the urine is very dark, you have significant diarrhea or vomiting, then please DO NOT take your water pill and CALL US immediately.

## 2021-07-06 RX ORDER — DIPHENHYDRAMINE HYDROCHLORIDE 50 MG/ML
50 INJECTION INTRAMUSCULAR; INTRAVENOUS ONCE
Status: CANCELLED | OUTPATIENT
Start: 2021-07-06 | End: 2021-07-06

## 2021-07-06 RX ORDER — SODIUM CHLORIDE 9 MG/ML
25 INJECTION, SOLUTION INTRAVENOUS PRN
Status: CANCELLED | OUTPATIENT
Start: 2021-07-06

## 2021-07-06 RX ORDER — EPINEPHRINE 1 MG/ML
0.3 INJECTION, SOLUTION, CONCENTRATE INTRAVENOUS PRN
Status: CANCELLED | OUTPATIENT
Start: 2021-07-06

## 2021-07-06 RX ORDER — HEPARIN SODIUM (PORCINE) LOCK FLUSH IV SOLN 100 UNIT/ML 100 UNIT/ML
500 SOLUTION INTRAVENOUS PRN
Status: CANCELLED | OUTPATIENT
Start: 2021-07-06

## 2021-07-06 RX ORDER — SODIUM CHLORIDE 0.9 % (FLUSH) 0.9 %
5-40 SYRINGE (ML) INJECTION PRN
Status: CANCELLED | OUTPATIENT
Start: 2021-07-06

## 2021-07-06 RX ORDER — METHYLPREDNISOLONE SODIUM SUCCINATE 125 MG/2ML
125 INJECTION, POWDER, LYOPHILIZED, FOR SOLUTION INTRAMUSCULAR; INTRAVENOUS ONCE
Status: CANCELLED | OUTPATIENT
Start: 2021-07-06 | End: 2021-07-06

## 2021-07-06 RX ORDER — SODIUM CHLORIDE 9 MG/ML
INJECTION, SOLUTION INTRAVENOUS CONTINUOUS
Status: CANCELLED | OUTPATIENT
Start: 2021-07-06

## 2021-07-11 PROBLEM — R77.8 ELEVATED TROPONIN: Status: RESOLVED | Noted: 2021-06-11 | Resolved: 2021-07-11

## 2021-07-11 PROBLEM — R79.89 ELEVATED TROPONIN: Status: RESOLVED | Noted: 2021-06-11 | Resolved: 2021-07-11

## 2021-07-13 RX ORDER — SODIUM CHLORIDE 9 MG/ML
INJECTION, SOLUTION INTRAVENOUS CONTINUOUS
Status: CANCELLED | OUTPATIENT
Start: 2021-07-13

## 2021-07-13 RX ORDER — SODIUM CHLORIDE 9 MG/ML
25 INJECTION, SOLUTION INTRAVENOUS PRN
Status: CANCELLED | OUTPATIENT
Start: 2021-07-13

## 2021-07-13 RX ORDER — HEPARIN SODIUM (PORCINE) LOCK FLUSH IV SOLN 100 UNIT/ML 100 UNIT/ML
500 SOLUTION INTRAVENOUS PRN
Status: CANCELLED | OUTPATIENT
Start: 2021-07-13

## 2021-07-13 RX ORDER — SODIUM CHLORIDE 0.9 % (FLUSH) 0.9 %
5-40 SYRINGE (ML) INJECTION PRN
Status: CANCELLED | OUTPATIENT
Start: 2021-07-13

## 2021-07-13 RX ORDER — METHYLPREDNISOLONE SODIUM SUCCINATE 125 MG/2ML
125 INJECTION, POWDER, LYOPHILIZED, FOR SOLUTION INTRAMUSCULAR; INTRAVENOUS ONCE
Status: CANCELLED | OUTPATIENT
Start: 2021-07-13 | End: 2021-07-13

## 2021-07-13 RX ORDER — DIPHENHYDRAMINE HYDROCHLORIDE 50 MG/ML
50 INJECTION INTRAMUSCULAR; INTRAVENOUS ONCE
Status: CANCELLED | OUTPATIENT
Start: 2021-07-13 | End: 2021-07-13

## 2021-07-13 RX ORDER — EPINEPHRINE 1 MG/ML
0.3 INJECTION, SOLUTION, CONCENTRATE INTRAVENOUS PRN
Status: CANCELLED | OUTPATIENT
Start: 2021-07-13

## 2021-07-14 ENCOUNTER — HOSPITAL ENCOUNTER (OUTPATIENT)
Dept: INFUSION THERAPY | Age: 71
Setting detail: INFUSION SERIES
Discharge: HOME | End: 2021-07-14
Payer: MEDICARE

## 2021-07-14 VITALS
RESPIRATION RATE: 18 BRPM | SYSTOLIC BLOOD PRESSURE: 117 MMHG | TEMPERATURE: 98.8 F | DIASTOLIC BLOOD PRESSURE: 75 MMHG | HEART RATE: 44 BPM

## 2021-07-14 DIAGNOSIS — D63.8 ANEMIA OF CHRONIC DISEASE: Primary | ICD-10-CM

## 2021-07-14 PROCEDURE — 96372 THER/PROPH/DIAG INJ SC/IM: CPT

## 2021-07-14 PROCEDURE — 6360000002 HC RX W HCPCS: Performed by: INTERNAL MEDICINE

## 2021-07-14 RX ADMIN — EPOETIN ALFA-EPBX 10000 UNITS: 10000 INJECTION, SOLUTION INTRAVENOUS; SUBCUTANEOUS at 15:39

## 2021-07-21 ENCOUNTER — HOSPITAL ENCOUNTER (OUTPATIENT)
Dept: INFUSION THERAPY | Age: 71
Setting detail: INFUSION SERIES
Discharge: HOME | End: 2021-07-21
Payer: MEDICARE

## 2021-07-21 VITALS
TEMPERATURE: 98.6 F | DIASTOLIC BLOOD PRESSURE: 47 MMHG | SYSTOLIC BLOOD PRESSURE: 119 MMHG | HEART RATE: 54 BPM | RESPIRATION RATE: 18 BRPM

## 2021-07-21 DIAGNOSIS — D63.8 ANEMIA OF CHRONIC DISEASE: Primary | ICD-10-CM

## 2021-07-21 PROCEDURE — 6360000002 HC RX W HCPCS: Performed by: INTERNAL MEDICINE

## 2021-07-21 PROCEDURE — 96372 THER/PROPH/DIAG INJ SC/IM: CPT

## 2021-07-21 RX ADMIN — EPOETIN ALFA-EPBX 10000 UNITS: 10000 INJECTION, SOLUTION INTRAVENOUS; SUBCUTANEOUS at 15:19

## 2021-07-28 ENCOUNTER — HOSPITAL ENCOUNTER (OUTPATIENT)
Dept: INFUSION THERAPY | Age: 71
Setting detail: INFUSION SERIES
Discharge: HOME | End: 2021-07-28
Payer: MEDICARE

## 2021-07-28 VITALS
HEART RATE: 54 BPM | SYSTOLIC BLOOD PRESSURE: 136 MMHG | OXYGEN SATURATION: 97 % | RESPIRATION RATE: 16 BRPM | DIASTOLIC BLOOD PRESSURE: 59 MMHG | TEMPERATURE: 98 F

## 2021-07-28 DIAGNOSIS — D63.1 ANEMIA IN STAGE 3 CHRONIC KIDNEY DISEASE, UNSPECIFIED WHETHER STAGE 3A OR 3B CKD (HCC): ICD-10-CM

## 2021-07-28 DIAGNOSIS — N18.30 ANEMIA IN STAGE 3 CHRONIC KIDNEY DISEASE, UNSPECIFIED WHETHER STAGE 3A OR 3B CKD (HCC): ICD-10-CM

## 2021-07-28 DIAGNOSIS — D63.8 ANEMIA OF CHRONIC DISEASE: Primary | ICD-10-CM

## 2021-07-28 PROCEDURE — 96365 THER/PROPH/DIAG IV INF INIT: CPT

## 2021-07-28 PROCEDURE — 96372 THER/PROPH/DIAG INJ SC/IM: CPT

## 2021-07-28 PROCEDURE — 2580000003 HC RX 258: Performed by: INTERNAL MEDICINE

## 2021-07-28 PROCEDURE — 96367 TX/PROPH/DG ADDL SEQ IV INF: CPT

## 2021-07-28 PROCEDURE — 6360000002 HC RX W HCPCS: Performed by: INTERNAL MEDICINE

## 2021-07-28 RX ORDER — SODIUM CHLORIDE 9 MG/ML
25 INJECTION, SOLUTION INTRAVENOUS PRN
Status: CANCELLED | OUTPATIENT
Start: 2021-08-04

## 2021-07-28 RX ORDER — EPINEPHRINE 1 MG/ML
0.3 INJECTION, SOLUTION, CONCENTRATE INTRAVENOUS PRN
Status: CANCELLED | OUTPATIENT
Start: 2021-08-04

## 2021-07-28 RX ORDER — HEPARIN SODIUM (PORCINE) LOCK FLUSH IV SOLN 100 UNIT/ML 100 UNIT/ML
500 SOLUTION INTRAVENOUS PRN
Status: DISCONTINUED | OUTPATIENT
Start: 2021-07-28 | End: 2021-07-29 | Stop reason: HOSPADM

## 2021-07-28 RX ORDER — SODIUM CHLORIDE 0.9 % (FLUSH) 0.9 %
5-40 SYRINGE (ML) INJECTION PRN
Status: DISCONTINUED | OUTPATIENT
Start: 2021-07-28 | End: 2021-07-29 | Stop reason: HOSPADM

## 2021-07-28 RX ORDER — SODIUM CHLORIDE 9 MG/ML
INJECTION, SOLUTION INTRAVENOUS CONTINUOUS
Status: CANCELLED | OUTPATIENT
Start: 2021-08-04

## 2021-07-28 RX ORDER — DIPHENHYDRAMINE HYDROCHLORIDE 50 MG/ML
50 INJECTION INTRAMUSCULAR; INTRAVENOUS ONCE
Status: CANCELLED | OUTPATIENT
Start: 2021-08-04 | End: 2021-08-04

## 2021-07-28 RX ORDER — HEPARIN SODIUM (PORCINE) LOCK FLUSH IV SOLN 100 UNIT/ML 100 UNIT/ML
500 SOLUTION INTRAVENOUS PRN
Status: CANCELLED | OUTPATIENT
Start: 2021-08-04

## 2021-07-28 RX ORDER — SODIUM CHLORIDE 0.9 % (FLUSH) 0.9 %
5-40 SYRINGE (ML) INJECTION PRN
Status: CANCELLED | OUTPATIENT
Start: 2021-08-04

## 2021-07-28 RX ORDER — SODIUM CHLORIDE 9 MG/ML
25 INJECTION, SOLUTION INTRAVENOUS PRN
Status: DISCONTINUED | OUTPATIENT
Start: 2021-07-28 | End: 2021-07-29 | Stop reason: HOSPADM

## 2021-07-28 RX ORDER — METHYLPREDNISOLONE SODIUM SUCCINATE 125 MG/2ML
125 INJECTION, POWDER, LYOPHILIZED, FOR SOLUTION INTRAMUSCULAR; INTRAVENOUS ONCE
Status: CANCELLED | OUTPATIENT
Start: 2021-08-04 | End: 2021-08-04

## 2021-07-28 RX ORDER — SODIUM CHLORIDE 9 MG/ML
INJECTION, SOLUTION INTRAVENOUS CONTINUOUS
Status: DISCONTINUED | OUTPATIENT
Start: 2021-07-28 | End: 2021-07-28 | Stop reason: HOSPADM

## 2021-07-28 RX ORDER — SODIUM CHLORIDE 0.9 % (FLUSH) 0.9 %
SYRINGE (ML) INJECTION
Status: DISCONTINUED
Start: 2021-07-28 | End: 2021-07-28 | Stop reason: HOSPADM

## 2021-07-28 RX ADMIN — SODIUM CHLORIDE 100 MG: 9 INJECTION, SOLUTION INTRAVENOUS at 11:17

## 2021-07-28 RX ADMIN — SODIUM CHLORIDE 25 MG: 9 INJECTION, SOLUTION INTRAVENOUS at 10:29

## 2021-07-28 RX ADMIN — SODIUM CHLORIDE, PRESERVATIVE FREE 10 ML: 5 INJECTION INTRAVENOUS at 10:15

## 2021-07-28 RX ADMIN — EPOETIN ALFA-EPBX 10000 UNITS: 10000 INJECTION, SOLUTION INTRAVENOUS; SUBCUTANEOUS at 10:31

## 2021-08-04 ENCOUNTER — HOSPITAL ENCOUNTER (OUTPATIENT)
Dept: INFUSION THERAPY | Age: 71
Setting detail: INFUSION SERIES
Discharge: HOME | End: 2021-08-04
Payer: MEDICARE

## 2021-08-04 VITALS
SYSTOLIC BLOOD PRESSURE: 125 MMHG | TEMPERATURE: 98.6 F | HEART RATE: 50 BPM | DIASTOLIC BLOOD PRESSURE: 47 MMHG | RESPIRATION RATE: 18 BRPM

## 2021-08-04 DIAGNOSIS — D63.1 ANEMIA IN STAGE 3 CHRONIC KIDNEY DISEASE, UNSPECIFIED WHETHER STAGE 3A OR 3B CKD (HCC): ICD-10-CM

## 2021-08-04 DIAGNOSIS — N18.30 ANEMIA IN STAGE 3 CHRONIC KIDNEY DISEASE, UNSPECIFIED WHETHER STAGE 3A OR 3B CKD (HCC): ICD-10-CM

## 2021-08-04 DIAGNOSIS — D63.8 ANEMIA OF CHRONIC DISEASE: Primary | ICD-10-CM

## 2021-08-04 LAB
ANION GAP SERPL CALCULATED.3IONS-SCNC: 11 MMOL/L (ref 7–16)
ANION GAP SERPL CALCULATED.3IONS-SCNC: 9 MMOL/L (ref 7–16)
BUN BLDV-MCNC: 56 MG/DL (ref 6–23)
BUN BLDV-MCNC: 57 MG/DL (ref 6–23)
CALCIUM SERPL-MCNC: 8.8 MG/DL (ref 8.6–10.2)
CALCIUM SERPL-MCNC: 8.8 MG/DL (ref 8.6–10.2)
CHLORIDE BLD-SCNC: 105 MMOL/L (ref 98–107)
CHLORIDE BLD-SCNC: 106 MMOL/L (ref 98–107)
CO2: 19 MMOL/L (ref 22–29)
CO2: 22 MMOL/L (ref 22–29)
CREAT SERPL-MCNC: 3.8 MG/DL (ref 0.7–1.2)
CREAT SERPL-MCNC: 3.8 MG/DL (ref 0.7–1.2)
GFR AFRICAN AMERICAN: 19
GFR AFRICAN AMERICAN: 19
GFR NON-AFRICAN AMERICAN: 19 ML/MIN/1.73
GFR NON-AFRICAN AMERICAN: 19 ML/MIN/1.73
GLUCOSE BLD-MCNC: 63 MG/DL (ref 74–99)
GLUCOSE BLD-MCNC: 76 MG/DL (ref 74–99)
POTASSIUM SERPL-SCNC: 5.9 MMOL/L (ref 3.5–5)
POTASSIUM SERPL-SCNC: 6.9 MMOL/L (ref 3.5–5)
SODIUM BLD-SCNC: 135 MMOL/L (ref 132–146)
SODIUM BLD-SCNC: 137 MMOL/L (ref 132–146)

## 2021-08-04 PROCEDURE — 6360000002 HC RX W HCPCS: Performed by: INTERNAL MEDICINE

## 2021-08-04 PROCEDURE — 96372 THER/PROPH/DIAG INJ SC/IM: CPT

## 2021-08-04 PROCEDURE — 80048 BASIC METABOLIC PNL TOTAL CA: CPT

## 2021-08-04 PROCEDURE — 2580000003 HC RX 258: Performed by: INTERNAL MEDICINE

## 2021-08-04 PROCEDURE — 36415 COLL VENOUS BLD VENIPUNCTURE: CPT

## 2021-08-04 PROCEDURE — 96365 THER/PROPH/DIAG IV INF INIT: CPT

## 2021-08-04 RX ORDER — DIPHENHYDRAMINE HYDROCHLORIDE 50 MG/ML
50 INJECTION INTRAMUSCULAR; INTRAVENOUS ONCE
Status: CANCELLED | OUTPATIENT
Start: 2021-08-11 | End: 2021-08-11

## 2021-08-04 RX ORDER — SODIUM CHLORIDE 9 MG/ML
25 INJECTION, SOLUTION INTRAVENOUS PRN
Status: CANCELLED | OUTPATIENT
Start: 2021-08-11

## 2021-08-04 RX ORDER — SODIUM CHLORIDE 9 MG/ML
INJECTION, SOLUTION INTRAVENOUS CONTINUOUS
Status: ACTIVE | OUTPATIENT
Start: 2021-08-04 | End: 2021-08-04

## 2021-08-04 RX ORDER — SODIUM CHLORIDE 0.9 % (FLUSH) 0.9 %
5-40 SYRINGE (ML) INJECTION PRN
Status: DISCONTINUED | OUTPATIENT
Start: 2021-08-04 | End: 2021-08-05 | Stop reason: HOSPADM

## 2021-08-04 RX ORDER — HEPARIN SODIUM (PORCINE) LOCK FLUSH IV SOLN 100 UNIT/ML 100 UNIT/ML
500 SOLUTION INTRAVENOUS PRN
Status: CANCELLED | OUTPATIENT
Start: 2021-08-11

## 2021-08-04 RX ORDER — SODIUM CHLORIDE 9 MG/ML
INJECTION, SOLUTION INTRAVENOUS CONTINUOUS
Status: CANCELLED | OUTPATIENT
Start: 2021-08-11

## 2021-08-04 RX ORDER — SODIUM CHLORIDE 0.9 % (FLUSH) 0.9 %
5-40 SYRINGE (ML) INJECTION PRN
Status: CANCELLED | OUTPATIENT
Start: 2021-08-11

## 2021-08-04 RX ORDER — METHYLPREDNISOLONE SODIUM SUCCINATE 125 MG/2ML
125 INJECTION, POWDER, LYOPHILIZED, FOR SOLUTION INTRAMUSCULAR; INTRAVENOUS ONCE
Status: CANCELLED | OUTPATIENT
Start: 2021-08-11 | End: 2021-08-11

## 2021-08-04 RX ORDER — EPINEPHRINE 1 MG/ML
0.3 INJECTION, SOLUTION, CONCENTRATE INTRAVENOUS PRN
Status: CANCELLED | OUTPATIENT
Start: 2021-08-11

## 2021-08-04 RX ADMIN — SODIUM CHLORIDE, PRESERVATIVE FREE 10 ML: 5 INJECTION INTRAVENOUS at 11:06

## 2021-08-04 RX ADMIN — SODIUM CHLORIDE 125 MG: 9 INJECTION, SOLUTION INTRAVENOUS at 11:06

## 2021-08-04 RX ADMIN — EPOETIN ALFA-EPBX 10000 UNITS: 10000 INJECTION, SOLUTION INTRAVENOUS; SUBCUTANEOUS at 11:12

## 2021-08-06 ENCOUNTER — HOSPITAL ENCOUNTER (OUTPATIENT)
Age: 71
Discharge: HOME | End: 2021-08-06
Payer: MEDICARE

## 2021-08-06 LAB — POTASSIUM SERPL-SCNC: 5.4 MMOL/L (ref 3.5–5)

## 2021-08-06 PROCEDURE — 84132 ASSAY OF SERUM POTASSIUM: CPT

## 2021-08-06 PROCEDURE — 36415 COLL VENOUS BLD VENIPUNCTURE: CPT

## 2021-08-11 ENCOUNTER — HOSPITAL ENCOUNTER (OUTPATIENT)
Dept: INFUSION THERAPY | Age: 71
Setting detail: INFUSION SERIES
Discharge: HOME | End: 2021-08-11
Payer: MEDICARE

## 2021-08-11 VITALS
RESPIRATION RATE: 18 BRPM | SYSTOLIC BLOOD PRESSURE: 133 MMHG | OXYGEN SATURATION: 95 % | DIASTOLIC BLOOD PRESSURE: 56 MMHG | HEART RATE: 52 BPM | TEMPERATURE: 97.9 F

## 2021-08-11 DIAGNOSIS — D63.8 ANEMIA OF CHRONIC DISEASE: Primary | ICD-10-CM

## 2021-08-11 DIAGNOSIS — D63.1 ANEMIA IN STAGE 3 CHRONIC KIDNEY DISEASE, UNSPECIFIED WHETHER STAGE 3A OR 3B CKD (HCC): ICD-10-CM

## 2021-08-11 DIAGNOSIS — N18.30 ANEMIA IN STAGE 3 CHRONIC KIDNEY DISEASE, UNSPECIFIED WHETHER STAGE 3A OR 3B CKD (HCC): ICD-10-CM

## 2021-08-11 PROCEDURE — 6360000002 HC RX W HCPCS: Performed by: INTERNAL MEDICINE

## 2021-08-11 PROCEDURE — 96367 TX/PROPH/DG ADDL SEQ IV INF: CPT

## 2021-08-11 PROCEDURE — 2580000003 HC RX 258: Performed by: INTERNAL MEDICINE

## 2021-08-11 PROCEDURE — 96372 THER/PROPH/DIAG INJ SC/IM: CPT

## 2021-08-11 PROCEDURE — 96365 THER/PROPH/DIAG IV INF INIT: CPT

## 2021-08-11 RX ORDER — HEPARIN SODIUM (PORCINE) LOCK FLUSH IV SOLN 100 UNIT/ML 100 UNIT/ML
500 SOLUTION INTRAVENOUS PRN
Status: DISCONTINUED | OUTPATIENT
Start: 2021-08-11 | End: 2021-08-12 | Stop reason: HOSPADM

## 2021-08-11 RX ORDER — EPINEPHRINE 1 MG/ML
0.3 INJECTION, SOLUTION, CONCENTRATE INTRAVENOUS PRN
Status: CANCELLED | OUTPATIENT
Start: 2021-08-18

## 2021-08-11 RX ORDER — HEPARIN SODIUM (PORCINE) LOCK FLUSH IV SOLN 100 UNIT/ML 100 UNIT/ML
500 SOLUTION INTRAVENOUS PRN
Status: CANCELLED | OUTPATIENT
Start: 2021-08-18

## 2021-08-11 RX ORDER — SODIUM CHLORIDE 9 MG/ML
25 INJECTION, SOLUTION INTRAVENOUS PRN
Status: CANCELLED | OUTPATIENT
Start: 2021-08-18

## 2021-08-11 RX ORDER — DIPHENHYDRAMINE HYDROCHLORIDE 50 MG/ML
50 INJECTION INTRAMUSCULAR; INTRAVENOUS ONCE
Status: CANCELLED | OUTPATIENT
Start: 2021-08-18 | End: 2021-08-18

## 2021-08-11 RX ORDER — SODIUM CHLORIDE 9 MG/ML
INJECTION, SOLUTION INTRAVENOUS CONTINUOUS
Status: ACTIVE | OUTPATIENT
Start: 2021-08-11 | End: 2021-08-11

## 2021-08-11 RX ORDER — SODIUM CHLORIDE 9 MG/ML
INJECTION, SOLUTION INTRAVENOUS CONTINUOUS
Status: CANCELLED | OUTPATIENT
Start: 2021-08-18

## 2021-08-11 RX ORDER — SODIUM CHLORIDE 0.9 % (FLUSH) 0.9 %
5-40 SYRINGE (ML) INJECTION PRN
Status: DISCONTINUED | OUTPATIENT
Start: 2021-08-11 | End: 2021-08-12 | Stop reason: HOSPADM

## 2021-08-11 RX ORDER — SODIUM CHLORIDE 0.9 % (FLUSH) 0.9 %
5-40 SYRINGE (ML) INJECTION PRN
Status: CANCELLED | OUTPATIENT
Start: 2021-08-18

## 2021-08-11 RX ORDER — METHYLPREDNISOLONE SODIUM SUCCINATE 125 MG/2ML
125 INJECTION, POWDER, LYOPHILIZED, FOR SOLUTION INTRAMUSCULAR; INTRAVENOUS ONCE
Status: CANCELLED | OUTPATIENT
Start: 2021-08-18 | End: 2021-08-18

## 2021-08-11 RX ADMIN — SODIUM CHLORIDE, PRESERVATIVE FREE 10 ML: 5 INJECTION INTRAVENOUS at 10:51

## 2021-08-11 RX ADMIN — EPOETIN ALFA-EPBX 10000 UNITS: 10000 INJECTION, SOLUTION INTRAVENOUS; SUBCUTANEOUS at 10:26

## 2021-08-11 RX ADMIN — SODIUM CHLORIDE 125 MG: 9 INJECTION, SOLUTION INTRAVENOUS at 10:50

## 2021-08-11 ASSESSMENT — PAIN SCALES - GENERAL: PAINLEVEL_OUTOF10: 0

## 2021-08-18 ENCOUNTER — HOSPITAL ENCOUNTER (OUTPATIENT)
Dept: INFUSION THERAPY | Age: 71
Setting detail: INFUSION SERIES
Discharge: HOME | End: 2021-08-18
Payer: MEDICARE

## 2021-08-18 VITALS
TEMPERATURE: 98.7 F | SYSTOLIC BLOOD PRESSURE: 115 MMHG | DIASTOLIC BLOOD PRESSURE: 45 MMHG | HEART RATE: 60 BPM | RESPIRATION RATE: 18 BRPM | OXYGEN SATURATION: 95 %

## 2021-08-18 DIAGNOSIS — D63.8 ANEMIA OF CHRONIC DISEASE: Primary | ICD-10-CM

## 2021-08-18 DIAGNOSIS — N18.30 ANEMIA IN STAGE 3 CHRONIC KIDNEY DISEASE, UNSPECIFIED WHETHER STAGE 3A OR 3B CKD (HCC): ICD-10-CM

## 2021-08-18 DIAGNOSIS — D63.1 ANEMIA IN STAGE 3 CHRONIC KIDNEY DISEASE, UNSPECIFIED WHETHER STAGE 3A OR 3B CKD (HCC): ICD-10-CM

## 2021-08-18 PROCEDURE — 96365 THER/PROPH/DIAG IV INF INIT: CPT

## 2021-08-18 PROCEDURE — 6360000002 HC RX W HCPCS: Performed by: INTERNAL MEDICINE

## 2021-08-18 PROCEDURE — 96372 THER/PROPH/DIAG INJ SC/IM: CPT

## 2021-08-18 PROCEDURE — 2580000003 HC RX 258: Performed by: INTERNAL MEDICINE

## 2021-08-18 RX ORDER — SODIUM CHLORIDE 0.9 % (FLUSH) 0.9 %
5-40 SYRINGE (ML) INJECTION PRN
Status: DISCONTINUED | OUTPATIENT
Start: 2021-08-18 | End: 2021-08-19 | Stop reason: HOSPADM

## 2021-08-18 RX ORDER — SODIUM CHLORIDE 9 MG/ML
INJECTION, SOLUTION INTRAVENOUS CONTINUOUS
Status: CANCELLED | OUTPATIENT
Start: 2021-08-25

## 2021-08-18 RX ORDER — DIPHENHYDRAMINE HYDROCHLORIDE 50 MG/ML
50 INJECTION INTRAMUSCULAR; INTRAVENOUS ONCE
Status: CANCELLED | OUTPATIENT
Start: 2021-08-25 | End: 2021-08-25

## 2021-08-18 RX ORDER — EPINEPHRINE 1 MG/ML
0.3 INJECTION, SOLUTION, CONCENTRATE INTRAVENOUS PRN
Status: CANCELLED | OUTPATIENT
Start: 2021-08-25

## 2021-08-18 RX ORDER — SODIUM CHLORIDE 9 MG/ML
INJECTION, SOLUTION INTRAVENOUS CONTINUOUS
Status: ACTIVE | OUTPATIENT
Start: 2021-08-18 | End: 2021-08-18

## 2021-08-18 RX ORDER — METHYLPREDNISOLONE SODIUM SUCCINATE 125 MG/2ML
125 INJECTION, POWDER, LYOPHILIZED, FOR SOLUTION INTRAMUSCULAR; INTRAVENOUS ONCE
Status: CANCELLED | OUTPATIENT
Start: 2021-08-25 | End: 2021-08-25

## 2021-08-18 RX ORDER — HEPARIN SODIUM (PORCINE) LOCK FLUSH IV SOLN 100 UNIT/ML 100 UNIT/ML
500 SOLUTION INTRAVENOUS PRN
Status: CANCELLED | OUTPATIENT
Start: 2021-08-25

## 2021-08-18 RX ORDER — SODIUM CHLORIDE 9 MG/ML
25 INJECTION, SOLUTION INTRAVENOUS PRN
Status: CANCELLED | OUTPATIENT
Start: 2021-08-25

## 2021-08-18 RX ORDER — SODIUM CHLORIDE 0.9 % (FLUSH) 0.9 %
5-40 SYRINGE (ML) INJECTION PRN
Status: CANCELLED | OUTPATIENT
Start: 2021-08-25

## 2021-08-18 RX ADMIN — SODIUM CHLORIDE 125 MG: 9 INJECTION, SOLUTION INTRAVENOUS at 12:41

## 2021-08-18 RX ADMIN — EPOETIN ALFA-EPBX 10000 UNITS: 10000 INJECTION, SOLUTION INTRAVENOUS; SUBCUTANEOUS at 11:52

## 2021-08-23 ENCOUNTER — HOSPITAL ENCOUNTER (OUTPATIENT)
Age: 71
Discharge: HOME | End: 2021-08-23
Payer: MEDICARE

## 2021-08-23 LAB
ANISOCYTOSIS: ABNORMAL
BASOPHILS ABSOLUTE: 0 E9/L (ref 0–0.2)
BASOPHILS RELATIVE PERCENT: 0.3 % (ref 0–2)
EOSINOPHILS ABSOLUTE: 0.05 E9/L (ref 0.05–0.5)
EOSINOPHILS RELATIVE PERCENT: 1.7 % (ref 0–6)
HCT VFR BLD CALC: 26.8 % (ref 37–54)
HEMOGLOBIN: 7.9 G/DL (ref 12.5–16.5)
LYMPHOCYTES ABSOLUTE: 0.46 E9/L (ref 1.5–4)
LYMPHOCYTES RELATIVE PERCENT: 15.7 % (ref 20–42)
MCH RBC QN AUTO: 25.8 PG (ref 26–35)
MCHC RBC AUTO-ENTMCNC: 29.5 % (ref 32–34.5)
MCV RBC AUTO: 87.6 FL (ref 80–99.9)
METAMYELOCYTES RELATIVE PERCENT: 0.9 % (ref 0–1)
MONOCYTES ABSOLUTE: 0.29 E9/L (ref 0.1–0.95)
MONOCYTES RELATIVE PERCENT: 10.4 % (ref 2–12)
NEUTROPHILS ABSOLUTE: 2.09 E9/L (ref 1.8–7.3)
NEUTROPHILS RELATIVE PERCENT: 71.3 % (ref 43–80)
OVALOCYTES: ABNORMAL
PDW BLD-RTO: 16.3 FL (ref 11.5–15)
PLATELET # BLD: 215 E9/L (ref 130–450)
PMV BLD AUTO: 9.2 FL (ref 7–12)
POIKILOCYTES: ABNORMAL
POLYCHROMASIA: ABNORMAL
POTASSIUM SERPL-SCNC: 4.4 MMOL/L (ref 3.5–5)
RBC # BLD: 3.06 E12/L (ref 3.8–5.8)
TARGET CELLS: ABNORMAL
WBC # BLD: 2.9 E9/L (ref 4.5–11.5)

## 2021-08-23 PROCEDURE — 85025 COMPLETE CBC W/AUTO DIFF WBC: CPT

## 2021-08-23 PROCEDURE — 84132 ASSAY OF SERUM POTASSIUM: CPT

## 2021-08-23 PROCEDURE — 36415 COLL VENOUS BLD VENIPUNCTURE: CPT

## 2021-08-25 ENCOUNTER — TELEPHONE (OUTPATIENT)
Dept: ADMINISTRATIVE | Age: 71
End: 2021-08-25

## 2021-08-25 ENCOUNTER — HOSPITAL ENCOUNTER (OUTPATIENT)
Dept: INFUSION THERAPY | Age: 71
Setting detail: INFUSION SERIES
Discharge: HOME | End: 2021-08-25
Payer: MEDICARE

## 2021-08-25 VITALS
OXYGEN SATURATION: 95 % | RESPIRATION RATE: 18 BRPM | DIASTOLIC BLOOD PRESSURE: 58 MMHG | HEART RATE: 61 BPM | SYSTOLIC BLOOD PRESSURE: 120 MMHG | TEMPERATURE: 98.4 F

## 2021-08-25 DIAGNOSIS — D63.1 ANEMIA IN STAGE 3 CHRONIC KIDNEY DISEASE, UNSPECIFIED WHETHER STAGE 3A OR 3B CKD (HCC): ICD-10-CM

## 2021-08-25 DIAGNOSIS — N18.30 ANEMIA IN STAGE 3 CHRONIC KIDNEY DISEASE, UNSPECIFIED WHETHER STAGE 3A OR 3B CKD (HCC): ICD-10-CM

## 2021-08-25 DIAGNOSIS — D63.8 ANEMIA OF CHRONIC DISEASE: Primary | ICD-10-CM

## 2021-08-25 PROCEDURE — 96365 THER/PROPH/DIAG IV INF INIT: CPT

## 2021-08-25 PROCEDURE — 2580000003 HC RX 258: Performed by: INTERNAL MEDICINE

## 2021-08-25 PROCEDURE — 6360000002 HC RX W HCPCS: Performed by: INTERNAL MEDICINE

## 2021-08-25 PROCEDURE — 96372 THER/PROPH/DIAG INJ SC/IM: CPT

## 2021-08-25 RX ORDER — SODIUM CHLORIDE 9 MG/ML
INJECTION, SOLUTION INTRAVENOUS CONTINUOUS
Status: CANCELLED | OUTPATIENT
Start: 2021-09-01

## 2021-08-25 RX ORDER — SODIUM CHLORIDE 0.9 % (FLUSH) 0.9 %
5-40 SYRINGE (ML) INJECTION PRN
Status: DISCONTINUED | OUTPATIENT
Start: 2021-08-25 | End: 2021-08-26 | Stop reason: HOSPADM

## 2021-08-25 RX ORDER — EPINEPHRINE 1 MG/ML
0.3 INJECTION, SOLUTION, CONCENTRATE INTRAVENOUS PRN
Status: CANCELLED | OUTPATIENT
Start: 2021-09-01

## 2021-08-25 RX ORDER — HEPARIN SODIUM (PORCINE) LOCK FLUSH IV SOLN 100 UNIT/ML 100 UNIT/ML
500 SOLUTION INTRAVENOUS PRN
Status: DISCONTINUED | OUTPATIENT
Start: 2021-08-25 | End: 2021-08-26 | Stop reason: HOSPADM

## 2021-08-25 RX ORDER — DIPHENHYDRAMINE HYDROCHLORIDE 50 MG/ML
50 INJECTION INTRAMUSCULAR; INTRAVENOUS ONCE
Status: CANCELLED | OUTPATIENT
Start: 2021-09-01 | End: 2021-09-01

## 2021-08-25 RX ORDER — SODIUM CHLORIDE 9 MG/ML
25 INJECTION, SOLUTION INTRAVENOUS PRN
Status: DISCONTINUED | OUTPATIENT
Start: 2021-08-25 | End: 2021-08-26 | Stop reason: HOSPADM

## 2021-08-25 RX ORDER — SODIUM CHLORIDE 9 MG/ML
250 INJECTION, SOLUTION INTRAVENOUS CONTINUOUS
Status: ACTIVE | OUTPATIENT
Start: 2021-08-25 | End: 2021-08-25

## 2021-08-25 RX ORDER — METHYLPREDNISOLONE SODIUM SUCCINATE 125 MG/2ML
125 INJECTION, POWDER, LYOPHILIZED, FOR SOLUTION INTRAMUSCULAR; INTRAVENOUS ONCE
Status: CANCELLED | OUTPATIENT
Start: 2021-09-01 | End: 2021-09-01

## 2021-08-25 RX ORDER — SODIUM CHLORIDE 0.9 % (FLUSH) 0.9 %
5-40 SYRINGE (ML) INJECTION PRN
Status: CANCELLED | OUTPATIENT
Start: 2021-09-01

## 2021-08-25 RX ORDER — HEPARIN SODIUM (PORCINE) LOCK FLUSH IV SOLN 100 UNIT/ML 100 UNIT/ML
500 SOLUTION INTRAVENOUS PRN
Status: CANCELLED | OUTPATIENT
Start: 2021-09-01

## 2021-08-25 RX ORDER — SODIUM CHLORIDE 9 MG/ML
25 INJECTION, SOLUTION INTRAVENOUS PRN
Status: CANCELLED | OUTPATIENT
Start: 2021-09-01

## 2021-08-25 RX ADMIN — SODIUM CHLORIDE 125 MG: 9 INJECTION, SOLUTION INTRAVENOUS at 10:26

## 2021-08-25 RX ADMIN — SODIUM CHLORIDE 25 ML: 9 INJECTION, SOLUTION INTRAVENOUS at 11:30

## 2021-08-25 RX ADMIN — SODIUM CHLORIDE, PRESERVATIVE FREE 10 ML: 5 INJECTION INTRAVENOUS at 10:27

## 2021-08-25 RX ADMIN — EPOETIN ALFA-EPBX 10000 UNITS: 10000 INJECTION, SOLUTION INTRAVENOUS; SUBCUTANEOUS at 10:29

## 2021-08-25 NOTE — TELEPHONE ENCOUNTER
Pt of Dr Nomi Ambrosio called, is having wt gain, was advised to call his cardiologist, please advise pt at [phone]

## 2021-08-26 NOTE — TELEPHONE ENCOUNTER
Pt is experiencing a total of 6lb weight gain in one week  Reported going from 210lbs to 216lb      No SOB     bilateral leg and ankle edema     Please advise

## 2021-08-26 NOTE — TELEPHONE ENCOUNTER
8 24 21 in epic was noted DR Clinton  \"clinical documentation  Encounter\"  . Unable to see any notes from this encounter. I  Called and left voice message at DR Clinton office to call Lavelle Dunham   [phone] (home)    Regarding his 6# wt gain and leg edema  As nephrology manages his diuretics   (kidney transplant patient)     Kennedy Chacon MD  Physician (Active)  [address] [place] [address]     (D) [phone]   (R) [phone]           Next I called Lavelle Dunham  He confirmed DR Clinton was his nephrologist. He did not go see Dr Clinton 2 days ago. He declines needing DR Clinton's #. He will hang up and call his nephrologist now regarding wt gain and leg edema.     Lavelle Dunham will call nephrology (see recent labs by nephrology in [facility] also)

## 2021-08-30 ENCOUNTER — HOSPITAL ENCOUNTER (OUTPATIENT)
Age: 71
Discharge: HOME | End: 2021-08-30
Payer: MEDICARE

## 2021-08-30 LAB
ACANTHOCYTES: ABNORMAL
ANION GAP SERPL CALCULATED.3IONS-SCNC: 6 MMOL/L (ref 7–16)
ANISOCYTOSIS: ABNORMAL
BASOPHILS ABSOLUTE: 0 E9/L (ref 0–0.2)
BASOPHILS RELATIVE PERCENT: 0.3 % (ref 0–2)
BUN BLDV-MCNC: 44 MG/DL (ref 6–23)
BURR CELLS: ABNORMAL
CALCIUM SERPL-MCNC: 9 MG/DL (ref 8.6–10.2)
CHLORIDE BLD-SCNC: 106 MMOL/L (ref 98–107)
CO2: 29 MMOL/L (ref 22–29)
CREAT SERPL-MCNC: 2.9 MG/DL (ref 0.7–1.2)
EOSINOPHILS ABSOLUTE: 0.08 E9/L (ref 0.05–0.5)
EOSINOPHILS RELATIVE PERCENT: 2.6 % (ref 0–6)
FERRITIN: 230 NG/ML
GFR AFRICAN AMERICAN: 26
GFR NON-AFRICAN AMERICAN: 26 ML/MIN/1.73
GLUCOSE BLD-MCNC: 32 MG/DL (ref 74–99)
HCT VFR BLD CALC: 31.8 % (ref 37–54)
HEMOGLOBIN: 8.9 G/DL (ref 12.5–16.5)
IRON SATURATION: 21 % (ref 20–55)
IRON: 41 MCG/DL (ref 59–158)
LYMPHOCYTES ABSOLUTE: 0.37 E9/L (ref 1.5–4)
LYMPHOCYTES RELATIVE PERCENT: 12.2 % (ref 20–42)
MCH RBC QN AUTO: 25.5 PG (ref 26–35)
MCHC RBC AUTO-ENTMCNC: 28 % (ref 32–34.5)
MCV RBC AUTO: 91.1 FL (ref 80–99.9)
MONOCYTES ABSOLUTE: 0.28 E9/L (ref 0.1–0.95)
MONOCYTES RELATIVE PERCENT: 8.7 % (ref 2–12)
MYELOCYTE PERCENT: 0.9 % (ref 0–0)
NEUTROPHILS ABSOLUTE: 2.39 E9/L (ref 1.8–7.3)
NEUTROPHILS RELATIVE PERCENT: 75.7 % (ref 43–80)
OVALOCYTES: ABNORMAL
PARATHYROID HORMONE INTACT: 199 PG/ML (ref 15–65)
PDW BLD-RTO: 16.9 FL (ref 11.5–15)
PHOSPHORUS: 4.1 MG/DL (ref 2.5–4.5)
PLATELET # BLD: 234 E9/L (ref 130–450)
PMV BLD AUTO: 9.5 FL (ref 7–12)
POIKILOCYTES: ABNORMAL
POLYCHROMASIA: ABNORMAL
POTASSIUM SERPL-SCNC: 4.5 MMOL/L (ref 3.5–5)
RBC # BLD: 3.49 E12/L (ref 3.8–5.8)
SCHISTOCYTES: ABNORMAL
SODIUM BLD-SCNC: 141 MMOL/L (ref 132–146)
TOTAL IRON BINDING CAPACITY: 198 MCG/DL (ref 250–450)
WBC # BLD: 3.1 E9/L (ref 4.5–11.5)

## 2021-08-30 PROCEDURE — 83540 ASSAY OF IRON: CPT

## 2021-08-30 PROCEDURE — 83550 IRON BINDING TEST: CPT

## 2021-08-30 PROCEDURE — 36415 COLL VENOUS BLD VENIPUNCTURE: CPT

## 2021-08-30 PROCEDURE — 83970 ASSAY OF PARATHORMONE: CPT

## 2021-08-30 PROCEDURE — 82728 ASSAY OF FERRITIN: CPT

## 2021-08-30 PROCEDURE — 85025 COMPLETE CBC W/AUTO DIFF WBC: CPT

## 2021-08-30 PROCEDURE — 84100 ASSAY OF PHOSPHORUS: CPT

## 2021-08-30 PROCEDURE — 80048 BASIC METABOLIC PNL TOTAL CA: CPT

## 2021-09-01 ENCOUNTER — APPOINTMENT (OUTPATIENT)
Dept: INFUSION THERAPY | Age: 71
End: 2021-09-01
Payer: MEDICARE

## 2021-09-01 ENCOUNTER — HOSPITAL ENCOUNTER (OUTPATIENT)
Dept: INFUSION THERAPY | Age: 71
Setting detail: INFUSION SERIES
Discharge: HOME | End: 2021-09-01
Payer: MEDICARE

## 2021-09-01 VITALS
TEMPERATURE: 98.7 F | SYSTOLIC BLOOD PRESSURE: 129 MMHG | RESPIRATION RATE: 18 BRPM | DIASTOLIC BLOOD PRESSURE: 64 MMHG | HEART RATE: 68 BPM | OXYGEN SATURATION: 95 %

## 2021-09-01 DIAGNOSIS — D63.8 ANEMIA OF CHRONIC DISEASE: Primary | ICD-10-CM

## 2021-09-01 PROCEDURE — 6360000002 HC RX W HCPCS: Performed by: INTERNAL MEDICINE

## 2021-09-01 PROCEDURE — 96372 THER/PROPH/DIAG INJ SC/IM: CPT

## 2021-09-01 RX ADMIN — EPOETIN ALFA-EPBX 10000 UNITS: 10000 INJECTION, SOLUTION INTRAVENOUS; SUBCUTANEOUS at 15:12

## 2021-09-07 ENCOUNTER — HOSPITAL ENCOUNTER (OUTPATIENT)
Age: 71
Discharge: HOME | End: 2021-09-07
Payer: MEDICARE

## 2021-09-07 LAB
ACANTHOCYTES: ABNORMAL
ANION GAP SERPL CALCULATED.3IONS-SCNC: 5 MMOL/L (ref 7–16)
ANISOCYTOSIS: ABNORMAL
BASOPHILS ABSOLUTE: 0.03 E9/L (ref 0–0.2)
BASOPHILS RELATIVE PERCENT: 0.9 % (ref 0–2)
BUN BLDV-MCNC: 40 MG/DL (ref 6–23)
CALCIUM SERPL-MCNC: 9.1 MG/DL (ref 8.6–10.2)
CHLORIDE BLD-SCNC: 106 MMOL/L (ref 98–107)
CO2: 32 MMOL/L (ref 22–29)
CREAT SERPL-MCNC: 3.1 MG/DL (ref 0.7–1.2)
EOSINOPHILS ABSOLUTE: 0.03 E9/L (ref 0.05–0.5)
EOSINOPHILS RELATIVE PERCENT: 0.9 % (ref 0–6)
FERRITIN: 155 NG/ML
GFR AFRICAN AMERICAN: 24
GFR NON-AFRICAN AMERICAN: 24 ML/MIN/1.73
GLUCOSE BLD-MCNC: 62 MG/DL (ref 74–99)
HCT VFR BLD CALC: 31.3 % (ref 37–54)
HEMOGLOBIN: 9 G/DL (ref 12.5–16.5)
IRON SATURATION: 20 % (ref 20–55)
IRON: 34 MCG/DL (ref 59–158)
LYMPHOCYTES ABSOLUTE: 0.3 E9/L (ref 1.5–4)
LYMPHOCYTES RELATIVE PERCENT: 9.6 % (ref 20–42)
MCH RBC QN AUTO: 25.9 PG (ref 26–35)
MCHC RBC AUTO-ENTMCNC: 28.8 % (ref 32–34.5)
MCV RBC AUTO: 89.9 FL (ref 80–99.9)
MONOCYTES ABSOLUTE: 0.21 E9/L (ref 0.1–0.95)
MONOCYTES RELATIVE PERCENT: 7 % (ref 2–12)
NEUTROPHILS ABSOLUTE: 2.46 E9/L (ref 1.8–7.3)
NEUTROPHILS RELATIVE PERCENT: 81.6 % (ref 43–80)
OVALOCYTES: ABNORMAL
PARATHYROID HORMONE INTACT: 172 PG/ML (ref 15–65)
PDW BLD-RTO: 16.9 FL (ref 11.5–15)
PHOSPHORUS: 3 MG/DL (ref 2.5–4.5)
PLATELET # BLD: 207 E9/L (ref 130–450)
PMV BLD AUTO: 9.4 FL (ref 7–12)
POIKILOCYTES: ABNORMAL
POLYCHROMASIA: ABNORMAL
POTASSIUM SERPL-SCNC: 5 MMOL/L (ref 3.5–5)
RBC # BLD: 3.48 E12/L (ref 3.8–5.8)
SCHISTOCYTES: ABNORMAL
SODIUM BLD-SCNC: 143 MMOL/L (ref 132–146)
TOTAL IRON BINDING CAPACITY: 174 MCG/DL (ref 250–450)
WBC # BLD: 3 E9/L (ref 4.5–11.5)

## 2021-09-07 PROCEDURE — 80048 BASIC METABOLIC PNL TOTAL CA: CPT

## 2021-09-07 PROCEDURE — 85025 COMPLETE CBC W/AUTO DIFF WBC: CPT

## 2021-09-07 PROCEDURE — 83550 IRON BINDING TEST: CPT

## 2021-09-07 PROCEDURE — 82728 ASSAY OF FERRITIN: CPT

## 2021-09-07 PROCEDURE — 83970 ASSAY OF PARATHORMONE: CPT

## 2021-09-07 PROCEDURE — 84100 ASSAY OF PHOSPHORUS: CPT

## 2021-09-07 PROCEDURE — 83540 ASSAY OF IRON: CPT

## 2021-09-07 PROCEDURE — 36415 COLL VENOUS BLD VENIPUNCTURE: CPT

## 2021-09-08 ENCOUNTER — HOSPITAL ENCOUNTER (OUTPATIENT)
Dept: INFUSION THERAPY | Age: 71
Setting detail: INFUSION SERIES
Discharge: HOME | End: 2021-09-08
Payer: MEDICARE

## 2021-09-08 VITALS
RESPIRATION RATE: 18 BRPM | SYSTOLIC BLOOD PRESSURE: 137 MMHG | HEART RATE: 57 BPM | DIASTOLIC BLOOD PRESSURE: 54 MMHG | OXYGEN SATURATION: 96 % | TEMPERATURE: 98.3 F

## 2021-09-08 DIAGNOSIS — D63.8 ANEMIA OF CHRONIC DISEASE: Primary | ICD-10-CM

## 2021-09-08 PROCEDURE — 6360000002 HC RX W HCPCS: Performed by: INTERNAL MEDICINE

## 2021-09-08 PROCEDURE — 96372 THER/PROPH/DIAG INJ SC/IM: CPT

## 2021-09-08 RX ADMIN — EPOETIN ALFA-EPBX 10000 UNITS: 10000 INJECTION, SOLUTION INTRAVENOUS; SUBCUTANEOUS at 15:05

## 2021-09-13 ENCOUNTER — HOSPITAL ENCOUNTER (OUTPATIENT)
Age: 71
Discharge: HOME | End: 2021-09-13
Payer: MEDICARE

## 2021-09-13 LAB
ANION GAP SERPL CALCULATED.3IONS-SCNC: 11 MMOL/L (ref 7–16)
ANISOCYTOSIS: ABNORMAL
BASOPHILS ABSOLUTE: 0 E9/L (ref 0–0.2)
BASOPHILS RELATIVE PERCENT: 0.3 % (ref 0–2)
BUN BLDV-MCNC: 37 MG/DL (ref 6–23)
BURR CELLS: ABNORMAL
CALCIUM SERPL-MCNC: 8.8 MG/DL (ref 8.6–10.2)
CHLORIDE BLD-SCNC: 104 MMOL/L (ref 98–107)
CO2: 25 MMOL/L (ref 22–29)
CREAT SERPL-MCNC: 3 MG/DL (ref 0.7–1.2)
EOSINOPHILS ABSOLUTE: 0 E9/L (ref 0.05–0.5)
EOSINOPHILS RELATIVE PERCENT: 0.6 % (ref 0–6)
FERRITIN: 108 NG/ML
GFR AFRICAN AMERICAN: 25
GFR NON-AFRICAN AMERICAN: 25 ML/MIN/1.73
GLUCOSE BLD-MCNC: 26 MG/DL (ref 74–99)
HCT VFR BLD CALC: 32.1 % (ref 37–54)
HEMOGLOBIN: 9.1 G/DL (ref 12.5–16.5)
IRON SATURATION: 21 % (ref 20–55)
IRON: 44 MCG/DL (ref 59–158)
LYMPHOCYTES ABSOLUTE: 0.13 E9/L (ref 1.5–4)
LYMPHOCYTES RELATIVE PERCENT: 4.3 % (ref 20–42)
MCH RBC QN AUTO: 25.2 PG (ref 26–35)
MCHC RBC AUTO-ENTMCNC: 28.3 % (ref 32–34.5)
MCV RBC AUTO: 88.9 FL (ref 80–99.9)
METAMYELOCYTES RELATIVE PERCENT: 0.9 % (ref 0–1)
MONOCYTES ABSOLUTE: 0.33 E9/L (ref 0.1–0.95)
MONOCYTES RELATIVE PERCENT: 10.4 % (ref 2–12)
NEUTROPHILS ABSOLUTE: 2.81 E9/L (ref 1.8–7.3)
NEUTROPHILS RELATIVE PERCENT: 84.3 % (ref 43–80)
OVALOCYTES: ABNORMAL
PARATHYROID HORMONE INTACT: 128 PG/ML (ref 15–65)
PDW BLD-RTO: 16.4 FL (ref 11.5–15)
PHOSPHORUS: 3 MG/DL (ref 2.5–4.5)
PLATELET # BLD: 227 E9/L (ref 130–450)
PMV BLD AUTO: 9.8 FL (ref 7–12)
POIKILOCYTES: ABNORMAL
POTASSIUM SERPL-SCNC: 4.4 MMOL/L (ref 3.5–5)
RBC # BLD: 3.61 E12/L (ref 3.8–5.8)
SODIUM BLD-SCNC: 140 MMOL/L (ref 132–146)
TOTAL IRON BINDING CAPACITY: 214 MCG/DL (ref 250–450)
WBC # BLD: 3.3 E9/L (ref 4.5–11.5)

## 2021-09-13 PROCEDURE — 83540 ASSAY OF IRON: CPT

## 2021-09-13 PROCEDURE — 83970 ASSAY OF PARATHORMONE: CPT

## 2021-09-13 PROCEDURE — 82728 ASSAY OF FERRITIN: CPT

## 2021-09-13 PROCEDURE — 83550 IRON BINDING TEST: CPT

## 2021-09-13 PROCEDURE — 80048 BASIC METABOLIC PNL TOTAL CA: CPT

## 2021-09-13 PROCEDURE — 36415 COLL VENOUS BLD VENIPUNCTURE: CPT

## 2021-09-13 PROCEDURE — 84100 ASSAY OF PHOSPHORUS: CPT

## 2021-09-13 PROCEDURE — 85025 COMPLETE CBC W/AUTO DIFF WBC: CPT

## 2021-09-14 ENCOUNTER — TELEPHONE (OUTPATIENT)
Dept: CARDIOLOGY CLINIC | Age: 71
End: 2021-09-14

## 2021-09-14 NOTE — TELEPHONE ENCOUNTER
Pt called and is having eye surgery and needs a cardiac clearance. He saw  You in the hospital, but not in the office. Does he need to come in for an appointment first? The surgery is next month.  Please advise [phone]

## 2021-09-14 NOTE — TELEPHONE ENCOUNTER
I have not seen this patient before. He was seen by Dr. Sierra Reed at the hospital.  He will need to be seen at the office for clearance.

## 2021-09-15 ENCOUNTER — HOSPITAL ENCOUNTER (OUTPATIENT)
Dept: INFUSION THERAPY | Age: 71
Setting detail: INFUSION SERIES
Discharge: HOME | End: 2021-09-15
Payer: MEDICARE

## 2021-09-15 ENCOUNTER — APPOINTMENT (OUTPATIENT)
Dept: INFUSION THERAPY | Age: 71
End: 2021-09-15
Payer: MEDICARE

## 2021-09-15 VITALS
RESPIRATION RATE: 20 BRPM | HEART RATE: 59 BPM | SYSTOLIC BLOOD PRESSURE: 130 MMHG | DIASTOLIC BLOOD PRESSURE: 50 MMHG | OXYGEN SATURATION: 98 % | TEMPERATURE: 98.5 F

## 2021-09-15 DIAGNOSIS — D63.8 ANEMIA OF CHRONIC DISEASE: Primary | ICD-10-CM

## 2021-09-15 PROCEDURE — 96372 THER/PROPH/DIAG INJ SC/IM: CPT

## 2021-09-15 PROCEDURE — 6360000002 HC RX W HCPCS: Performed by: INTERNAL MEDICINE

## 2021-09-15 RX ADMIN — EPOETIN ALFA-EPBX 10000 UNITS: 10000 INJECTION, SOLUTION INTRAVENOUS; SUBCUTANEOUS at 15:28

## 2021-09-20 ENCOUNTER — HOSPITAL ENCOUNTER (OUTPATIENT)
Age: 71
Discharge: HOME | End: 2021-09-20
Payer: MEDICARE

## 2021-09-20 LAB
ACANTHOCYTES: ABNORMAL
ANISOCYTOSIS: ABNORMAL
BASOPHILS ABSOLUTE: 0.03 E9/L (ref 0–0.2)
BASOPHILS RELATIVE PERCENT: 0.9 % (ref 0–2)
BURR CELLS: ABNORMAL
EOSINOPHILS ABSOLUTE: 0.05 E9/L (ref 0.05–0.5)
EOSINOPHILS RELATIVE PERCENT: 1.7 % (ref 0–6)
HCT VFR BLD CALC: 32.4 % (ref 37–54)
HEMOGLOBIN: 9.3 G/DL (ref 12.5–16.5)
HYPOCHROMIA: ABNORMAL
LYMPHOCYTES ABSOLUTE: 0.18 E9/L (ref 1.5–4)
LYMPHOCYTES RELATIVE PERCENT: 6.1 % (ref 20–42)
MCH RBC QN AUTO: 25.7 PG (ref 26–35)
MCHC RBC AUTO-ENTMCNC: 28.7 % (ref 32–34.5)
MCV RBC AUTO: 89.5 FL (ref 80–99.9)
MONOCYTES ABSOLUTE: 0.27 E9/L (ref 0.1–0.95)
MONOCYTES RELATIVE PERCENT: 8.7 % (ref 2–12)
NEUTROPHILS ABSOLUTE: 2.49 E9/L (ref 1.8–7.3)
NEUTROPHILS RELATIVE PERCENT: 82.6 % (ref 43–80)
OVALOCYTES: ABNORMAL
PDW BLD-RTO: 15.9 FL (ref 11.5–15)
PLATELET # BLD: 208 E9/L (ref 130–450)
PMV BLD AUTO: 9.2 FL (ref 7–12)
POIKILOCYTES: ABNORMAL
RBC # BLD: 3.62 E12/L (ref 3.8–5.8)
WBC # BLD: 3 E9/L (ref 4.5–11.5)

## 2021-09-20 PROCEDURE — 85025 COMPLETE CBC W/AUTO DIFF WBC: CPT

## 2021-09-20 PROCEDURE — 36415 COLL VENOUS BLD VENIPUNCTURE: CPT

## 2021-09-22 ENCOUNTER — HOSPITAL ENCOUNTER (OUTPATIENT)
Dept: INFUSION THERAPY | Age: 71
Setting detail: INFUSION SERIES
Discharge: HOME | End: 2021-09-22
Payer: MEDICARE

## 2021-09-22 VITALS
HEART RATE: 69 BPM | DIASTOLIC BLOOD PRESSURE: 48 MMHG | RESPIRATION RATE: 18 BRPM | OXYGEN SATURATION: 95 % | TEMPERATURE: 98.6 F | SYSTOLIC BLOOD PRESSURE: 100 MMHG

## 2021-09-22 DIAGNOSIS — D63.8 ANEMIA OF CHRONIC DISEASE: Primary | ICD-10-CM

## 2021-09-22 PROCEDURE — 6360000002 HC RX W HCPCS: Performed by: INTERNAL MEDICINE

## 2021-09-22 PROCEDURE — 96372 THER/PROPH/DIAG INJ SC/IM: CPT

## 2021-09-22 RX ADMIN — EPOETIN ALFA-EPBX 10000 UNITS: 10000 INJECTION, SOLUTION INTRAVENOUS; SUBCUTANEOUS at 14:57

## 2021-09-27 ENCOUNTER — HOSPITAL ENCOUNTER (OUTPATIENT)
Age: 71
Discharge: HOME | End: 2021-09-27
Payer: MEDICARE

## 2021-09-27 LAB
ANISOCYTOSIS: ABNORMAL
BASOPHILS ABSOLUTE: 0.01 E9/L (ref 0–0.2)
BASOPHILS RELATIVE PERCENT: 0.3 % (ref 0–2)
BURR CELLS: ABNORMAL
EOSINOPHILS ABSOLUTE: 0.02 E9/L (ref 0.05–0.5)
EOSINOPHILS RELATIVE PERCENT: 0.6 % (ref 0–6)
HCT VFR BLD CALC: 36.5 % (ref 37–54)
HEMOGLOBIN: 10.2 G/DL (ref 12.5–16.5)
IMMATURE GRANULOCYTES #: 0.04 E9/L
IMMATURE GRANULOCYTES %: 1.1 % (ref 0–5)
LYMPHOCYTES ABSOLUTE: 0.51 E9/L (ref 1.5–4)
LYMPHOCYTES RELATIVE PERCENT: 14.3 % (ref 20–42)
MCH RBC QN AUTO: 25.3 PG (ref 26–35)
MCHC RBC AUTO-ENTMCNC: 27.9 % (ref 32–34.5)
MCV RBC AUTO: 90.6 FL (ref 80–99.9)
MONOCYTES ABSOLUTE: 0.52 E9/L (ref 0.1–0.95)
MONOCYTES RELATIVE PERCENT: 14.6 % (ref 2–12)
NEUTROPHILS ABSOLUTE: 2.46 E9/L (ref 1.8–7.3)
NEUTROPHILS RELATIVE PERCENT: 69.1 % (ref 43–80)
OVALOCYTES: ABNORMAL
PDW BLD-RTO: 15.4 FL (ref 11.5–15)
PLATELET # BLD: 228 E9/L (ref 130–450)
PMV BLD AUTO: 9.5 FL (ref 7–12)
POIKILOCYTES: ABNORMAL
POLYCHROMASIA: ABNORMAL
RBC # BLD: 4.03 E12/L (ref 3.8–5.8)
WBC # BLD: 3.6 E9/L (ref 4.5–11.5)

## 2021-09-27 PROCEDURE — 36415 COLL VENOUS BLD VENIPUNCTURE: CPT

## 2021-09-27 PROCEDURE — 85025 COMPLETE CBC W/AUTO DIFF WBC: CPT

## 2021-09-29 ENCOUNTER — APPOINTMENT (OUTPATIENT)
Dept: INFUSION THERAPY | Age: 71
End: 2021-09-29
Payer: MEDICARE

## 2021-09-29 ENCOUNTER — HOSPITAL ENCOUNTER (OUTPATIENT)
Dept: INFUSION THERAPY | Age: 71
Setting detail: INFUSION SERIES
Discharge: HOME | End: 2021-09-29
Payer: MEDICARE

## 2021-09-29 VITALS
SYSTOLIC BLOOD PRESSURE: 155 MMHG | OXYGEN SATURATION: 90 % | RESPIRATION RATE: 18 BRPM | DIASTOLIC BLOOD PRESSURE: 60 MMHG | HEART RATE: 61 BPM | TEMPERATURE: 98.6 F

## 2021-09-29 DIAGNOSIS — D63.8 ANEMIA OF CHRONIC DISEASE: Primary | ICD-10-CM

## 2021-09-29 PROCEDURE — 96372 THER/PROPH/DIAG INJ SC/IM: CPT

## 2021-09-29 PROCEDURE — 6360000002 HC RX W HCPCS: Performed by: INTERNAL MEDICINE

## 2021-09-29 RX ADMIN — EPOETIN ALFA-EPBX 10000 UNITS: 10000 INJECTION, SOLUTION INTRAVENOUS; SUBCUTANEOUS at 14:50

## 2021-10-02 ENCOUNTER — APPOINTMENT (OUTPATIENT)
Dept: GENERAL RADIOLOGY | Age: 71
DRG: 291 | End: 2021-10-02
Payer: MEDICARE

## 2021-10-02 ENCOUNTER — HOSPITAL ENCOUNTER (INPATIENT)
Age: 71
LOS: 5 days | Discharge: HOME | DRG: 291 | End: 2021-10-07
Attending: EMERGENCY MEDICINE | Admitting: INTERNAL MEDICINE
Payer: MEDICARE

## 2021-10-02 DIAGNOSIS — I50.41 ACUTE COMBINED SYSTOLIC AND DIASTOLIC CHF, NYHA CLASS 1 (HCC): ICD-10-CM

## 2021-10-02 DIAGNOSIS — I50.9 ACUTE DECOMPENSATED HEART FAILURE (HCC): Primary | ICD-10-CM

## 2021-10-02 PROBLEM — N18.9 ACUTE KIDNEY INJURY SUPERIMPOSED ON CKD (HCC): Status: ACTIVE | Noted: 2021-04-10

## 2021-10-02 LAB
ADENOVIRUS BY PCR: NOT DETECTED
ALBUMIN SERPL-MCNC: 3.6 G/DL (ref 3.5–5.2)
ALP BLD-CCNC: 63 U/L (ref 40–129)
ALT SERPL-CCNC: 10 U/L (ref 0–40)
ANION GAP SERPL CALCULATED.3IONS-SCNC: 13 MMOL/L (ref 7–16)
ANISOCYTOSIS: ABNORMAL
AST SERPL-CCNC: 25 U/L (ref 0–39)
BASOPHILS ABSOLUTE: 0 E9/L (ref 0–0.2)
BASOPHILS RELATIVE PERCENT: 0.2 % (ref 0–2)
BILIRUB SERPL-MCNC: 0.4 MG/DL (ref 0–1.2)
BORDETELLA PARAPERTUSSIS BY PCR: NOT DETECTED
BORDETELLA PERTUSSIS BY PCR: NOT DETECTED
BUN BLDV-MCNC: 50 MG/DL (ref 6–23)
BURR CELLS: ABNORMAL
CALCIUM SERPL-MCNC: 9.3 MG/DL (ref 8.6–10.2)
CHLAMYDOPHILIA PNEUMONIAE BY PCR: NOT DETECTED
CHLORIDE BLD-SCNC: 98 MMOL/L (ref 98–107)
CO2: 27 MMOL/L (ref 22–29)
CORONAVIRUS 229E BY PCR: NOT DETECTED
CORONAVIRUS HKU1 BY PCR: NOT DETECTED
CORONAVIRUS NL63 BY PCR: NOT DETECTED
CORONAVIRUS OC43 BY PCR: NOT DETECTED
CREAT SERPL-MCNC: 3.6 MG/DL (ref 0.7–1.2)
EOSINOPHILS ABSOLUTE: 0 E9/L (ref 0.05–0.5)
EOSINOPHILS RELATIVE PERCENT: 0 % (ref 0–6)
GFR AFRICAN AMERICAN: 20
GFR NON-AFRICAN AMERICAN: 20 ML/MIN/1.73
GLUCOSE BLD-MCNC: 130 MG/DL (ref 74–99)
HCT VFR BLD CALC: 36.4 % (ref 37–54)
HEMOGLOBIN: 10.6 G/DL (ref 12.5–16.5)
HUMAN METAPNEUMOVIRUS BY PCR: NOT DETECTED
HUMAN RHINOVIRUS/ENTEROVIRUS BY PCR: NOT DETECTED
HYPOCHROMIA: ABNORMAL
INFLUENZA A BY PCR: NOT DETECTED
INFLUENZA B BY PCR: NOT DETECTED
LYMPHOCYTES ABSOLUTE: 0.18 E9/L (ref 1.5–4)
LYMPHOCYTES RELATIVE PERCENT: 2.6 % (ref 20–42)
MAGNESIUM: 2.1 MG/DL (ref 1.6–2.6)
MCH RBC QN AUTO: 24.7 PG (ref 26–35)
MCHC RBC AUTO-ENTMCNC: 29.1 % (ref 32–34.5)
MCV RBC AUTO: 84.8 FL (ref 80–99.9)
METER GLUCOSE: 115 MG/DL (ref 74–99)
METER GLUCOSE: 134 MG/DL (ref 74–99)
MONOCYTES ABSOLUTE: 0.24 E9/L (ref 0.1–0.95)
MONOCYTES RELATIVE PERCENT: 3.5 % (ref 2–12)
MYCOPLASMA PNEUMONIAE BY PCR: NOT DETECTED
NEUTROPHILS ABSOLUTE: 5.55 E9/L (ref 1.8–7.3)
NEUTROPHILS RELATIVE PERCENT: 93.9 % (ref 43–80)
OVALOCYTES: ABNORMAL
PARAINFLUENZA VIRUS 1 BY PCR: NOT DETECTED
PARAINFLUENZA VIRUS 2 BY PCR: NOT DETECTED
PARAINFLUENZA VIRUS 3 BY PCR: NOT DETECTED
PARAINFLUENZA VIRUS 4 BY PCR: NOT DETECTED
PDW BLD-RTO: 14.7 FL (ref 11.5–15)
PLATELET # BLD: 276 E9/L (ref 130–450)
PMV BLD AUTO: 8.9 FL (ref 7–12)
POIKILOCYTES: ABNORMAL
POTASSIUM SERPL-SCNC: 5.9 MMOL/L (ref 3.5–5)
PRO-BNP: ABNORMAL PG/ML (ref 0–125)
RBC # BLD: 4.29 E12/L (ref 3.8–5.8)
RESPIRATORY SYNCYTIAL VIRUS BY PCR: DETECTED
SARS-COV-2, NAAT: NOT DETECTED
SARS-COV-2, PCR: NOT DETECTED
SCHISTOCYTES: ABNORMAL
SODIUM BLD-SCNC: 138 MMOL/L (ref 132–146)
TOTAL PROTEIN: 6.5 G/DL (ref 6.4–8.3)
TROPONIN, HIGH SENSITIVITY: 127 NG/L (ref 0–11)
WBC # BLD: 5.9 E9/L (ref 4.5–11.5)

## 2021-10-02 PROCEDURE — 6360000002 HC RX W HCPCS: Performed by: EMERGENCY MEDICINE

## 2021-10-02 PROCEDURE — 80053 COMPREHEN METABOLIC PANEL: CPT

## 2021-10-02 PROCEDURE — 93005 ELECTROCARDIOGRAM TRACING: CPT | Performed by: EMERGENCY MEDICINE

## 2021-10-02 PROCEDURE — 6370000000 HC RX 637 (ALT 250 FOR IP): Performed by: EMERGENCY MEDICINE

## 2021-10-02 PROCEDURE — 36415 COLL VENOUS BLD VENIPUNCTURE: CPT

## 2021-10-02 PROCEDURE — 83540 ASSAY OF IRON: CPT

## 2021-10-02 PROCEDURE — 99285 EMERGENCY DEPT VISIT HI MDM: CPT

## 2021-10-02 PROCEDURE — 83880 ASSAY OF NATRIURETIC PEPTIDE: CPT

## 2021-10-02 PROCEDURE — 71045 X-RAY EXAM CHEST 1 VIEW: CPT

## 2021-10-02 PROCEDURE — 85025 COMPLETE CBC W/AUTO DIFF WBC: CPT

## 2021-10-02 PROCEDURE — 87635 SARS-COV-2 COVID-19 AMP PRB: CPT

## 2021-10-02 PROCEDURE — 2500000003 HC RX 250 WO HCPCS: Performed by: EMERGENCY MEDICINE

## 2021-10-02 PROCEDURE — 2500000003 HC RX 250 WO HCPCS: Performed by: PHYSICIAN ASSISTANT

## 2021-10-02 PROCEDURE — 2060000000 HC ICU INTERMEDIATE R&B

## 2021-10-02 PROCEDURE — 6370000000 HC RX 637 (ALT 250 FOR IP): Performed by: PHYSICIAN ASSISTANT

## 2021-10-02 PROCEDURE — 83735 ASSAY OF MAGNESIUM: CPT

## 2021-10-02 PROCEDURE — 0202U NFCT DS 22 TRGT SARS-COV-2: CPT

## 2021-10-02 PROCEDURE — 82962 GLUCOSE BLOOD TEST: CPT

## 2021-10-02 PROCEDURE — 84484 ASSAY OF TROPONIN QUANT: CPT

## 2021-10-02 PROCEDURE — 6360000002 HC RX W HCPCS: Performed by: PHYSICIAN ASSISTANT

## 2021-10-02 PROCEDURE — 83550 IRON BINDING TEST: CPT

## 2021-10-02 RX ORDER — ASPIRIN 81 MG/1
81 TABLET ORAL DAILY
Status: DISCONTINUED | OUTPATIENT
Start: 2021-10-02 | End: 2021-10-07 | Stop reason: HOSPADM

## 2021-10-02 RX ORDER — OXYCODONE HYDROCHLORIDE 10 MG/1
20 TABLET ORAL EVERY 8 HOURS PRN
Status: DISCONTINUED | OUTPATIENT
Start: 2021-10-02 | End: 2021-10-07 | Stop reason: HOSPADM

## 2021-10-02 RX ORDER — DEXTROSE MONOHYDRATE 50 MG/ML
100 INJECTION, SOLUTION INTRAVENOUS PRN
Status: DISCONTINUED | OUTPATIENT
Start: 2021-10-02 | End: 2021-10-02 | Stop reason: SDUPTHER

## 2021-10-02 RX ORDER — BUMETANIDE 0.25 MG/ML
0.5 INJECTION, SOLUTION INTRAMUSCULAR; INTRAVENOUS 2 TIMES DAILY
Status: DISCONTINUED | OUTPATIENT
Start: 2021-10-02 | End: 2021-10-03

## 2021-10-02 RX ORDER — DEXAMETHASONE SODIUM PHOSPHATE 10 MG/ML
6 INJECTION INTRAMUSCULAR; INTRAVENOUS ONCE
Status: COMPLETED | OUTPATIENT
Start: 2021-10-02 | End: 2021-10-02

## 2021-10-02 RX ORDER — DEXTROSE MONOHYDRATE 25 G/50ML
12.5 INJECTION, SOLUTION INTRAVENOUS PRN
Status: DISCONTINUED | OUTPATIENT
Start: 2021-10-02 | End: 2021-10-02 | Stop reason: SDUPTHER

## 2021-10-02 RX ORDER — TACROLIMUS 1 MG/1
3 CAPSULE ORAL 2 TIMES DAILY
Status: DISCONTINUED | OUTPATIENT
Start: 2021-10-02 | End: 2021-10-07 | Stop reason: HOSPADM

## 2021-10-02 RX ORDER — PREDNISONE 1 MG/1
5 TABLET ORAL DAILY
Status: DISCONTINUED | OUTPATIENT
Start: 2021-10-02 | End: 2021-10-07 | Stop reason: HOSPADM

## 2021-10-02 RX ORDER — METOPROLOL SUCCINATE 100 MG/1
100 TABLET, EXTENDED RELEASE ORAL DAILY
Status: DISCONTINUED | OUTPATIENT
Start: 2021-10-02 | End: 2021-10-07 | Stop reason: HOSPADM

## 2021-10-02 RX ORDER — DEXTROSE MONOHYDRATE 25 G/50ML
25 INJECTION, SOLUTION INTRAVENOUS ONCE
Status: COMPLETED | OUTPATIENT
Start: 2021-10-02 | End: 2021-10-02

## 2021-10-02 RX ORDER — ACETAMINOPHEN 325 MG/1
650 TABLET ORAL EVERY 6 HOURS PRN
Status: DISCONTINUED | OUTPATIENT
Start: 2021-10-02 | End: 2021-10-07 | Stop reason: HOSPADM

## 2021-10-02 RX ORDER — SODIUM CHLORIDE 0.9 % (FLUSH) 0.9 %
5-40 SYRINGE (ML) INJECTION PRN
Status: DISCONTINUED | OUTPATIENT
Start: 2021-10-02 | End: 2021-10-07 | Stop reason: HOSPADM

## 2021-10-02 RX ORDER — DEXTROSE MONOHYDRATE 50 MG/ML
100 INJECTION, SOLUTION INTRAVENOUS PRN
Status: DISCONTINUED | OUTPATIENT
Start: 2021-10-02 | End: 2021-10-07 | Stop reason: HOSPADM

## 2021-10-02 RX ORDER — SODIUM CHLORIDE 9 MG/ML
25 INJECTION, SOLUTION INTRAVENOUS PRN
Status: DISCONTINUED | OUTPATIENT
Start: 2021-10-02 | End: 2021-10-07 | Stop reason: HOSPADM

## 2021-10-02 RX ORDER — ATORVASTATIN CALCIUM 10 MG/1
10 TABLET, FILM COATED ORAL DAILY
Status: DISCONTINUED | OUTPATIENT
Start: 2021-10-02 | End: 2021-10-07 | Stop reason: HOSPADM

## 2021-10-02 RX ORDER — HYDRALAZINE HYDROCHLORIDE 50 MG/1
100 TABLET, FILM COATED ORAL 3 TIMES DAILY
Status: DISCONTINUED | OUTPATIENT
Start: 2021-10-02 | End: 2021-10-07 | Stop reason: HOSPADM

## 2021-10-02 RX ORDER — NICOTINE POLACRILEX 4 MG
15 LOZENGE BUCCAL PRN
Status: DISCONTINUED | OUTPATIENT
Start: 2021-10-02 | End: 2021-10-02 | Stop reason: SDUPTHER

## 2021-10-02 RX ORDER — SODIUM BICARBONATE 650 MG/1
650 TABLET ORAL 4 TIMES DAILY
Status: DISCONTINUED | OUTPATIENT
Start: 2021-10-02 | End: 2021-10-07 | Stop reason: HOSPADM

## 2021-10-02 RX ORDER — SODIUM CHLORIDE 0.9 % (FLUSH) 0.9 %
5-40 SYRINGE (ML) INJECTION EVERY 12 HOURS SCHEDULED
Status: DISCONTINUED | OUTPATIENT
Start: 2021-10-02 | End: 2021-10-07 | Stop reason: HOSPADM

## 2021-10-02 RX ORDER — DEXTROSE MONOHYDRATE 25 G/50ML
12.5 INJECTION, SOLUTION INTRAVENOUS PRN
Status: DISCONTINUED | OUTPATIENT
Start: 2021-10-02 | End: 2021-10-07 | Stop reason: HOSPADM

## 2021-10-02 RX ORDER — BUMETANIDE 0.25 MG/ML
2 INJECTION, SOLUTION INTRAMUSCULAR; INTRAVENOUS ONCE
Status: COMPLETED | OUTPATIENT
Start: 2021-10-02 | End: 2021-10-02

## 2021-10-02 RX ORDER — ONDANSETRON 2 MG/ML
4 INJECTION INTRAMUSCULAR; INTRAVENOUS EVERY 6 HOURS PRN
Status: DISCONTINUED | OUTPATIENT
Start: 2021-10-02 | End: 2021-10-07 | Stop reason: HOSPADM

## 2021-10-02 RX ORDER — IPRATROPIUM BROMIDE AND ALBUTEROL SULFATE 2.5; .5 MG/3ML; MG/3ML
1 SOLUTION RESPIRATORY (INHALATION)
Status: DISCONTINUED | OUTPATIENT
Start: 2021-10-03 | End: 2021-10-07 | Stop reason: HOSPADM

## 2021-10-02 RX ORDER — MYCOPHENOLATE MOFETIL 250 MG/1
500 CAPSULE ORAL 2 TIMES DAILY
Status: DISCONTINUED | OUTPATIENT
Start: 2021-10-02 | End: 2021-10-07 | Stop reason: HOSPADM

## 2021-10-02 RX ORDER — ISOSORBIDE MONONITRATE 60 MG/1
60 TABLET, EXTENDED RELEASE ORAL DAILY
Status: DISCONTINUED | OUTPATIENT
Start: 2021-10-02 | End: 2021-10-07 | Stop reason: HOSPADM

## 2021-10-02 RX ORDER — ACETAMINOPHEN 650 MG/1
650 SUPPOSITORY RECTAL EVERY 6 HOURS PRN
Status: DISCONTINUED | OUTPATIENT
Start: 2021-10-02 | End: 2021-10-07 | Stop reason: HOSPADM

## 2021-10-02 RX ORDER — ONDANSETRON 4 MG/1
4 TABLET, ORALLY DISINTEGRATING ORAL EVERY 8 HOURS PRN
Status: DISCONTINUED | OUTPATIENT
Start: 2021-10-02 | End: 2021-10-07 | Stop reason: HOSPADM

## 2021-10-02 RX ORDER — NICOTINE POLACRILEX 4 MG
15 LOZENGE BUCCAL PRN
Status: DISCONTINUED | OUTPATIENT
Start: 2021-10-02 | End: 2021-10-07 | Stop reason: HOSPADM

## 2021-10-02 RX ORDER — POLYETHYLENE GLYCOL 3350 17 G/17G
17 POWDER, FOR SOLUTION ORAL DAILY PRN
Status: DISCONTINUED | OUTPATIENT
Start: 2021-10-02 | End: 2021-10-07 | Stop reason: HOSPADM

## 2021-10-02 RX ADMIN — ENOXAPARIN SODIUM 40 MG: 40 INJECTION SUBCUTANEOUS at 21:15

## 2021-10-02 RX ADMIN — INSULIN HUMAN 10 UNITS: 100 INJECTION, SOLUTION PARENTERAL at 16:12

## 2021-10-02 RX ADMIN — SODIUM BICARBONATE 650 MG: 650 TABLET ORAL at 21:08

## 2021-10-02 RX ADMIN — ASPIRIN 81 MG: 81 TABLET, COATED ORAL at 21:07

## 2021-10-02 RX ADMIN — METOPROLOL SUCCINATE 100 MG: 100 TABLET, EXTENDED RELEASE ORAL at 21:07

## 2021-10-02 RX ADMIN — BUMETANIDE 2 MG: 0.25 INJECTION INTRAMUSCULAR; INTRAVENOUS at 16:11

## 2021-10-02 RX ADMIN — HYDRALAZINE HYDROCHLORIDE 100 MG: 50 TABLET, FILM COATED ORAL at 21:07

## 2021-10-02 RX ADMIN — DEXAMETHASONE SODIUM PHOSPHATE 6 MG: 10 INJECTION INTRAMUSCULAR; INTRAVENOUS at 16:11

## 2021-10-02 RX ADMIN — TACROLIMUS 3 MG: 1 CAPSULE ORAL at 21:07

## 2021-10-02 RX ADMIN — PREDNISONE 5 MG: 5 TABLET ORAL at 21:08

## 2021-10-02 RX ADMIN — OXYCODONE HYDROCHLORIDE 20 MG: 10 TABLET ORAL at 21:28

## 2021-10-02 RX ADMIN — DEXTROSE MONOHYDRATE 25 G: 25 INJECTION, SOLUTION INTRAVENOUS at 16:12

## 2021-10-02 RX ADMIN — MYCOPHENOLATE MOFETIL 500 MG: 250 CAPSULE ORAL at 21:08

## 2021-10-02 RX ADMIN — ISOSORBIDE MONONITRATE 60 MG: 60 TABLET ORAL at 21:08

## 2021-10-02 RX ADMIN — ATORVASTATIN CALCIUM 10 MG: 10 TABLET, FILM COATED ORAL at 21:07

## 2021-10-02 RX ADMIN — BUMETANIDE 0.5 MG: 0.25 INJECTION, SOLUTION INTRAMUSCULAR; INTRAVENOUS at 21:08

## 2021-10-02 RX ADMIN — SODIUM BICARBONATE 50 MEQ: 84 INJECTION, SOLUTION INTRAVENOUS at 16:11

## 2021-10-02 ASSESSMENT — PAIN SCALES - GENERAL
PAINLEVEL_OUTOF10: 6
PAINLEVEL_OUTOF10: 6

## 2021-10-02 ASSESSMENT — ENCOUNTER SYMPTOMS
ABDOMINAL PAIN: 1
RHINORRHEA: 0
DIARRHEA: 0
SHORTNESS OF BREATH: 1
COLOR CHANGE: 0
VOMITING: 0
BLOOD IN STOOL: 0
TROUBLE SWALLOWING: 0
COUGH: 1
NAUSEA: 0

## 2021-10-02 NOTE — ED NOTES
Yocasta Bowling RN  10/02/21 1838    Addendum charted on wrong pt     Yocasta Bowling RN  10/02/21 Leighton Armstrong none

## 2021-10-02 NOTE — ED PROVIDER NOTES
ED PROVIDER NOTE    Chief Complaint   Patient presents with    Shortness of Breath     tested positive for COVID yesterday, increased SOB. Pt also retaining fluid, bilateral leg and arm swelling        HPI:  10/2/21,   Time: 1:50 PM EDT       Tiffanie Perkins is a 70 y.o. male presenting to the ED for shortness of breath. Gradual onset over the past few days. Yesterday had positive covid test. Shortness of breath worse w/ minimal exertion, wears 2L O2 w/ exertion at home. Associated orthopnea and leg swelling. Had some intermittent midsternal tightness and periumbilical abdominal pain yesterday which has since resolved. Cough productive of white sputum. Taking home diuretics w/ normal urine output. +Chills. No fever. No nausea, vomiting. Decreased po intake and appetite. Had positive COVID19 test yesterday at [facility]. Chart review: hx of HFpEF, afib, CKD, DM, HTN, HLD, pulmonary HTN  Lab Results   Component Value Date    LVEF 63 04/13/2021    LVEFMODE Echo 07/27/2015       Review of Systems:     Review of Systems   Constitutional: Positive for appetite change, chills and fatigue. Negative for fever. HENT: Negative for congestion, rhinorrhea and trouble swallowing. Eyes: Negative for visual disturbance. Respiratory: Positive for cough and shortness of breath. Cardiovascular: Positive for chest pain and leg swelling. Gastrointestinal: Positive for abdominal pain. Negative for blood in stool, diarrhea, nausea and vomiting. Genitourinary: Negative for decreased urine volume, difficulty urinating, dysuria, frequency, hematuria and urgency. Musculoskeletal: Negative for myalgias, neck pain and neck stiffness. Skin: Negative for color change. Neurological: Positive for light-headedness.  Negative for dizziness, syncope, weakness, numbness and headaches.         --------------------------------------------- PAST HISTORY ---------------------------------------------  Past Medical History:   Past Medical History:   Diagnosis Date    Anemia     Iron deficiency    Atrial fibrillation (HCC)     CHF (congestive heart failure) ([facility]) 2008    Chronic diastolic congestive heart failure ([facility] 2008    Chronic foot pain     Chronic knee pain     BILATERAL    Constipation     Depression     DM (diabetes mellitus) ([facility])     ESRD (end stage renal disease) ([facility]     H/O kidney transplant 2014    Hyperlipidemia     Hypertension     Immunosuppression ([facility] 3/12/2008    Kidney disease     Pulmonary hypertension (HCC)        Past Surgical History:   Past Surgical History:   Procedure Laterality Date    BACK SURGERY      BLADDER SURGERY      CARPAL TUNNEL RELEASE Left 2014    CHOLECYSTECTOMY, LAPAROSCOPIC  2013    COLONOSCOPY      DIAGNOSTIC CARDIAC CATH LAB PROCEDURE  01/15/2013    NORMAL    ECHO COMPL W DOP COLOR FLOW  2013         ENDOSCOPY, COLON, DIAGNOSTIC      FOOT SURGERY      BONE REMOVED    KIDNEY BIOPSY  2015    [facility] ; ALSO 2016, 2017    KIDNEY TRANSPLANT  2014    @ [facility]    EDUARDO-EN-Y GASTRIC BYPASS  2010    Laparoscopic / Dr. Lucian Barraza  2007    Dr. Cece Soliz 2021    EGD in OR 12--COVID performed by Elvia Cadena MD at [facility] ENDOSCOPY       Social History:   Social History     Socioeconomic History    Marital status:      Spouse name: Not on file    Number of children: Not on file    Years of education: Not on file    Highest education level: Not on file   Occupational History    Not on file   Tobacco Use    Smoking status: Former Smoker     Packs/day: 1.00     Years: 20.00     Pack years: 20.00     Types: Cigarettes     Quit date: 1990     Years since quittin.7    Smokeless tobacco: Never Used   Substance and Sexual Activity    Alcohol use: Not Currently     Alcohol/week: 0.0 standard drinks    Drug use: No    Sexual activity: Not Currently   Other Topics Concern    Not on file   Social History Narrative    Not on file     Social Determinants of Health     Financial Resource Strain:     Difficulty of Paying Living Expenses:    Food Insecurity:     Worried About Running Out of Food in the Last Year:     [address] in the Last Year:    Transportation Needs:     Lack of Transportation (Medical):  Lack of Transportation (Non-Medical):    Physical Activity:     Days of Exercise per Week:     Minutes of Exercise per Session:    Stress:     Feeling of Stress :    Social Connections:     Frequency of Communication with Friends and Family:     Frequency of Social Gatherings with Friends and Family:     Attends [employer] Services:     Active Member of Clubs or Organizations:     Attends Club or Organization Meetings:     Marital Status:    Intimate Partner Violence:     Fear of Current or Ex-Partner:     Emotionally Abused:     Physically Abused:     Sexually Abused:        Family History:   Family History   Problem Relation Age of Onset    Diabetes Mother     High Blood Pressure Mother     Heart Disease Mother     Diabetes Father     Cancer Father     Stroke Father     Heart Disease Father        The patients home medications have been reviewed. Allergies:   No Known Allergies        ---------------------------------------------------PHYSICAL EXAM--------------------------------------    BP (!) 155/71   Pulse 69   Temp 98.5 °F (36.9 °C) (Temporal)   Resp 18   Ht 5' 11\" (1.803 m)   Wt 212 lb (96.2 kg)   SpO2 100%   BMI 29.57 kg/m²     Physical Exam  Vitals and nursing note reviewed. Constitutional:       General: He is not in acute distress. Appearance: He is not toxic-appearing. HENT:      Mouth/Throat:      Mouth: Mucous membranes are moist.   Eyes:      General: No scleral icterus. Extraocular Movements: Extraocular movements intact.       Pupils: Pupils are equal, round, and reactive to light. Neck:      Comments: +JVD  Cardiovascular:      Rate and Rhythm: Normal rate and regular rhythm. Pulses: Normal pulses. Heart sounds: Normal heart sounds. No murmur heard. Pulmonary:      Effort: Pulmonary effort is normal. No respiratory distress. Breath sounds: Rales (bibasilar) present. No wheezing. Abdominal:      General: There is no distension. Palpations: Abdomen is soft. Tenderness: There is no abdominal tenderness. There is no guarding or rebound. Musculoskeletal:         General: Swelling (2+ pitting edema to shins BLE) present. No tenderness. Normal range of motion. Cervical back: Normal range of motion and neck supple. No rigidity. No muscular tenderness. Comments: Radial, DP, and PT pulses 2+ bilaterally. Skin:     General: Skin is warm and dry. Neurological:      Mental Status: He is alert and oriented to person, place, and time. Comments: Strength 5/5 and sensation grossly intact to light touch and equal bilaterally throughout all extremities             -------------------------------------------------- RESULTS -------------------------------------------------  I have personally reviewed all laboratory and imaging results for this patient. Results are listed below.      LABS:  Labs Reviewed   RESPIRATORY PANEL, MOLECULAR, WITH COVID-19 - Abnormal; Notable for the following components:       Result Value    Respiratory Syncytial Virus by PCR DETECTED (*)     All other components within normal limits   CBC WITH AUTO DIFFERENTIAL - Abnormal; Notable for the following components:    Hemoglobin 10.6 (*)     Hematocrit 36.4 (*)     MCH 24.7 (*)     MCHC 29.1 (*)     Neutrophils % 93.9 (*)     Lymphocytes % 2.6 (*)     Lymphocytes Absolute 0.18 (*)     Eosinophils Absolute 0.00 (*)     All other components within normal limits   COMPREHENSIVE METABOLIC PANEL - Abnormal; Notable for the following components:    Potassium 5.9 (*) dexamethasone (DECADRON) injection 6 mg (6 mg IntraVENous Given 10/2/21 1611)   insulin regular (HUMULIN R;NOVOLIN R) injection 10 Units (10 Units IntraVENous Given 10/2/21 1612)     And   dextrose 50 % IV solution (25 g IntraVENous Given 10/2/21 1612)   sodium bicarbonate 8.4 % injection 50 mEq (50 mEq IntraVENous Given 10/2/21 1611)     Counseling: The emergency provider has spoken with the patient and discussed todays results, in addition to providing specific details for the plan of care and counseling regarding the diagnosis and prognosis. Questions are answered at this time and they are agreeable with the plan. ED Course/Medical Decision Makin y.o. male here with shortness of breath. Clinically volume overloaded. On home O2 2L requirement. Afebrile and hemodynamically stable. Positive COVID test yesterday at [place] however resp panel here negative for COVID, does show RSV. Suspect bilateral infiltrates are pulmonary edema. Started IV diuresis and treated for hyperkalemia. Admitted in stable condition for further management.       --------------------------------- IMPRESSION AND DISPOSITION ---------------------------------    IMPRESSION  1. Acute decompensated heart failure (HCC)        DISPOSITION  Disposition: Admit to telemetry  Patient condition is stable    NOTE: This report was transcribed using voice recognition software.  Every effort was made to ensure accuracy; however, inadvertent computerized transcription errors may be present    Shabbir Pedroza MD  Attending Emergency Physician         Shabbir Pedroza MD  10/02/21 [phone]

## 2021-10-03 LAB
ACANTHOCYTES: ABNORMAL
ANION GAP SERPL CALCULATED.3IONS-SCNC: 11 MMOL/L (ref 7–16)
ANISOCYTOSIS: ABNORMAL
BASOPHILIC STIPPLING: ABNORMAL
BASOPHILS ABSOLUTE: 0 E9/L (ref 0–0.2)
BASOPHILS RELATIVE PERCENT: 0 % (ref 0–2)
BUN BLDV-MCNC: 64 MG/DL (ref 6–23)
CALCIUM SERPL-MCNC: 9.4 MG/DL (ref 8.6–10.2)
CHLORIDE BLD-SCNC: 92 MMOL/L (ref 98–107)
CHOLESTEROL, TOTAL: 135 MG/DL (ref 0–199)
CK MB: 2.2 NG/ML (ref 0–7.7)
CO2: 27 MMOL/L (ref 22–29)
CREAT SERPL-MCNC: 4.3 MG/DL (ref 0.7–1.2)
CREATININE URINE: 53 MG/DL (ref 40–278)
EKG ATRIAL RATE: 72 BPM
EKG P AXIS: 20 DEGREES
EKG P-R INTERVAL: 148 MS
EKG Q-T INTERVAL: 384 MS
EKG QRS DURATION: 80 MS
EKG QTC CALCULATION (BAZETT): 420 MS
EKG R AXIS: -2 DEGREES
EKG T AXIS: -96 DEGREES
EKG VENTRICULAR RATE: 72 BPM
EOSINOPHILS ABSOLUTE: 0.01 E9/L (ref 0.05–0.5)
EOSINOPHILS RELATIVE PERCENT: 0.2 % (ref 0–6)
GFR AFRICAN AMERICAN: 17
GFR NON-AFRICAN AMERICAN: 17 ML/MIN/1.73
GLUCOSE BLD-MCNC: 263 MG/DL (ref 74–99)
HCT VFR BLD CALC: 32.8 % (ref 37–54)
HDLC SERPL-MCNC: 55 MG/DL
HEMOGLOBIN: 10.2 G/DL (ref 12.5–16.5)
IMMATURE GRANULOCYTES #: 0.06 E9/L
IMMATURE GRANULOCYTES %: 1.3 % (ref 0–5)
IRON SATURATION: 17 % (ref 20–55)
IRON: 30 MCG/DL (ref 59–158)
LDL CHOLESTEROL CALCULATED: 60 MG/DL (ref 0–99)
LYMPHOCYTES ABSOLUTE: 0.44 E9/L (ref 1.5–4)
LYMPHOCYTES RELATIVE PERCENT: 9.5 % (ref 20–42)
MAGNESIUM: 2.2 MG/DL (ref 1.6–2.6)
MCH RBC QN AUTO: 26.1 PG (ref 26–35)
MCHC RBC AUTO-ENTMCNC: 31.1 % (ref 32–34.5)
MCV RBC AUTO: 83.9 FL (ref 80–99.9)
METER GLUCOSE: 119 MG/DL (ref 74–99)
METER GLUCOSE: 171 MG/DL (ref 74–99)
METER GLUCOSE: 245 MG/DL (ref 74–99)
METER GLUCOSE: 269 MG/DL (ref 74–99)
MONOCYTES ABSOLUTE: 0.32 E9/L (ref 0.1–0.95)
MONOCYTES RELATIVE PERCENT: 6.9 % (ref 2–12)
NEUTROPHILS ABSOLUTE: 3.78 E9/L (ref 1.8–7.3)
NEUTROPHILS RELATIVE PERCENT: 82.1 % (ref 43–80)
OVALOCYTES: ABNORMAL
PDW BLD-RTO: 14.8 FL (ref 11.5–15)
PLATELET # BLD: 295 E9/L (ref 130–450)
PMV BLD AUTO: 9.5 FL (ref 7–12)
POIKILOCYTES: ABNORMAL
POLYCHROMASIA: ABNORMAL
POTASSIUM SERPL-SCNC: 6.2 MMOL/L (ref 3.5–5)
RBC # BLD: 3.91 E12/L (ref 3.8–5.8)
REASON FOR REJECTION: NORMAL
REJECTED TEST: NORMAL
SODIUM BLD-SCNC: 130 MMOL/L (ref 132–146)
SODIUM URINE: 102 MMOL/L
TOTAL CK: 31 U/L (ref 20–200)
TOTAL IRON BINDING CAPACITY: 173 MCG/DL (ref 250–450)
TRIGL SERPL-MCNC: 100 MG/DL (ref 0–149)
TROPONIN, HIGH SENSITIVITY: 118 NG/L (ref 0–11)
TROPONIN, HIGH SENSITIVITY: 145 NG/L (ref 0–11)
VLDLC SERPL CALC-MCNC: 20 MG/DL
WBC # BLD: 4.6 E9/L (ref 4.5–11.5)

## 2021-10-03 PROCEDURE — 36415 COLL VENOUS BLD VENIPUNCTURE: CPT

## 2021-10-03 PROCEDURE — 82553 CREATINE MB FRACTION: CPT

## 2021-10-03 PROCEDURE — 6370000000 HC RX 637 (ALT 250 FOR IP): Performed by: PHYSICIAN ASSISTANT

## 2021-10-03 PROCEDURE — 2060000000 HC ICU INTERMEDIATE R&B

## 2021-10-03 PROCEDURE — 2500000003 HC RX 250 WO HCPCS: Performed by: PHYSICIAN ASSISTANT

## 2021-10-03 PROCEDURE — 93010 ELECTROCARDIOGRAM REPORT: CPT | Performed by: INTERNAL MEDICINE

## 2021-10-03 PROCEDURE — 82550 ASSAY OF CK (CPK): CPT

## 2021-10-03 PROCEDURE — 84484 ASSAY OF TROPONIN QUANT: CPT

## 2021-10-03 PROCEDURE — 99223 1ST HOSP IP/OBS HIGH 75: CPT | Performed by: INTERNAL MEDICINE

## 2021-10-03 PROCEDURE — 94640 AIRWAY INHALATION TREATMENT: CPT

## 2021-10-03 PROCEDURE — 2580000003 HC RX 258: Performed by: PHYSICIAN ASSISTANT

## 2021-10-03 PROCEDURE — 83735 ASSAY OF MAGNESIUM: CPT

## 2021-10-03 PROCEDURE — 80048 BASIC METABOLIC PNL TOTAL CA: CPT

## 2021-10-03 PROCEDURE — 84300 ASSAY OF URINE SODIUM: CPT

## 2021-10-03 PROCEDURE — APPSS60 APP SPLIT SHARED TIME 46-60 MINUTES: Performed by: NURSE PRACTITIONER

## 2021-10-03 PROCEDURE — 80061 LIPID PANEL: CPT

## 2021-10-03 PROCEDURE — 2700000000 HC OXYGEN THERAPY PER DAY

## 2021-10-03 PROCEDURE — 85025 COMPLETE CBC W/AUTO DIFF WBC: CPT

## 2021-10-03 PROCEDURE — 82962 GLUCOSE BLOOD TEST: CPT

## 2021-10-03 PROCEDURE — 2500000003 HC RX 250 WO HCPCS: Performed by: NURSE PRACTITIONER

## 2021-10-03 PROCEDURE — 82570 ASSAY OF URINE CREATININE: CPT

## 2021-10-03 PROCEDURE — 6360000002 HC RX W HCPCS: Performed by: PHYSICIAN ASSISTANT

## 2021-10-03 RX ORDER — BUMETANIDE 0.25 MG/ML
1 INJECTION, SOLUTION INTRAMUSCULAR; INTRAVENOUS 2 TIMES DAILY
Status: DISCONTINUED | OUTPATIENT
Start: 2021-10-03 | End: 2021-10-05

## 2021-10-03 RX ADMIN — IPRATROPIUM BROMIDE AND ALBUTEROL SULFATE 1 AMPULE: .5; 2.5 SOLUTION RESPIRATORY (INHALATION) at 09:47

## 2021-10-03 RX ADMIN — BUMETANIDE 1 MG: 0.25 INJECTION, SOLUTION INTRAMUSCULAR; INTRAVENOUS at 20:29

## 2021-10-03 RX ADMIN — ISOSORBIDE MONONITRATE 60 MG: 60 TABLET ORAL at 10:21

## 2021-10-03 RX ADMIN — INSULIN LISPRO 1 UNITS: 100 INJECTION, SOLUTION INTRAVENOUS; SUBCUTANEOUS at 20:14

## 2021-10-03 RX ADMIN — SODIUM BICARBONATE 650 MG: 650 TABLET ORAL at 12:39

## 2021-10-03 RX ADMIN — Medication 10 ML: at 10:27

## 2021-10-03 RX ADMIN — PREDNISONE 5 MG: 5 TABLET ORAL at 10:22

## 2021-10-03 RX ADMIN — OXYCODONE HYDROCHLORIDE 20 MG: 10 TABLET ORAL at 20:29

## 2021-10-03 RX ADMIN — IPRATROPIUM BROMIDE AND ALBUTEROL SULFATE 1 AMPULE: .5; 2.5 SOLUTION RESPIRATORY (INHALATION) at 15:47

## 2021-10-03 RX ADMIN — BUMETANIDE 0.5 MG: 0.25 INJECTION, SOLUTION INTRAMUSCULAR; INTRAVENOUS at 10:26

## 2021-10-03 RX ADMIN — INSULIN LISPRO 3 UNITS: 100 INJECTION, SOLUTION INTRAVENOUS; SUBCUTANEOUS at 16:45

## 2021-10-03 RX ADMIN — TACROLIMUS 3 MG: 1 CAPSULE ORAL at 20:13

## 2021-10-03 RX ADMIN — ATORVASTATIN CALCIUM 10 MG: 10 TABLET, FILM COATED ORAL at 10:22

## 2021-10-03 RX ADMIN — SODIUM BICARBONATE 650 MG: 650 TABLET ORAL at 16:45

## 2021-10-03 RX ADMIN — MYCOPHENOLATE MOFETIL 500 MG: 250 CAPSULE ORAL at 20:13

## 2021-10-03 RX ADMIN — IPRATROPIUM BROMIDE AND ALBUTEROL SULFATE 1 AMPULE: .5; 2.5 SOLUTION RESPIRATORY (INHALATION) at 19:48

## 2021-10-03 RX ADMIN — TACROLIMUS 3 MG: 1 CAPSULE ORAL at 10:22

## 2021-10-03 RX ADMIN — Medication 10 ML: at 20:29

## 2021-10-03 RX ADMIN — ENOXAPARIN SODIUM 30 MG: 30 INJECTION SUBCUTANEOUS at 20:14

## 2021-10-03 RX ADMIN — SODIUM BICARBONATE 650 MG: 650 TABLET ORAL at 10:21

## 2021-10-03 RX ADMIN — IPRATROPIUM BROMIDE AND ALBUTEROL SULFATE 1 AMPULE: .5; 2.5 SOLUTION RESPIRATORY (INHALATION) at 11:52

## 2021-10-03 RX ADMIN — HYDRALAZINE HYDROCHLORIDE 100 MG: 50 TABLET, FILM COATED ORAL at 16:45

## 2021-10-03 RX ADMIN — ASPIRIN 81 MG: 81 TABLET, COATED ORAL at 10:22

## 2021-10-03 RX ADMIN — SODIUM BICARBONATE 650 MG: 650 TABLET ORAL at 20:15

## 2021-10-03 RX ADMIN — HYDRALAZINE HYDROCHLORIDE 100 MG: 50 TABLET, FILM COATED ORAL at 10:28

## 2021-10-03 RX ADMIN — MYCOPHENOLATE MOFETIL 500 MG: 250 CAPSULE ORAL at 10:21

## 2021-10-03 RX ADMIN — METOPROLOL SUCCINATE 100 MG: 100 TABLET, EXTENDED RELEASE ORAL at 10:22

## 2021-10-03 RX ADMIN — HYDRALAZINE HYDROCHLORIDE 100 MG: 50 TABLET, FILM COATED ORAL at 20:15

## 2021-10-03 ASSESSMENT — PAIN SCALES - GENERAL
PAINLEVEL_OUTOF10: 7
PAINLEVEL_OUTOF10: 0
PAINLEVEL_OUTOF10: 0

## 2021-10-03 NOTE — CONSULTS
Inpatient Cardiology Consultation      Reason for Consult:  CHF / Elevated troponin     Consulting Physician: Dr. Alexus Karimi     Requesting Physician:  Dr. Derik Prado     Date of Consultation: 10/3/2021    HISTORY OF PRESENT ILLNESS:   Mr. Ankit Whaley is a 70year old male who is requesting to follow with Dr. Tavia West locally and he follows with Dr. An Vo ([facility]) for [place] [address]. Patient was recently seen in hospital consultation 6/2021 Dr. Tavia West for CHF and elevated troponin. High-sensitivity troponin 115, proBNP 9928. EKG showing sinus rhythm with PACs and no acute changes. Hemoglobin 8.4. Creatinine 3.0. He was diuresed 5 L and on discharge creatinine was 2.6. Dry weight 210 pounds. Patient was seen in OP follow-up with CHAI Read 7/2/2021 for hospital follow-up / no medication changes. PMHx: Chronic HFpEF, combined pre-/post capillary pulmonary hypertension (CpC-PH, WHO Group 2), hypertension, obesity, type 2 diabetes mellitus, GERD, hyperlipidemia, end-stage renal disease status post kidney transplant 12/2014 ([facility]), OLINDA (compliant with CPAP), CKD (stage IIIb (history of end-stage renal disease status post renal transplant), former smoker (quit 1991). Patient underwent nuclear imaging for cardiac amyloidosis (2/6/2021, [facility]) --> findings not consistent with T TR amyloidosis: A semiquantitative visual score of 0 or H's/CL ratio <1.25 /coronary calcifications visualized. [facility] on 10/2/2021 with LE edema x 1 month with \"weeping\" blisters , PINEDO and orthopnea. Despite taking Bumex that was increased to 2 mg QD he has had no improvement. He was sitting down getting ready for bed when he suddenly became acutely SOB, developed RCW \"tightness\" with radiating pain underneath his right breast and into the middle of his chest, lasting ~ 1.5 hours, relieved on it's own prior to presenting to the ED. He did report being tested at [facility] [employer] 10/1/2021 and was told he was positive.   complaints of gradual shortness of breath x2 days. He reports a white productive cough and chills along with abdominal bloating and poor appetite. Despite retaining fluid he has had increased urination. Upon arrival to ED: Blood pressure 155/71, heart rate 69, afebrile, 100% on room air. Labs: Sodium 130, potassium 5.9, BUN 50, creatinine 3.6 (baseline SCr 2.9-3.6), proBNP 22,645 (prior proBNP 9928 6/11/2021). High-sensitivity troponin 127, 118 (chronically elevated)  WBC 5.9, H/H 10.6/36.4, platelet count 651. Respiratory panel: negative Covid-19 + RSV. CXR: Hazy multifocal bilateral pulmonary infiltrates. ER medications: Bumex 2 mg IV x1, Decadron, sodium bicarbonate. Patient was admitted to a telemetry monitoring unit and was started on Bumex 0.5 mg IV twice daily. Net output thus far 1.4 L. Current weight 215 pounds (97.8 kg). Please note: past medical records were reviewed per electronic medical record (EMR) - see detailed reports under Past Medical/ Surgical History. Past Medical History:    1. Hypertension. 2. Diabetes. 3. GERD. 4. Hyperlipidemia. 5. NKDA. 6. Obesity. 7. End-stage renal disease. 8. Not a known smoker. 9. Echo 07/12/2012. LVH. EF 63%. Stage I diastolic dysfunction. No significant valvulopathy. 10. Stress Myoview 07/12/2012. Reversible ischemia in the mid anterior wall. EF 45%. 11. Lexiscan MPS 11/21/2013. Normal perfusion study. EF 63%.    12. Echo 07/20/2014. EF normal. Stage I diastolic dysfunction, otherwise no valvular heart disease. Mild LAE. 13. Status post kidney transplant 12/12/2014 at [facility]. 14. Echo, 07/21/2015. Stage I diastolic dysfunction. EF 65%. Normal sized LA. No valvular heart disease. Small circumferential pericardial effusion. 15. Lexiscan MPS, 07/26/2015: Small area of reversible ischemia in the lateral wall. EF 48%.    16. Renal artery US, [facility] - 08/13/2015: 60-99% stenosis right renal artery, may be overestimated due to vessel tortuosity.    17.  Angiogram of the transplant right renal artery (anastomosis of the right common iliac artery). FFR and IVUS, 09/21/2015, Dr. Candi Jane, [facility]. The transplant renal artery was extremely tortuous, difficult to assess the severity of stenosis by angiography. IVUS of the transplant renal artery, proximally normal. Unable to pass into more distal segment.    18. Repeat procedure, 09/30/2015 from the left groin using an up-and-over approach. Dr. Candi Jane, [facility]. FFR 0.98. Peak-to-peak gradient of 8-10 mm. IVUS, no significant atherosclerosis, plaque, or fibrosis.   19. Echocardiogram, 4/27/2017, Dr. Damon Casanova. EF 55-65%.  Mild aortic sclerosis. No other significant valvular heart disease.   20. Mixed lung disease.  Was seen by [facility] in April 2020. ? Chest CT without contrast showed mild aortic and coronary calcification and a dilated main PA to 3.9 cm.  There were no stigmata of interstitial lung disease but there was mild diffuse mosaic attenuation of the parenchyma and eventration of the right hemidiaphragm  ? PFTs showed a TLC of 48% and DLCO 48% predicted  21. OLINDA - on CPAP  22. Has a history of carpal tunnel (although he is unsure if it is) with a prior surgical procedure 6/2014. Nuclear imaging performed for cardiac amyloidosis ([facility]) and found to be negative with AL serologies unremarkable as well. 23. RHC (9/2/2020, [facility]) RA: 13. RV: 91/14. PA: 91/25 with a mean of 48. PCW: 22. CO/CI (Jack): 5.6/2.5. CO/CI (thermodilution): 4.9/2.1 Conclusions: 1. Moderately elevated right and left filling pressures. 2. Severe pulmonary hypertension, predominantly precapillary. PVR approximately 5-5.5. 3. Gabrielle 5 consistent with normal RV function. Refer to pulmonary hypertension center at [facility]  24. Pulmonary hypertension evaluation at [facility] >> (CpC-PH, WHO group 2) - TPG 26 and PVR of 5. PCWP was 22 at time of cardiac catheterization.  Given that this is largely a group 2 component, would first try to optimize his medications as best as possible (reduce beta blockade given bradycardia and likely take off ISMN given ill effects in HFpEF patients). PDE-5 inhibitors have limited data in HFpEF with none being beneficial.   25. TTE (4/3/2020, [facility]) CONCLUSIONS: Technically difficult exam due to body habitus. Exam indication: Evaluation of known heart failure to guide therapy. The left ventricle is normal in size. There is moderate left ventricular hypertrophy. Left ventricular systolic function is normal. EF = 67 ± 5% (2D biplane) Grade II left ventricular diastolic dysfunction. Global strain not reported due to poor tracking. The right ventricle is normal in size. Right ventricular systolic function is normal. The left atrial cavity is severely dilated. Exam was compared with the prior  echocardiographic exam performed on 10/19/2016. Similar findings. 26. NM cardiac amyloid SPECT: 2/5/2021: [facility]: CONCLUSIONS:  Not Consistent with TTR amyloidosis: A semi-quantitative visual score of 0 or H/CL ratio < 1.25  2. Incidental Findings from limited non-diagnostic CTAC:  - Coronary calcifications visualized. 27. Echocardiogram 04/13/2021 (Dr. Rafa Garcia): EF 60-65%. Stage I Diastolic Dysfunction. 28. EGD 04/28/2021: Marginal Ulcer, nonbleeding  29. Chronically elevated troponin: High-sensitivity troponin 115, 105 (6/11/2021)    Medications Prior to admit:  Prior to Admission medications    Medication Sig Start Date End Date Taking? Authorizing Provider   insulin glargine (LANTUS SOLOSTAR) 100 UNIT/ML injection pen Inject 12 Units into the skin daily At noon   Had today  Patient taking differently: Inject 15 Units into the skin daily At noon   Had today 6/14/21  Yes Army Trever MD   bumetanide (BUMEX) 2 MG tablet Take 1 tablet by mouth daily 6/14/21  Yes Army Trever MD   oxyCODONE (ROXICODONE) 20 MG immediate release tablet Take 20 mg by mouth every 8 hours as needed for Pain.     Yes Historical Provider, MD   Cholecalciferol (VITAMIN D3) 125 MCG (5000 UT) TABS Take 5,000 Units by mouth daily   Yes Historical Provider, MD   hydrALAZINE (APRESOLINE) 100 MG tablet Take 100 mg by mouth 3 times daily   Yes Historical Provider, MD   metoprolol succinate (TOPROL XL) 100 MG extended release tablet Take 100 mg by mouth daily   Yes Historical Provider, MD   predniSONE (DELTASONE) 5 MG tablet Take 5 mg by mouth daily   Yes Historical Provider, MD   simvastatin (ZOCOR) 10 MG tablet Take 10 mg by mouth daily   Yes Historical Provider, MD   isosorbide mononitrate (IMDUR) 60 MG extended release tablet TAKE 1 TABLET DAILY 3/6/18  Yes Lilliana Dhillon MD   tacrolimus (PROGRAF) 1 MG capsule Take 2 capsules by mouth 2 times daily  Patient taking differently: Take 3 mg by mouth 2 times daily  5/1/17  Yes Deisi Ferrer MD   sodium bicarbonate 650 MG tablet Take 650 mg by mouth 4 times daily    Yes Historical Provider, MD   Multiple Vitamin (MULTI VITAMIN DAILY PO) Take by mouth daily   Yes Historical Provider, MD   mycophenolate (CELLCEPT) 250 MG capsule Take 500 mg by mouth 2 times daily  6/18/15  Yes Historical Provider, MD   insulin lispro (HUMALOG KWIKPEN) 100 UNIT/ML SOPN Inject 5 Units into the skin daily (before lunch) Patient takes with his biggest meal   Yes Historical Provider, MD   docusate sodium (COLACE) 100 MG capsule Take 1 capsule by mouth 2 times daily as needed for Constipation. Patient taking differently: Take 100 mg by mouth daily as needed for Constipation  7/26/13  Yes Glenn Rees MD   linagliptin (TRADJENTA) 5 MG tablet Take 5 mg by mouth daily. Yes Historical Provider, MD   aspirin 81 MG EC tablet Take 81 mg by mouth daily.    Yes Historical Provider, MD   OXYGEN Inhale 2 L into the lungs daily as needed  Patient not taking: Reported on 7/29/2021    Historical Provider, MD   chlorothiazide (DIURIL) 250 MG tablet Take 1 tablet by mouth every morning 6/14/21   Deisi Ferrer MD       Current Medications:    Current Facility-Administered Medications: aspirin EC tablet 81 mg, 81 mg, Oral, Daily  hydrALAZINE (APRESOLINE) tablet 100 mg, 100 mg, Oral, TID  isosorbide mononitrate (IMDUR) extended release tablet 60 mg, 60 mg, Oral, Daily  metoprolol succinate (TOPROL XL) extended release tablet 100 mg, 100 mg, Oral, Daily  mycophenolate (CELLCEPT) capsule 500 mg, 500 mg, Oral, BID  oxyCODONE HCl (OXY-IR) immediate release tablet 20 mg, 20 mg, Oral, Q8H PRN  predniSONE (DELTASONE) tablet 5 mg, 5 mg, Oral, Daily  atorvastatin (LIPITOR) tablet 10 mg, 10 mg, Oral, Daily  sodium bicarbonate tablet 650 mg, 650 mg, Oral, 4x Daily  tacrolimus (PROGRAF) capsule 3 mg, 3 mg, Oral, BID  insulin lispro (HUMALOG) injection vial 0-6 Units, 0-6 Units, SubCUTAneous, TID WC  insulin lispro (HUMALOG) injection vial 0-3 Units, 0-3 Units, SubCUTAneous, Nightly  glucose (GLUTOSE) 40 % oral gel 15 g, 15 g, Oral, PRN  dextrose 50 % IV solution, 12.5 g, IntraVENous, PRN  glucagon (rDNA) injection 1 mg, 1 mg, IntraMUSCular, PRN  dextrose 5 % solution, 100 mL/hr, IntraVENous, PRN  sodium chloride flush 0.9 % injection 5-40 mL, 5-40 mL, IntraVENous, 2 times per day  sodium chloride flush 0.9 % injection 5-40 mL, 5-40 mL, IntraVENous, PRN  0.9 % sodium chloride infusion, 25 mL, IntraVENous, PRN  ondansetron (ZOFRAN-ODT) disintegrating tablet 4 mg, 4 mg, Oral, Q8H PRN **OR** ondansetron (ZOFRAN) injection 4 mg, 4 mg, IntraVENous, Q6H PRN  polyethylene glycol (GLYCOLAX) packet 17 g, 17 g, Oral, Daily PRN  acetaminophen (TYLENOL) tablet 650 mg, 650 mg, Oral, Q6H PRN **OR** acetaminophen (TYLENOL) suppository 650 mg, 650 mg, Rectal, Q6H PRN  enoxaparin (LOVENOX) injection 40 mg, 40 mg, SubCUTAneous, Daily  bumetanide (BUMEX) injection 0.5 mg, 0.5 mg, IntraVENous, BID  ipratropium-albuterol (DUONEB) nebulizer solution 1 ampule, 1 ampule, Inhalation, Q4H WA    Allergies:  Patient has no known allergies. Social History:     Former smoker: quit in [place] [address] [facility]; 20 pack years   Denies alcohol and illicit drug     Family History: Noncontributory due to advanced age. REVIEW OF SYSTEMS:     · Constitutional: + Fatigue. Denies fevers, chills or night sweats  · Eyes: Denies visual changes or drainage  · ENT: Denies headaches or hearing loss. No mouth sores or sore throat. No epistaxis   · Cardiovascular: Denies chest pain, pressure or palpitations. + lower extremity swelling. · Respiratory: +PINEDO, + dry cough, +orthopnea . Denies PND. No hemoptysis   · Gastrointestinal: Denies hematemesis or anorexia. No hematochezia or melena    · Genitourinary: Denies urgency, dysuria or hematuria. · Musculoskeletal: Denies gait disturbance, weakness or joint complaints  · Integumentary: Denies rash, hives or pruritis   · Neurological: Denies dizziness, headaches or seizures. No numbness or tingling  · Psychiatric: Denies anxiety or depression. · Endocrine: Denies temperature intolerance. No recent weight change. .  · Hematologic/Lymphatic: Denies abnormal bruising or bleeding. No swollen lymph nodes    PHYSICAL EXAM:   BP (!) 163/77   Pulse 72   Temp 98.6 °F (37 °C) (Temporal)   Resp 16   Ht 5' 11\" (1.803 m)   Wt 215 lb 9.6 oz (97.8 kg)   SpO2 98%   BMI 30.07 kg/m²   CONST:  Well developed, well nourished elderly male who appears of stated age. Awake, alert and cooperative. No apparent distress. HEENT:   Head- Normocephalic, atraumatic   Eyes- Conjunctivae pink, anicteric  Throat- Oral mucosa pink and moist  Neck-  No stridor, trachea midline, no jugular venous distention. No carotid bruit. CHEST: Chest symmetrical and non-tender to palpation. No accessory muscle use or intercostal retractions  RESPIRATORY: Lung sounds - clear throughout fields. On 2L NC.   CARDIOVASCULAR:     Heart Inspection- shows no noted pulsations  Heart Palpation- no heaves or thrills; PMI is non-displaced   Heart Ausculation- Regular rate and rhythm, no murmur. No s3, s4 or rub   PV: No lower extremity edema. No varicosities.  Pedal pulses palpable, no clubbing or cyanosis   ABDOMEN: Soft, obese, non-tender to light palpation. Bowel sounds present. No palpable masses no organomegaly; no abdominal bruit  MS: Good muscle strength and tone. No atrophy or abnormal movements. : Deferred  SKIN: Warm and dry no statis dermatitis or ulcers   NEURO / PSYCH: Oriented to person, place and time. Speech clear and appropriate. Follows all commands. Flat affect. DATA:    ECG: 10/2/2021 1410 NSR, LVH, NSSTT changes, rate 72 bpm.   Tele strips: SR, rate 60-70 bpm.   Diagnostic:      Intake/Output Summary (Last 24 hours) at 10/3/2021 0633  Last data filed at 10/3/2021 0541  Gross per 24 hour   Intake --   Output 1400 ml   Net -1400 ml       Labs:   CBC:   Recent Labs     10/02/21  1417   WBC 5.9   HGB 10.6*   HCT 36.4*        BMP:   Recent Labs     10/02/21  1417      K 5.9*   CO2 27   BUN 50*   CREATININE 3.6*   LABGLOM 20   CALCIUM 9.3     Mag:   Recent Labs     10/02/21  1417   MG 2.1     Phos: No results for input(s): PHOS in the last 72 hours. TFT:   Lab Results   Component Value Date    TSH 0.996 06/03/2014    FT3 1.6 (L) 05/22/2013    T4FREE 0.91 05/22/2013      HgA1c:   Lab Results   Component Value Date    LABA1C 5.5 06/03/2014     No results found for: EAG  proBNP:   Recent Labs     10/02/21  1417   PROBNP 22,645*     CARDIAC ENZYMES:  Recent Labs     10/02/21  1417 10/02/21  2329   TROPHS 127* 118*     FASTING LIPID PANEL:  Lab Results   Component Value Date    CHOL 128 06/03/2014    HDL 51 06/03/2014    LDLCALC 67 06/03/2014    TRIG 50 06/03/2014     LIVER PROFILE:  Recent Labs     10/02/21  1417   AST 25   ALT 10   LABALBU 3.6       CXR: 10/2/2021: Hazy multifocal bilateral pulmonary infiltrates. TTE: 4/13/2021: (Dr. Janene Ndiaye):  Normal left ventricular chamber size. Normal left ventricular systolic function. Visually estimated LVEF is 60-65 %. No wall motion abnormalities. Stage I diastolic function. Normal right ventricle structure and function.   No significant valvular abnormalities. Assessment:  1. Acute on Chronic HFpEF: LVEF 67% with moderate LVH  2. Hypervolemic   3. Pulmonary HTN (CpC-PH, WHO group 2)   4. CKD: stage IIIb (Hx of renal transplant). Worsening ALYSON in the setting of acute HF. 5. Chronically Elevated hs-cTnT: pattern flat with no EKG changes. 6. HTN  7. HLD  8. T2DM  9. Chronic anemia: stable. 10. OLINDA: compliant   11. Obesity: BMI 30.07 kg/m2  12. GERD  13. Hyperkalemia     Plan:  1. Continue Bumex 1 mg IV twice daily  2. Monitor renal function, daily weights and strict I/O's  3. Appreciate renal consultation  4. Consider Lexiscan MPS once clinically euvolemic. 5.  Recent echocardiogram 4/2021 reviewed; no need to repeat  6. Further recommendations pending the above clinical course. Above assessment/plan as per Dr. Dominik Covarrubias.   Electronically signed by CHAI Patel CNP on 10/3/21 at 4:43 PM EDT

## 2021-10-03 NOTE — PROGRESS NOTES
David Osborne PA,    Your patient is on a medication that requires a renal dose adjustment. Renal Function Assessment:    Date Body Weight IBW Adj. Body Weight SCr CrCl Dialysis status   10/3/2021 97.8 kg 50 kg - kg 22 3.6 ml/min -       Pharmacy has renally dose-adjusted the following medication(s):    Date Medication Original Dosing Regimen New Dosing Regimen   10/3/2021 Enoxaparin 40mg qd 30mg qd           These changes were made per protocol according to the [facility] Renal Function-Based Dose Adjustments Policy    *Please note this dose may need readjusted if your patient's renal function significantly improves. Please contact pharmacy with any questions regarding these changes.   Peter Earl, [address] [facility]

## 2021-10-03 NOTE — H&P
Admission History and Physical                                                                                    Carolina De Leon MD, FACP                   Patient Name: Rhea Griggs                   Age:  70 y.o. Gender:   male    CC: Shortness of breath with episodic cough    HPI: This patient states that he had talked to his PCP and had a Covid test yesterday which was reported as positive. He has been noting progressive dyspnea on exertion, orthopnea and increased leg swelling  He also has a cough which is productive of sputum  He has had no fevers but reports chills  He also reports anorexia    He was evaluated in the emergency room and admitted for congestive heart failure  His Covid test was negative but he was positive for respiratory syncytial virus  His labs demonstrated a BUN of 50 and a creatinine of 3.6  proBNP was 22,645  Hemoglobin was 10.6 the patient does have a history of iron deficiency anemia.   This morning his vitals are satisfactory  He has no fever  His pulse is 64  He does not have an oxygen requirement and is breathing satisfactorily on room air    Chart review demonstrates that an echo was performed in April 2021 which showed an ejection fraction of 60 to 65% and was otherwise normal  He does follow with nephrology for stage IIIb renal failure  And he receives infusions for his anemia  His earlier BUN and creatinine appear to be similar      Past Medical History:   Diagnosis Date    Anemia     Iron deficiency    Atrial fibrillation (HCC)     CHF [facility]congestive heart failure) ([facility] 03/12/2008    Chronic diastolic congestive heart failure ([facility] 03/12/2008    Chronic foot pain     Chronic knee pain     BILATERAL    Constipation     Depression     DM (diabetes mellitus) ([facility]     ESRD (end stage renal disease) ([facility]     H/O kidney transplant 12/12/2014    Hyperlipidemia     Hypertension     Immunosuppression Pill count =  Patient did not brong      Pill count verified with patient. UDS obtained and sent =  Pain contract = on file   made available for MD review. ([facility] 3/12/2008    Kidney disease     Pulmonary hypertension ([facility]        Past Surgical History:   Procedure Laterality Date    BACK SURGERY  1982    BLADDER SURGERY      CARPAL TUNNEL RELEASE Left 06/23/2014    CHOLECYSTECTOMY, LAPAROSCOPIC  05/21/2013    COLONOSCOPY      DIAGNOSTIC CARDIAC CATH LAB PROCEDURE  01/15/2013    NORMAL    ECHO COMPL W DOP COLOR FLOW  05/22/2013         ENDOSCOPY, COLON, DIAGNOSTIC      FOOT SURGERY      BONE REMOVED    KIDNEY BIOPSY  06/09/2015    [facility] ; ALSO 4/14/2016, 4/5/2017    KIDNEY TRANSPLANT  12/12/2014    @ [facility]    EDUARDO-EN-Y GASTRIC BYPASS  06/29/2010    Laparoscopic / Dr. Lynnwood Dance  12/21/2007    Dr. Jon Dumont 4/28/2021    EGD in OR 12--COVID performed by Jamie Barry MD at [place] [id number] Status   Relation Name Status    Mother  Alive    Father  Archana Estes        Prior to Admission medications    Medication Sig Start Date End Date Taking? Authorizing Provider   insulin glargine (LANTUS SOLOSTAR) 100 UNIT/ML injection pen Inject 12 Units into the skin daily At noon   Had today  Patient taking differently: Inject 15 Units into the skin daily At noon   Had today 6/14/21  Yes Addison Escobedo MD   bumetanide (BUMEX) 2 MG tablet Take 1 tablet by mouth daily 6/14/21  Yes Addison Escobedo MD   oxyCODONE (ROXICODONE) 20 MG immediate release tablet Take 20 mg by mouth every 8 hours as needed for Pain.     Yes Historical Provider, MD   Cholecalciferol (VITAMIN D3) 125 MCG (5000 UT) TABS Take 5,000 Units by mouth daily   Yes Historical Provider, MD   hydrALAZINE (APRESOLINE) 100 MG tablet Take 100 mg by mouth 3 times daily   Yes Historical Provider, MD   metoprolol succinate (TOPROL XL) 100 MG extended release tablet Take 100 mg by mouth daily   Yes Historical Provider, MD   predniSONE (DELTASONE) 5 MG tablet Take 5 mg by mouth daily   Yes Historical Provider, MD   simvastatin (ZOCOR) 10 MG tablet Take 10 mg by mouth daily   Yes Historical Provider, MD   isosorbide mononitrate (IMDUR) 60 MG extended release tablet TAKE 1 TABLET DAILY 3/6/18  Yes Seema Hansen MD   tacrolimus (PROGRAF) 1 MG capsule Take 2 capsules by mouth 2 times daily  Patient taking differently: Take 3 mg by mouth 2 times daily  17  Yes Gunnar Payton MD   sodium bicarbonate 650 MG tablet Take 650 mg by mouth 4 times daily    Yes Historical Provider, MD   Multiple Vitamin (MULTI VITAMIN DAILY PO) Take by mouth daily   Yes Historical Provider, MD   mycophenolate (CELLCEPT) 250 MG capsule Take 500 mg by mouth 2 times daily  6/18/15  Yes Historical Provider, MD   insulin lispro (HUMALOG KWIKPEN) 100 UNIT/ML SOPN Inject 5 Units into the skin daily (before lunch) Patient takes with his biggest meal   Yes Historical Provider, MD   docusate sodium (COLACE) 100 MG capsule Take 1 capsule by mouth 2 times daily as needed for Constipation. Patient taking differently: Take 100 mg by mouth daily as needed for Constipation  13  Yes Genaro Norton MD   linagliptin (TRADJENTA) 5 MG tablet Take 5 mg by mouth daily. Yes Historical Provider, MD   aspirin 81 MG EC tablet Take 81 mg by mouth daily.    Yes Historical Provider, MD   OXYGEN Inhale 2 L into the lungs daily as needed  Patient not taking: Reported on 2021    Historical Provider, MD   chlorothiazide (DIURIL) 250 MG tablet Take 1 tablet by mouth every morning 21   Gunnar Payton MD        Social History     Socioeconomic History    Marital status:      Spouse name: Not on file    Number of children: Not on file    Years of education: Not on file    Highest education level: Not on file   Occupational History    Not on file   Tobacco Use    Smoking status: Former Smoker     Packs/day: 1.00     Years: 20.00     Pack years: 20.00     Types: Cigarettes     Quit date: 1990     Years since quittin.7    Smokeless tobacco: Never Used   Substance and Sexual Activity    Alcohol use: Not Currently     Alcohol/week: 0.0 standard drinks    Drug use: No    Sexual activity: Not Currently   Other Topics Concern    Not on file   Social History Narrative    Not on file     Social Determinants of Health     Financial Resource Strain:     Difficulty of Paying Living Expenses:    Food Insecurity:     Worried About Running Out of Food in the Last Year:     Ran Out of Food in the Last Year:    Transportation Needs:     Lack of Transportation (Medical):  Lack of Transportation (Non-Medical):    Physical Activity:     Days of Exercise per Week:     Minutes of Exercise per Session:    Stress:     Feeling of Stress :    Social Connections:     Frequency of Communication with Friends and Family:     Frequency of Social Gatherings with Friends and Family:     Attends Worship Services:     Active Member of Clubs or Organizations:     Attends Club or Organization Meetings:     Marital Status:    Intimate Partner Violence:     Fear of Current or Ex-Partner:     Emotionally Abused:     Physically Abused:     Sexually Abused:        No Known Allergies    The patient's medical records have been reviewed contingent on availability    Review of Systems:   · General: As per HPI. Denies fever  · Eyes: No visual changes or diplopia. No swelling or pain. · ENT: No Headaches, tinnitus or vertigo. No mouth sores or sore throat. · Cardiovascular: No chest pain, the rest as per HPI  · Respiratory: Minimal cough but productive of white sputum and dyspnea on exertion with orthopnea  · Gastrointestinal: No abdominal pain, anorexia, hematochezia, melena, hematemesis or change in bowels. · Genitourinary: No dysuria, trouble voiding, or hematuria. No change in urination. · Musculoskeletal:  No joint pain or inflammation. No limb weakness. · Integumentary: He does report peripheral edema  · Neurological: No unusual headaches, weakness, paresthesias.    No change in gait, balance, coordination, memory, mentation or behavior. · Psychiatric: No anxiety, or depression. Mood and affect reported as normal  · Endocrine: No temperature intolerance. No polydipsia or polyuria. · Hematologic: No abnormal bruising or bleeding, blood clots or swollen lymph nodes. no anemia, no fever,chills, night sweats, swollen glands. · Allergic/Immunologic: No nasal congestion or hives. Physical Examination:      Wt Readings from Last 3 Encounters:   10/03/21 215 lb 9.6 oz (97.8 kg)   07/29/21 208 lb 12.8 oz (94.7 kg)   06/24/21 215 lb (97.5 kg)     Temp Readings from Last 3 Encounters:   10/02/21 98.6 °F (37 °C) (Temporal)   09/29/21 98.6 °F (37 °C) (Temporal)   09/22/21 98.6 °F (37 °C) (Temporal)     BP Readings from Last 3 Encounters:   10/03/21 (!) 141/64   09/29/21 (!) 155/60   09/22/21 (!) 100/48     Pulse Readings from Last 3 Encounters:   10/03/21 64   09/29/21 61   09/22/21 69       General appearance: Normal, awake, alert no distress. Skin: Color, texture, turgor normal. No rashes or lesions. Head: Normocephalic. No masses, lesions, tenderness or abnormalities   Face: Full and somewhat puffy appearing  Eyes: Conjunctivae/cornea clear. Maylon Sly. Sclera non icteric. Ears: External appearance normal.   Mouth: Lips and tongue appear normal.   Neck:  Symmetric. No adenopathy. Short and stout  Chest: Reduced but even excursion   Lungs: Clear to auscultation. No rhonchi, crackles or rales. Bibasilar hypoventilation  Heart: S1 > S2. Rhythm is regular and rate is normal. No gallop rub or murmur. Abdomen: Soft, mildly protuberant, non-tender. BS normal. No masses, organomegaly. Anatomic contours appear normal.  Extremities: There is peripheral edema  Musculoskeletal: No unusual pain or swelling. Muscular strength intact. Neuro:   · Cranial nerves grossly intact. · Motor: Strength grossly normal. No focal weakness.    · Otherwise grossly normal exam  Mental status: Awake, alert, cognizant of person, place, time. Patient appears capable of directing self care   Mood: Normal and appropriate affect  Gait & balance: Independently ambulatory     Labs     CBC:   Lab Results   Component Value Date    WBC 5.9 10/02/2021    RBC 4.29 10/02/2021    HGB 10.6 10/02/2021    HCT 36.4 10/02/2021     10/02/2021    MCV 84.8 10/02/2021     BMP:    Lab Results   Component Value Date     10/02/2021    K 5.9 10/02/2021    K 5.4 06/12/2021    CL 98 10/02/2021    CO2 27 10/02/2021    BUN 50 10/02/2021    CREATININE 3.6 10/02/2021    GLUCOSE 130 10/02/2021    GLUCOSE 124 02/21/2012    CALCIUM 9.3 10/02/2021     Hepatic Function Panel:    Lab Results   Component Value Date    ALKPHOS 63 10/02/2021    AST 25 10/02/2021    ALT 10 10/02/2021    PROT 6.5 10/02/2021    LABALBU 3.6 10/02/2021    LABALBU 3.8 07/05/2011    BILITOT 0.4 10/02/2021     Magnesium:    Lab Results   Component Value Date    MG 2.2 10/03/2021     Cardiac Enzymes:   Lab Results   Component Value Date    CKTOTAL 36 06/12/2021    CKTOTAL 71 06/11/2021    CKTOTAL 197 07/25/2015    CKMB 4.5 07/25/2015    CKMB 5.0 07/25/2015    CKMB 5.3 06/02/2014    TROPONINI 0.07 (H) 04/26/2021    TROPONINI 0.08 (H) 04/10/2021    TROPONINI 0.10 (H) 04/09/2021     LDH:    Lab Results   Component Value Date     04/15/2021     PT/INR:    Lab Results   Component Value Date    PROTIME 11.3 08/31/2020    INR 1.0 08/31/2020     BNP: No results for input(s): BNP in the last 72 hours. TSH:   Lab Results   Component Value Date    TSH 0.996 06/03/2014      Cardiac Injury Profile: No results for input(s): CKTOTAL, CKMB, CKMBINDEX, TROPONINI in the last 72 hours.    Lipid Profile:   Lab Results   Component Value Date    TRIG 100 10/03/2021    HDL 55 10/03/2021    LDLCALC 60 10/03/2021    CHOL 135 10/03/2021      Hemoglobin A1C: No components found for: HGBA1C   U/A:   Lab Results   Component Value Date    LEUKOCYTESUR Negative 04/10/2021    PHUR 5.5 04/10/2021    WBCUA 0-1 04/10/2021    RBCUA 0-1 04/10/2021    RBCUA NONE 05/18/2013    BACTERIA RARE 04/10/2021    SPECGRAV 1.020 04/10/2021    BLOODU Negative 04/10/2021    GLUCOSEU Negative 04/10/2021         ADMISSION SCHEDULED MEDS:   Current Facility-Administered Medications   Medication Dose Route Frequency Provider Last Rate Last Admin    enoxaparin (LOVENOX) injection 30 mg  30 mg SubCUTAneous Daily Teresa Birminghamma        aspirin EC tablet 81 mg  81 mg Oral Daily Deandre Gi, PA   81 mg at 10/02/21 2107    hydrALAZINE (APRESOLINE) tablet 100 mg  100 mg Oral TID Deandre Gi, PA   100 mg at 10/02/21 2107    isosorbide mononitrate (IMDUR) extended release tablet 60 mg  60 mg Oral Daily Deandre Gi, PA   60 mg at 10/02/21 2108    metoprolol succinate (TOPROL XL) extended release tablet 100 mg  100 mg Oral Daily Deandre Gi, PA   100 mg at 10/02/21 2107    mycophenolate (CELLCEPT) capsule 500 mg  500 mg Oral BID Deandre Gi, PA   500 mg at 10/02/21 2108    oxyCODONE HCl (OXY-IR) immediate release tablet 20 mg  20 mg Oral Q8H PRN Deandre Gi, PA   20 mg at 10/02/21 2128    predniSONE (DELTASONE) tablet 5 mg  5 mg Oral Daily Deandre Gi, PA   5 mg at 10/02/21 2108    atorvastatin (LIPITOR) tablet 10 mg  10 mg Oral Daily Deandre Gi, PA   10 mg at 10/02/21 2107    sodium bicarbonate tablet 650 mg  650 mg Oral 4x Daily [facility]   650 mg at 10/02/21 2108    tacrolimus (PROGRAF) capsule 3 mg  3 mg Oral BID Deandre Gi, PA   3 mg at 10/02/21 2107    insulin lispro (HUMALOG) injection vial 0-6 Units  0-6 Units SubCUTAneous TID  Deandre Gi, PA        insulin lispro (HUMALOG) injection vial 0-3 Units  0-3 Units SubCUTAneous Nightly JESUS Bravo        glucose (GLUTOSE) 40 % oral gel 15 g  15 g Oral PRN JESUS [place]        dextrose 50 % IV solution  12.5 g IntraVENous PRN JESUS [place]        glucagon (rDNA) injection 1 mg  1 mg IntraMUSCular PRN Deandre Gi, PA        dextrose 5 % solution  100 mL/hr IntraVENous PRN Deandre Gi, PA        sodium chloride flush 0.9 % injection 5-40 mL  5-40 mL IntraVENous 2 times per day Deandre Gi, PA        sodium chloride flush 0.9 % injection 5-40 mL  5-40 mL IntraVENous PRN Deandre Gi, PA        0.9 % sodium chloride infusion  25 mL IntraVENous PRN Deandre Ig, PA        ondansetron (ZOFRAN-ODT) disintegrating tablet 4 mg  4 mg Oral Q8H PRN Deandre Gi, PA        Or    ondansetron (ZOFRAN) injection 4 mg  4 mg IntraVENous Q6H PRN Deandre Gi, PA        polyethylene glycol (GLYCOLAX) packet 17 g  17 g Oral Daily PRN Deandre Gi, PA        acetaminophen (TYLENOL) tablet 650 mg  650 mg Oral Q6H PRN Deandre Gi, PA        Or    acetaminophen (TYLENOL) suppository 650 mg  650 mg Rectal Q6H PRN Deandre Gi, PA        bumetanide (BUMEX) injection 0.5 mg  0.5 mg IntraVENous BID Deandre Rothmane, PA   0.5 mg at 10/02/21 2108    ipratropium-albuterol (DUONEB) nebulizer solution 1 ampule  1 ampule Inhalation Q4H WA JESUS Bravo           Current  Infusions   dextrose      sodium chloride         Prn Meds  oxyCODONE, glucose, dextrose, glucagon (rDNA), dextrose, sodium chloride flush, sodium chloride, ondansetron **OR** ondansetron, polyethylene glycol, acetaminophen **OR** acetaminophen    Radiology Review:  XR CHEST PORTABLE   Final Result   1. Hazy multifocal bilateral pulmonary infiltrates.                ASSESSMENT:  Active diagnoses treated at this admission:  · Acute on chronic diastolic congestive heart failure with preserved ejection fraction  · CKD  · Type 2 diabetes mellitus  · RSV bronchitis    Problem list:  Patient Active Problem List   Diagnosis    GERD (gastroesophageal reflux disease)    CKD (chronic kidney disease) stage 4, GFR 15-29 ml/min [facility]    Mixed hyperlipidemia    [facility], [facility]    HTN (hypertension), benign    Junctional bradycardia    Anemia in stage 3 chronic kidney disease (HCC)    CAD (coronary artery disease), native coronary artery    [facility]    Chest pain    Respiratory failure (HCC)    Acute kidney injury (HCC)    Vomiting and diarrhea    Acute kidney injury superimposed on CKD ([facility]    Chronic diastolic congestive heart failure ([facility]    H/O kidney transplant    Pulmonary hypertension (HCC)    Immunosuppression ([facility])    Pneumonia due to COVID-19 virus    Acute renal insufficiency    GI bleed    Acute blood loss anemia    Acute combined systolic and diastolic CHF, NYHA class 1 ([facility])    Chronic kidney disease    Anemia of chronic disease    Hyperkalemia    Essential hypertension    Type 2 diabetes mellitus, with long-term current use of insulin ([facility])    Obesity (BMI 30-39. 9)    Acute decompensated heart failure ([facility])       PLAN:  Reviewed admitting orders  Patient is receiving Bumex 0.5 IV twice daily  Lovenox for DVT prophylaxis should be changed to heparin  Sliding scale insulin with hypoglycemic protocol  He did receive 1 dose of Decadron  Aerosol treatments, steroids  Continuation of his renal medications  Continuation of CellCept and Prograf  Consultation placed to cardiology, nephrology, dietary and heart failure nurse coordinator  Monitor glycemia    Labs and electrocardiogram reviewed      See  Orders  Constantin Gonzalez MD, Maricruz Siu [employer] Board of Internal Medicine  Diplomate, [address] of Internal Medicine  9:32 AM  10/3/2021

## 2021-10-03 NOTE — CONSULTS
Consult Note  NEPHROLOGY    Reason for Consult: Management of renal disease    Requesting Physician: JESUS Pelletier    Chief Complaint: Admitted with acute decompensated heart failure    History Obtained From:  patient, electronic medical record    History of Present Ilness: This is a 70 y.o. male with a H/O Chronic HFpEF, combined pre-/post capillary pulmonary hypertension, hypertension, obesity, type 2 diabetes mellitus, GERD, hyperlipidemia, end-stage renal disease status post kidney transplant 12/2014 ([facility]), OLINDA (compliant with CPAP), former smoker (quit 1991) who now presents to ED with worsening shortness of breath over the past few days. Vitals upon arrival included BP (!) 155/71   Pulse 69   Temp 98.5 °F (36.9 °C) (Temporal)   Resp 18   Ht 5' 11\" (1.803 m)   Wt 212 lb (96.2 kg)   SpO2 100%   BMI 29.57 kg/m². Pertinent labs included WBC 5.9, hemoglobin 10.6, hematocrit 36.4 and platelet count 419. BMP revealed sodium 138, potassium 5.9, chloride 98, CO2 27, BUN 50 and creatinine 3.6. BNP 22,645. Chest x-ray showed bilateral infiltrates and pulmonary edema. ED course included treatment for the hyperkalemia and IV diuretics. He was subsequently admitted with acute decompensated heart failure. Patient now examined sitting up in bed in no acute distress. He does state complaints of gradual shortness of breath x2 days prior to arrival. Patient reported that he went to [place] [address] on 10/1/2021 and tested positive for Covid-19. In addition to shortness of breath he reports having orthopnea and lower extremity edema along with intermittent midsternal tightness and diffuse abdominal pain. He reports a white productive cough and chills.   He has been taking his home medications including diuretics with normal urine output.       Past Medical History:        Diagnosis Date    Anemia     Iron deficiency    Atrial fibrillation (HCC)     [facility]congest[facility] heart failure[facility] ([facility]) 03/12/2008    [facility] failure ([facility] 03/12/2008    Chronic foot pain     Chronic knee pain     BILATERAL    Constipation     Depression     DM [facility]diabetes mellitus) [facility]     ESRD (end stage renal disease) ([facility]     H/O kidney transplant 12/12/2014    Hyperlipidemia     Hypertension     Immunosuppression ([facility] 3/12/2008    Kidney disease     Pulmonary hypertension (HCC)        Past Surgical History:        Procedure Laterality Date    BACK SURGERY  1982    BLADDER SURGERY      CARPAL TUNNEL RELEASE Left 06/23/2014    CHOLECYSTECTOMY, LAPAROSCOPIC  05/21/2013    COLONOSCOPY      DIAGNOSTIC CARDIAC CATH LAB PROCEDURE  01/15/2013    NORMAL    ECHO COMPL W DOP COLOR FLOW  05/22/2013         ENDOSCOPY, COLON, DIAGNOSTIC      FOOT SURGERY      BONE REMOVED    KIDNEY BIOPSY  06/09/2015    [facility] ; ALSO 4/14/2016, 4/5/2017    KIDNEY TRANSPLANT  12/12/2014    @ [facility]    EDUARDO-EN-Y GASTRIC BYPASS  06/29/2010    Laparoscopic / Dr. Trevon Murdock  12/21/2007    Dr. Parker Rose N/A 4/28/2021    EGD in OR 12--COVID performed by Tangela Samaniego MD at [facility] ENDOSCOPY       Current Medications:    Current Facility-Administered Medications: enoxaparin (LOVENOX) injection 30 mg, 30 mg, SubCUTAneous, Daily  bumetanide (BUMEX) injection 1 mg, 1 mg, IntraVENous, BID  aspirin EC tablet 81 mg, 81 mg, Oral, Daily  hydrALAZINE (APRESOLINE) tablet 100 mg, 100 mg, Oral, TID  isosorbide mononitrate (IMDUR) extended release tablet 60 mg, 60 mg, Oral, Daily  metoprolol succinate (TOPROL XL) extended release tablet 100 mg, 100 mg, Oral, Daily  mycophenolate (CELLCEPT) capsule 500 mg, 500 mg, Oral, BID  oxyCODONE HCl (OXY-IR) immediate release tablet 20 mg, 20 mg, Oral, Q8H PRN  predniSONE (DELTASONE) tablet 5 mg, 5 mg, Oral, Daily  atorvastatin (LIPITOR) tablet 10 mg, 10 mg, Oral, Daily  sodium bicarbonate tablet 650 mg, 650 mg, Oral, 4x Daily  tacrolimus (PROGRAF) capsule 3 mg, 3 mg, Oral, BID  insulin lispro (HUMALOG) injection vial 0-6 Units, 0-6 Units, SubCUTAneous, TID WC  insulin lispro (HUMALOG) injection vial 0-3 Units, 0-3 Units, SubCUTAneous, Nightly  glucose (GLUTOSE) 40 % oral gel 15 g, 15 g, Oral, PRN  dextrose 50 % IV solution, 12.5 g, IntraVENous, PRN  glucagon (rDNA) injection 1 mg, 1 mg, IntraMUSCular, PRN  dextrose 5 % solution, 100 mL/hr, IntraVENous, PRN  sodium chloride flush 0.9 % injection 5-40 mL, 5-40 mL, IntraVENous, 2 times per day  sodium chloride flush 0.9 % injection 5-40 mL, 5-40 mL, IntraVENous, PRN  0.9 % sodium chloride infusion, 25 mL, IntraVENous, PRN  ondansetron (ZOFRAN-ODT) disintegrating tablet 4 mg, 4 mg, Oral, Q8H PRN **OR** ondansetron (ZOFRAN) injection 4 mg, 4 mg, IntraVENous, Q6H PRN  polyethylene glycol (GLYCOLAX) packet 17 g, 17 g, Oral, Daily PRN  acetaminophen (TYLENOL) tablet 650 mg, 650 mg, Oral, Q6H PRN **OR** acetaminophen (TYLENOL) suppository 650 mg, 650 mg, Rectal, Q6H PRN  ipratropium-albuterol (DUONEB) nebulizer solution 1 ampule, 1 ampule, Inhalation, Q4H WA    Allergies:  Patient has no known allergies. Social History:       Alcohol use: Not Currently       Alcohol/week: 0.0 standard drinks    Drug use: No    Sexual activity: Not Currently         Family History:     Family History   Problem Relation Age of Onset    Diabetes Mother     High Blood Pressure Mother     Heart Disease Mother     Diabetes Father     Cancer Father     Stroke Father     Heart Disease Father          Review of Systems:       Pertinent positives stated above in HPI. All other systems were reviewed and were negative.     Physical exam:   Constitutional:  VITALS:  BP (!) 141/64   Pulse 64   Temp 97.2 °F (36.2 °C) (Temporal)   Resp 20   Ht 5' 11\" (1.803 m)   Wt 215 lb 9.6 oz (97.8 kg)   SpO2 96%   BMI 30.07 kg/m²   CURRENT TEMPERATURE:  Temp: 97.2 °F (36.2 °C)  CURRENT RESPIRATORY RATE:  Resp: 20  CURRENT PULSE:  Pulse: 64  CURRENT BLOOD PRESSURE:  BP: (!) 141/64  24HR BLOOD PRESSURE RANGE:  Systolic (39GRZ), SHY:967 , Min:141 , XPA:883   ; Diastolic (88MOI), VZK:84, Min:64, Max:88    24HR INTAKE/OUTPUT:      Intake/Output Summary (Last 24 hours) at 10/3/2021 1218  Last data filed at 10/3/2021 0800  Gross per 24 hour   Intake 180 ml   Output 1400 ml   Net -1220 ml     Gen: alert, awake, nad  Skin: no rash, turgor wnl  Heent:  eomi, mmm  Neck: No bruits or jvd noted  Cardiovascular:  S1, S2 without m/r/g  Respiratory: Lung sounds clear  Abdomen:  +bs, soft, nt, nd  Ext: No lower extremity edema  Psychiatric: mood and affect appropriate  Musculoskeletal:  Rom, muscular strength intact    DATA:      CBC:   Lab Results   Component Value Date    WBC 5.9 10/02/2021    RBC 4.29 10/02/2021    HGB 10.6 10/02/2021    HCT 36.4 10/02/2021    MCV 84.8 10/02/2021    MCH 24.7 10/02/2021    MCHC 29.1 10/02/2021    RDW 14.7 10/02/2021     10/02/2021    MPV 8.9 10/02/2021     BMP:    Lab Results   Component Value Date     10/02/2021    K 5.9 10/02/2021    K 5.4 06/12/2021    CL 98 10/02/2021    CO2 27 10/02/2021    BUN 50 10/02/2021    LABALBU 3.6 10/02/2021    LABALBU 3.8 07/05/2011    CREATININE 3.6 10/02/2021    CALCIUM 9.3 10/02/2021    GFRAA 20 10/02/2021    LABGLOM 20 10/02/2021    GLUCOSE 130 10/02/2021    GLUCOSE 124 02/21/2012       RAD:  XR CHEST PORTABLE    Result Date: 10/2/2021  EXAMINATION: ONE XRAY VIEW OF THE CHEST 10/2/2021 2:19 pm COMPARISON: 06/11/2021 and 07/02/2021 HISTORY: ORDERING SYSTEM PROVIDED HISTORY: short of breath TECHNOLOGIST PROVIDED HISTORY: Reason for exam:->short of breath What reading provider will be dictating this exam?->CRC FINDINGS: The cardiac silhouette is within normals. Hazy bilateral multifocal infiltrates are noted. The infiltrates are more prominent within the lung bases. I cannot exclude a trace right pleural effusion. 1. Hazy multifocal bilateral pulmonary infiltrates. Assessment/Plan    1- CKD5D/ESRD with RRT as LRD Renal Allograft placed 12/12/14 with a baseline serum cr of 2.1-2.7mg/dl in all of 2021   TXP US (4/19/21) No hydro with 1cm complex renal cyst, no MARI  Tacrolimus level 18.8 (from 4/17/21)  Renal function w/ scr above recent baseline   Strict intake and output, daily labs     2- Anemia in CKD - HgB above goal >/=10  EGD 4/28/21  PRBC 4/28/21     3- HTN with CKD 5/ESRD  BP at goal <130/80  Follow     4- Sec HPTH due to the CAN/CKD with Hypocalcemia  PO4 WNL  Follow calcium and Phos levels     5-Hyperkalemia  With mild ALYSON  Repeat labs now    Thank you for allowing us to participate in care of Mr. Ian Chaparro, APRN - CNP  10/3/2021  12:18 PM     Patient examined , sob improved , edematous   High BNP , anemia, hyperk and volume overload   With ALYSON on CKD 4 , renal transplant on immunosuppressives    Non oliguric , responding to diuretics low iron stores     Plan as outlined above     Dr Carol Koroma

## 2021-10-04 ENCOUNTER — TELEPHONE (OUTPATIENT)
Dept: CARDIOLOGY CLINIC | Age: 71
End: 2021-10-04

## 2021-10-04 LAB
ANION GAP SERPL CALCULATED.3IONS-SCNC: 11 MMOL/L (ref 7–16)
ANION GAP SERPL CALCULATED.3IONS-SCNC: 8 MMOL/L (ref 7–16)
BUN BLDV-MCNC: 64 MG/DL (ref 6–23)
BUN BLDV-MCNC: 65 MG/DL (ref 6–23)
CALCIUM SERPL-MCNC: 8.7 MG/DL (ref 8.6–10.2)
CALCIUM SERPL-MCNC: 9.4 MG/DL (ref 8.6–10.2)
CHLORIDE BLD-SCNC: 96 MMOL/L (ref 98–107)
CHLORIDE BLD-SCNC: 96 MMOL/L (ref 98–107)
CO2: 27 MMOL/L (ref 22–29)
CO2: 33 MMOL/L (ref 22–29)
CREAT SERPL-MCNC: 4.5 MG/DL (ref 0.7–1.2)
CREAT SERPL-MCNC: 4.6 MG/DL (ref 0.7–1.2)
GFR AFRICAN AMERICAN: 15
GFR AFRICAN AMERICAN: 16
GFR NON-AFRICAN AMERICAN: 15 ML/MIN/1.73
GFR NON-AFRICAN AMERICAN: 16 ML/MIN/1.73
GLUCOSE BLD-MCNC: 137 MG/DL (ref 74–99)
GLUCOSE BLD-MCNC: 193 MG/DL (ref 74–99)
MAGNESIUM: 2.2 MG/DL (ref 1.6–2.6)
METER GLUCOSE: 139 MG/DL (ref 74–99)
METER GLUCOSE: 176 MG/DL (ref 74–99)
METER GLUCOSE: 257 MG/DL (ref 74–99)
METER GLUCOSE: 264 MG/DL (ref 74–99)
POTASSIUM SERPL-SCNC: 5.4 MMOL/L (ref 3.5–5)
POTASSIUM SERPL-SCNC: 5.5 MMOL/L (ref 3.5–5)
SODIUM BLD-SCNC: 134 MMOL/L (ref 132–146)
SODIUM BLD-SCNC: 137 MMOL/L (ref 132–146)

## 2021-10-04 PROCEDURE — 80048 BASIC METABOLIC PNL TOTAL CA: CPT

## 2021-10-04 PROCEDURE — 2500000003 HC RX 250 WO HCPCS: Performed by: NURSE PRACTITIONER

## 2021-10-04 PROCEDURE — 6370000000 HC RX 637 (ALT 250 FOR IP): Performed by: NURSE PRACTITIONER

## 2021-10-04 PROCEDURE — 82962 GLUCOSE BLOOD TEST: CPT

## 2021-10-04 PROCEDURE — 2580000003 HC RX 258: Performed by: PHYSICIAN ASSISTANT

## 2021-10-04 PROCEDURE — 2700000000 HC OXYGEN THERAPY PER DAY

## 2021-10-04 PROCEDURE — 6370000000 HC RX 637 (ALT 250 FOR IP): Performed by: PHYSICIAN ASSISTANT

## 2021-10-04 PROCEDURE — 6360000002 HC RX W HCPCS: Performed by: PHYSICIAN ASSISTANT

## 2021-10-04 PROCEDURE — 36415 COLL VENOUS BLD VENIPUNCTURE: CPT

## 2021-10-04 PROCEDURE — 6360000002 HC RX W HCPCS: Performed by: INTERNAL MEDICINE

## 2021-10-04 PROCEDURE — 97165 OT EVAL LOW COMPLEX 30 MIN: CPT

## 2021-10-04 PROCEDURE — 99232 SBSQ HOSP IP/OBS MODERATE 35: CPT | Performed by: INTERNAL MEDICINE

## 2021-10-04 PROCEDURE — 94640 AIRWAY INHALATION TREATMENT: CPT

## 2021-10-04 PROCEDURE — 83735 ASSAY OF MAGNESIUM: CPT

## 2021-10-04 PROCEDURE — 1200000000 HC SEMI PRIVATE

## 2021-10-04 PROCEDURE — 97535 SELF CARE MNGMENT TRAINING: CPT

## 2021-10-04 RX ORDER — HEPARIN SODIUM 10000 [USP'U]/ML
5000 INJECTION, SOLUTION INTRAVENOUS; SUBCUTANEOUS EVERY 8 HOURS
Status: DISCONTINUED | OUTPATIENT
Start: 2021-10-04 | End: 2021-10-07 | Stop reason: HOSPADM

## 2021-10-04 RX ADMIN — SODIUM ZIRCONIUM CYCLOSILICATE 10 G: 10 POWDER, FOR SUSPENSION ORAL at 10:27

## 2021-10-04 RX ADMIN — HYDRALAZINE HYDROCHLORIDE 100 MG: 50 TABLET, FILM COATED ORAL at 21:54

## 2021-10-04 RX ADMIN — IPRATROPIUM BROMIDE AND ALBUTEROL SULFATE 1 AMPULE: .5; 2.5 SOLUTION RESPIRATORY (INHALATION) at 13:26

## 2021-10-04 RX ADMIN — BUMETANIDE 1 MG: 0.25 INJECTION, SOLUTION INTRAMUSCULAR; INTRAVENOUS at 21:54

## 2021-10-04 RX ADMIN — HEPARIN SODIUM 5000 UNITS: 10000 INJECTION INTRAVENOUS; SUBCUTANEOUS at 17:48

## 2021-10-04 RX ADMIN — Medication 10 ML: at 21:54

## 2021-10-04 RX ADMIN — SODIUM BICARBONATE 650 MG: 650 TABLET ORAL at 17:46

## 2021-10-04 RX ADMIN — ISOSORBIDE MONONITRATE 60 MG: 60 TABLET ORAL at 10:27

## 2021-10-04 RX ADMIN — ASPIRIN 81 MG: 81 TABLET, COATED ORAL at 10:27

## 2021-10-04 RX ADMIN — IPRATROPIUM BROMIDE AND ALBUTEROL SULFATE 1 AMPULE: .5; 2.5 SOLUTION RESPIRATORY (INHALATION) at 16:50

## 2021-10-04 RX ADMIN — INSULIN LISPRO 1 UNITS: 100 INJECTION, SOLUTION INTRAVENOUS; SUBCUTANEOUS at 13:19

## 2021-10-04 RX ADMIN — SODIUM BICARBONATE 650 MG: 650 TABLET ORAL at 21:53

## 2021-10-04 RX ADMIN — HYDRALAZINE HYDROCHLORIDE 100 MG: 50 TABLET, FILM COATED ORAL at 17:45

## 2021-10-04 RX ADMIN — TACROLIMUS 3 MG: 1 CAPSULE ORAL at 10:24

## 2021-10-04 RX ADMIN — OXYCODONE HYDROCHLORIDE 20 MG: 10 TABLET ORAL at 21:53

## 2021-10-04 RX ADMIN — HYDRALAZINE HYDROCHLORIDE 100 MG: 50 TABLET, FILM COATED ORAL at 10:26

## 2021-10-04 RX ADMIN — TACROLIMUS 3 MG: 1 CAPSULE ORAL at 21:53

## 2021-10-04 RX ADMIN — HEPARIN SODIUM 5000 UNITS: 10000 INJECTION INTRAVENOUS; SUBCUTANEOUS at 21:54

## 2021-10-04 RX ADMIN — BUMETANIDE 1 MG: 0.25 INJECTION, SOLUTION INTRAMUSCULAR; INTRAVENOUS at 10:26

## 2021-10-04 RX ADMIN — MYCOPHENOLATE MOFETIL 500 MG: 250 CAPSULE ORAL at 10:26

## 2021-10-04 RX ADMIN — IPRATROPIUM BROMIDE AND ALBUTEROL SULFATE 1 AMPULE: .5; 2.5 SOLUTION RESPIRATORY (INHALATION) at 20:29

## 2021-10-04 RX ADMIN — INSULIN LISPRO 3 UNITS: 100 INJECTION, SOLUTION INTRAVENOUS; SUBCUTANEOUS at 17:49

## 2021-10-04 RX ADMIN — ATORVASTATIN CALCIUM 10 MG: 10 TABLET, FILM COATED ORAL at 10:38

## 2021-10-04 RX ADMIN — INSULIN LISPRO 2 UNITS: 100 INJECTION, SOLUTION INTRAVENOUS; SUBCUTANEOUS at 21:54

## 2021-10-04 RX ADMIN — PREDNISONE 5 MG: 5 TABLET ORAL at 10:26

## 2021-10-04 RX ADMIN — MYCOPHENOLATE MOFETIL 500 MG: 250 CAPSULE ORAL at 21:53

## 2021-10-04 RX ADMIN — Medication 10 ML: at 10:25

## 2021-10-04 RX ADMIN — SODIUM BICARBONATE 650 MG: 650 TABLET ORAL at 13:19

## 2021-10-04 RX ADMIN — SODIUM BICARBONATE 650 MG: 650 TABLET ORAL at 10:25

## 2021-10-04 RX ADMIN — EPOETIN ALFA-EPBX 3000 UNITS: 4000 INJECTION, SOLUTION INTRAVENOUS; SUBCUTANEOUS at 17:46

## 2021-10-04 ASSESSMENT — PAIN SCALES - GENERAL
PAINLEVEL_OUTOF10: 0
PAINLEVEL_OUTOF10: 6

## 2021-10-04 ASSESSMENT — PAIN DESCRIPTION - LOCATION: LOCATION: LEG

## 2021-10-04 ASSESSMENT — PAIN DESCRIPTION - ONSET: ONSET: ON-GOING

## 2021-10-04 ASSESSMENT — PAIN DESCRIPTION - DESCRIPTORS: DESCRIPTORS: ACHING;DISCOMFORT

## 2021-10-04 ASSESSMENT — PAIN DESCRIPTION - ORIENTATION: ORIENTATION: RIGHT;LEFT

## 2021-10-04 ASSESSMENT — PAIN - FUNCTIONAL ASSESSMENT: PAIN_FUNCTIONAL_ASSESSMENT: ACTIVITIES ARE NOT PREVENTED

## 2021-10-04 ASSESSMENT — PAIN DESCRIPTION - FREQUENCY: FREQUENCY: CONTINUOUS

## 2021-10-04 ASSESSMENT — PAIN DESCRIPTION - PAIN TYPE: TYPE: ACUTE PAIN

## 2021-10-04 ASSESSMENT — PAIN DESCRIPTION - PROGRESSION: CLINICAL_PROGRESSION: NOT CHANGED

## 2021-10-04 NOTE — PLAN OF CARE
Problem: Pain:  Goal: Pain level will decrease  Description: Pain level will decrease  10/4/2021 0438 by Ophelia Francis RN  Outcome: Met This Shift  10/4/2021 0438 by Ophelia Francis RN  Outcome: Met This Shift  Goal: Control of acute pain  Description: Control of acute pain  10/4/2021 0438 by Ophelia Francis RN  Outcome: Met This Shift  10/4/2021 0438 by Ophelia Francis RN  Outcome: Met This Shift  Goal: Control of chronic pain  Description: Control of chronic pain  10/4/2021 0438 by Ophelia Francis RN  Outcome: Met This Shift  10/4/2021 0438 by Ophelia Francis RN  Outcome: Met This Shift     Problem:  Activity:  Goal: Capacity to carry out activities will improve  Description: Capacity to carry out activities will improve  Outcome: Met This Shift  Goal: Will verbalize the importance of balancing activity with adequate rest periods  Description: Will verbalize the importance of balancing activity with adequate rest periods  Outcome: Met This Shift     Problem: Cardiac:  Goal: Hemodynamic stability will improve  Description: Hemodynamic stability will improve  Outcome: Met This Shift  Goal: Ability to maintain an adequate cardiac output will improve  Description: Ability to maintain an adequate cardiac output will improve  Outcome: Met This Shift     Problem: Fluid Volume:  Goal: Risk for excess fluid volume will decrease  Description: Risk for excess fluid volume will decrease  Outcome: Met This Shift  Goal: Maintenance of adequate hydration will improve  Description: Maintenance of adequate hydration will improve  Outcome: Met This Shift  Goal: Will show no signs and symptoms of electrolyte imbalance  Description: Will show no signs and symptoms of electrolyte imbalance  Outcome: Met This Shift     Problem: Nutritional:  Goal: Maintenance of adequate nutrition will improve  Description: Maintenance of adequate nutrition will improve  Outcome: Met This Shift     Problem: Physical Regulation:  Goal: Complications related to the disease process, condition or treatment will be avoided or minimized  Description: Complications related to the disease process, condition or treatment will be avoided or minimized  Outcome: Met This Shift     Problem: Respiratory:  Goal: Ability to maintain adequate ventilation will improve  Description: Ability to maintain adequate ventilation will improve  Outcome: Met This Shift  Goal: Respiratory status will improve  Description: Respiratory status will improve  Outcome: Met This Shift

## 2021-10-04 NOTE — TELEPHONE ENCOUNTER
Patient on DR Mayer's outpatient schedule for tomorrow. Currently inpatient. I Moved to next available apt. Yenny Cuba  is on CHF consult list inpatient.          Ashlie Mills RN

## 2021-10-04 NOTE — PROGRESS NOTES
[facility]  Nephrology Attending Progress Note  Kyler Ferreira. Bernie Park MD        SUBJECTIVE:   From consult 10/3/21: This is a 70 y.o. male with a H/O Chronic HFpEF, combined pre-/post capillary pulmonary hypertension, hypertension, obesity, type 2 diabetes mellitus, GERD, hyperlipidemia, end-stage renal disease status post kidney transplant 12/2014 ([facility]), OLINDA (compliant with CPAP), former smoker (quit 1991) who now presents to ED with worsening shortness of breath over the past few days. Vitals upon arrival included BP (!) 155/71   Pulse 69   Temp 98.5 °F (36.9 °C) (Temporal)   Resp 18   Ht 5' 11\" (1.803 m)   Wt 212 lb (96.2 kg)   SpO2 100%   BMI 29.57 kg/m². Pertinent labs included WBC 5.9, hemoglobin 10.6, hematocrit 36.4 and platelet count 939. BMP revealed sodium 138, potassium 5.9, chloride 98, CO2 27, BUN 50 and creatinine 3.6. BNP 22,645. Chest x-ray showed bilateral infiltrates and pulmonary edema. ED course included treatment for the hyperkalemia and IV diuretics. He was subsequently admitted with acute decompensated heart failure.     Patient now examined sitting up in bed in no acute distress. He does state complaints of gradual shortness of breath x2 days prior to arrival. Patient reported that he went to [place] on 10/1/2021 and tested positive for Covid-19.  In addition to shortness of breath he reports having orthopnea and lower extremity edema along with intermittent midsternal tightness and diffuse abdominal pain.  He reports a white productive cough and chills.  He has been taking his home medications including diuretics with normal urine output. 10/4/21- resting in bed, no acute distress.        PROBLEM LIST:    Patient Active Problem List   Diagnosis    GERD (gastroesophageal reflux disease)    CKD (chronic kidney disease) stage 4, GFR 15-29 ml/min ([facility]    Mixed hyperlipidemia    Diabetes mellitus, [facility] 2 [facility]    HTN (hypertension), benign    Junctional bradycardia  Anemia in stage 3 chronic kidney disease (HCC)    CAD (coronary artery disease), native coronary artery    [facility]    Chest pain    Respiratory failure ([facility]    Acute kidney injury (HCC)    Vomiting and diarrhea    Acute kidney injury superimposed on CKD ([facility])    Chronic diastolic congestive heart failure ([address]    H/O kidney transplant    Pulmonary hypertension ([address]    [facility]osuppression ([facility]    Pneumonia due to COVID-19 virus    Acute renal insufficiency    GI bleed    Acute blood loss anemia    Acute combined systolic and diastolic CHF, NYHA class 1 ([facility])    Chronic kidney disease    Anemia of chronic disease    Hyperkalemia    Essential hypertension    Type 2 diabetes mellitus, with long-term current use of insulin ([facility])    Obesity (BMI 30-39. 9)    Acute decompensated heart failure ([facility])        PAST MEDICAL HISTORY:    Past Medical History:   Diagnosis Date    Anemia     Iron deficiency    Atrial fibrillation ([facility]     CHF (congestive heart failure) ([facility] 03/12/2008    Chronic diastolic congestive heart failure ([facility] 03/12/2008    Chronic foot pain     Chronic knee pain     BILATERAL    Constipation     Depression     DM (diabetes mellitus) ([address]     ESRD (end stage renal disease) ([facility]     H/O kidney transplant 12/12/2014    Hyperlipidemia     Hypertension     Immunosuppression ([facility] 3/12/2008    Kidney disease     Pulmonary hypertension ([facility]        DIET:    ADULT DIET; Regular; 3 carb choices (45 gm/meal);  Low Sodium (2 gm)     PHYSICAL EXAM:     Patient Vitals for the past 24 hrs:   BP Temp Temp src Pulse Resp SpO2 Height Weight   10/04/21 0500 -- -- -- -- -- -- 5' 11\" (1.803 m) 221 lb 8 oz (100.5 kg)   10/03/21 2330 (!) 114/53 97.7 °F (36.5 °C) Temporal 52 14 100 % -- --   10/03/21 1900 (!) 124/57 98 °F (36.7 °C) Temporal 61 18 96 % -- --   10/03/21 1530 (!) 159/67 98.1 °F (36.7 °C) Temporal 63 20 96 % -- --   10/03/21 1152 -- -- -- -- -- 96 % -- --   10/03/21 0950 -- -- -- -- -- 98 % -- --   @      Intake/Output Summary (Last 24 hours) at 10/4/2021 0831  Last data filed at 10/4/2021 0484  Gross per 24 hour   Intake 120 ml   Output --   Net 120 ml         Wt Readings from Last 3 Encounters:   10/04/21 221 lb 8 oz (100.5 kg)   07/29/21 208 lb 12.8 oz (94.7 kg)   06/24/21 215 lb (97.5 kg)       Constitutional:  Pt is in no acute distress  Head: normocephalic, atraumatic  Neck: no JVD  Cardiovascular: S1 S2 no S3 or rub  Respiratory:  Clear upper, diminisihed in bases  Gastrointestinal:  Soft, nontender,  bowel sounds x 4  Ext: + edema   Skin: dry, no rash  Neuro: alert and oriented    MEDS (scheduled):    enoxaparin  30 mg SubCUTAneous Daily    bumetanide  1 mg IntraVENous BID    aspirin  81 mg Oral Daily    hydrALAZINE  100 mg Oral TID    isosorbide mononitrate  60 mg Oral Daily    metoprolol succinate  100 mg Oral Daily    mycophenolate  500 mg Oral BID    predniSONE  5 mg Oral Daily    atorvastatin  10 mg Oral Daily    sodium bicarbonate  650 mg Oral 4x Daily    tacrolimus  3 mg Oral BID    insulin lispro  0-6 Units SubCUTAneous TID WC    insulin lispro  0-3 Units SubCUTAneous Nightly    sodium chloride flush  5-40 mL IntraVENous 2 times per day    ipratropium-albuterol  1 ampule Inhalation Q4H WA       MEDS (infusions):   dextrose      sodium chloride         MEDS (prn):  oxyCODONE, glucose, dextrose, glucagon (rDNA), dextrose, sodium chloride flush, sodium chloride, ondansetron **OR** ondansetron, polyethylene glycol, acetaminophen **OR** acetaminophen    DATA:    Recent Labs     10/02/21  1417 10/03/21  1700   WBC 5.9 4.6   HGB 10.6* 10.2*   HCT 36.4* 32.8*   MCV 84.8 83.9    295     Recent Labs     10/02/21  1417 10/03/21  0722 10/03/21  1700 10/04/21  0639     --  130* 134   K 5.9*  --  6.2* 5.4*   CL 98  --  92* 96*   CO2 27  --  27 27   BUN 50*  --  64* 64*   CREATININE 3.6*  --  4.3* 4.5*   LABGLOM 20 --  17 16   GLUCOSE 130*  --  263* 137*   CALCIUM 9.3  --  9.4 8.7   ALT 10  --   --   --    AST 25  --   --   --    BILITOT 0.4  --   --   --    ALKPHOS 63  --   --   --    MG 2.1 2.2  --  2.2       Lab Results   Component Value Date    LABALBU 3.6 10/02/2021    LABALBU 3.7 06/11/2021    LABALBU 2.7 (L) 04/27/2021     Lab Results   Component Value Date    TSH 0.996 06/03/2014       Iron Studies  Lab Results   Component Value Date    IRON 30 (L) 10/02/2021    TIBC 173 (L) 10/02/2021    FERRITIN 108 09/13/2021     Vitamin B-12   Date Value Ref Range Status   04/28/2017 281 211 - 946 pg/mL Final     Folate   Date Value Ref Range Status   04/28/2017 7.4 4.8 - 24.2 ng/mL Final       Vit D, 25-Hydroxy   Date Value Ref Range Status   04/13/2021 53 30 - 100 ng/mL Final     Comment:     <20 ng/mL. ........... Cloteal Thompson Deficient  20-30 ng/mL. ......... Cloteal Thompson Insufficient   ng/mL. ........ Cloteal Thompson Sufficient  >100 ng/mL. .......... Cloteal Thompson Toxic       PTH   Date Value Ref Range Status   09/13/2021 128 (H) 15 - 65 pg/mL Final       No components found for: URIC    Lab Results   Component Value Date    COLORU Yellow 04/10/2021    NITRU Negative 04/10/2021    GLUCOSEU Negative 04/10/2021    KETUA Negative 04/10/2021    UROBILINOGEN 0.2 04/10/2021    BILIRUBINUR Negative 04/10/2021       No results found for: Jabier Junior      IMPRESSION/RECOMMENDATIONS:      1- CKD5D/ESRD-  With RRT as LRD Renal Allograft placed 12/12/14 with a baseline serum cr of 2.1-2.7mg/dl in all of 2021   TXP US (4/19/21) No hydro with 1cm complex renal cyst, no MARI  Tacrolimus level 18.8 (from 4/17/21)  Renal function w/ scr above recent baseline   Strict intake and output, daily labs     2- Anemia in CKD-  Follow   HgB above goal >/=10  EGD 4/28/21  PRBC 4/28/21  Start carmen     3- HTN with CKD 5/ESRD  BP at goal <130/80  Follow     4- Sec HPTH of renal origin  Will check PO4, PTH, Ca in the am      5-Hyperkalemia  With AYLSON  Will dose lokelma one dose and follow   Check  BMP at 1400    6. HFpEF-  LVEF 67%  On bid IV bumetanide 1 mg. UOP poorly recorded.         CHAI Carranza - CNP   Pt seen and examined agree with above  If cr rises further will stop diuretics  Likely switch to po soon  Check tac level  Start carmen  Radha Villa MD

## 2021-10-04 NOTE — PROGRESS NOTES
Markell Medley MD, FACP                   Patient Name: Yenny Cuba                   Age:  70 y.o. Gender:   male    CC: Shortness of breath with episodic cough    HPI: This patient states that he had talked to his PCP and had a Covid test yesterday which was reported as positive. He has been noting progressive dyspnea on exertion, orthopnea and increased leg swelling  He also has a cough which is productive of sputum  He has had no fevers but reports chills  He also reports anorexia    He was evaluated in the emergency room and admitted for congestive heart failure  His Covid test was negative but he was positive for respiratory syncytial virus  His labs demonstrated a BUN of 50 and a creatinine of 3.6  proBNP was 22,645  Hemoglobin was 10.6 the patient does have a history of iron deficiency anemia.   This morning his vitals are satisfactory  He has no fever  His pulse is 64  He does not have an oxygen requirement and is breathing satisfactorily on room air    Chart review demonstrates that an echo was performed in April 2021 which showed an ejection fraction of 60 to 65% and was otherwise normal  He does follow with nephrology for stage IIIb renal failure  And he receives infusions for his anemia  His earlier BUN and creatinine appear to be similar    10/4  Subsequent to yesterday's evaluation patient was evaluated by nephrology  Defined end-stage renal disease with renal allograft placed on 12/12/2014    This morning the patient states that he is feeling better  He denies any shortness of breath  He is on oxygen support-requiring only 2 L and maintaining at 100% saturation  There has been no fever  He also has a fine pre and post capillary pulmonary hypertension  His blood pressure is 114/53    Past Medical History:   Diagnosis Date    Anemia     Iron deficiency  Atrial fibrillation (HCC)     CHF (congestive heart failure) (HCC) 03/12/2008    Chronic diastolic congestive heart failure ([facility] 03/12/2008    Chronic foot pain     Chronic knee pain     BILATERAL    Constipation     Depression     DM (diabetes mellitus) ([facility]     ESRD (end stage renal disease) ([facility]     H/O kidney transplant 12/12/2014    Hyperlipidemia     Hypertension     Immunosuppression ([facility] 3/12/2008    Kidney disease     Pulmonary hypertension ([facility]        Past Surgical History:   Procedure Laterality Date    BACK SURGERY  1982    BLADDER SURGERY      CARPAL TUNNEL RELEASE Left 06/23/2014    CHOLECYSTECTOMY, LAPAROSCOPIC  05/21/2013    COLONOSCOPY      DIAGNOSTIC CARDIAC CATH LAB PROCEDURE  01/15/2013    NORMAL    ECHO COMPL W DOP COLOR FLOW  05/22/2013         ENDOSCOPY, COLON, DIAGNOSTIC      FOOT SURGERY      BONE REMOVED    KIDNEY BIOPSY  06/09/2015    [facility] ; ALSO 4/14/2016, 4/5/2017    KIDNEY TRANSPLANT  12/12/2014    @ [facility]    EDUARDO-EN-Y GASTRIC BYPASS  06/29/2010    Laparoscopic / Dr. Adam Corona  12/21/2007    Dr. Marianela Wise 4/28/2021    EGD in OR 12--COVID performed by Shakira Casillas MD at [address] [place] [address]         Review of Systems:   · General: As per HPI.  Denies fever  He appears to be comfortable and he is oxygenating quite well  He has minimal cough now    Physical Examination:      Wt Readings from Last 3 Encounters:   10/04/21 221 lb 8 oz (100.5 kg)   07/29/21 208 lb 12.8 oz (94.7 kg)   06/24/21 215 lb (97.5 kg)     Temp Readings from Last 3 Encounters:   10/03/21 97.7 °F (36.5 °C) (Temporal)   09/29/21 98.6 °F (37 °C) (Temporal)   09/22/21 98.6 °F (37 °C) (Temporal)     BP Readings from Last 3 Encounters:   10/03/21 (!) 114/53   09/29/21 (!) 155/60   09/22/21 (!) 100/48     Pulse Readings from Last 3 Encounters:   10/03/21 52   09/29/21 61   09/22/21 69       General appearance: Normal, awake, alert no distress. Skin: Color, texture, turgor normal. No rashes or lesions. Head: Normocephalic. No masses, lesions, tenderness or abnormalities   Face: Full and somewhat puffy appearing  Eyes: Conjunctivae/cornea clear. Lavera Prost. Sclera non icteric. Neck:  Symmetric. No adenopathy. Short and stout  Chest: Reduced but even excursion   Lungs: Clear to auscultation. No rhonchi, crackles or rales. Bibasilar hypoventilation  Heart: S1 > S2. Rhythm is regular and rate is normal. No gallop rub or murmur. Abdomen: Soft, mildly protuberant, non-tender. BS normal. No masses, organomegaly. Anatomic contours appear normal.  Extremities: There is peripheral edema this is improved  Musculoskeletal: No unusual pain or swelling. Muscular strength intact. Neuro:   · Cranial nerves grossly intact. · Motor: Strength grossly normal. No focal weakness. · Otherwise grossly normal exam  Mental status: Awake, alert, cognizant of person, place, time.     Patient appears capable of directing self care   Mood: Normal and appropriate affect  Gait & balance: Independently ambulatory     Labs     CBC:   Lab Results   Component Value Date    WBC 4.6 10/03/2021    RBC 3.91 10/03/2021    HGB 10.2 10/03/2021    HCT 32.8 10/03/2021     10/03/2021    MCV 83.9 10/03/2021     BMP:    Lab Results   Component Value Date     10/04/2021    K 5.4 10/04/2021    K 5.4 06/12/2021    CL 96 10/04/2021    CO2 27 10/04/2021    BUN 64 10/04/2021    CREATININE 4.5 10/04/2021    GLUCOSE 137 10/04/2021    GLUCOSE 124 02/21/2012    CALCIUM 8.7 10/04/2021     Hepatic Function Panel:    Lab Results   Component Value Date    ALKPHOS 63 10/02/2021    AST 25 10/02/2021    ALT 10 10/02/2021    PROT 6.5 10/02/2021    LABALBU 3.6 10/02/2021    LABALBU 3.8 07/05/2011    BILITOT 0.4 10/02/2021     Magnesium:    Lab Results   Component Value Date    MG 2.2 10/04/2021     Cardiac Enzymes:   Lab Results   Component Value Date    CKTOTAL 31 10/03/2021    CKTOTAL 36 06/12/2021    CKTOTAL 71 06/11/2021    CKMB 2.2 10/03/2021    CKMB 4.5 07/25/2015    CKMB 5.0 07/25/2015    TROPONINI 0.07 (H) 04/26/2021    TROPONINI 0.08 (H) 04/10/2021    TROPONINI 0.10 (H) 04/09/2021     LDH:    Lab Results   Component Value Date     04/15/2021     PT/INR:    Lab Results   Component Value Date    PROTIME 11.3 08/31/2020    INR 1.0 08/31/2020     BNP: No results for input(s): BNP in the last 72 hours.    TSH:   Lab Results   Component Value Date    TSH 0.996 06/03/2014      Cardiac Injury Profile:   Recent Labs     10/03/21  1700   CKTOTAL 31   CKMB 2.2      Lipid Profile:   Lab Results   Component Value Date    TRIG 100 10/03/2021    HDL 55 10/03/2021    LDLCALC 60 10/03/2021    CHOL 135 10/03/2021      Hemoglobin A1C: No components found for: HGBA1C   U/A:   Lab Results   Component Value Date    LEUKOCYTESUR Negative 04/10/2021    PHUR 5.5 04/10/2021    WBCUA 0-1 04/10/2021    RBCUA 0-1 04/10/2021    RBCUA NONE 05/18/2013    BACTERIA RARE 04/10/2021    SPECGRAV 1.020 04/10/2021    BLOODU Negative 04/10/2021    GLUCOSEU Negative 04/10/2021         ADMISSION SCHEDULED MEDS:   Current Facility-Administered Medications   Medication Dose Route Frequency Provider Last Rate Last Admin    sodium zirconium cyclosilicate (LOKELMA) oral suspension 10 g  10 g Oral Once Roderic Paget, APRN - CNP        enoxaparin (LOVENOX) injection 30 mg  30 mg SubCUTAneous Daily Uzma Lopez PA   30 mg at 10/03/21 2014    bumetanide (BUMEX) injection 1 mg  1 mg IntraVENous BID CHAI Lemons CNP   1 mg at 10/03/21 2029    aspirin EC tablet 81 mg  81 mg Oral Daily Uzma Girt, PA   81 mg at 10/03/21 1022    hydrALAZINE (APRESOLINE) tablet 100 mg  100 mg Oral TID Uzma Girt, PA   100 mg at 10/03/21 2015    isosorbide mononitrate (IMDUR) extended release tablet 60 mg  60 mg Oral Daily JESUS Strong   60 mg at 10/03/21 1021    metoprolol succinate (TOPROL XL) extended release tablet 100 mg  100 mg Oral Daily JESUS Garcia   100 mg at 10/03/21 1022    mycophenolate (CELLCEPT) capsule 500 mg  500 mg Oral BID JESUS Garcia   500 mg at 10/03/21 2013    oxyCODONE HCl (OXY-IR) immediate release tablet 20 mg  20 mg Oral Q8H PRN Brina San PA   20 mg at 10/03/21 2029    predniSONE (DELTASONE) tablet 5 mg  5 mg Oral Daily JESUS Garcia   5 mg at 10/03/21 1022    atorvastatin (LIPITOR) tablet 10 mg  10 mg Oral Daily JESUS Garcia   10 mg at 10/03/21 1022    sodium bicarbonate tablet 650 mg  650 mg Oral 4x Daily JESUS Garcia   650 mg at 10/03/21 2015    tacrolimus (PROGRAF) capsule 3 mg  3 mg Oral BID JESUS Garcia   3 mg at 10/03/21 2013    insulin lispro (HUMALOG) injection vial 0-6 Units  0-6 Units SubCUTAneous TID WC JESUS Garcia   3 Units at 10/03/21 1645    insulin lispro (HUMALOG) injection vial 0-3 Units  0-3 Units SubCUTAneous Nightly JESUS Garcia   1 Units at 10/03/21 2014    glucose (GLUTOSE) 40 % oral gel 15 g  15 g Oral PRN JESUS Garcia        dextrose 50 % IV solution  12.5 g IntraVENous PRN JESUS Garcia        glucagon (rDNA) injection 1 mg  1 mg IntraMUSCular PRN JESUS Garcia        dextrose 5 % solution  100 mL/hr IntraVENous PRN JESUS Garcia        sodium chloride flush 0.9 % injection 5-40 mL  5-40 mL IntraVENous 2 times per day JESUS Garcia   10 mL at 10/03/21 2029    sodium chloride flush 0.9 % injection 5-40 mL  5-40 mL IntraVENous PRN JESUS Garcia        0.9 % sodium chloride infusion  25 mL IntraVENous PRN JESUS Garcia        ondansetron (ZOFRAN-ODT) disintegrating tablet 4 mg  4 mg Oral Q8H PRN JESUS Garcia        Or    ondansetron (ZOFRAN) injection 4 mg  4 mg IntraVENous Q6H PRN JESUS Garcia        polyethylene glycol (GLYCOLAX) packet 17 g  17 g Oral Daily PRN JESUS Garcia        acetaminophen (TYLENOL) tablet 650 mg  650 mg Oral Q6H PRN Liane Goldmann, PA        Or    acetaminophen (TYLENOL) suppository 650 mg  650 mg Rectal Q6H PRN Liane Goldmann, PA        ipratropium-albuterol (DUONEB) nebulizer solution 1 ampule  1 ampule Inhalation Q4H WA Liane Goldmann, [address]   1 ampule at 10/03/21 1948       Current  Infusions   dextrose      sodium chloride         Prn Meds  oxyCODONE, glucose, dextrose, glucagon (rDNA), dextrose, sodium chloride flush, sodium chloride, ondansetron **OR** ondansetron, polyethylene glycol, acetaminophen **OR** acetaminophen    Radiology Review:  XR CHEST PORTABLE   Final Result   1. Hazy multifocal bilateral pulmonary infiltrates.                ASSESSMENT:  Active diagnoses treated at this admission:  · Acute on chronic diastolic congestive heart failure with preserved ejection fraction-appears improved  · CKD-end-stage renal disease  · Type 2 diabetes mellitus  · RSV bronchitis    Problem list:  Patient Active Problem List   Diagnosis    GERD (gastroesophageal reflux disease)    CKD (chronic kidney disease) stage 4, GFR 15-29 ml/min ([facility]    Mixed hyperlipidemia    Diabetes mellitus, type 2 ([address]    HTN (hypertension), benign    Junctional bradycardia    Anemia in stage 3 chronic kidney disease (HCC)    CAD (coronary artery disease), native coronary artery    Acute CHF (congestive heart failure) ([facility]    Chest pain    Respiratory failure ([facility])    Acute kidney injury ([address]    Vomiting and diarrhea    Acute kidney injury superimposed on CKD ([facility])    Chronic diastolic congestive heart failure ([address]    H/O kidney transplant    Pulmonary hypertension ([address]    Immunosuppression ([address]    Pneumonia due to COVID-19 virus    Acute renal insufficiency    GI bleed    Acute blood loss anemia    Acute combined systolic and diastolic CHF, NYHA class 1 (HCC)    Chronic kidney disease    Anemia of chronic disease    Hyperkalemia    Essential hypertension    Type 2 diabetes mellitus, with long-term current use of insulin (HCC)    Obesity (BMI 30-39. 9)    Acute decompensated heart failure (HCC)       PLAN:  Reviewed admitting orders  Patient is receiving Bumex 0.5 IV twice daily-BUN and creatinine appear to be stable  Lovenox for DVT prophylaxis should be changed to heparin  Hemoglobin is also stable  Sliding scale insulin with hypoglycemic protocol  He did receive 1 dose of Decadron  Aerosol treatments, steroids  Continuation of his renal medications  Continuation of CellCept and Prograf  Consultation placed to cardiology, nephrology, dietary and heart failure nurse coordinator  Monitor glycemia    Labs and electrocardiogram reviewed      See  Orders  Fish Nolan MD, Ney Spivey, American Board of Internal Medicine  Diplomate, [address] of Internal Medicine  9:34 AM  10/4/2021

## 2021-10-04 NOTE — PLAN OF CARE
Patient's chart updated to reflect:      . - HF care plan, HF education points and HF discharge instructions.  -Orders: 2 gram sodium diet, daily weights, I/O.  -PCP and/or Cardiologist appointment to be scheduled within 7 days of hospital discharge. Needs PCP follow up within 1 week. Cardiology appointment as follows:      Kelley Aguilar RN   Heart Failure Navigator      Update-  CHF follow up appointment made for 10/18/21 with JE Mcginnis at [facility] cardiology office.   Information also placed in discharge AVS.

## 2021-10-04 NOTE — PROGRESS NOTES
[address] [facility] [id number] [place] [address]       Date:10/4/2021                                                  Patient Name: Geo Her  MRN: [id number]  : 1950  Room: 83[facility]    Evaluating OT: Madiha Verduzco, [place] [address]    Referring Provider[de-identified] JESUS Snyder     Specific Provider Orders/Date: OT evaluation and treatment 10/2/21    Diagnosis:  Acute decompensated heart failure ([address] [I50.9]      Pertinent Medical History:  has a past medical history of Anemia, Atrial fibrillation ([address] CHF (congestive heart failure) ([address] Chronic diastolic congestive heart failure ([address] Chronic foot pain, Chronic knee pain, Constipation, Depression, DM (diabetes mellitus) ([address] ESRD (end stage renal disease) ([facility] H/O kidney transplant, Hyperlipidemia, Hypertension, Immunosuppression ([facility] Kidney disease, and Pulmonary hypertension ([address]        Precautions:  Fall Risk, droplet precautions (RSV), O2    Assessment of current deficits   [x] Functional mobility   [x]ADLs  [x] Strength               []Cognition   [x] Functional transfers   [] IADLs         [x] Safety Awareness   [x]Endurance   [] Fine Coordination              [x] Balance      [] Vision/perception   []Sensation    []Gross Motor Coordination  [] ROM  [] Delirium                   [] Motor Control     OT PLAN OF CARE   OT POC based on physician orders, patient diagnosis and results of clinical assessment    Frequency/Duration 1-4 days/wk for 2 weeks PRN   Specific OT Treatment to include:   * Instruction/training on adapted ADL techniques and AE recommendations to increase functional independence within precautions       * Training on energy conservation strategies, correct breathing pattern and techniques to improve independence/tolerance for self-care routine  * Functional transfer/mobility training/DME recommendations for increased independence, safety, and fall prevention  * Patient/Family education to increase follow through with safety techniques and functional independence  * Therapeutic exercise to improve motor endurance, ROM, and functional strength for ADLs/functional transfers  * Therapeutic activities to facilitate/challenge dynamic balance, stand tolerance for increased safety and independence with ADLs        Recommended Adaptive Equipment:  TBD     Home Living:  Pt lives Wife  in a billevel with 0 step(s) to enter and 5 steps with 1 rail(s) to bed/bath    Bathroom setup: tub shower combo, , 1/2 bath on main living area   Equipment owned: none    Prior Level of Function: Independent  with ADLs , Independent  with IADLs; ambulated no AD  Driving: yes  Occupation/leisure: retired    Pain Level: 0/10  Cognition: A&O: 4/4;   Follows 2 step directions: good    Memory:  good    Sequencing:  good    Problem solving:  good    Judgement/safety:  fair     Functional Assessment:   AM-PAC Daily Activity Raw Score: 18/24     Initial Eval Status  Date: 10/4/21 Treatment Status  Date: STG=LTG  Time frame: 10-14 days   Feeding Independent   Independent    Grooming Stand by Assist   Independent    UB Dressing Minimal Assist   Independent    LB Dressing Moderate Assist   To noman/doff socks seated EOB  Limited reach, edema present in BLE  Modified Independent    Bathing Minimal Assist   Sponge bathe UE and upper legs Standing at sink  Modified [place]    Toileting Stand by Assist   Modified [place]    Bed Mobility  Supine to sit: SBA  Sit to supine: Minimal Assist   Supine to sit: Modified [place]   Sit to supine: Modified [place]    Functional Transfers Sit to stand: Stand by Assist   Stand to sit: Stand by Assist   Stand pivot: Stand by Assist   Modified [place]    Functional Mobility SBA with no AD  Bed to bathroom commode  indep  Functional household distance   Balance Sitting: indep     Standing: SBA  Sitting: indep Standing: indep   Activity Tolerance fair  good   Visual/  Perceptual Glasses: yes          BUE  ROM/Strength/  Fine motor Coordination Hand dominance: R    RUE: ROM WFL     Strength: grossly 4/5      Strength:  WFL     Coordination:   WFL    LUE: ROM  WFL     Strength: grossly 4/5      Strength:  WFL     Coordination: WFL       Hearing: WFL   Sensation:  No c/o numbness or tingling   Tone:  WFL   Edema: BLE noted    Comments:   RN cleared patient for OT. Upon arrival patient in bed. Patient presented with decreased activity tolerance,  decreased independence and safety during completion of ADL/functional transfer/mobility tasks,  Therapist facilitated and instructed pt on adapted  techniques & compensatory strategies to improve safety and independence with basic ADLs, bed mobility,  functional transfers & mobility to allow pt to achieve highest level of independence and safely. At end of session, patient in bed with call light and phone within reach, all lines and tubes intact. Pt would benefit from continued skilled OT to increase safety and independence with completion of ADL tasks and functional mobility for improved quality of life. Treatment: OT treatment provided this date includes:    ADL-  Instruction/training on safety and adapted techniques for completion of grooming (at the sink), bathing, dressing and toileting    Mobility-  Instruction/training on safety and improved independence with bed mobility  functional transfers  and functional mobility with no AD. Zaheer Wade  Skilled monitoring of O2 sats-   SpO2 at rest 94% on 2 L, SpO2 during activity 91%, SpO2 at end of session 95%         Rehab Potential: Good  for established goals     Patient / Family Goal:  Not stated    Patient and/or family were instructed on functional diagnosis, prognosis/goals and OT plan of care. Demonstrated good understanding.      Eval Complexity: Low    Time In: 11:03  Time Out: 11:40  Total Treatment Time: 15 minutes    Min Units   OT Eval Low [id number]  X     OT Eval Medium [id number]      OT Eval High [id number]       OT Re-Eval [id number]       Therapeutic Ex [id number]       Therapeutic Activities [id number]      ADL/Self Care 21365  15 1   Orthotic Management 85208       Neuro Re-Ed [id number]       Non-Billable Time          Evaluation Time includes thorough review of current medical information, gathering information on past medical history/social history and prior level of function, completion of standardized testing/informal observation of tasks, assessment of data and education on plan of care and goals.             [place], [place] [address]

## 2021-10-04 NOTE — DISCHARGE INSTR - DIET
 Good nutrition is important when healing from an illness, injury, or surgery. Follow any nutrition recommendations given to you during your hospital stay.  If you were given an oral nutrition supplement while in the hospital, continue to take this supplement at home. You can take it with meals, in-between meals, and/or before bedtime. These supplements can be purchased at most local grocery stores, pharmacies, and chain super-stores.  If you have any questions about your diet or nutrition, call the outpatient registered dietitian at [phone]. Heart Failure Nutrition Therapy  This nutrition therapy will help you feel better and support your heart. This plan focuses on:   Limiting sodium in your diet. Salt (sodium) makes your body hold water. When your body holds too much water, you can feel shortness of breath and swelling. You can prevent these symptoms by eating less salt.  Limiting fluid in your diet. For some patients, drinking too much fluid can make heart failure worse. It can cause symptoms such as shortness of breath and swelling. Limiting fluids can help relieve some of your symptoms.  Managing your weight. Your registered dietitian nutritionist (RDN) can help you choose a healthy weight for your body type. You can achieve these goals by:   Reading food labels to keep track of how much sodium is in the foods you eat.  Limiting foods that are high in sodium.  Checking your weight to make sure youre not retaining too much fluid. Reading the Food Label: How Much Sodium Is Too Much? The nutrition plan for heart failure usually limits the sodium you get from food and drinks to 2,000 milligrams per day. Salt is the main source of sodium. Read the nutrition label to find out how much sodium is in 1 serving of a food.  Select foods with 140 milligrams of sodium or less per serving.    Foods with more than 300 milligrams of sodium per serving may not fit into a reduced-sodium meal plan.   Check serving sizes. If you eat more than 1 serving, you will get more sodium than the amount listed. Cutting Back on Sodium   Avoid processed foods. Eat more fresh foods. o Fresh and frozen fruits and vegetables without added juices or sauces are naturally low in sodium. o Fresh meats are lower in sodium than processed meats, such as murrieta, sausage, and hot dogs. Read the nutrition label or ask your  to help you find a fresh meat that is low in sodium.  Eat less salt, at the table and when cooking. o Just 1 teaspoon of table salt has 2,300 milligrams of sodium. o Leave the salt out of recipes for pasta, casseroles, and soups. o Ask your RDN how to cook your favorite recipes without sodium.  Be a smart . o Look for food packages that say salt-free or sodium-free.  These items contain less than 5 milligrams of sodium per serving. o Very-low-sodium products contain less than 35 milligrams of sodium per serving. o Low-sodium products contain less than 140 milligrams of sodium per serving. o Unsalted or no added salt products may still be high in sodium. Check the nutrition label.  Add flavors to your food without adding sodium. o Try lemon juice, lime juice, fruit juice, or vinegar. o Dry or fresh herbs add flavor. Try basil, bay leaf, dill, rosemary, parsley, izzy, dry mustard, nutmeg, thyme, and paprika.  o Pepper, red pepper flakes, and cayenne pepper can add spice to your meals without adding sodium. Hot sauce contains sodium, but if you use just a drop or two, it will not add up to much.  o Buy a sodium-free seasoning blend or make your own at home.  Use caution when you eat outside your home.  o Restaurant foods can be very high in sodium. o Ask for nutrition information. Many restaurants provide nutrition facts on their menus or websites. o Let your  know that you want your food to be cooked without salt.  Ask for your salad dressing and sauces to come on the side.   Fluid Restriction  Your doctor may ask you to follow a fluid restriction in addition to taking diuretics (water pills). Ask your doctor how much fluid you can have. Foods that are liquid at room temperature are considered a fluid, such as popsicles, soup, ice cream, and Jell-O. Here are some common conversions that will help you measure your fluid intake every day:   1,000 milliliters = 1 liter or 4 cups  1 fluid ounce = 30 milliliters    1 cup = 240 milliliters  2,000 milliliters = 2 liters or 8 cups    1,500 milliliters = 1½ liters or 6 cups     Weight Monitoring  Weigh yourself each day. Sudden weight gain is a sign that fluid is building up in your body. Follow these guidelines:   Weigh yourself every morning. If you gain 3 or more pounds in 1-2 days or 5 or more pounds within 1 week, call your doctor. Your doctor may adjust your medicine to get rid of the extra fluid.  Talk with your doctor or RDN about what a healthy weight is for you.  Talk with your doctor to find out what type of physical activity is best for you.   Foods Recommended  Food Group Recommended Foods   Grains Bread with less than 80 milligrams sodium per slice (yeast breads usually have less sodium than those made with baking soda)  Homemade bread made with reduced-sodium baking soda  Many cold cereals, especially shredded wheat and puffed rice  Oats, grits, or cream of wheat  Dry pastas, noodles, quinoa, and rice   Vegetables Fresh and frozen vegetables without added sauces, salt, or sodium  Homemade soups (salt free or low sodium)  Low-sodium or sodium-free canned vegetables and soups   Fruits Fresh and canned fruits  Dried fruits, such as raisins, cranberries, and prunes   Dairy (Milk and Milk Products) Milk or milk powder  Rice milk and soy milk  Yogurt, including Greek yogurt  Small amounts of natural, block cheese or reduced-sodium cheese (Swiss, ricotta, and fresh mozzarella are lower in sodium than others)  Regular or soft cream cheese and low-sodium cottage cheese   Protein Foods (Meat, Poultry, Fish, Beans) Fresh meats and fish  [place]) murrieta (except if packaged in a sodium solution)  Canned or packed tuna (no more than 4 ounces at 1 serving)  Dried beans and peas; edamame (fresh soybeans)  Eggs or egg beaters (if  less than 200 mg per serving)  Unsalted nuts or peanut butter   Desserts and Snacks Fresh fruit or applesauce  Taye food cake  Granola bars  Unsalted pretzels, popcorn, or nuts  Pudding or gelatin with whipped cream topping  Homemade rice-crispy treats  Vanilla wafers  Frozen fruit bars   Fats Tub or liquid margarine  Unsaturated fat oils (canola, olive, corn, sunflower, safflower, peanut)   Condiments Fresh or dried herbs; low-sodium ketchup; vinegar; lemon or lime juice; pepper; salt-free seasoning mixes and marinades (salt-free seasoning blend); simple salad dressings (vinegar and oil); salt-free sauces   Foods Not Recommended  Food Group Foods Not Recommended   Grains Breads or crackers topped with salt  Cereals (hot/cold) with more than 300 milligrams sodium per serving  Biscuits, cornbread, and other quick breads prepared with baking soda  Prepackaged bread crumbs  Self-rising flours   Vegetables Canned vegetables (unless they are salt free or low sodium)  Frozen vegetables with seasoning and sauces  Sauerkraut and pickled vegetables  Canned or dried soups (unless they are salt free or low  sodium)  [place] fries and onion rings   Fruits Dried fruits preserved with sodium-containing additives   Dairy (Milk and Milk Products) Buttermilk  Processed cheeses   Cottage cheese (unless a low-sodium variety)  Feta cheese; shredded cheese (has more sodium than block cheese); singles slices and string cheese   Protein Foods (Meat, Poultry, Fish, Beans) Cured meats: murrieta, ham, sausage, pepperoni, and hot dogs  Canned meats: chili, Sue sausage, sardines, and ham  Smoked fish and meats  Frozen meals that have more than 600 milligrams sodium   Fats Salted butter or margarine   Condiments Salt, sea salt, kosher salt, onion salt, and garlic salt  Seasoning mixes containing salt (Lemon Pepper or Bouillon cubes)  Catsup or ketchup, BBQ sauce, Worcestershire and soy sauce  Salsa, pickles, olives, relish  Salad dressings: ranch, blue cheese, [place], and [place]    Alcohol Check with your doctor.    Heart Failure Sample 1-Day Menu View Nutrient Info  Breakfast 1 cup regular oatmeal made with water or milk  1 cup reduced-fat (2%) milk  1 medium banana  1 slice whole wheat bread  1 tablespoon salt-free peanut butter   Morning Snack 1/2 cup dried cranberries   Lunch 3 ounces grilled chicken breast  1 cup salad greens  Olive oil and vinegar dressing (for greens)  5 unsalted or low-sodium crackers  Fruit plate with 1/4 cup strawberries  1/2 sliced orange (for fruit plate)  1 peach half (for fruit plate)   Afternoon Snack 1 ounce low-sodium turkey  1 piece whole wheat bread   Evening Meal 3 ounces herb-baked fish  1 baked potato  2 teaspoons soft margarine (trans fat-free) (for potato)  Sliced tomatoes  1/2 cup steamed spinach drizzled with lemon juice  3-inch square of [employer] food cake  Fresh strawberries (2) (for cake)   Evening Snack 2 tablespoons salt-free peanut butter  5 low-sodium crackers

## 2021-10-04 NOTE — PROGRESS NOTES
Medications:  Current Facility-Administered Medications   Medication Dose Route Frequency Provider Last Rate Last Admin    heparin (porcine) injection 5,000 Units  5,000 Units SubCUTAneous Q8H Soledad Hsieh MD   5,000 Units at 10/04/21 1748    epoetin rowan-epbx (RETACRIT) injection 3,000 Units  30 Units/kg SubCUTAneous Once per day on Mon Wed Fri Anika Neves MD   3,000 Units at 10/04/21 1746    bumetanide (BUMEX) injection 1 mg  1 mg IntraVENous BID CHAI Nunn - CNP   1 mg at 10/04/21 1026    aspirin EC tablet 81 mg  81 mg Oral Daily Merian [place], PA   81 mg at 10/04/21 1027    hydrALAZINE (APRESOLINE) tablet 100 mg  100 mg Oral TID Merian [place], PA   100 mg at 10/04/21 1745    isosorbide mononitrate (IMDUR) extended release tablet 60 mg  60 mg Oral Daily Merian Callie, PA   60 mg at 10/04/21 1027    metoprolol succinate (TOPROL XL) extended release tablet 100 mg  100 mg Oral Daily Merian Callie, PA   100 mg at 10/03/21 1022    mycophenolate (CELLCEPT) capsule 500 mg  500 mg Oral BID Merian Callie, PA   500 mg at 10/04/21 1026    oxyCODONE HCl (OXY-IR) immediate release tablet 20 mg  20 mg Oral Q8H PRN Merian [place], PA   20 mg at 10/03/21 2029    predniSONE (DELTASONE) tablet 5 mg  5 mg Oral Daily Merian Callie, PA   5 mg at 10/04/21 1026    atorvastatin (LIPITOR) tablet 10 mg  10 mg Oral Daily Merian Callie, PA   10 mg at 10/04/21 1038    sodium bicarbonate tablet 650 mg  650 mg Oral 4x Daily Merian Callie, PA   650 mg at 10/04/21 1746    tacrolimus (PROGRAF) capsule 3 mg  3 mg Oral BID Merian [place], PA   3 mg at 10/04/21 1024    insulin lispro (HUMALOG) injection vial 0-6 Units  0-6 Units SubCUTAneous TID WC Queenie Ledesma, PA   3 Units at 10/04/21 1749    insulin lispro (HUMALOG) injection vial 0-3 Units  0-3 Units SubCUTAneous Nightly Merian Callie, PA   1 Units at 10/03/21 2014    glucose (GLUTOSE) 40 % oral gel 15 g  15 g Oral PRN Queenie Ledesma, sounds  Extremities: Moves all extremities x 4, no lower extremity edema  Neurologic: No focal motor deficits apparent, normal mood and affect  Peripheral Pulses: Intact posterior tibial pulses bilaterally    Intake/Output:    Intake/Output Summary (Last 24 hours) at 10/4/2021 1825  Last data filed at 10/4/2021 1532  Gross per 24 hour   Intake 0 ml   Output 450 ml   Net -450 ml     I/O this shift:  In: -   Out: 200 [Urine:200]    Laboratory Tests:  Recent Labs     10/03/21  1700 10/04/21  0639 10/04/21  1439   * 134 137   K 6.2* 5.4* 5.5*   CL 92* 96* 96*   CO2 27 27 33*   BUN 64* 64* 65*   CREATININE 4.3* 4.5* 4.6*   GLUCOSE 263* 137* 193*   CALCIUM 9.4 8.7 9.4     Lab Results   Component Value Date    MG 2.2 10/04/2021     Recent Labs     10/02/21  1417   ALKPHOS 63   ALT 10   AST 25   PROT 6.5   BILITOT 0.4   LABALBU 3.6     Recent Labs     10/02/21  1417 10/03/21  1700   WBC 5.9 4.6   RBC 4.29 3.91   HGB 10.6* 10.2*   HCT 36.4* 32.8*   MCV 84.8 83.9   MCH 24.7* 26.1   MCHC 29.1* 31.1*   RDW 14.7 14.8    295   MPV 8.9 9.5     Lab Results   Component Value Date    CKTOTAL 31 10/03/2021    CKMB 2.2 10/03/2021    TROPONINI 0.07 (H) 04/26/2021    TROPONINI 0.08 (H) 04/10/2021    TROPONINI 0.10 (H) 04/09/2021     Lab Results   Component Value Date    INR 1.0 08/31/2020    INR 1.2 04/27/2017    INR 1.1 07/24/2015    PROTIME 11.3 08/31/2020    PROTIME 13.2 (H) 04/27/2017    PROTIME 11.9 07/24/2015     Lab Results   Component Value Date    TSH 0.996 06/03/2014     Lab Results   Component Value Date    LABA1C 5.5 06/03/2014     No results found for: EAG  Lab Results   Component Value Date    CHOL 135 10/03/2021    CHOL 128 06/03/2014    CHOL 101 05/23/2013     Lab Results   Component Value Date    TRIG 100 10/03/2021    TRIG 50 06/03/2014    TRIG 160 (H) 05/23/2013     Lab Results   Component Value Date    HDL 55 10/03/2021    HDL 51 06/03/2014    HDL 21.0 (A) 05/23/2013     Lab Results   Component Value Date LDLCALC 60 10/03/2021    LDLCALC 67 06/03/2014    LDLCALC 48 05/23/2013     Lab Results   Component Value Date    LABVLDL 20 10/03/2021    LABVLDL 10 06/03/2014     No results found for: CHOLHDLRATIO    Cardiac Tests:  Telemetry findings reviewed: SR at rate upper 50s no new tachy/bradyarrhythmias overnight     EKG: Normal sinus rhythm, LVH, inferior infarct age undetermined, ST-T changes suggestive lateral wall ischemia, abnormal EKG.    Vitals and labs were reviewed: Blood pressure 138/51 heart rate 71 sats 95 % on 2 L.     Labs-BUN/creatinine 50/3.6>> 65/4.6, potassium 5.9>> 5.5, proBNP 22 645, troponin, high-sensitivity 127>> 118, cholesterol 135, HDL 55, LDL 60 triglycerides 100, liver function normal H&H 10.2/32.8, serum iron low at 38 TIBC 173 iron sats 17     1. Stress Myoview 07/12/2012. Reversible ischemia in the mid anterior wall. EF 45%.     2. Lexiscan MPS 11/21/2013. Normal perfusion study. EF 63%.       3. Echo 07/20/2014. EF normal. Stage I diastolic dysfunction, otherwise no valvular heart disease. Mild LAE.     4. Echo, 07/21/2015. Stage I diastolic dysfunction. EF 65%. Normal sized LA. No valvular heart disease. Small circumferential pericardial effusion.     5. Lexiscan MPS, 07/26/2015: Small area of reversible ischemia in the lateral wall. EF 48%.      6. TTE (4/3/2020, [facility]) CONCLUSIONS: Technically difficult exam due to body habitus. Exam indication: Evaluation of known heart failure to guide therapy. The left ventricle is normal in size. There is moderate left ventricular hypertrophy. Left ventricular systolic function is normal. EF = 67 ± 5% (2D biplane) Grade II left ventricular diastolic dysfunction. Global strain not reported due to poor tracking. The right ventricle is normal in size. Right ventricular systolic function is normal. The left atrial cavity is severely dilated. Exam was compared with the prior  echocardiographic exam performed on 10/19/2016. Similar findings.     7.  NM

## 2021-10-04 NOTE — CONSULTS
Patient currently admitted with diagnosis of Diastolic heart failure. Patient was alert, oriented and participated during the consultation. He was engaged and asked appropriate questions throughout the education session. He is agreeable to [facility] Future Appointments   Date Time Provider Alfa Moore   10/6/2021  2:30 PM [facility] CHAIR 3 [facility]   10/13/2021  2:30 PM SEY INF[facility]ION [facility] CHAIR 3 [facility]   10/18/2021 10:30 AM Randall Bella APRN - CNP [facility]   10/20/2021  2:30 PM SEY INFUSION [facility] CHAIR 3 [facility]   10/27/2021  2:30 PM SEY INFUSION [facility] CHAIR 3 [facility]   10/29/2021 12:30 PM Blaine Garcia MD [facility]   11/3/2021  2:30 PM SEY INFUSION [facility] CHAIR 3 [facility]   11/9/2021  2:15 PM Kylee Bhat APRN - CNP [facility]   11/10/2021  2:30 PM SE[facility] INF[facility] CHAIR 3 [facility]   11/17/2021  2:30 PM SEY INFUSION [facility]CS CHAIR 3 [facility] IN[facility]   11/24/2021  2:30 PM SEY INFUSION SVCS CHAIR 3 [facility] INF [facility]   12/1/2021  2:30 PM SEY INFUSION SVCS CHAIR 3 [facility]   12/8/2021  2:30 PM SEY INFUSION SVCS CHAIR 3 [facility] INF [facility]   12/15/2021  2:30 PM SEY INFUSION SVCS CHAIR 3 [facility] INF [facility]   12/22/2021  2:30 PM SEY INFUSION SVCS CHAIR 3 [facility] INF [facility]   12/29/2021  2:30 PM SEY INFUSION SVCS CHAIR 3 [facility] IN[facility]            We reviewed the introduction to Heart Failure, the HF zones, signs and symptoms to report on day 1 of onset, medications, medication compliance, the importance of obtaining daily weights, following a low sodium diet, reading food labels for the sodium content, keeping physician appointments, and smoking cessation.  We discussed writing / tracking daily weights on a calendar / log because a 5 pound gain in 1 week can sneak up if you are not tracking it. You can also take your charted weights to your doctor appointments to be reviewed. Contributing risk factors for Heart Failure are identified as not weighing himself daily. Patient provided with a digital scale today. I advised patient they can reduce the risk for Heart Failure exacerbations by modifying / controlling the risk factors. We discussed self-managed care which includes the following:  to take medications as prescribed, report any intolerable side effects of medications to the cardiologist / doctor, do not just stop taking the medication; follow a cardiac heart healthy / low sodium diet; weigh yourself daily, exercise regularly- per doctor recommendation and not to smoke or use an excess amount of alcohol. We discussed calling the cardiologist / doctor with a weight gain of 3 pounds in one day or a total of 5 pounds or more in one week. Also, if you should have a significant weight loss of 3# or more in one day to call the doctor, they may need to decrease or hold the diuretic dose. On days you feels nauseated and not eating / drinking, having emesis or diarrhea,  informed to call the cardiologist  / doctor, they may need to decrease or hold diuretic to avoid dehydration. I stressed the importance of informing their cardiologist the first day of onset of any of the signs and symptoms in the \"Yellow Zone\" of the HF Zones. Patient verbalizes understanding. Greater than 30 minutes was spent educating patient.           BNP:   Lab Results   Component Value Date    PROBNP 22,645 (H) 10/02/2021       History of:    has a past medical history of Anemia, Atrial fibrillation ([address] CHF (congestive heart failure) ([facility] Chronic diastolic congestive heart failure ([address] Chronic foot pain, Chronic knee pain, Constipation, Depression, DM (diabetes mellitus) ([address] ESRD (end stage renal disease) ([facility] H/O kidney transplant, Hyperlipidemia, Hypertension, Immunosuppression [facility]), Kidney disease, and Pulmonary hypertension ([facility] has a past surgical history that includes back surgery (1982); Upper gastrointestinal endoscopy (12/21/2007); [place] Gastric Bypass (06/29/2010); Cholecystectomy, laparoscopic (05/21/2013); ECHO Compl W Dop Color Flow (05/22/2013); Diagnostic Cardiac Cath Lab Procedure (01/15/2013); Foot surgery; Colonoscopy; Endoscopy, colon, diagnostic; Carpal tunnel release (Left, 06/23/2014); Kidney transplant (12/12/2014); Bladder surgery; Kidney biopsy (06/09/2015); and Upper gastrointestinal endoscopy (N/A, 4/28/2021). Medications:    heparin (porcine)  5,000 Units SubCUTAneous Q8H    epoetin rowan-epbx  30 Units/kg SubCUTAneous Once per day on Mon Wed Fri    bumetanide  1 mg IntraVENous BID    aspirin  81 mg Oral Daily    hydrALAZINE  100 mg Oral TID    isosorbide mononitrate  60 mg Oral Daily    metoprolol succinate  100 mg Oral Daily    mycophenolate  500 mg Oral BID    predniSONE  5 mg Oral Daily    atorvastatin  10 mg Oral Daily    sodium bicarbonate  650 mg Oral 4x Daily    tacrolimus  3 mg Oral BID    insulin lispro  0-6 Units SubCUTAneous TID WC    insulin lispro  0-3 Units SubCUTAneous Nightly    sodium chloride flush  5-40 mL IntraVENous 2 times per day    ipratropium-albuterol  1 ampule Inhalation Q4H WA      dextrose      sodium chloride         Code Status:Full Code    The patient is ordered:  Diet: ADULT DIET; Regular; 3 carb choices (45 gm/meal);  Low Sodium (2 gm)   Sodium controlled diet Yes  Fluid restriction daily ordered (fluid restriction recommended if patient is hyponatremic and/or diuretic is initiated or increased) No  FR:   Daily Weights:   Patient Vitals for the past 96 hrs (Last 3 readings):   Weight   10/04/21 0500 221 lb 8 oz (100.5 kg)   10/03/21 0540 215 lb 9.6 oz (97.8 kg)   10/02/21 1249 212 lb (96.2 kg)     I/O:     Intake/Output Summary (Last 24 hours) at 10/4/2021 1650  Last data filed at 10/4/2021 1532  Gross per 24 hour   Intake 0 ml   Output 450 ml   Net -450 ml        The Heart Failure Booklet given to the patient with additional patient education addressing:  · What is Heart Failure? · Things You Can Do to Live Well with HF  · How to Take Your Medications  · How to Eat Less Salt  · Exercising Well with Heart Failure  · Signs and symptoms of HF to report  · Weight Yourself Each Day  · Home Self Management- activity, weight tracking, taking medications as prescribed, meals /diet planning (sodium and fluid restriction), how to read food labels, keeping physician follow ups, smoking cessation, follow the Heart Failure Zones  · The Heart Failure zones  · Sodium content pamphlet  · What foods I should avoid tip sheet    Instructed  to call 911 if you have any of the following symptoms:    ·    Struggling to breathe unrelieved with rest,  ·    Having chest pain, confusion or can't think clearly  ·    Have confusion or cant think clearly    Readmission Risk Score: 37        Discharge Plan:  I placed the Heart Failure Home Instructions in patient's discharge instructions. Per Heart Failure GWTG, the patient should have a follow-up appointment made within 7 days of discharge. Jasmyne De La Rosa RN   Heart Failure Navigator        CONGESTIVE HEART FAILURE (CHF) GUIDELINES  (Must be completed for Primary Diagnosis CHF or History of CHF)    Type of CHF:    [] Acute   [] Chronic     [x] Acute on Chronic     Ventricular Function Assessment (check):     [x] HFpEF  [] HFpEF, borderline  [] HFpEF, improved  [] HFrEF    Echocardiogram: Normal left ventricular chamber size. Normal left ventricular systolic function. Visually estimated LVEF is 60-65 %. No wall motion abnormalities. Stage I diastolic function. Normal right ventricle structure and function.        No significant valvular abnormalities  Ejection Fraction (%):  60-65 Angiotensin-Converting-Enzyme (ACE) inhibitor ordered:  [] Yes  [x] No (specify contraindication):  [] Renal Insufficiency  [] Cough  [] Hypotension  [] Allergy/angioedema  [x] No left ventricular systolic dysfunction (LVSD)  [] Hyperkalemia  [] Moderate to severe aortic stenosis  [] Other (Specify): Angiotensin II receptor blockers (ARB) ordered:  [] Yes  [x] No (specify contraindication):  [] Renal Insufficiency  [] Hypotension  [] Allergy/angioedema  [x] No LVSD  [] Hyperkalemia  [] Moderate to severe aortic stenosis  [] Other (Specify):      ARNI - Angiotensin Receptor Neprilysin Inhibitor ordered:  [] Yes  [x] No      ACC/AHA Guidelines Beta Blocker (Carvedilol, Metoprolol Succinate, or Bisoprolol) ordered:    [x] Yes-Metoprolol succinate  [] No (specify contraindication):  [] Bradycardia  [] Hypotension  [] LVD  [] 2nd or 3rd degree heart block  [] Bronchospastic airway disease  [] Decompensated CHF  [] Other (Specify):    Has a CHF follow up appointment made for 10/18/21 with JE Cornelius at [facility] cardiology office.    Information also placed in discharge AVS.    Electronically signed by Janice Kirkpatrick RN on 10/4/2021 at 4:58 PM

## 2021-10-05 LAB
ANION GAP SERPL CALCULATED.3IONS-SCNC: 12 MMOL/L (ref 7–16)
BUN BLDV-MCNC: 61 MG/DL (ref 6–23)
CALCIUM SERPL-MCNC: 8.9 MG/DL (ref 8.6–10.2)
CHLORIDE BLD-SCNC: 95 MMOL/L (ref 98–107)
CO2: 30 MMOL/L (ref 22–29)
CREAT SERPL-MCNC: 4.3 MG/DL (ref 0.7–1.2)
GFR AFRICAN AMERICAN: 17
GFR NON-AFRICAN AMERICAN: 17 ML/MIN/1.73
GLUCOSE BLD-MCNC: 120 MG/DL (ref 74–99)
HCT VFR BLD CALC: 34.5 % (ref 37–54)
HEMOGLOBIN: 9.9 G/DL (ref 12.5–16.5)
MAGNESIUM: 2.2 MG/DL (ref 1.6–2.6)
MCH RBC QN AUTO: 24.8 PG (ref 26–35)
MCHC RBC AUTO-ENTMCNC: 28.7 % (ref 32–34.5)
MCV RBC AUTO: 86.5 FL (ref 80–99.9)
METER GLUCOSE: 116 MG/DL (ref 74–99)
METER GLUCOSE: 147 MG/DL (ref 74–99)
METER GLUCOSE: 164 MG/DL (ref 74–99)
METER GLUCOSE: 199 MG/DL (ref 74–99)
PARATHYROID HORMONE INTACT: 233 PG/ML (ref 15–65)
PDW BLD-RTO: 14.8 FL (ref 11.5–15)
PHOSPHORUS: 3.4 MG/DL (ref 2.5–4.5)
PLATELET # BLD: 296 E9/L (ref 130–450)
PMV BLD AUTO: 9 FL (ref 7–12)
POTASSIUM SERPL-SCNC: 4.7 MMOL/L (ref 3.5–5)
RBC # BLD: 3.99 E12/L (ref 3.8–5.8)
SODIUM BLD-SCNC: 137 MMOL/L (ref 132–146)
VITAMIN D 25-HYDROXY: 77 NG/ML (ref 30–100)
WBC # BLD: 4.4 E9/L (ref 4.5–11.5)

## 2021-10-05 PROCEDURE — 85027 COMPLETE CBC AUTOMATED: CPT

## 2021-10-05 PROCEDURE — 2500000003 HC RX 250 WO HCPCS: Performed by: NURSE PRACTITIONER

## 2021-10-05 PROCEDURE — 97161 PT EVAL LOW COMPLEX 20 MIN: CPT

## 2021-10-05 PROCEDURE — 82962 GLUCOSE BLOOD TEST: CPT

## 2021-10-05 PROCEDURE — 99232 SBSQ HOSP IP/OBS MODERATE 35: CPT | Performed by: INTERNAL MEDICINE

## 2021-10-05 PROCEDURE — 36415 COLL VENOUS BLD VENIPUNCTURE: CPT

## 2021-10-05 PROCEDURE — 1200000000 HC SEMI PRIVATE

## 2021-10-05 PROCEDURE — 2700000000 HC OXYGEN THERAPY PER DAY

## 2021-10-05 PROCEDURE — 80048 BASIC METABOLIC PNL TOTAL CA: CPT

## 2021-10-05 PROCEDURE — 82306 VITAMIN D 25 HYDROXY: CPT

## 2021-10-05 PROCEDURE — 6360000002 HC RX W HCPCS: Performed by: PHYSICIAN ASSISTANT

## 2021-10-05 PROCEDURE — 83970 ASSAY OF PARATHORMONE: CPT

## 2021-10-05 PROCEDURE — 94664 DEMO&/EVAL PT USE INHALER: CPT

## 2021-10-05 PROCEDURE — 84100 ASSAY OF PHOSPHORUS: CPT

## 2021-10-05 PROCEDURE — 6370000000 HC RX 637 (ALT 250 FOR IP): Performed by: PHYSICIAN ASSISTANT

## 2021-10-05 PROCEDURE — 80197 ASSAY OF TACROLIMUS: CPT

## 2021-10-05 PROCEDURE — 94640 AIRWAY INHALATION TREATMENT: CPT

## 2021-10-05 PROCEDURE — 6360000002 HC RX W HCPCS: Performed by: INTERNAL MEDICINE

## 2021-10-05 PROCEDURE — 6370000000 HC RX 637 (ALT 250 FOR IP): Performed by: INTERNAL MEDICINE

## 2021-10-05 PROCEDURE — 2580000003 HC RX 258: Performed by: PHYSICIAN ASSISTANT

## 2021-10-05 PROCEDURE — 83735 ASSAY OF MAGNESIUM: CPT

## 2021-10-05 RX ORDER — BUMETANIDE 1 MG/1
2 TABLET ORAL DAILY
Status: DISCONTINUED | OUTPATIENT
Start: 2021-10-05 | End: 2021-10-07 | Stop reason: HOSPADM

## 2021-10-05 RX ADMIN — TACROLIMUS 3 MG: 1 CAPSULE ORAL at 19:54

## 2021-10-05 RX ADMIN — SODIUM BICARBONATE 650 MG: 650 TABLET ORAL at 12:04

## 2021-10-05 RX ADMIN — HEPARIN SODIUM 5000 UNITS: 10000 INJECTION INTRAVENOUS; SUBCUTANEOUS at 06:00

## 2021-10-05 RX ADMIN — HEPARIN SODIUM 5000 UNITS: 10000 INJECTION INTRAVENOUS; SUBCUTANEOUS at 16:50

## 2021-10-05 RX ADMIN — Medication 10 ML: at 10:26

## 2021-10-05 RX ADMIN — IPRATROPIUM BROMIDE AND ALBUTEROL SULFATE 1 AMPULE: .5; 2.5 SOLUTION RESPIRATORY (INHALATION) at 07:53

## 2021-10-05 RX ADMIN — ATORVASTATIN CALCIUM 10 MG: 10 TABLET, FILM COATED ORAL at 09:43

## 2021-10-05 RX ADMIN — OXYCODONE HYDROCHLORIDE 20 MG: 10 TABLET ORAL at 19:52

## 2021-10-05 RX ADMIN — HYDRALAZINE HYDROCHLORIDE 100 MG: 50 TABLET, FILM COATED ORAL at 09:42

## 2021-10-05 RX ADMIN — BUMETANIDE 2 MG: 1 TABLET ORAL at 18:06

## 2021-10-05 RX ADMIN — METOPROLOL SUCCINATE 100 MG: 100 TABLET, EXTENDED RELEASE ORAL at 09:42

## 2021-10-05 RX ADMIN — HYDRALAZINE HYDROCHLORIDE 100 MG: 50 TABLET, FILM COATED ORAL at 14:58

## 2021-10-05 RX ADMIN — IPRATROPIUM BROMIDE AND ALBUTEROL SULFATE 1 AMPULE: .5; 2.5 SOLUTION RESPIRATORY (INHALATION) at 20:51

## 2021-10-05 RX ADMIN — HEPARIN SODIUM 5000 UNITS: 10000 INJECTION INTRAVENOUS; SUBCUTANEOUS at 22:15

## 2021-10-05 RX ADMIN — SODIUM BICARBONATE 650 MG: 650 TABLET ORAL at 09:43

## 2021-10-05 RX ADMIN — INSULIN LISPRO 1 UNITS: 100 INJECTION, SOLUTION INTRAVENOUS; SUBCUTANEOUS at 18:25

## 2021-10-05 RX ADMIN — MYCOPHENOLATE MOFETIL 500 MG: 250 CAPSULE ORAL at 19:52

## 2021-10-05 RX ADMIN — TACROLIMUS 3 MG: 1 CAPSULE ORAL at 09:46

## 2021-10-05 RX ADMIN — INSULIN LISPRO 1 UNITS: 100 INJECTION, SOLUTION INTRAVENOUS; SUBCUTANEOUS at 12:35

## 2021-10-05 RX ADMIN — Medication 10 ML: at 19:54

## 2021-10-05 RX ADMIN — PREDNISONE 5 MG: 5 TABLET ORAL at 09:43

## 2021-10-05 RX ADMIN — HYDRALAZINE HYDROCHLORIDE 100 MG: 50 TABLET, FILM COATED ORAL at 19:53

## 2021-10-05 RX ADMIN — ASPIRIN 81 MG: 81 TABLET, COATED ORAL at 09:44

## 2021-10-05 RX ADMIN — SODIUM BICARBONATE 650 MG: 650 TABLET ORAL at 18:05

## 2021-10-05 RX ADMIN — SODIUM BICARBONATE 650 MG: 650 TABLET ORAL at 22:14

## 2021-10-05 RX ADMIN — ISOSORBIDE MONONITRATE 60 MG: 60 TABLET ORAL at 09:43

## 2021-10-05 RX ADMIN — MYCOPHENOLATE MOFETIL 500 MG: 250 CAPSULE ORAL at 09:42

## 2021-10-05 RX ADMIN — BUMETANIDE 1 MG: 0.25 INJECTION, SOLUTION INTRAMUSCULAR; INTRAVENOUS at 09:51

## 2021-10-05 RX ADMIN — IPRATROPIUM BROMIDE AND ALBUTEROL SULFATE 1 AMPULE: .5; 2.5 SOLUTION RESPIRATORY (INHALATION) at 16:41

## 2021-10-05 ASSESSMENT — PAIN DESCRIPTION - PROGRESSION: CLINICAL_PROGRESSION: NOT CHANGED

## 2021-10-05 ASSESSMENT — PAIN SCALES - GENERAL
PAINLEVEL_OUTOF10: 4
PAINLEVEL_OUTOF10: 0
PAINLEVEL_OUTOF10: 6
PAINLEVEL_OUTOF10: 6

## 2021-10-05 ASSESSMENT — PAIN DESCRIPTION - ONSET: ONSET: ON-GOING

## 2021-10-05 ASSESSMENT — PAIN DESCRIPTION - DESCRIPTORS: DESCRIPTORS: ACHING;DISCOMFORT

## 2021-10-05 ASSESSMENT — PAIN DESCRIPTION - PAIN TYPE: TYPE: ACUTE PAIN

## 2021-10-05 ASSESSMENT — PAIN DESCRIPTION - LOCATION: LOCATION: LEG

## 2021-10-05 ASSESSMENT — PAIN DESCRIPTION - ORIENTATION: ORIENTATION: RIGHT;LEFT

## 2021-10-05 ASSESSMENT — PAIN DESCRIPTION - FREQUENCY: FREQUENCY: CONTINUOUS

## 2021-10-05 NOTE — PROGRESS NOTES
[facility]  Nephrology Attending Progress Note  Jose Maier MD        SUBJECTIVE:   From consult 10/3/21: This is a 70 y.o. male with a H/O Chronic HFpEF, combined pre-/post capillary pulmonary hypertension, hypertension, obesity, type 2 diabetes mellitus, GERD, hyperlipidemia, end-stage renal disease status post kidney transplant 12/2014 ([facility]), OLINDA (compliant with CPAP), former smoker (quit 1991) who now presents to ED with worsening shortness of breath over the past few days. Vitals upon arrival included BP (!) 155/71   Pulse 69   Temp 98.5 °F (36.9 °C) (Temporal)   Resp 18   Ht 5' 11\" (1.803 m)   Wt 212 lb (96.2 kg)   SpO2 100%   BMI 29.57 kg/m². Pertinent labs included WBC 5.9, hemoglobin 10.6, hematocrit 36.4 and platelet count 414. BMP revealed sodium 138, potassium 5.9, chloride 98, CO2 27, BUN 50 and creatinine 3.6. BNP 22,645. Chest x-ray showed bilateral infiltrates and pulmonary edema. ED course included treatment for the hyperkalemia and IV diuretics. He was subsequently admitted with acute decompensated heart failure.     Patient now examined sitting up in bed in no acute distress. He does state complaints of gradual shortness of breath x2 days prior to arrival. Patient reported that he went to [place] on 10/1/2021 and tested positive for Covid-19.  In addition to shortness of breath he reports having orthopnea and lower extremity edema along with intermittent midsternal tightness and diffuse abdominal pain.  He reports a white productive cough and chills.  He has been taking his home medications including diuretics with normal urine output. 10/4/21- resting in bed, no acute distress.      10/5: pt seen in room, no complaints, no cp or sob      PROBLEM LIST:    Patient Active Problem List   Diagnosis    GERD (gastroesophageal reflux disease)    CKD (chronic kidney disease) stage 4, GFR 15-29 ml/min ([facility])    Mixed hyperlipidemia    [facility], [facility]    HTN (hypertension), benign    Junctional bradycardia    Anemia in stage 3 chronic kidney disease (HCC)    CAD (coronary artery disease), native coronary artery    Acute CHF (congestive heart failure) (HCC)    Chest pain    Respiratory failure (HCC)    Acute kidney injury (HCC)    Vomiting and diarrhea    Acute kidney injury superimposed on CKD (HCC)    Chronic diastolic congestive heart failure ([address]    H/O kidney transplant    Pulmonary hypertension (HCC)    Immunosuppression (HCC)    Pneumonia due to COVID-19 virus    Acute renal insufficiency    GI bleed    Acute blood loss anemia    Acute combined systolic and diastolic CHF, NYHA class 1 (HCC)    Chronic kidney disease    Anemia of chronic disease    Hyperkalemia    Essential hypertension    Type 2 diabetes mellitus, with long-term current use of insulin ([facility])    Obesity (BMI 30-39. 9)    Acute decompensated heart failure (HCC)        PAST MEDICAL HISTORY:    Past Medical History:   Diagnosis Date    Anemia     Iron deficiency    Atrial fibrillation ([address]     CHF (congestive heart failure) ([address] 03/12/2008    Chronic diastolic congestive heart failure ([address] 03/12/2008    Chronic foot pain     Chronic knee pain     BILATERAL    Constipation     Depression     DM (diabetes mellitus) ([address]     ESRD (end stage renal disease) ([address]     H/O kidney transplant 12/12/2014    Hyperlipidemia     Hypertension     Immunosuppression ([address] 3/12/2008    Kidney disease     Pulmonary hypertension ([address]        DIET:    ADULT DIET; Regular; 3 carb choices (45 gm/meal);  Low Sodium (2 gm)     PHYSICAL EXAM:     Patient Vitals for the past 24 hrs:   BP Temp Temp src Pulse Resp SpO2   10/05/21 0942 (!) 157/72 97.8 °F (36.6 °C) Temporal 75 18 --   10/05/21 0753 -- -- -- -- -- 96 %   10/05/21 0343 (!) 143/65 97.5 °F (36.4 °C) Temporal 71 16 96 %   10/04/21 2029 -- -- -- -- -- 97 %   10/04/21 2000 (!) 122/55 98.4 °F (36.9 °C) Temporal 67 16 97 % 10/04/21 1650 -- -- -- -- -- 95 %   10/04/21 1530 (!) 138/57 98 °F (36.7 °C) Temporal 71 16 95 %   @      Intake/Output Summary (Last 24 hours) at 10/5/2021 1126  Last data filed at 10/4/2021 2153  Gross per 24 hour   Intake 10 ml   Output 200 ml   Net -190 ml         Wt Readings from Last 3 Encounters:   10/04/21 221 lb 8 oz (100.5 kg)   07/29/21 208 lb 12.8 oz (94.7 kg)   06/24/21 215 lb (97.5 kg)       Constitutional:  Pt is in no acute distress  Head: normocephalic, atraumatic  Neck: no JVD  Cardiovascular: S1 S2 no S3 or rub  Respiratory:  Clear upper, diminisihed in bases  Gastrointestinal:  Soft, nontender,  bowel sounds x 4  Ext: + edema   Skin: dry, no rash  Neuro: alert and oriented    MEDS (scheduled):    heparin (porcine)  5,000 Units SubCUTAneous Q8H    epoetin rowan-epbx  30 Units/kg SubCUTAneous Once per day on Mon Wed Fri    bumetanide  1 mg IntraVENous BID    aspirin  81 mg Oral Daily    hydrALAZINE  100 mg Oral TID    isosorbide mononitrate  60 mg Oral Daily    metoprolol succinate  100 mg Oral Daily    mycophenolate  500 mg Oral BID    predniSONE  5 mg Oral Daily    atorvastatin  10 mg Oral Daily    sodium bicarbonate  650 mg Oral 4x Daily    tacrolimus  3 mg Oral BID    insulin lispro  0-6 Units SubCUTAneous TID WC    insulin lispro  0-3 Units SubCUTAneous Nightly    sodium chloride flush  5-40 mL IntraVENous 2 times per day    ipratropium-albuterol  1 ampule Inhalation Q4H WA       MEDS (infusions):   dextrose      sodium chloride         MEDS (prn):  oxyCODONE, glucose, dextrose, glucagon (rDNA), dextrose, sodium chloride flush, sodium chloride, ondansetron **OR** ondansetron, polyethylene glycol, acetaminophen **OR** acetaminophen    DATA:    Recent Labs     10/02/21  1417 10/03/21  1700 10/05/21  0522   WBC 5.9 4.6 4.4*   HGB 10.6* 10.2* 9.9*   HCT 36.4* 32.8* 34.5*   MCV 84.8 83.9 86.5    295 296     Recent Labs     10/02/21  1417 10/02/21  1417 10/03/21  9770 10/03/21  1700 10/04/21  0639 10/04/21  1439 10/05/21  0522     --   --    < > 134 137 137   K 5.9*  --   --    < > 5.4* 5.5* 4.7   CL 98  --   --    < > 96* 96* 95*   CO2 27  --   --    < > 27 33* 30*   BUN 50*  --   --    < > 64* 65* 61*   CREATININE 3.6*  --   --    < > 4.5* 4.6* 4.3*   LABGLOM 20  --   --    < > 16 15 17   GLUCOSE 130*  --   --    < > 137* 193* 120*   CALCIUM 9.3  --   --    < > 8.7 9.4 8.9   ALT 10  --   --   --   --   --   --    AST 25  --   --   --   --   --   --    BILITOT 0.4  --   --   --   --   --   --    ALKPHOS 63  --   --   --   --   --   --    MG 2.1   < > 2.2  --  2.2  --  2.2   PHOS  --   --   --   --   --   --  3.4    < > = values in this interval not displayed. Lab Results   Component Value Date    LABALBU 3.6 10/02/2021    LABALBU 3.7 06/11/2021    LABALBU 2.7 (L) 04/27/2021     Lab Results   Component Value Date    TSH 0.996 06/03/2014       Iron Studies  Lab Results   Component Value Date    IRON 30 (L) 10/02/2021    TIBC 173 (L) 10/02/2021    FERRITIN 108 09/13/2021     Vitamin B-12   Date Value Ref Range Status   04/28/2017 281 211 - 946 pg/mL Final     Folate   Date Value Ref Range Status   04/28/2017 7.4 4.8 - 24.2 ng/mL Final       Vit D, 25-Hydroxy   Date Value Ref Range Status   04/13/2021 53 30 - 100 ng/mL Final     Comment:     <20 ng/mL. ........... Yifan Braun Deficient  20-30 ng/mL. ......... Yifan Braun Insufficient   ng/mL. ........ Yifan Braun Sufficient  >100 ng/mL. .......... Yifan Braun Toxic       PTH   Date Value Ref Range Status   10/05/2021 233 (H) 15 - 65 pg/mL Final       No components found for: URIC    Lab Results   Component Value Date    COLORU Yellow 04/10/2021    NITRU Negative 04/10/2021    GLUCOSEU Negative 04/10/2021    KETUA Negative 04/10/2021    UROBILINOGEN 0.2 04/10/2021    BILIRUBINUR Negative 04/10/2021       No results found for: Concetta Saint      IMPRESSION/RECOMMENDATIONS:      1- CKD5D/ESRD  With RRT as LRD Renal Allograft placed 12/12/14 with a baseline serum cr of 2.1-2.7mg/dl in all of 2021   TXP US (4/19/21) No hydro/1cm complex renal cyst, no MARI  Tacrolimus level 18.8 (from 4/17/21)  Renal function w/ scr above recent baseline   Strict intake and output, daily labs     2- Anemia in CKD  Follow   HgB above goal >/=10  EGD 4/28/21  PRBC 4/28/21  Start carmen     3- HTN with CKD 5/ESRD  BP at goal <130/80  Follow     4- Sec HPTH of renal origin  Will check PO4, PTH, Ca in the am      5-Hyperkalemia  With ALYSON  Will dose lokelma one dose and follow   Check  BMP at 1400    6. HFpEF  LVEF 67%  On bid IV bumetanide 1 mg. UOP poorly recorded.     Change to oral      Zach Lopez MD

## 2021-10-05 NOTE — PROGRESS NOTES
in presence of wife  He is breathing much better  Denies shortness of breath  Very little cough  He is down to 2 L nasal cannula at 96%  Blood pressure is better and he is afebrile  Cardiology has ordered the continuation of Bumex 1 mg p.o. twice daily and continued monitoring of renal function  Once he is euvolemic they will consider a [place] scan  Nephrology indicates he is stable  ADAM has started          Past Medical History:   Diagnosis Date    Anemia     Iron deficiency    Atrial fibrillation (HCC)     CHF (congestive heart failure) ([facility] 03/12/2008    Chronic diastolic congestive heart failure ([address] 03/12/2008    Chronic foot pain     Chronic knee pain     BILATERAL    Constipation     Depression     DM (diabetes mellitus) ([address]     ESRD (end stage renal disease) ([address]     H/O kidney transplant 12/12/2014    Hyperlipidemia     Hypertension     Immunosuppression ([facility] 3/12/2008    Kidney disease     Pulmonary hypertension ([facility]        Past Surgical History:   Procedure Laterality Date    BACK SURGERY  1982    BLADDER SURGERY      CARPAL TUNNEL RELEASE Left 06/23/2014    CHOLECYSTECTOMY, LAPAROSCOPIC  05/21/2013    COLONOSCOPY      DIAGNOSTIC CARDIAC CATH LAB PROCEDURE  01/15/2013    NORMAL    ECHO COMPL W DOP COLOR FLOW  05/22/2013         ENDOSCOPY, COLON, DIAGNOSTIC      FOOT SURGERY      BONE REMOVED    KIDNEY BIOPSY  06/09/2015    [facility] ; ALSO 4/14/2016, 4/5/2017    KIDNEY TRANSPLANT  12/12/2014    @ [facility]    EDUARDO-EN-Y GASTRIC BYPASS  06/29/2010    Laparoscopic / Dr. Adam Corona  12/21/2007    Dr. Marianela Wise 4/28/2021    EGD in OR 12--COVID performed by Shakira Casillas MD at [address] [place] [address]         Review of Systems:   · General: As per HPI.  Denies fever  He appears to be comfortable and he is oxygenating quite well  He has minimal cough now    Physical Examination:      Wt Readings from Last 3 Encounters: 10/04/21 221 lb 8 oz (100.5 kg)   07/29/21 208 lb 12.8 oz (94.7 kg)   06/24/21 215 lb (97.5 kg)     Temp Readings from Last 3 Encounters:   10/05/21 97.8 °F (36.6 °C) (Temporal)   09/29/21 98.6 °F (37 °C) (Temporal)   09/22/21 98.6 °F (37 °C) (Temporal)     BP Readings from Last 3 Encounters:   10/05/21 (!) 157/72   09/29/21 (!) 155/60   09/22/21 (!) 100/48     Pulse Readings from Last 3 Encounters:   10/05/21 75   09/29/21 61   09/22/21 69       General appearance: Normal, awake, alert no distress. Face: Much less puffy appearing  Eyes: Conjunctivae/cornea clear. Vessie [place]. Sclera non icteric. Neck:  Symmetric. No adenopathy. Short and stout  Chest: Reduced but even excursion   Lungs: Clear to auscultation. No rhonchi, crackles or rales. Bibasilar hypoventilation  Heart: S1 > S2. Rhythm is regular and rate is normal. No gallop rub or murmur. Abdomen: Soft, mildly protuberant, non-tender. BS normal. No masses, organomegaly. Anatomic contours appear normal.  Extremities: There is peripheral edema this is improved  Musculoskeletal: No unusual pain or swelling. Muscular strength intact.    Neuro:   · Grossly normal exam  Gait & balance: Independently ambulatory     Labs     CBC:   Lab Results   Component Value Date    WBC 4.4 10/05/2021    RBC 3.99 10/05/2021    HGB 9.9 10/05/2021    HCT 34.5 10/05/2021     10/05/2021    MCV 86.5 10/05/2021     BMP:    Lab Results   Component Value Date     10/05/2021    K 4.7 10/05/2021    K 5.4 06/12/2021    CL 95 10/05/2021    CO2 30 10/05/2021    BUN 61 10/05/2021    CREATININE 4.3 10/05/2021    GLUCOSE 120 10/05/2021    GLUCOSE 124 02/21/2012    CALCIUM 8.9 10/05/2021     Hepatic Function Panel:    Lab Results   Component Value Date    ALKPHOS 63 10/02/2021    AST 25 10/02/2021    ALT 10 10/02/2021    PROT 6.5 10/02/2021    LABALBU 3.6 10/02/2021    LABALBU 3.8 07/05/2011    BILITOT 0.4 10/02/2021     Magnesium:    Lab Results   Component Value Date    MG 2.2 10/05/2021     Cardiac Enzymes:   Lab Results   Component Value Date    CKTOTAL 31 10/03/2021    CKTOTAL 36 06/12/2021    CKTOTAL 71 06/11/2021    CKMB 2.2 10/03/2021    CKMB 4.5 07/25/2015    CKMB 5.0 07/25/2015    TROPONINI 0.07 (H) 04/26/2021    TROPONINI 0.08 (H) 04/10/2021    TROPONINI 0.10 (H) 04/09/2021     LDH:    Lab Results   Component Value Date     04/15/2021     PT/INR:    Lab Results   Component Value Date    PROTIME 11.3 08/31/2020    INR 1.0 08/31/2020     BNP: No results for input(s): BNP in the last 72 hours.    TSH:   Lab Results   Component Value Date    TSH 0.996 06/03/2014      Cardiac Injury Profile:   Recent Labs     10/03/21  1700   CKTOTAL 31   CKMB 2.2      Lipid Profile:   Lab Results   Component Value Date    TRIG 100 10/03/2021    HDL 55 10/03/2021    LDLCALC 60 10/03/2021    CHOL 135 10/03/2021      Hemoglobin A1C: No components found for: HGBA1C   U/A:   Lab Results   Component Value Date    LEUKOCYTESUR Negative 04/10/2021    PHUR 5.5 04/10/2021    WBCUA 0-1 04/10/2021    RBCUA 0-1 04/10/2021    RBCUA NONE 05/18/2013    BACTERIA RARE 04/10/2021    SPECGRAV 1.020 04/10/2021    BLOODU Negative 04/10/2021    GLUCOSEU Negative 04/10/2021         ADMISSION SCHEDULED MEDS:   Current Facility-Administered Medications   Medication Dose Route Frequency Provider Last Rate Last Admin    heparin (porcine) injection 5,000 Units  5,000 Units SubCUTAneous Q8H Sheree Chandler MD   5,000 Units at 10/05/21 0600    epoetin rowan-epbx (RETACRIT) injection 3,000 Units  30 Units/kg SubCUTAneous Once per day on Mon Wed Fri Radha Villa MD   3,000 Units at 10/04/21 1746    bumetanide (BUMEX) injection 1 mg  1 mg IntraVENous BID CHAI Valles CNP   1 mg at 10/05/21 0906    aspirin EC tablet 81 mg  81 mg Oral Daily Pecola Barley, PA   81 mg at 10/05/21 0944    hydrALAZINE (APRESOLINE) tablet 100 mg  100 mg Oral TID Pecola Barley, PA   100 mg at 10/05/21 6680    isosorbide mononitrate (IMDUR) extended release tablet 60 mg  60 mg Oral Daily JESUS Garcia   60 mg at 10/05/21 2519    metoprolol succinate (TOPROL XL) extended release tablet 100 mg  100 mg Oral Daily JESUS Garcia   100 mg at 10/05/21 6744    mycophenolate (CELLCEPT) capsule 500 mg  500 mg Oral BID JESUS Garcia   500 mg at 10/05/21 1601    oxyCODONE HCl (OXY-IR) immediate release tablet 20 mg  20 mg Oral Q8H PRN Brina San PA   20 mg at 10/04/21 2153    predniSONE (DELTASONE) tablet 5 mg  5 mg Oral Daily JESUS Garcia   5 mg at 10/05/21 2211    atorvastatin (LIPITOR) tablet 10 mg  10 mg Oral Daily JESUS Garcia   10 mg at 10/05/21 2101    sodium bicarbonate tablet 650 mg  650 mg Oral 4x Daily JESUS Garcia   650 mg at 10/05/21 4658    tacrolimus (PROGRAF) capsule 3 mg  3 mg Oral BID JESUS Garcia   3 mg at 10/05/21 0946    insulin lispro (HUMALOG) injection vial 0-6 Units  0-6 Units SubCUTAneous TID WC JESUS Garcia   3 Units at 10/04/21 1749    insulin lispro (HUMALOG) injection vial 0-3 Units  0-3 Units SubCUTAneous Nightly JESUS Garcia   2 Units at 10/04/21 2154    glucose (GLUTOSE) 40 % oral gel 15 g  15 g Oral PRN JESUS Garcia        dextrose 50 % IV solution  12.5 g IntraVENous PRN JESUS Garcia        glucagon (rDNA) injection 1 mg  1 mg IntraMUSCular PRN JESUS Garcia        dextrose 5 % solution  100 mL/hr IntraVENous PRN JESUS Garcia        sodium chloride flush 0.9 % injection 5-40 mL  5-40 mL IntraVENous 2 times per day JESUS Garcia   10 mL at 10/04/21 2154    sodium chloride flush 0.9 % injection 5-40 mL  5-40 mL IntraVENous PRN JESUS Garcia        0.9 % sodium chloride infusion  25 mL IntraVENous PRN JESUS Garcia        ondansetron (ZOFRAN-ODT) disintegrating tablet 4 mg  4 mg Oral Q8H PRN JESUS Garcia        Or    ondansetron (ZOFRAN) injection 4 mg  4 mg IntraVENous Q6H PRN JESUS Bell        polyethylene glycol [facility]) packet 17 g  17 g Oral Daily PRN JESUS Bell        acetaminophen (TYLENOL) tablet 650 mg  650 mg Oral Q6H PRN JESUS Bell        Or    acetaminophen (TYLENOL) suppository 650 mg  650 mg Rectal Q6H PRN JESUS Bell        ipratropium-albuterol (DUONEB) nebulizer solution 1 ampule  1 ampule Inhalation Q4H WA Sona Bell   1 ampule at 10/05/21 3843       Current  Infusions   dextrose      sodium chloride         Prn Meds  oxyCODONE, glucose, dextrose, glucagon (rDNA), dextrose, sodium chloride flush, sodium chloride, ondansetron **OR** ondansetron, polyethylene glycol, acetaminophen **OR** acetaminophen    Radiology Review:  XR CHEST PORTABLE   Final Result   1. Hazy multifocal bilateral pulmonary infiltrates.                ASSESSMENT:  Active diagnoses treated at this admission:  · Acute on chronic diastolic congestive heart failure with preserved ejection fraction-appears improved  · CKD-end-stage renal disease currently stabilized  · Type 2 diabetes mellitus  · RSV bronchitis improving    Problem list:  Patient Active Problem List   Diagnosis    GERD (gastroesophageal reflux disease)    CKD (chronic kidney disease) stage 4, GFR 15-29 ml/min ([facility]    Mixed hyperlipidemia    Diabetes mellitus, type 2 ([address]    HTN (hypertension), benign    Junctional bradycardia    Anemia in stage 3 chronic kidney disease (HCC)    CAD (coronary artery disease), native coronary artery    Acute CHF (congestive heart failure) ([facility]    Chest pain    Respiratory failure ([facility]    Acute kidney injury ([address]    Vomiting and diarrhea    Acute kidney injury superimposed on CKD (HCC)    Chronic diastolic congestive heart failure ([address]    H/O kidney transplant    Pulmonary hypertension ([address]    Immunosuppression ([address]    Pneumonia due to COVID-19 virus    Acute renal insufficiency    GI bleed    Acute blood loss anemia    Acute combined systolic and diastolic CHF, NYHA class 1 (HCC)    Chronic kidney disease    Anemia of chronic disease    Hyperkalemia    Essential hypertension    Type 2 diabetes mellitus, with long-term current use of insulin (HCC)    Obesity (BMI 30-39. 9)    Acute decompensated heart failure (HCC)       PLAN:  Reviewed and updated orders  Patient is receiving Bumex 0.5 IV twice daily-BUN and creatinine appear to be stable  DVT prophylaxis on heparin now  Hemoglobin is also stable  Sliding scale insulin with hypoglycemic protocol  Aerosol treatments, steroids low-dose for suppression treatment currently  Continuation of his renal medications  Continuation of CellCept and Prograf  Reviewed all consultations  Monitor glycemia    Labs and electrocardiogram reviewed      See  Orders  Mary Izaguirre MD, Manuela Norris, [address] [place] [address] Rd[address] of Internal Medicine  Diplomate, [address] , [address] [place] [address] Rd[address] of Internal Medicine  10:17 AM  10/5/2021

## 2021-10-05 NOTE — PROGRESS NOTES
Physical Therapy  Physical Therapy Initial Assessment     Name: Ivory Cortes  : 1950  MRN: [id number]      Date of Service: 10/5/2021    Evaluating PT:  Aileen Real, PT, DPT [id number]    Room #:  9748/8306-S  Diagnosis:  Acute decompensated heart failure ([facility] [I50.9]  PMHx/PSHx:  HTN, chronic foot pain, depression, DM, kidney disease, ESRD, HLD, CHF, chronic B knee pain, anemia, afib, h/o kidney transplant 2014, pulmonary HTN, immunosupression  Precautions:  Falls, O2 NC  Equipment Needs:  none    SUBJECTIVE:    Pt lives with wife in a bi-level home with 5 stairs to enter and 1 rail. Bed is on first floor and bath is on first floor. Pt ambulated with no AD independently PTA. OBJECTIVE:   Initial Evaluation  Date: 10/5/2021 Treatment Short Term/ Long Term   Goals   AM-PAC 6 Clicks 85/85     Was pt agreeable to Eval/treatment? yes     Does pt have pain? No c/o pain     Bed Mobility  Rolling: Independent  Supine to sit: Independent  Sit to supine: Independent  Scooting: Independent  Rolling: Independent  Supine to sit: Independent  Sit to supine: Independent  Scooting: Independent   Transfers Sit to stand: SBA  Stand to sit: SBA  Stand pivot: SBA no AD  Sit to stand: Independent  Stand to sit: Independent   Stand pivot: Independent    Ambulation    50 feet with no AD SBA  150 feet with no AD Independent   Stair negotiation: ascended and descended  NT  5 steps with 1 rail Syed   ROM BUE:  Per OT note  BLE:  WNL     Strength BUE:  Per OT note  BLE:  WNL     Balance Sitting EOB:  Independent   Dynamic Standing:  SBA no AD  Sitting EOB:  Independent  Dynamic Standing:  Independent     Pt is A & O x 4  Sensation:  Pt denies numbness and tingling to extremities  Edema:  unremarkable    Vitals:  SPo2 92% on 2L ambulating. SPo2 95% 2L seated recovery. Patient education  Pt educated on role of PT intervention. Pt educated on safety in room with utilization of call light for assistance with mobility.   Pt educated on importance of maximizing OOB time by transferring to bedside chair for meals and ambulating to bathroom/transferring to bedside commode with assistance from nursing and therapy staff to increase functional activity tolerance and overall functional independence. Patient response to education:   Pt verbalized understanding Pt demonstrated skill Pt requires further education in this area   yes yes yes     ASSESSMENT:    Conditions Requiring Skilled Therapeutic Intervention:    []Decreased strength     []Decreased ROM  [x]Decreased functional mobility  []Decreased balance   [x]Decreased endurance   []Decreased posture  []Decreased sensation  []Decreased coordination   []Decreased vision  []Decreased safety awareness   []Increased pain       Comments:  RN cleared pt for activity prior to session. Pt received supine in bed and agreeable to PT intervention at this time. Pt performed all functional mobility as noted above. Pt appears to be functioning near baseline at this time. Pt is experiencing some SOB with activity and requiring supplemental O2. Pt ambulated short distance around room and then returned to supine. Encouraged pt to increase activity levels in room to prevent further deconditioning. Pt returned to supine at end of session and left with all needs met and call light in reach. Pt requires continued skilled PT intervention for the purposes of maximizing functional mobility and independence by addressing deficits described above. Treatment:  Patient practiced and was instructed in the following treatment:     Therapeutic Activities Completed:  o Functional mobility as noted above:   - Bed mobility: Independent   - Transfer training: SBA for safety and vitals monitoring.    - Ambulation: 50 feet with 2 turns SBA no AD. Vitals monitored throughout.   Decreased delia and step length.  o Skilled repositioning in supine with HOB elevated for comfort.  o Skilled vitals monitoring as noted above.  o Pt education as noted above. Pt's/ family goals   1. Return home. Prognosis is good for reaching above PT goals. Patient and or family understand(s) diagnosis, prognosis, and plan of care. yes    PHYSICAL THERAPY PLAN OF CARE:    PT POC is established based on physician order and patient diagnosis     Referring provider/PT Order:    10/02/21 2000  PT evaluation and treat Start: 10/02/21 2000, End: 10/02/21 2000, ONE TIME, Standing Count: 1 Occurrences, R      JESUS Bravo     Diagnosis:  Acute decompensated heart failure ([address] [I50.9]  Specific instructions for next treatment:  Ambulate as tolerated. LE strengthening. Dynamic sitting/standing balance activities for improved overall functional mobility. Current Treatment Recommendations:     [x] Strengthening to improve independence with functional mobility   [x] ROM to improve independence with functional mobility   [x] Balance Training to improve static/dynamic balance and to reduce fall risk  [x] Endurance Training to improve activity tolerance during functional mobility   [x] Transfer Training to improve safety and independence with all functional transfers   [x] Gait Training to improve gait mechanics, endurance and assess need for appropriate assistive device  [x] Stair Training in preparation for safe discharge home and/or into the community   [x] Positioning to prevent skin breakdown and contractures  [x] Safety and Education Training   [x] Patient/Caregiver Education   [] HEP  [] Other     PT long term treatment goals are located in above grid    Frequency of treatments: 2-5x/week x 1-2 weeks.     Time in  0910  Time out  0920    Total Treatment Time  5 minutes     Evaluation Time includes thorough review of current medical information, gathering information on past medical history/social history and prior level of function, completion of standardized testing/informal observation of tasks, assessment of data and education on plan of care and goals.     CPT codes:  [x] Low Complexity PT evaluation 83523  [] Moderate Complexity PT evaluation 96647  [] High Complexity PT evaluation 61163  [] PT Re-evaluation 29465  [] Gait training 31988 0 minutes  [] Manual therapy 38465 0 minutes  [x] Therapeutic activities 64828 5 minutes  [] Therapeutic exercises 40581 0 minutes  [] Neuromuscular reeducation 83090 0 minutes     Renate Bruce, PT, DPT  [id number]

## 2021-10-05 NOTE — PROGRESS NOTES
INPATIENT CARDIOLOGY FOLLOW-UP    Name: Nurys Acosta    Age: 70 y.o. Date of Admission: 10/2/2021  1:22 PM    Date of Service: 10/5/2021    Chief Complaint: Follow-up for CHF and elevated troponin    Interim History:  No new overnight cardiac complaints. He is feeling much better and dyspnea and swelling are improving. Currently with no complaints of CP, palpitations, dizziness, or lightheadedness. Off telemetry. Review of Systems:   Cardiac: As per HPI  General: No fever, chills  Pulmonary: As per HPI  HEENT: No visual disturbances, difficult swallowing  GI: No nausea, vomiting  Endocrine: No thyroid disease or DM  Musculoskeletal: TREVINO x 4, no focal motor deficits  Skin: Intact, no rashes  Neuro/Psych: No headache or seizures    Problem List:  Patient Active Problem List   Diagnosis    GERD (gastroesophageal reflux disease)    CKD (chronic kidney disease) stage 4, GFR 15-29 ml/min ([facility])    Mixed hyperlipidemia    Diabetes mellitus, type 2 ([address]    HTN (hypertension), benign    Junctional bradycardia    Anemia in stage 3 chronic kidney disease ([address]    CAD (coronary artery disease), native coronary artery    Acute CHF (congestive heart failure) ([facility])    Chest pain    Respiratory failure ([facility])    Acute kidney injury ([address]    Vomiting and diarrhea    Acute kidney injury superimposed on CKD ([facility])    Chronic diastolic congestive heart failure ([address]    H/O kidney transplant    Pulmonary hypertension ([address]    Immunosuppression ([address]    Pneumonia due to COVID-19 virus    Acute renal insufficiency    GI bleed    Acute blood loss anemia    Acute combined systolic and diastolic CHF, NYHA class 1 ([facility])    Chronic kidney disease    Anemia of chronic disease    Hyperkalemia    Essential hypertension    Type 2 diabetes mellitus, with long-term current use of insulin ([facility])    Obesity (BMI 30-39. 9)    Acute decompensated heart failure ([facility])       Allergies:  No Known Allergies    Current Medications:  Current Facility-Administered Medications   Medication Dose Route Frequency Provider Last Rate Last Admin    bumetanide (BUMEX) tablet 2 mg  2 mg Oral Daily Pedro Garcia MD        heparin (porcine) injection 5,000 Units  5,000 Units SubCUTAneous Q8H Natacha Bernal MD   5,000 Units at 10/05/21 0600    epoetin rowan-epbx (RETACRIT) injection 3,000 Units  30 Units/kg SubCUTAneous Once per day on Mon Wed Fri Pedro Garcia MD   3,000 Units at 10/04/21 1746    aspirin EC tablet 81 mg  81 mg Oral Daily Hector Ornelas PA   81 mg at 10/05/21 0944    hydrALAZINE (APRESOLINE) tablet 100 mg  100 mg Oral TID Hector Ornelas PA   100 mg at 10/05/21 9857    isosorbide mononitrate (IMDUR) extended release tablet 60 mg  60 mg Oral Daily Hector Ornelas PA   60 mg at 10/05/21 2024    metoprolol succinate (TOPROL XL) extended release tablet 100 mg  100 mg Oral Daily Hector Ornelas PA   100 mg at 10/05/21 6206    mycophenolate (CELLCEPT) capsule 500 mg  500 mg Oral BID Brissayrene Johnson PA   500 mg at 10/05/21 8103    oxyCODONE HCl (OXY-IR) immediate release tablet 20 mg  20 mg Oral Q8H PRN Hector Ornelas PA   20 mg at 10/04/21 2153    predniSONE (DELTASONE) tablet 5 mg  5 mg Oral Daily Brissayrene Johnson PA   5 mg at 10/05/21 0943    atorvastatin (LIPITOR) tablet 10 mg  10 mg Oral Daily Brissayrene Johnson PA   10 mg at 10/05/21 2690    sodium bicarbonate tablet 650 mg  650 mg Oral 4x Daily Brissayrene Johnson PA   650 mg at 10/05/21 1204    tacrolimus (PROGRAF) capsule 3 mg  3 mg Oral BID Hector Ornelas PA   3 mg at 10/05/21 0946    insulin lispro (HUMALOG) injection vial 0-6 Units  0-6 Units SubCUTAneous TID WC Hector Ornelas PA   1 Units at 10/05/21 1235    insulin lispro (HUMALOG) injection vial 0-3 Units  0-3 Units SubCUTAneous Nightly Hector Ornelas PA   2 Units at 10/04/21 2154    glucose (GLUTOSE) 40 % oral gel 15 g  15 g Oral PRN JESUS Fowler        dextrose 50 % IV solution 12.5 g IntraVENous PRN JESUS Ladd        glucagon (rDNA) injection 1 mg  1 mg IntraMUSCular PRN JESUS Ladd        dextrose 5 % solution  100 mL/hr IntraVENous PRN JESUS Ladd        sodium chloride flush 0.9 % injection 5-40 mL  5-40 mL IntraVENous 2 times per day JESUS aLdd   10 mL at 10/05/21 1026    sodium chloride flush 0.9 % injection 5-40 mL  5-40 mL IntraVENous PRN JESUS Ladd        0.9 % sodium chloride infusion  25 mL IntraVENous PRN JESUS Ladd        ondansetron (ZOFRAN-ODT) disintegrating tablet 4 mg  4 mg Oral Q8H PRN JESUS Ladd        Or    ondansetron (ZOFRAN) injection 4 mg  4 mg IntraVENous Q6H PRN JESUS Ladd        polyethylene glycol (GLYCOLAX) packet 17 g  17 g Oral Daily PRN JESUS Ladd        acetaminophen (TYLENOL) tablet 650 mg  650 mg Oral Q6H PRN JESUS Ladd        Or    acetaminophen (TYLENOL) suppository 650 mg  650 mg Rectal Q6H PRN JESUS Ladd        ipratropium-albuterol (DUONEB) nebulizer solution 1 ampule  1 ampule Inhalation Q4H WA JESUS Ladd   1 ampule at 10/05/21 0753      dextrose      sodium chloride         Physical Exam:  BP (!) 157/72   Pulse 75   Temp 97.8 °F (36.6 °C) (Temporal)   Resp 18   Ht 5' 11\" (1.803 m)   Wt 221 lb 8 oz (100.5 kg)   SpO2 96%   BMI 30.89 kg/m²   Wt Readings from Last 3 Encounters:   10/04/21 221 lb 8 oz (100.5 kg)   07/29/21 208 lb 12.8 oz (94.7 kg)   06/24/21 215 lb (97.5 kg)     Appearance: Awake, alert, no acute respiratory distress  Skin: Intact, no rash  Head: Normocephalic, atraumatic  Eyes: EOMI, no conjunctival erythema  ENMT: No pharyngeal erythema, MMM, no rhinorrhea  Neck: Supple, no elevated JVP, no carotid bruits  Lungs: Clear to auscultation bilaterally. No wheezes, rales, or rhonchi.   Cardiac: Regular rate and rhythm, +S1S2, no murmurs apparent  Abdomen: Soft, nontender, +bowel sounds  Extremities: Moves all extremities x 4, 1 +lower extremity edema  Neurologic: No focal motor deficits apparent, normal mood and affect  Peripheral Pulses: Intact posterior tibial pulses bilaterally    Intake/Output:    Intake/Output Summary (Last 24 hours) at 10/5/2021 1435  Last data filed at 10/4/2021 2153  Gross per 24 hour   Intake 10 ml   Output 200 ml   Net -190 ml     No intake/output data recorded. Laboratory Tests:  Recent Labs     10/04/21  0639 10/04/21  1439 10/05/21  0522    137 137   K 5.4* 5.5* 4.7   CL 96* 96* 95*   CO2 27 33* 30*   BUN 64* 65* 61*   CREATININE 4.5* 4.6* 4.3*   GLUCOSE 137* 193* 120*   CALCIUM 8.7 9.4 8.9     Lab Results   Component Value Date    MG 2.2 10/05/2021     No results for input(s): ALKPHOS, ALT, AST, PROT, BILITOT, BILIDIR, LABALBU in the last 72 hours.   Recent Labs     10/03/21  1700 10/05/21  0522   WBC 4.6 4.4*   RBC 3.91 3.99   HGB 10.2* 9.9*   HCT 32.8* 34.5*   MCV 83.9 86.5   MCH 26.1 24.8*   MCHC 31.1* 28.7*   RDW 14.8 14.8    296   MPV 9.5 9.0     Lab Results   Component Value Date    CKTOTAL 31 10/03/2021    CKMB 2.2 10/03/2021    TROPONINI 0.07 (H) 04/26/2021    TROPONINI 0.08 (H) 04/10/2021    TROPONINI 0.10 (H) 04/09/2021     Lab Results   Component Value Date    INR 1.0 08/31/2020    INR 1.2 04/27/2017    INR 1.1 07/24/2015    PROTIME 11.3 08/31/2020    PROTIME 13.2 (H) 04/27/2017    PROTIME 11.9 07/24/2015     Lab Results   Component Value Date    TSH 0.996 06/03/2014     Lab Results   Component Value Date    LABA1C 5.5 06/03/2014     No results found for: EAG  Lab Results   Component Value Date    CHOL 135 10/03/2021    CHOL 128 06/03/2014    CHOL 101 05/23/2013     Lab Results   Component Value Date    TRIG 100 10/03/2021    TRIG 50 06/03/2014    TRIG 160 (H) 05/23/2013     Lab Results   Component Value Date    HDL 55 10/03/2021    HDL 51 06/03/2014    HDL 21.0 (A) 05/23/2013     Lab Results   Component Value Date    LDLCALC 60 10/03/2021    [address] 06/03/2014 LDLCALC 48 05/23/2013     Lab Results   Component Value Date    LABVLDL 20 10/03/2021    LABVLDL 10 06/03/2014     No results found for: [facility]    Cardiac Tests:  Telemetry findings reviewed: Off the monitor     EKG: Normal sinus rhythm, LVH, inferior infarct age undetermined, ST-T changes suggestive lateral wall ischemia, abnormal EKG.    Vitals and labs were reviewed: Blood pressure 157/72 heart rate 75 sats 96 % on 2 L.     Labs-BUN/creatinine 50/3.6>> 65/4.6>> 61/4.3, potassium 5.9>> 5.5>> 4.7, proBNP 22 645, troponin, high-sensitivity 127>> 118, cholesterol 135, HDL 55, LDL 60 triglycerides 100, liver function normal H&H 10.2/32.8>> 9.9/34.5, serum iron low at 38 TIBC 173 iron sats 17     1. Stress Myoview 07/12/2012. Reversible ischemia in the mid anterior wall. EF 45%.     2. Lexiscan MPS 11/21/2013. Normal perfusion study. EF 63%.       3. Echo 07/20/2014. EF normal. Stage I diastolic dysfunction, otherwise no valvular heart disease. Mild LAE.     4. Echo, 07/21/2015. Stage I diastolic dysfunction. EF 65%. Normal sized LA. No valvular heart disease. Small circumferential pericardial effusion.     5. Lexiscan MPS, 07/26/2015: Small area of reversible ischemia in the lateral wall. EF 48%.      6. TTE (4/3/2020, [facility]) CONCLUSIONS: Technically difficult exam due to body habitus. Exam indication: Evaluation of known heart failure to guide therapy. The left ventricle is normal in size. There is moderate left ventricular hypertrophy. Left ventricular systolic function is normal. EF = 67 ± 5% (2D biplane) Grade II left ventricular diastolic dysfunction. Global strain not reported due to poor tracking. The right ventricle is normal in size. Right ventricular systolic function is normal. The left atrial cavity is severely dilated. Exam was compared with the prior  echocardiographic exam performed on 10/19/2016.  Similar findings.     7. NM cardiac amyloid SPECT: 2/5/2021: [facility]: CONCLUSIONS:  Not Consistent with TTR amyloidosis: A semi-quantitative visual score of 0 or H/CL ratio < 1.25  2. Incidental Findings from limited non-diagnostic CTAC:  - Coronary calcifications visualized.      8. Echocardiogram 04/13/2021 (Dr. Jess Maria): EF 60-65%. Stage I Diastolic Dysfunction.         Assessment:  · Acute on chronic HFpEF, improving  · One  episode of atypical chest pain will rule out ischemia  · History of ESRD status post renal transplant underwent in December 2014, now has a CKD stage IV with rising creatinine level creatinine 3.6>> 4.3>> 4.5>> 4.6  · Chronically elevated troponin level without any EKG changes  · Hypertension, not well controlled secondary to volume overload  · Hyperlipidemia on statin  · Type 2 diabetes  · Chronic anemia with some iron deficiency complement  · Obstructive sleep apnea compliant with CPAP   · GERD  · Mild hyperkalemia        Plan:   · Diuretics were changed to oral therapy and currently getting Bumex 2 mg p.o. daily. · Once he is euvolemic and then will consider Lexiscan nuclear stress test to rule out ischemia, probably on Wednesday. · Recent echocardiogram done in April 2021 showed normal LV function with stage I diastolic dysfunction  · Continue rest of the current home medications    Silverio Henriquez MD., Ashish Gaines.   [facility]) Cardiology

## 2021-10-06 ENCOUNTER — HOSPITAL ENCOUNTER (OUTPATIENT)
Dept: INFUSION THERAPY | Age: 71
Setting detail: INFUSION SERIES
Discharge: HOME | End: 2021-10-06
Payer: MEDICARE

## 2021-10-06 DIAGNOSIS — D63.8 ANEMIA OF CHRONIC DISEASE: Primary | ICD-10-CM

## 2021-10-06 LAB
ANION GAP SERPL CALCULATED.3IONS-SCNC: 9 MMOL/L (ref 7–16)
BUN BLDV-MCNC: 57 MG/DL (ref 6–23)
CALCIUM SERPL-MCNC: 9.2 MG/DL (ref 8.6–10.2)
CHLORIDE BLD-SCNC: 95 MMOL/L (ref 98–107)
CO2: 32 MMOL/L (ref 22–29)
CREAT SERPL-MCNC: 3.7 MG/DL (ref 0.7–1.2)
GFR AFRICAN AMERICAN: 20
GFR NON-AFRICAN AMERICAN: 20 ML/MIN/1.73
GLUCOSE BLD-MCNC: 120 MG/DL (ref 74–99)
MAGNESIUM: 2.2 MG/DL (ref 1.6–2.6)
METER GLUCOSE: 129 MG/DL (ref 74–99)
METER GLUCOSE: 160 MG/DL (ref 74–99)
METER GLUCOSE: 186 MG/DL (ref 74–99)
METER GLUCOSE: 221 MG/DL (ref 74–99)
POTASSIUM SERPL-SCNC: 5.2 MMOL/L (ref 3.5–5)
SODIUM BLD-SCNC: 136 MMOL/L (ref 132–146)

## 2021-10-06 PROCEDURE — 1200000000 HC SEMI PRIVATE

## 2021-10-06 PROCEDURE — 82962 GLUCOSE BLOOD TEST: CPT

## 2021-10-06 PROCEDURE — 6370000000 HC RX 637 (ALT 250 FOR IP): Performed by: INTERNAL MEDICINE

## 2021-10-06 PROCEDURE — 6360000002 HC RX W HCPCS: Performed by: INTERNAL MEDICINE

## 2021-10-06 PROCEDURE — 83735 ASSAY OF MAGNESIUM: CPT

## 2021-10-06 PROCEDURE — 2700000000 HC OXYGEN THERAPY PER DAY

## 2021-10-06 PROCEDURE — 94640 AIRWAY INHALATION TREATMENT: CPT

## 2021-10-06 PROCEDURE — 2580000003 HC RX 258: Performed by: PHYSICIAN ASSISTANT

## 2021-10-06 PROCEDURE — 6370000000 HC RX 637 (ALT 250 FOR IP): Performed by: PHYSICIAN ASSISTANT

## 2021-10-06 PROCEDURE — 99232 SBSQ HOSP IP/OBS MODERATE 35: CPT | Performed by: INTERNAL MEDICINE

## 2021-10-06 PROCEDURE — 6360000002 HC RX W HCPCS: Performed by: PHYSICIAN ASSISTANT

## 2021-10-06 PROCEDURE — 80048 BASIC METABOLIC PNL TOTAL CA: CPT

## 2021-10-06 PROCEDURE — 36415 COLL VENOUS BLD VENIPUNCTURE: CPT

## 2021-10-06 RX ADMIN — ISOSORBIDE MONONITRATE 60 MG: 60 TABLET ORAL at 09:09

## 2021-10-06 RX ADMIN — HYDRALAZINE HYDROCHLORIDE 100 MG: 50 TABLET, FILM COATED ORAL at 14:38

## 2021-10-06 RX ADMIN — TACROLIMUS 3 MG: 1 CAPSULE ORAL at 21:09

## 2021-10-06 RX ADMIN — BUMETANIDE 2 MG: 1 TABLET ORAL at 09:08

## 2021-10-06 RX ADMIN — HYDRALAZINE HYDROCHLORIDE 100 MG: 50 TABLET, FILM COATED ORAL at 21:08

## 2021-10-06 RX ADMIN — SODIUM BICARBONATE 650 MG: 650 TABLET ORAL at 18:26

## 2021-10-06 RX ADMIN — EPOETIN ALFA-EPBX 3000 UNITS: 4000 INJECTION, SOLUTION INTRAVENOUS; SUBCUTANEOUS at 09:22

## 2021-10-06 RX ADMIN — INSULIN LISPRO 1 UNITS: 100 INJECTION, SOLUTION INTRAVENOUS; SUBCUTANEOUS at 21:09

## 2021-10-06 RX ADMIN — HEPARIN SODIUM 5000 UNITS: 10000 INJECTION INTRAVENOUS; SUBCUTANEOUS at 14:38

## 2021-10-06 RX ADMIN — ATORVASTATIN CALCIUM 10 MG: 10 TABLET, FILM COATED ORAL at 09:09

## 2021-10-06 RX ADMIN — MYCOPHENOLATE MOFETIL 500 MG: 250 CAPSULE ORAL at 09:09

## 2021-10-06 RX ADMIN — SODIUM BICARBONATE 650 MG: 650 TABLET ORAL at 21:09

## 2021-10-06 RX ADMIN — HYDRALAZINE HYDROCHLORIDE 100 MG: 50 TABLET, FILM COATED ORAL at 09:09

## 2021-10-06 RX ADMIN — PREDNISONE 5 MG: 5 TABLET ORAL at 09:09

## 2021-10-06 RX ADMIN — METOPROLOL SUCCINATE 100 MG: 100 TABLET, EXTENDED RELEASE ORAL at 09:09

## 2021-10-06 RX ADMIN — HEPARIN SODIUM 5000 UNITS: 10000 INJECTION INTRAVENOUS; SUBCUTANEOUS at 21:11

## 2021-10-06 RX ADMIN — MYCOPHENOLATE MOFETIL 500 MG: 250 CAPSULE ORAL at 21:08

## 2021-10-06 RX ADMIN — ASPIRIN 81 MG: 81 TABLET, COATED ORAL at 09:09

## 2021-10-06 RX ADMIN — Medication 10 ML: at 21:09

## 2021-10-06 RX ADMIN — Medication 10 ML: at 09:15

## 2021-10-06 RX ADMIN — TACROLIMUS 3 MG: 1 CAPSULE ORAL at 09:09

## 2021-10-06 RX ADMIN — IPRATROPIUM BROMIDE AND ALBUTEROL SULFATE 1 AMPULE: .5; 2.5 SOLUTION RESPIRATORY (INHALATION) at 15:09

## 2021-10-06 RX ADMIN — SODIUM BICARBONATE 650 MG: 650 TABLET ORAL at 09:09

## 2021-10-06 RX ADMIN — IPRATROPIUM BROMIDE AND ALBUTEROL SULFATE 1 AMPULE: .5; 2.5 SOLUTION RESPIRATORY (INHALATION) at 10:31

## 2021-10-06 RX ADMIN — INSULIN LISPRO 2 UNITS: 100 INJECTION, SOLUTION INTRAVENOUS; SUBCUTANEOUS at 18:26

## 2021-10-06 ASSESSMENT — PAIN SCALES - GENERAL
PAINLEVEL_OUTOF10: 0
PAINLEVEL_OUTOF10: 0

## 2021-10-06 NOTE — PROGRESS NOTES
Cardiac stress called(SPOKE WITH DANNY) they will get consent signed down there . Informed hes diabetic , its ok to give juice and crackers, no caffeine  now then make npo. He will be las t d/t isolation.

## 2021-10-06 NOTE — PROGRESS NOTES
Constantin Gonzalez MD, FACP                   Patient Name: Mera Jonas                   Age:  70 y.o. Gender:   male    CC: Shortness of breath with episodic cough    HPI: This patient states that he had talked to his PCP and had a Covid test yesterday which was reported as positive. He has been noting progressive dyspnea on exertion, orthopnea and increased leg swelling  He also has a cough which is productive of sputum  He has had no fevers but reports chills  He also reports anorexia    He was evaluated in the emergency room and admitted for congestive heart failure  His Covid test was negative but he was positive for respiratory syncytial virus  His labs demonstrated a BUN of 50 and a creatinine of 3.6  proBNP was 22,645  Hemoglobin was 10.6 the patient does have a history of iron deficiency anemia.   This morning his vitals are satisfactory  He has no fever  His pulse is 64  He does not have an oxygen requirement and is breathing satisfactorily on room air    Chart review demonstrates that an echo was performed in April 2021 which showed an ejection fraction of 60 to 65% and was otherwise normal  He does follow with nephrology for stage IIIb renal failure  And he receives infusions for his anemia  His earlier BUN and creatinine appear to be similar    10/4  Subsequent to yesterday's evaluation patient was evaluated by nephrology  Defined end-stage renal disease with renal allograft placed on 12/12/2014    This morning the patient states that he is feeling better  He denies any shortness of breath  He is on oxygen support-requiring only 2 L and maintaining at 100% saturation  There has been no fever  He also has a fine pre and post capillary pulmonary hypertension  His blood pressure is 114/53    10/5  Patient feels quite well this morning  Interviewed and examined in presence of wife  He is breathing much better  Denies shortness of breath  Very little cough  He is down to 2 L nasal cannula at 96%  Blood pressure is better and he is afebrile  Cardiology has ordered the continuation of Bumex 1 mg p.o. twice daily and continued monitoring of renal function  Once he is euvolemic they will consider a Lexiscan scan  Nephrology indicates he is stable  ADAM has started    10/6  Patient is seated comfortably at the side of the bed  He has ambulated about the room  Still has some dyspnea on exertion  Denies any chest pain  Oxygen saturation at 96% on 2 L  Vitals are otherwise normal  Notes that he is for stress test today  Had several questions all of which were answered    Reviewed all the consultants notes          Past Medical History:   Diagnosis Date    Anemia     Iron deficiency    Atrial fibrillation ([address]     CHF (congestive heart failure) ([address] 03/12/2008    Chronic diastolic congestive heart failure ([address] 03/12/2008    Chronic foot pain     Chronic knee pain     BILATERAL    Constipation     Depression     DM (diabetes mellitus) ([address]     ESRD (end stage renal disease) ([address]     H/O kidney transplant 12/12/2014    Hyperlipidemia     Hypertension     Immunosuppression ([address] 3/12/2008    Kidney disease     Pulmonary hypertension ([address]        Past Surgical History:   Procedure Laterality Date    BACK SURGERY  1982    BLADDER SURGERY      CARPAL TUNNEL RELEASE Left 06/23/2014    CHOLECYSTECTOMY, LAPAROSCOPIC  05/21/2013    COLONOSCOPY      DIAGNOSTIC CARDIAC CATH LAB PROCEDURE  01/15/2013    NORMAL    ECHO COMPL W DOP COLOR FLOW  05/22/2013         ENDOSCOPY, COLON, DIAGNOSTIC      FOOT SURGERY      BONE REMOVED    KIDNEY BIOPSY  06/09/2015    [facility] ; ALSO 4/14/2016, 4/5/2017    KIDNEY TRANSPLANT  12/12/2014    @ [facility]    EDUARDO-EN-Y GASTRIC BYPASS  06/29/2010    Laparoscopic / Dr. Adam Corona  12/21/2007    Dr. Royer Weiss UPPER GASTROINTESTINAL ENDOSCOPY N/A 4/28/2021    EGD in OR 12--COVID performed by Cindy Reyes MD at [address] [place] [address] of Systems:   · General: As per HPI. Denies fever  He appears to be comfortable and he is oxygenating quite well  He has minimal cough now    Physical Examination:      Wt Readings from Last 3 Encounters:   10/04/21 221 lb 8 oz (100.5 kg)   07/29/21 208 lb 12.8 oz (94.7 kg)   06/24/21 215 lb (97.5 kg)     Temp Readings from Last 3 Encounters:   10/06/21 97.8 °F (36.6 °C) (Temporal)   09/29/21 98.6 °F (37 °C) (Temporal)   09/22/21 98.6 °F (37 °C) (Temporal)     BP Readings from Last 3 Encounters:   10/06/21 (!) 148/70   09/29/21 (!) 155/60   09/22/21 (!) 100/48     Pulse Readings from Last 3 Encounters:   10/06/21 72   09/29/21 61   09/22/21 69       General appearance: Normal, awake, alert no distress. Face: Much less puffy appearing  Eyes: Conjunctivae/cornea clear. Maylon Sly. Sclera non icteric. Neck:  Symmetric. No adenopathy. Short and stout  Chest: Reduced but even excursion   Lungs: Clear to auscultation. No rhonchi, crackles or rales. Bibasilar hypoventilation  Heart: S1 > S2. Rhythm is regular and rate is normal. No gallop rub or murmur. Abdomen: Soft, mildly protuberant, non-tender. BS normal. No masses, organomegaly. Anatomic contours appear normal.  Extremities: There is peripheral edema this is significantly improved  Musculoskeletal: No unusual pain or swelling. Muscular strength intact.    Neuro:   · Grossly normal exam  Gait & balance: Independently ambulatory     Labs     CBC:   Lab Results   Component Value Date    WBC 4.4 10/05/2021    RBC 3.99 10/05/2021    HGB 9.9 10/05/2021    HCT 34.5 10/05/2021     10/05/2021    MCV 86.5 10/05/2021     BMP:    Lab Results   Component Value Date     10/06/2021    K 5.2 10/06/2021    K 5.4 06/12/2021    CL 95 10/06/2021    CO2 32 10/06/2021    BUN 57 10/06/2021    CREATININE 3.7 10/06/2021    GLUCOSE 120 10/06/2021    GLUCOSE 124 02/21/2012    CALCIUM 9.2 10/06/2021     Hepatic Function Panel:    Lab Results   Component Value Date    ALKPHOS 63 10/02/2021    AST 25 10/02/2021    ALT 10 10/02/2021    PROT 6.5 10/02/2021    LABALBU 3.6 10/02/2021    LABALBU 3.8 07/05/2011    BILITOT 0.4 10/02/2021     Magnesium:    Lab Results   Component Value Date    MG 2.2 10/06/2021     Cardiac Enzymes:   Lab Results   Component Value Date    CKTOTAL 31 10/03/2021    CKTOTAL 36 06/12/2021    CKTOTAL 71 06/11/2021    CKMB 2.2 10/03/2021    CKMB 4.5 07/25/2015    CKMB 5.0 07/25/2015    TROPONINI 0.07 (H) 04/26/2021    TROPONINI 0.08 (H) 04/10/2021    TROPONINI 0.10 (H) 04/09/2021     LDH:    Lab Results   Component Value Date     04/15/2021     PT/INR:    Lab Results   Component Value Date    PROTIME 11.3 08/31/2020    INR 1.0 08/31/2020     BNP: No results for input(s): BNP in the last 72 hours.    TSH:   Lab Results   Component Value Date    TSH 0.996 06/03/2014      Cardiac Injury Profile:   Recent Labs     10/03/21  1700   CKTOTAL 31   CKMB 2.2      Lipid Profile:   Lab Results   Component Value Date    TRIG 100 10/03/2021    HDL 55 10/03/2021    LDLCALC 60 10/03/2021    CHOL 135 10/03/2021      Hemoglobin A1C: No components found for: HGBA1C   U/A:   Lab Results   Component Value Date    LEUKOCYTESUR Negative 04/10/2021    PHUR 5.5 04/10/2021    WBCUA 0-1 04/10/2021    RBCUA 0-1 04/10/2021    RBCUA NONE 05/18/2013    BACTERIA RARE 04/10/2021    SPECGRAV 1.020 04/10/2021    BLOODU Negative 04/10/2021    GLUCOSEU Negative 04/10/2021         ADMISSION SCHEDULED MEDS:   Current Facility-Administered Medications   Medication Dose Route Frequency Provider Last Rate Last Admin    bumetanide (BUMEX) tablet 2 mg  2 mg Oral Daily Javier Gómez MD   2 mg at 10/06/21 0908    regadenoson (LEXISCAN) injection 0.4 mg  0.4 mg IntraVENous ONCE PRN Luis Castillo MD        heparin (porcine) injection 5,000 Units  5,000 Units SubCUTAneous Q8H Zena Gonzalez MD   5,000 Units at 10/05/21 2215    epoetin rowan-epbx (RETACRIT) injection 3,000 Units  30 Units/kg SubCUTAneous Once per day on Mon Wed Fri Deborah Denis MD   3,000 Units at 10/06/21 5483    aspirin EC tablet 81 mg  81 mg Oral Daily JESUS Early   81 mg at 10/06/21 1327    hydrALAZINE (APRESOLINE) tablet 100 mg  100 mg Oral TID JESUS Early   100 mg at 10/06/21 8947    isosorbide mononitrate (IMDUR) extended release tablet 60 mg  60 mg Oral Daily JESUS Early   60 mg at 10/06/21 8598    metoprolol succinate (TOPROL XL) extended release tablet 100 mg  100 mg Oral Daily JESUS Early   100 mg at 10/06/21 2823    mycophenolate (CELLCEPT) capsule 500 mg  500 mg Oral BID JESUS Early   500 mg at 10/06/21 8443    oxyCODONE HCl (OXY-IR) immediate release tablet 20 mg  20 mg Oral Q8H PRN JESUS Early   20 mg at 10/05/21 1952    predniSONE (DELTASONE) tablet 5 mg  5 mg Oral Daily JESUS Early   5 mg at 10/06/21 8611    atorvastatin (LIPITOR) tablet 10 mg  10 mg Oral Daily JESUS Early   10 mg at 10/06/21 7084    sodium bicarbonate tablet 650 mg  650 mg Oral 4x Daily JESUS Early   650 mg at 10/06/21 7703    tacrolimus (PROGRAF) capsule 3 mg  3 mg Oral BID JESUS Early   3 mg at 10/06/21 7416    insulin lispro (HUMALOG) injection vial 0-6 Units  0-6 Units SubCUTAneous TID WC JESUS Early   1 Units at 10/05/21 1825    insulin lispro (HUMALOG) injection vial 0-3 Units  0-3 Units SubCUTAneous Nightly JESUS Early   2 Units at 10/04/21 2154    glucose (GLUTOSE) 40 % oral gel 15 g  15 g Oral PRN JESUS Early        dextrose 50 % IV solution  12.5 g IntraVENous PRN JESUS Early        glucagon (rDNA) injection 1 mg  1 mg IntraMUSCular PRN JESUS Early        dextrose 5 % solution  100 mL/hr IntraVENous PRN JESUS Early        sodium chloride flush 0.9 % injection 5-40 mL  5-40 mL IntraVENous 2 times per day JESUS Snyder   10 mL at 10/06/21 0915    sodium chloride flush 0.9 % injection 5-40 mL  5-40 mL IntraVENous PRN JESUS Snyder        0.9 % sodium chloride infusion  25 mL IntraVENous PRN JESUS Snyder        ondansetron (ZOFRAN-ODT) disintegrating tablet 4 mg  4 mg Oral Q8H PRN JESUS Snyder        Or    ondansetron (ZOFRAN) injection 4 mg  4 mg IntraVENous Q6H PRN JESUS Snyder        polyethylene glycol (GLYCOLAX) packet 17 g  17 g Oral Daily PRN JESUS Snyder        acetaminophen (TYLENOL) tablet 650 mg  650 mg Oral Q6H PRN JESUS Snyder        Or    acetaminophen (TYLENOL) suppository 650 mg  650 mg Rectal Q6H PRN JESUS Snyder        ipratropium-albuterol (DUONEB) nebulizer solution 1 ampule  1 ampule Inhalation Q4H WA JESUS Snyder   1 ampule at 10/05/21 2051     Facility-Administered Medications Ordered in Other Encounters   Medication Dose Route Frequency Provider Last Rate Last Admin    epoetin rowan-epbx (RETACRIT) injection 10,000 Units  10,000 Units SubCUTAneous Once Deb Diallo MD           Current  Infusions   dextrose      sodium chloride         Prn Meds  regadenoson, oxyCODONE, glucose, dextrose, glucagon (rDNA), dextrose, sodium chloride flush, sodium chloride, ondansetron **OR** ondansetron, polyethylene glycol, acetaminophen **OR** acetaminophen    Radiology Review:  XR CHEST PORTABLE   Final Result   1. Hazy multifocal bilateral pulmonary infiltrates.          NM Cardiac Stress Test Nuclear Imaging    (Results Pending)         ASSESSMENT:  Active diagnoses treated at this admission:  · Acute on chronic diastolic congestive heart failure with preserved ejection fraction-appears improved  · CKD-end-stage renal disease currently stabilized  · Type 2 diabetes mellitus  · RSV bronchitis improving    Problem list:  Patient Active Problem List   Diagnosis    GERD (gastroesophageal reflux disease)    CKD (chronic kidney disease) stage 4, GFR 15-29 ml/min (HCC)    Mixed hyperlipidemia    Diabetes mellitus, type 2 (HCC)    HTN (hypertension), benign    Junctional bradycardia    Anemia in stage 3 chronic kidney disease (HCC)    CAD (coronary artery disease), native coronary artery    Acute CHF (congestive heart failure) (HCC)    Chest pain    Respiratory failure (HCC)    Acute kidney injury (HCC)    Vomiting and diarrhea    Acute kidney injury superimposed on CKD (HCC)    Chronic diastolic congestive heart failure ([address]    H/O kidney transplant    Pulmonary hypertension (HCC)    Immunosuppression (HCC)    Pneumonia due to COVID-19 virus    Acute renal insufficiency    GI bleed    Acute blood loss anemia    Acute combined systolic and diastolic CHF, NYHA class 1 (HCC)    Chronic kidney disease    Anemia of chronic disease    Hyperkalemia    Essential hypertension    Type 2 diabetes mellitus, with long-term current use of insulin ([facility])    Obesity (BMI 30-39. 9)    Acute decompensated heart failure ([facility])       PLAN:  Reviewed and updated orders  Patient is receiving oral Bumex and appears to be stable  DVT prophylaxis on heparin now  Hemoglobin is also stable  Sliding scale insulin with hypoglycemic protocol  Aerosol treatments, steroids low-dose for suppression treatment currently  Continuation of his renal medications  Continuation of CellCept and Prograf  Reviewed all consultations  Monitor glycemia  For stress test today  If no other intervention planned possible discharge following stress      See  Orders  Lynda Cummings MD, Nacho Parson, American Board of Internal Medicine  Diplomate, [address] of Internal Medicine  10:13 AM  10/6/2021

## 2021-10-06 NOTE — CARE COORDINATION
Spoke with Pt by room phone about Transition Plan of Care. Pt lives with his wife with 5 steps in the bi-level home. Pt was independent prior to admission. Pt has home oxygen but does not remember the company name. PCP: Dr. Glen Dakins III. Pharmacy: [facility] [place] [facility]. Family will provide transport at discharge. No HX of HHC. Pt declined HHC. Discharge plan is to return home with no needs. SW to follow for discharge needs.  Pt to get Lesixscan stress test.    Mahin Shahid, L.S.W.  [phone]

## 2021-10-06 NOTE — PROGRESS NOTES
[facility]  Nephrology Attending Progress Note  Jeromy Franco. Mel Wheeler MD        SUBJECTIVE:   From consult 10/3/21: This is a 70 y.o. male with a H/O Chronic HFpEF, combined pre-/post capillary pulmonary hypertension, hypertension, obesity, type 2 diabetes mellitus, GERD, hyperlipidemia, end-stage renal disease status post kidney transplant 12/2014 ([facility]), OLINDA (compliant with CPAP), former smoker (quit 1991) who now presents to ED with worsening shortness of breath over the past few days. Vitals upon arrival included BP (!) 155/71   Pulse 69   Temp 98.5 °F (36.9 °C) (Temporal)   Resp 18   Ht 5' 11\" (1.803 m)   Wt 212 lb (96.2 kg)   SpO2 100%   BMI 29.57 kg/m². Pertinent labs included WBC 5.9, hemoglobin 10.6, hematocrit 36.4 and platelet count 138. BMP revealed sodium 138, potassium 5.9, chloride 98, CO2 27, BUN 50 and creatinine 3.6. BNP 22,645. Chest x-ray showed bilateral infiltrates and pulmonary edema. ED course included treatment for the hyperkalemia and IV diuretics. He was subsequently admitted with acute decompensated heart failure.     Patient now examined sitting up in bed in no acute distress. He does state complaints of gradual shortness of breath x2 days prior to arrival. Patient reported that he went to [facility] on 10/1/2021 and tested positive for Covid-19.  In addition to shortness of breath he reports having orthopnea and lower extremity edema along with intermittent midsternal tightness and diffuse abdominal pain.  He reports a white productive cough and chills.  He has been taking his home medications including diuretics with normal urine output. 10/4/21- resting in bed, no acute distress.      10/5: pt seen in room, no complaints, no cp or sob    10/6: pt seen in room, feels ok, sob improved      PROBLEM LIST:    Patient Active Problem List   Diagnosis    GERD (gastroesophageal reflux disease)    CKD (chronic kidney disease) stage 4, GFR 15-29 ml/min ([facility])    Mixed hours) at 10/6/2021 1309  Last data filed at 10/6/2021 0020  Gross per 24 hour   Intake --   Output 300 ml   Net -300 ml         Wt Readings from Last 3 Encounters:   10/04/21 221 lb 8 oz (100.5 kg)   07/29/21 208 lb 12.8 oz (94.7 kg)   06/24/21 215 lb (97.5 kg)       Constitutional:  Pt is in no acute distress  Head: normocephalic, atraumatic  Neck: no JVD  Cardiovascular: S1 S2 no S3 or rub  Respiratory:  Clear upper, diminisihed in bases  Gastrointestinal:  Soft, nontender,  bowel sounds x 4  Ext: + edema   Skin: dry, no rash  Neuro: alert and oriented    MEDS (scheduled):    bumetanide  2 mg Oral Daily    heparin (porcine)  5,000 Units SubCUTAneous Q8H    epoetin rowan-epbx  30 Units/kg SubCUTAneous Once per day on Mon Wed Fri    aspirin  81 mg Oral Daily    hydrALAZINE  100 mg Oral TID    isosorbide mononitrate  60 mg Oral Daily    metoprolol succinate  100 mg Oral Daily    mycophenolate  500 mg Oral BID    predniSONE  5 mg Oral Daily    atorvastatin  10 mg Oral Daily    sodium bicarbonate  650 mg Oral 4x Daily    tacrolimus  3 mg Oral BID    insulin lispro  0-6 Units SubCUTAneous TID WC    insulin lispro  0-3 Units SubCUTAneous Nightly    sodium chloride flush  5-40 mL IntraVENous 2 times per day    ipratropium-albuterol  1 ampule Inhalation Q4H WA       MEDS (infusions):   dextrose      sodium chloride         MEDS (prn):  regadenoson, oxyCODONE, glucose, dextrose, glucagon (rDNA), dextrose, sodium chloride flush, sodium chloride, ondansetron **OR** ondansetron, polyethylene glycol, acetaminophen **OR** acetaminophen    DATA:    Recent Labs     10/03/21  1700 10/05/21  0522   WBC 4.6 4.4*   HGB 10.2* 9.9*   HCT 32.8* 34.5*   MCV 83.9 86.5    296     Recent Labs     10/04/21  0639 10/04/21  0639 10/04/21  1439 10/05/21  0522 10/06/21  0646      < > 137 137 136   K 5.4*   < > 5.5* 4.7 5.2*   CL 96*   < > 96* 95* 95*   CO2 27   < > 33* 30* 32*   BUN 64*   < > 65* 61* 57* lokelma one dose and follow   Check  BMP at 1400    6. HFpEF  LVEF 67%  On bid IV bumetanide 1 mg. UOP poorly recorded.     Change to oral      Leydi Sagastume MD

## 2021-10-06 NOTE — PROGRESS NOTES
INPATIENT CARDIOLOGY FOLLOW-UP    Name: Balbina Garcia    Age: 70 y.o. Date of Admission: 10/2/2021  1:22 PM    Date of Service: 10/6/2021    Chief Complaint: Follow-up for CHF and elevated troponin    Interim History:  No new overnight cardiac complaints. He is feeling much better and dyspnea and swelling are improving. Now, he is on oral diuretics. Currently with no complaints of CP, palpitations, dizziness, or lightheadedness. Off telemetry. Review of Systems:   Cardiac: As per HPI  General: No fever, chills  Pulmonary: As per HPI  HEENT: No visual disturbances, difficult swallowing  GI: No nausea, vomiting  Endocrine: No thyroid disease or DM  Musculoskeletal: TREVINO x 4, no focal motor deficits  Skin: Intact, no rashes  Neuro/Psych: No headache or seizures    Problem List:  Patient Active Problem List   Diagnosis    GERD (gastroesophageal reflux disease)    CKD (chronic kidney disease) stage 4, GFR 15-29 ml/min ([facility])    Mixed hyperlipidemia    Diabetes mellitus, type 2 ([address]    HTN (hypertension), benign    Junctional bradycardia    Anemia in stage 3 chronic kidney disease ([address]    CAD (coronary artery disease), native coronary artery    Acute CHF (congestive heart failure) ([facility])    Chest pain    Respiratory failure ([facility])    Acute kidney injury ([address]    Vomiting and diarrhea    Acute kidney injury superimposed on CKD ([facility])    Chronic diastolic congestive heart failure ([address]    H/O kidney transplant    Pulmonary hypertension ([address]    Immunosuppression ([address]    Pneumonia due to COVID-19 virus    Acute renal insufficiency    GI bleed    Acute blood loss anemia    Acute combined systolic and diastolic CHF, NYHA class 1 ([facility])    Chronic kidney disease    Anemia of chronic disease    Hyperkalemia    Essential hypertension    Type 2 diabetes mellitus, with long-term current use of insulin ([facility])    Obesity (BMI 30-39. 9)    Acute decompensated heart failure ([facility]) Allergies:  No Known Allergies    Current Medications:  Current Facility-Administered Medications   Medication Dose Route Frequency Provider Last Rate Last Admin    bumetanide (BUMEX) tablet 2 mg  2 mg Oral Daily Jae Isaacs MD   2 mg at 10/06/21 0908    regadenoson (LEXISCAN) injection 0.4 mg  0.4 mg IntraVENous ONCE PRN Jocelyne Dill MD        heparin (porcine) injection 5,000 Units  5,000 Units SubCUTAneous Maritza Goins MD   5,000 Units at 10/05/21 2215    epoetin rowan-epbx (RETACRIT) injection 3,000 Units  30 Units/kg SubCUTAneous Once per day on Mon Wed Fri Jae Isaacs MD   3,000 Units at 10/06/21 9114    aspirin EC tablet 81 mg  81 mg Oral Daily Pete Cease, PA   81 mg at 10/06/21 5754    hydrALAZINE (APRESOLINE) tablet 100 mg  100 mg Oral TID Pete Cease, PA   100 mg at 10/06/21 5889    isosorbide mononitrate (IMDUR) extended release tablet 60 mg  60 mg Oral Daily Pete Cease, PA   60 mg at 10/06/21 4588    metoprolol succinate (TOPROL XL) extended release tablet 100 mg  100 mg Oral Daily Pete Cease, PA   100 mg at 10/06/21 6634    mycophenolate (CELLCEPT) capsule 500 mg  500 mg Oral BID Pete Cease, PA   500 mg at 10/06/21 2679    oxyCODONE HCl (OXY-IR) immediate release tablet 20 mg  20 mg Oral Q8H PRN Pete Cease, PA   20 mg at 10/05/21 1952    predniSONE (DELTASONE) tablet 5 mg  5 mg Oral Daily Pete Cease, PA   5 mg at 10/06/21 8286    atorvastatin (LIPITOR) tablet 10 mg  10 mg Oral Daily Pete Cease, PA   10 mg at 10/06/21 6017    sodium bicarbonate tablet 650 mg  650 mg Oral 4x Daily Pete Cease, [place]   650 mg at 10/06/21 4160    tacrolimus (PROGRAF) capsule 3 mg  3 mg Oral BID Pete Cease, PA   3 mg at 10/06/21 1913    insulin lispro (HUMALOG) injection vial 0-6 Units  0-6 Units SubCUTAneous TID WC Pete Cease, PA   1 Units at 10/05/21 1825    insulin lispro (HUMALOG) injection vial 0-3 Units  0-3 Units SubCUTAneous Nightly [facility]   2 Units at 10/04/21 2154    glucose (GLUTOSE) 40 % oral gel 15 g  15 g Oral PRN JESUS Asif        dextrose 50 % IV solution  12.5 g IntraVENous PRN JESUS Asif        glucagon (rDNA) injection 1 mg  1 mg IntraMUSCular PRN JESUS Asif        dextrose 5 % solution  100 mL/hr IntraVENous PRN JESUS Asif        sodium chloride flush 0.9 % injection 5-40 mL  5-40 mL IntraVENous 2 times per day JESUS Asif   10 mL at 10/06/21 0915    sodium chloride flush 0.9 % injection 5-40 mL  5-40 mL IntraVENous PRN JESUS Asif        0.9 % sodium chloride infusion  25 mL IntraVENous PRN JESUS Asif        ondansetron (ZOFRAN-ODT) disintegrating tablet 4 mg  4 mg Oral Q8H PRN JESUS Asif        Or    ondansetron (ZOFRAN) injection 4 mg  4 mg IntraVENous Q6H PRN JESUS Asif        polyethylene glycol (GLYCOLAX) packet 17 g  17 g Oral Daily PRN JESUS Asif        acetaminophen (TYLENOL) tablet 650 mg  650 mg Oral Q6H PRN JESUS Asif        Or    acetaminophen (TYLENOL) suppository 650 mg  650 mg Rectal Q6H PRN JESUS Asif        ipratropium-albuterol (DUONEB) nebulizer solution 1 ampule  1 ampule Inhalation Q4H WA JESUS Asif   1 ampule at 10/06/21 1031     Facility-Administered Medications Ordered in Other Encounters   Medication Dose Route Frequency Provider Last Rate Last Admin    epoetin rowan-epbx (RETACRIT) injection 10,000 Units  10,000 Units SubCUTAneous Once Bill Pereira MD          dextrose      sodium chloride         Physical Exam:  BP (!) 146/67   Pulse 67   Temp 98 °F (36.7 °C) (Temporal)   Resp 16   Ht 5' 11\" (1.803 m)   Wt 221 lb 8 oz (100.5 kg)   SpO2 98%   BMI 30.89 kg/m²   Wt Readings from Last 3 Encounters:   10/04/21 221 lb 8 oz (100.5 kg)   07/29/21 208 lb 12.8 oz (94.7 kg)   06/24/21 215 lb (97.5 kg)     Appearance: Awake, alert, no acute respiratory distress  Skin: Intact, no rash  Head: Normocephalic, atraumatic  Eyes: EOMI, no conjunctival erythema  ENMT: No pharyngeal erythema, MMM, no rhinorrhea  Neck: Supple, no elevated JVP, no carotid bruits  Lungs: Clear to auscultation bilaterally. No wheezes, rales, or rhonchi. Cardiac: Regular rate and rhythm, +S1S2, no murmurs apparent  Abdomen: Soft, nontender, +bowel sounds  Extremities: Moves all extremities x 4, 1 +lower extremity edema  Neurologic: No focal motor deficits apparent, normal mood and affect  Peripheral Pulses: Intact posterior tibial pulses bilaterally    Intake/Output:    Intake/Output Summary (Last 24 hours) at 10/6/2021 1357  Last data filed at 10/6/2021 0020  Gross per 24 hour   Intake --   Output 300 ml   Net -300 ml     No intake/output data recorded. Laboratory Tests:  Recent Labs     10/04/21  1439 10/05/21  0522 10/06/21  0646    137 136   K 5.5* 4.7 5.2*   CL 96* 95* 95*   CO2 33* 30* 32*   BUN 65* 61* 57*   CREATININE 4.6* 4.3* 3.7*   GLUCOSE 193* 120* 120*   CALCIUM 9.4 8.9 9.2     Lab Results   Component Value Date    MG 2.2 10/06/2021     No results for input(s): ALKPHOS, ALT, AST, PROT, BILITOT, BILIDIR, LABALBU in the last 72 hours.   Recent Labs     10/03/21  1700 10/05/21  0522   WBC 4.6 4.4*   RBC 3.91 3.99   HGB 10.2* 9.9*   HCT 32.8* 34.5*   MCV 83.9 86.5   MCH 26.1 24.8*   MCHC 31.1* 28.7*   RDW 14.8 14.8    296   MPV 9.5 9.0     Lab Results   Component Value Date    CKTOTAL 31 10/03/2021    CKMB 2.2 10/03/2021    TROPONINI 0.07 (H) 04/26/2021    TROPONINI 0.08 (H) 04/10/2021    TROPONINI 0.10 (H) 04/09/2021     Lab Results   Component Value Date    INR 1.0 08/31/2020    INR 1.2 04/27/2017    INR 1.1 07/24/2015    PROTIME 11.3 08/31/2020    PROTIME 13.2 (H) 04/27/2017    PROTIME 11.9 07/24/2015     Lab Results   Component Value Date    TSH 0.996 06/03/2014     Lab Results   Component Value Date    LABA1C 5.5 06/03/2014     No results found for: EAG  Lab Results   Component Value Date    CHOL 135 10/03/2021    CHOL 128 06/03/2014    CHOL 101 05/23/2013     Lab Results   Component Value Date    TRIG 100 10/03/2021    TRIG 50 06/03/2014    TRIG 160 (H) 05/23/2013     Lab Results   Component Value Date    HDL 55 10/03/2021    HDL 51 06/03/2014    HDL 21.0 (A) 05/23/2013     Lab Results   Component Value Date    LDLCALC 60 10/03/2021    LDLCALC 67 06/03/2014    LDLCALC 48 05/23/2013     Lab Results   Component Value Date    LABVLDL 20 10/03/2021    LABVLDL 10 06/03/2014     No results found for: [facility]    Cardiac Tests:  Telemetry findings reviewed: Off the monitor     EKG: Normal sinus rhythm, LVH, inferior infarct age undetermined, ST-T changes suggestive lateral wall ischemia, abnormal EKG.    Vitals and labs were reviewed: Blood pressure 146/67 heart rate 67 sats 98 % on 2 L.     Labs-BUN/creatinine 50/3.6>> 65/4.6>> 61/4.3>> 57/3.7, potassium 5.9>> 5.5>> 4.7>> 5.2, proBNP 22 645, troponin, high-sensitivity 127>> 118, cholesterol 135, HDL 55, LDL 60 triglycerides 100, liver function normal H&H 10.2/32.8>> 9.9/34.5, serum iron low at 38 TIBC 173 iron sats 17     1. Stress Myoview 07/12/2012. Reversible ischemia in the mid anterior wall. EF 45%.     2. Lexiscan MPS 11/21/2013. Normal perfusion study. EF 63%.       3. Echo 07/20/2014. EF normal. Stage I diastolic dysfunction, otherwise no valvular heart disease. Mild LAE.     4. Echo, 07/21/2015. Stage I diastolic dysfunction. EF 65%. Normal sized LA. No valvular heart disease. Small circumferential pericardial effusion.     5. Lexiscan MPS, 07/26/2015: Small area of reversible ischemia in the lateral wall. EF 48%.      6. TTE (4/3/2020, [facility]) CONCLUSIONS: Technically difficult exam due to body habitus. Exam indication: Evaluation of known heart failure to guide therapy. The left ventricle is normal in size. There is moderate left ventricular hypertrophy.  Left ventricular systolic function is normal. EF = 67 ± 5% (2D biplane) Grade II left ventricular diastolic dysfunction. Global strain not reported due to poor tracking. The right ventricle is normal in size. Right ventricular systolic function is normal. The left atrial cavity is severely dilated. Exam was compared with the prior  echocardiographic exam performed on 10/19/2016. Similar findings.     7. NM cardiac amyloid SPECT: 2/5/2021: [facility]: CONCLUSIONS:  Not Consistent with TTR amyloidosis: A semi-quantitative visual score of 0 or H/CL ratio < 1.25  2. Incidental Findings from limited non-diagnostic CTAC:  - Coronary calcifications visualized.      8. Echocardiogram 04/13/2021 (Dr. Jean Villanueva): EF 60-65%. Stage I Diastolic Dysfunction.         Assessment:  · Acute on chronic HFpEF, improved   · One  episode of atypical chest pain will rule out ischemia, stress test pending  · History of ESRD status post renal transplant underwent in December 2014, now has a CKD stage IV with rising creatinine level creatinine 3.6>> 4.3>> 4.5>> 4.6  · Chronically elevated troponin level without any EKG changes  · Hypertension, not well controlled secondary to volume overload  · Hyperlipidemia on statin  · Type 2 diabetes  · Chronic anemia with some iron deficiency complement  · Obstructive sleep apnea compliant with CPAP   · GERD  · Mild hyperkalemia  · RSV virus bronchitis, stable        Plan:   · Continue Bumex 2 mg p.o. daily. · Volume status seems improved, doing well. Cough has improved. Today, we will proceed with Lexiscan nuclear stress test to rule out ischemia. If the stress test comes back normal then he is stable for discharge from the cardiology standpoint. · Recent echocardiogram done in April 2021 showed normal LV function with stage I diastolic dysfunction  · Continue rest of the current home medications  · Follow-up with the cardiology service in 1 month. Zach Hendrix MD., Jem Moya.   [facility]) Cardiology

## 2021-10-07 ENCOUNTER — APPOINTMENT (OUTPATIENT)
Dept: ULTRASOUND IMAGING | Age: 71
DRG: 291 | End: 2021-10-07
Payer: MEDICARE

## 2021-10-07 ENCOUNTER — APPOINTMENT (OUTPATIENT)
Dept: NUCLEAR MEDICINE | Age: 71
DRG: 291 | End: 2021-10-07
Payer: MEDICARE

## 2021-10-07 VITALS
TEMPERATURE: 98.2 F | BODY MASS INDEX: 31.01 KG/M2 | WEIGHT: 221.5 LBS | RESPIRATION RATE: 18 BRPM | HEIGHT: 71 IN | HEART RATE: 79 BPM | OXYGEN SATURATION: 97 % | SYSTOLIC BLOOD PRESSURE: 156 MMHG | DIASTOLIC BLOOD PRESSURE: 70 MMHG

## 2021-10-07 LAB
ANION GAP SERPL CALCULATED.3IONS-SCNC: 11 MMOL/L (ref 7–16)
ANION GAP SERPL CALCULATED.3IONS-SCNC: 4 MMOL/L (ref 7–16)
BUN BLDV-MCNC: 55 MG/DL (ref 6–23)
BUN BLDV-MCNC: 56 MG/DL (ref 6–23)
CALCIUM SERPL-MCNC: 9.4 MG/DL (ref 8.6–10.2)
CALCIUM SERPL-MCNC: 9.5 MG/DL (ref 8.6–10.2)
CHLORIDE BLD-SCNC: 92 MMOL/L (ref 98–107)
CHLORIDE BLD-SCNC: 97 MMOL/L (ref 98–107)
CO2: 32 MMOL/L (ref 22–29)
CO2: 37 MMOL/L (ref 22–29)
CREAT SERPL-MCNC: 3.3 MG/DL (ref 0.7–1.2)
CREAT SERPL-MCNC: 3.3 MG/DL (ref 0.7–1.2)
GFR AFRICAN AMERICAN: 22
GFR AFRICAN AMERICAN: 22
GFR NON-AFRICAN AMERICAN: 22 ML/MIN/1.73
GFR NON-AFRICAN AMERICAN: 22 ML/MIN/1.73
GLUCOSE BLD-MCNC: 168 MG/DL (ref 74–99)
GLUCOSE BLD-MCNC: 95 MG/DL (ref 74–99)
LV EF: 75 %
LVEF MODALITY: NORMAL
MAGNESIUM: 2.1 MG/DL (ref 1.6–2.6)
METER GLUCOSE: 116 MG/DL (ref 74–99)
METER GLUCOSE: 81 MG/DL (ref 74–99)
POTASSIUM SERPL-SCNC: 5.4 MMOL/L (ref 3.5–5)
POTASSIUM SERPL-SCNC: 5.5 MMOL/L (ref 3.5–5)
SODIUM BLD-SCNC: 135 MMOL/L (ref 132–146)
SODIUM BLD-SCNC: 138 MMOL/L (ref 132–146)

## 2021-10-07 PROCEDURE — 3430000000 HC RX DIAGNOSTIC RADIOPHARMACEUTICAL: Performed by: RADIOLOGY

## 2021-10-07 PROCEDURE — 6370000000 HC RX 637 (ALT 250 FOR IP): Performed by: PHYSICIAN ASSISTANT

## 2021-10-07 PROCEDURE — 93018 CV STRESS TEST I&R ONLY: CPT | Performed by: INTERNAL MEDICINE

## 2021-10-07 PROCEDURE — 6370000000 HC RX 637 (ALT 250 FOR IP): Performed by: INTERNAL MEDICINE

## 2021-10-07 PROCEDURE — 93017 CV STRESS TEST TRACING ONLY: CPT

## 2021-10-07 PROCEDURE — 2580000003 HC RX 258: Performed by: PHYSICIAN ASSISTANT

## 2021-10-07 PROCEDURE — 6360000002 HC RX W HCPCS: Performed by: INTERNAL MEDICINE

## 2021-10-07 PROCEDURE — 82962 GLUCOSE BLOOD TEST: CPT

## 2021-10-07 PROCEDURE — 83735 ASSAY OF MAGNESIUM: CPT

## 2021-10-07 PROCEDURE — 78452 HT MUSCLE IMAGE SPECT MULT: CPT

## 2021-10-07 PROCEDURE — 2700000000 HC OXYGEN THERAPY PER DAY

## 2021-10-07 PROCEDURE — 36415 COLL VENOUS BLD VENIPUNCTURE: CPT

## 2021-10-07 PROCEDURE — 80048 BASIC METABOLIC PNL TOTAL CA: CPT

## 2021-10-07 PROCEDURE — 94640 AIRWAY INHALATION TREATMENT: CPT

## 2021-10-07 PROCEDURE — 6360000002 HC RX W HCPCS: Performed by: PHYSICIAN ASSISTANT

## 2021-10-07 PROCEDURE — 99232 SBSQ HOSP IP/OBS MODERATE 35: CPT | Performed by: INTERNAL MEDICINE

## 2021-10-07 PROCEDURE — 78452 HT MUSCLE IMAGE SPECT MULT: CPT | Performed by: INTERNAL MEDICINE

## 2021-10-07 PROCEDURE — 93016 CV STRESS TEST SUPVJ ONLY: CPT | Performed by: INTERNAL MEDICINE

## 2021-10-07 PROCEDURE — A9500 TC99M SESTAMIBI: HCPCS | Performed by: RADIOLOGY

## 2021-10-07 PROCEDURE — 93971 EXTREMITY STUDY: CPT

## 2021-10-07 RX ORDER — CALCITRIOL 0.25 UG/1
0.25 CAPSULE, LIQUID FILLED ORAL DAILY
Qty: 30 CAPSULE | Refills: 3 | Status: SHIPPED | OUTPATIENT
Start: 2021-10-08

## 2021-10-07 RX ORDER — CALCITRIOL 0.25 UG/1
0.25 CAPSULE, LIQUID FILLED ORAL DAILY
Status: DISCONTINUED | OUTPATIENT
Start: 2021-10-07 | End: 2021-10-07 | Stop reason: HOSPADM

## 2021-10-07 RX ORDER — AMLODIPINE BESYLATE 5 MG/1
5 TABLET ORAL DAILY
Qty: 30 TABLET | Refills: 5 | Status: SHIPPED | OUTPATIENT
Start: 2021-10-07 | End: 2022-03-10

## 2021-10-07 RX ADMIN — BUMETANIDE 2 MG: 1 TABLET ORAL at 09:03

## 2021-10-07 RX ADMIN — PREDNISONE 5 MG: 5 TABLET ORAL at 09:03

## 2021-10-07 RX ADMIN — MYCOPHENOLATE MOFETIL 500 MG: 250 CAPSULE ORAL at 09:03

## 2021-10-07 RX ADMIN — HYDRALAZINE HYDROCHLORIDE 100 MG: 50 TABLET, FILM COATED ORAL at 09:04

## 2021-10-07 RX ADMIN — Medication 12 MILLICURIE: at 12:28

## 2021-10-07 RX ADMIN — ASPIRIN 81 MG: 81 TABLET, COATED ORAL at 09:03

## 2021-10-07 RX ADMIN — HYDRALAZINE HYDROCHLORIDE 100 MG: 50 TABLET, FILM COATED ORAL at 15:26

## 2021-10-07 RX ADMIN — METOPROLOL SUCCINATE 100 MG: 100 TABLET, EXTENDED RELEASE ORAL at 09:04

## 2021-10-07 RX ADMIN — OXYCODONE HYDROCHLORIDE 20 MG: 10 TABLET ORAL at 00:03

## 2021-10-07 RX ADMIN — HEPARIN SODIUM 5000 UNITS: 10000 INJECTION INTRAVENOUS; SUBCUTANEOUS at 15:29

## 2021-10-07 RX ADMIN — IPRATROPIUM BROMIDE AND ALBUTEROL SULFATE 1 AMPULE: .5; 2.5 SOLUTION RESPIRATORY (INHALATION) at 16:42

## 2021-10-07 RX ADMIN — IPRATROPIUM BROMIDE AND ALBUTEROL SULFATE 1 AMPULE: .5; 2.5 SOLUTION RESPIRATORY (INHALATION) at 09:40

## 2021-10-07 RX ADMIN — TACROLIMUS 3 MG: 1 CAPSULE ORAL at 09:04

## 2021-10-07 RX ADMIN — Medication 35 MILLICURIE: at 14:00

## 2021-10-07 RX ADMIN — HEPARIN SODIUM 5000 UNITS: 10000 INJECTION INTRAVENOUS; SUBCUTANEOUS at 06:35

## 2021-10-07 RX ADMIN — Medication 10 ML: at 09:04

## 2021-10-07 RX ADMIN — ISOSORBIDE MONONITRATE 60 MG: 60 TABLET ORAL at 09:03

## 2021-10-07 RX ADMIN — REGADENOSON 0.4 MG: 0.08 INJECTION, SOLUTION INTRAVENOUS at 12:44

## 2021-10-07 RX ADMIN — SODIUM BICARBONATE 650 MG: 650 TABLET ORAL at 09:03

## 2021-10-07 RX ADMIN — ATORVASTATIN CALCIUM 10 MG: 10 TABLET, FILM COATED ORAL at 09:04

## 2021-10-07 ASSESSMENT — PAIN SCALES - GENERAL: PAINLEVEL_OUTOF10: 6

## 2021-10-07 NOTE — CARE COORDINATION
10/7/2021 social work transition of care planning  Pt plan remains for home, family to transport. Sw will follow.   Electronically signed by DARYL Whittaker on 10/7/2021 at 12:30 PM

## 2021-10-07 NOTE — PROGRESS NOTES
Monet Bonds MD, FACP                   Patient Name: Manju Mtz                   Age:  70 y.o. Gender:   male    CC: Shortness of breath with episodic cough    HPI: This patient states that he had talked to his PCP and had a Covid test yesterday which was reported as positive. He has been noting progressive dyspnea on exertion, orthopnea and increased leg swelling  He also has a cough which is productive of sputum  He has had no fevers but reports chills  He also reports anorexia    He was evaluated in the emergency room and admitted for congestive heart failure  His Covid test was negative but he was positive for respiratory syncytial virus  His labs demonstrated a BUN of 50 and a creatinine of 3.6  proBNP was 22,645  Hemoglobin was 10.6 the patient does have a history of iron deficiency anemia.   This morning his vitals are satisfactory  He has no fever  His pulse is 64  He does not have an oxygen requirement and is breathing satisfactorily on room air    Chart review demonstrates that an echo was performed in April 2021 which showed an ejection fraction of 60 to 65% and was otherwise normal  He does follow with nephrology for stage IIIb renal failure  And he receives infusions for his anemia  His earlier BUN and creatinine appear to be similar    10/4  Subsequent to yesterday's evaluation patient was evaluated by nephrology  Defined end-stage renal disease with renal allograft placed on 12/12/2014    This morning the patient states that he is feeling better  He denies any shortness of breath  He is on oxygen support-requiring only 2 L and maintaining at 100% saturation  There has been no fever  He also has a fine pre and post capillary pulmonary hypertension  His blood pressure is 114/53    10/5  Patient feels quite well this morning  Interviewed and examined in presence of wife  He is breathing much better  Denies shortness of breath  Very little cough  He is down to 2 L nasal cannula at 96%  Blood pressure is better and he is afebrile  Cardiology has ordered the continuation of Bumex 1 mg p.o. twice daily and continued monitoring of renal function  Once he is euvolemic they will consider a Lexiscan scan  Nephrology indicates he is stable  ADAM has started    10/6  Patient is seated comfortably at the side of the bed  He has ambulated about the room  Still has some dyspnea on exertion  Denies any chest pain  Oxygen saturation at 96% on 2 L  Vitals are otherwise normal  Notes that he is for stress test today  Had several questions all of which were answered    Reviewed all the consultants notes    10/7  Patient's clinical status remains stable and unchanged  Unfortunately his stress test was not done yesterday and is scheduled to be done today  He feels well and is ambulating about the room  Per cardiology if the stress test is negative he likely could be followed up outpatient          Past Medical History:   Diagnosis Date    Anemia     Iron deficiency    Atrial fibrillation ([address]     CHF (congestive heart failure) ([address] 03/12/2008    Chronic diastolic congestive heart failure ([address] 03/12/2008    Chronic foot pain     Chronic knee pain     BILATERAL    Constipation     Depression     DM (diabetes mellitus) ([address]     ESRD (end stage renal disease) ([address]     H/O kidney transplant 12/12/2014    Hyperlipidemia     Hypertension     Immunosuppression ([address] 3/12/2008    Kidney disease     Pulmonary hypertension ([address]        Past Surgical History:   Procedure Laterality Date    BACK SURGERY  1982    BLADDER SURGERY      CARPAL TUNNEL RELEASE Left 06/23/2014    CHOLECYSTECTOMY, LAPAROSCOPIC  05/21/2013    COLONOSCOPY      DIAGNOSTIC CARDIAC CATH LAB PROCEDURE  01/15/2013    NORMAL    ECHO COMPL W DOP COLOR FLOW  05/22/2013         ENDOSCOPY, COLON, DIAGNOSTIC      FOOT SURGERY      BONE REMOVED    KIDNEY BIOPSY  06/09/2015    [facility] ; ALSO 4/14/2016, 4/5/2017    KIDNEY TRANSPLANT  12/12/2014    @ [facility]    EDUARDO-EN-Y GASTRIC BYPASS  06/29/2010    Laparoscopic / Dr. Leela Bedoya  12/21/2007    Dr. Saeid Swanson 4/28/2021    EGD in OR 12--COVID performed by Mona Moore MD at [address] [place] [address] of Systems:   · General: As per HPI. Denies fever  He appears to be comfortable and he is oxygenating quite well  He has minimal cough now    Physical Examination:      Wt Readings from Last 3 Encounters:   10/04/21 221 lb 8 oz (100.5 kg)   07/29/21 208 lb 12.8 oz (94.7 kg)   06/24/21 215 lb (97.5 kg)     Temp Readings from Last 3 Encounters:   10/07/21 98 °F (36.7 °C) (Temporal)   09/29/21 98.6 °F (37 °C) (Temporal)   09/22/21 98.6 °F (37 °C) (Temporal)     BP Readings from Last 3 Encounters:   10/07/21 (!) 181/70   09/29/21 (!) 155/60   09/22/21 (!) 100/48     Pulse Readings from Last 3 Encounters:   10/07/21 74   09/29/21 61   09/22/21 69       General appearance: Normal, awake, alert no distress. Face: Much less puffy appearing  Eyes: Conjunctivae/cornea clear. Tia Veda. Sclera non icteric. Neck:  Symmetric. No adenopathy. Short and stout  Chest: Reduced but even excursion   Lungs: Clear to auscultation. No rhonchi, crackles or rales. Bibasilar hypoventilation  Heart: S1 > S2. Rhythm is regular and rate is normal. No gallop rub or murmur. Abdomen: Soft, mildly protuberant, non-tender. BS normal. No masses, organomegaly. Anatomic contours appear normal.  Extremities: There is peripheral edema this is significantly improved, there is a noted swelling of the RUE over the past couple of days-non tender  Musculoskeletal: No unusual pain or swelling. Muscular strength intact.    Neuro:   · Grossly normal exam  Gait & balance: Independently ambulatory     Labs     CBC:   Lab Results Component Value Date    WBC 4.4 10/05/2021    RBC 3.99 10/05/2021    HGB 9.9 10/05/2021    HCT 34.5 10/05/2021     10/05/2021    MCV 86.5 10/05/2021     BMP:    Lab Results   Component Value Date     10/06/2021    K 5.2 10/06/2021    K 5.4 06/12/2021    CL 95 10/06/2021    CO2 32 10/06/2021    BUN 57 10/06/2021    CREATININE 3.7 10/06/2021    GLUCOSE 120 10/06/2021    GLUCOSE 124 02/21/2012    CALCIUM 9.2 10/06/2021     Hepatic Function Panel:    Lab Results   Component Value Date    ALKPHOS 63 10/02/2021    AST 25 10/02/2021    ALT 10 10/02/2021    PROT 6.5 10/02/2021    LABALBU 3.6 10/02/2021    LABALBU 3.8 07/05/2011    BILITOT 0.4 10/02/2021     Magnesium:    Lab Results   Component Value Date    MG 2.2 10/06/2021     Cardiac Enzymes:   Lab Results   Component Value Date    CKTOTAL 31 10/03/2021    CKTOTAL 36 06/12/2021    CKTOTAL 71 06/11/2021    CKMB 2.2 10/03/2021    CKMB 4.5 07/25/2015    CKMB 5.0 07/25/2015    TROPONINI 0.07 (H) 04/26/2021    TROPONINI 0.08 (H) 04/10/2021    TROPONINI 0.10 (H) 04/09/2021     LDH:    Lab Results   Component Value Date     04/15/2021     PT/INR:    Lab Results   Component Value Date    PROTIME 11.3 08/31/2020    INR 1.0 08/31/2020     BNP: No results for input(s): BNP in the last 72 hours. TSH:   Lab Results   Component Value Date    TSH 0.996 06/03/2014      Cardiac Injury Profile:   No results for input(s): CKTOTAL, CKMB, CKMBINDEX, TROPONINI in the last 72 hours.    Lipid Profile:   Lab Results   Component Value Date    TRIG 100 10/03/2021    HDL 55 10/03/2021    LDLCALC 60 10/03/2021    CHOL 135 10/03/2021      Hemoglobin A1C: No components found for: HGBA1C   U/A:   Lab Results   Component Value Date    LEUKOCYTESUR Negative 04/10/2021    PHUR 5.5 04/10/2021    WBCUA 0-1 04/10/2021    RBCUA 0-1 04/10/2021    RBCUA NONE 05/18/2013    BACTERIA RARE 04/10/2021    SPECGRAV 1.020 04/10/2021    BLOODU Negative 04/10/2021    GLUCOSEU Negative 04/10/2021 1 Units at 10/06/21 2109    glucose (GLUTOSE) 40 % oral gel 15 g  15 g Oral PRN Uzma Girt, PA        dextrose 50 % IV solution  12.5 g IntraVENous PRN Uzma Girt, PA        glucagon (rDNA) injection 1 mg  1 mg IntraMUSCular PRN Uzma Girt, PA        dextrose 5 % solution  100 mL/hr IntraVENous PRN Uzma Girt, PA        sodium chloride flush 0.9 % injection 5-40 mL  5-40 mL IntraVENous 2 times per day Uzma Girt, PA   10 mL at 10/07/21 0904    sodium chloride flush 0.9 % injection 5-40 mL  5-40 mL IntraVENous PRN Uzma Girt, PA        0.9 % sodium chloride infusion  25 mL IntraVENous PRN Uzma Girt, PA        ondansetron (ZOFRAN-ODT) disintegrating tablet 4 mg  4 mg Oral Q8H PRN Uzma Girt, PA        Or    ondansetron (ZOFRAN) injection 4 mg  4 mg IntraVENous Q6H PRN Uzma Girt, PA        polyethylene glycol (GLYCOLAX) packet 17 g  17 g Oral Daily PRN Uzma Girt, PA        acetaminophen (TYLENOL) tablet 650 mg  650 mg Oral Q6H PRN Uzma Girt, PA        Or    acetaminophen (TYLENOL) suppository 650 mg  650 mg Rectal Q6H PRN Uzma Girt, PA        ipratropium-albuterol (DUONEB) nebulizer solution 1 ampule  1 ampule Inhalation Q4H WA Uzma Girt, PA   1 ampule at 10/07/21 0940       Current  Infusions   dextrose      sodium chloride         Prn Meds  regadenoson, oxyCODONE, glucose, dextrose, glucagon (rDNA), dextrose, sodium chloride flush, sodium chloride, ondansetron **OR** ondansetron, polyethylene glycol, acetaminophen **OR** acetaminophen    Radiology Review:  XR CHEST PORTABLE   Final Result   1. Hazy multifocal bilateral pulmonary infiltrates.          NM Cardiac Stress Test Nuclear Imaging    (Results Pending)         ASSESSMENT:  Active diagnoses treated at this admission:  · Acute on chronic diastolic congestive heart failure with preserved ejection fraction-appears improved  · CKD-end-stage renal disease currently stabilized  · Type 2 diabetes mellitus  · RSV bronchitis improving    Problem list:  Patient Active Problem List   Diagnosis    GERD (gastroesophageal reflux disease)    CKD (chronic kidney disease) stage 4, GFR 15-29 ml/min ([facility])    Mixed hyperlipidemia    Diabetes mellitus, type 2 ([address]    HTN (hypertension), benign    Junctional bradycardia    Anemia in stage 3 chronic kidney disease ([address]    CAD (coronary artery disease), native coronary artery    Acute CHF (congestive heart failure) ([facility])    Chest pain    Respiratory failure ([facility])    [facility] kidney [facility]    Vomiting and diarrhea    Acute kidney injury superimposed on CKD ([facility])    Chronic diastolic congestive heart failure ([facility]    H/O kidney transplant    Pulmonary hypertension ([facility]    Immunosuppression ([facility]    Pneumonia due to COVID-19 virus    Acute renal insufficiency    GI bleed    Acute blood loss anemia    Acute combined systolic and diastolic CHF, NYHA class 1 ([facility])    Chronic kidney disease    Anemia of chronic disease    Hyperkalemia    Essential hypertension    Type 2 diabetes mellitus, with long-term current use of insulin ([facility])    Obesity (BMI 30-39. 9)    Acute decompensated heart failure ([facility])       PLAN:  Reviewed and updated orders  Patient is receiving oral Bumex and appears to be stable  DVT prophylaxis on heparin now  Hemoglobin is also stable  Sliding scale insulin with hypoglycemic protocol  Aerosol treatments, steroids low-dose for suppression treatment currently  Continuation of his renal medications  Continuation of CellCept and Prograf  Reviewed all consultations  Monitor glycemia  For stress test today  If no other intervention planned possible discharge following stress      See  Orders  Lashanda Thayer MD, Jaydon Grover, American Board of Internal Medicine  Diplomate, [address], [employer]  10:09 AM  10/7/2021

## 2021-10-07 NOTE — PROCEDURES
[facility] Nuclear Stress Test:    Cardiologist: Dr. Paul Smith MD    Date: 10/7/2021    Indications for study: Chest pain    1. No chest pain  2. No new arrhythmias  3. No EKG changes suggestive of stress induced ischemia  4.  Nuclear images pending    Jesus Manuel Weaver MD  [facility]) Cardiology

## 2021-10-07 NOTE — DISCHARGE SUMMARY
Discharge Summary    Brisa Moreno  :  1950  MRN:  [id number]    Admit date:  10/2/2021  Discharge date:  10/7/2021 2:55 PM    Admitting Physician:  Reece Domingo MD    Discharge Diagnoses:    · Acute on chronic diastolic congestive heart failure with preserved ejection fraction-appears improved  · CKD-end-stage renal disease currently stabilized  · Type 2 diabetes mellitus  · RSV bronchitis improving      Patient Active Problem List    Diagnosis Date Noted    Acute decompensated heart failure ([facility] 10/02/2021    Acute combined systolic and diastolic CHF, NYHA class 1 ([facility] 2021    Chronic kidney disease 2021    Anemia of chronic disease 2021    Hyperkalemia 2021    Essential hypertension 2021    Type 2 diabetes mellitus, with long-term current use of insulin ([facility] 2021    Obesity (BMI 30-39.9) 2021    Acute blood loss anemia 2021    Acute renal insufficiency 2021    GI bleed 2021    Pneumonia due to COVID-19 virus 2021    Acute kidney injury ([facility] 04/10/2021    Vomiting and diarrhea 04/10/2021    Acute kidney injury superimposed on CKD ([facility] 04/10/2021    Pulmonary hypertension ([facility]     Respiratory failure ([facility] 2017    Acute CHF (congestive heart failure) ([facility] 2015    Chest pain 2015    H/O kidney transplant 2014    CKD (chronic kidney disease) stage 4, GFR 15-29 ml/min ([facility] 2014    Mixed hyperlipidemia 2014    Diabetes mellitus, type 2 ([address] 2014    HTN (hypertension), benign 2014    Junctional bradycardia 2014    Anemia in stage 3 chronic kidney disease ([facility] 2014    CAD (coronary artery disease), native coronary artery 2014    GERD (gastroesophageal reflux disease) 2010    Chronic diastolic congestive heart failure ([facility] 2008    [facility] 2008       Past Medical Hx :   Past Medical History:   Diagnosis Date    Anemia     Iron deficiency    Atrial fibrillation (HCC)     CHF (congestive heart failure) (HCC) 03/12/2008    Chronic diastolic congestive heart failure (HCC) 03/12/2008    Chronic foot pain     Chronic knee pain     BILATERAL    Constipation     Depression     DM [facility]di[facility] mellitus[facility]     ESRD (end stage renal disease) [facility]     H/O kidney transplant 12/12/2014    Hyperlipidemia     Hypertension     Immunosuppression ([facility] 3/12/2008    Kidney disease     Pulmonary hypertension ([facility]        Past Surgical Hx :   Past Surgical History:   Procedure Laterality Date    BACK SURGERY  1982    BLADDER SURGERY      CARPAL TUNNEL RELEASE Left 06/23/2014    CHOLECYSTECTOMY, LAPAROSCOPIC  05/21/2013    COLONOSCOPY      DIAGNOSTIC CARDIAC CATH LAB PROCEDURE  01/15/2013    NORMAL    ECHO COMPL W DOP COLOR FLOW  05/22/2013         ENDOSCOPY, COLON, DIAGNOSTIC      FOOT SURGERY      BONE REMOVED    KIDNEY BIOPSY  06/09/2015    [facility] ; ALSO 4/14/2016, 4/5/2017    KIDNEY TRANSPLANT  12/12/2014    @ [facility]    EDUARDO-EN-Y GASTRIC BYPASS  06/29/2010    Laparoscopic / Dr. Kraig Ojeda  12/21/2007    Dr. Maryana Gallegos N/A 4/28/2021    EGD in OR 12--COVID performed by Jhonatan Loomis MD at [facility] ENDOSCOPY       Admission Condition:  poor    Discharged Condition:  fair    Labs:  CBC:   Lab Results   Component Value Date    WBC 4.4 10/05/2021    RBC 3.99 10/05/2021    HGB 9.9 10/05/2021    HCT 34.5 10/05/2021    MCV 86.5 10/05/2021    MCH 24.8 10/05/2021    MCHC 28.7 10/05/2021    RDW 14.8 10/05/2021     10/05/2021    MPV 9.0 10/05/2021     CMP:    Lab Results   Component Value Date     10/07/2021    K 5.5 10/07/2021    K 5.4 06/12/2021    CL 92 10/07/2021    CO2 32 10/07/2021    BUN 56 10/07/2021    CREATININE 3.3 10/07/2021    GFRAA 22 10/07/2021    LABGLOM 22 10/07/2021    GLUCOSE 95 10/07/2021    GLUCOSE 124 02/21/2012    PROT 6.5 10/02/2021    LABALBU 3.6 10/02/2021    LABALBU 3.8 07/05/2011    CALCIUM 9.4 10/07/2021    BILITOT 0.4 10/02/2021    ALKPHOS 63 10/02/2021    AST 25 10/02/2021    ALT 10 10/02/2021        Radiology Results: XR CHEST PORTABLE    Result Date: 10/2/2021  EXAMINATION: ONE XRAY VIEW OF THE CHEST 10/2/2021 2:19 pm COMPARISON: 06/11/2021 and 07/02/2021 HISTORY: ORDERING SYSTEM PROVIDED HISTORY: short of breath TECHNOLOGIST PROVIDED HISTORY: Reason for exam:->short of breath What reading provider will be dictating this exam?->CRC FINDINGS: The cardiac silhouette is within normals. Hazy bilateral multifocal infiltrates are noted. The infiltrates are more prominent within the lung bases. I cannot exclude a trace right pleural effusion. 1. Hazy multifocal bilateral pulmonary infiltrates. Hospital Course: This patient states that he had talked to his PCP and had a Covid test yesterday which was reported as positive. He has been noting progressive dyspnea on exertion, orthopnea and increased leg swelling  He also has a cough which is productive of sputum  He has had no fevers but reports chills  He also reports anorexia     He was evaluated in the emergency room and admitted for congestive heart failure  His Covid test was negative but he was positive for respiratory syncytial virus  His labs demonstrated a BUN of 50 and a creatinine of 3.6  proBNP was 22,645  Hemoglobin was 10.6 the patient does have a history of iron deficiency anemia.   This morning his vitals are satisfactory  He has no fever  His pulse is 64  He does not have an oxygen requirement and is breathing satisfactorily on room air     Chart review demonstrates that an echo was performed in April 2021 which showed an ejection fraction of 60 to 65% and was otherwise normal  He does follow with nephrology for stage IIIb renal failure  And he receives infusions for his anemia  His earlier BUN and creatinine appear to be similar     10/4  Subsequent to yesterday's evaluation patient was evaluated by nephrology  Defined end-stage renal disease with renal allograft placed on 12/12/2014     This morning the patient states that he is feeling better  He denies any shortness of breath  He is on oxygen support-requiring only 2 L and maintaining at 100% saturation  There has been no fever  He also has a fine pre and post capillary pulmonary hypertension  His blood pressure is 114/53     10/5  Patient feels quite well this morning  Interviewed and examined in presence of wife  He is breathing much better  Denies shortness of breath  Very little cough  He is down to 2 L nasal cannula at 96%  Blood pressure is better and he is afebrile  Cardiology has ordered the continuation of Bumex 1 mg p.o. twice daily and continued monitoring of renal function  Once he is euvolemic they will consider a Lexiscan scan  Nephrology indicates he is stable  ADAM has started     10/6  Patient is seated comfortably at the side of the bed  He has ambulated about the room  Still has some dyspnea on exertion  Denies any chest pain  Oxygen saturation at 96% on 2 L  Vitals are otherwise normal  Notes that he is for stress test today  Had several questions all of which were answered     Reviewed all the consultants notes     10/7  Patient's clinical status remains stable and unchanged  Unfortunately his stress test was not done yesterday and is scheduled to be done today  He feels well and is ambulating about the room  Per cardiology if the stress test is negative he likely could be followed up outpatient        Discharge Medications:    Enrique Sanches   Home Medication Instructions [id number]    Printed on:10/07/21 6163   Medication Information                      amLODIPine (NORVASC) 5 MG tablet  Take 1 tablet by mouth daily             aspirin 81 MG EC tablet  Take 81 mg by mouth daily.              bumetanide (BUMEX) 2 MG tablet  Take 1 tablet by mouth daily             calcitRIOL (ROCALTROL) 0.25 MCG capsule  Take 1 capsule by mouth daily             docusate sodium (COLACE) 100 MG capsule  Take 1 capsule by mouth 2 times daily as needed for Constipation. epoetin rowan-epbx (RETACRIT) 4000 UNIT/ML SOLN injection  Inject 0.75 mLs into the skin three times a week             hydrALAZINE (APRESOLINE) 100 MG tablet  Take 100 mg by mouth 3 times daily             insulin glargine (LANTUS SOLOSTAR) 100 UNIT/ML injection pen  Inject 12 Units into the skin daily At noon   Had today             insulin lispro (HUMALOG KWIKPEN) 100 UNIT/ML SOPN  Inject 5 Units into the skin daily (before lunch) Patient takes with his biggest meal             isosorbide mononitrate (IMDUR) 60 MG extended release tablet  TAKE 1 TABLET DAILY             linagliptin (TRADJENTA) 5 MG tablet  Take 5 mg by mouth daily. metoprolol succinate (TOPROL XL) 100 MG extended release tablet  Take 100 mg by mouth daily             Multiple Vitamin (MULTI VITAMIN DAILY PO)  Take by mouth daily             mycophenolate (CELLCEPT) 250 MG capsule  Take 500 mg by mouth 2 times daily              oxyCODONE (ROXICODONE) 20 MG immediate release tablet  Take 20 mg by mouth every 8 hours as needed for Pain.               OXYGEN  Inhale 2 L into the lungs daily as needed             predniSONE (DELTASONE) 5 MG tablet  Take 5 mg by mouth daily             simvastatin (ZOCOR) 10 MG tablet  Take 10 mg by mouth daily             sodium bicarbonate 650 MG tablet  Take 650 mg by mouth 4 times daily              tacrolimus (PROGRAF) 1 MG capsule  Take 2 capsules by mouth 2 times daily                 Discharge Exam:    Wt Readings from Last 3 Encounters:   10/04/21 221 lb 8 oz (100.5 kg)   07/29/21 208 lb 12.8 oz (94.7 kg)   06/24/21 215 lb (97.5 kg)          Temp Readings from Last 3 Encounters:   10/07/21 98 °F (36.7 °C) (Temporal)   09/29/21 98.6 °F (37 °C) (Temporal)   09/22/21 98.6 °F (37 °C) (Temporal)          BP Readings from Last 3 Encounters:   10/07/21 (!) 181/70   09/29/21 (!) 155/60   09/22/21 (!) 100/48          Pulse Readings from Last 3 Encounters:   10/07/21 74   09/29/21 61   09/22/21 69         General appearance: Normal, awake, alert no distress. Face: Much less puffy appearing  Eyes: Conjunctivae/cornea clear. Benedetta Roosevelt. Sclera non icteric. Neck:  Symmetric. No adenopathy. Short and stout  Chest: Reduced but even excursion   Lungs: Clear to auscultation. No rhonchi, crackles or rales. Bibasilar hypoventilation  Heart: S1 > S2. Rhythm is regular and rate is normal. No gallop rub or murmur. Abdomen: Soft, mildly protuberant, non-tender. BS normal. No masses, organomegaly. Anatomic contours appear normal.  Extremities: There is peripheral edema this is significantly improved, there is a noted swelling of the RUE over the past couple of days-non tender  Musculoskeletal: No unusual pain or swelling. Muscular strength intact.    Neuro:   · Grossly normal exam  Gait & balance: Independently ambulatory      Disposition: home    Amlodipine added for further BP control    Patient Instructions:   REFER TO AVR or ELIA document    Signed:  Lashanda Nuñez of Internal Medicine  [employer] Ger[employer]  10/7/2021, 2:55 PM

## 2021-10-07 NOTE — PROGRESS NOTES
INPATIENT CARDIOLOGY FOLLOW-UP    Name: Alex Sotelo    Age: 70 y.o. Date of Admission: 10/2/2021  1:22 PM    Date of Service: 10/7/2021    Chief Complaint: Follow-up for CHF and elevated troponin    Interim History:  No new overnight cardiac complaints. He is feeling much better and dyspnea and swelling are improving. Now, he is on oral diuretics. Currently with no complaints of CP, palpitations, dizziness, or lightheadedness. Off telemetry. Review of Systems:   Cardiac: As per HPI  General: No fever, chills  Pulmonary: As per HPI  HEENT: No visual disturbances, difficult swallowing  GI: No nausea, vomiting  Endocrine: No thyroid disease or DM  Musculoskeletal: TREVINO x 4, no focal motor deficits  Skin: Intact, no rashes  Neuro/Psych: No headache or seizures    Problem List:  Patient Active Problem List   Diagnosis    GERD (gastroesophageal reflux disease)    CKD (chronic kidney disease) stage 4, GFR 15-29 ml/min ([facility])    Mixed hyperlipidemia    Diabetes mellitus, type 2 ([address]    HTN (hypertension), benign    Junctional bradycardia    Anemia in stage 3 chronic kidney disease ([address]    CAD (coronary artery disease), native coronary artery    Acute CHF (congestive heart failure) ([facility])    Chest pain    Respiratory failure ([facility])    Acute kidney injury ([address]    Vomiting and diarrhea    Acute kidney injury superimposed on CKD ([facility])    Chronic diastolic congestive heart failure ([address]    H/O kidney transplant    Pulmonary hypertension ([address]    Immunosuppression ([address]    Pneumonia due to COVID-19 virus    Acute renal insufficiency    GI bleed    Acute blood loss anemia    Acute combined systolic and diastolic CHF, NYHA class 1 ([facility])    Chronic kidney disease    Anemia of chronic disease    Hyperkalemia    Essential hypertension    Type 2 diabetes mellitus, with long-term current use of insulin ([facility])    Obesity (BMI 30-39. 9)    Acute decompensated heart failure ([facility]) Allergies:  No Known Allergies    Current Medications:  Current Facility-Administered Medications   Medication Dose Route Frequency Provider Last Rate Last Admin    calcitRIOL (ROCALTROL) capsule 0.25 mcg  0.25 mcg Oral Daily Phan Bhagat MD        [facility]) tablet 2 mg  2 mg Oral Daily Phan Bhagat MD   2 mg at 10/07/21 3540    heparin (porcine) injection 5,000 Units  5,000 Units SubCUTAneous Q8H Payton Salguero MD   5,000 Units at 10/07/21 1529    epoetin rowan-epbx (RETACRIT) injection 3,000 Units  30 Units/kg SubCUTAneous Once per day on Mon Wed Fri Phan Bhagat MD   3,000 Units at 10/06/21 3611    aspirin EC tablet 81 mg  81 mg Oral Daily Mercedes Bee PA   81 mg at 10/07/21 2819    hydrALAZINE (APRESOLINE) tablet 100 mg  100 mg Oral TID JESUS Garrett   100 mg at 10/07/21 1526    isosorbide mononitrate (IMDUR) extended release tablet 60 mg  60 mg Oral Daily JESUS Garrett   60 mg at 10/07/21 0123    metoprolol succinate (TOPROL XL) extended release tablet 100 mg  100 mg Oral Daily Mercedes Bee PA   100 mg at 10/07/21 9024    mycophenolate (CELLCEPT) capsule 500 mg  500 mg Oral BID Mercedes Bee PA   500 mg at 10/07/21 4194    oxyCODONE HCl (OXY-IR) immediate release tablet 20 mg  20 mg Oral Q8H PRN JESUS Garrett   20 mg at 10/07/21 0003    predniSONE (DELTASONE) tablet 5 mg  5 mg Oral Daily Mercedes Bee PA   5 mg at 10/07/21 2935    atorvastatin (LIPITOR) tablet 10 mg  10 mg Oral Daily Mercedes Bee PA   10 mg at 10/07/21 0137    sodium bicarbonate tablet 650 mg  650 mg Oral 4x Daily JESUS Garrett   650 mg at 10/07/21 8890    tacrolimus (PROGRAF) capsule 3 mg  3 mg Oral BID JESUS Garrett   3 mg at 10/07/21 0904    insulin lispro (HUMALOG) injection vial 0-6 Units  0-6 Units SubCUTAneous TID  JESUS Garrett   2 Units at 10/06/21 1826    insulin lispro (HUMALOG) injection vial 0-3 Units  0-3 Units SubCUTAneous Nightly JESUS Asif   1 Units at 10/06/21 2109    glucose (GLUTOSE) 40 % oral gel 15 g  15 g Oral PRN JESUS Asif        dextrose 50 % IV solution  12.5 g IntraVENous PRN JESUS Asif        glucagon (rDNA) injection 1 mg  1 mg IntraMUSCular PRN JESUS Asif        dextrose 5 % solution  100 mL/hr IntraVENous PRN JESUS Asif        sodium chloride flush 0.9 % injection 5-40 mL  5-40 mL IntraVENous 2 times per day JESUS Asif   10 mL at 10/07/21 0904    sodium chloride flush 0.9 % injection 5-40 mL  5-40 mL IntraVENous PRN JESUS Asif        0.9 % sodium chloride infusion  25 mL IntraVENous PRN JESUS Asif        ondansetron (ZOFRAN-ODT) disintegrating tablet 4 mg  4 mg Oral Q8H PRN JESUS Asif        Or    ondansetron (ZOFRAN) injection 4 mg  4 mg IntraVENous Q6H PRN JESUS Asif        polyethylene glycol (GLYCOLAX) packet 17 g  17 g Oral Daily PRN JESUS Asif        acetaminophen (TYLENOL) tablet 650 mg  650 mg Oral Q6H PRN JESUS Asif        Or    acetaminophen (TYLENOL) suppository 650 mg  650 mg Rectal Q6H PRN JESUS Asif        ipratropium-albuterol (DUONEB) nebulizer solution 1 ampule  1 ampule Inhalation Q4H WA JESUS sAif   1 ampule at 10/07/21 1642      dextrose      sodium chloride         Physical Exam:  BP (!) 156/70   Pulse 79   Temp 98.2 °F (36.8 °C) (Temporal)   Resp 18   Ht 5' 11\" (1.803 m)   Wt 221 lb 8 oz (100.5 kg)   SpO2 97%   BMI 30.89 kg/m²   Wt Readings from Last 3 Encounters:   10/04/21 221 lb 8 oz (100.5 kg)   07/29/21 208 lb 12.8 oz (94.7 kg)   06/24/21 215 lb (97.5 kg)     Appearance: Awake, alert, no acute respiratory distress  Skin: Intact, no rash  Head: Normocephalic, atraumatic  Eyes: EOMI, no conjunctival erythema  ENMT: No pharyngeal erythema, MMM, no rhinorrhea  Neck: Supple, no elevated JVP, no carotid bruits  Lungs: Clear to auscultation bilaterally.  No wheezes, rales, or rhonchi. Cardiac: Regular rate and rhythm, +S1S2, no murmurs apparent  Abdomen: Soft, nontender, +bowel sounds  Extremities: Moves all extremities x 4, 1 +lower extremity edema  Neurologic: No focal motor deficits apparent, normal mood and affect  Peripheral Pulses: Intact posterior tibial pulses bilaterally    Intake/Output:    Intake/Output Summary (Last 24 hours) at 10/7/2021 1734  Last data filed at 10/7/2021 0630  Gross per 24 hour   Intake 240 ml   Output 300 ml   Net -60 ml     No intake/output data recorded. Laboratory Tests:  Recent Labs     10/06/21  0646 10/07/21  1039 10/07/21  1501    135 138   K 5.2* 5.5* 5.4*   CL 95* 92* 97*   CO2 32* 32* 37*   BUN 57* 56* 55*   CREATININE 3.7* 3.3* 3.3*   GLUCOSE 120* 95 168*   CALCIUM 9.2 9.4 9.5     Lab Results   Component Value Date    MG 2.1 10/07/2021     No results for input(s): ALKPHOS, ALT, AST, PROT, BILITOT, BILIDIR, LABALBU in the last 72 hours.   Recent Labs     10/05/21  0522   WBC 4.4*   RBC 3.99   HGB 9.9*   HCT 34.5*   MCV 86.5   MCH 24.8*   MCHC 28.7*   RDW 14.8      MPV 9.0     Lab Results   Component Value Date    CKTOTAL 31 10/03/2021    CKMB 2.2 10/03/2021    TROPONINI 0.07 (H) 04/26/2021    TROPONINI 0.08 (H) 04/10/2021    TROPONINI 0.10 (H) 04/09/2021     Lab Results   Component Value Date    INR 1.0 08/31/2020    INR 1.2 04/27/2017    INR 1.1 07/24/2015    PROTIME 11.3 08/31/2020    PROTIME 13.2 (H) 04/27/2017    PROTIME 11.9 07/24/2015     Lab Results   Component Value Date    TSH 0.996 06/03/2014     Lab Results   Component Value Date    LABA1C 5.5 06/03/2014     No results found for: EAG  Lab Results   Component Value Date    CHOL 135 10/03/2021    CHOL 128 06/03/2014    CHOL 101 05/23/2013     Lab Results   Component Value Date    TRIG 100 10/03/2021    TRIG 50 06/03/2014    TRIG 160 (H) 05/23/2013     Lab Results   Component Value Date    HDL 55 10/03/2021    HDL 51 06/03/2014    HDL 21.0 (A) 05/23/2013 Lab Results   Component Value Date    LDLCALC 60 10/03/2021    LDLCALC 67 06/03/2014    LDLCALC 48 05/23/2013     Lab Results   Component Value Date    LABVLDL 20 10/03/2021    LABVLDL 10 06/03/2014     No results found for: [facility]    Cardiac Tests:  Telemetry findings reviewed: Off the monitor     EKG: Normal sinus rhythm, LVH, inferior infarct age undetermined, ST-T changes suggestive lateral wall ischemia, abnormal EKG.    Vitals and labs were reviewed: Blood pressure 146/67 heart rate 67 sats 98 % on 2 L.     Labs-BUN/creatinine 50/3.6>> 65/4.6>> 61/4.3>> 57/3.7, potassium 5.9>> 5.5>> 4.7>> 5.2, proBNP 22 645, troponin, high-sensitivity 127>> 118, cholesterol 135, HDL 55, LDL 60 triglycerides 100, liver function normal H&H 10.2/32.8>> 9.9/34.5, serum iron low at 38 TIBC 173 iron sats 17     1. Stress Myoview 07/12/2012. Reversible ischemia in the mid anterior wall. EF 45%.     2. Lexiscan MPS 11/21/2013. Normal perfusion study. EF 63%.       3. Echo 07/20/2014. EF normal. Stage I diastolic dysfunction, otherwise no valvular heart disease. Mild LAE.     4. Echo, 07/21/2015. Stage I diastolic dysfunction. EF 65%. Normal sized LA. No valvular heart disease. Small circumferential pericardial effusion.     5. Lexiscan MPS, 07/26/2015: Small area of reversible ischemia in the lateral wall. EF 48%.      6. TTE (4/3/2020, [facility]) CONCLUSIONS: Technically difficult exam due to body habitus. Exam indication: Evaluation of known heart failure to guide therapy. The left ventricle is normal in size. There is moderate left ventricular hypertrophy. Left ventricular systolic function is normal. EF = 67 ± 5% (2D biplane) Grade II left ventricular diastolic dysfunction. Global strain not reported due to poor tracking. The right ventricle is normal in size. Right ventricular systolic function is normal. The left atrial cavity is severely dilated.  Exam was compared with the prior  echocardiographic exam performed on 10/19/2016. Similar findings.     7. NM cardiac amyloid SPECT: 2/5/2021: [facility]: CONCLUSIONS:  Not Consistent with TTR amyloidosis: A semi-quantitative visual score of 0 or H/CL ratio < 1.25  2. Incidental Findings from limited non-diagnostic CTAC:  - Coronary calcifications visualized.      8. Echocardiogram 04/13/2021 (Dr. Melida Hilliard): EF 60-65%. Stage I Diastolic Dysfunction. 9. Lexiscan nuclear stress test-10/7/2021:  1. The myocardial perfusion imaging was normal with soft tissue   attenuation. 2. Gated SPECT left ventricular ejection fraction was calculated to be   75% with normal myocardial wall motion.         10.         Assessment:  · Acute on chronic HFpEF, improved   · One  episode of atypical chest pain will rule out ischemia, stress test showed no evidence of ischemia. · History of ESRD status post renal transplant underwent in December 2014, now has a CKD stage IV with rising creatinine level creatinine 3.6>> 4.3>> 4.5>> 4.6  · Chronically elevated troponin level without any EKG changes  · Hypertension, not well controlled secondary to volume overload  · Hyperlipidemia on statin  · Type 2 diabetes  · Chronic anemia with some iron deficiency complement  · Obstructive sleep apnea compliant with CPAP   · GERD  · Mild hyperkalemia  · RSV virus bronchitis, stable        Plan:   · Continue Bumex 2 mg p.o. daily. · Volume status seems improved, doing well. Cough has improved. · Stress test results reviewed with the patient and they were normal.  Patient told me is going home. · Recent echocardiogram done in April 2021 showed normal LV function with stage I diastolic dysfunction  · Continue rest of the current home medications   · Patient is stable stable from the cardiology standpoint, will sign off. He is going home today. · Follow-up with the cardiology service in 1 month. Kay New MD., [place].   [facility] Cardiology

## 2021-10-08 LAB — TACROLIMUS BLOOD: 12.1 NG/ML

## 2021-10-11 ENCOUNTER — TELEPHONE (OUTPATIENT)
Dept: CARDIOLOGY CLINIC | Age: 71
End: 2021-10-11

## 2021-10-11 ENCOUNTER — TELEPHONE (OUTPATIENT)
Dept: ADMINISTRATIVE | Age: 71
End: 2021-10-11

## 2021-10-11 NOTE — TELEPHONE ENCOUNTER
Patient needs hospital follow up Dr Ricky Islas discharged 10/08 CHF. Please call patient and advise.  Thanks

## 2021-10-13 ENCOUNTER — HOSPITAL ENCOUNTER (OUTPATIENT)
Dept: INFUSION THERAPY | Age: 71
Setting detail: INFUSION SERIES
End: 2021-10-13
Payer: MEDICARE

## 2021-10-18 ENCOUNTER — HOSPITAL ENCOUNTER (OUTPATIENT)
Age: 71
Discharge: HOME | End: 2021-10-18
Payer: MEDICARE

## 2021-10-18 ENCOUNTER — OFFICE VISIT (OUTPATIENT)
Dept: CARDIOLOGY CLINIC | Age: 71
End: 2021-10-18
Payer: MEDICARE

## 2021-10-18 VITALS
WEIGHT: 200 LBS | SYSTOLIC BLOOD PRESSURE: 120 MMHG | DIASTOLIC BLOOD PRESSURE: 62 MMHG | RESPIRATION RATE: 16 BRPM | HEART RATE: 53 BPM | OXYGEN SATURATION: 95 % | HEIGHT: 71 IN | BODY MASS INDEX: 28 KG/M2

## 2021-10-18 DIAGNOSIS — R07.9 CHEST PAIN, UNSPECIFIED TYPE: ICD-10-CM

## 2021-10-18 DIAGNOSIS — I27.20 PULMONARY HYPERTENSION (HCC): ICD-10-CM

## 2021-10-18 DIAGNOSIS — I10 ESSENTIAL HYPERTENSION: Primary | ICD-10-CM

## 2021-10-18 DIAGNOSIS — I50.32 CHRONIC DIASTOLIC CONGESTIVE HEART FAILURE (HCC): ICD-10-CM

## 2021-10-18 DIAGNOSIS — R00.1 JUNCTIONAL BRADYCARDIA: ICD-10-CM

## 2021-10-18 DIAGNOSIS — I25.10 CORONARY ARTERY DISEASE INVOLVING NATIVE CORONARY ARTERY OF NATIVE HEART WITHOUT ANGINA PECTORIS: ICD-10-CM

## 2021-10-18 DIAGNOSIS — I50.31 ACUTE DIASTOLIC CONGESTIVE HEART FAILURE (HCC): ICD-10-CM

## 2021-10-18 DIAGNOSIS — I10 HTN (HYPERTENSION), BENIGN: ICD-10-CM

## 2021-10-18 DIAGNOSIS — I50.41 ACUTE COMBINED SYSTOLIC AND DIASTOLIC CHF, NYHA CLASS 1 (HCC): ICD-10-CM

## 2021-10-18 DIAGNOSIS — E78.2 MIXED HYPERLIPIDEMIA: ICD-10-CM

## 2021-10-18 DIAGNOSIS — I50.9 ACUTE DECOMPENSATED HEART FAILURE (HCC): ICD-10-CM

## 2021-10-18 LAB
ANISOCYTOSIS: ABNORMAL
BASOPHILS ABSOLUTE: 0 E9/L (ref 0–0.2)
BASOPHILS RELATIVE PERCENT: 0.3 % (ref 0–2)
BURR CELLS: ABNORMAL
EOSINOPHILS ABSOLUTE: 0.16 E9/L (ref 0.05–0.5)
EOSINOPHILS RELATIVE PERCENT: 5.2 % (ref 0–6)
HCT VFR BLD CALC: 33 % (ref 37–54)
HEMOGLOBIN: 9.2 G/DL (ref 12.5–16.5)
HYPOCHROMIA: ABNORMAL
LYMPHOCYTES ABSOLUTE: 0.25 E9/L (ref 1.5–4)
LYMPHOCYTES RELATIVE PERCENT: 7.8 % (ref 20–42)
MCH RBC QN AUTO: 25.3 PG (ref 26–35)
MCHC RBC AUTO-ENTMCNC: 27.9 % (ref 32–34.5)
MCV RBC AUTO: 90.7 FL (ref 80–99.9)
METAMYELOCYTES RELATIVE PERCENT: 0.9 % (ref 0–1)
MONOCYTES ABSOLUTE: 0.31 E9/L (ref 0.1–0.95)
MONOCYTES RELATIVE PERCENT: 9.6 % (ref 2–12)
MYELOCYTE PERCENT: 0.9 % (ref 0–0)
NEUTROPHILS ABSOLUTE: 2.42 E9/L (ref 1.8–7.3)
NEUTROPHILS RELATIVE PERCENT: 75.7 % (ref 43–80)
OVALOCYTES: ABNORMAL
PDW BLD-RTO: 15.1 FL (ref 11.5–15)
PLATELET # BLD: 258 E9/L (ref 130–450)
PMV BLD AUTO: 9.7 FL (ref 7–12)
POIKILOCYTES: ABNORMAL
POLYCHROMASIA: ABNORMAL
RBC # BLD: 3.64 E12/L (ref 3.8–5.8)
SCHISTOCYTES: ABNORMAL
WBC # BLD: 3.1 E9/L (ref 4.5–11.5)

## 2021-10-18 PROCEDURE — 93000 ELECTROCARDIOGRAM COMPLETE: CPT | Performed by: NURSE PRACTITIONER

## 2021-10-18 PROCEDURE — 85025 COMPLETE CBC W/AUTO DIFF WBC: CPT

## 2021-10-18 PROCEDURE — 36415 COLL VENOUS BLD VENIPUNCTURE: CPT

## 2021-10-18 PROCEDURE — 99214 OFFICE O/P EST MOD 30 MIN: CPT | Performed by: NURSE PRACTITIONER

## 2021-10-18 RX ORDER — AMMONIUM LACTATE 12 G/100G
LOTION TOPICAL
COMMUNITY
End: 2022-09-02

## 2021-10-18 RX ORDER — CHLOROTHIAZIDE 250 MG/1
TABLET ORAL
COMMUNITY
End: 2022-03-10

## 2021-10-18 RX ORDER — TIZANIDINE 4 MG/1
4 TABLET ORAL PRN
COMMUNITY
Start: 2021-09-30 | End: 2022-09-02

## 2021-10-18 RX ORDER — SODIUM ZIRCONIUM CYCLOSILICATE 10 G/10G
POWDER, FOR SUSPENSION ORAL
COMMUNITY
Start: 2021-08-09 | End: 2022-07-01

## 2021-10-18 RX ORDER — LATANOPROST 50 UG/ML
SOLUTION/ DROPS OPHTHALMIC
COMMUNITY
Start: 2021-10-12 | End: 2022-02-21 | Stop reason: ALTCHOICE

## 2021-10-18 RX ORDER — BLOOD SUGAR DIAGNOSTIC
STRIP MISCELLANEOUS
COMMUNITY
Start: 2021-09-14

## 2021-10-18 RX ORDER — METOPROLOL TARTRATE 50 MG/1
50 TABLET, FILM COATED ORAL 2 TIMES DAILY
COMMUNITY
End: 2022-09-02

## 2021-10-18 RX ORDER — PEN NEEDLE, DIABETIC 32GX 5/32"
NEEDLE, DISPOSABLE MISCELLANEOUS
COMMUNITY
Start: 2021-10-16

## 2021-10-18 RX ORDER — LABETALOL 200 MG/1
TABLET, FILM COATED ORAL DAILY
Status: ON HOLD | COMMUNITY
End: 2022-06-16 | Stop reason: HOSPADM

## 2021-10-18 RX ORDER — SULFAMETHOXAZOLE AND TRIMETHOPRIM 400; 80 MG/1; MG/1
2 TABLET ORAL 2 TIMES DAILY
COMMUNITY
Start: 2021-06-28

## 2021-10-18 RX ORDER — LANCETS 33 GAUGE
EACH MISCELLANEOUS
COMMUNITY
Start: 2021-09-14

## 2021-10-18 RX ORDER — METOLAZONE 2.5 MG/1
TABLET ORAL
COMMUNITY
Start: 2021-09-06 | End: 2022-02-21 | Stop reason: ALTCHOICE

## 2021-10-18 NOTE — PATIENT INSTRUCTIONS
1. Ok to proceed with cataract surgery     2. Following with Dr. Marshall Nieves     3. Follow up with Dr. Wayne Johnston in 3 months - sooner if needed    4. Daily weights     -Diet should sodium restricted to 2 grams    -Again watch your daily weight trends and if you gain water weight please follow below instructions.    -If at any time you feel that you are retaining fluid, your medications are not working, or you feel ill in anyway, then please call us for either same day appointment or the next day, and for instructions. Our goal is to keep you out of the emergency room and the hospital and we have ways to do it. You just need to call us in a timely manner.     -If you become sick for other reasons, and notice that you are not urinating as much, the urine is very dark, you have significant diarrhea or vomiting, then please DO NOT take your water pill and CALL US immediately.

## 2021-10-18 NOTE — TELEPHONE ENCOUNTER
DR Haley Mcwilliams coordinator is Janine Harvey,  His RN is Kimberley  Forwarded to Janine Harvey to set f/u

## 2021-10-18 NOTE — PROGRESS NOTES
[facility] Cardiology  Office Visit         Reason for Visit: Heart Failure    Primary Cardiologist: Dr. Chayito Good        History of Present Illness:     Mr. Shelby Palacios is a 70year old male with a PMHx of chronic HFpEF, combined pre-/post capillary pulmonary hypertension (CpC-PH, WHO Group 2), ESRD s/p renal transplant now with CKD stage IV, HTN, HLD, T2DM, chronic anemia, OLINDA. He recently presented to the emergency room with complaints of creased bilateral lower extremity edema with weeping blisters, dyspnea on exertion and orthopnea. Despite increased as an outpatient his symptoms did not improve prompting hospitalization. He was diuresed and once improved from a fluid standpoint underwent NM stress with LVEF 75% and no reversible ischemia. He presents today in follow-up, since discharge from the hospital he has been compliant with all of his current cardiac medications. He continues to have good urinary response to oral bumetanide. He is following closely with nephrology given worsening renal function and having weekly lab draws. He has chronic lower extremity edema however controlled with compression hose and current diuretic regimen. He reports stable weights and is monitoring his diet closely for sodium content. He denies any chest pain, pressure, heaviness, palpitations, dizziness, lightheadedness, near-syncope or syncope. He lives in a bilevel home and is able to ambulate stairs without chest pain. He has chronic dyspnea with exertion, denies worsening shortness of breath, or decline in overall functional capacity. He denies orthopnea, PND, nocturnal cough or hemoptysis. He denies abdominal distention or bloating, early satiety, anorexia/change in appetite, unintentional weight loss. He does lower extremity edema. He denies exertional lightheadedness. He denies palpitations, syncope or near syncope. Review of systems is negative for chest pain, pressure, discomfort.  When ambulating on an incline, He does not leg claudication. History is negative for neurological symptoms including transient loss of vision, asymmetric weakness, aphasia, dysphasia, numbness, tingling. Patient Active Problem List    Diagnosis Date Noted    Acute decompensated heart failure ([facility] 10/02/2021    Acute combined systolic and diastolic CHF, NYHA class 1 ([address] 06/11/2021    Chronic kidney disease 06/11/2021    Anemia of chronic disease 06/11/2021    Hyperkalemia 06/11/2021    Essential hypertension 06/11/2021    Type 2 diabetes mellitus, with long-term current use of insulin ([address] 06/11/2021    Obesity (BMI 30-39.9) 06/11/2021    Acute blood loss anemia 04/27/2021    Acute renal insufficiency 04/26/2021    GI bleed 04/26/2021    Pneumonia due to COVID-19 virus 04/19/2021    Acute kidney injury ([address] 04/10/2021    Vomiting and diarrhea 04/10/2021    Acute kidney injury superimposed on CKD ([address] 04/10/2021    Pulmonary hypertension ([address]     Respiratory failure ([address] 04/27/2017    Acute CHF (congestive heart failure) ([facility] 07/24/2015    Chest pain 07/24/2015    H/O kidney transplant 12/12/2014    CKD (chronic kidney disease) stage 4, GFR 15-29 ml/min ([facility]) 06/02/2014    Mixed hyperlipidemia 06/02/2014    Diabetes mellitus, type 2 ([address] 06/02/2014    HTN (hypertension), benign 06/02/2014    Junctional bradycardia 06/02/2014    Anemia in stage 3 chronic kidney disease ([address] 06/02/2014     Diagnosis updated to problem list by Iman Neville RPh 2/7/2019 11:23 AM, due to new billing requirements, based on transcribed order from Dr. Sandra Ivy.  CAD (coronary artery disease), native coronary artery 06/02/2014    GERD (gastroesophageal reflux disease) 12/02/2010    Chronic diastolic congestive heart failure ([address] 03/12/2008    Immunosuppression ([address] 03/12/2008     Past Medical History:    1. Hypertension. 2. Diabetes. 3. GERD. 4. Hyperlipidemia. 5. NKDA. 6. Obesity.    7. End-stage renal disease. 8. Not a known smoker. 9. Echo 07/12/2012. LVH. EF 63%. Stage I diastolic dysfunction. No significant valvulopathy. 10. Stress Myoview 07/12/2012. Reversible ischemia in the mid anterior wall. EF 45%. 11. Lexiscan MPS 11/21/2013. Normal perfusion study. EF 63%.    12. Echo 07/20/2014. EF normal. Stage I diastolic dysfunction, otherwise no valvular heart disease. Mild LAE. 13. Status post kidney transplant 12/12/2014 at [facility]. 14. Echo, 07/21/2015. Stage I diastolic dysfunction. EF 65%. Normal sized LA. No valvular heart disease. Small circumferential pericardial effusion. 15. Lexiscan MPS, 07/26/2015: Small area of reversible ischemia in the lateral wall. EF 48%.    16. Renal artery US, [facility] - 08/13/2015: 60-99% stenosis right renal artery, may be overestimated due to vessel tortuosity.    17. Angiogram of the transplant right renal artery (anastomosis of the right common iliac artery). FFR and IVUS, 09/21/2015, Dr. Nivia Simmons, [facility]. The transplant renal artery was extremely tortuous, difficult to assess the severity of stenosis by angiography. IVUS of the transplant renal artery, proximally normal. Unable to pass into more distal segment.    18. Repeat procedure, 09/30/2015 from the left groin using an up-and-over approach. Dr. Nivia Simmons, [facility]. FFR 0.98. Peak-to-peak gradient of 8-10 mm. IVUS, no significant atherosclerosis, plaque, or fibrosis.   19. Echocardiogram, 4/27/2017, Dr. Isadora Shepherd. EF 55-65%.  Mild aortic sclerosis. No other significant valvular heart disease.   20. Mixed lung disease.  Was seen by [facility] in April 2020. ? Chest CT without contrast showed mild aortic and coronary calcification and a dilated main PA to 3.9 cm.  There were no stigmata of interstitial lung disease but there was mild diffuse mosaic attenuation of the parenchyma and eventration of the right hemidiaphragm  ? PFTs showed a TLC of 48% and DLCO 48% predicted  21. OLINDA - on CPAP  22.  Has a history of carpal tunnel (although he is unsure if it is) with a prior surgical procedure 6/2014. Nuclear imaging performed for cardiac amyloidosis ([facility]) and found to be negative with AL serologies unremarkable as well. 23. RHC (9/2/2020, [facility]) RA: 13. RV: 91/14. PA: 91/25 with a mean of 48. PCW: 22. CO/CI (Jack): 5.6/2.5. CO/CI (thermodilution): 4.9/2.1 Conclusions: 1. Moderately elevated right and left filling pressures. 2. Severe pulmonary hypertension, predominantly precapillary. PVR approximately 5-5.5. 3. Gabrielle 5 consistent with normal RV function. Refer to pulmonary hypertension center at [facility]  24. Pulmonary hypertension evaluation at [facility] >> (CpC-PH, WHO group 2) - TPG 26 and PVR of 5. PCWP was 22 at time of cardiac catheterization. Given that this is largely a group 2 component, would first try to optimize his medications as best as possible (reduce beta blockade given bradycardia and likely take off ISMN given ill effects in HFpEF patients). PDE-5 inhibitors have limited data in HFpEF with none being beneficial.   25. TTE (4/3/2020, [facility]) CONCLUSIONS: Technically difficult exam due to body habitus. Exam indication: Evaluation of known heart failure to guide therapy. The left ventricle is normal in size. There is moderate left ventricular hypertrophy. Left ventricular systolic function is normal. EF = 67 ± 5% (2D biplane) Grade II left ventricular diastolic dysfunction. Global strain not reported due to poor tracking. The right ventricle is normal in size. Right ventricular systolic function is normal. The left atrial cavity is severely dilated. Exam was compared with the prior  echocardiographic exam performed on 10/19/2016. Similar findings. 26. NM cardiac amyloid SPECT: 2/5/2021: [facility]: CONCLUSIONS:  Not Consistent with TTR amyloidosis: A semi-quantitative visual score of 0 or H/CL ratio < 1.25  2. Incidental Findings from limited non-diagnostic CTAC:  - Coronary calcifications visualized.    27. Echocardiogram 04/13/2021 (Dr. Anne Cueva): EF 60-65%. Stage I Diastolic Dysfunction. 28. EGD 04/28/2021: Marginal Ulcer, nonbleeding  29. Chronically elevated troponin: High-sensitivity troponin 115, 105 (6/11/2021)    Social History: Former smoker: quit in 200; 20 pack years   Denies alcohol and illicit drug      Family History: Noncontributory due to advanced age.      Past Medical History:   Diagnosis Date    Anemia     Iron deficiency    Atrial fibrillation ([address]     CH[facility]est[facility]) [facility] 03/12/2008    Chronic diastolic congestive heart failure ([address] 03/12/2008    Chronic foot pain     Chronic knee pain     BILATERAL    Constipation     Depression     DM (diabetes mellitus) ([address]     ESRD (end stage renal disease) ([address]     H/O kidney transplant 12/12/2014    Hyperlipidemia     Hypertension     Immunosuppression ([address] 3/12/2008    Kidney disease     Pulmonary hypertension ([facility]            Past Surgical History:   Procedure Laterality Date    BACK SURGERY  1982    BLADDER SURGERY      CARPAL TUNNEL RELEASE Left 06/23/2014    CHOLECYSTECTOMY, LAPAROSCOPIC  05/21/2013    COLONOSCOPY      DIAGNOSTIC CARDIAC CATH LAB PROCEDURE  01/15/2013    NORMAL    ECHO COMPL W DOP COLOR FLOW  05/22/2013         ENDOSCOPY, COLON, DIAGNOSTIC      FOOT SURGERY      BONE REMOVED    KIDNEY BIOPSY  06/09/2015    [facility] ; ALSO 4/14/2016, 4/5/2017    KIDNEY TRANSPLANT  12/12/2014    @ [facility]    EDUARDO-EN-Y GASTRIC BYPASS  06/29/2010    Laparoscopic / Dr. Ortega Fuelling  12/21/2007    Dr. Tanesha Beasley N/A 4/28/2021    EGD in OR 12--COVID performed by Cindy Reyes MD at [facility] ENDOSCOPY             No Known Allergies      Outpatient Medications Marked as Taking for the 10/18/21 encounter (Office Visit) with CHAI Adrian - CNP   Medication Sig Dispense Refill    chlorothiazide (DIURIL) 250 MG tablet chlorothiazide 250 mg tablet   Take 1 tablet every day by oral route.  sulfamethoxazole-trimethoprim (BACTRIM;SEPTRA) 400-80 MG per tablet Take 2 tablets by mouth      Cholecalciferol (VITAMIN D3) 125 MCG (5000 UT) TABS Take by mouth      ammonium lactate (LAC-HYDRIN) 12 % lotion ammonium lactate 12 % lotion      bisacodyl (DULCOLAX) 5 MG EC tablet Take by mouth      ONETOUCH ULTRA strip       BD PEN NEEDLE TAINA 2ND GEN 32G X 4 MM MISC       Lancets (ONETOUCH DELICA PLUS [id number]) MISC       latanoprost (XALATAN) 0.005 % ophthalmic solution       metoprolol tartrate (LOPRESSOR) 50 MG tablet metoprolol tartrate 50 mg tablet   Take 1 tablet twice a day by oral route.  LOKELMA 10 g PACK oral suspension       tiZANidine (ZANAFLEX) 4 MG tablet       metOLazone (ZAROXOLYN) 2.5 MG tablet       labetalol (NORMODYNE) 200 MG tablet Take by mouth      calcitRIOL (ROCALTROL) 0.25 MCG capsule Take 1 capsule by mouth daily 30 capsule 3    amLODIPine (NORVASC) 5 MG tablet Take 1 tablet by mouth daily 30 tablet 5    OXYGEN Inhale 2 L into the lungs daily as needed       insulin glargine (LANTUS SOLOSTAR) 100 UNIT/ML injection pen Inject 12 Units into the skin daily At noon   Had today (Patient taking differently: Inject 12 Units into the skin daily ) 5 pen 3    bumetanide (BUMEX) 2 MG tablet Take 1 tablet by mouth daily 30 tablet 3    oxyCODONE (ROXICODONE) 20 MG immediate release tablet Take 20 mg by mouth every 8 hours as needed for Pain.        hydrALAZINE (APRESOLINE) 100 MG tablet Take 100 mg by mouth 3 times daily      metoprolol succinate (TOPROL XL) 100 MG extended release tablet Take 100 mg by mouth daily      predniSONE (DELTASONE) 5 MG tablet Take 5 mg by mouth daily      simvastatin (ZOCOR) 10 MG tablet Take 10 mg by mouth daily      isosorbide mononitrate (IMDUR) 60 MG extended release tablet TAKE 1 TABLET DAILY 90 tablet 3    tacrolimus (PROGRAF) 1 MG capsule Take 2 capsules by mouth 2 times daily (Patient taking differently: Take 3 mg by mouth 2 times daily ) 60 capsule 3    sodium bicarbonate 650 MG tablet Take 650 mg by mouth 4 times daily       Multiple Vitamin (MULTI VITAMIN DAILY PO) Take by mouth daily      mycophenolate (CELLCEPT) 250 MG capsule Take 1,000 mg by mouth 2 times daily       insulin lispro (HUMALOG KWIKPEN) 100 UNIT/ML SOPN Inject 5 Units into the skin daily (before lunch) Patient takes with his biggest meal      docusate sodium (COLACE) 100 MG capsule Take 1 capsule by mouth 2 times daily as needed for Constipation. (Patient taking differently: Take 100 mg by mouth daily as needed for Constipation ) 20 capsule 0    linagliptin (TRADJENTA) 5 MG tablet Take 5 mg by mouth daily.  aspirin 81 MG EC tablet Take 81 mg by mouth daily. Review of Systems:   Cardiac: As per HPI  General: No fever, chills, rigors  Pulmonary: As per HPI  HEENT: No visual disturbances, difficult swallowing  GI: No nausea, vomiting, abdominal pain  : No dysuria or hematuria  Endocrine: No thyroid disease or diabetes  Musculoskeletal: TREVINO x 4, no focal motor deficits  Skin: Intact, no rashes  Neuro/Psych: No headache or seizures          Weights: Wt Readings from Last 3 Encounters:   10/18/21 200 lb (90.7 kg)   10/04/21 221 lb 8 oz (100.5 kg)   07/29/21 208 lb 12.8 oz (94.7 kg)             Physical Examination:     /62   Resp 16   Ht 5' 11\" (1.803 m)   Wt 200 lb (90.7 kg)   SpO2 95%   BMI 27.89 kg/m²     CONSTITUTIONAL: Alert and oriented times 3, no acute distress and cooperative to examination with proper mood and affect. SKIN: Skin color, texture, turgor normal. No rashes or lesions. LYMPH: no cervical nodes, no inguinal nodes  HEENT: Head is normocephalic, atraumatic. EOMI, PERRLA.   NECK: Supple, symmetrical, trachea midline, no adenopathy, thyroid symmetric, not enlarged and no tenderness, skin normal.  CHEST/LUNGS: chest symmetric with normal A/P diameter, normal respiratory rate and rhythm, lungs clear to auscultation without wheezes, rales or rhonchi. No accessory muscle use. Scars None   CARDIOVASCULAR: Heart sounds are normal.  Regular rate and rhythm without murmur, gallop or rub. Normal S1 and S2. Carotid and femoral pulses 2+/4 and equal bilaterally. ABDOMEN: Obese. No and Laparoscopic scar(s) present. Normal bowel sounds. No bruits. soft, nondistended, no masses or organomegaly. no evidence of hernia. Percussion: Normal without hepatosplenomegally. Tenderness: absent. RECTAL: deferred, not clinically indicated  NEUROLOGIC: There are no focalizing motor or sensory deficits. CN II-XII are grossly intact. Yifan Braun EXTREMITIES: no cyanosis, no clubbing. 1+ wearing compression stockings. All the following diagnostics were personally reviewed and interpreted by me. LAB DATA:     10/7/2021 15:01 10/18/2021 07:07   Sodium 138    Potassium 5.4 (H)    Chloride 97 (L)    CO2 37 (H)    BUN 55 (H)    Creatinine 3.3 (H)    Anion Gap 4 (L)    GFR Non- 22    GFR African American 22    Glucose 168 (H)    Calcium 9.5    WBC  3.1 (L)   RBC  3.64 (L)   Hemoglobin Quant  9.2 (L)   Hematocrit  33.0 (L)   MCV  90.7   MCH  25.3 (L)   MCHC  27.9 (L)   MPV  9.7   RDW  15.1 (H)   Platelet Count  601       IMAGING:    CXR (10/2/2021)  FINDINGS:  The cardiac silhouette is within normals. Hazy bilateral multifocal infiltrates are noted.  The infiltrates are more prominent within the lung bases. I cannot exclude a trace right pleural effusion. Impression:  1. Hazy multifocal bilateral pulmonary infiltrates. CARDIAC TESTING:    NM Stress (10/7/2021)  FINDINGS:   The overall quality of the study was good. Left ventricular cavity size was noted to be normal.  Rotational analog analysis demonstrated soft tissue attenuation. The gated SPECT stress imaging in the short, vertical long, and  horizontal long axis demonstrated normal homogeneous tracer  distribution throughout the myocardium.   The resting images demonstrated no change. Gated SPECT left ventricular ejection fraction was calculated to be  75% with normal myocardial wall motion. Impression:  1. The myocardial perfusion imaging was normal with soft tissue attenuation. 2. Gated SPECT left ventricular ejection fraction was calculated to be 75% with normal myocardial wall motion. TTE (4/13/2021)   Summary:   Normal left ventricular chamber size. Normal left ventricular systolic function. Visually estimated LVEF is 60-65 %. No wall motion abnormalities. Stage I diastolic function. Normal right ventricle structure and function. No significant valvular abnormalities. EKG  Sinus Bradycardia  Frequent PAC s   Nonspecific T-abnormality      ASSESSMENT:  1. Chronic HFpEF  2. ACC stage C / NYHA class III  3. Euvolemic with dependent lower extremity edema  4. Nonischemic stress 10/7/2021  5. Pulmonary HTN (CpC-PH, WHO group 2) with preserved RV per TTE 4/2021  6. HTN  7. ESRD s/p renal transplant (12/2014) now with CKD stage IV  8. Hx of hyperkalemia - on Lokelma   9. HLD - on statin therapy   10. T2DM  11. Chronic anemia with some iron deficiency complement  12. Obstructive sleep apnea compliant with CPAP   13. GERD  14. COVID -19 infection (4/26/21)  15. Recent RSV bronchitis (10/2/2021)      PLAN:  1. Ok to proceed with cataract surgery from cardiology standpoint - see below     Pre Operative Assessment:     ? RCRI Risk: Class IV Risk, 11% Risk of Major Cardiac Event  ? He does have a history of HFpEF however is currently euvolemic with a functional status > 4 METS with no current limiting cardiac symptoms. CV risk is low, based on presumed surgical procedure. Pre-op testing or change in medications will not alter this risk. Cardiac risk not prohibitive if non-operative risk is greater. Therefore He is cleared to undergo the procedure with no further testing for risk stratification from a cardiology standpoint. ?  Continue beta blocker during pre and post operative period   ? Avoid fluid shifts in the post operative phase due to history of heart failure. 2. Following with Dr. Kuldip Santiago     3. Follow up with Dr. Angel Louise in 3 months - sooner if needed    4. Daily weights     -Diet should sodium restricted to 2 grams    -Again watch your daily weight trends and if you gain water weight please follow below instructions.    -If at any time you feel that you are retaining fluid, your medications are not working, or you feel ill in anyway, then please call us for either same day appointment or the next day, and for instructions. Our goal is to keep you out of the emergency room and the hospital and we have ways to do it. You just need to call us in a timely manner.     -If you become sick for other reasons, and notice that you are not urinating as much, the urine is very dark, you have significant diarrhea or vomiting, then please DO NOT take your water pill and CALL US immediately.         Veena PAULA-CNP  [facility] Cardiology

## 2021-10-20 ENCOUNTER — HOSPITAL ENCOUNTER (OUTPATIENT)
Dept: INFUSION THERAPY | Age: 71
Setting detail: INFUSION SERIES
Discharge: HOME | End: 2021-10-20
Payer: MEDICARE

## 2021-10-20 VITALS
SYSTOLIC BLOOD PRESSURE: 116 MMHG | TEMPERATURE: 98.6 F | RESPIRATION RATE: 18 BRPM | HEART RATE: 62 BPM | DIASTOLIC BLOOD PRESSURE: 56 MMHG

## 2021-10-20 DIAGNOSIS — D63.8 ANEMIA OF CHRONIC DISEASE: Primary | ICD-10-CM

## 2021-10-20 PROCEDURE — 6360000002 HC RX W HCPCS: Performed by: INTERNAL MEDICINE

## 2021-10-20 PROCEDURE — 96372 THER/PROPH/DIAG INJ SC/IM: CPT

## 2021-10-20 RX ADMIN — EPOETIN ALFA-EPBX 10000 UNITS: 10000 INJECTION, SOLUTION INTRAVENOUS; SUBCUTANEOUS at 14:54

## 2021-10-25 ENCOUNTER — HOSPITAL ENCOUNTER (OUTPATIENT)
Age: 71
Discharge: HOME | End: 2021-10-25
Payer: MEDICARE

## 2021-10-25 LAB
ANISOCYTOSIS: ABNORMAL
BASOPHILS ABSOLUTE: 0.02 E9/L (ref 0–0.2)
BASOPHILS RELATIVE PERCENT: 0.6 % (ref 0–2)
BURR CELLS: ABNORMAL
EOSINOPHILS ABSOLUTE: 0.06 E9/L (ref 0.05–0.5)
EOSINOPHILS RELATIVE PERCENT: 1.8 % (ref 0–6)
HCT VFR BLD CALC: 31 % (ref 37–54)
HEMOGLOBIN: 8.8 G/DL (ref 12.5–16.5)
IMMATURE GRANULOCYTES #: 0.05 E9/L
IMMATURE GRANULOCYTES %: 1.5 % (ref 0–5)
LYMPHOCYTES ABSOLUTE: 0.53 E9/L (ref 1.5–4)
LYMPHOCYTES RELATIVE PERCENT: 15.5 % (ref 20–42)
MCH RBC QN AUTO: 25.7 PG (ref 26–35)
MCHC RBC AUTO-ENTMCNC: 28.4 % (ref 32–34.5)
MCV RBC AUTO: 90.6 FL (ref 80–99.9)
MONOCYTES ABSOLUTE: 0.47 E9/L (ref 0.1–0.95)
MONOCYTES RELATIVE PERCENT: 13.8 % (ref 2–12)
NEUTROPHILS ABSOLUTE: 2.28 E9/L (ref 1.8–7.3)
NEUTROPHILS RELATIVE PERCENT: 66.8 % (ref 43–80)
OVALOCYTES: ABNORMAL
PDW BLD-RTO: 15.8 FL (ref 11.5–15)
PLATELET # BLD: 225 E9/L (ref 130–450)
PMV BLD AUTO: 9.7 FL (ref 7–12)
POIKILOCYTES: ABNORMAL
POLYCHROMASIA: ABNORMAL
RBC # BLD: 3.42 E12/L (ref 3.8–5.8)
WBC # BLD: 3.4 E9/L (ref 4.5–11.5)

## 2021-10-25 PROCEDURE — 36415 COLL VENOUS BLD VENIPUNCTURE: CPT

## 2021-10-25 PROCEDURE — 85025 COMPLETE CBC W/AUTO DIFF WBC: CPT

## 2021-10-27 ENCOUNTER — HOSPITAL ENCOUNTER (OUTPATIENT)
Dept: INFUSION THERAPY | Age: 71
Setting detail: INFUSION SERIES
Discharge: HOME | End: 2021-10-27
Payer: MEDICARE

## 2021-10-27 VITALS
DIASTOLIC BLOOD PRESSURE: 50 MMHG | HEART RATE: 70 BPM | OXYGEN SATURATION: 92 % | TEMPERATURE: 98.4 F | RESPIRATION RATE: 18 BRPM | SYSTOLIC BLOOD PRESSURE: 121 MMHG

## 2021-10-27 DIAGNOSIS — D63.8 ANEMIA OF CHRONIC DISEASE: Primary | ICD-10-CM

## 2021-10-27 PROCEDURE — 6360000002 HC RX W HCPCS: Performed by: INTERNAL MEDICINE

## 2021-10-27 PROCEDURE — 96372 THER/PROPH/DIAG INJ SC/IM: CPT

## 2021-10-27 RX ADMIN — EPOETIN ALFA-EPBX 10000 UNITS: 10000 INJECTION, SOLUTION INTRAVENOUS; SUBCUTANEOUS at 15:15

## 2021-11-01 ENCOUNTER — HOSPITAL ENCOUNTER (OUTPATIENT)
Age: 71
Discharge: HOME | End: 2021-11-01
Payer: MEDICARE

## 2021-11-01 LAB
ANION GAP SERPL CALCULATED.3IONS-SCNC: 9 MMOL/L (ref 7–16)
ANISOCYTOSIS: ABNORMAL
BASOPHILS ABSOLUTE: 0.06 E9/L (ref 0–0.2)
BASOPHILS RELATIVE PERCENT: 1.7 % (ref 0–2)
BUN BLDV-MCNC: 49 MG/DL (ref 6–23)
CALCIUM SERPL-MCNC: 8.9 MG/DL (ref 8.6–10.2)
CHLORIDE BLD-SCNC: 105 MMOL/L (ref 98–107)
CO2: 27 MMOL/L (ref 22–29)
CREAT SERPL-MCNC: 3.4 MG/DL (ref 0.7–1.2)
EOSINOPHILS ABSOLUTE: 0.44 E9/L (ref 0.05–0.5)
EOSINOPHILS RELATIVE PERCENT: 13 % (ref 0–6)
GFR AFRICAN AMERICAN: 22
GFR NON-AFRICAN AMERICAN: 22 ML/MIN/1.73
GLUCOSE BLD-MCNC: 49 MG/DL (ref 74–99)
HBA1C MFR BLD: 4.9 % (ref 4–5.6)
HCT VFR BLD CALC: 30.1 % (ref 37–54)
HEMOGLOBIN: 8.7 G/DL (ref 12.5–16.5)
LYMPHOCYTES ABSOLUTE: 0.41 E9/L (ref 1.5–4)
LYMPHOCYTES RELATIVE PERCENT: 12.2 % (ref 20–42)
MCH RBC QN AUTO: 25.7 PG (ref 26–35)
MCHC RBC AUTO-ENTMCNC: 28.9 % (ref 32–34.5)
MCV RBC AUTO: 89.1 FL (ref 80–99.9)
METAMYELOCYTES RELATIVE PERCENT: 0.9 % (ref 0–1)
MONOCYTES ABSOLUTE: 0.27 E9/L (ref 0.1–0.95)
MONOCYTES RELATIVE PERCENT: 7.8 % (ref 2–12)
NEUTROPHILS ABSOLUTE: 2.21 E9/L (ref 1.8–7.3)
NEUTROPHILS RELATIVE PERCENT: 64.3 % (ref 43–80)
PDW BLD-RTO: 16.6 FL (ref 11.5–15)
PHOSPHORUS: 3.9 MG/DL (ref 2.5–4.5)
PLATELET # BLD: 196 E9/L (ref 130–450)
PMV BLD AUTO: 9.8 FL (ref 7–12)
POIKILOCYTES: ABNORMAL
POTASSIUM SERPL-SCNC: 5.1 MMOL/L (ref 3.5–5)
RBC # BLD: 3.38 E12/L (ref 3.8–5.8)
SODIUM BLD-SCNC: 141 MMOL/L (ref 132–146)
WBC # BLD: 3.4 E9/L (ref 4.5–11.5)

## 2021-11-01 PROCEDURE — 80048 BASIC METABOLIC PNL TOTAL CA: CPT

## 2021-11-01 PROCEDURE — 85025 COMPLETE CBC W/AUTO DIFF WBC: CPT

## 2021-11-01 PROCEDURE — 36415 COLL VENOUS BLD VENIPUNCTURE: CPT

## 2021-11-01 PROCEDURE — 84100 ASSAY OF PHOSPHORUS: CPT

## 2021-11-01 PROCEDURE — 83036 HEMOGLOBIN GLYCOSYLATED A1C: CPT

## 2021-11-03 ENCOUNTER — HOSPITAL ENCOUNTER (OUTPATIENT)
Dept: INFUSION THERAPY | Age: 71
Setting detail: INFUSION SERIES
Discharge: HOME | End: 2021-11-03
Payer: MEDICARE

## 2021-11-03 VITALS
RESPIRATION RATE: 18 BRPM | SYSTOLIC BLOOD PRESSURE: 118 MMHG | TEMPERATURE: 97.6 F | HEART RATE: 50 BPM | OXYGEN SATURATION: 96 % | DIASTOLIC BLOOD PRESSURE: 63 MMHG

## 2021-11-03 DIAGNOSIS — D63.8 ANEMIA OF CHRONIC DISEASE: Primary | ICD-10-CM

## 2021-11-03 PROCEDURE — 6360000002 HC RX W HCPCS: Performed by: INTERNAL MEDICINE

## 2021-11-03 PROCEDURE — 96372 THER/PROPH/DIAG INJ SC/IM: CPT

## 2021-11-03 RX ADMIN — EPOETIN ALFA-EPBX 10000 UNITS: 10000 INJECTION, SOLUTION INTRAVENOUS; SUBCUTANEOUS at 15:06

## 2021-11-08 ENCOUNTER — HOSPITAL ENCOUNTER (OUTPATIENT)
Age: 71
Discharge: HOME | End: 2021-11-08
Payer: MEDICARE

## 2021-11-08 LAB
ANISOCYTOSIS: ABNORMAL
BASOPHILS ABSOLUTE: 0 E9/L (ref 0–0.2)
BASOPHILS RELATIVE PERCENT: 0.3 % (ref 0–2)
EOSINOPHILS ABSOLUTE: 0 E9/L (ref 0.05–0.5)
EOSINOPHILS RELATIVE PERCENT: 1.2 % (ref 0–6)
HCT VFR BLD CALC: 31 % (ref 37–54)
HEMOGLOBIN: 8.8 G/DL (ref 12.5–16.5)
LYMPHOCYTES ABSOLUTE: 0.51 E9/L (ref 1.5–4)
LYMPHOCYTES RELATIVE PERCENT: 15.7 % (ref 20–42)
MCH RBC QN AUTO: 26 PG (ref 26–35)
MCHC RBC AUTO-ENTMCNC: 28.4 % (ref 32–34.5)
MCV RBC AUTO: 91.7 FL (ref 80–99.9)
MONOCYTES ABSOLUTE: 0.26 E9/L (ref 0.1–0.95)
MONOCYTES RELATIVE PERCENT: 7.8 % (ref 2–12)
MYELOCYTE PERCENT: 0.9 % (ref 0–0)
NEUTROPHILS ABSOLUTE: 2.46 E9/L (ref 1.8–7.3)
NEUTROPHILS RELATIVE PERCENT: 75.7 % (ref 43–80)
OVALOCYTES: ABNORMAL
PDW BLD-RTO: 16.6 FL (ref 11.5–15)
PLATELET # BLD: 187 E9/L (ref 130–450)
PMV BLD AUTO: 8.8 FL (ref 7–12)
POIKILOCYTES: ABNORMAL
POLYCHROMASIA: ABNORMAL
RBC # BLD: 3.38 E12/L (ref 3.8–5.8)
SCHISTOCYTES: ABNORMAL
WBC # BLD: 3.2 E9/L (ref 4.5–11.5)

## 2021-11-08 PROCEDURE — 85025 COMPLETE CBC W/AUTO DIFF WBC: CPT

## 2021-11-08 PROCEDURE — 36415 COLL VENOUS BLD VENIPUNCTURE: CPT

## 2021-11-10 ENCOUNTER — HOSPITAL ENCOUNTER (OUTPATIENT)
Dept: INFUSION THERAPY | Age: 71
Setting detail: INFUSION SERIES
Discharge: HOME | End: 2021-11-10
Payer: MEDICARE

## 2021-11-10 VITALS
DIASTOLIC BLOOD PRESSURE: 52 MMHG | TEMPERATURE: 98.4 F | HEART RATE: 57 BPM | OXYGEN SATURATION: 96 % | RESPIRATION RATE: 16 BRPM | SYSTOLIC BLOOD PRESSURE: 122 MMHG

## 2021-11-10 DIAGNOSIS — D63.8 ANEMIA OF CHRONIC DISEASE: Primary | ICD-10-CM

## 2021-11-10 PROCEDURE — 96372 THER/PROPH/DIAG INJ SC/IM: CPT

## 2021-11-10 PROCEDURE — 6360000002 HC RX W HCPCS: Performed by: INTERNAL MEDICINE

## 2021-11-10 RX ADMIN — EPOETIN ALFA-EPBX 10000 UNITS: 10000 INJECTION, SOLUTION INTRAVENOUS; SUBCUTANEOUS at 14:41

## 2021-11-15 ENCOUNTER — HOSPITAL ENCOUNTER (OUTPATIENT)
Age: 71
Discharge: HOME | End: 2021-11-15
Payer: MEDICARE

## 2021-11-15 LAB
ANISOCYTOSIS: ABNORMAL
BASOPHILS ABSOLUTE: 0 E9/L (ref 0–0.2)
BASOPHILS RELATIVE PERCENT: 0.7 % (ref 0–2)
BURR CELLS: ABNORMAL
EOSINOPHILS ABSOLUTE: 0.08 E9/L (ref 0.05–0.5)
EOSINOPHILS RELATIVE PERCENT: 2.6 % (ref 0–6)
HCT VFR BLD CALC: 32.5 % (ref 37–54)
HEMOGLOBIN: 9.1 G/DL (ref 12.5–16.5)
HYPOCHROMIA: ABNORMAL
LYMPHOCYTES ABSOLUTE: 0.65 E9/L (ref 1.5–4)
LYMPHOCYTES RELATIVE PERCENT: 20.9 % (ref 20–42)
MCH RBC QN AUTO: 25.8 PG (ref 26–35)
MCHC RBC AUTO-ENTMCNC: 28 % (ref 32–34.5)
MCV RBC AUTO: 92.1 FL (ref 80–99.9)
MONOCYTES ABSOLUTE: 0.25 E9/L (ref 0.1–0.95)
MONOCYTES RELATIVE PERCENT: 7.8 % (ref 2–12)
NEUTROPHILS ABSOLUTE: 2.14 E9/L (ref 1.8–7.3)
NEUTROPHILS RELATIVE PERCENT: 68.7 % (ref 43–80)
OVALOCYTES: ABNORMAL
PDW BLD-RTO: 16.3 FL (ref 11.5–15)
PLATELET # BLD: 213 E9/L (ref 130–450)
PMV BLD AUTO: 9.8 FL (ref 7–12)
POIKILOCYTES: ABNORMAL
RBC # BLD: 3.53 E12/L (ref 3.8–5.8)
SCHISTOCYTES: ABNORMAL
WBC # BLD: 3.1 E9/L (ref 4.5–11.5)

## 2021-11-15 PROCEDURE — 36415 COLL VENOUS BLD VENIPUNCTURE: CPT

## 2021-11-15 PROCEDURE — 85025 COMPLETE CBC W/AUTO DIFF WBC: CPT

## 2021-11-17 ENCOUNTER — HOSPITAL ENCOUNTER (OUTPATIENT)
Dept: INFUSION THERAPY | Age: 71
Setting detail: INFUSION SERIES
Discharge: HOME | End: 2021-11-17
Payer: MEDICARE

## 2021-11-17 VITALS
OXYGEN SATURATION: 94 % | TEMPERATURE: 99.1 F | HEART RATE: 57 BPM | RESPIRATION RATE: 16 BRPM | SYSTOLIC BLOOD PRESSURE: 120 MMHG | DIASTOLIC BLOOD PRESSURE: 44 MMHG

## 2021-11-17 DIAGNOSIS — D63.8 ANEMIA OF CHRONIC DISEASE: Primary | ICD-10-CM

## 2021-11-17 PROCEDURE — 6360000002 HC RX W HCPCS: Performed by: INTERNAL MEDICINE

## 2021-11-17 PROCEDURE — 96372 THER/PROPH/DIAG INJ SC/IM: CPT

## 2021-11-17 RX ADMIN — EPOETIN ALFA-EPBX 10000 UNITS: 10000 INJECTION, SOLUTION INTRAVENOUS; SUBCUTANEOUS at 14:49

## 2021-11-22 ENCOUNTER — HOSPITAL ENCOUNTER (OUTPATIENT)
Age: 71
Discharge: HOME | End: 2021-11-22
Payer: MEDICARE

## 2021-11-22 LAB
ANION GAP SERPL CALCULATED.3IONS-SCNC: 10 MMOL/L (ref 7–16)
ANISOCYTOSIS: ABNORMAL
BASOPHILS ABSOLUTE: 0 E9/L (ref 0–0.2)
BASOPHILS RELATIVE PERCENT: 0.4 % (ref 0–2)
BUN BLDV-MCNC: 44 MG/DL (ref 6–23)
CALCIUM SERPL-MCNC: 9.1 MG/DL (ref 8.6–10.2)
CHLORIDE BLD-SCNC: 105 MMOL/L (ref 98–107)
CO2: 28 MMOL/L (ref 22–29)
CREAT SERPL-MCNC: 3.7 MG/DL (ref 0.7–1.2)
EOSINOPHILS ABSOLUTE: 0.07 E9/L (ref 0.05–0.5)
EOSINOPHILS RELATIVE PERCENT: 2.6 % (ref 0–6)
GFR AFRICAN AMERICAN: 20
GFR NON-AFRICAN AMERICAN: 20 ML/MIN/1.73
GLUCOSE BLD-MCNC: 75 MG/DL (ref 74–99)
HCT VFR BLD CALC: 33.7 % (ref 37–54)
HEMOGLOBIN: 9.3 G/DL (ref 12.5–16.5)
LYMPHOCYTES ABSOLUTE: 0.39 E9/L (ref 1.5–4)
LYMPHOCYTES RELATIVE PERCENT: 14.8 % (ref 20–42)
MCH RBC QN AUTO: 25.7 PG (ref 26–35)
MCHC RBC AUTO-ENTMCNC: 27.6 % (ref 32–34.5)
MCV RBC AUTO: 93.1 FL (ref 80–99.9)
MONOCYTES ABSOLUTE: 0.26 E9/L (ref 0.1–0.95)
MONOCYTES RELATIVE PERCENT: 10.4 % (ref 2–12)
NEUTROPHILS ABSOLUTE: 1.87 E9/L (ref 1.8–7.3)
NEUTROPHILS RELATIVE PERCENT: 72.2 % (ref 43–80)
OVALOCYTES: ABNORMAL
PDW BLD-RTO: 15.9 FL (ref 11.5–15)
PLATELET # BLD: 226 E9/L (ref 130–450)
PMV BLD AUTO: 9.3 FL (ref 7–12)
POIKILOCYTES: ABNORMAL
POTASSIUM SERPL-SCNC: 4.9 MMOL/L (ref 3.5–5)
RBC # BLD: 3.62 E12/L (ref 3.8–5.8)
SCHISTOCYTES: ABNORMAL
SODIUM BLD-SCNC: 143 MMOL/L (ref 132–146)
WBC # BLD: 2.6 E9/L (ref 4.5–11.5)

## 2021-11-22 PROCEDURE — 80048 BASIC METABOLIC PNL TOTAL CA: CPT

## 2021-11-22 PROCEDURE — 85025 COMPLETE CBC W/AUTO DIFF WBC: CPT

## 2021-11-22 PROCEDURE — 36415 COLL VENOUS BLD VENIPUNCTURE: CPT

## 2021-11-24 ENCOUNTER — HOSPITAL ENCOUNTER (OUTPATIENT)
Dept: INFUSION THERAPY | Age: 71
Setting detail: INFUSION SERIES
Discharge: HOME | End: 2021-11-24
Payer: MEDICARE

## 2021-11-24 DIAGNOSIS — D63.8 ANEMIA OF CHRONIC DISEASE: Primary | ICD-10-CM

## 2021-11-26 ENCOUNTER — HOSPITAL ENCOUNTER (OUTPATIENT)
Dept: INFUSION THERAPY | Age: 71
Setting detail: INFUSION SERIES
Discharge: HOME | End: 2021-11-26
Payer: MEDICARE

## 2021-11-26 VITALS
TEMPERATURE: 98.6 F | DIASTOLIC BLOOD PRESSURE: 52 MMHG | SYSTOLIC BLOOD PRESSURE: 121 MMHG | RESPIRATION RATE: 16 BRPM | HEART RATE: 55 BPM

## 2021-11-26 DIAGNOSIS — D63.8 ANEMIA OF CHRONIC DISEASE: Primary | ICD-10-CM

## 2021-11-26 PROCEDURE — 96372 THER/PROPH/DIAG INJ SC/IM: CPT

## 2021-11-26 PROCEDURE — 6360000002 HC RX W HCPCS: Performed by: INTERNAL MEDICINE

## 2021-11-26 RX ADMIN — EPOETIN ALFA-EPBX 10000 UNITS: 10000 INJECTION, SOLUTION INTRAVENOUS; SUBCUTANEOUS at 14:42

## 2021-11-26 ASSESSMENT — PAIN SCALES - GENERAL: PAINLEVEL_OUTOF10: 0

## 2021-11-29 ENCOUNTER — HOSPITAL ENCOUNTER (OUTPATIENT)
Age: 71
Discharge: HOME | End: 2021-11-29
Payer: MEDICARE

## 2021-11-29 LAB
ANISOCYTOSIS: ABNORMAL
BASOPHILS ABSOLUTE: 0 E9/L (ref 0–0.2)
BASOPHILS RELATIVE PERCENT: 0.4 % (ref 0–2)
BURR CELLS: ABNORMAL
EOSINOPHILS ABSOLUTE: 0.07 E9/L (ref 0.05–0.5)
EOSINOPHILS RELATIVE PERCENT: 2.6 % (ref 0–6)
HCT VFR BLD CALC: 31.9 % (ref 37–54)
HEMOGLOBIN: 9 G/DL (ref 12.5–16.5)
LYMPHOCYTES ABSOLUTE: 0.25 E9/L (ref 1.5–4)
LYMPHOCYTES RELATIVE PERCENT: 8.8 % (ref 20–42)
MCH RBC QN AUTO: 25.6 PG (ref 26–35)
MCHC RBC AUTO-ENTMCNC: 28.2 % (ref 32–34.5)
MCV RBC AUTO: 90.9 FL (ref 80–99.9)
MONOCYTES ABSOLUTE: 0.34 E9/L (ref 0.1–0.95)
MONOCYTES RELATIVE PERCENT: 12.3 % (ref 2–12)
NEUTROPHILS ABSOLUTE: 2.13 E9/L (ref 1.8–7.3)
NEUTROPHILS RELATIVE PERCENT: 76.3 % (ref 43–80)
OVALOCYTES: ABNORMAL
PDW BLD-RTO: 15.5 FL (ref 11.5–15)
PLATELET # BLD: 186 E9/L (ref 130–450)
PMV BLD AUTO: 9.2 FL (ref 7–12)
POIKILOCYTES: ABNORMAL
RBC # BLD: 3.51 E12/L (ref 3.8–5.8)
WBC # BLD: 2.8 E9/L (ref 4.5–11.5)

## 2021-11-29 PROCEDURE — 85025 COMPLETE CBC W/AUTO DIFF WBC: CPT

## 2021-11-29 PROCEDURE — 36415 COLL VENOUS BLD VENIPUNCTURE: CPT

## 2021-12-01 ENCOUNTER — HOSPITAL ENCOUNTER (OUTPATIENT)
Dept: INFUSION THERAPY | Age: 71
Setting detail: INFUSION SERIES
Discharge: HOME | End: 2021-12-01
Payer: MEDICARE

## 2021-12-01 VITALS
RESPIRATION RATE: 16 BRPM | HEART RATE: 66 BPM | TEMPERATURE: 98.3 F | DIASTOLIC BLOOD PRESSURE: 55 MMHG | OXYGEN SATURATION: 96 % | SYSTOLIC BLOOD PRESSURE: 159 MMHG

## 2021-12-01 DIAGNOSIS — D63.8 ANEMIA OF CHRONIC DISEASE: Primary | ICD-10-CM

## 2021-12-01 PROCEDURE — 96372 THER/PROPH/DIAG INJ SC/IM: CPT

## 2021-12-01 PROCEDURE — 6360000002 HC RX W HCPCS: Performed by: INTERNAL MEDICINE

## 2021-12-01 RX ADMIN — EPOETIN ALFA-EPBX 10000 UNITS: 10000 INJECTION, SOLUTION INTRAVENOUS; SUBCUTANEOUS at 14:56

## 2021-12-06 ENCOUNTER — HOSPITAL ENCOUNTER (OUTPATIENT)
Age: 71
Discharge: HOME | End: 2021-12-06
Payer: MEDICARE

## 2021-12-06 LAB
ACANTHOCYTES: ABNORMAL
ANION GAP SERPL CALCULATED.3IONS-SCNC: 10 MMOL/L (ref 7–16)
ANISOCYTOSIS: ABNORMAL
BASOPHILS ABSOLUTE: 0 E9/L (ref 0–0.2)
BASOPHILS RELATIVE PERCENT: 0.7 % (ref 0–2)
BUN BLDV-MCNC: 37 MG/DL (ref 6–23)
BURR CELLS: ABNORMAL
CALCIUM SERPL-MCNC: 9 MG/DL (ref 8.6–10.2)
CHLORIDE BLD-SCNC: 107 MMOL/L (ref 98–107)
CO2: 27 MMOL/L (ref 22–29)
CREAT SERPL-MCNC: 2.6 MG/DL (ref 0.7–1.2)
EOSINOPHILS ABSOLUTE: 0.1 E9/L (ref 0.05–0.5)
EOSINOPHILS RELATIVE PERCENT: 3.5 % (ref 0–6)
FERRITIN: 44 NG/ML
GFR AFRICAN AMERICAN: 30
GFR NON-AFRICAN AMERICAN: 30 ML/MIN/1.73
GLUCOSE BLD-MCNC: 63 MG/DL (ref 74–99)
HCT VFR BLD CALC: 33.2 % (ref 37–54)
HEMOGLOBIN: 9.6 G/DL (ref 12.5–16.5)
HYPOCHROMIA: ABNORMAL
IRON SATURATION: 13 % (ref 20–55)
IRON: 29 MCG/DL (ref 59–158)
LYMPHOCYTES ABSOLUTE: 0.5 E9/L (ref 1.5–4)
LYMPHOCYTES RELATIVE PERCENT: 18.3 % (ref 20–42)
MCH RBC QN AUTO: 26.1 PG (ref 26–35)
MCHC RBC AUTO-ENTMCNC: 28.9 % (ref 32–34.5)
MCV RBC AUTO: 90.2 FL (ref 80–99.9)
MONOCYTES ABSOLUTE: 0.28 E9/L (ref 0.1–0.95)
MONOCYTES RELATIVE PERCENT: 10.4 % (ref 2–12)
NEUTROPHILS ABSOLUTE: 1.9 E9/L (ref 1.8–7.3)
NEUTROPHILS RELATIVE PERCENT: 67.8 % (ref 43–80)
OVALOCYTES: ABNORMAL
PARATHYROID HORMONE INTACT: 176 PG/ML (ref 15–65)
PDW BLD-RTO: 15.7 FL (ref 11.5–15)
PHOSPHORUS: 3 MG/DL (ref 2.5–4.5)
PLATELET # BLD: 211 E9/L (ref 130–450)
PMV BLD AUTO: 9.8 FL (ref 7–12)
POIKILOCYTES: ABNORMAL
POTASSIUM SERPL-SCNC: 4.2 MMOL/L (ref 3.5–5)
RBC # BLD: 3.68 E12/L (ref 3.8–5.8)
SODIUM BLD-SCNC: 144 MMOL/L (ref 132–146)
TOTAL IRON BINDING CAPACITY: 226 MCG/DL (ref 250–450)
WBC # BLD: 2.8 E9/L (ref 4.5–11.5)

## 2021-12-06 PROCEDURE — 83550 IRON BINDING TEST: CPT

## 2021-12-06 PROCEDURE — 83970 ASSAY OF PARATHORMONE: CPT

## 2021-12-06 PROCEDURE — 85025 COMPLETE CBC W/AUTO DIFF WBC: CPT

## 2021-12-06 PROCEDURE — 84100 ASSAY OF PHOSPHORUS: CPT

## 2021-12-06 PROCEDURE — 80048 BASIC METABOLIC PNL TOTAL CA: CPT

## 2021-12-06 PROCEDURE — 82728 ASSAY OF FERRITIN: CPT

## 2021-12-06 PROCEDURE — 36415 COLL VENOUS BLD VENIPUNCTURE: CPT

## 2021-12-06 PROCEDURE — 83540 ASSAY OF IRON: CPT

## 2021-12-07 ENCOUNTER — HOSPITAL ENCOUNTER (OUTPATIENT)
Dept: INFUSION THERAPY | Age: 71
Setting detail: INFUSION SERIES
Discharge: HOME | End: 2021-12-07
Payer: MEDICARE

## 2021-12-07 DIAGNOSIS — D63.8 ANEMIA OF CHRONIC DISEASE: Primary | ICD-10-CM

## 2021-12-07 PROCEDURE — 6360000002 HC RX W HCPCS: Performed by: INTERNAL MEDICINE

## 2021-12-07 PROCEDURE — 96372 THER/PROPH/DIAG INJ SC/IM: CPT

## 2021-12-07 RX ORDER — SODIUM CHLORIDE 9 MG/ML
INJECTION, SOLUTION INTRAVENOUS CONTINUOUS
Status: CANCELLED | OUTPATIENT
Start: 2021-12-07

## 2021-12-07 RX ORDER — DIPHENHYDRAMINE HYDROCHLORIDE 50 MG/ML
50 INJECTION INTRAMUSCULAR; INTRAVENOUS
Status: CANCELLED | OUTPATIENT
Start: 2021-12-07

## 2021-12-07 RX ORDER — EPINEPHRINE 1 MG/ML
0.3 INJECTION, SOLUTION, CONCENTRATE INTRAVENOUS PRN
Status: CANCELLED | OUTPATIENT
Start: 2021-12-07

## 2021-12-07 RX ORDER — SODIUM CHLORIDE 0.9 % (FLUSH) 0.9 %
5-40 SYRINGE (ML) INJECTION PRN
Status: CANCELLED | OUTPATIENT
Start: 2021-12-07

## 2021-12-07 RX ORDER — HEPARIN SODIUM (PORCINE) LOCK FLUSH IV SOLN 100 UNIT/ML 100 UNIT/ML
500 SOLUTION INTRAVENOUS PRN
Status: CANCELLED | OUTPATIENT
Start: 2021-12-07

## 2021-12-07 RX ORDER — SODIUM CHLORIDE 9 MG/ML
25 INJECTION, SOLUTION INTRAVENOUS PRN
Status: CANCELLED | OUTPATIENT
Start: 2021-12-07

## 2021-12-07 RX ADMIN — EPOETIN ALFA-EPBX 10000 UNITS: 10000 INJECTION, SOLUTION INTRAVENOUS; SUBCUTANEOUS at 09:22

## 2021-12-14 ENCOUNTER — HOSPITAL ENCOUNTER (OUTPATIENT)
Age: 71
Discharge: HOME | End: 2021-12-14
Payer: MEDICARE

## 2021-12-14 LAB
ANION GAP SERPL CALCULATED.3IONS-SCNC: 11 MMOL/L (ref 7–16)
ANISOCYTOSIS: ABNORMAL
BASOPHILS ABSOLUTE: 0 E9/L (ref 0–0.2)
BASOPHILS RELATIVE PERCENT: 0.3 % (ref 0–2)
BUN BLDV-MCNC: 37 MG/DL (ref 6–23)
BURR CELLS: ABNORMAL
CALCIUM SERPL-MCNC: 9 MG/DL (ref 8.6–10.2)
CHLORIDE BLD-SCNC: 108 MMOL/L (ref 98–107)
CO2: 26 MMOL/L (ref 22–29)
CREAT SERPL-MCNC: 2.5 MG/DL (ref 0.7–1.2)
EOSINOPHILS ABSOLUTE: 0.14 E9/L (ref 0.05–0.5)
EOSINOPHILS RELATIVE PERCENT: 3.5 % (ref 0–6)
FERRITIN: 36 NG/ML
GFR AFRICAN AMERICAN: 31
GFR NON-AFRICAN AMERICAN: 31 ML/MIN/1.73
GLUCOSE BLD-MCNC: 103 MG/DL (ref 74–99)
HCT VFR BLD CALC: 31.2 % (ref 37–54)
HEMOGLOBIN: 9 G/DL (ref 12.5–16.5)
HYPOCHROMIA: ABNORMAL
IRON SATURATION: 14 % (ref 20–55)
IRON: 36 MCG/DL (ref 59–158)
LYMPHOCYTES ABSOLUTE: 0.16 E9/L (ref 1.5–4)
LYMPHOCYTES RELATIVE PERCENT: 3.5 % (ref 20–42)
MCH RBC QN AUTO: 24.9 PG (ref 26–35)
MCHC RBC AUTO-ENTMCNC: 28.8 % (ref 32–34.5)
MCV RBC AUTO: 86.4 FL (ref 80–99.9)
MONOCYTES ABSOLUTE: 0.2 E9/L (ref 0.1–0.95)
MONOCYTES RELATIVE PERCENT: 5.3 % (ref 2–12)
NEUTROPHILS ABSOLUTE: 3.39 E9/L (ref 1.8–7.3)
NEUTROPHILS RELATIVE PERCENT: 86.8 % (ref 43–80)
OVALOCYTES: ABNORMAL
PARATHYROID HORMONE INTACT: 181 PG/ML (ref 15–65)
PDW BLD-RTO: 15.4 FL (ref 11.5–15)
PHOSPHORUS: 3.5 MG/DL (ref 2.5–4.5)
PLATELET # BLD: 246 E9/L (ref 130–450)
PMV BLD AUTO: 9.5 FL (ref 7–12)
POIKILOCYTES: ABNORMAL
POLYCHROMASIA: ABNORMAL
POTASSIUM SERPL-SCNC: 4.6 MMOL/L (ref 3.5–5)
PROMYELOCYTES PERCENT: 0.9 % (ref 0–0)
RBC # BLD: 3.61 E12/L (ref 3.8–5.8)
SCHISTOCYTES: ABNORMAL
SODIUM BLD-SCNC: 145 MMOL/L (ref 132–146)
TOTAL IRON BINDING CAPACITY: 263 MCG/DL (ref 250–450)
WBC # BLD: 3.9 E9/L (ref 4.5–11.5)

## 2021-12-14 PROCEDURE — 85025 COMPLETE CBC W/AUTO DIFF WBC: CPT

## 2021-12-14 PROCEDURE — 82728 ASSAY OF FERRITIN: CPT

## 2021-12-14 PROCEDURE — 36415 COLL VENOUS BLD VENIPUNCTURE: CPT

## 2021-12-14 PROCEDURE — 84100 ASSAY OF PHOSPHORUS: CPT

## 2021-12-14 PROCEDURE — 83550 IRON BINDING TEST: CPT

## 2021-12-14 PROCEDURE — 80048 BASIC METABOLIC PNL TOTAL CA: CPT

## 2021-12-14 PROCEDURE — 83540 ASSAY OF IRON: CPT

## 2021-12-14 PROCEDURE — 83970 ASSAY OF PARATHORMONE: CPT

## 2021-12-14 RX ORDER — DIPHENHYDRAMINE HYDROCHLORIDE 50 MG/ML
50 INJECTION INTRAMUSCULAR; INTRAVENOUS
Status: CANCELLED | OUTPATIENT
Start: 2021-12-14

## 2021-12-14 RX ORDER — SODIUM CHLORIDE 9 MG/ML
25 INJECTION, SOLUTION INTRAVENOUS PRN
Status: CANCELLED | OUTPATIENT
Start: 2021-12-14

## 2021-12-14 RX ORDER — SODIUM CHLORIDE 9 MG/ML
INJECTION, SOLUTION INTRAVENOUS CONTINUOUS
Status: CANCELLED | OUTPATIENT
Start: 2021-12-14

## 2021-12-14 RX ORDER — EPINEPHRINE 1 MG/ML
0.3 INJECTION, SOLUTION, CONCENTRATE INTRAVENOUS PRN
Status: CANCELLED | OUTPATIENT
Start: 2021-12-14

## 2021-12-14 RX ORDER — METHYLPREDNISOLONE SODIUM SUCCINATE 125 MG/2ML
125 INJECTION, POWDER, LYOPHILIZED, FOR SOLUTION INTRAMUSCULAR; INTRAVENOUS ONCE
Status: CANCELLED
Start: 2021-12-14 | End: 2021-12-14

## 2021-12-14 RX ORDER — HEPARIN SODIUM (PORCINE) LOCK FLUSH IV SOLN 100 UNIT/ML 100 UNIT/ML
500 SOLUTION INTRAVENOUS PRN
Status: CANCELLED | OUTPATIENT
Start: 2021-12-14

## 2021-12-14 RX ORDER — SODIUM CHLORIDE 0.9 % (FLUSH) 0.9 %
5-40 SYRINGE (ML) INJECTION PRN
Status: CANCELLED | OUTPATIENT
Start: 2021-12-14

## 2021-12-15 ENCOUNTER — HOSPITAL ENCOUNTER (OUTPATIENT)
Dept: INFUSION THERAPY | Age: 71
Setting detail: INFUSION SERIES
Discharge: HOME | End: 2021-12-15
Payer: MEDICARE

## 2021-12-15 VITALS
HEART RATE: 58 BPM | DIASTOLIC BLOOD PRESSURE: 65 MMHG | SYSTOLIC BLOOD PRESSURE: 118 MMHG | RESPIRATION RATE: 18 BRPM | TEMPERATURE: 98.6 F

## 2021-12-15 DIAGNOSIS — D63.8 ANEMIA OF CHRONIC DISEASE: Primary | ICD-10-CM

## 2021-12-15 PROCEDURE — 6360000002 HC RX W HCPCS: Performed by: INTERNAL MEDICINE

## 2021-12-15 PROCEDURE — 96372 THER/PROPH/DIAG INJ SC/IM: CPT

## 2021-12-15 RX ADMIN — EPOETIN ALFA-EPBX 10000 UNITS: 10000 INJECTION, SOLUTION INTRAVENOUS; SUBCUTANEOUS at 15:05

## 2021-12-20 ENCOUNTER — HOSPITAL ENCOUNTER (OUTPATIENT)
Age: 71
Discharge: HOME | End: 2021-12-20
Payer: MEDICARE

## 2021-12-20 LAB
BASOPHILS ABSOLUTE: 0.01 E9/L (ref 0–0.2)
BASOPHILS RELATIVE PERCENT: 0.3 % (ref 0–2)
BURR CELLS: ABNORMAL
EOSINOPHILS ABSOLUTE: 0.05 E9/L (ref 0.05–0.5)
EOSINOPHILS RELATIVE PERCENT: 1.4 % (ref 0–6)
HCT VFR BLD CALC: 31.1 % (ref 37–54)
HEMOGLOBIN: 8.9 G/DL (ref 12.5–16.5)
IMMATURE GRANULOCYTES #: 0.02 E9/L
IMMATURE GRANULOCYTES %: 0.6 % (ref 0–5)
LYMPHOCYTES ABSOLUTE: 0.68 E9/L (ref 1.5–4)
LYMPHOCYTES RELATIVE PERCENT: 19.5 % (ref 20–42)
MCH RBC QN AUTO: 24.7 PG (ref 26–35)
MCHC RBC AUTO-ENTMCNC: 28.6 % (ref 32–34.5)
MCV RBC AUTO: 86.4 FL (ref 80–99.9)
MONOCYTES ABSOLUTE: 0.56 E9/L (ref 0.1–0.95)
MONOCYTES RELATIVE PERCENT: 16.1 % (ref 2–12)
NEUTROPHILS ABSOLUTE: 2.16 E9/L (ref 1.8–7.3)
NEUTROPHILS RELATIVE PERCENT: 62.1 % (ref 43–80)
OVALOCYTES: ABNORMAL
PDW BLD-RTO: 15.2 FL (ref 11.5–15)
PLATELET # BLD: 239 E9/L (ref 130–450)
PMV BLD AUTO: 9.3 FL (ref 7–12)
POIKILOCYTES: ABNORMAL
RBC # BLD: 3.6 E12/L (ref 3.8–5.8)
SCHISTOCYTES: ABNORMAL
WBC # BLD: 3.5 E9/L (ref 4.5–11.5)

## 2021-12-20 PROCEDURE — 36415 COLL VENOUS BLD VENIPUNCTURE: CPT

## 2021-12-20 PROCEDURE — 85025 COMPLETE CBC W/AUTO DIFF WBC: CPT

## 2021-12-22 ENCOUNTER — HOSPITAL ENCOUNTER (OUTPATIENT)
Dept: INFUSION THERAPY | Age: 71
Setting detail: INFUSION SERIES
Discharge: HOME | End: 2021-12-22
Payer: MEDICARE

## 2021-12-22 VITALS
OXYGEN SATURATION: 97 % | SYSTOLIC BLOOD PRESSURE: 149 MMHG | HEART RATE: 51 BPM | RESPIRATION RATE: 24 BRPM | TEMPERATURE: 98.7 F | DIASTOLIC BLOOD PRESSURE: 67 MMHG

## 2021-12-22 DIAGNOSIS — D63.8 ANEMIA OF CHRONIC DISEASE: Primary | ICD-10-CM

## 2021-12-22 PROCEDURE — 96372 THER/PROPH/DIAG INJ SC/IM: CPT

## 2021-12-22 PROCEDURE — 6360000002 HC RX W HCPCS: Performed by: INTERNAL MEDICINE

## 2021-12-22 RX ADMIN — EPOETIN ALFA-EPBX 10000 UNITS: 10000 INJECTION, SOLUTION INTRAVENOUS; SUBCUTANEOUS at 14:42

## 2021-12-27 ENCOUNTER — HOSPITAL ENCOUNTER (OUTPATIENT)
Age: 71
Discharge: HOME | End: 2021-12-27
Payer: MEDICARE

## 2021-12-27 LAB
BASOPHILS ABSOLUTE: 0.01 E9/L (ref 0–0.2)
BASOPHILS RELATIVE PERCENT: 0.3 % (ref 0–2)
EOSINOPHILS ABSOLUTE: 0.07 E9/L (ref 0.05–0.5)
EOSINOPHILS RELATIVE PERCENT: 1.9 % (ref 0–6)
HCT VFR BLD CALC: 31.5 % (ref 37–54)
HEMOGLOBIN: 9.2 G/DL (ref 12.5–16.5)
IMMATURE GRANULOCYTES #: 0.03 E9/L
IMMATURE GRANULOCYTES %: 0.8 % (ref 0–5)
LYMPHOCYTES ABSOLUTE: 0.81 E9/L (ref 1.5–4)
LYMPHOCYTES RELATIVE PERCENT: 21.4 % (ref 20–42)
MCH RBC QN AUTO: 25.6 PG (ref 26–35)
MCHC RBC AUTO-ENTMCNC: 29.2 % (ref 32–34.5)
MCV RBC AUTO: 87.7 FL (ref 80–99.9)
MONOCYTES ABSOLUTE: 0.65 E9/L (ref 0.1–0.95)
MONOCYTES RELATIVE PERCENT: 17.2 % (ref 2–12)
NEUTROPHILS ABSOLUTE: 2.21 E9/L (ref 1.8–7.3)
NEUTROPHILS RELATIVE PERCENT: 58.4 % (ref 43–80)
PDW BLD-RTO: 15.4 FL (ref 11.5–15)
PLATELET # BLD: 222 E9/L (ref 130–450)
PMV BLD AUTO: 8.9 FL (ref 7–12)
RBC # BLD: 3.59 E12/L (ref 3.8–5.8)
WBC # BLD: 3.8 E9/L (ref 4.5–11.5)

## 2021-12-27 PROCEDURE — 36415 COLL VENOUS BLD VENIPUNCTURE: CPT

## 2021-12-27 PROCEDURE — 85025 COMPLETE CBC W/AUTO DIFF WBC: CPT

## 2021-12-29 ENCOUNTER — HOSPITAL ENCOUNTER (OUTPATIENT)
Dept: INFUSION THERAPY | Age: 71
Setting detail: INFUSION SERIES
Discharge: HOME | End: 2021-12-29
Payer: MEDICARE

## 2021-12-29 VITALS
TEMPERATURE: 98.3 F | SYSTOLIC BLOOD PRESSURE: 131 MMHG | OXYGEN SATURATION: 95 % | RESPIRATION RATE: 20 BRPM | HEART RATE: 68 BPM | DIASTOLIC BLOOD PRESSURE: 54 MMHG

## 2021-12-29 DIAGNOSIS — D63.8 ANEMIA OF CHRONIC DISEASE: Primary | ICD-10-CM

## 2021-12-29 PROCEDURE — 96372 THER/PROPH/DIAG INJ SC/IM: CPT

## 2021-12-29 PROCEDURE — 6360000002 HC RX W HCPCS: Performed by: INTERNAL MEDICINE

## 2021-12-29 RX ADMIN — EPOETIN ALFA-EPBX 10000 UNITS: 10000 INJECTION, SOLUTION INTRAVENOUS; SUBCUTANEOUS at 14:41

## 2022-01-03 ENCOUNTER — HOSPITAL ENCOUNTER (OUTPATIENT)
Age: 72
Discharge: HOME | End: 2022-01-03
Payer: MEDICARE

## 2022-01-03 LAB
ANION GAP SERPL CALCULATED.3IONS-SCNC: 11 MMOL/L (ref 7–16)
ANISOCYTOSIS: ABNORMAL
BASOPHILS ABSOLUTE: 0 E9/L (ref 0–0.2)
BASOPHILS RELATIVE PERCENT: 0.3 % (ref 0–2)
BUN BLDV-MCNC: 50 MG/DL (ref 6–23)
BURR CELLS: ABNORMAL
CALCIUM SERPL-MCNC: 9.2 MG/DL (ref 8.6–10.2)
CHLORIDE BLD-SCNC: 105 MMOL/L (ref 98–107)
CO2: 26 MMOL/L (ref 22–29)
CREAT SERPL-MCNC: 3.5 MG/DL (ref 0.7–1.2)
EOSINOPHILS ABSOLUTE: 0.12 E9/L (ref 0.05–0.5)
EOSINOPHILS RELATIVE PERCENT: 3.5 % (ref 0–6)
FERRITIN: 30 NG/ML
GFR AFRICAN AMERICAN: 21
GFR NON-AFRICAN AMERICAN: 21 ML/MIN/1.73
GLUCOSE BLD-MCNC: 93 MG/DL (ref 74–99)
HCT VFR BLD CALC: 33.5 % (ref 37–54)
HEMOGLOBIN: 9.6 G/DL (ref 12.5–16.5)
HYPOCHROMIA: ABNORMAL
IRON SATURATION: 14 % (ref 20–55)
IRON: 43 MCG/DL (ref 59–158)
LYMPHOCYTES ABSOLUTE: 0.54 E9/L (ref 1.5–4)
LYMPHOCYTES RELATIVE PERCENT: 15.7 % (ref 20–42)
MCH RBC QN AUTO: 25.2 PG (ref 26–35)
MCHC RBC AUTO-ENTMCNC: 28.7 % (ref 32–34.5)
MCV RBC AUTO: 87.9 FL (ref 80–99.9)
MONOCYTES ABSOLUTE: 0.27 E9/L (ref 0.1–0.95)
MONOCYTES RELATIVE PERCENT: 7.8 % (ref 2–12)
NEUTROPHILS ABSOLUTE: 2.48 E9/L (ref 1.8–7.3)
NEUTROPHILS RELATIVE PERCENT: 73 % (ref 43–80)
OVALOCYTES: ABNORMAL
PARATHYROID HORMONE INTACT: 165 PG/ML (ref 15–65)
PDW BLD-RTO: 15.2 FL (ref 11.5–15)
PHOSPHORUS: 3.3 MG/DL (ref 2.5–4.5)
PLATELET # BLD: 244 E9/L (ref 130–450)
PMV BLD AUTO: 9.8 FL (ref 7–12)
POIKILOCYTES: ABNORMAL
POLYCHROMASIA: ABNORMAL
POTASSIUM SERPL-SCNC: 4.7 MMOL/L (ref 3.5–5)
RBC # BLD: 3.81 E12/L (ref 3.8–5.8)
SODIUM BLD-SCNC: 142 MMOL/L (ref 132–146)
TOTAL IRON BINDING CAPACITY: 311 MCG/DL (ref 250–450)
WBC # BLD: 3.4 E9/L (ref 4.5–11.5)

## 2022-01-03 PROCEDURE — 83540 ASSAY OF IRON: CPT

## 2022-01-03 PROCEDURE — 82728 ASSAY OF FERRITIN: CPT

## 2022-01-03 PROCEDURE — 84100 ASSAY OF PHOSPHORUS: CPT

## 2022-01-03 PROCEDURE — 36415 COLL VENOUS BLD VENIPUNCTURE: CPT

## 2022-01-03 PROCEDURE — 83550 IRON BINDING TEST: CPT

## 2022-01-03 PROCEDURE — 80048 BASIC METABOLIC PNL TOTAL CA: CPT

## 2022-01-03 PROCEDURE — 83970 ASSAY OF PARATHORMONE: CPT

## 2022-01-03 PROCEDURE — 85025 COMPLETE CBC W/AUTO DIFF WBC: CPT

## 2022-01-05 ENCOUNTER — HOSPITAL ENCOUNTER (OUTPATIENT)
Dept: INFUSION THERAPY | Age: 72
Setting detail: INFUSION SERIES
Discharge: HOME | End: 2022-01-05
Payer: MEDICARE

## 2022-01-05 VITALS
OXYGEN SATURATION: 96 % | RESPIRATION RATE: 16 BRPM | DIASTOLIC BLOOD PRESSURE: 59 MMHG | TEMPERATURE: 98 F | SYSTOLIC BLOOD PRESSURE: 142 MMHG | HEART RATE: 55 BPM

## 2022-01-05 VITALS
RESPIRATION RATE: 20 BRPM | HEART RATE: 106 BPM | SYSTOLIC BLOOD PRESSURE: 120 MMHG | DIASTOLIC BLOOD PRESSURE: 66 MMHG | TEMPERATURE: 98.1 F

## 2022-01-05 DIAGNOSIS — N18.30 ANEMIA IN STAGE 3 CHRONIC KIDNEY DISEASE, UNSPECIFIED WHETHER STAGE 3A OR 3B CKD (HCC): Primary | ICD-10-CM

## 2022-01-05 DIAGNOSIS — D63.1 ANEMIA IN STAGE 3 CHRONIC KIDNEY DISEASE, UNSPECIFIED WHETHER STAGE 3A OR 3B CKD (HCC): Primary | ICD-10-CM

## 2022-01-05 DIAGNOSIS — D63.8 ANEMIA OF CHRONIC DISEASE: Primary | ICD-10-CM

## 2022-01-05 PROCEDURE — 96372 THER/PROPH/DIAG INJ SC/IM: CPT

## 2022-01-05 PROCEDURE — 6360000002 HC RX W HCPCS: Performed by: INTERNAL MEDICINE

## 2022-01-05 PROCEDURE — 2580000003 HC RX 258: Performed by: INTERNAL MEDICINE

## 2022-01-05 PROCEDURE — 96365 THER/PROPH/DIAG IV INF INIT: CPT

## 2022-01-05 RX ORDER — DIPHENHYDRAMINE HYDROCHLORIDE 50 MG/ML
50 INJECTION INTRAMUSCULAR; INTRAVENOUS
Status: CANCELLED | OUTPATIENT
Start: 2022-01-12

## 2022-01-05 RX ORDER — METHYLPREDNISOLONE SODIUM SUCCINATE 125 MG/2ML
125 INJECTION, POWDER, LYOPHILIZED, FOR SOLUTION INTRAMUSCULAR; INTRAVENOUS ONCE
Status: CANCELLED
Start: 2022-01-12 | End: 2022-01-12

## 2022-01-05 RX ORDER — EPINEPHRINE 1 MG/ML
0.3 INJECTION, SOLUTION, CONCENTRATE INTRAVENOUS PRN
Status: CANCELLED | OUTPATIENT
Start: 2022-01-12

## 2022-01-05 RX ORDER — HEPARIN SODIUM (PORCINE) LOCK FLUSH IV SOLN 100 UNIT/ML 100 UNIT/ML
500 SOLUTION INTRAVENOUS PRN
Status: CANCELLED | OUTPATIENT
Start: 2022-01-12

## 2022-01-05 RX ORDER — SODIUM CHLORIDE 9 MG/ML
25 INJECTION, SOLUTION INTRAVENOUS PRN
Status: CANCELLED | OUTPATIENT
Start: 2022-01-12

## 2022-01-05 RX ORDER — SODIUM CHLORIDE 0.9 % (FLUSH) 0.9 %
5-40 SYRINGE (ML) INJECTION PRN
Status: DISCONTINUED | OUTPATIENT
Start: 2022-01-05 | End: 2022-01-06 | Stop reason: HOSPADM

## 2022-01-05 RX ORDER — SODIUM CHLORIDE 9 MG/ML
INJECTION, SOLUTION INTRAVENOUS CONTINUOUS
Status: CANCELLED | OUTPATIENT
Start: 2022-01-12

## 2022-01-05 RX ORDER — SODIUM CHLORIDE 0.9 % (FLUSH) 0.9 %
5-40 SYRINGE (ML) INJECTION PRN
Status: CANCELLED | OUTPATIENT
Start: 2022-01-12

## 2022-01-05 RX ORDER — SODIUM CHLORIDE 9 MG/ML
INJECTION, SOLUTION INTRAVENOUS CONTINUOUS
Status: ACTIVE | OUTPATIENT
Start: 2022-01-05 | End: 2022-01-05

## 2022-01-05 RX ADMIN — SODIUM CHLORIDE, PRESERVATIVE FREE 10 ML: 5 INJECTION INTRAVENOUS at 11:57

## 2022-01-05 RX ADMIN — EPOETIN ALFA-EPBX 10000 UNITS: 10000 INJECTION, SOLUTION INTRAVENOUS; SUBCUTANEOUS at 12:18

## 2022-01-05 RX ADMIN — SODIUM CHLORIDE 125 MG: 9 INJECTION, SOLUTION INTRAVENOUS at 11:55

## 2022-01-10 ENCOUNTER — HOSPITAL ENCOUNTER (OUTPATIENT)
Age: 72
Discharge: HOME | End: 2022-01-10
Payer: MEDICARE

## 2022-01-10 LAB
ANION GAP SERPL CALCULATED.3IONS-SCNC: 10 MMOL/L (ref 7–16)
BASOPHILS ABSOLUTE: 0.01 E9/L (ref 0–0.2)
BASOPHILS RELATIVE PERCENT: 0.3 % (ref 0–2)
BUN BLDV-MCNC: 54 MG/DL (ref 6–23)
CALCIUM SERPL-MCNC: 9.4 MG/DL (ref 8.6–10.2)
CHLORIDE BLD-SCNC: 102 MMOL/L (ref 98–107)
CO2: 28 MMOL/L (ref 22–29)
CREAT SERPL-MCNC: 3.8 MG/DL (ref 0.7–1.2)
EOSINOPHILS ABSOLUTE: 0.04 E9/L (ref 0.05–0.5)
EOSINOPHILS RELATIVE PERCENT: 1.1 % (ref 0–6)
FERRITIN: 97 NG/ML
GFR AFRICAN AMERICAN: 19
GFR NON-AFRICAN AMERICAN: 19 ML/MIN/1.73
GLUCOSE BLD-MCNC: 98 MG/DL (ref 74–99)
HCT VFR BLD CALC: 33.4 % (ref 37–54)
HEMOGLOBIN: 9.7 G/DL (ref 12.5–16.5)
IMMATURE GRANULOCYTES #: 0.01 E9/L
IMMATURE GRANULOCYTES %: 0.3 % (ref 0–5)
IRON SATURATION: 13 % (ref 20–55)
IRON: 35 MCG/DL (ref 59–158)
LYMPHOCYTES ABSOLUTE: 0.6 E9/L (ref 1.5–4)
LYMPHOCYTES RELATIVE PERCENT: 17 % (ref 20–42)
MCH RBC QN AUTO: 25.1 PG (ref 26–35)
MCHC RBC AUTO-ENTMCNC: 29 % (ref 32–34.5)
MCV RBC AUTO: 86.3 FL (ref 80–99.9)
MONOCYTES ABSOLUTE: 0.45 E9/L (ref 0.1–0.95)
MONOCYTES RELATIVE PERCENT: 12.7 % (ref 2–12)
NEUTROPHILS ABSOLUTE: 2.42 E9/L (ref 1.8–7.3)
NEUTROPHILS RELATIVE PERCENT: 68.6 % (ref 43–80)
PARATHYROID HORMONE INTACT: 208 PG/ML (ref 15–65)
PDW BLD-RTO: 14.9 FL (ref 11.5–15)
PHOSPHORUS: 3.5 MG/DL (ref 2.5–4.5)
PLATELET # BLD: 250 E9/L (ref 130–450)
PMV BLD AUTO: 9.4 FL (ref 7–12)
POTASSIUM SERPL-SCNC: 4.3 MMOL/L (ref 3.5–5)
RBC # BLD: 3.87 E12/L (ref 3.8–5.8)
SODIUM BLD-SCNC: 140 MMOL/L (ref 132–146)
TOTAL IRON BINDING CAPACITY: 268 MCG/DL (ref 250–450)
WBC # BLD: 3.5 E9/L (ref 4.5–11.5)

## 2022-01-10 PROCEDURE — 85025 COMPLETE CBC W/AUTO DIFF WBC: CPT

## 2022-01-10 PROCEDURE — 83970 ASSAY OF PARATHORMONE: CPT

## 2022-01-10 PROCEDURE — 36415 COLL VENOUS BLD VENIPUNCTURE: CPT

## 2022-01-10 PROCEDURE — 83550 IRON BINDING TEST: CPT

## 2022-01-10 PROCEDURE — 83540 ASSAY OF IRON: CPT

## 2022-01-10 PROCEDURE — 80048 BASIC METABOLIC PNL TOTAL CA: CPT

## 2022-01-10 PROCEDURE — 84100 ASSAY OF PHOSPHORUS: CPT

## 2022-01-10 PROCEDURE — 82728 ASSAY OF FERRITIN: CPT

## 2022-01-12 ENCOUNTER — HOSPITAL ENCOUNTER (OUTPATIENT)
Dept: INFUSION THERAPY | Age: 72
Setting detail: INFUSION SERIES
Discharge: HOME | End: 2022-01-12
Payer: MEDICARE

## 2022-01-12 VITALS
SYSTOLIC BLOOD PRESSURE: 120 MMHG | TEMPERATURE: 98.4 F | OXYGEN SATURATION: 99 % | DIASTOLIC BLOOD PRESSURE: 59 MMHG | RESPIRATION RATE: 20 BRPM | HEART RATE: 54 BPM

## 2022-01-12 VITALS
OXYGEN SATURATION: 100 % | SYSTOLIC BLOOD PRESSURE: 137 MMHG | TEMPERATURE: 98.8 F | HEART RATE: 53 BPM | RESPIRATION RATE: 20 BRPM | DIASTOLIC BLOOD PRESSURE: 59 MMHG

## 2022-01-12 DIAGNOSIS — N18.30 ANEMIA IN STAGE 3 CHRONIC KIDNEY DISEASE, UNSPECIFIED WHETHER STAGE 3A OR 3B CKD (HCC): Primary | ICD-10-CM

## 2022-01-12 DIAGNOSIS — D63.1 ANEMIA IN STAGE 3 CHRONIC KIDNEY DISEASE, UNSPECIFIED WHETHER STAGE 3A OR 3B CKD (HCC): Primary | ICD-10-CM

## 2022-01-12 DIAGNOSIS — D63.8 ANEMIA OF CHRONIC DISEASE: Primary | ICD-10-CM

## 2022-01-12 PROCEDURE — 96365 THER/PROPH/DIAG IV INF INIT: CPT

## 2022-01-12 PROCEDURE — 2580000003 HC RX 258: Performed by: INTERNAL MEDICINE

## 2022-01-12 PROCEDURE — 6360000002 HC RX W HCPCS: Performed by: INTERNAL MEDICINE

## 2022-01-12 PROCEDURE — 96372 THER/PROPH/DIAG INJ SC/IM: CPT

## 2022-01-12 RX ORDER — HEPARIN SODIUM (PORCINE) LOCK FLUSH IV SOLN 100 UNIT/ML 100 UNIT/ML
500 SOLUTION INTRAVENOUS PRN
Status: CANCELLED | OUTPATIENT
Start: 2022-01-19

## 2022-01-12 RX ORDER — SODIUM CHLORIDE 9 MG/ML
25 INJECTION, SOLUTION INTRAVENOUS PRN
Status: CANCELLED | OUTPATIENT
Start: 2022-01-19

## 2022-01-12 RX ORDER — METHYLPREDNISOLONE SODIUM SUCCINATE 125 MG/2ML
125 INJECTION, POWDER, LYOPHILIZED, FOR SOLUTION INTRAMUSCULAR; INTRAVENOUS ONCE
Status: CANCELLED
Start: 2022-01-19 | End: 2022-01-19

## 2022-01-12 RX ORDER — SODIUM CHLORIDE 9 MG/ML
25 INJECTION, SOLUTION INTRAVENOUS PRN
Status: DISCONTINUED | OUTPATIENT
Start: 2022-01-12 | End: 2022-01-13 | Stop reason: HOSPADM

## 2022-01-12 RX ORDER — SODIUM CHLORIDE 0.9 % (FLUSH) 0.9 %
5-40 SYRINGE (ML) INJECTION PRN
Status: CANCELLED | OUTPATIENT
Start: 2022-01-19

## 2022-01-12 RX ORDER — HEPARIN SODIUM (PORCINE) LOCK FLUSH IV SOLN 100 UNIT/ML 100 UNIT/ML
500 SOLUTION INTRAVENOUS PRN
Status: DISCONTINUED | OUTPATIENT
Start: 2022-01-12 | End: 2022-01-13 | Stop reason: HOSPADM

## 2022-01-12 RX ORDER — DIPHENHYDRAMINE HYDROCHLORIDE 50 MG/ML
50 INJECTION INTRAMUSCULAR; INTRAVENOUS
Status: CANCELLED | OUTPATIENT
Start: 2022-01-19

## 2022-01-12 RX ORDER — SODIUM CHLORIDE 9 MG/ML
INJECTION, SOLUTION INTRAVENOUS CONTINUOUS
Status: CANCELLED | OUTPATIENT
Start: 2022-01-19

## 2022-01-12 RX ORDER — SODIUM CHLORIDE 0.9 % (FLUSH) 0.9 %
5-40 SYRINGE (ML) INJECTION PRN
Status: DISCONTINUED | OUTPATIENT
Start: 2022-01-12 | End: 2022-01-13 | Stop reason: HOSPADM

## 2022-01-12 RX ORDER — EPINEPHRINE 1 MG/ML
0.3 INJECTION, SOLUTION, CONCENTRATE INTRAVENOUS PRN
Status: CANCELLED | OUTPATIENT
Start: 2022-01-19

## 2022-01-12 RX ADMIN — SODIUM CHLORIDE 125 MG: 9 INJECTION, SOLUTION INTRAVENOUS at 12:25

## 2022-01-12 RX ADMIN — SODIUM CHLORIDE 45 ML: 9 INJECTION, SOLUTION INTRAVENOUS at 13:18

## 2022-01-12 RX ADMIN — EPOETIN ALFA-EPBX 10000 UNITS: 10000 INJECTION, SOLUTION INTRAVENOUS; SUBCUTANEOUS at 11:48

## 2022-01-12 ASSESSMENT — PAIN SCALES - GENERAL
PAINLEVEL_OUTOF10: 0
PAINLEVEL_OUTOF10: 0

## 2022-01-18 ENCOUNTER — HOSPITAL ENCOUNTER (OUTPATIENT)
Age: 72
Discharge: HOME | End: 2022-01-18
Payer: MEDICARE

## 2022-01-18 LAB
ANISOCYTOSIS: ABNORMAL
BASOPHILS ABSOLUTE: 0.03 E9/L (ref 0–0.2)
BASOPHILS RELATIVE PERCENT: 0.9 % (ref 0–2)
BURR CELLS: ABNORMAL
EOSINOPHILS ABSOLUTE: 0.02 E9/L (ref 0.05–0.5)
EOSINOPHILS RELATIVE PERCENT: 0.8 % (ref 0–6)
HCT VFR BLD CALC: 33.6 % (ref 37–54)
HEMOGLOBIN: 9.7 G/DL (ref 12.5–16.5)
HYPOCHROMIA: ABNORMAL
LYMPHOCYTES ABSOLUTE: 0.7 E9/L (ref 1.5–4)
LYMPHOCYTES RELATIVE PERCENT: 23.5 % (ref 20–42)
MCH RBC QN AUTO: 25.3 PG (ref 26–35)
MCHC RBC AUTO-ENTMCNC: 28.9 % (ref 32–34.5)
MCV RBC AUTO: 87.5 FL (ref 80–99.9)
MONOCYTES ABSOLUTE: 0.35 E9/L (ref 0.1–0.95)
MONOCYTES RELATIVE PERCENT: 12.2 % (ref 2–12)
NEUTROPHILS ABSOLUTE: 1.83 E9/L (ref 1.8–7.3)
NEUTROPHILS RELATIVE PERCENT: 62.6 % (ref 43–80)
OVALOCYTES: ABNORMAL
PDW BLD-RTO: 16 FL (ref 11.5–15)
PLATELET # BLD: 229 E9/L (ref 130–450)
PMV BLD AUTO: 9 FL (ref 7–12)
POIKILOCYTES: ABNORMAL
RBC # BLD: 3.84 E12/L (ref 3.8–5.8)
WBC # BLD: 2.9 E9/L (ref 4.5–11.5)

## 2022-01-18 PROCEDURE — 36415 COLL VENOUS BLD VENIPUNCTURE: CPT

## 2022-01-18 PROCEDURE — 85025 COMPLETE CBC W/AUTO DIFF WBC: CPT

## 2022-01-19 ENCOUNTER — HOSPITAL ENCOUNTER (OUTPATIENT)
Dept: INFUSION THERAPY | Age: 72
Setting detail: INFUSION SERIES
Discharge: HOME | End: 2022-01-19
Payer: MEDICARE

## 2022-01-19 VITALS — HEART RATE: 54 BPM | RESPIRATION RATE: 16 BRPM | SYSTOLIC BLOOD PRESSURE: 125 MMHG | DIASTOLIC BLOOD PRESSURE: 58 MMHG

## 2022-01-19 VITALS
RESPIRATION RATE: 20 BRPM | DIASTOLIC BLOOD PRESSURE: 61 MMHG | OXYGEN SATURATION: 97 % | SYSTOLIC BLOOD PRESSURE: 131 MMHG | HEART RATE: 94 BPM | TEMPERATURE: 99 F

## 2022-01-19 DIAGNOSIS — D63.1 ANEMIA IN STAGE 3 CHRONIC KIDNEY DISEASE, UNSPECIFIED WHETHER STAGE 3A OR 3B CKD (HCC): Primary | ICD-10-CM

## 2022-01-19 DIAGNOSIS — D63.8 ANEMIA OF CHRONIC DISEASE: Primary | ICD-10-CM

## 2022-01-19 DIAGNOSIS — N18.30 ANEMIA IN STAGE 3 CHRONIC KIDNEY DISEASE, UNSPECIFIED WHETHER STAGE 3A OR 3B CKD (HCC): Primary | ICD-10-CM

## 2022-01-19 PROCEDURE — 6360000002 HC RX W HCPCS: Performed by: INTERNAL MEDICINE

## 2022-01-19 PROCEDURE — 96365 THER/PROPH/DIAG IV INF INIT: CPT

## 2022-01-19 PROCEDURE — 2580000003 HC RX 258: Performed by: INTERNAL MEDICINE

## 2022-01-19 PROCEDURE — 96372 THER/PROPH/DIAG INJ SC/IM: CPT

## 2022-01-19 RX ORDER — SODIUM CHLORIDE 9 MG/ML
INJECTION, SOLUTION INTRAVENOUS CONTINUOUS
Status: CANCELLED | OUTPATIENT
Start: 2022-01-26

## 2022-01-19 RX ORDER — METHYLPREDNISOLONE SODIUM SUCCINATE 125 MG/2ML
125 INJECTION, POWDER, LYOPHILIZED, FOR SOLUTION INTRAMUSCULAR; INTRAVENOUS ONCE
Status: CANCELLED
Start: 2022-01-26 | End: 2022-01-26

## 2022-01-19 RX ORDER — SODIUM CHLORIDE 0.9 % (FLUSH) 0.9 %
5-40 SYRINGE (ML) INJECTION PRN
Status: DISCONTINUED | OUTPATIENT
Start: 2022-01-19 | End: 2022-01-20 | Stop reason: HOSPADM

## 2022-01-19 RX ORDER — SODIUM CHLORIDE 9 MG/ML
25 INJECTION, SOLUTION INTRAVENOUS PRN
Status: DISCONTINUED | OUTPATIENT
Start: 2022-01-19 | End: 2022-01-20 | Stop reason: HOSPADM

## 2022-01-19 RX ORDER — SODIUM CHLORIDE 0.9 % (FLUSH) 0.9 %
5-40 SYRINGE (ML) INJECTION PRN
Status: CANCELLED | OUTPATIENT
Start: 2022-01-26

## 2022-01-19 RX ORDER — HEPARIN SODIUM (PORCINE) LOCK FLUSH IV SOLN 100 UNIT/ML 100 UNIT/ML
500 SOLUTION INTRAVENOUS PRN
Status: DISCONTINUED | OUTPATIENT
Start: 2022-01-19 | End: 2022-01-20 | Stop reason: HOSPADM

## 2022-01-19 RX ORDER — HEPARIN SODIUM (PORCINE) LOCK FLUSH IV SOLN 100 UNIT/ML 100 UNIT/ML
500 SOLUTION INTRAVENOUS PRN
Status: CANCELLED | OUTPATIENT
Start: 2022-01-26

## 2022-01-19 RX ORDER — SODIUM CHLORIDE 9 MG/ML
25 INJECTION, SOLUTION INTRAVENOUS PRN
Status: CANCELLED | OUTPATIENT
Start: 2022-01-26

## 2022-01-19 RX ORDER — EPINEPHRINE 1 MG/ML
0.3 INJECTION, SOLUTION, CONCENTRATE INTRAVENOUS PRN
Status: CANCELLED | OUTPATIENT
Start: 2022-01-26

## 2022-01-19 RX ORDER — DIPHENHYDRAMINE HYDROCHLORIDE 50 MG/ML
50 INJECTION INTRAMUSCULAR; INTRAVENOUS
Status: CANCELLED | OUTPATIENT
Start: 2022-01-26

## 2022-01-19 RX ADMIN — SODIUM CHLORIDE 125 MG: 9 INJECTION, SOLUTION INTRAVENOUS at 11:59

## 2022-01-19 RX ADMIN — EPOETIN ALFA-EPBX 10000 UNITS: 10000 INJECTION, SOLUTION INTRAVENOUS; SUBCUTANEOUS at 11:41

## 2022-01-19 RX ADMIN — SODIUM CHLORIDE 25 ML: 9 INJECTION, SOLUTION INTRAVENOUS at 13:04

## 2022-01-24 ENCOUNTER — HOSPITAL ENCOUNTER (OUTPATIENT)
Age: 72
Discharge: HOME | End: 2022-01-24
Payer: MEDICARE

## 2022-01-24 LAB
ANISOCYTOSIS: ABNORMAL
BASOPHILS ABSOLUTE: 0.02 E9/L (ref 0–0.2)
BASOPHILS RELATIVE PERCENT: 0.4 % (ref 0–2)
BURR CELLS: ABNORMAL
EOSINOPHILS ABSOLUTE: 0.06 E9/L (ref 0.05–0.5)
EOSINOPHILS RELATIVE PERCENT: 1.2 % (ref 0–6)
HCT VFR BLD CALC: 35.3 % (ref 37–54)
HEMOGLOBIN: 10.1 G/DL (ref 12.5–16.5)
IMMATURE GRANULOCYTES #: 0.02 E9/L
IMMATURE GRANULOCYTES %: 0.4 % (ref 0–5)
LYMPHOCYTES ABSOLUTE: 0.61 E9/L (ref 1.5–4)
LYMPHOCYTES RELATIVE PERCENT: 12.5 % (ref 20–42)
MCH RBC QN AUTO: 25.3 PG (ref 26–35)
MCHC RBC AUTO-ENTMCNC: 28.6 % (ref 32–34.5)
MCV RBC AUTO: 88.5 FL (ref 80–99.9)
MONOCYTES ABSOLUTE: 0.61 E9/L (ref 0.1–0.95)
MONOCYTES RELATIVE PERCENT: 12.5 % (ref 2–12)
NEUTROPHILS ABSOLUTE: 3.55 E9/L (ref 1.8–7.3)
NEUTROPHILS RELATIVE PERCENT: 73 % (ref 43–80)
OVALOCYTES: ABNORMAL
PDW BLD-RTO: 17 FL (ref 11.5–15)
PLATELET # BLD: 210 E9/L (ref 130–450)
PMV BLD AUTO: 9.6 FL (ref 7–12)
POIKILOCYTES: ABNORMAL
POLYCHROMASIA: ABNORMAL
RBC # BLD: 3.99 E12/L (ref 3.8–5.8)
SCHISTOCYTES: ABNORMAL
WBC # BLD: 4.9 E9/L (ref 4.5–11.5)

## 2022-01-24 PROCEDURE — 85025 COMPLETE CBC W/AUTO DIFF WBC: CPT

## 2022-01-24 PROCEDURE — 36415 COLL VENOUS BLD VENIPUNCTURE: CPT

## 2022-01-26 ENCOUNTER — HOSPITAL ENCOUNTER (OUTPATIENT)
Dept: INFUSION THERAPY | Age: 72
Setting detail: INFUSION SERIES
Discharge: HOME | End: 2022-01-26
Payer: MEDICARE

## 2022-01-26 VITALS
OXYGEN SATURATION: 98 % | TEMPERATURE: 99.8 F | HEART RATE: 55 BPM | DIASTOLIC BLOOD PRESSURE: 71 MMHG | SYSTOLIC BLOOD PRESSURE: 131 MMHG | RESPIRATION RATE: 16 BRPM

## 2022-01-26 DIAGNOSIS — D63.8 ANEMIA OF CHRONIC DISEASE: Primary | ICD-10-CM

## 2022-01-26 DIAGNOSIS — N18.30 ANEMIA IN STAGE 3 CHRONIC KIDNEY DISEASE, UNSPECIFIED WHETHER STAGE 3A OR 3B CKD (HCC): Primary | ICD-10-CM

## 2022-01-26 DIAGNOSIS — D63.1 ANEMIA IN STAGE 3 CHRONIC KIDNEY DISEASE, UNSPECIFIED WHETHER STAGE 3A OR 3B CKD (HCC): Primary | ICD-10-CM

## 2022-01-26 PROCEDURE — 2580000003 HC RX 258: Performed by: INTERNAL MEDICINE

## 2022-01-26 PROCEDURE — 96365 THER/PROPH/DIAG IV INF INIT: CPT

## 2022-01-26 PROCEDURE — 6360000002 HC RX W HCPCS: Performed by: INTERNAL MEDICINE

## 2022-01-26 PROCEDURE — 96372 THER/PROPH/DIAG INJ SC/IM: CPT

## 2022-01-26 RX ORDER — SODIUM CHLORIDE 0.9 % (FLUSH) 0.9 %
5-40 SYRINGE (ML) INJECTION PRN
Status: CANCELLED | OUTPATIENT
Start: 2022-02-02

## 2022-01-26 RX ORDER — SODIUM CHLORIDE 9 MG/ML
25 INJECTION, SOLUTION INTRAVENOUS PRN
Status: CANCELLED | OUTPATIENT
Start: 2022-02-02

## 2022-01-26 RX ORDER — SODIUM CHLORIDE 9 MG/ML
INJECTION, SOLUTION INTRAVENOUS CONTINUOUS
Status: CANCELLED | OUTPATIENT
Start: 2022-02-02

## 2022-01-26 RX ORDER — METHYLPREDNISOLONE SODIUM SUCCINATE 125 MG/2ML
125 INJECTION, POWDER, LYOPHILIZED, FOR SOLUTION INTRAMUSCULAR; INTRAVENOUS ONCE
Status: CANCELLED
Start: 2022-02-02 | End: 2022-02-02

## 2022-01-26 RX ORDER — SODIUM CHLORIDE 0.9 % (FLUSH) 0.9 %
5-40 SYRINGE (ML) INJECTION PRN
Status: DISCONTINUED | OUTPATIENT
Start: 2022-01-26 | End: 2022-01-27 | Stop reason: HOSPADM

## 2022-01-26 RX ORDER — EPINEPHRINE 1 MG/ML
0.3 INJECTION, SOLUTION, CONCENTRATE INTRAVENOUS PRN
Status: CANCELLED | OUTPATIENT
Start: 2022-02-02

## 2022-01-26 RX ORDER — HEPARIN SODIUM (PORCINE) LOCK FLUSH IV SOLN 100 UNIT/ML 100 UNIT/ML
500 SOLUTION INTRAVENOUS PRN
Status: CANCELLED | OUTPATIENT
Start: 2022-02-02

## 2022-01-26 RX ORDER — DIPHENHYDRAMINE HYDROCHLORIDE 50 MG/ML
50 INJECTION INTRAMUSCULAR; INTRAVENOUS
Status: CANCELLED | OUTPATIENT
Start: 2022-02-02

## 2022-01-26 RX ORDER — SODIUM CHLORIDE 9 MG/ML
INJECTION, SOLUTION INTRAVENOUS CONTINUOUS
Status: ACTIVE | OUTPATIENT
Start: 2022-01-26 | End: 2022-01-26

## 2022-01-26 RX ADMIN — SODIUM CHLORIDE 125 MG: 9 INJECTION, SOLUTION INTRAVENOUS at 12:21

## 2022-01-26 RX ADMIN — EPOETIN ALFA-EPBX 10000 UNITS: 10000 INJECTION, SOLUTION INTRAVENOUS; SUBCUTANEOUS at 12:28

## 2022-01-26 RX ADMIN — SODIUM CHLORIDE, PRESERVATIVE FREE 10 ML: 5 INJECTION INTRAVENOUS at 11:40

## 2022-01-31 ENCOUNTER — HOSPITAL ENCOUNTER (OUTPATIENT)
Age: 72
Discharge: HOME | End: 2022-01-31
Payer: MEDICARE

## 2022-01-31 LAB
ANION GAP SERPL CALCULATED.3IONS-SCNC: 10 MMOL/L (ref 7–16)
ANISOCYTOSIS: ABNORMAL
BASOPHILS ABSOLUTE: 0.01 E9/L (ref 0–0.2)
BASOPHILS RELATIVE PERCENT: 0.4 % (ref 0–2)
BUN BLDV-MCNC: 35 MG/DL (ref 6–23)
BURR CELLS: ABNORMAL
CALCIUM SERPL-MCNC: 9.1 MG/DL (ref 8.6–10.2)
CHLORIDE BLD-SCNC: 107 MMOL/L (ref 98–107)
CO2: 23 MMOL/L (ref 22–29)
CREAT SERPL-MCNC: 2.6 MG/DL (ref 0.7–1.2)
EOSINOPHILS ABSOLUTE: 0.05 E9/L (ref 0.05–0.5)
EOSINOPHILS RELATIVE PERCENT: 1.8 % (ref 0–6)
GFR AFRICAN AMERICAN: 30
GFR NON-AFRICAN AMERICAN: 30 ML/MIN/1.73
GLUCOSE BLD-MCNC: 112 MG/DL (ref 74–99)
HCT VFR BLD CALC: 34.7 % (ref 37–54)
HEMOGLOBIN: 9.9 G/DL (ref 12.5–16.5)
IMMATURE GRANULOCYTES #: 0.03 E9/L
IMMATURE GRANULOCYTES %: 1.1 % (ref 0–5)
LYMPHOCYTES ABSOLUTE: 0.56 E9/L (ref 1.5–4)
LYMPHOCYTES RELATIVE PERCENT: 20.4 % (ref 20–42)
MCH RBC QN AUTO: 25.1 PG (ref 26–35)
MCHC RBC AUTO-ENTMCNC: 28.5 % (ref 32–34.5)
MCV RBC AUTO: 87.8 FL (ref 80–99.9)
MONOCYTES ABSOLUTE: 0.43 E9/L (ref 0.1–0.95)
MONOCYTES RELATIVE PERCENT: 15.6 % (ref 2–12)
NEUTROPHILS ABSOLUTE: 1.67 E9/L (ref 1.8–7.3)
NEUTROPHILS RELATIVE PERCENT: 60.7 % (ref 43–80)
OVALOCYTES: ABNORMAL
PDW BLD-RTO: 16.8 FL (ref 11.5–15)
PHOSPHORUS: 2.8 MG/DL (ref 2.5–4.5)
PLATELET # BLD: 203 E9/L (ref 130–450)
PMV BLD AUTO: 9.6 FL (ref 7–12)
POIKILOCYTES: ABNORMAL
POLYCHROMASIA: ABNORMAL
POTASSIUM SERPL-SCNC: 4.7 MMOL/L (ref 3.5–5)
RBC # BLD: 3.95 E12/L (ref 3.8–5.8)
SODIUM BLD-SCNC: 140 MMOL/L (ref 132–146)
TARGET CELLS: ABNORMAL
WBC # BLD: 2.8 E9/L (ref 4.5–11.5)

## 2022-01-31 PROCEDURE — 85025 COMPLETE CBC W/AUTO DIFF WBC: CPT

## 2022-01-31 PROCEDURE — 36415 COLL VENOUS BLD VENIPUNCTURE: CPT

## 2022-01-31 PROCEDURE — 80048 BASIC METABOLIC PNL TOTAL CA: CPT

## 2022-01-31 PROCEDURE — 84100 ASSAY OF PHOSPHORUS: CPT

## 2022-02-02 ENCOUNTER — HOSPITAL ENCOUNTER (OUTPATIENT)
Dept: INFUSION THERAPY | Age: 72
Setting detail: INFUSION SERIES
Discharge: HOME | End: 2022-02-02
Payer: MEDICARE

## 2022-02-02 VITALS — HEART RATE: 50 BPM | RESPIRATION RATE: 16 BRPM | DIASTOLIC BLOOD PRESSURE: 64 MMHG | SYSTOLIC BLOOD PRESSURE: 140 MMHG

## 2022-02-02 VITALS
SYSTOLIC BLOOD PRESSURE: 140 MMHG | TEMPERATURE: 98.2 F | RESPIRATION RATE: 18 BRPM | DIASTOLIC BLOOD PRESSURE: 93 MMHG | OXYGEN SATURATION: 100 % | HEART RATE: 59 BPM

## 2022-02-02 DIAGNOSIS — D63.1 ANEMIA IN STAGE 3 CHRONIC KIDNEY DISEASE, UNSPECIFIED WHETHER STAGE 3A OR 3B CKD (HCC): ICD-10-CM

## 2022-02-02 DIAGNOSIS — D63.8 ANEMIA OF CHRONIC DISEASE: Primary | ICD-10-CM

## 2022-02-02 DIAGNOSIS — D63.1 ANEMIA IN STAGE 3 CHRONIC KIDNEY DISEASE, UNSPECIFIED WHETHER STAGE 3A OR 3B CKD (HCC): Primary | ICD-10-CM

## 2022-02-02 DIAGNOSIS — N18.30 ANEMIA IN STAGE 3 CHRONIC KIDNEY DISEASE, UNSPECIFIED WHETHER STAGE 3A OR 3B CKD (HCC): ICD-10-CM

## 2022-02-02 DIAGNOSIS — N18.30 ANEMIA IN STAGE 3 CHRONIC KIDNEY DISEASE, UNSPECIFIED WHETHER STAGE 3A OR 3B CKD (HCC): Primary | ICD-10-CM

## 2022-02-02 DIAGNOSIS — D63.8 ANEMIA OF CHRONIC DISEASE: ICD-10-CM

## 2022-02-02 PROCEDURE — 2580000003 HC RX 258: Performed by: INTERNAL MEDICINE

## 2022-02-02 PROCEDURE — 96372 THER/PROPH/DIAG INJ SC/IM: CPT

## 2022-02-02 PROCEDURE — 96365 THER/PROPH/DIAG IV INF INIT: CPT

## 2022-02-02 PROCEDURE — 6360000002 HC RX W HCPCS: Performed by: INTERNAL MEDICINE

## 2022-02-02 RX ORDER — SODIUM CHLORIDE 0.9 % (FLUSH) 0.9 %
5-40 SYRINGE (ML) INJECTION PRN
Status: DISCONTINUED | OUTPATIENT
Start: 2022-02-02 | End: 2022-02-03 | Stop reason: HOSPADM

## 2022-02-02 RX ORDER — DIPHENHYDRAMINE HYDROCHLORIDE 50 MG/ML
50 INJECTION INTRAMUSCULAR; INTRAVENOUS
Status: CANCELLED | OUTPATIENT
Start: 2022-02-09

## 2022-02-02 RX ORDER — HEPARIN SODIUM (PORCINE) LOCK FLUSH IV SOLN 100 UNIT/ML 100 UNIT/ML
500 SOLUTION INTRAVENOUS PRN
Status: CANCELLED | OUTPATIENT
Start: 2022-02-09

## 2022-02-02 RX ORDER — SODIUM CHLORIDE 0.9 % (FLUSH) 0.9 %
5-40 SYRINGE (ML) INJECTION PRN
Status: CANCELLED | OUTPATIENT
Start: 2022-02-09

## 2022-02-02 RX ORDER — METHYLPREDNISOLONE SODIUM SUCCINATE 125 MG/2ML
125 INJECTION, POWDER, LYOPHILIZED, FOR SOLUTION INTRAMUSCULAR; INTRAVENOUS ONCE
Status: CANCELLED
Start: 2022-02-09 | End: 2022-02-09

## 2022-02-02 RX ORDER — SODIUM CHLORIDE 9 MG/ML
INJECTION, SOLUTION INTRAVENOUS CONTINUOUS
Status: CANCELLED | OUTPATIENT
Start: 2022-02-09

## 2022-02-02 RX ORDER — SODIUM CHLORIDE 9 MG/ML
25 INJECTION, SOLUTION INTRAVENOUS PRN
Status: DISCONTINUED | OUTPATIENT
Start: 2022-02-02 | End: 2022-02-03 | Stop reason: HOSPADM

## 2022-02-02 RX ORDER — EPINEPHRINE 1 MG/ML
0.3 INJECTION, SOLUTION, CONCENTRATE INTRAVENOUS PRN
Status: CANCELLED | OUTPATIENT
Start: 2022-02-09

## 2022-02-02 RX ORDER — SODIUM CHLORIDE 9 MG/ML
25 INJECTION, SOLUTION INTRAVENOUS PRN
Status: CANCELLED | OUTPATIENT
Start: 2022-02-09

## 2022-02-02 RX ORDER — SODIUM CHLORIDE 9 MG/ML
INJECTION, SOLUTION INTRAVENOUS CONTINUOUS
Status: ACTIVE | OUTPATIENT
Start: 2022-02-02 | End: 2022-02-02

## 2022-02-02 RX ORDER — HEPARIN SODIUM (PORCINE) LOCK FLUSH IV SOLN 100 UNIT/ML 100 UNIT/ML
500 SOLUTION INTRAVENOUS PRN
Status: DISCONTINUED | OUTPATIENT
Start: 2022-02-02 | End: 2022-02-03 | Stop reason: HOSPADM

## 2022-02-02 RX ADMIN — SODIUM CHLORIDE 125 MG: 9 INJECTION, SOLUTION INTRAVENOUS at 12:16

## 2022-02-02 RX ADMIN — EPOETIN ALFA-EPBX 10000 UNITS: 10000 INJECTION, SOLUTION INTRAVENOUS; SUBCUTANEOUS at 11:51

## 2022-02-07 ENCOUNTER — HOSPITAL ENCOUNTER (OUTPATIENT)
Age: 72
Discharge: HOME | End: 2022-02-07
Payer: MEDICARE

## 2022-02-07 LAB
ACANTHOCYTES: ABNORMAL
ANION GAP SERPL CALCULATED.3IONS-SCNC: 8 MMOL/L (ref 7–16)
ANISOCYTOSIS: ABNORMAL
BASOPHILS ABSOLUTE: 0.02 E9/L (ref 0–0.2)
BASOPHILS RELATIVE PERCENT: 0.6 % (ref 0–2)
BUN BLDV-MCNC: 36 MG/DL (ref 6–23)
CALCIUM SERPL-MCNC: 8.7 MG/DL (ref 8.6–10.2)
CHLORIDE BLD-SCNC: 108 MMOL/L (ref 98–107)
CO2: 24 MMOL/L (ref 22–29)
CREAT SERPL-MCNC: 2.7 MG/DL (ref 0.7–1.2)
EOSINOPHILS ABSOLUTE: 0.05 E9/L (ref 0.05–0.5)
EOSINOPHILS RELATIVE PERCENT: 1.6 % (ref 0–6)
GFR AFRICAN AMERICAN: 28
GFR NON-AFRICAN AMERICAN: 28 ML/MIN/1.73
GLUCOSE BLD-MCNC: 84 MG/DL (ref 74–99)
HCT VFR BLD CALC: 34.3 % (ref 37–54)
HEMOGLOBIN: 9.9 G/DL (ref 12.5–16.5)
IMMATURE GRANULOCYTES #: 0.03 E9/L
IMMATURE GRANULOCYTES %: 0.9 % (ref 0–5)
LYMPHOCYTES ABSOLUTE: 0.54 E9/L (ref 1.5–4)
LYMPHOCYTES RELATIVE PERCENT: 17 % (ref 20–42)
MCH RBC QN AUTO: 25.6 PG (ref 26–35)
MCHC RBC AUTO-ENTMCNC: 28.9 % (ref 32–34.5)
MCV RBC AUTO: 88.9 FL (ref 80–99.9)
MONOCYTES ABSOLUTE: 0.44 E9/L (ref 0.1–0.95)
MONOCYTES RELATIVE PERCENT: 13.8 % (ref 2–12)
NEUTROPHILS ABSOLUTE: 2.1 E9/L (ref 1.8–7.3)
NEUTROPHILS RELATIVE PERCENT: 66.1 % (ref 43–80)
OVALOCYTES: ABNORMAL
PDW BLD-RTO: 17.7 FL (ref 11.5–15)
PHOSPHORUS: 2.8 MG/DL (ref 2.5–4.5)
PLATELET # BLD: 217 E9/L (ref 130–450)
PMV BLD AUTO: 9.9 FL (ref 7–12)
POIKILOCYTES: ABNORMAL
POLYCHROMASIA: ABNORMAL
POTASSIUM SERPL-SCNC: 5 MMOL/L (ref 3.5–5)
RBC # BLD: 3.86 E12/L (ref 3.8–5.8)
SCHISTOCYTES: ABNORMAL
SODIUM BLD-SCNC: 140 MMOL/L (ref 132–146)
WBC # BLD: 3.2 E9/L (ref 4.5–11.5)

## 2022-02-07 PROCEDURE — 84100 ASSAY OF PHOSPHORUS: CPT

## 2022-02-07 PROCEDURE — 36415 COLL VENOUS BLD VENIPUNCTURE: CPT

## 2022-02-07 PROCEDURE — 80048 BASIC METABOLIC PNL TOTAL CA: CPT

## 2022-02-07 PROCEDURE — 85025 COMPLETE CBC W/AUTO DIFF WBC: CPT

## 2022-02-09 ENCOUNTER — HOSPITAL ENCOUNTER (OUTPATIENT)
Dept: INFUSION THERAPY | Age: 72
Setting detail: INFUSION SERIES
Discharge: HOME | End: 2022-02-09
Payer: MEDICARE

## 2022-02-09 VITALS
TEMPERATURE: 98 F | HEART RATE: 54 BPM | DIASTOLIC BLOOD PRESSURE: 68 MMHG | RESPIRATION RATE: 18 BRPM | SYSTOLIC BLOOD PRESSURE: 150 MMHG

## 2022-02-09 DIAGNOSIS — D63.8 ANEMIA OF CHRONIC DISEASE: Primary | ICD-10-CM

## 2022-02-09 PROCEDURE — 6360000002 HC RX W HCPCS: Performed by: INTERNAL MEDICINE

## 2022-02-09 PROCEDURE — 96372 THER/PROPH/DIAG INJ SC/IM: CPT

## 2022-02-09 RX ADMIN — EPOETIN ALFA-EPBX 10000 UNITS: 10000 INJECTION, SOLUTION INTRAVENOUS; SUBCUTANEOUS at 11:43

## 2022-02-14 ENCOUNTER — HOSPITAL ENCOUNTER (OUTPATIENT)
Age: 72
Discharge: HOME | End: 2022-02-14
Payer: MEDICARE

## 2022-02-14 LAB
ANISOCYTOSIS: ABNORMAL
BASOPHILS ABSOLUTE: 0.05 E9/L (ref 0–0.2)
BASOPHILS RELATIVE PERCENT: 1.7 % (ref 0–2)
BURR CELLS: ABNORMAL
EOSINOPHILS ABSOLUTE: 0.02 E9/L (ref 0.05–0.5)
EOSINOPHILS RELATIVE PERCENT: 0.9 % (ref 0–6)
HCT VFR BLD CALC: 35.6 % (ref 37–54)
HEMOGLOBIN: 10 G/DL (ref 12.5–16.5)
LYMPHOCYTES ABSOLUTE: 0.32 E9/L (ref 1.5–4)
LYMPHOCYTES RELATIVE PERCENT: 12.2 % (ref 20–42)
MCH RBC QN AUTO: 25.4 PG (ref 26–35)
MCHC RBC AUTO-ENTMCNC: 28.1 % (ref 32–34.5)
MCV RBC AUTO: 90.4 FL (ref 80–99.9)
MONOCYTES ABSOLUTE: 0.32 E9/L (ref 0.1–0.95)
MONOCYTES RELATIVE PERCENT: 12.2 % (ref 2–12)
NEUTROPHILS ABSOLUTE: 1.97 E9/L (ref 1.8–7.3)
NEUTROPHILS RELATIVE PERCENT: 73 % (ref 43–80)
OVALOCYTES: ABNORMAL
PDW BLD-RTO: 17.7 FL (ref 11.5–15)
PLATELET # BLD: 195 E9/L (ref 130–450)
PMV BLD AUTO: 9.5 FL (ref 7–12)
POIKILOCYTES: ABNORMAL
RBC # BLD: 3.94 E12/L (ref 3.8–5.8)
WBC # BLD: 2.7 E9/L (ref 4.5–11.5)

## 2022-02-14 PROCEDURE — 36415 COLL VENOUS BLD VENIPUNCTURE: CPT

## 2022-02-14 PROCEDURE — 85025 COMPLETE CBC W/AUTO DIFF WBC: CPT

## 2022-02-16 ENCOUNTER — HOSPITAL ENCOUNTER (OUTPATIENT)
Dept: INFUSION THERAPY | Age: 72
Setting detail: INFUSION SERIES
Discharge: HOME | End: 2022-02-16
Payer: MEDICARE

## 2022-02-16 VITALS
SYSTOLIC BLOOD PRESSURE: 142 MMHG | HEART RATE: 48 BPM | TEMPERATURE: 98.6 F | RESPIRATION RATE: 16 BRPM | DIASTOLIC BLOOD PRESSURE: 67 MMHG | OXYGEN SATURATION: 96 %

## 2022-02-16 DIAGNOSIS — D63.8 ANEMIA OF CHRONIC DISEASE: Primary | ICD-10-CM

## 2022-02-16 PROCEDURE — 6360000002 HC RX W HCPCS: Performed by: INTERNAL MEDICINE

## 2022-02-16 PROCEDURE — 96372 THER/PROPH/DIAG INJ SC/IM: CPT

## 2022-02-16 RX ADMIN — EPOETIN ALFA-EPBX 10000 UNITS: 10000 INJECTION, SOLUTION INTRAVENOUS; SUBCUTANEOUS at 11:41

## 2022-02-21 ENCOUNTER — HOSPITAL ENCOUNTER (OUTPATIENT)
Dept: OTHER | Age: 72
Setting detail: THERAPIES SERIES
Discharge: HOME | End: 2022-02-21
Payer: MEDICARE

## 2022-02-21 ENCOUNTER — HOSPITAL ENCOUNTER (OUTPATIENT)
Age: 72
Discharge: HOME | End: 2022-02-21
Payer: MEDICARE

## 2022-02-21 ENCOUNTER — OFFICE VISIT (OUTPATIENT)
Dept: CARDIOLOGY CLINIC | Age: 72
End: 2022-02-21
Payer: MEDICARE

## 2022-02-21 VITALS
DIASTOLIC BLOOD PRESSURE: 70 MMHG | SYSTOLIC BLOOD PRESSURE: 144 MMHG | WEIGHT: 210 LBS | HEART RATE: 56 BPM | HEIGHT: 71 IN | BODY MASS INDEX: 29.4 KG/M2 | OXYGEN SATURATION: 95 % | RESPIRATION RATE: 20 BRPM

## 2022-02-21 VITALS
OXYGEN SATURATION: 98 % | SYSTOLIC BLOOD PRESSURE: 173 MMHG | HEART RATE: 56 BPM | DIASTOLIC BLOOD PRESSURE: 82 MMHG | RESPIRATION RATE: 18 BRPM | WEIGHT: 209 LBS | BODY MASS INDEX: 29.15 KG/M2

## 2022-02-21 DIAGNOSIS — I50.33 ACUTE ON CHRONIC HEART FAILURE WITH PRESERVED EJECTION FRACTION (HFPEF) (HCC): Primary | ICD-10-CM

## 2022-02-21 DIAGNOSIS — I27.20 PULMONARY HYPERTENSION (HCC): ICD-10-CM

## 2022-02-21 DIAGNOSIS — I10 ESSENTIAL HYPERTENSION: ICD-10-CM

## 2022-02-21 LAB
ANION GAP SERPL CALCULATED.3IONS-SCNC: 11 MMOL/L (ref 7–16)
ANISOCYTOSIS: ABNORMAL
BASOPHILS ABSOLUTE: 0.03 E9/L (ref 0–0.2)
BASOPHILS RELATIVE PERCENT: 0.9 % (ref 0–2)
BUN BLDV-MCNC: 42 MG/DL (ref 6–23)
CALCIUM SERPL-MCNC: 8.8 MG/DL (ref 8.6–10.2)
CHLORIDE BLD-SCNC: 109 MMOL/L (ref 98–107)
CO2: 24 MMOL/L (ref 22–29)
CREAT SERPL-MCNC: 3 MG/DL (ref 0.7–1.2)
EOSINOPHILS ABSOLUTE: 0.03 E9/L (ref 0.05–0.5)
EOSINOPHILS RELATIVE PERCENT: 0.9 % (ref 0–6)
FERRITIN: 62 NG/ML
GFR AFRICAN AMERICAN: 25
GFR NON-AFRICAN AMERICAN: 25 ML/MIN/1.73
GLUCOSE BLD-MCNC: 88 MG/DL (ref 74–99)
HCT VFR BLD CALC: 35.9 % (ref 37–54)
HEMOGLOBIN: 10.2 G/DL (ref 12.5–16.5)
IRON SATURATION: 16 % (ref 20–55)
IRON: 35 MCG/DL (ref 59–158)
LYMPHOCYTES ABSOLUTE: 0.51 E9/L (ref 1.5–4)
LYMPHOCYTES RELATIVE PERCENT: 16.5 % (ref 20–42)
MCH RBC QN AUTO: 25.5 PG (ref 26–35)
MCHC RBC AUTO-ENTMCNC: 28.4 % (ref 32–34.5)
MCV RBC AUTO: 89.8 FL (ref 80–99.9)
MONOCYTES ABSOLUTE: 0.27 E9/L (ref 0.1–0.95)
MONOCYTES RELATIVE PERCENT: 8.7 % (ref 2–12)
NEUTROPHILS ABSOLUTE: 2.19 E9/L (ref 1.8–7.3)
NEUTROPHILS RELATIVE PERCENT: 73 % (ref 43–80)
NUCLEATED RED BLOOD CELLS: 0.9 /100 WBC
OVALOCYTES: ABNORMAL
PARATHYROID HORMONE INTACT: 214 PG/ML (ref 15–65)
PDW BLD-RTO: 17.5 FL (ref 11.5–15)
PHOSPHORUS: 3.7 MG/DL (ref 2.5–4.5)
PLATELET # BLD: 188 E9/L (ref 130–450)
PMV BLD AUTO: 9.3 FL (ref 7–12)
POIKILOCYTES: ABNORMAL
POLYCHROMASIA: ABNORMAL
POTASSIUM SERPL-SCNC: 4.6 MMOL/L (ref 3.5–5)
PRO-BNP: 9699 PG/ML (ref 0–125)
RBC # BLD: 4 E12/L (ref 3.8–5.8)
SCHISTOCYTES: ABNORMAL
SODIUM BLD-SCNC: 144 MMOL/L (ref 132–146)
SPHEROCYTES: ABNORMAL
TOTAL IRON BINDING CAPACITY: 219 MCG/DL (ref 250–450)
WBC # BLD: 3 E9/L (ref 4.5–11.5)

## 2022-02-21 PROCEDURE — 82728 ASSAY OF FERRITIN: CPT

## 2022-02-21 PROCEDURE — 96374 THER/PROPH/DIAG INJ IV PUSH: CPT

## 2022-02-21 PROCEDURE — 36415 COLL VENOUS BLD VENIPUNCTURE: CPT

## 2022-02-21 PROCEDURE — 99204 OFFICE O/P NEW MOD 45 MIN: CPT

## 2022-02-21 PROCEDURE — 83550 IRON BINDING TEST: CPT

## 2022-02-21 PROCEDURE — 85025 COMPLETE CBC W/AUTO DIFF WBC: CPT

## 2022-02-21 PROCEDURE — 2580000003 HC RX 258: Performed by: INTERNAL MEDICINE

## 2022-02-21 PROCEDURE — 99215 OFFICE O/P EST HI 40 MIN: CPT | Performed by: INTERNAL MEDICINE

## 2022-02-21 PROCEDURE — 2500000003 HC RX 250 WO HCPCS: Performed by: INTERNAL MEDICINE

## 2022-02-21 PROCEDURE — 83880 ASSAY OF NATRIURETIC PEPTIDE: CPT

## 2022-02-21 PROCEDURE — 80048 BASIC METABOLIC PNL TOTAL CA: CPT

## 2022-02-21 PROCEDURE — 84100 ASSAY OF PHOSPHORUS: CPT

## 2022-02-21 PROCEDURE — 83970 ASSAY OF PARATHORMONE: CPT

## 2022-02-21 PROCEDURE — 93000 ELECTROCARDIOGRAM COMPLETE: CPT | Performed by: INTERNAL MEDICINE

## 2022-02-21 PROCEDURE — 83540 ASSAY OF IRON: CPT

## 2022-02-21 RX ORDER — BUMETANIDE 2 MG/1
1 TABLET ORAL DAILY
Qty: 1 TABLET | Refills: 1 | Status: ON HOLD
Start: 2022-02-21 | End: 2022-06-16 | Stop reason: HOSPADM

## 2022-02-21 RX ORDER — SODIUM CHLORIDE 0.9 % (FLUSH) 0.9 %
10 SYRINGE (ML) INJECTION PRN
Status: DISCONTINUED | OUTPATIENT
Start: 2022-02-21 | End: 2022-02-22 | Stop reason: HOSPADM

## 2022-02-21 RX ORDER — BUMETANIDE 0.25 MG/ML
2 INJECTION, SOLUTION INTRAMUSCULAR; INTRAVENOUS ONCE
Status: COMPLETED | OUTPATIENT
Start: 2022-02-21 | End: 2022-02-21

## 2022-02-21 RX ADMIN — Medication 10 ML: at 10:04

## 2022-02-21 RX ADMIN — BUMETANIDE 2 MG: 0.25 INJECTION, SOLUTION INTRAMUSCULAR; INTRAVENOUS at 10:03

## 2022-02-21 NOTE — PROGRESS NOTES
Congestive Heart Failure [facility]   1950          Referring Provider: Dr Brittany Mendez  Primary Care Physician: Dr Chloe Bejarano  Cardiologist: Dr Brittany Mendez  Nephrologist: Dr Clinton        History of Present Illness:     Srinath Singh is a 70 y.o. male with a history of HFpEF, most recent EF 75% on 10-7-21. Patient Story:    He does  have dyspnea with exertion, shortness of breath, or decline in overall functional capacity. He does not have orthopnea, PND, nocturnal cough or hemoptysis. He does not have abdominal distention or bloating, early satiety, anorexia/change in appetite. He does has a good urinary response to  oral diuretic. States sometimes are better than others. He does have  lower extremity edema. He denies lightheadedness, dizziness. He denies palpitations, syncope or near syncope. He does not complain of chest pain, pressure, discomfort. No Known Allergies      Outpatient Medications Marked as Taking for the 2/21/22 encounter [facility] HOSPITAL Encounter) with [facility] ROOM 1   Medication Sig Dispense Refill    bumetanide (BUMEX) 2 MG tablet Take 0.5 tablets by mouth daily (Patient taking differently: Take 1 mg by mouth daily 1mg every day, if weight takes 2mg) 1 tablet 1    chlorothiazide (DIURIL) 250 MG tablet chlorothiazide 250 mg tablet   Take 1 tablet every day by oral route.       sulfamethoxazole-trimethoprim (BACTRIM;SEPTRA) 400-80 MG per tablet Take 2 tablets by mouth      Cholecalciferol (VITAMIN D3) 125 MCG (5000 UT) TABS Take by mouth      ammonium lactate (LAC-HYDRIN) 12 % lotion ammonium lactate 12 % lotion      bisacodyl (DULCOLAX) 5 MG EC tablet Take by mouth      ONETOUCH ULTRA strip       BD PEN NEEDLE TAINA 2ND GEN 32G X 4 MM MISC       Lancets (ONETOUCH DELICA PLUS [id number]) MISC       metoprolol tartrate (LOPRESSOR) 50 MG tablet 50 mg 2 times daily       LOKELMA 10 g PACK oral suspension       tiZANidine (ZANAFLEX) 4 MG tablet       labetalol (NORMODYNE) 200 MG tablet Take by mouth      calcitRIOL (ROCALTROL) 0.25 MCG capsule Take 1 capsule by mouth daily 30 capsule 3    amLODIPine (NORVASC) 5 MG tablet Take 1 tablet by mouth daily 30 tablet 5    insulin glargine (LANTUS SOLOSTAR) 100 UNIT/ML injection pen Inject 12 Units into the skin daily At noon   Had today (Patient taking differently: Inject 12 Units into the skin daily ) 5 pen 3    oxyCODONE (ROXICODONE) 20 MG immediate release tablet Take 20 mg by mouth every 8 hours as needed for Pain.  hydrALAZINE (APRESOLINE) 100 MG tablet Take 100 mg by mouth 3 times daily      predniSONE (DELTASONE) 5 MG tablet Take 5 mg by mouth daily      simvastatin (ZOCOR) 10 MG tablet Take 10 mg by mouth daily      isosorbide mononitrate (IMDUR) 60 MG extended release tablet TAKE 1 TABLET DAILY 90 tablet 3    tacrolimus (PROGRAF) 1 MG capsule Take 2 capsules by mouth 2 times daily 60 capsule 3    sodium bicarbonate 650 MG tablet Take 650 mg by mouth 4 times daily       Multiple Vitamin (MULTI VITAMIN DAILY PO) Take by mouth daily      mycophenolate (CELLCEPT) 250 MG capsule Take 1,000 mg by mouth 2 times daily       insulin lispro (HUMALOG KWIKPEN) 100 UNIT/ML SOPN Inject 5 Units into the skin daily (before lunch) Patient takes with his biggest meal      docusate sodium (COLACE) 100 MG capsule Take 1 capsule by mouth 2 times daily as needed for Constipation. (Patient taking differently: Take 100 mg by mouth daily as needed for Constipation ) 20 capsule 0    linagliptin (TRADJENTA) 5 MG tablet Take 5 mg by mouth daily.  aspirin 81 MG EC tablet Take 81 mg by mouth daily. Guideline directed medical:  ARNI/ACE I/ARB: No  Beta blocker:   Yes  Aldosterone antagonist:  No        Physical Examination:     BP (!) 173/82 Comment: has not taken meds yet  Pulse 56   Resp 18   Wt 209 lb (94.8 kg)   SpO2 98%   BMI 29.15 kg/m²     Assessment  Charting Type: Shift assessment    Neurological  Level of Consciousness: Alert (0)                             Cardiac  Cardiac Regularity: Regular  Cardiac Rhythm: Sinus tete    Rhythm Interpretation  Pulse: 56         Gastrointestinal  Abdominal (WDL): Within Defined Limits               Peripheral Vascular  Peripheral Vascular (WDL): Exceptions to WDL  Edema: Right lower extremity,Left lower extremity  RLE Edema: +1,+2,Pitting  LLE Edema: +1,+2,Pitting                        Psychosocial  Psychosocial (WDL): Within Defined Limits                        Pulse: 56                     LAB DATA:    Last 3 BMP      Sodium (mmol/L)   Date Value   02/21/2022 144   02/07/2022 140   01/31/2022 140     Potassium (mmol/L)   Date Value   02/21/2022 4.6   02/07/2022 5.0   01/31/2022 4.7     Potassium reflex Magnesium (mmol/L)   Date Value   06/12/2021 5.4 (H)   06/11/2021 5.2 (H)   04/26/2021 4.9     Chloride (mmol/L)   Date Value   02/21/2022 109 (H)   02/07/2022 108 (H)   01/31/2022 107     CO2 (mmol/L)   Date Value   02/21/2022 24   02/07/2022 24   01/31/2022 23     BUN (mg/dL)   Date Value   02/21/2022 42 (H)   02/07/2022 36 (H)   01/31/2022 35 (H)     Glucose (mg/dL)   Date Value   02/21/2022 88   02/07/2022 84   01/31/2022 112 (H)   02/21/2012 124 (H)   09/23/2011 140 (H)   07/05/2011 153 (H)     Calcium (mg/dL)   Date Value   02/21/2022 8.8   02/07/2022 8.7   01/31/2022 9.1       Last 3 BNP       Pro-BNP (pg/mL)   Date Value   10/02/2021 22,645 (H)   06/11/2021 9,928 (H)   04/26/2021 5,086 (H)          CBC:   Recent Labs     02/21/22  0635   WBC 3.0*   HGB 10.2*        BMP:    Recent Labs     02/21/22  0635      K 4.6   *   CO2 24   BUN 42*   CREATININE 3.0*   GLUCOSE 88     Hepatic: No results for input(s): AST, ALT, ALB, BILITOT, ALKPHOS in the last 72 hours. Troponin: No results for input(s): TROPONINI in the last 72 hours. BNP: No results for input(s): BNP in the last 72 hours.   Lipids: No results for input(s): CHOL, HDL in the last 72 hours. Invalid input(s): LDLCALCU  INR: No results for input(s): INR in the last 72 hours. WEIGHTS:    Wt Readings from Last 3 Encounters:   02/21/22 209 lb (94.8 kg)   02/21/22 210 lb (95.3 kg)   10/18/21 200 lb (90.7 kg)         TELEMETRY:  Cardiac Regularity: Regular  Cardiac Rhythm/Interpretation:SB        ASSESSMENT:  Ruth Hanks is hypervolemic with weight gain   Sent over from Dr Sherron Astudillo office for IV diuretic. I provided pt with the HF book (\"Caring for Your Heart: Living Well with Heart Failure\"), HF zones, and Sodium content pamphlet. Wife and patient demonstrate understanding. Interventions completed this visit:  IV diuretics given yes, Bumex 2 mg IVP   Lab work obtained yes,BNP (BMP done earlier)  Reviewed currently prescribed medications with patient, educated on importance of compliance and answered any questions regarding their medication  Educated on signs and symptoms of HF  Educated on low sodium diet    PLAN:  Scheduled to follow up in CHF clinic on   Future Appointments   Date Time Provider Alfa Moore   2/21/2022 10:30 AM [facility] CHF ROOM 1 SE[facility] CHF [facility]   2/23/2022 11:30 AM [facility] INFUSION [facility] BAY 10 SEYZ INF SER [facility]   2/28/2022  7:15 AM [facility] CHF ROOM 1 SEYZ CHF Vicente Whitten     Given clinic phone number and aware of signs and symptoms to call with any HF change in symptoms.

## 2022-02-21 NOTE — PATIENT INSTRUCTIONS
1. CHF clinic today for IV diuretic  2. Continue watching your weight and if youre not down 3 lbs or more by Wednesday please call this office again  3. Schedule follow up at [facility] with Dr. Lilli Gonzalez. Until then please call me with any questions or concerns  4.  Return as needed

## 2022-02-21 NOTE — Clinical Note
He can call me until he reestablishes follow-up at [facility]. Once he establishes follow-up at [facility] then he will continue to follow-up there. If he cannot return to [facility] then we can refer him to [facility].   Thanks

## 2022-02-21 NOTE — PROGRESS NOTES
[facility]   Heart and Vascular [place]   Clinic Note     Date:2/21/22   Patient Name:Efren Craft  YOB: 1950  Age: 70 y.o. Primary Care Provider: GUIDO GERONIMO III, DO    Subjective     59-year-old pleasant -American male who returns for follow-up accompanied by his wife. He reports progressive lower extremity edema and dyspnea on walking less than 50 feet over the past week or so with some response to oral diuretic. He has no chest pain or syncope or presyncope. Currently he takes bumetanide 1 mg in the morning daily as well as an extra 1 mg in the afternoon about 3 times a week. He also takes oral chlorothiazide 250 mg once daily. A focused history review includes:  1. Chronic HFpEF  1. Right heart cath September 2020 (): RA 13, PA 91/25 (48), PCW 22, CI 2.5/2.1 (F/T) -referred to [facility]  2. Negative amyloid scan at [facility]  3. TTE 4/2021: Normal biventricular size and systolic function. Inadequate for PASP  4. Seen at [facility] CHF clinic per my referral  2. October 2021 Lexiscan MPI normal (Dr. Leslie Villanueva)  3. CKD status post kidney transplant in 2014 (underwent invasive evaluation of the transplanted renal artery at [facility] which was unremarkable)  1. Now with CKD 4 baseline creatinine 2.7-3.0  4. Type 2 diabetes on insulin  5. Mixed lung disease. Was seen by [facility] clinic in April 2020.  1. Chest CT without contrast showed mild aortic and coronary calcification and a dilated main PA to 3.9 cm. There were no stigmata of interstitial lung disease but there was mild diffuse mosaic attenuation of the parenchyma and eventration of the right hemidiaphragm  2. PFTs showed a TLC of 48% and DLCO 48% predicted  6.  Hypertension      Past History    Past Medical History:         Diagnosis Date    Anemia     Iron deficiency    Atrial fibrillation (HCC)     CHF (congestive heart failure) (HCC) 03/12/2008    Chronic diastolic congestive heart failure ([facility] 03/12/2008    Chronic foot pain     Chronic knee pain     BILATERAL    Constipation     Depression     DM (diabetes mellitus) ([facility]     ESRD (end stage renal disease) ([facility]     H/O kidney transplant 12/12/2014    Hyperlipidemia     Hypertension     [facility] 3/12/2008    Kidney disease     Pulmonary hypertension ([facility]        Past Surgical History:  Past Surgical History:   Procedure Laterality Date    BACK SURGERY  1982    BLADDER SURGERY      CARPAL TUNNEL RELEASE Left 06/23/2014    CHOLECYSTECTOMY, LAPAROSCOPIC  05/21/2013    COLONOSCOPY      DIAGNOSTIC CARDIAC CATH LAB PROCEDURE  01/15/2013    NORMAL    ECHO COMPL W DOP COLOR FLOW  05/22/2013         ENDOSCOPY, COLON, DIAGNOSTIC      FOOT SURGERY      BONE REMOVED    KIDNEY BIOPSY  06/09/2015    [facility] ; ALSO 4/14/2016, 4/5/2017    KIDNEY TRANSPLANT  12/12/2014    @ [facility]    EDUARDO-EN-Y GASTRIC BYPASS  06/29/2010    Laparoscopic / Dr. Mercedes Gomez  12/21/2007    Dr. Jose Church 4/28/2021    EGD in OR 12--COVID performed by Lavern Mc MD at [address] [place] [address] History:    Social History     Tobacco History     Smoking Status  Former Smoker Quit date  1/1/1990 Smoking Frequency  1 pack/day for 20 years (20 pk yrs) Smoking Tobacco Type  Cigarettes    Smokeless Tobacco Use  Never Used          Alcohol History     Alcohol Use Status  Not Currently Drinks/Week  0 Standard drinks or equivalent per week Amount  0.0 standard drinks of alcohol/wk          Drug Use     Drug Use Status  No          Sexual Activity     Sexually Active  Not Currently                    Family History:-      Problem Relation Age of Onset    Diabetes Mother     High Blood Pressure Mother     Heart Disease Mother     Diabetes Father     Cancer Father     Stroke Father     Heart Disease Father          Review of Systems   Negative except as in HPI        Medications Current Outpatient Medications   Medication Sig Dispense Refill    bumetanide (BUMEX) 2 MG tablet Take 0.5 tablets by mouth daily 1 tablet 1    chlorothiazide (DIURIL) 250 MG tablet chlorothiazide 250 mg tablet   Take 1 tablet every day by oral route.  sulfamethoxazole-trimethoprim (BACTRIM;SEPTRA) 400-80 MG per tablet Take 2 tablets by mouth      Cholecalciferol (VITAMIN D3) 125 MCG (5000 UT) TABS Take by mouth      ammonium lactate (LAC-HYDRIN) 12 % lotion ammonium lactate 12 % lotion      bisacodyl (DULCOLAX) 5 MG EC tablet Take by mouth      metoprolol tartrate (LOPRESSOR) 50 MG tablet metoprolol tartrate 50 mg tablet   Take 1 tablet twice a day by oral route.  LOKELMA 10 g PACK oral suspension       tiZANidine (ZANAFLEX) 4 MG tablet       labetalol (NORMODYNE) 200 MG tablet Take by mouth      calcitRIOL (ROCALTROL) 0.25 MCG capsule Take 1 capsule by mouth daily 30 capsule 3    amLODIPine (NORVASC) 5 MG tablet Take 1 tablet by mouth daily 30 tablet 5    insulin glargine (LANTUS SOLOSTAR) 100 UNIT/ML injection pen Inject 12 Units into the skin daily At noon   Had today (Patient taking differently: Inject 12 Units into the skin daily ) 5 pen 3    oxyCODONE (ROXICODONE) 20 MG immediate release tablet Take 20 mg by mouth every 8 hours as needed for Pain.        hydrALAZINE (APRESOLINE) 100 MG tablet Take 100 mg by mouth 3 times daily      predniSONE (DELTASONE) 5 MG tablet Take 5 mg by mouth daily      simvastatin (ZOCOR) 10 MG tablet Take 10 mg by mouth daily      isosorbide mononitrate (IMDUR) 60 MG extended release tablet TAKE 1 TABLET DAILY 90 tablet 3    tacrolimus (PROGRAF) 1 MG capsule Take 2 capsules by mouth 2 times daily 60 capsule 3    sodium bicarbonate 650 MG tablet Take 650 mg by mouth 4 times daily       Multiple Vitamin (MULTI VITAMIN DAILY PO) Take by mouth daily      mycophenolate (CELLCEPT) 250 MG capsule Take 1,000 mg by mouth 2 times daily       insulin lispro (HUMALOG KWIKPEN) 100 UNIT/ML SOPN Inject 5 Units into the skin daily (before lunch) Patient takes with his biggest meal      docusate sodium (COLACE) 100 MG capsule Take 1 capsule by mouth 2 times daily as needed for Constipation. (Patient taking differently: Take 100 mg by mouth daily as needed for Constipation ) 20 capsule 0    linagliptin (TRADJENTA) 5 MG tablet Take 5 mg by mouth daily.  aspirin 81 MG EC tablet Take 81 mg by mouth daily.  ONETOUCH ULTRA strip       BD PEN NEEDLE TAINA 2ND GEN 32G X 4 MM MISC       Lancets (ONETOUCH DELICA PLUS [id number]) MISC       OXYGEN Inhale 2 L into the lungs daily as needed  (Patient not taking: Reported on 2/21/2022)       No current facility-administered medications for this visit. Facility-Administered Medications Ordered in Other Visits   Medication Dose Route Frequency Provider Last Rate Last Admin    sodium chloride flush 0.9 % injection 10 mL  10 mL IntraVENous PRN Patience MD Lisa        bumetanide (BUMEX) injection 2 mg  2 mg IntraVENous Once Patience MD Lisa               Physical Examination      BP (!) 144/70   Pulse 56   Resp 20   Ht 5' 11\" (1.803 m)   Wt 210 lb (95.3 kg)   SpO2 95%   BMI 29.29 kg/m²   Body surface area is 2.18 meters squared.      General: No acute distress, appears as stated age, nonicteric  Head: Atraumatic, no gross abnormalities or bruises  Neck: Supple and nontender, no carotid bruits, JVP markedly elevated  Lungs: Clear to auscultation bilaterally, no wheezes, rales, or rhonchi  Heart: Regular rate and rhythm, no murmurs, rubs, or gallops  Abdomen: Soft, nontender, nondistended, normal bowel sounds  Extremities: No obvious deformities, no cyanosis, 3+ pitting edema bilaterally  Neurological: Alert and oriented x3, EOMI, moving all extremities x4  Psychological: Normal mood and affect, cooperative  Skin: Color, texture, and turgor normal for age          Labs/Imaging/Diagnostics   Personally reviewed:    Lab Results Component Value Date     02/21/2022    K 4.6 02/21/2022    K 5.4 06/12/2021     02/21/2022    CO2 24 02/21/2022    BUN 42 02/21/2022    CREATININE 3.0 02/21/2022    GLUCOSE 88 02/21/2022    GLUCOSE 124 02/21/2012    CALCIUM 8.8 02/21/2022        Estimated Creatinine Clearance: 27 mL/min (A) (based on SCr of 3 mg/dL (H)). Lab Results   Component Value Date    WBC 3.0 (L) 02/21/2022    HGB 10.2 (L) 02/21/2022    HCT 35.9 (L) 02/21/2022    MCV 89.8 02/21/2022     02/21/2022       Lab Results   Component Value Date    ALT 10 10/02/2021    AST 25 10/02/2021    ALKPHOS 63 10/02/2021    BILITOT 0.4 10/02/2021       Lab Results   Component Value Date    LABALBU 3.6 10/02/2021       Lab Results   Component Value Date    CHOL 135 10/03/2021    CHOL 128 06/03/2014    CHOL 101 05/23/2013     Lab Results   Component Value Date    TRIG 100 10/03/2021    TRIG 50 06/03/2014    TRIG 160 (H) 05/23/2013     Lab Results   Component Value Date    HDL 55 10/03/2021    HDL 51 06/03/2014    HDL 21.0 (A) 05/23/2013     Lab Results   Component Value Date    LDLCALC 60 10/03/2021    LDLCALC 67 06/03/2014    LDLCALC 48 05/23/2013     Lab Results   Component Value Date    LABVLDL 20 10/03/2021    LABVLDL 10 06/03/2014         Lab Results   Component Value Date    LABA1C 4.9 11/01/2021         Personally interpreted the following studies.:  EKG: Sinus bradycardia with PACs and PVCs     Assessment and Plan:        70-year-old -American male with stage IV CKD of a transplanted kidney, HFpEF, severe mixed pulmonary hypertension. He is markedly hypervolemic today but breathing comfortably at rest.  He is NYHA class III. He is perfusing well     Diagnosis Orders   1. Acute on chronic heart failure with preserved ejection fraction (HFpEF) ([facility]  [address]   2. Essential hypertension     3.  Pulmonary hypertension ([facility]           Same-day appointment at CHF clinic made for IV diuresis   If not down by 3 pounds or more over the next 2 to 3 days then will increase oral diuretic as a last resort to avoid admission which may be unavoidable given his kidney function   I advised him to return to [facility] heart failure for follow-up given his complex cardiorenal issues and pulmonary hypertension. He should follow-up there longitudinally. Until then I instructed him to call this office with any questions or concerns   Continue metoprolol tartrate 50 mg p.o. twice daily, isosorbide mononitrate 60 mg daily, amlodipine 5 mg daily, bumetanide and chlorothiazide as is for now     Assessment and plan reviewed with the patient/family and their questions and concerns answered in full.  Follow up with me as needed    We appreciate the opportunity to participate in His care!       Seth Serrato MD, [facility]  Interventional Cardiology/Structural Heart Disease  Office: [phone]  Fax: [phone]  Office Coordinators: Tri Butler

## 2022-02-23 ENCOUNTER — HOSPITAL ENCOUNTER (OUTPATIENT)
Dept: INFUSION THERAPY | Age: 72
Setting detail: INFUSION SERIES
Discharge: HOME | End: 2022-02-23
Payer: MEDICARE

## 2022-02-23 VITALS
HEART RATE: 61 BPM | RESPIRATION RATE: 16 BRPM | TEMPERATURE: 98.2 F | SYSTOLIC BLOOD PRESSURE: 160 MMHG | DIASTOLIC BLOOD PRESSURE: 75 MMHG

## 2022-02-23 DIAGNOSIS — D63.8 ANEMIA OF CHRONIC DISEASE: Primary | ICD-10-CM

## 2022-02-23 PROCEDURE — 6360000002 HC RX W HCPCS: Performed by: INTERNAL MEDICINE

## 2022-02-23 PROCEDURE — 96372 THER/PROPH/DIAG INJ SC/IM: CPT

## 2022-02-23 RX ADMIN — EPOETIN ALFA-EPBX 10000 UNITS: 10000 INJECTION, SOLUTION INTRAVENOUS; SUBCUTANEOUS at 11:29

## 2022-02-28 ENCOUNTER — HOSPITAL ENCOUNTER (OUTPATIENT)
Age: 72
Discharge: HOME | End: 2022-02-28
Payer: MEDICARE

## 2022-02-28 ENCOUNTER — HOSPITAL ENCOUNTER (OUTPATIENT)
Dept: OTHER | Age: 72
Setting detail: THERAPIES SERIES
Discharge: HOME | End: 2022-02-28
Payer: MEDICARE

## 2022-02-28 VITALS
DIASTOLIC BLOOD PRESSURE: 86 MMHG | OXYGEN SATURATION: 94 % | SYSTOLIC BLOOD PRESSURE: 180 MMHG | HEART RATE: 50 BPM | RESPIRATION RATE: 18 BRPM | BODY MASS INDEX: 28.87 KG/M2 | WEIGHT: 207 LBS

## 2022-02-28 LAB
ANION GAP SERPL CALCULATED.3IONS-SCNC: 10 MMOL/L (ref 7–16)
ANISOCYTOSIS: ABNORMAL
BASOPHILS ABSOLUTE: 0.01 E9/L (ref 0–0.2)
BASOPHILS RELATIVE PERCENT: 0.3 % (ref 0–2)
BUN BLDV-MCNC: 42 MG/DL (ref 6–23)
BURR CELLS: ABNORMAL
CALCIUM SERPL-MCNC: 9 MG/DL (ref 8.6–10.2)
CHLORIDE BLD-SCNC: 107 MMOL/L (ref 98–107)
CO2: 28 MMOL/L (ref 22–29)
CREAT SERPL-MCNC: 2.8 MG/DL (ref 0.7–1.2)
EOSINOPHILS ABSOLUTE: 0.05 E9/L (ref 0.05–0.5)
EOSINOPHILS RELATIVE PERCENT: 1.7 % (ref 0–6)
GFR AFRICAN AMERICAN: 27
GFR NON-AFRICAN AMERICAN: 27 ML/MIN/1.73
GLUCOSE BLD-MCNC: 61 MG/DL (ref 74–99)
HCT VFR BLD CALC: 36.3 % (ref 37–54)
HEMOGLOBIN: 10.6 G/DL (ref 12.5–16.5)
IMMATURE GRANULOCYTES #: 0.01 E9/L
IMMATURE GRANULOCYTES %: 0.3 % (ref 0–5)
LYMPHOCYTES ABSOLUTE: 0.51 E9/L (ref 1.5–4)
LYMPHOCYTES RELATIVE PERCENT: 17.1 % (ref 20–42)
MCH RBC QN AUTO: 25.2 PG (ref 26–35)
MCHC RBC AUTO-ENTMCNC: 29.2 % (ref 32–34.5)
MCV RBC AUTO: 86.2 FL (ref 80–99.9)
MONOCYTES ABSOLUTE: 0.49 E9/L (ref 0.1–0.95)
MONOCYTES RELATIVE PERCENT: 16.4 % (ref 2–12)
NEUTROPHILS ABSOLUTE: 1.91 E9/L (ref 1.8–7.3)
NEUTROPHILS RELATIVE PERCENT: 64.2 % (ref 43–80)
OVALOCYTES: ABNORMAL
PDW BLD-RTO: 17 FL (ref 11.5–15)
PHOSPHORUS: 2.7 MG/DL (ref 2.5–4.5)
PLATELET # BLD: 201 E9/L (ref 130–450)
PMV BLD AUTO: 9.3 FL (ref 7–12)
POIKILOCYTES: ABNORMAL
POTASSIUM SERPL-SCNC: 5 MMOL/L (ref 3.5–5)
PRO-BNP: ABNORMAL PG/ML (ref 0–125)
RBC # BLD: 4.21 E12/L (ref 3.8–5.8)
SCHISTOCYTES: ABNORMAL
SODIUM BLD-SCNC: 145 MMOL/L (ref 132–146)
WBC # BLD: 3 E9/L (ref 4.5–11.5)

## 2022-02-28 PROCEDURE — 36415 COLL VENOUS BLD VENIPUNCTURE: CPT

## 2022-02-28 PROCEDURE — 84100 ASSAY OF PHOSPHORUS: CPT

## 2022-02-28 PROCEDURE — 2500000003 HC RX 250 WO HCPCS: Performed by: INTERNAL MEDICINE

## 2022-02-28 PROCEDURE — 85025 COMPLETE CBC W/AUTO DIFF WBC: CPT

## 2022-02-28 PROCEDURE — 80048 BASIC METABOLIC PNL TOTAL CA: CPT

## 2022-02-28 PROCEDURE — 83880 ASSAY OF NATRIURETIC PEPTIDE: CPT

## 2022-02-28 PROCEDURE — 2580000003 HC RX 258: Performed by: INTERNAL MEDICINE

## 2022-02-28 PROCEDURE — 96374 THER/PROPH/DIAG INJ IV PUSH: CPT

## 2022-02-28 PROCEDURE — 99214 OFFICE O/P EST MOD 30 MIN: CPT

## 2022-02-28 RX ORDER — BUMETANIDE 0.25 MG/ML
2 INJECTION, SOLUTION INTRAMUSCULAR; INTRAVENOUS ONCE
Status: COMPLETED | OUTPATIENT
Start: 2022-02-28 | End: 2022-02-28

## 2022-02-28 RX ORDER — SODIUM CHLORIDE 0.9 % (FLUSH) 0.9 %
10 SYRINGE (ML) INJECTION 2 TIMES DAILY
Status: DISCONTINUED | OUTPATIENT
Start: 2022-02-28 | End: 2022-03-01 | Stop reason: HOSPADM

## 2022-02-28 RX ADMIN — BUMETANIDE 2 MG: 0.25 INJECTION, SOLUTION INTRAMUSCULAR; INTRAVENOUS at 07:08

## 2022-02-28 RX ADMIN — Medication 10 ML: at 07:08

## 2022-02-28 NOTE — PROGRESS NOTES
Congestive Heart Failure [facility]   1950          Referring Provider: Dr Harrison Kruse  Primary Care Physician: Dr Derek Min  Cardiologist: Dr Harrison Kruse  Nephrologist: Dr Clinton        History of Present Illness:     Raya De La Rosa is a 70 y.o. male with a history of HFpEF, most recent EF 75% on 10-7-21. Patient Story:    He does  have dyspnea with exertion, shortness of breath, or decline in overall functional capacity. He does not have orthopnea, PND, nocturnal cough or hemoptysis. He does not have abdominal distention or bloating, early satiety, anorexia/change in appetite. He does has a good urinary response to  oral diuretic. States sometimes are better than others. He does have  lower extremity edema. He denies lightheadedness, dizziness. He denies palpitations, syncope or near syncope. He does not complain of chest pain, pressure, discomfort. No Known Allergies      Outpatient Medications Marked as Taking for the 2/28/22 encounter [facility] Encounter) with [facility] ROOM 1   Medication Sig Dispense Refill    bumetanide (BUMEX) 2 MG tablet Take 0.5 tablets by mouth daily (Patient taking differently: Take 1 mg by mouth daily 1mg every day, if weight up takes an extra pill every other day) 1 tablet 1    chlorothiazide (DIURIL) 250 MG tablet chlorothiazide 250 mg tablet   Take 1 tablet every day by oral route.       sulfamethoxazole-trimethoprim (BACTRIM;SEPTRA) 400-80 MG per tablet Take 2 tablets by mouth 2 times daily       Cholecalciferol (VITAMIN D3) 125 MCG (5000 UT) TABS Take by mouth      ammonium lactate (LAC-HYDRIN) 12 % lotion ammonium lactate 12 % lotion      bisacodyl (DULCOLAX) 5 MG EC tablet Take by mouth      metoprolol tartrate (LOPRESSOR) 50 MG tablet 50 mg 2 times daily       tiZANidine (ZANAFLEX) 4 MG tablet       labetalol (NORMODYNE) 200 MG tablet Take by mouth daily       calcitRIOL (ROCALTROL) 0.25 MCG capsule Take 1 capsule by mouth daily 30 capsule 3    amLODIPine (NORVASC) 5 MG tablet Take 1 tablet by mouth daily 30 tablet 5    insulin glargine (LANTUS SOLOSTAR) 100 UNIT/ML injection pen Inject 12 Units into the skin daily At noon   Had today (Patient taking differently: Inject 12 Units into the skin daily ) 5 pen 3    oxyCODONE (ROXICODONE) 20 MG immediate release tablet Take 20 mg by mouth every 8 hours as needed for Pain.  hydrALAZINE (APRESOLINE) 100 MG tablet Take 100 mg by mouth 3 times daily      predniSONE (DELTASONE) 5 MG tablet Take 5 mg by mouth daily      simvastatin (ZOCOR) 10 MG tablet Take 10 mg by mouth daily      isosorbide mononitrate (IMDUR) 60 MG extended release tablet TAKE 1 TABLET DAILY 90 tablet 3    tacrolimus (PROGRAF) 1 MG capsule Take 2 capsules by mouth 2 times daily 60 capsule 3    sodium bicarbonate 650 MG tablet Take 650 mg by mouth 4 times daily       Multiple Vitamin (MULTI VITAMIN DAILY PO) Take by mouth daily      mycophenolate (CELLCEPT) 250 MG capsule Take 1,000 mg by mouth 2 times daily       insulin lispro (HUMALOG KWIKPEN) 100 UNIT/ML SOPN Inject 5 Units into the skin daily (before lunch) Patient takes with his biggest meal      docusate sodium (COLACE) 100 MG capsule Take 1 capsule by mouth 2 times daily as needed for Constipation. (Patient taking differently: Take 100 mg by mouth daily as needed for Constipation ) 20 capsule 0    linagliptin (TRADJENTA) 5 MG tablet Take 5 mg by mouth daily.  aspirin 81 MG EC tablet Take 81 mg by mouth daily. Guideline directed medical:  ARNI/ACE I/ARB: No  Beta blocker:   Yes  Aldosterone antagonist:  No        Physical Examination:     BP (!) 180/86 Comment: DID NOT TAKE PILLS THIS AM  Pulse 50   Resp 18   Wt 207 lb (93.9 kg)   SpO2 94%   BMI 28.87 kg/m²     Assessment  Charting Type: Shift assessment    Neurological  Level of Consciousness: Alert (0)              Respiratory  Respiratory Pattern: Regular  Respiratory Depth: Normal  Respiratory Quality/Effort: Dyspnea with exertion  Chest Assessment: Chest expansion symmetrical  L Breath Sounds: Clear,Diminished  R Breath Sounds: Clear,Diminished              Cardiac  Cardiac Regularity: Regular  Cardiac Rhythm: Sinus tete    Rhythm Interpretation  Pulse: 50         Gastrointestinal  Abdominal (WDL): Within Defined Limits               Peripheral Vascular  Peripheral Vascular (WDL): Exceptions to WDL  Edema: Right lower extremity,Left lower extremity  RLE Edema: +1,Pitting  LLE Edema: +1,Pitting                        Psychosocial  Psychosocial (WDL): Within Defined Limits                        Pulse: 50                     LAB DATA:    Last 3 BMP      Sodium (mmol/L)   Date Value   02/21/2022 144   02/07/2022 140   01/31/2022 140     Potassium (mmol/L)   Date Value   02/21/2022 4.6   02/07/2022 5.0   01/31/2022 4.7     Potassium reflex Magnesium (mmol/L)   Date Value   06/12/2021 5.4 (H)   06/11/2021 5.2 (H)   04/26/2021 4.9     Chloride (mmol/L)   Date Value   02/21/2022 109 (H)   02/07/2022 108 (H)   01/31/2022 107     CO2 (mmol/L)   Date Value   02/21/2022 24   02/07/2022 24   01/31/2022 23     BUN (mg/dL)   Date Value   02/21/2022 42 (H)   02/07/2022 36 (H)   01/31/2022 35 (H)     Glucose (mg/dL)   Date Value   02/21/2022 88   02/07/2022 84   01/31/2022 112 (H)   02/21/2012 124 (H)   09/23/2011 140 (H)   07/05/2011 153 (H)     Calcium (mg/dL)   Date Value   02/21/2022 8.8   02/07/2022 8.7   01/31/2022 9.1       Last 3 BNP       Pro-BNP (pg/mL)   Date Value   02/21/2022 9,699 (H)   10/02/2021 22,645 (H)   06/11/2021 9,928 (H)          CBC:   Recent Labs     02/28/22  0645   WBC 3.0*   HGB 10.6*        BMP:    No results for input(s): NA, K, CL, CO2, BUN, CREATININE, GLUCOSE in the last 72 hours. Hepatic: No results for input(s): AST, ALT, ALB, BILITOT, ALKPHOS in the last 72 hours.   Troponin: No results for input(s): TROPONINI in the last 72

## 2022-03-02 ENCOUNTER — TELEPHONE (OUTPATIENT)
Dept: CARDIOLOGY CLINIC | Age: 72
End: 2022-03-02

## 2022-03-02 RX ORDER — AMLODIPINE BESYLATE 5 MG/1
5 TABLET ORAL DAILY
Qty: 90 TABLET | Refills: 1 | Status: CANCELLED | OUTPATIENT
Start: 2022-03-02

## 2022-03-03 ENCOUNTER — HOSPITAL ENCOUNTER (OUTPATIENT)
Dept: INFUSION THERAPY | Age: 72
Setting detail: INFUSION SERIES
Discharge: HOME | End: 2022-03-03

## 2022-03-03 DIAGNOSIS — D63.8 ANEMIA OF CHRONIC DISEASE: Primary | ICD-10-CM

## 2022-03-05 RX ORDER — AMLODIPINE BESYLATE 5 MG/1
5 TABLET ORAL DAILY
Qty: 90 TABLET | Refills: 3 | OUTPATIENT
Start: 2022-03-05

## 2022-03-05 NOTE — TELEPHONE ENCOUNTER
Amlodipine will increase serum levels of tacrolimus. Please forward to his nephrologist to help choose an alternate antihypertensive.

## 2022-03-07 ENCOUNTER — HOSPITAL ENCOUNTER (OUTPATIENT)
Age: 72
Discharge: HOME | End: 2022-03-07
Payer: MEDICARE

## 2022-03-07 LAB
ANION GAP SERPL CALCULATED.3IONS-SCNC: 7 MMOL/L (ref 7–16)
BASOPHILS ABSOLUTE: 0.01 E9/L (ref 0–0.2)
BASOPHILS RELATIVE PERCENT: 0.3 % (ref 0–2)
BUN BLDV-MCNC: 40 MG/DL (ref 6–23)
CALCIUM SERPL-MCNC: 9.2 MG/DL (ref 8.6–10.2)
CHLORIDE BLD-SCNC: 106 MMOL/L (ref 98–107)
CO2: 27 MMOL/L (ref 22–29)
CREAT SERPL-MCNC: 2.7 MG/DL (ref 0.7–1.2)
EOSINOPHILS ABSOLUTE: 0.07 E9/L (ref 0.05–0.5)
EOSINOPHILS RELATIVE PERCENT: 2.1 % (ref 0–6)
GFR AFRICAN AMERICAN: 28
GFR NON-AFRICAN AMERICAN: 28 ML/MIN/1.73
GLUCOSE BLD-MCNC: 81 MG/DL (ref 74–99)
HCT VFR BLD CALC: 36.2 % (ref 37–54)
HEMOGLOBIN: 10.7 G/DL (ref 12.5–16.5)
IMMATURE GRANULOCYTES #: 0.02 E9/L
IMMATURE GRANULOCYTES %: 0.6 % (ref 0–5)
LYMPHOCYTES ABSOLUTE: 0.64 E9/L (ref 1.5–4)
LYMPHOCYTES RELATIVE PERCENT: 19.3 % (ref 20–42)
MCH RBC QN AUTO: 25.2 PG (ref 26–35)
MCHC RBC AUTO-ENTMCNC: 29.6 % (ref 32–34.5)
MCV RBC AUTO: 85.4 FL (ref 80–99.9)
MONOCYTES ABSOLUTE: 0.48 E9/L (ref 0.1–0.95)
MONOCYTES RELATIVE PERCENT: 14.5 % (ref 2–12)
NEUTROPHILS ABSOLUTE: 2.09 E9/L (ref 1.8–7.3)
NEUTROPHILS RELATIVE PERCENT: 63.2 % (ref 43–80)
PDW BLD-RTO: 16.3 FL (ref 11.5–15)
PHOSPHORUS: 3.3 MG/DL (ref 2.5–4.5)
PLATELET # BLD: 212 E9/L (ref 130–450)
PMV BLD AUTO: 8.7 FL (ref 7–12)
POTASSIUM SERPL-SCNC: 4.9 MMOL/L (ref 3.5–5)
RBC # BLD: 4.24 E12/L (ref 3.8–5.8)
SODIUM BLD-SCNC: 140 MMOL/L (ref 132–146)
WBC # BLD: 3.3 E9/L (ref 4.5–11.5)

## 2022-03-07 PROCEDURE — 85025 COMPLETE CBC W/AUTO DIFF WBC: CPT

## 2022-03-07 PROCEDURE — 84100 ASSAY OF PHOSPHORUS: CPT

## 2022-03-07 PROCEDURE — 80048 BASIC METABOLIC PNL TOTAL CA: CPT

## 2022-03-07 PROCEDURE — 36415 COLL VENOUS BLD VENIPUNCTURE: CPT

## 2022-03-09 ENCOUNTER — HOSPITAL ENCOUNTER (OUTPATIENT)
Dept: INFUSION THERAPY | Age: 72
Setting detail: INFUSION SERIES
Discharge: HOME | End: 2022-03-09
Payer: MEDICARE

## 2022-03-09 VITALS
HEART RATE: 53 BPM | OXYGEN SATURATION: 97 % | SYSTOLIC BLOOD PRESSURE: 125 MMHG | RESPIRATION RATE: 18 BRPM | TEMPERATURE: 99 F | DIASTOLIC BLOOD PRESSURE: 47 MMHG

## 2022-03-09 DIAGNOSIS — D63.8 ANEMIA OF CHRONIC DISEASE: Primary | ICD-10-CM

## 2022-03-09 PROCEDURE — 96372 THER/PROPH/DIAG INJ SC/IM: CPT

## 2022-03-09 PROCEDURE — 6360000002 HC RX W HCPCS: Performed by: INTERNAL MEDICINE

## 2022-03-09 RX ADMIN — EPOETIN ALFA-EPBX 10000 UNITS: 10000 INJECTION, SOLUTION INTRAVENOUS; SUBCUTANEOUS at 11:35

## 2022-03-10 ENCOUNTER — HOSPITAL ENCOUNTER (OUTPATIENT)
Dept: OTHER | Age: 72
Setting detail: THERAPIES SERIES
Discharge: HOME | End: 2022-03-10
Payer: MEDICARE

## 2022-03-10 VITALS
SYSTOLIC BLOOD PRESSURE: 149 MMHG | OXYGEN SATURATION: 97 % | BODY MASS INDEX: 27.89 KG/M2 | DIASTOLIC BLOOD PRESSURE: 78 MMHG | WEIGHT: 200 LBS | HEART RATE: 53 BPM | RESPIRATION RATE: 18 BRPM

## 2022-03-10 LAB
ANION GAP SERPL CALCULATED.3IONS-SCNC: 13 MMOL/L (ref 7–16)
BUN BLDV-MCNC: 39 MG/DL (ref 6–23)
CALCIUM SERPL-MCNC: 8.8 MG/DL (ref 8.6–10.2)
CHLORIDE BLD-SCNC: 107 MMOL/L (ref 98–107)
CO2: 22 MMOL/L (ref 22–29)
CREAT SERPL-MCNC: 2.7 MG/DL (ref 0.7–1.2)
GFR AFRICAN AMERICAN: 28
GFR NON-AFRICAN AMERICAN: 28 ML/MIN/1.73
GLUCOSE BLD-MCNC: 107 MG/DL (ref 74–99)
POTASSIUM SERPL-SCNC: 4.9 MMOL/L (ref 3.5–5)
PRO-BNP: 7487 PG/ML (ref 0–125)
SODIUM BLD-SCNC: 142 MMOL/L (ref 132–146)

## 2022-03-10 PROCEDURE — 2580000003 HC RX 258: Performed by: NURSE PRACTITIONER

## 2022-03-10 PROCEDURE — 96374 THER/PROPH/DIAG INJ IV PUSH: CPT

## 2022-03-10 PROCEDURE — 99214 OFFICE O/P EST MOD 30 MIN: CPT

## 2022-03-10 PROCEDURE — 83880 ASSAY OF NATRIURETIC PEPTIDE: CPT

## 2022-03-10 PROCEDURE — 80048 BASIC METABOLIC PNL TOTAL CA: CPT

## 2022-03-10 PROCEDURE — 36415 COLL VENOUS BLD VENIPUNCTURE: CPT

## 2022-03-10 PROCEDURE — 2500000003 HC RX 250 WO HCPCS: Performed by: NURSE PRACTITIONER

## 2022-03-10 RX ORDER — BUMETANIDE 0.25 MG/ML
2 INJECTION, SOLUTION INTRAMUSCULAR; INTRAVENOUS ONCE
Status: COMPLETED | OUTPATIENT
Start: 2022-03-10 | End: 2022-03-10

## 2022-03-10 RX ORDER — SODIUM CHLORIDE 0.9 % (FLUSH) 0.9 %
10 SYRINGE (ML) INJECTION PRN
Status: DISCONTINUED | OUTPATIENT
Start: 2022-03-10 | End: 2022-03-11 | Stop reason: HOSPADM

## 2022-03-10 RX ADMIN — Medication 10 ML: at 10:02

## 2022-03-10 RX ADMIN — BUMETANIDE 2 MG: 0.25 INJECTION, SOLUTION INTRAMUSCULAR; INTRAVENOUS at 10:01

## 2022-03-10 NOTE — PROGRESS NOTES
Congestive Heart Failure [facility]   1950          Referring Provider: Dr Loree Hillman  Primary Care Physician: Dr Ezekiel Gorman  Cardiologist: Dr Loree Hillman  Nephrologist: Dr Clinton        History of Present Illness:     Oscar Botello is a 70 y.o. male with a history of HFpEF, most recent EF 75% on 10-7-21. Patient Story:    He does  have dyspnea with exertion, shortness of breath, or decline in overall functional capacity. He does not have orthopnea, PND, nocturnal cough or hemoptysis. He does not have abdominal distention or bloating, early satiety, anorexia/change in appetite. He does has a good urinary response to  oral diuretic. States sometimes are better than others. He does have  lower extremity edema. He denies lightheadedness, dizziness. He denies palpitations, syncope or near syncope. He does not complain of chest pain, pressure, discomfort.          No Known Allergies      Outpatient Medications Marked as Taking for the 3/10/22 encounter [facility] Encounter) with [facility] ROOM 1   Medication Sig Dispense Refill    bumetanide (BUMEX) 2 MG tablet Take 0.5 tablets by mouth daily (Patient taking differently: Take 1 mg by mouth daily Pt takes 1mg bid as instructed on bottle) 1 tablet 1    sulfamethoxazole-trimethoprim (BACTRIM;SEPTRA) 400-80 MG per tablet Take 2 tablets by mouth 2 times daily       ammonium lactate (LAC-HYDRIN) 12 % lotion ammonium lactate 12 % lotion      bisacodyl (DULCOLAX) 5 MG EC tablet Take by mouth      ONETOUCH ULTRA strip       BD PEN NEEDLE TAINA 2ND GEN 32G X 4 MM MISC       Lancets (ONETOUCH DELICA PLUS [id number]) MISC       metoprolol tartrate (LOPRESSOR) 50 MG tablet 50 mg 2 times daily       calcitRIOL (ROCALTROL) 0.25 MCG capsule Take 1 capsule by mouth daily 30 capsule 3    insulin glargine (LANTUS SOLOSTAR) 100 UNIT/ML injection pen Inject 12 Units into the skin daily At noon   Had today (Patient taking differently: Inject 12 Units into the skin daily ) 5 pen 3    oxyCODONE (ROXICODONE) 20 MG immediate release tablet Take 20 mg by mouth every 8 hours as needed for Pain.  hydrALAZINE (APRESOLINE) 100 MG tablet Take 100 mg by mouth 3 times daily      simvastatin (ZOCOR) 10 MG tablet Take 10 mg by mouth daily      isosorbide mononitrate (IMDUR) 60 MG extended release tablet TAKE 1 TABLET DAILY 90 tablet 3    tacrolimus (PROGRAF) 1 MG capsule Take 2 capsules by mouth 2 times daily 60 capsule 3    sodium bicarbonate 650 MG tablet Take 650 mg by mouth 4 times daily       Multiple Vitamin (MULTI VITAMIN DAILY PO) Take by mouth daily      mycophenolate (CELLCEPT) 250 MG capsule Take 1,000 mg by mouth 2 times daily       insulin lispro (HUMALOG KWIKPEN) 100 UNIT/ML SOPN Inject 5 Units into the skin daily (before lunch) Patient takes with his biggest meal      docusate sodium (COLACE) 100 MG capsule Take 1 capsule by mouth 2 times daily as needed for Constipation. (Patient taking differently: Take 100 mg by mouth daily as needed for Constipation ) 20 capsule 0    linagliptin (TRADJENTA) 5 MG tablet Take 5 mg by mouth daily.  aspirin 81 MG EC tablet Take 81 mg by mouth daily. Guideline directed medical:  ARNI/ACE I/ARB: No  Beta blocker:   Yes  Aldosterone antagonist:  No        Physical Examination:     BP (!) 149/78   Pulse 53   Resp 18   Wt 200 lb (90.7 kg)   SpO2 97%   BMI 27.89 kg/m²     Assessment  Charting Type: Shift assessment    Neurological  Level of Consciousness: Alert (0)              Respiratory  Respiratory Pattern: Regular  Respiratory Depth: Normal  Respiratory Quality/Effort: Dyspnea with exertion  Chest Assessment: Chest expansion symmetrical  L Breath Sounds: Clear,Diminished  R Breath Sounds: Clear,Diminished              Cardiac  Cardiac Regularity: Regular  Cardiac Rhythm: Sinus tete    Rhythm Interpretation  Pulse: 53         Gastrointestinal  Abdominal (WDL): Within Defined Limits               Peripheral Vascular  Peripheral Vascular (WDL): Exceptions to WDL  Edema: Right lower extremity,Left lower extremity  RLE Edema: +1,Pitting  LLE Edema: +1,Pitting,Trace                        Psychosocial  Psychosocial (WDL): Within Defined Limits                        Pulse: 53                     LAB DATA:    Last 3 BMP      Sodium (mmol/L)   Date Value   03/07/2022 140   02/28/2022 145   02/21/2022 144     Potassium (mmol/L)   Date Value   03/07/2022 4.9   02/28/2022 5.0   02/21/2022 4.6     Potassium reflex Magnesium (mmol/L)   Date Value   06/12/2021 5.4 (H)   06/11/2021 5.2 (H)   04/26/2021 4.9     Chloride (mmol/L)   Date Value   03/07/2022 106   02/28/2022 107   02/21/2022 109 (H)     CO2 (mmol/L)   Date Value   03/07/2022 27   02/28/2022 28   02/21/2022 24     BUN (mg/dL)   Date Value   03/07/2022 40 (H)   02/28/2022 42 (H)   02/21/2022 42 (H)     Glucose (mg/dL)   Date Value   03/07/2022 81   02/28/2022 61 (L)   02/21/2022 88   02/21/2012 124 (H)   09/23/2011 140 (H)   07/05/2011 153 (H)     Calcium (mg/dL)   Date Value   03/07/2022 9.2   02/28/2022 9.0   02/21/2022 8.8       Last 3 BNP       Pro-BNP (pg/mL)   Date Value   02/28/2022 13,356 (H)   02/21/2022 9,699 (H)   10/02/2021 22,645 (H)          CBC:   No results for input(s): WBC, HGB, PLT in the last 72 hours. BMP:    No results for input(s): NA, K, CL, CO2, BUN, CREATININE, GLUCOSE in the last 72 hours. Hepatic: No results for input(s): AST, ALT, ALB, BILITOT, ALKPHOS in the last 72 hours. Troponin: No results for input(s): TROPONINI in the last 72 hours. BNP: No results for input(s): BNP in the last 72 hours. Lipids: No results for input(s): CHOL, HDL in the last 72 hours. Invalid input(s): LDLCALCU  INR: No results for input(s): INR in the last 72 hours.         WEIGHTS:    Wt Readings from Last 3 Encounters:   03/10/22 200 lb (90.7 kg)   02/28/22 207 lb (93.9 kg)   02/21/22 209 lb (94.8 kg) TELEMETRY:  Cardiac Regularity: Regular  Cardiac Rhythm/Interpretation:SB        ASSESSMENT:  Cirsta Zavala is hypervolemic with weight loss, but remains up overall. Interventions completed this visit:  IV diuretics given yes, Bumex 2 mg IVP   Lab work obtained yes,BNP (BMP done earlier)  Reviewed currently prescribed medications with patient, educated on importance of compliance and answered any questions regarding their medication  Educated on signs and symptoms of HF  Educated on low sodium diet    PLAN:  Scheduled to follow up in CHF clinic on   Future Appointments   Date Time Provider Alfa Moore   3/16/2022 11:30 AM [facility]   3/21/2022  7:45 AM [facility] ROOM 1 [facility] [place]     Given clinic phone number and aware of signs and symptoms to call with any HF change in symptoms. States he continues to try to follow the low sodium diet.

## 2022-03-14 ENCOUNTER — HOSPITAL ENCOUNTER (OUTPATIENT)
Age: 72
Discharge: HOME | End: 2022-03-14
Payer: MEDICARE

## 2022-03-14 LAB
ACANTHOCYTES: ABNORMAL
ANION GAP SERPL CALCULATED.3IONS-SCNC: 10 MMOL/L (ref 7–16)
ANISOCYTOSIS: ABNORMAL
BASOPHILS ABSOLUTE: 0 E9/L (ref 0–0.2)
BASOPHILS RELATIVE PERCENT: 0.7 % (ref 0–2)
BUN BLDV-MCNC: 40 MG/DL (ref 6–23)
BURR CELLS: ABNORMAL
CALCIUM SERPL-MCNC: 8.9 MG/DL (ref 8.6–10.2)
CHLORIDE BLD-SCNC: 106 MMOL/L (ref 98–107)
CO2: 26 MMOL/L (ref 22–29)
CREAT SERPL-MCNC: 2.8 MG/DL (ref 0.7–1.2)
EOSINOPHILS ABSOLUTE: 0.11 E9/L (ref 0.05–0.5)
EOSINOPHILS RELATIVE PERCENT: 3.5 % (ref 0–6)
GFR AFRICAN AMERICAN: 27
GFR NON-AFRICAN AMERICAN: 27 ML/MIN/1.73
GLUCOSE BLD-MCNC: 91 MG/DL (ref 74–99)
HCT VFR BLD CALC: 37 % (ref 37–54)
HEMOGLOBIN: 10.6 G/DL (ref 12.5–16.5)
LYMPHOCYTES ABSOLUTE: 0.22 E9/L (ref 1.5–4)
LYMPHOCYTES RELATIVE PERCENT: 7 % (ref 20–42)
MCH RBC QN AUTO: 24.9 PG (ref 26–35)
MCHC RBC AUTO-ENTMCNC: 28.6 % (ref 32–34.5)
MCV RBC AUTO: 87.1 FL (ref 80–99.9)
MONOCYTES ABSOLUTE: 0.31 E9/L (ref 0.1–0.95)
MONOCYTES RELATIVE PERCENT: 10.4 % (ref 2–12)
NEUTROPHILS ABSOLUTE: 2.45 E9/L (ref 1.8–7.3)
NEUTROPHILS RELATIVE PERCENT: 79.1 % (ref 43–80)
OVALOCYTES: ABNORMAL
PDW BLD-RTO: 16.5 FL (ref 11.5–15)
PHOSPHORUS: 2.9 MG/DL (ref 2.5–4.5)
PLATELET # BLD: 240 E9/L (ref 130–450)
PMV BLD AUTO: 9.8 FL (ref 7–12)
POIKILOCYTES: ABNORMAL
POTASSIUM SERPL-SCNC: 4.6 MMOL/L (ref 3.5–5)
RBC # BLD: 4.25 E12/L (ref 3.8–5.8)
SCHISTOCYTES: ABNORMAL
SODIUM BLD-SCNC: 142 MMOL/L (ref 132–146)
WBC # BLD: 3.1 E9/L (ref 4.5–11.5)

## 2022-03-14 PROCEDURE — 84100 ASSAY OF PHOSPHORUS: CPT

## 2022-03-14 PROCEDURE — 80048 BASIC METABOLIC PNL TOTAL CA: CPT

## 2022-03-14 PROCEDURE — 85025 COMPLETE CBC W/AUTO DIFF WBC: CPT

## 2022-03-14 PROCEDURE — 36415 COLL VENOUS BLD VENIPUNCTURE: CPT

## 2022-03-16 ENCOUNTER — HOSPITAL ENCOUNTER (OUTPATIENT)
Dept: INFUSION THERAPY | Age: 72
Setting detail: INFUSION SERIES
Discharge: HOME | End: 2022-03-16
Payer: MEDICARE

## 2022-03-16 VITALS
SYSTOLIC BLOOD PRESSURE: 123 MMHG | TEMPERATURE: 99.1 F | DIASTOLIC BLOOD PRESSURE: 73 MMHG | OXYGEN SATURATION: 97 % | HEART RATE: 75 BPM | RESPIRATION RATE: 16 BRPM

## 2022-03-16 DIAGNOSIS — D63.8 ANEMIA OF CHRONIC DISEASE: Primary | ICD-10-CM

## 2022-03-16 PROCEDURE — 6360000002 HC RX W HCPCS: Performed by: INTERNAL MEDICINE

## 2022-03-16 PROCEDURE — 96372 THER/PROPH/DIAG INJ SC/IM: CPT

## 2022-03-16 RX ADMIN — EPOETIN ALFA-EPBX 10000 UNITS: 10000 INJECTION, SOLUTION INTRAVENOUS; SUBCUTANEOUS at 11:44

## 2022-03-16 ASSESSMENT — PAIN SCALES - GENERAL: PAINLEVEL_OUTOF10: 0

## 2022-03-16 NOTE — FLOWSHEET NOTE
Patient tolerated injection well. Patient alert and oriented x3. No distress noted. Vital signs stable. Patient denies any new or worsening pain. Educated patient on possible side effects and treatment of medication. Patient verbalized understanding. Offered patient education an/or discharge material.  Patient declined. Patient denies any needs. All questions answered.

## 2022-03-21 ENCOUNTER — HOSPITAL ENCOUNTER (OUTPATIENT)
Age: 72
Discharge: HOME | End: 2022-03-21
Payer: MEDICARE

## 2022-03-21 ENCOUNTER — HOSPITAL ENCOUNTER (OUTPATIENT)
Dept: OTHER | Age: 72
Setting detail: THERAPIES SERIES
Discharge: HOME | End: 2022-03-21
Payer: MEDICARE

## 2022-03-21 VITALS
DIASTOLIC BLOOD PRESSURE: 68 MMHG | HEART RATE: 52 BPM | SYSTOLIC BLOOD PRESSURE: 145 MMHG | WEIGHT: 206 LBS | BODY MASS INDEX: 28.73 KG/M2 | RESPIRATION RATE: 18 BRPM | OXYGEN SATURATION: 96 %

## 2022-03-21 LAB
ANION GAP SERPL CALCULATED.3IONS-SCNC: 8 MMOL/L (ref 7–16)
ANISOCYTOSIS: ABNORMAL
BASOPHILS ABSOLUTE: 0 E9/L (ref 0–0.2)
BASOPHILS RELATIVE PERCENT: 0.6 % (ref 0–2)
BUN BLDV-MCNC: 42 MG/DL (ref 6–23)
BURR CELLS: ABNORMAL
CALCIUM SERPL-MCNC: 9 MG/DL (ref 8.6–10.2)
CHLORIDE BLD-SCNC: 105 MMOL/L (ref 98–107)
CO2: 28 MMOL/L (ref 22–29)
CREAT SERPL-MCNC: 2.6 MG/DL (ref 0.7–1.2)
EOSINOPHILS ABSOLUTE: 0.05 E9/L (ref 0.05–0.5)
EOSINOPHILS RELATIVE PERCENT: 1.7 % (ref 0–6)
GFR AFRICAN AMERICAN: 30
GFR NON-AFRICAN AMERICAN: 30 ML/MIN/1.73
GLUCOSE BLD-MCNC: 87 MG/DL (ref 74–99)
HCT VFR BLD CALC: 38.1 % (ref 37–54)
HEMOGLOBIN: 11 G/DL (ref 12.5–16.5)
LYMPHOCYTES ABSOLUTE: 0.4 E9/L (ref 1.5–4)
LYMPHOCYTES RELATIVE PERCENT: 13 % (ref 20–42)
MCH RBC QN AUTO: 25.3 PG (ref 26–35)
MCHC RBC AUTO-ENTMCNC: 28.9 % (ref 32–34.5)
MCV RBC AUTO: 87.8 FL (ref 80–99.9)
METAMYELOCYTES RELATIVE PERCENT: 0.9 % (ref 0–1)
MONOCYTES ABSOLUTE: 0.22 E9/L (ref 0.1–0.95)
MONOCYTES RELATIVE PERCENT: 7 % (ref 2–12)
MYELOCYTE PERCENT: 0.9 % (ref 0–0)
NEUTROPHILS ABSOLUTE: 2.42 E9/L (ref 1.8–7.3)
NEUTROPHILS RELATIVE PERCENT: 76.5 % (ref 43–80)
OVALOCYTES: ABNORMAL
PDW BLD-RTO: 16.9 FL (ref 11.5–15)
PHOSPHORUS: 3.6 MG/DL (ref 2.5–4.5)
PLATELET # BLD: 183 E9/L (ref 130–450)
PMV BLD AUTO: 9.6 FL (ref 7–12)
POIKILOCYTES: ABNORMAL
POTASSIUM SERPL-SCNC: 4.7 MMOL/L (ref 3.5–5)
PRO-BNP: ABNORMAL PG/ML (ref 0–125)
RBC # BLD: 4.34 E12/L (ref 3.8–5.8)
SODIUM BLD-SCNC: 141 MMOL/L (ref 132–146)
WBC # BLD: 3.1 E9/L (ref 4.5–11.5)

## 2022-03-21 PROCEDURE — 2580000003 HC RX 258: Performed by: INTERNAL MEDICINE

## 2022-03-21 PROCEDURE — 84100 ASSAY OF PHOSPHORUS: CPT

## 2022-03-21 PROCEDURE — 36415 COLL VENOUS BLD VENIPUNCTURE: CPT

## 2022-03-21 PROCEDURE — 83880 ASSAY OF NATRIURETIC PEPTIDE: CPT

## 2022-03-21 PROCEDURE — 96374 THER/PROPH/DIAG INJ IV PUSH: CPT

## 2022-03-21 PROCEDURE — 99214 OFFICE O/P EST MOD 30 MIN: CPT

## 2022-03-21 PROCEDURE — 80048 BASIC METABOLIC PNL TOTAL CA: CPT

## 2022-03-21 PROCEDURE — 85025 COMPLETE CBC W/AUTO DIFF WBC: CPT

## 2022-03-21 PROCEDURE — 2500000003 HC RX 250 WO HCPCS: Performed by: INTERNAL MEDICINE

## 2022-03-21 RX ORDER — BUMETANIDE 0.25 MG/ML
2 INJECTION, SOLUTION INTRAMUSCULAR; INTRAVENOUS ONCE
Status: COMPLETED | OUTPATIENT
Start: 2022-03-21 | End: 2022-03-21

## 2022-03-21 RX ORDER — SODIUM CHLORIDE 0.9 % (FLUSH) 0.9 %
10 SYRINGE (ML) INJECTION PRN
Status: DISCONTINUED | OUTPATIENT
Start: 2022-03-21 | End: 2022-03-22 | Stop reason: HOSPADM

## 2022-03-21 RX ADMIN — BUMETANIDE 2 MG: 0.25 INJECTION, SOLUTION INTRAMUSCULAR; INTRAVENOUS at 07:55

## 2022-03-21 RX ADMIN — Medication 10 ML: at 07:55

## 2022-03-21 NOTE — PROGRESS NOTES
Congestive Heart Failure [facility]   1950          Referring Provider: Dr Cristobal Black  Primary Care Physician: Dr Jaylin Mortensen  Cardiologist: Dr Cristobal Black  Nephrologist: Dr Clinton        History of Present Illness:     Peace Cabrera is a 70 y.o. male with a history of HFpEF, most recent EF 75% on 10-7-21. Patient Story:    He does  have dyspnea with exertion, shortness of breath, or decline in overall functional capacity. He does not have orthopnea, PND, nocturnal cough or hemoptysis. He does not have abdominal distention or bloating, early satiety, anorexia/change in appetite. He usually has a good urinary response to  oral diuretic. States sometimes are better than others. He does have  lower extremity edema. He denies lightheadedness, dizziness. He denies palpitations, syncope or near syncope. He does not complain of chest pain, pressure, discomfort. No Known Allergies      Prior to Visit Medications    Medication Sig Taking?  Authorizing Provider   bumetanide (BUMEX) 2 MG tablet Take 0.5 tablets by mouth daily  Patient taking differently: Take 1 mg by mouth daily Pt takes 1mg bid as instructed on bottle  Mayra Toro MD   sulfamethoxazole-trimethoprim (BACTRIM;SEPTRA) 400-80 MG per tablet Take 2 tablets by mouth 2 times daily   Historical Provider, MD   Cholecalciferol (VITAMIN D3) 125 MCG (5000 UT) TABS Take by mouth  Historical Provider, MD   ammonium lactate (LAC-HYDRIN) 12 % lotion ammonium lactate 12 % lotion  Historical Provider, MD   bisacodyl (DULCOLAX) 5 MG EC tablet Take by mouth  Historical Provider, MD   [facility] ULTRA strip   Historical Provider, MD   BD PEN NEEDLE TAINA 2ND GEN 32G X 4 MM [id number] [facility]   Historical Provider, MD   Lancets (Luz [place] Crank) [id number] [facility]   Historical Provider, MD   metoprolol tartrate (LOPRESSOR) 50 MG tablet 50 mg 2 times daily   Historical Provider, MD PACK 10 g PACK oral suspension   Historical Provider, MD   tiZANidine (ZANAFLEX) 4 MG tablet   Historical Provider, MD   labetalol (NORMODYNE) 200 MG tablet Take by mouth daily   Patient not taking: Reported on 3/10/2022  Historical Provider, MD   calcitRIOL (ROCALTROL) 0.25 MCG capsule Take 1 capsule by mouth daily  Dashawn Thomas MD   OXYGEN Inhale 2 L into the lungs daily as needed   Patient not taking: Reported on 2/21/2022  Historical Provider, MD   insulin glargine (LANTUS SOLOSTAR) 100 UNIT/ML injection pen Inject 12 Units into the skin daily At noon   Had today  Patient taking differently: Inject 12 Units into the skin daily   Ova MD Davion   oxyCODONE (ROXICODONE) 20 MG immediate release tablet Take 20 mg by mouth every 8 hours as needed for Pain. Historical Provider, MD   hydrALAZINE (APRESOLINE) 100 MG tablet Take 100 mg by mouth 3 times daily  Historical Provider, MD   simvastatin (ZOCOR) 10 MG tablet Take 10 mg by mouth daily  Historical Provider, MD   isosorbide mononitrate (IMDUR) 60 MG extended release tablet TAKE 1 TABLET DAILY  Trent Kanner, MD   tacrolimus (PROGRAF) 1 MG capsule Take 2 capsules by mouth 2 times daily  Ova MD Davion   sodium bicarbonate 650 MG tablet Take 650 mg by mouth 4 times daily   Historical Provider, MD   Multiple Vitamin (MULTI VITAMIN DAILY PO) Take by mouth daily  Historical Provider, MD   mycophenolate (CELLCEPT) 250 MG capsule Take 1,000 mg by mouth 2 times daily   Historical Provider, MD   insulin lispro (HUMALOG KWIKPEN) 100 UNIT/ML SOPN Inject 5 Units into the skin daily (before lunch) Patient takes with his biggest meal  Historical Provider, MD   docusate sodium (COLACE) 100 MG capsule Take 1 capsule by mouth 2 times daily as needed for Constipation. Patient taking differently: Take 100 mg by mouth daily as needed for Constipation   Chari López MD   linagliptin (TRADJENTA) 5 MG tablet Take 5 mg by mouth daily. Historical Provider, MD   aspirin 81 MG EC tablet Take 81 mg by mouth daily. Historical Provider, MD             Guideline directed medical:  ARNI/ACE I/ARB: No  Beta blocker:   Yes  Aldosterone antagonist:  No        Physical Examination:     BP (!) 145/68   Pulse 52   Resp 18   Wt 206 lb (93.4 kg)   SpO2 96%   BMI 28.73 kg/m²     Assessment  Charting Type: Shift assessment    Neurological  Level of Consciousness: Alert (0)              Respiratory  Respiratory Pattern: Regular  Respiratory Depth: Normal  Respiratory Quality/Effort: Dyspnea with exertion  Chest Assessment: Chest expansion symmetrical  L Breath Sounds: Clear,Diminished  R Breath Sounds: Clear,Diminished              Cardiac  Cardiac Regularity: Regular  Cardiac Rhythm: Sinus tete    Rhythm Interpretation  Pulse: 52         Gastrointestinal  Abdominal (WDL): Within Defined Limits               Peripheral Vascular  Peripheral Vascular (WDL): Exceptions to WDL  Edema: Right lower extremity,Left lower extremity  RLE Edema: +1,Pitting  LLE Edema: +1,Pitting                        Psychosocial  Psychosocial (WDL): Within Defined Limits                        Pulse: 52                     LAB DATA:    Last 3 BMP      Sodium (mmol/L)   Date Value   03/21/2022 141   03/14/2022 142   03/10/2022 142     Potassium (mmol/L)   Date Value   03/21/2022 4.7   03/14/2022 4.6   03/10/2022 4.9     Potassium reflex Magnesium (mmol/L)   Date Value   06/12/2021 5.4 (H)   06/11/2021 5.2 (H)   04/26/2021 4.9     Chloride (mmol/L)   Date Value   03/21/2022 105   03/14/2022 106   03/10/2022 107     CO2 (mmol/L)   Date Value   03/21/2022 28   03/14/2022 26   03/10/2022 22     BUN (mg/dL)   Date Value   03/21/2022 42 (H)   03/14/2022 40 (H)   03/10/2022 39 (H)     Glucose (mg/dL)   Date Value   03/21/2022 87   03/14/2022 91   03/10/2022 107 (H)   02/21/2012 124 (H)   09/23/2011 140 (H)   07/05/2011 153 (H)     Calcium (mg/dL)   Date Value   03/21/2022 9.0   03/14/2022 8.9   03/10/2022 8.8       Last 3 BNP       Pro-BNP (pg/mL)   Date Value 03/21/2022 13,649 (H)   03/10/2022 7,487 (H)   02/28/2022 13,356 (H)          CBC:   Recent Labs     03/21/22  0654   WBC 3.1*   HGB 11.0*        BMP:    Recent Labs     03/21/22  0654      K 4.7      CO2 28   BUN 42*   CREATININE 2.6*   GLUCOSE 87     Hepatic: No results for input(s): AST, ALT, ALB, BILITOT, ALKPHOS in the last 72 hours. Troponin: No results for input(s): TROPONINI in the last 72 hours. BNP: No results for input(s): BNP in the last 72 hours. Lipids: No results for input(s): CHOL, HDL in the last 72 hours. Invalid input(s): LDLCALCU  INR: No results for input(s): INR in the last 72 hours. WEIGHTS:    Wt Readings from Last 3 Encounters:   03/21/22 206 lb (93.4 kg)   03/10/22 200 lb (90.7 kg)   02/28/22 207 lb (93.9 kg)         TELEMETRY:  Cardiac Regularity: Regular  Cardiac Rhythm/Interpretation:SB        ASSESSMENT:  Samuel Ann is hypervolemic with 6 lb weight gain. Patient just ate at [employer]. Reinforced need for low sodium diet. Interventions completed this visit:  IV diuretics given yes, Bumex 2 mg IVP   Lab work obtained yes,BMP/BNP  Reviewed currently prescribed medications with patient, educated on importance of compliance and answered any questions regarding their medication  Educated on signs and symptoms of HF  Educated on low sodium diet    PLAN:  Scheduled to follow up in CHF clinic on   Future Appointments   Date Time Provider Alfa Moore   3/23/2022 11:30 AM [facility]   3/29/2022  8:15 AM [facility] CHF ROOM 1 [facility]     Given clinic phone number and aware of signs and symptoms to call with any HF change in symptoms. [unfilled]    States he continues to try to follow the low sodium diet.

## 2022-03-22 RX ORDER — BUMETANIDE 2 MG/1
2 TABLET ORAL DAILY
Qty: 30 TABLET | Refills: 3 | Status: ON HOLD
Start: 2022-03-22 | End: 2022-06-16 | Stop reason: HOSPADM

## 2022-03-22 RX ORDER — AMLODIPINE BESYLATE 5 MG/1
5 TABLET ORAL DAILY
Qty: 90 TABLET | Refills: 1 | Status: SHIPPED
Start: 2022-03-22 | End: 2022-09-02

## 2022-03-28 NOTE — PROGRESS NOTES
Family called in and cancelled appointment for tomorrow . States he will be admitted in [facility] tomorrow and she will call us when he's discharged to reschedule.

## 2022-03-29 ENCOUNTER — HOSPITAL ENCOUNTER (OUTPATIENT)
Dept: OTHER | Age: 72
Setting detail: THERAPIES SERIES
Discharge: HOME | End: 2022-03-29
Payer: MEDICARE

## 2022-03-30 ENCOUNTER — HOSPITAL ENCOUNTER (OUTPATIENT)
Dept: INFUSION THERAPY | Age: 72
Setting detail: INFUSION SERIES
End: 2022-03-30

## 2022-04-11 ENCOUNTER — HOSPITAL ENCOUNTER (OUTPATIENT)
Age: 72
Discharge: HOME | End: 2022-04-11
Payer: MEDICARE

## 2022-04-11 LAB
ANION GAP SERPL CALCULATED.3IONS-SCNC: 11 MMOL/L (ref 7–16)
BASOPHILS ABSOLUTE: 0.01 E9/L (ref 0–0.2)
BASOPHILS RELATIVE PERCENT: 0.3 % (ref 0–2)
BUN BLDV-MCNC: 56 MG/DL (ref 6–23)
CALCIUM SERPL-MCNC: 9.5 MG/DL (ref 8.6–10.2)
CHLORIDE BLD-SCNC: 106 MMOL/L (ref 98–107)
CO2: 26 MMOL/L (ref 22–29)
CREAT SERPL-MCNC: 3.4 MG/DL (ref 0.7–1.2)
EOSINOPHILS ABSOLUTE: 0.08 E9/L (ref 0.05–0.5)
EOSINOPHILS RELATIVE PERCENT: 2.6 % (ref 0–6)
GFR AFRICAN AMERICAN: 22
GFR NON-AFRICAN AMERICAN: 22 ML/MIN/1.73
GLUCOSE BLD-MCNC: 85 MG/DL (ref 74–99)
HCT VFR BLD CALC: 36.6 % (ref 37–54)
HEMOGLOBIN: 10.9 G/DL (ref 12.5–16.5)
IMMATURE GRANULOCYTES #: 0.02 E9/L
IMMATURE GRANULOCYTES %: 0.7 % (ref 0–5)
LYMPHOCYTES ABSOLUTE: 0.62 E9/L (ref 1.5–4)
LYMPHOCYTES RELATIVE PERCENT: 20.4 % (ref 20–42)
MCH RBC QN AUTO: 25.4 PG (ref 26–35)
MCHC RBC AUTO-ENTMCNC: 29.8 % (ref 32–34.5)
MCV RBC AUTO: 85.3 FL (ref 80–99.9)
MONOCYTES ABSOLUTE: 0.47 E9/L (ref 0.1–0.95)
MONOCYTES RELATIVE PERCENT: 15.5 % (ref 2–12)
NEUTROPHILS ABSOLUTE: 1.84 E9/L (ref 1.8–7.3)
NEUTROPHILS RELATIVE PERCENT: 60.5 % (ref 43–80)
PDW BLD-RTO: 15.9 FL (ref 11.5–15)
PHOSPHORUS: 3.3 MG/DL (ref 2.5–4.5)
PLATELET # BLD: 217 E9/L (ref 130–450)
PMV BLD AUTO: 8.6 FL (ref 7–12)
POTASSIUM SERPL-SCNC: 4.8 MMOL/L (ref 3.5–5)
RBC # BLD: 4.29 E12/L (ref 3.8–5.8)
SODIUM BLD-SCNC: 143 MMOL/L (ref 132–146)
WBC # BLD: 3 E9/L (ref 4.5–11.5)

## 2022-04-11 PROCEDURE — 85025 COMPLETE CBC W/AUTO DIFF WBC: CPT

## 2022-04-11 PROCEDURE — 80048 BASIC METABOLIC PNL TOTAL CA: CPT

## 2022-04-11 PROCEDURE — 36415 COLL VENOUS BLD VENIPUNCTURE: CPT

## 2022-04-11 PROCEDURE — 84100 ASSAY OF PHOSPHORUS: CPT

## 2022-04-13 ENCOUNTER — HOSPITAL ENCOUNTER (OUTPATIENT)
Dept: INFUSION THERAPY | Age: 72
Setting detail: INFUSION SERIES
Discharge: HOME | End: 2022-04-13
Payer: MEDICARE

## 2022-04-13 VITALS
DIASTOLIC BLOOD PRESSURE: 74 MMHG | OXYGEN SATURATION: 100 % | SYSTOLIC BLOOD PRESSURE: 122 MMHG | RESPIRATION RATE: 18 BRPM | HEART RATE: 88 BPM | TEMPERATURE: 97.5 F

## 2022-04-13 DIAGNOSIS — D63.8 ANEMIA OF CHRONIC DISEASE: Primary | ICD-10-CM

## 2022-04-13 PROCEDURE — 96372 THER/PROPH/DIAG INJ SC/IM: CPT

## 2022-04-13 PROCEDURE — 6360000002 HC RX W HCPCS: Performed by: INTERNAL MEDICINE

## 2022-04-13 RX ADMIN — EPOETIN ALFA-EPBX 10000 UNITS: 10000 INJECTION, SOLUTION INTRAVENOUS; SUBCUTANEOUS at 11:26

## 2022-04-13 NOTE — FLOWSHEET NOTE
Does not want dc instructions, tolerated injection well, all questions answered. VS stable , discharged in stable condition

## 2022-04-18 ENCOUNTER — HOSPITAL ENCOUNTER (OUTPATIENT)
Age: 72
Discharge: HOME | End: 2022-04-18
Payer: MEDICARE

## 2022-04-18 LAB
ANION GAP SERPL CALCULATED.3IONS-SCNC: 12 MMOL/L (ref 7–16)
ANISOCYTOSIS: ABNORMAL
BASOPHILS ABSOLUTE: 0.03 E9/L (ref 0–0.2)
BASOPHILS RELATIVE PERCENT: 0.9 % (ref 0–2)
BUN BLDV-MCNC: 37 MG/DL (ref 6–23)
BURR CELLS: ABNORMAL
CALCIUM SERPL-MCNC: 9.1 MG/DL (ref 8.6–10.2)
CHLORIDE BLD-SCNC: 106 MMOL/L (ref 98–107)
CO2: 24 MMOL/L (ref 22–29)
CREAT SERPL-MCNC: 2.4 MG/DL (ref 0.7–1.2)
EOSINOPHILS ABSOLUTE: 0.05 E9/L (ref 0.05–0.5)
EOSINOPHILS RELATIVE PERCENT: 1.7 % (ref 0–6)
GFR AFRICAN AMERICAN: 32
GFR NON-AFRICAN AMERICAN: 32 ML/MIN/1.73
GLUCOSE BLD-MCNC: 71 MG/DL (ref 74–99)
HCT VFR BLD CALC: 36 % (ref 37–54)
HEMOGLOBIN: 10.6 G/DL (ref 12.5–16.5)
LYMPHOCYTES ABSOLUTE: 0.47 E9/L (ref 1.5–4)
LYMPHOCYTES RELATIVE PERCENT: 14.8 % (ref 20–42)
MCH RBC QN AUTO: 25.9 PG (ref 26–35)
MCHC RBC AUTO-ENTMCNC: 29.4 % (ref 32–34.5)
MCV RBC AUTO: 87.8 FL (ref 80–99.9)
MONOCYTES ABSOLUTE: 0.31 E9/L (ref 0.1–0.95)
MONOCYTES RELATIVE PERCENT: 9.6 % (ref 2–12)
NEUTROPHILS ABSOLUTE: 2.26 E9/L (ref 1.8–7.3)
NEUTROPHILS RELATIVE PERCENT: 73 % (ref 43–80)
OVALOCYTES: ABNORMAL
PDW BLD-RTO: 16.3 FL (ref 11.5–15)
PHOSPHORUS: 3.3 MG/DL (ref 2.5–4.5)
PLATELET # BLD: 202 E9/L (ref 130–450)
PMV BLD AUTO: 8.7 FL (ref 7–12)
POIKILOCYTES: ABNORMAL
POTASSIUM SERPL-SCNC: 4.4 MMOL/L (ref 3.5–5)
RBC # BLD: 4.1 E12/L (ref 3.8–5.8)
SCHISTOCYTES: ABNORMAL
SODIUM BLD-SCNC: 142 MMOL/L (ref 132–146)
WBC # BLD: 3.1 E9/L (ref 4.5–11.5)

## 2022-04-18 PROCEDURE — 36415 COLL VENOUS BLD VENIPUNCTURE: CPT

## 2022-04-18 PROCEDURE — 85025 COMPLETE CBC W/AUTO DIFF WBC: CPT

## 2022-04-18 PROCEDURE — 80048 BASIC METABOLIC PNL TOTAL CA: CPT

## 2022-04-18 PROCEDURE — 84100 ASSAY OF PHOSPHORUS: CPT

## 2022-04-20 ENCOUNTER — HOSPITAL ENCOUNTER (OUTPATIENT)
Dept: INFUSION THERAPY | Age: 72
Setting detail: INFUSION SERIES
Discharge: HOME | End: 2022-04-20
Payer: MEDICARE

## 2022-04-20 VITALS
TEMPERATURE: 94.6 F | HEART RATE: 88 BPM | DIASTOLIC BLOOD PRESSURE: 69 MMHG | SYSTOLIC BLOOD PRESSURE: 115 MMHG | RESPIRATION RATE: 18 BRPM

## 2022-04-20 DIAGNOSIS — D63.8 ANEMIA OF CHRONIC DISEASE: Primary | ICD-10-CM

## 2022-04-20 PROCEDURE — 6360000002 HC RX W HCPCS: Performed by: INTERNAL MEDICINE

## 2022-04-20 PROCEDURE — 96372 THER/PROPH/DIAG INJ SC/IM: CPT

## 2022-04-20 RX ADMIN — EPOETIN ALFA-EPBX 10000 UNITS: 10000 INJECTION, SOLUTION INTRAVENOUS; SUBCUTANEOUS at 11:35

## 2022-04-20 NOTE — PROGRESS NOTES
Pt tolerated injection without problems. Pt declined discharge instructions. Pt discharge with wife.

## 2022-04-25 ENCOUNTER — HOSPITAL ENCOUNTER (OUTPATIENT)
Age: 72
Discharge: HOME | End: 2022-04-25
Payer: MEDICARE

## 2022-04-25 LAB
ANION GAP SERPL CALCULATED.3IONS-SCNC: 10 MMOL/L (ref 7–16)
BASOPHILS ABSOLUTE: 0.02 E9/L (ref 0–0.2)
BASOPHILS RELATIVE PERCENT: 0.7 % (ref 0–2)
BUN BLDV-MCNC: 30 MG/DL (ref 6–23)
CALCIUM SERPL-MCNC: 9.1 MG/DL (ref 8.6–10.2)
CHLORIDE BLD-SCNC: 107 MMOL/L (ref 98–107)
CO2: 26 MMOL/L (ref 22–29)
CREAT SERPL-MCNC: 2.6 MG/DL (ref 0.7–1.2)
EOSINOPHILS ABSOLUTE: 0.11 E9/L (ref 0.05–0.5)
EOSINOPHILS RELATIVE PERCENT: 3.8 % (ref 0–6)
GFR AFRICAN AMERICAN: 30
GFR NON-AFRICAN AMERICAN: 30 ML/MIN/1.73
GLUCOSE BLD-MCNC: 86 MG/DL (ref 74–99)
HCT VFR BLD CALC: 36.7 % (ref 37–54)
HEMOGLOBIN: 10.8 G/DL (ref 12.5–16.5)
IMMATURE GRANULOCYTES #: 0.01 E9/L
IMMATURE GRANULOCYTES %: 0.3 % (ref 0–5)
LYMPHOCYTES ABSOLUTE: 0.6 E9/L (ref 1.5–4)
LYMPHOCYTES RELATIVE PERCENT: 20.8 % (ref 20–42)
MCH RBC QN AUTO: 25.9 PG (ref 26–35)
MCHC RBC AUTO-ENTMCNC: 29.4 % (ref 32–34.5)
MCV RBC AUTO: 88 FL (ref 80–99.9)
MONOCYTES ABSOLUTE: 0.47 E9/L (ref 0.1–0.95)
MONOCYTES RELATIVE PERCENT: 16.3 % (ref 2–12)
NEUTROPHILS ABSOLUTE: 1.68 E9/L (ref 1.8–7.3)
NEUTROPHILS RELATIVE PERCENT: 58.1 % (ref 43–80)
PDW BLD-RTO: 17.1 FL (ref 11.5–15)
PHOSPHORUS: 3.4 MG/DL (ref 2.5–4.5)
PLATELET # BLD: 213 E9/L (ref 130–450)
PMV BLD AUTO: 9.5 FL (ref 7–12)
POTASSIUM SERPL-SCNC: 4.3 MMOL/L (ref 3.5–5)
RBC # BLD: 4.17 E12/L (ref 3.8–5.8)
SODIUM BLD-SCNC: 143 MMOL/L (ref 132–146)
WBC # BLD: 2.9 E9/L (ref 4.5–11.5)

## 2022-04-25 PROCEDURE — 84100 ASSAY OF PHOSPHORUS: CPT

## 2022-04-25 PROCEDURE — 80048 BASIC METABOLIC PNL TOTAL CA: CPT

## 2022-04-25 PROCEDURE — 85025 COMPLETE CBC W/AUTO DIFF WBC: CPT

## 2022-04-25 PROCEDURE — 36415 COLL VENOUS BLD VENIPUNCTURE: CPT

## 2022-04-27 ENCOUNTER — HOSPITAL ENCOUNTER (OUTPATIENT)
Dept: INFUSION THERAPY | Age: 72
Setting detail: INFUSION SERIES
Discharge: HOME | End: 2022-04-27
Payer: MEDICARE

## 2022-04-27 VITALS
OXYGEN SATURATION: 99 % | SYSTOLIC BLOOD PRESSURE: 131 MMHG | RESPIRATION RATE: 18 BRPM | DIASTOLIC BLOOD PRESSURE: 72 MMHG | TEMPERATURE: 98.3 F | HEART RATE: 79 BPM

## 2022-04-27 DIAGNOSIS — D63.8 ANEMIA OF CHRONIC DISEASE: Primary | ICD-10-CM

## 2022-04-27 PROCEDURE — 6360000002 HC RX W HCPCS: Performed by: INTERNAL MEDICINE

## 2022-04-27 PROCEDURE — 96372 THER/PROPH/DIAG INJ SC/IM: CPT

## 2022-04-27 RX ADMIN — EPOETIN ALFA-EPBX 10000 UNITS: 10000 INJECTION, SOLUTION INTRAVENOUS; SUBCUTANEOUS at 11:35

## 2022-04-27 NOTE — PROGRESS NOTES
Patient tolerated injection well. Patient alert and oriented x3. No distress noted. Vital signs stable. Patient denies any new or worsening pain. Offered patient education an/or discharge material.  Patient declined. Patient denies any needs. All questions answered.

## 2022-05-02 ENCOUNTER — HOSPITAL ENCOUNTER (OUTPATIENT)
Age: 72
Discharge: HOME | End: 2022-05-02
Payer: MEDICARE

## 2022-05-02 LAB
ACANTHOCYTES: ABNORMAL
ANION GAP SERPL CALCULATED.3IONS-SCNC: 11 MMOL/L (ref 7–16)
ANISOCYTOSIS: ABNORMAL
ATYPICAL LYMPHOCYTE RELATIVE PERCENT: 2.6 % (ref 0–4)
BASOPHILS ABSOLUTE: 0.02 E9/L (ref 0–0.2)
BASOPHILS RELATIVE PERCENT: 0.9 % (ref 0–2)
BUN BLDV-MCNC: 30 MG/DL (ref 6–23)
BURR CELLS: ABNORMAL
CALCIUM SERPL-MCNC: 9 MG/DL (ref 8.6–10.2)
CHLORIDE BLD-SCNC: 106 MMOL/L (ref 98–107)
CO2: 25 MMOL/L (ref 22–29)
CREAT SERPL-MCNC: 2.3 MG/DL (ref 0.7–1.2)
EOSINOPHILS ABSOLUTE: 0.05 E9/L (ref 0.05–0.5)
EOSINOPHILS RELATIVE PERCENT: 1.8 % (ref 0–6)
GFR AFRICAN AMERICAN: 34
GFR NON-AFRICAN AMERICAN: 34 ML/MIN/1.73
GLUCOSE BLD-MCNC: 74 MG/DL (ref 74–99)
HCT VFR BLD CALC: 37.9 % (ref 37–54)
HEMOGLOBIN: 11 G/DL (ref 12.5–16.5)
LYMPHOCYTES ABSOLUTE: 0.49 E9/L (ref 1.5–4)
LYMPHOCYTES RELATIVE PERCENT: 16.7 % (ref 20–42)
MCH RBC QN AUTO: 26.1 PG (ref 26–35)
MCHC RBC AUTO-ENTMCNC: 29 % (ref 32–34.5)
MCV RBC AUTO: 90 FL (ref 80–99.9)
MONOCYTES ABSOLUTE: 0.36 E9/L (ref 0.1–0.95)
MONOCYTES RELATIVE PERCENT: 14 % (ref 2–12)
NEUTROPHILS ABSOLUTE: 1.66 E9/L (ref 1.8–7.3)
NEUTROPHILS RELATIVE PERCENT: 64 % (ref 43–80)
OVALOCYTES: ABNORMAL
PDW BLD-RTO: 17.2 FL (ref 11.5–15)
PHOSPHORUS: 3.1 MG/DL (ref 2.5–4.5)
PLATELET # BLD: 212 E9/L (ref 130–450)
PMV BLD AUTO: 9.4 FL (ref 7–12)
POIKILOCYTES: ABNORMAL
POLYCHROMASIA: ABNORMAL
POTASSIUM SERPL-SCNC: 4.7 MMOL/L (ref 3.5–5)
RBC # BLD: 4.21 E12/L (ref 3.8–5.8)
SCHISTOCYTES: ABNORMAL
SODIUM BLD-SCNC: 142 MMOL/L (ref 132–146)
TARGET CELLS: ABNORMAL
WBC # BLD: 2.6 E9/L (ref 4.5–11.5)

## 2022-05-02 PROCEDURE — 84100 ASSAY OF PHOSPHORUS: CPT

## 2022-05-02 PROCEDURE — 85025 COMPLETE CBC W/AUTO DIFF WBC: CPT

## 2022-05-02 PROCEDURE — 36415 COLL VENOUS BLD VENIPUNCTURE: CPT

## 2022-05-02 PROCEDURE — 80048 BASIC METABOLIC PNL TOTAL CA: CPT

## 2022-05-04 ENCOUNTER — HOSPITAL ENCOUNTER (OUTPATIENT)
Dept: INFUSION THERAPY | Age: 72
Setting detail: INFUSION SERIES
Discharge: HOME | End: 2022-05-04

## 2022-05-09 ENCOUNTER — HOSPITAL ENCOUNTER (OUTPATIENT)
Age: 72
Discharge: HOME | End: 2022-05-09
Payer: MEDICARE

## 2022-05-09 LAB
ACANTHOCYTES: ABNORMAL
ANION GAP SERPL CALCULATED.3IONS-SCNC: 11 MMOL/L (ref 7–16)
ANISOCYTOSIS: ABNORMAL
BASOPHILS ABSOLUTE: 0.01 E9/L (ref 0–0.2)
BASOPHILS RELATIVE PERCENT: 0.3 % (ref 0–2)
BUN BLDV-MCNC: 31 MG/DL (ref 6–23)
CALCIUM SERPL-MCNC: 9.1 MG/DL (ref 8.6–10.2)
CHLORIDE BLD-SCNC: 105 MMOL/L (ref 98–107)
CO2: 27 MMOL/L (ref 22–29)
CREAT SERPL-MCNC: 2.4 MG/DL (ref 0.7–1.2)
EOSINOPHILS ABSOLUTE: 0.06 E9/L (ref 0.05–0.5)
EOSINOPHILS RELATIVE PERCENT: 2 % (ref 0–6)
GFR AFRICAN AMERICAN: 32
GFR NON-AFRICAN AMERICAN: 32 ML/MIN/1.73
GLUCOSE BLD-MCNC: 75 MG/DL (ref 74–99)
HCT VFR BLD CALC: 36.5 % (ref 37–54)
HEMOGLOBIN: 10.7 G/DL (ref 12.5–16.5)
IMMATURE GRANULOCYTES #: 0.02 E9/L
IMMATURE GRANULOCYTES %: 0.7 % (ref 0–5)
LYMPHOCYTES ABSOLUTE: 0.47 E9/L (ref 1.5–4)
LYMPHOCYTES RELATIVE PERCENT: 15.7 % (ref 20–42)
MCH RBC QN AUTO: 26.2 PG (ref 26–35)
MCHC RBC AUTO-ENTMCNC: 29.3 % (ref 32–34.5)
MCV RBC AUTO: 89.2 FL (ref 80–99.9)
MONOCYTES ABSOLUTE: 0.48 E9/L (ref 0.1–0.95)
MONOCYTES RELATIVE PERCENT: 16.1 % (ref 2–12)
NEUTROPHILS ABSOLUTE: 1.95 E9/L (ref 1.8–7.3)
NEUTROPHILS RELATIVE PERCENT: 65.2 % (ref 43–80)
OVALOCYTES: ABNORMAL
PDW BLD-RTO: 16.9 FL (ref 11.5–15)
PHOSPHORUS: 3.3 MG/DL (ref 2.5–4.5)
PLATELET # BLD: 199 E9/L (ref 130–450)
PMV BLD AUTO: 9.3 FL (ref 7–12)
POIKILOCYTES: ABNORMAL
POTASSIUM SERPL-SCNC: 4.7 MMOL/L (ref 3.5–5)
RBC # BLD: 4.09 E12/L (ref 3.8–5.8)
SODIUM BLD-SCNC: 143 MMOL/L (ref 132–146)
WBC # BLD: 3 E9/L (ref 4.5–11.5)

## 2022-05-09 PROCEDURE — 80048 BASIC METABOLIC PNL TOTAL CA: CPT

## 2022-05-09 PROCEDURE — 36415 COLL VENOUS BLD VENIPUNCTURE: CPT

## 2022-05-09 PROCEDURE — 85025 COMPLETE CBC W/AUTO DIFF WBC: CPT

## 2022-05-09 PROCEDURE — 84100 ASSAY OF PHOSPHORUS: CPT

## 2022-05-11 ENCOUNTER — HOSPITAL ENCOUNTER (OUTPATIENT)
Dept: INFUSION THERAPY | Age: 72
Setting detail: INFUSION SERIES
Discharge: HOME | End: 2022-05-11
Payer: MEDICARE

## 2022-05-11 VITALS
TEMPERATURE: 97.1 F | OXYGEN SATURATION: 99 % | SYSTOLIC BLOOD PRESSURE: 146 MMHG | HEART RATE: 75 BPM | RESPIRATION RATE: 18 BRPM | DIASTOLIC BLOOD PRESSURE: 72 MMHG

## 2022-05-11 DIAGNOSIS — D63.8 ANEMIA OF CHRONIC DISEASE: Primary | ICD-10-CM

## 2022-05-11 PROCEDURE — 6360000002 HC RX W HCPCS: Performed by: INTERNAL MEDICINE

## 2022-05-11 PROCEDURE — 96372 THER/PROPH/DIAG INJ SC/IM: CPT

## 2022-05-11 RX ADMIN — EPOETIN ALFA-EPBX 10000 UNITS: 10000 INJECTION, SOLUTION INTRAVENOUS; SUBCUTANEOUS at 11:37

## 2022-05-16 ENCOUNTER — HOSPITAL ENCOUNTER (OUTPATIENT)
Age: 72
Discharge: HOME | End: 2022-05-16
Payer: MEDICARE

## 2022-05-16 LAB
ANION GAP SERPL CALCULATED.3IONS-SCNC: 9 MMOL/L (ref 7–16)
ANISOCYTOSIS: ABNORMAL
BASOPHILS ABSOLUTE: 0 E9/L (ref 0–0.2)
BASOPHILS RELATIVE PERCENT: 0.7 % (ref 0–2)
BLASTS RELATIVE PERCENT: 0.9 % (ref 0–0)
BUN BLDV-MCNC: 37 MG/DL (ref 6–23)
BURR CELLS: ABNORMAL
CALCIUM SERPL-MCNC: 9.1 MG/DL (ref 8.6–10.2)
CHLORIDE BLD-SCNC: 105 MMOL/L (ref 98–107)
CO2: 27 MMOL/L (ref 22–29)
CREAT SERPL-MCNC: 2.5 MG/DL (ref 0.7–1.2)
EOSINOPHILS ABSOLUTE: 0.22 E9/L (ref 0.05–0.5)
EOSINOPHILS RELATIVE PERCENT: 7.8 % (ref 0–6)
GFR AFRICAN AMERICAN: 31
GFR NON-AFRICAN AMERICAN: 31 ML/MIN/1.73
GLUCOSE BLD-MCNC: 60 MG/DL (ref 74–99)
HCT VFR BLD CALC: 37.4 % (ref 37–54)
HEMOGLOBIN: 11.1 G/DL (ref 12.5–16.5)
LYMPHOCYTES ABSOLUTE: 0.2 E9/L (ref 1.5–4)
LYMPHOCYTES RELATIVE PERCENT: 7 % (ref 20–42)
MCH RBC QN AUTO: 26.1 PG (ref 26–35)
MCHC RBC AUTO-ENTMCNC: 29.7 % (ref 32–34.5)
MCV RBC AUTO: 87.8 FL (ref 80–99.9)
MONOCYTES ABSOLUTE: 0.36 E9/L (ref 0.1–0.95)
MONOCYTES RELATIVE PERCENT: 13 % (ref 2–12)
NEUTROPHILS ABSOLUTE: 1.99 E9/L (ref 1.8–7.3)
NEUTROPHILS RELATIVE PERCENT: 71.3 % (ref 43–80)
OVALOCYTES: ABNORMAL
PDW BLD-RTO: 16.5 FL (ref 11.5–15)
PHOSPHORUS: 3 MG/DL (ref 2.5–4.5)
PLATELET # BLD: 216 E9/L (ref 130–450)
PMV BLD AUTO: 9.2 FL (ref 7–12)
POIKILOCYTES: ABNORMAL
POTASSIUM SERPL-SCNC: 4.3 MMOL/L (ref 3.5–5)
RBC # BLD: 4.26 E12/L (ref 3.8–5.8)
SODIUM BLD-SCNC: 141 MMOL/L (ref 132–146)
WBC # BLD: 2.8 E9/L (ref 4.5–11.5)

## 2022-05-16 PROCEDURE — 84100 ASSAY OF PHOSPHORUS: CPT

## 2022-05-16 PROCEDURE — 36415 COLL VENOUS BLD VENIPUNCTURE: CPT

## 2022-05-16 PROCEDURE — 85025 COMPLETE CBC W/AUTO DIFF WBC: CPT

## 2022-05-16 PROCEDURE — 80048 BASIC METABOLIC PNL TOTAL CA: CPT

## 2022-05-18 ENCOUNTER — HOSPITAL ENCOUNTER (OUTPATIENT)
Dept: INFUSION THERAPY | Age: 72
Setting detail: INFUSION SERIES
Discharge: HOME | End: 2022-05-18

## 2022-05-23 ENCOUNTER — HOSPITAL ENCOUNTER (OUTPATIENT)
Age: 72
Discharge: HOME | End: 2022-05-23
Payer: MEDICARE

## 2022-05-23 LAB
ACANTHOCYTES: ABNORMAL
ANION GAP SERPL CALCULATED.3IONS-SCNC: 12 MMOL/L (ref 7–16)
ANISOCYTOSIS: ABNORMAL
BASOPHILS ABSOLUTE: 0.01 E9/L (ref 0–0.2)
BASOPHILS RELATIVE PERCENT: 0.2 % (ref 0–2)
BUN BLDV-MCNC: 42 MG/DL (ref 6–23)
BURR CELLS: ABNORMAL
CALCIUM SERPL-MCNC: 9.2 MG/DL (ref 8.6–10.2)
CHLORIDE BLD-SCNC: 104 MMOL/L (ref 98–107)
CO2: 25 MMOL/L (ref 22–29)
CREAT SERPL-MCNC: 2.6 MG/DL (ref 0.7–1.2)
EOSINOPHILS ABSOLUTE: 0.03 E9/L (ref 0.05–0.5)
EOSINOPHILS RELATIVE PERCENT: 0.7 % (ref 0–6)
GFR AFRICAN AMERICAN: 30
GFR NON-AFRICAN AMERICAN: 30 ML/MIN/1.73
GLUCOSE BLD-MCNC: 151 MG/DL (ref 74–99)
HCT VFR BLD CALC: 36.2 % (ref 37–54)
HEMOGLOBIN: 11 G/DL (ref 12.5–16.5)
IMMATURE GRANULOCYTES #: 0.03 E9/L
IMMATURE GRANULOCYTES %: 0.7 % (ref 0–5)
LYMPHOCYTES ABSOLUTE: 0.43 E9/L (ref 1.5–4)
LYMPHOCYTES RELATIVE PERCENT: 10 % (ref 20–42)
MCH RBC QN AUTO: 26.2 PG (ref 26–35)
MCHC RBC AUTO-ENTMCNC: 30.4 % (ref 32–34.5)
MCV RBC AUTO: 86.2 FL (ref 80–99.9)
MONOCYTES ABSOLUTE: 0.36 E9/L (ref 0.1–0.95)
MONOCYTES RELATIVE PERCENT: 8.4 % (ref 2–12)
NEUTROPHILS ABSOLUTE: 3.42 E9/L (ref 1.8–7.3)
NEUTROPHILS RELATIVE PERCENT: 80 % (ref 43–80)
OVALOCYTES: ABNORMAL
PDW BLD-RTO: 16.6 FL (ref 11.5–15)
PHOSPHORUS: 3.2 MG/DL (ref 2.5–4.5)
PLATELET # BLD: 214 E9/L (ref 130–450)
PMV BLD AUTO: 8.8 FL (ref 7–12)
POIKILOCYTES: ABNORMAL
POTASSIUM SERPL-SCNC: 5 MMOL/L (ref 3.5–5)
RBC # BLD: 4.2 E12/L (ref 3.8–5.8)
SODIUM BLD-SCNC: 141 MMOL/L (ref 132–146)
WBC # BLD: 4.3 E9/L (ref 4.5–11.5)

## 2022-05-23 PROCEDURE — 85025 COMPLETE CBC W/AUTO DIFF WBC: CPT

## 2022-05-23 PROCEDURE — 36415 COLL VENOUS BLD VENIPUNCTURE: CPT

## 2022-05-23 PROCEDURE — 80048 BASIC METABOLIC PNL TOTAL CA: CPT

## 2022-05-23 PROCEDURE — 84100 ASSAY OF PHOSPHORUS: CPT

## 2022-05-25 ENCOUNTER — HOSPITAL ENCOUNTER (OUTPATIENT)
Dept: INFUSION THERAPY | Age: 72
Setting detail: INFUSION SERIES
Discharge: HOME | End: 2022-05-25

## 2022-05-31 ENCOUNTER — HOSPITAL ENCOUNTER (OUTPATIENT)
Age: 72
Discharge: HOME | End: 2022-05-31
Payer: MEDICARE

## 2022-05-31 LAB
ACANTHOCYTES: ABNORMAL
ANION GAP SERPL CALCULATED.3IONS-SCNC: 19 MMOL/L (ref 7–16)
ANISOCYTOSIS: ABNORMAL
BASOPHILS ABSOLUTE: 0.02 E9/L (ref 0–0.2)
BASOPHILS RELATIVE PERCENT: 0.5 % (ref 0–2)
BUN BLDV-MCNC: 37 MG/DL (ref 6–23)
BURR CELLS: ABNORMAL
CALCIUM SERPL-MCNC: 9 MG/DL (ref 8.6–10.2)
CHLORIDE BLD-SCNC: 106 MMOL/L (ref 98–107)
CO2: 19 MMOL/L (ref 22–29)
CREAT SERPL-MCNC: 2.8 MG/DL (ref 0.7–1.2)
EOSINOPHILS ABSOLUTE: 0.14 E9/L (ref 0.05–0.5)
EOSINOPHILS RELATIVE PERCENT: 3.7 % (ref 0–6)
GFR AFRICAN AMERICAN: 27
GFR NON-AFRICAN AMERICAN: 27 ML/MIN/1.73
GLUCOSE BLD-MCNC: 144 MG/DL (ref 74–99)
HCT VFR BLD CALC: 36.9 % (ref 37–54)
HEMOGLOBIN: 10.9 G/DL (ref 12.5–16.5)
IMMATURE GRANULOCYTES #: 0.03 E9/L
IMMATURE GRANULOCYTES %: 0.8 % (ref 0–5)
LYMPHOCYTES ABSOLUTE: 0.48 E9/L (ref 1.5–4)
LYMPHOCYTES RELATIVE PERCENT: 12.7 % (ref 20–42)
MCH RBC QN AUTO: 26.1 PG (ref 26–35)
MCHC RBC AUTO-ENTMCNC: 29.5 % (ref 32–34.5)
MCV RBC AUTO: 88.5 FL (ref 80–99.9)
MONOCYTES ABSOLUTE: 0.38 E9/L (ref 0.1–0.95)
MONOCYTES RELATIVE PERCENT: 10 % (ref 2–12)
NEUTROPHILS ABSOLUTE: 2.74 E9/L (ref 1.8–7.3)
NEUTROPHILS RELATIVE PERCENT: 72.3 % (ref 43–80)
OVALOCYTES: ABNORMAL
PDW BLD-RTO: 16.1 FL (ref 11.5–15)
PHOSPHORUS: 3.5 MG/DL (ref 2.5–4.5)
PLATELET # BLD: 224 E9/L (ref 130–450)
PMV BLD AUTO: 9.7 FL (ref 7–12)
POIKILOCYTES: ABNORMAL
POLYCHROMASIA: ABNORMAL
POTASSIUM SERPL-SCNC: 4.5 MMOL/L (ref 3.5–5)
RBC # BLD: 4.17 E12/L (ref 3.8–5.8)
SCHISTOCYTES: ABNORMAL
SODIUM BLD-SCNC: 144 MMOL/L (ref 132–146)
WBC # BLD: 3.8 E9/L (ref 4.5–11.5)

## 2022-05-31 PROCEDURE — 80048 BASIC METABOLIC PNL TOTAL CA: CPT

## 2022-05-31 PROCEDURE — 84100 ASSAY OF PHOSPHORUS: CPT

## 2022-05-31 PROCEDURE — 36415 COLL VENOUS BLD VENIPUNCTURE: CPT

## 2022-05-31 PROCEDURE — 85025 COMPLETE CBC W/AUTO DIFF WBC: CPT

## 2022-06-01 ENCOUNTER — HOSPITAL ENCOUNTER (OUTPATIENT)
Dept: INFUSION THERAPY | Age: 72
Setting detail: INFUSION SERIES
Discharge: HOME | End: 2022-06-01
Payer: MEDICARE

## 2022-06-01 VITALS
OXYGEN SATURATION: 99 % | TEMPERATURE: 99.2 F | RESPIRATION RATE: 18 BRPM | SYSTOLIC BLOOD PRESSURE: 122 MMHG | DIASTOLIC BLOOD PRESSURE: 61 MMHG | HEART RATE: 80 BPM

## 2022-06-01 DIAGNOSIS — D63.8 ANEMIA OF CHRONIC DISEASE: Primary | ICD-10-CM

## 2022-06-01 PROCEDURE — 96372 THER/PROPH/DIAG INJ SC/IM: CPT

## 2022-06-01 PROCEDURE — 6360000002 HC RX W HCPCS: Performed by: INTERNAL MEDICINE

## 2022-06-01 RX ADMIN — EPOETIN ALFA-EPBX 10000 UNITS: 10000 INJECTION, SOLUTION INTRAVENOUS; SUBCUTANEOUS at 11:56

## 2022-06-06 ENCOUNTER — HOSPITAL ENCOUNTER (OUTPATIENT)
Age: 72
Discharge: HOME | End: 2022-06-06
Payer: MEDICARE

## 2022-06-06 LAB
ANION GAP SERPL CALCULATED.3IONS-SCNC: 14 MMOL/L (ref 7–16)
ANISOCYTOSIS: ABNORMAL
BASOPHILS ABSOLUTE: 0 E9/L (ref 0–0.2)
BASOPHILS RELATIVE PERCENT: 0.6 % (ref 0–2)
BUN BLDV-MCNC: 37 MG/DL (ref 6–23)
BURR CELLS: ABNORMAL
CALCIUM SERPL-MCNC: 9.3 MG/DL (ref 8.6–10.2)
CHLORIDE BLD-SCNC: 103 MMOL/L (ref 98–107)
CO2: 25 MMOL/L (ref 22–29)
CREAT SERPL-MCNC: 2.8 MG/DL (ref 0.7–1.2)
EOSINOPHILS ABSOLUTE: 0.16 E9/L (ref 0.05–0.5)
EOSINOPHILS RELATIVE PERCENT: 5.2 % (ref 0–6)
GFR AFRICAN AMERICAN: 27
GFR NON-AFRICAN AMERICAN: 27 ML/MIN/1.73
GLUCOSE BLD-MCNC: 68 MG/DL (ref 74–99)
HCT VFR BLD CALC: 37 % (ref 37–54)
HEMOGLOBIN: 11.2 G/DL (ref 12.5–16.5)
LYMPHOCYTES ABSOLUTE: 0.43 E9/L (ref 1.5–4)
LYMPHOCYTES RELATIVE PERCENT: 13.9 % (ref 20–42)
MCH RBC QN AUTO: 26.5 PG (ref 26–35)
MCHC RBC AUTO-ENTMCNC: 30.3 % (ref 32–34.5)
MCV RBC AUTO: 87.7 FL (ref 80–99.9)
MONOCYTES ABSOLUTE: 0.34 E9/L (ref 0.1–0.95)
MONOCYTES RELATIVE PERCENT: 11.3 % (ref 2–12)
NEUTROPHILS ABSOLUTE: 2.17 E9/L (ref 1.8–7.3)
NEUTROPHILS RELATIVE PERCENT: 69.6 % (ref 43–80)
OVALOCYTES: ABNORMAL
PDW BLD-RTO: 16.1 FL (ref 11.5–15)
PHOSPHORUS: 3.9 MG/DL (ref 2.5–4.5)
PLATELET # BLD: 228 E9/L (ref 130–450)
PMV BLD AUTO: 9.8 FL (ref 7–12)
POIKILOCYTES: ABNORMAL
POTASSIUM SERPL-SCNC: 4.5 MMOL/L (ref 3.5–5)
RBC # BLD: 4.22 E12/L (ref 3.8–5.8)
SODIUM BLD-SCNC: 142 MMOL/L (ref 132–146)
WBC # BLD: 3.1 E9/L (ref 4.5–11.5)

## 2022-06-06 PROCEDURE — 85025 COMPLETE CBC W/AUTO DIFF WBC: CPT

## 2022-06-06 PROCEDURE — 36415 COLL VENOUS BLD VENIPUNCTURE: CPT

## 2022-06-06 PROCEDURE — 80048 BASIC METABOLIC PNL TOTAL CA: CPT

## 2022-06-06 PROCEDURE — 84100 ASSAY OF PHOSPHORUS: CPT

## 2022-06-08 ENCOUNTER — HOSPITAL ENCOUNTER (OUTPATIENT)
Dept: INFUSION THERAPY | Age: 72
Setting detail: INFUSION SERIES
Discharge: HOME | End: 2022-06-08

## 2022-06-13 ENCOUNTER — HOSPITAL ENCOUNTER (OUTPATIENT)
Age: 72
Discharge: HOME | DRG: 291 | End: 2022-06-13
Payer: MEDICARE

## 2022-06-13 LAB
ANION GAP SERPL CALCULATED.3IONS-SCNC: 16 MMOL/L (ref 7–16)
BASOPHILS ABSOLUTE: 0.02 E9/L (ref 0–0.2)
BASOPHILS RELATIVE PERCENT: 0.3 % (ref 0–2)
BUN BLDV-MCNC: 40 MG/DL (ref 6–23)
CALCIUM SERPL-MCNC: 9.3 MG/DL (ref 8.6–10.2)
CHLORIDE BLD-SCNC: 103 MMOL/L (ref 98–107)
CO2: 22 MMOL/L (ref 22–29)
CREAT SERPL-MCNC: 2.9 MG/DL (ref 0.7–1.2)
EOSINOPHILS ABSOLUTE: 0.07 E9/L (ref 0.05–0.5)
EOSINOPHILS RELATIVE PERCENT: 0.9 % (ref 0–6)
GFR AFRICAN AMERICAN: 26
GFR NON-AFRICAN AMERICAN: 26 ML/MIN/1.73
GLUCOSE BLD-MCNC: 84 MG/DL (ref 74–99)
HCT VFR BLD CALC: 37.2 % (ref 37–54)
HEMOGLOBIN: 11.1 G/DL (ref 12.5–16.5)
IMMATURE GRANULOCYTES #: 0.06 E9/L
IMMATURE GRANULOCYTES %: 0.8 % (ref 0–5)
LYMPHOCYTES ABSOLUTE: 0.6 E9/L (ref 1.5–4)
LYMPHOCYTES RELATIVE PERCENT: 7.9 % (ref 20–42)
MCH RBC QN AUTO: 26.7 PG (ref 26–35)
MCHC RBC AUTO-ENTMCNC: 29.8 % (ref 32–34.5)
MCV RBC AUTO: 89.4 FL (ref 80–99.9)
MONOCYTES ABSOLUTE: 1.03 E9/L (ref 0.1–0.95)
MONOCYTES RELATIVE PERCENT: 13.6 % (ref 2–12)
NEUTROPHILS ABSOLUTE: 5.8 E9/L (ref 1.8–7.3)
NEUTROPHILS RELATIVE PERCENT: 76.5 % (ref 43–80)
PDW BLD-RTO: 16.2 FL (ref 11.5–15)
PHOSPHORUS: 3.2 MG/DL (ref 2.5–4.5)
PLATELET # BLD: 207 E9/L (ref 130–450)
PMV BLD AUTO: 9.3 FL (ref 7–12)
POTASSIUM SERPL-SCNC: 4.3 MMOL/L (ref 3.5–5)
RBC # BLD: 4.16 E12/L (ref 3.8–5.8)
SODIUM BLD-SCNC: 141 MMOL/L (ref 132–146)
WBC # BLD: 7.6 E9/L (ref 4.5–11.5)

## 2022-06-13 PROCEDURE — 85025 COMPLETE CBC W/AUTO DIFF WBC: CPT

## 2022-06-13 PROCEDURE — 84100 ASSAY OF PHOSPHORUS: CPT

## 2022-06-13 PROCEDURE — 36415 COLL VENOUS BLD VENIPUNCTURE: CPT

## 2022-06-13 PROCEDURE — 80048 BASIC METABOLIC PNL TOTAL CA: CPT

## 2022-06-14 ENCOUNTER — APPOINTMENT (OUTPATIENT)
Dept: GENERAL RADIOLOGY | Age: 72
DRG: 291 | End: 2022-06-14
Payer: MEDICARE

## 2022-06-14 ENCOUNTER — HOSPITAL ENCOUNTER (INPATIENT)
Age: 72
LOS: 2 days | Discharge: HOME | DRG: 291 | End: 2022-06-16
Attending: EMERGENCY MEDICINE | Admitting: INTERNAL MEDICINE
Payer: MEDICARE

## 2022-06-14 DIAGNOSIS — I50.31 ACUTE DIASTOLIC (CONGESTIVE) HEART FAILURE (HCC): ICD-10-CM

## 2022-06-14 DIAGNOSIS — I48.91 ATRIAL FIBRILLATION, UNSPECIFIED TYPE (HCC): Primary | ICD-10-CM

## 2022-06-14 LAB
ANION GAP SERPL CALCULATED.3IONS-SCNC: 12 MMOL/L (ref 7–16)
APTT: 21.2 SEC (ref 24.5–35.1)
BUN BLDV-MCNC: 45 MG/DL (ref 6–23)
CALCIUM SERPL-MCNC: 9.3 MG/DL (ref 8.6–10.2)
CHLORIDE BLD-SCNC: 105 MMOL/L (ref 98–107)
CO2: 22 MMOL/L (ref 22–29)
CREAT SERPL-MCNC: 2.7 MG/DL (ref 0.7–1.2)
EKG ATRIAL RATE: 98 BPM
EKG Q-T INTERVAL: 344 MS
EKG QRS DURATION: 72 MS
EKG QTC CALCULATION (BAZETT): 441 MS
EKG R AXIS: 6 DEGREES
EKG T AXIS: 27 DEGREES
EKG VENTRICULAR RATE: 99 BPM
GFR AFRICAN AMERICAN: 28
GFR NON-AFRICAN AMERICAN: 28 ML/MIN/1.73
GLUCOSE BLD-MCNC: 161 MG/DL (ref 74–99)
HCT VFR BLD CALC: 36 % (ref 37–54)
HEMOGLOBIN: 10.9 G/DL (ref 12.5–16.5)
INR BLD: 1.2
IRON SATURATION: 9 % (ref 20–55)
IRON: 19 MCG/DL (ref 59–158)
MCH RBC QN AUTO: 26.7 PG (ref 26–35)
MCHC RBC AUTO-ENTMCNC: 30.3 % (ref 32–34.5)
MCV RBC AUTO: 88 FL (ref 80–99.9)
METER GLUCOSE: 169 MG/DL (ref 74–99)
PDW BLD-RTO: 15.8 FL (ref 11.5–15)
PLATELET # BLD: 209 E9/L (ref 130–450)
PMV BLD AUTO: 9.5 FL (ref 7–12)
POTASSIUM SERPL-SCNC: 5 MMOL/L (ref 3.5–5)
PRO-BNP: ABNORMAL PG/ML (ref 0–125)
PROTHROMBIN TIME: 13.5 SEC (ref 9.3–12.4)
RBC # BLD: 4.09 E12/L (ref 3.8–5.8)
REASON FOR REJECTION: NORMAL
REASON FOR REJECTION: NORMAL
REJECTED TEST: NORMAL
REJECTED TEST: NORMAL
SODIUM BLD-SCNC: 139 MMOL/L (ref 132–146)
TOTAL IRON BINDING CAPACITY: 220 MCG/DL (ref 250–450)
TROPONIN, HIGH SENSITIVITY: 139 NG/L (ref 0–11)
TROPONIN, HIGH SENSITIVITY: 177 NG/L (ref 0–11)
WBC # BLD: 6.5 E9/L (ref 4.5–11.5)

## 2022-06-14 PROCEDURE — S5553 INSULIN LONG ACTING 5 U: HCPCS | Performed by: FAMILY MEDICINE

## 2022-06-14 PROCEDURE — 84484 ASSAY OF TROPONIN QUANT: CPT

## 2022-06-14 PROCEDURE — 96374 THER/PROPH/DIAG INJ IV PUSH: CPT

## 2022-06-14 PROCEDURE — 36415 COLL VENOUS BLD VENIPUNCTURE: CPT

## 2022-06-14 PROCEDURE — 2140000000 HC CCU INTERMEDIATE R&B

## 2022-06-14 PROCEDURE — 96372 THER/PROPH/DIAG INJ SC/IM: CPT

## 2022-06-14 PROCEDURE — 71046 X-RAY EXAM CHEST 2 VIEWS: CPT

## 2022-06-14 PROCEDURE — G0378 HOSPITAL OBSERVATION PER HR: HCPCS

## 2022-06-14 PROCEDURE — 93005 ELECTROCARDIOGRAM TRACING: CPT | Performed by: PHYSICIAN ASSISTANT

## 2022-06-14 PROCEDURE — 83550 IRON BINDING TEST: CPT

## 2022-06-14 PROCEDURE — 6370000000 HC RX 637 (ALT 250 FOR IP): Performed by: FAMILY MEDICINE

## 2022-06-14 PROCEDURE — 85610 PROTHROMBIN TIME: CPT

## 2022-06-14 PROCEDURE — 96376 TX/PRO/DX INJ SAME DRUG ADON: CPT

## 2022-06-14 PROCEDURE — 85730 THROMBOPLASTIN TIME PARTIAL: CPT

## 2022-06-14 PROCEDURE — 2500000003 HC RX 250 WO HCPCS: Performed by: FAMILY MEDICINE

## 2022-06-14 PROCEDURE — 80048 BASIC METABOLIC PNL TOTAL CA: CPT

## 2022-06-14 PROCEDURE — 2580000003 HC RX 258: Performed by: FAMILY MEDICINE

## 2022-06-14 PROCEDURE — 2500000003 HC RX 250 WO HCPCS

## 2022-06-14 PROCEDURE — 83540 ASSAY OF IRON: CPT

## 2022-06-14 PROCEDURE — 82962 GLUCOSE BLOOD TEST: CPT

## 2022-06-14 PROCEDURE — 85027 COMPLETE CBC AUTOMATED: CPT

## 2022-06-14 PROCEDURE — 99285 EMERGENCY DEPT VISIT HI MDM: CPT

## 2022-06-14 PROCEDURE — 83880 ASSAY OF NATRIURETIC PEPTIDE: CPT

## 2022-06-14 PROCEDURE — 6360000002 HC RX W HCPCS: Performed by: FAMILY MEDICINE

## 2022-06-14 RX ORDER — MYCOPHENOLATE MOFETIL 250 MG/1
1000 CAPSULE ORAL 2 TIMES DAILY
Status: DISCONTINUED | OUTPATIENT
Start: 2022-06-14 | End: 2022-06-16 | Stop reason: HOSPADM

## 2022-06-14 RX ORDER — HEPARIN SODIUM 10000 [USP'U]/ML
5000 INJECTION, SOLUTION INTRAVENOUS; SUBCUTANEOUS EVERY 8 HOURS SCHEDULED
Status: DISCONTINUED | OUTPATIENT
Start: 2022-06-14 | End: 2022-06-16 | Stop reason: HOSPADM

## 2022-06-14 RX ORDER — BUMETANIDE 0.25 MG/ML
1 INJECTION, SOLUTION INTRAMUSCULAR; INTRAVENOUS ONCE
Status: COMPLETED | OUTPATIENT
Start: 2022-06-14 | End: 2022-06-14

## 2022-06-14 RX ORDER — SODIUM BICARBONATE 650 MG/1
650 TABLET ORAL 4 TIMES DAILY
Status: DISCONTINUED | OUTPATIENT
Start: 2022-06-14 | End: 2022-06-16 | Stop reason: HOSPADM

## 2022-06-14 RX ORDER — AMMONIUM LACTATE 12 G/100G
LOTION TOPICAL PRN
Status: DISCONTINUED | OUTPATIENT
Start: 2022-06-14 | End: 2022-06-16 | Stop reason: HOSPADM

## 2022-06-14 RX ORDER — SULFAMETHOXAZOLE AND TRIMETHOPRIM 400; 80 MG/1; MG/1
1 TABLET ORAL 2 TIMES DAILY
Status: DISCONTINUED | OUTPATIENT
Start: 2022-06-14 | End: 2022-06-16 | Stop reason: HOSPADM

## 2022-06-14 RX ORDER — AMLODIPINE BESYLATE 5 MG/1
5 TABLET ORAL DAILY
Status: DISCONTINUED | OUTPATIENT
Start: 2022-06-14 | End: 2022-06-16 | Stop reason: HOSPADM

## 2022-06-14 RX ORDER — SODIUM CHLORIDE 0.9 % (FLUSH) 0.9 %
5-40 SYRINGE (ML) INJECTION EVERY 12 HOURS SCHEDULED
Status: DISCONTINUED | OUTPATIENT
Start: 2022-06-14 | End: 2022-06-16 | Stop reason: HOSPADM

## 2022-06-14 RX ORDER — SODIUM CHLORIDE 9 MG/ML
INJECTION, SOLUTION INTRAVENOUS PRN
Status: DISCONTINUED | OUTPATIENT
Start: 2022-06-14 | End: 2022-06-16 | Stop reason: HOSPADM

## 2022-06-14 RX ORDER — CALCITRIOL 0.25 UG/1
0.25 CAPSULE, LIQUID FILLED ORAL DAILY
Status: DISCONTINUED | OUTPATIENT
Start: 2022-06-14 | End: 2022-06-16 | Stop reason: HOSPADM

## 2022-06-14 RX ORDER — ASPIRIN 81 MG/1
81 TABLET ORAL DAILY
Status: DISCONTINUED | OUTPATIENT
Start: 2022-06-14 | End: 2022-06-16 | Stop reason: HOSPADM

## 2022-06-14 RX ORDER — INSULIN GLARGINE-YFGN 100 [IU]/ML
12 INJECTION, SOLUTION SUBCUTANEOUS
Status: DISCONTINUED | OUTPATIENT
Start: 2022-06-14 | End: 2022-06-16 | Stop reason: HOSPADM

## 2022-06-14 RX ORDER — ACETAMINOPHEN 325 MG/1
650 TABLET ORAL EVERY 6 HOURS PRN
Status: DISCONTINUED | OUTPATIENT
Start: 2022-06-14 | End: 2022-06-16 | Stop reason: HOSPADM

## 2022-06-14 RX ORDER — SODIUM CHLORIDE 0.9 % (FLUSH) 0.9 %
10 SYRINGE (ML) INJECTION PRN
Status: DISCONTINUED | OUTPATIENT
Start: 2022-06-14 | End: 2022-06-16 | Stop reason: HOSPADM

## 2022-06-14 RX ORDER — ONDANSETRON 2 MG/ML
4 INJECTION INTRAMUSCULAR; INTRAVENOUS EVERY 6 HOURS PRN
Status: DISCONTINUED | OUTPATIENT
Start: 2022-06-14 | End: 2022-06-16 | Stop reason: HOSPADM

## 2022-06-14 RX ORDER — BUMETANIDE 0.25 MG/ML
1 INJECTION, SOLUTION INTRAMUSCULAR; INTRAVENOUS 2 TIMES DAILY
Status: DISCONTINUED | OUTPATIENT
Start: 2022-06-14 | End: 2022-06-16 | Stop reason: HOSPADM

## 2022-06-14 RX ORDER — HYDRALAZINE HYDROCHLORIDE 50 MG/1
100 TABLET, FILM COATED ORAL 3 TIMES DAILY
Status: DISCONTINUED | OUTPATIENT
Start: 2022-06-14 | End: 2022-06-16 | Stop reason: HOSPADM

## 2022-06-14 RX ORDER — ISOSORBIDE MONONITRATE 60 MG/1
60 TABLET, EXTENDED RELEASE ORAL DAILY
Status: DISCONTINUED | OUTPATIENT
Start: 2022-06-15 | End: 2022-06-16 | Stop reason: HOSPADM

## 2022-06-14 RX ORDER — OXYCODONE HYDROCHLORIDE 10 MG/1
20 TABLET ORAL EVERY 8 HOURS PRN
Status: DISCONTINUED | OUTPATIENT
Start: 2022-06-14 | End: 2022-06-16 | Stop reason: HOSPADM

## 2022-06-14 RX ORDER — ACETAMINOPHEN 650 MG/1
650 SUPPOSITORY RECTAL EVERY 6 HOURS PRN
Status: DISCONTINUED | OUTPATIENT
Start: 2022-06-14 | End: 2022-06-16 | Stop reason: HOSPADM

## 2022-06-14 RX ORDER — METOPROLOL TARTRATE 50 MG/1
50 TABLET, FILM COATED ORAL 2 TIMES DAILY
Status: DISCONTINUED | OUTPATIENT
Start: 2022-06-14 | End: 2022-06-16 | Stop reason: HOSPADM

## 2022-06-14 RX ORDER — TACROLIMUS 1 MG/1
2 CAPSULE ORAL 2 TIMES DAILY
Status: DISCONTINUED | OUTPATIENT
Start: 2022-06-14 | End: 2022-06-16 | Stop reason: HOSPADM

## 2022-06-14 RX ORDER — ONDANSETRON 4 MG/1
4 TABLET, ORALLY DISINTEGRATING ORAL EVERY 8 HOURS PRN
Status: DISCONTINUED | OUTPATIENT
Start: 2022-06-14 | End: 2022-06-16 | Stop reason: HOSPADM

## 2022-06-14 RX ADMIN — BUMETANIDE 1 MG: 0.25 INJECTION, SOLUTION INTRAMUSCULAR; INTRAVENOUS at 21:27

## 2022-06-14 RX ADMIN — SULFAMETHOXAZOLE AND TRIMETHOPRIM 1 TABLET: 400; 80 TABLET ORAL at 22:43

## 2022-06-14 RX ADMIN — OXYCODONE HYDROCHLORIDE 20 MG: 10 TABLET ORAL at 22:49

## 2022-06-14 RX ADMIN — MYCOPHENOLATE MOFETIL 1000 MG: 250 CAPSULE ORAL at 22:43

## 2022-06-14 RX ADMIN — HEPARIN SODIUM 5000 UNITS: 10000 INJECTION INTRAVENOUS; SUBCUTANEOUS at 22:51

## 2022-06-14 RX ADMIN — HYDRALAZINE HYDROCHLORIDE 100 MG: 50 TABLET, FILM COATED ORAL at 21:27

## 2022-06-14 RX ADMIN — TACROLIMUS 2 MG: 1 CAPSULE ORAL at 23:26

## 2022-06-14 RX ADMIN — SODIUM BICARBONATE 650 MG: 650 TABLET ORAL at 21:27

## 2022-06-14 RX ADMIN — BUMETANIDE 1 MG: 0.25 INJECTION, SOLUTION INTRAMUSCULAR; INTRAVENOUS at 18:50

## 2022-06-14 RX ADMIN — SODIUM CHLORIDE, PRESERVATIVE FREE 10 ML: 5 INJECTION INTRAVENOUS at 22:52

## 2022-06-14 RX ADMIN — METOPROLOL TARTRATE 50 MG: 50 TABLET, FILM COATED ORAL at 21:27

## 2022-06-14 RX ADMIN — INSULIN GLARGINE-YFGN 12 UNITS: 100 INJECTION, SOLUTION SUBCUTANEOUS at 21:30

## 2022-06-14 ASSESSMENT — ENCOUNTER SYMPTOMS
SHORTNESS OF BREATH: 1
ABDOMINAL PAIN: 0
CONSTIPATION: 0
CHOKING: 0
NAUSEA: 0
BLOOD IN STOOL: 0
CHEST TIGHTNESS: 0
SORE THROAT: 0
COLOR CHANGE: 0
DIARRHEA: 0
COUGH: 0
VOMITING: 0

## 2022-06-14 ASSESSMENT — PAIN DESCRIPTION - LOCATION: LOCATION: BACK

## 2022-06-14 ASSESSMENT — PAIN - FUNCTIONAL ASSESSMENT: PAIN_FUNCTIONAL_ASSESSMENT: NONE - DENIES PAIN

## 2022-06-14 ASSESSMENT — PAIN DESCRIPTION - ORIENTATION: ORIENTATION: LOWER

## 2022-06-14 ASSESSMENT — PAIN SCALES - GENERAL
PAINLEVEL_OUTOF10: 10
PAINLEVEL_OUTOF10: 2

## 2022-06-14 ASSESSMENT — PAIN DESCRIPTION - DESCRIPTORS: DESCRIPTORS: ACHING

## 2022-06-14 NOTE — ED NOTES
Department of Emergency Medicine  FIRST PROVIDER TRIAGE NOTE             Independent MLP           6/14/22  9:38 AM EDT    Date of Encounter: 6/14/22   MRN: [id number]      HPI: Manolo Capellan is a 70 y.o. male who presents to the ED for Shortness of Breath (started 2 days ago ) and Leg Swelling (ble edema)   newly Dx a-fib at [facility]. Hx of CHF    ROS: Negative for abd pain, fever, diarrhea, urinary complaints or rash. PE: Gen Appearance/Constitutional: alert  HEENT: NC/NT. PERRLA,  Airway patent. Neck: supple     Initial Plan of Care: All treatment areas with department are currently occupied. Plan to order/Initiate the following while awaiting opening in ED: labs, EKG and imaging studies.   Initiate Treatment-Testing, Proceed toTreatment Area When Bed Available for ED Attending/MLP to Continue Care    Electronically signed by JESUS Lopez   DD: 6/14/22         [place], [place]  06/14/22 [id number]

## 2022-06-14 NOTE — ED PROVIDER NOTES
ATTENDING PROVIDER ATTESTATION:     Fred East presented to the emergency department for evaluation of Shortness of Breath (started 2 days ago ) and Leg Swelling (ble edema)   and was initially evaluated by the Medical Resident. See Original ED Note for H&P and ED course above. I have reviewed and discussed the case, including pertinent history (medical, surgical, family and social) and exam findings with the Medical Resident assigned to Fred East. I have personally performed and/or participated in the history, exam, medical decision making, and procedures and agree with all pertinent clinical information and any additional changes or corrections are noted below in my assessment and plan. I have discussed this patient in detail with the resident, and provided the instruction and education,       I have reviewed my findings and recommendations with the assigned Medical Resident, Fred East and members of family present at the time of disposition. I have performed a history and physical examination of this patient and directly supervised the resident caring for this patient      History of Present Illness:    Presents to the ED for shortness of breath, beginning a few days ago. The complaint has been constant, moderate in severity, and worsened by activity. Worsening shortness of breath for a few days. Says he is 2lbs above his normal weight. Reports when he exerts himself he gets winded. He denies fever or chills. No chest pain, just becomes short of breath. ?hx of afib but not anticoagulated. No fever or chills. No infectious sx. No hx of DVT or PE.  Denies calf pain or swelling        Review of Systems:   A complete review of systems was performed and pertinent positives and negatives are stated within HPI, all other systems reviewed and are negative.    --------------------------------------------- PAST HISTORY ---------------------------------------------  Past Medical History:  has a past medical history of Anemia, Atrial fibrillation ([facility] [address] CHF (congestive heart failure) (HCC), Chronic diastolic congestive heart failure (HCC), Chronic foot pain, Chronic knee pain, Constipation, Depression, DM (diabetes mellitus) ([facility] [address] ESRD (end stage renal disease) ([facility] [address] H/O kidney transplant, Hyperlipidemia, Hypertension, Immunosuppression ([facility] [address] Kidney disease, and Pulmonary hypertension ([facility] [address] Past Surgical History:  has a past surgical history that includes back surgery (1982); Upper gastrointestinal endoscopy (12/21/2007); Tootie-en-Y Gastric Bypass (06/29/2010); Cholecystectomy, laparoscopic (05/21/2013); ECHO Compl W Dop Color Flow (05/22/2013); Diagnostic Cardiac Cath Lab Procedure (01/15/2013); Foot surgery; Colonoscopy; Endoscopy, colon, diagnostic; Carpal tunnel release (Left, 06/23/2014); Kidney transplant (12/12/2014); Bladder surgery; Kidney biopsy (06/09/2015); and Upper gastrointestinal endoscopy (N/A, 4/28/2021). Social History:  reports that he quit smoking about 32 years ago. His smoking use included cigarettes. He has a 20.00 pack-year smoking history. He has never used smokeless tobacco. He reports previous alcohol use. He reports that he does not use drugs. Family History: family history includes Cancer in his father; Diabetes in his father and mother; Heart Disease in his father and mother; High Blood Pressure in his mother; Stroke in his father. Unless otherwise noted, family history is non contributory    The patients home medications have been reviewed. Allergies: Patient has no known allergies. Physical Exam:  Constitutional/General: Alert and oriented x3  Head: Normocephalic and atraumatic  Eyes: PERRL, EOMI, sclera non icteric  ENT: Oropharynx clear, handling secretions  Neck: Supple, full ROM, no stridor, no meningeal signs  Respiratory: Lungs clear to auscultation bilaterally, no wheezes, rales, or rhonchi.  Not in respiratory distress  Cardiovascular:  Irregularly irregular, No murmurs, no gallops, no rubs. 2+ distal pulses. Equal extremity pulses. GI:  Abdomen Soft, Non tender, Non distended. No rebound, guarding, or rigidity. No pulsatile masses. Musculoskeletal: Moves all extremities x 4. Warm and well perfused,  no clubbing, no cyanosis, no edema. Palpable peripheral pulses  Integument: skin warm and dry. No rashes. Neurologic: GCS 15, no focal deficits  Psychiatric: Normal Affect      I directly supervised any procedures performed by the resident and was present for the procedure including all critical portions of the procedure      The cardiac monitor revealed atrial fibrillation with a heart rate in the 90s as interpreted by me. The cardiac monitor was ordered secondary to the patient's shortness of breath and to monitor the patient for dysrhythmia. CPT [id number]      I, Dr. Noa Duran, am the primary provider of record    My Medical Decision Making:         Acute diastolic heart failure, likely triggered by afib  Needs admission  Already rate controlled         1. Atrial fibrillation, unspecified type ([address]    2.  Acute diastolic (congestive) heart failure ([address]                Kesha Schilling MD  06/14/22 8842

## 2022-06-14 NOTE — ED PROVIDER NOTES
[address]      Pt Name: Issa Rainey  MRN: [id number]  [place] 1950  Date of evaluation: 6/14/2022      CHIEF COMPLAINT       Chief Complaint   Patient presents with    Shortness of Breath     started 2 days ago     Leg Swelling     ble edema        HPI  Issa Rainey is a 70 y.o. male  with PMHx of CHF(bumex), DM2 (insulin) sp kidney transplant presents with sob for the past 2 days. States symptoms a have been persistent since onset 2 days ago. SOB is worse with exertion. Associated with bilateral leg swelling for the past 2 days. States in April he had worst symptoms, he was diuresed at [facility]. Describes symptoms moderate in severity with no alleviating or exacerbating factors. Denies any fever, chills, n/v, headache, dizziness, vision changes, neck tenderness or stiffness, weakness, cp, palpitations, cough, abd pain, dysuria, hematuria, diarrhea      Except as noted above the remainder of the review of systems was reviewed and negative. Review of Systems   Constitutional: Negative for appetite change, chills, fatigue and fever. HENT: Negative for congestion and sore throat. Eyes: Negative for visual disturbance. Respiratory: Positive for shortness of breath. Negative for cough, choking and chest tightness. Cardiovascular: Positive for leg swelling. Negative for chest pain and palpitations. Gastrointestinal: Negative for abdominal pain, blood in stool, constipation, diarrhea, nausea and vomiting. Endocrine: Negative for polyphagia. Genitourinary: Negative for decreased urine volume, difficulty urinating, flank pain and hematuria. Musculoskeletal: Negative for arthralgias, gait problem, joint swelling and myalgias. Skin: Negative for color change, pallor, rash and wound. Neurological: Negative for dizziness, tremors, seizures, syncope, weakness, light-headedness, numbness and headaches. Hematological: Negative for adenopathy. Does not bruise/bleed easily. Psychiatric/Behavioral: Negative for confusion and hallucinations. All other systems reviewed and are negative. Physical Exam  Vitals reviewed. Constitutional:       General: He is not in acute distress. Appearance: Normal appearance. He is normal weight. He is not ill-appearing, toxic-appearing or diaphoretic. HENT:      Head: Normocephalic and atraumatic. Right Ear: External ear normal.      Left Ear: External ear normal.      Nose: Nose normal. No congestion or rhinorrhea. Mouth/Throat:      Mouth: Mucous membranes are moist.      Pharynx: Oropharynx is clear. No oropharyngeal exudate or posterior oropharyngeal erythema. Eyes:      Extraocular Movements: Extraocular movements intact. Conjunctiva/sclera: Conjunctivae normal.      Pupils: Pupils are equal, round, and reactive to light. Cardiovascular:      Rate and Rhythm: Normal rate and regular rhythm. Pulses: Normal pulses. Pulmonary:      Effort: Pulmonary effort is normal. No respiratory distress. Breath sounds: Examination of the left-lower field reveals rales. Rales present. No wheezing or rhonchi. Chest:      Chest wall: No tenderness. Abdominal:      General: Abdomen is flat. Bowel sounds are normal. There is no distension. Palpations: Abdomen is soft. Tenderness: There is no abdominal tenderness. There is no right CVA tenderness, left CVA tenderness or guarding. Hernia: No hernia is present. Musculoskeletal:      Cervical back: Normal range of motion. Right lower leg: No tenderness. Edema present. Left lower leg: No tenderness. Edema present. Skin:     General: Skin is warm and dry. Capillary Refill: Capillary refill takes less than 2 seconds. Neurological:      General: No focal deficit present. Mental Status: He is alert and oriented to person, place, and time. Mental status is at baseline. Psychiatric:         Mood and Affect: Mood normal.         Behavior: Behavior normal.         Thought Content: Thought content normal.         Judgment: Judgment normal.          Procedures     MDM        70 y.o. male  with PMHx of CHF(bumex), DM2 (insulin) sp kidney transplant presents with sob for the past 2 days. States symptoms a have been persistent since onset 2 days ago. SOB is worse with exertion. Associated with bilateral leg swelling for the past 2 days. States in April he had worst symptoms, he was diuresed at [facility]. While in the ED patient was slightly tachycardiac initially but otherwise hemodynamically stable, afebrile, nontoxic-appearing, in no respiratory distress. Physical exam remarkable for pitting edema bilaterally with no calf tenderness, with diminished breath sounds bilateral.   Labs remarkable for elevated BNP, kidney function unchanged from baseline, anemia of chronic disease, elevated trop with delta trop trending down. EKG normal sinus with no sign of acute ischemia. CXR Coarsened pulmonary markings which likely relate to fibrosis. Patient received bumex. Patient admitted to the Medicine team for further management. Patient in agreement with plan of admission.            --------------------------------------------- PAST HISTORY ---------------------------------------------  Past Medical History:  has a past medical history of Anemia, Atrial fibrillation ([address] CHF (congestive heart failure) ([address] Chronic diastolic congestive heart failure ([address] Chronic foot pain, Chronic knee pain, Constipation, Depression, DM (diabetes mellitus) ([address] ESRD (end stage renal disease) ([address] H/O kidney transplant, Hyperlipidemia, Hypertension, Immunosuppression ([address] Kidney disease, and Pulmonary hypertension ([address] Past Surgical History:  has a past surgical history that includes back surgery (1982); Upper gastrointestinal endoscopy (12/21/2007);  Toot[place]-en-Y Gastric Bypass (06/29/2010); Cholecystectomy, laparoscopic (05/21/2013); ECHO Compl W Dop Color Flow (05/22/2013); Diagnostic Cardiac Cath Lab Procedure (01/15/2013); Foot surgery; Colonoscopy; Endoscopy, colon, diagnostic; Carpal tunnel release (Left, 06/23/2014); Kidney transplant (12/12/2014); Bladder surgery; Kidney biopsy (06/09/2015); and Upper gastrointestinal endoscopy (N/A, 4/28/2021). Social History:  reports that he quit smoking about 32 years ago. His smoking use included cigarettes. He has a 20.00 pack-year smoking history. He has never used smokeless tobacco. He reports previous alcohol use. He reports that he does not use drugs. Family History: family history includes Cancer in his father; Diabetes in his father and mother; Heart Disease in his father and mother; High Blood Pressure in his mother; Stroke in his father. The patients home medications have been reviewed. Allergies: Patient has no known allergies.     -------------------------------------------------- RESULTS -------------------------------------------------    LABS:  Results for orders placed or performed during the hospital encounter of 96/39/03   Basic metabolic panel   Result Value Ref Range    Sodium 139 132 - 146 mmol/L    Potassium 5.0 3.5 - 5.0 mmol/L    Chloride 105 98 - 107 mmol/L    CO2 22 22 - 29 mmol/L    Anion Gap 12 7 - 16 mmol/L    Glucose 161 (H) 74 - 99 mg/dL    BUN 45 (H) 6 - 23 mg/dL    CREATININE 2.7 (H) 0.7 - 1.2 mg/dL    GFR Non-African American 28 >=60 mL/min/1.73    GFR African American 28     Calcium 9.3 8.6 - 10.2 mg/dL   CBC   Result Value Ref Range    WBC 6.5 4.5 - 11.5 E9/L    RBC 4.09 3.80 - 5.80 E12/L    Hemoglobin 10.9 (L) 12.5 - 16.5 g/dL    Hematocrit 36.0 (L) 37.0 - 54.0 %    MCV 88.0 80.0 - 99.9 fL    MCH 26.7 26.0 - 35.0 pg    MCHC 30.3 (L) 32.0 - 34.5 %    RDW 15.8 (H) 11.5 - 15.0 fL    Platelets 006 251 - 120 E9/L    MPV 9.5 7.0 - 12.0 fL   Troponin   Result Value Ref Range    Troponin, High Sensitivity 177 (H) 0 - 11 ng/L   Protime-INR   Result Value Ref Range    Protime 13.5 (H) 9.3 - 12.4 sec    INR 1.2    APTT   Result Value Ref Range    aPTT 21.2 (L) 24.5 - 35.1 sec   Brain Natriuretic Peptide   Result Value Ref Range    Pro-BNP 26,284 (H) 0 - 125 pg/mL   SPECIMEN REJECTION   Result Value Ref Range    Rejected Test trp5     Reason for Rejection see below    SPECIMEN REJECTION   Result Value Ref Range    Rejected Test trp5     Reason for Rejection see below    Troponin   Result Value Ref Range    Troponin, High Sensitivity 139 (H) 0 - 11 ng/L   Iron and TIBC   Result Value Ref Range    Iron 19 (L) 59 - 158 mcg/dL    TIBC 220 (L) 250 - 450 mcg/dL    Iron Saturation 9 (L) 20 - 55 %   POCT Glucose   Result Value Ref Range    Meter Glucose 169 (H) 74 - 99 mg/dL   EKG 12 Lead   Result Value Ref Range    Ventricular Rate 99 BPM    Atrial Rate 98 BPM    QRS Duration 72 ms    Q-T Interval 344 ms    QTc Calculation (Bazett) 441 ms    R Axis 6 degrees    T Axis 27 degrees       RADIOLOGY:  XR CHEST (2 VW)   Final Result   Coarsened pulmonary markings which likely relate to fibrosis. See above. EKG:  This EKG is signed and interpreted by me. Rate: 99  Rhythm: Atrial fibrillation  Interpretation: non-specific EKG  Comparison: changes compared to previous EKG      ------------------------- NURSING NOTES AND VITALS REVIEWED ---------------------------  Date / Time Roomed:  6/14/2022  2:14 PM  ED Bed Assignment:  [id number]    The nursing notes within the ED encounter and vital signs as below have been reviewed.      Patient Vitals for the past 24 hrs:   BP Temp Temp src Pulse Resp SpO2 Weight   06/14/22 2319 -- -- -- -- 18 -- --   06/14/22 2249 -- -- -- -- 18 -- --   06/14/22 1930 131/69 98.6 °F (37 °C) Oral 93 18 92 % --   06/14/22 1818 (!) 178/90 -- -- 96 18 95 % --   06/14/22 0931 128/62 98.4 °F (36.9 °C) -- 98 20 93 % 190 lb (86.2 kg)   06/14/22 0906 -- 98.9 °F (37.2 °C) Oral (!) 106 -- 92 % --       Oxygen Saturation Interpretation: Normal    ------------------------------------------ PROGRESS NOTES ------------------------------------------  Re-evaluation(s):  Time: 4992  Patients symptoms show no change  Repeat physical examination is not changed    Counseling:  I have spoken with the patient and discussed todays results, in addition to providing specific details for the plan of care and counseling regarding the diagnosis and prognosis. Their questions are answered at this time and they are agreeable with the plan of admission.    --------------------------------- ADDITIONAL PROVIDER NOTES ---------------------------------  Consultations:  Dr. Scar Ruiz with NP for Dr. Sonia Callahan. Discussed case. They will admit the patient. This patient's ED course included: a personal history and physicial examination, re-evaluation prior to disposition, multiple bedside re-evaluations, IV medications, cardiac monitoring and continuous pulse oximetry    This patient has remained hemodynamically stable during their ED course. Diagnosis:  1. Atrial fibrillation, unspecified type ([address]    2. Acute diastolic (congestive) heart failure (HCC)        Disposition:  Patient's disposition: Admit to telemetry  Patient's condition is stable.          Ajit Moseley MD  Resident  06/15/22 6948

## 2022-06-15 ENCOUNTER — HOSPITAL ENCOUNTER (OUTPATIENT)
Dept: INFUSION THERAPY | Age: 72
Setting detail: INFUSION SERIES
Discharge: HOME | End: 2022-06-15

## 2022-06-15 LAB
ANION GAP SERPL CALCULATED.3IONS-SCNC: 12 MMOL/L (ref 7–16)
BUN BLDV-MCNC: 54 MG/DL (ref 6–23)
CALCIUM SERPL-MCNC: 9.1 MG/DL (ref 8.6–10.2)
CHLORIDE BLD-SCNC: 104 MMOL/L (ref 98–107)
CHOLESTEROL, TOTAL: 122 MG/DL (ref 0–199)
CO2: 23 MMOL/L (ref 22–29)
CREAT SERPL-MCNC: 2.8 MG/DL (ref 0.7–1.2)
GFR AFRICAN AMERICAN: 27
GFR NON-AFRICAN AMERICAN: 27 ML/MIN/1.73
GLUCOSE BLD-MCNC: 94 MG/DL (ref 74–99)
HBA1C MFR BLD: 5.1 % (ref 4–5.6)
HDLC SERPL-MCNC: 58 MG/DL
LDL CHOLESTEROL CALCULATED: 52 MG/DL (ref 0–99)
MAGNESIUM: 2.1 MG/DL (ref 1.6–2.6)
METER GLUCOSE: 148 MG/DL (ref 74–99)
METER GLUCOSE: 99 MG/DL (ref 74–99)
POTASSIUM SERPL-SCNC: 4.8 MMOL/L (ref 3.5–5)
SODIUM BLD-SCNC: 139 MMOL/L (ref 132–146)
TRIGL SERPL-MCNC: 59 MG/DL (ref 0–149)
TROPONIN, HIGH SENSITIVITY: 120 NG/L (ref 0–11)
TROPONIN, HIGH SENSITIVITY: 153 NG/L (ref 0–11)
VLDLC SERPL CALC-MCNC: 12 MG/DL

## 2022-06-15 PROCEDURE — 2580000003 HC RX 258: Performed by: FAMILY MEDICINE

## 2022-06-15 PROCEDURE — 6370000000 HC RX 637 (ALT 250 FOR IP): Performed by: INTERNAL MEDICINE

## 2022-06-15 PROCEDURE — 83735 ASSAY OF MAGNESIUM: CPT

## 2022-06-15 PROCEDURE — 6370000000 HC RX 637 (ALT 250 FOR IP): Performed by: FAMILY MEDICINE

## 2022-06-15 PROCEDURE — G0378 HOSPITAL OBSERVATION PER HR: HCPCS

## 2022-06-15 PROCEDURE — 99222 1ST HOSP IP/OBS MODERATE 55: CPT | Performed by: INTERNAL MEDICINE

## 2022-06-15 PROCEDURE — 83036 HEMOGLOBIN GLYCOSYLATED A1C: CPT

## 2022-06-15 PROCEDURE — 84484 ASSAY OF TROPONIN QUANT: CPT

## 2022-06-15 PROCEDURE — 80048 BASIC METABOLIC PNL TOTAL CA: CPT

## 2022-06-15 PROCEDURE — APPSS45 APP SPLIT SHARED TIME 31-45 MINUTES: Performed by: NURSE PRACTITIONER

## 2022-06-15 PROCEDURE — 82962 GLUCOSE BLOOD TEST: CPT

## 2022-06-15 PROCEDURE — 2140000000 HC CCU INTERMEDIATE R&B

## 2022-06-15 PROCEDURE — 6360000002 HC RX W HCPCS: Performed by: FAMILY MEDICINE

## 2022-06-15 PROCEDURE — 2500000003 HC RX 250 WO HCPCS: Performed by: FAMILY MEDICINE

## 2022-06-15 PROCEDURE — S5553 INSULIN LONG ACTING 5 U: HCPCS | Performed by: FAMILY MEDICINE

## 2022-06-15 PROCEDURE — 96372 THER/PROPH/DIAG INJ SC/IM: CPT

## 2022-06-15 PROCEDURE — 36415 COLL VENOUS BLD VENIPUNCTURE: CPT

## 2022-06-15 PROCEDURE — 96376 TX/PRO/DX INJ SAME DRUG ADON: CPT

## 2022-06-15 PROCEDURE — 80061 LIPID PANEL: CPT

## 2022-06-15 RX ORDER — DEXTROSE MONOHYDRATE 50 MG/ML
100 INJECTION, SOLUTION INTRAVENOUS PRN
Status: DISCONTINUED | OUTPATIENT
Start: 2022-06-15 | End: 2022-06-16 | Stop reason: HOSPADM

## 2022-06-15 RX ORDER — INSULIN LISPRO 100 [IU]/ML
0-10 INJECTION, SOLUTION INTRAVENOUS; SUBCUTANEOUS
Status: DISCONTINUED | OUTPATIENT
Start: 2022-06-15 | End: 2022-06-16 | Stop reason: HOSPADM

## 2022-06-15 RX ORDER — DOCUSATE SODIUM 100 MG/1
100 CAPSULE, LIQUID FILLED ORAL NIGHTLY
Status: DISCONTINUED | OUTPATIENT
Start: 2022-06-15 | End: 2022-06-16 | Stop reason: HOSPADM

## 2022-06-15 RX ORDER — PANTOPRAZOLE SODIUM 40 MG/1
40 TABLET, DELAYED RELEASE ORAL
Status: DISCONTINUED | OUTPATIENT
Start: 2022-06-15 | End: 2022-06-16 | Stop reason: HOSPADM

## 2022-06-15 RX ADMIN — DOCUSATE SODIUM 100 MG: 100 CAPSULE, LIQUID FILLED ORAL at 21:02

## 2022-06-15 RX ADMIN — ISOSORBIDE MONONITRATE 60 MG: 60 TABLET, EXTENDED RELEASE ORAL at 09:54

## 2022-06-15 RX ADMIN — HEPARIN SODIUM 5000 UNITS: 10000 INJECTION INTRAVENOUS; SUBCUTANEOUS at 14:00

## 2022-06-15 RX ADMIN — ASPIRIN 81 MG: 81 TABLET, COATED ORAL at 09:50

## 2022-06-15 RX ADMIN — SODIUM CHLORIDE, PRESERVATIVE FREE 10 ML: 5 INJECTION INTRAVENOUS at 21:03

## 2022-06-15 RX ADMIN — MYCOPHENOLATE MOFETIL 1000 MG: 250 CAPSULE ORAL at 09:00

## 2022-06-15 RX ADMIN — CALCITRIOL CAPSULES 0.25 MCG 0.25 MCG: 0.25 CAPSULE ORAL at 09:50

## 2022-06-15 RX ADMIN — HEPARIN SODIUM 5000 UNITS: 10000 INJECTION INTRAVENOUS; SUBCUTANEOUS at 05:52

## 2022-06-15 RX ADMIN — HYDRALAZINE HYDROCHLORIDE 100 MG: 50 TABLET, FILM COATED ORAL at 21:02

## 2022-06-15 RX ADMIN — BUMETANIDE 1 MG: 0.25 INJECTION, SOLUTION INTRAMUSCULAR; INTRAVENOUS at 21:02

## 2022-06-15 RX ADMIN — AMLODIPINE BESYLATE 5 MG: 5 TABLET ORAL at 09:50

## 2022-06-15 RX ADMIN — OXYCODONE HYDROCHLORIDE 20 MG: 10 TABLET ORAL at 21:01

## 2022-06-15 RX ADMIN — SODIUM BICARBONATE 650 MG: 650 TABLET ORAL at 18:12

## 2022-06-15 RX ADMIN — TACROLIMUS 2 MG: 1 CAPSULE ORAL at 09:00

## 2022-06-15 RX ADMIN — SULFAMETHOXAZOLE AND TRIMETHOPRIM 1 TABLET: 400; 80 TABLET ORAL at 09:00

## 2022-06-15 RX ADMIN — MYCOPHENOLATE MOFETIL 1000 MG: 250 CAPSULE ORAL at 21:01

## 2022-06-15 RX ADMIN — SODIUM BICARBONATE 650 MG: 650 TABLET ORAL at 21:02

## 2022-06-15 RX ADMIN — OXYCODONE HYDROCHLORIDE 20 MG: 10 TABLET ORAL at 10:12

## 2022-06-15 RX ADMIN — PANTOPRAZOLE SODIUM 40 MG: 40 TABLET, DELAYED RELEASE ORAL at 10:12

## 2022-06-15 RX ADMIN — METOPROLOL TARTRATE 50 MG: 50 TABLET, FILM COATED ORAL at 21:02

## 2022-06-15 RX ADMIN — TACROLIMUS 2 MG: 1 CAPSULE ORAL at 21:02

## 2022-06-15 RX ADMIN — HEPARIN SODIUM 5000 UNITS: 10000 INJECTION INTRAVENOUS; SUBCUTANEOUS at 20:00

## 2022-06-15 RX ADMIN — INSULIN GLARGINE-YFGN 12 UNITS: 100 INJECTION, SOLUTION SUBCUTANEOUS at 14:00

## 2022-06-15 RX ADMIN — SODIUM BICARBONATE 650 MG: 650 TABLET ORAL at 09:00

## 2022-06-15 RX ADMIN — BUMETANIDE 1 MG: 0.25 INJECTION, SOLUTION INTRAMUSCULAR; INTRAVENOUS at 09:58

## 2022-06-15 RX ADMIN — METOPROLOL TARTRATE 50 MG: 50 TABLET, FILM COATED ORAL at 09:00

## 2022-06-15 RX ADMIN — SODIUM BICARBONATE 650 MG: 650 TABLET ORAL at 13:00

## 2022-06-15 RX ADMIN — SODIUM CHLORIDE, PRESERVATIVE FREE 10 ML: 5 INJECTION INTRAVENOUS at 09:00

## 2022-06-15 RX ADMIN — HYDRALAZINE HYDROCHLORIDE 100 MG: 50 TABLET, FILM COATED ORAL at 09:00

## 2022-06-15 ASSESSMENT — PAIN DESCRIPTION - LOCATION
LOCATION: LEG
LOCATION: BACK

## 2022-06-15 ASSESSMENT — PAIN DESCRIPTION - DESCRIPTORS
DESCRIPTORS: ACHING
DESCRIPTORS: ACHING

## 2022-06-15 ASSESSMENT — PAIN DESCRIPTION - ORIENTATION
ORIENTATION: LOWER
ORIENTATION: RIGHT;LEFT

## 2022-06-15 ASSESSMENT — PAIN SCALES - GENERAL
PAINLEVEL_OUTOF10: 6
PAINLEVEL_OUTOF10: 7

## 2022-06-15 ASSESSMENT — PAIN - FUNCTIONAL ASSESSMENT: PAIN_FUNCTIONAL_ASSESSMENT: ACTIVITIES ARE NOT PREVENTED

## 2022-06-15 NOTE — PLAN OF CARE
Problem: Chronic Conditions and Co-morbidities  Goal: Patient's chronic conditions and co-morbidity symptoms are monitored and maintained or improved  Outcome: Progressing     Problem: Discharge Planning  Goal: Discharge to home or other facility with appropriate resources  Outcome: Progressing     Problem: Safety - Adult  Goal: Free from fall injury  Outcome: Progressing     Problem: ABCDS Injury Assessment  Goal: Absence of physical injury  Outcome: Progressing     Problem: ABCDS Injury Assessment  Goal: Absence of physical injury  Outcome: Progressing

## 2022-06-15 NOTE — PLAN OF CARE
Patient's chart updated to reflect:      . - HF care plan, HF education points and HF discharge instructions.  -Orders: 2 gram sodium diet, daily weights, I/O.  -PCP and cardiology follow up appointments to be scheduled within 7 days of hospital discharge. -CHF education session will be provided to the patient prior to hospital discharge.     Susan Forman RN RN, BSN  Heart Failure Navigator

## 2022-06-15 NOTE — PROGRESS NOTES
This patient is on medication that requires renal, weight, and/or indication dose adjustment. Date Body Weight IBW  Adjusted BW SCr  CrCl Dialysis status   6/14/2022 190 lb (86.2 kg) Ideal body weight: 75.3 kg (166 lb 0.1 oz)  Adjusted ideal body weight: 79.7 kg (175 lb 9.7 oz) Serum creatinine: 2.7 mg/dL (H) 06/14/22 0947  Estimated creatinine clearance: 27 mL/min (A) N/a       Pharmacy has dose-adjusted the following medication(s):    Date Previous Order Adjusted Order   6/14/2022 Bactrim 800-160 mg BID Bactrim 400-80 mg BID       These changes were made per protocol according to the [facility] Clinical Guidance for Pharmacists. *Please note this dose may need readjusted if patient's condition changes. Please contact pharmacy with any questions regarding these changes.     [address] [facility], [address] [facility]  6/14/2022  8:14 PM

## 2022-06-15 NOTE — CARE COORDINATION
Patient presented to the ED for shortness of breath and leg swelling; newly dx of Afib at [facility], hx of CHF and kidney transplant in 2014; admitted for acute diastolic heart failure. Met with patient at bedside for transition of care planning. Patient reports he lives with his wife in a home, 1 step to enter, he is independent without a device and has no home DME. No hx of home care or CLARA. Uses [facility] pharmacy ([address] or [facility] for 3 month supply meds. PCP is Dr. William Flores. Patient has 4 children, one daughter and son reside in the area and are able to provide assist when needed. Plan is home, patient reports no home going needs, is independent in the room and wife will transport home when medically cleared for discharge. The Plan for Transition of Care is related to the following treatment goals: discharge planning    The Patient and/or patient representative Vasile Ulloa was provided with a choice of provider and agrees   with the discharge plan. [x] Yes [] No    Freedom of choice list was provided with basic dialogue that supports the patient's individualized plan of care/goals, treatment preferences and shares the quality data associated with the providers.  [x] Yes [] No    Anitha Ogden, MSW, LSW [phone]

## 2022-06-15 NOTE — H&P
Hospital Medicine History & Physical      PCP: Lavinia Singh III, DO    Date of Admission: 6/14/2022    Date of Service: .Radha 15, 2022    Chief Complaint:   SOB       History Of Present Illness:     70 y.o. male presented with SOB, ONSET 3 DAYS AGO, WORSE WITH EXERTION BECOMING PROGRESSIVELY WORSE, AND HE COULD NOT EAT. EDEMA AT ONE POINT WAS UP TO HIS THIGHS. HE ASLO HAD ATRIAL FIB,  THIS IS NEW FOR HIM. NO CHEST PAIN,. HAD A SIMILAR EPISODE BEFORE AND WAS ON BUMEX PER [facility]. Goran Lo      Past Medical History:          Diagnosis Date    Anemia     Iron deficiency    Atrial fibrillation (HCC)     CHF (congestive heart failure) (HCC) 03/12/2008    Chronic diastolic congestive heart failure ([facility] 03/12/2008    Chronic foot pain     Chronic knee pain     BILATERAL    Constipation     Depression     DM (diabetes mellitus) ([facility]     ESRD (end stage renal disease) ([facility]     H/O kidney transplant 12/12/2014    Hyperlipidemia     Hypertension     Immunosuppression ([facility] 3/12/2008    Kidney disease     Pulmonary hypertension ([facility]        Past Surgical History:          Procedure Laterality Date    BACK SURGERY  1982    BLADDER SURGERY      CARPAL TUNNEL RELEASE Left 06/23/2014    CHOLECYSTECTOMY, LAPAROSCOPIC  05/21/2013    COLONOSCOPY      DIAGNOSTIC CARDIAC CATH LAB PROCEDURE  01/15/2013    NORMAL    ECHO COMPL W DOP COLOR FLOW  05/22/2013         ENDOSCOPY, COLON, DIAGNOSTIC      FOOT SURGERY      BONE REMOVED    KIDNEY BIOPSY  06/09/2015    [facility] ; ALSO 4/14/2016, 4/5/2017    KIDNEY TRANSPLANT  12/12/2014    @ [facility]    EDUARDO-EN-Y GASTRIC BYPASS  06/29/2010    Laparoscopic / Dr. Ishmael Lowe  12/21/2007    Dr. Stacy Hinojosa 4/28/2021    EGD in OR 12--COVID performed by Jessica Valdes MD at [address] [place] [address] Medications Prior to Admission:      Prior to Admission medications    Medication Sig Start Date End Date Taking?  Authorizing Provider   amLODIPine (NORVASC) 5 MG tablet Take 1 tablet by mouth daily 3/22/22   Ashly Joaquin MD   bumetanide (BUMEX) 2 MG tablet Take 1 tablet by mouth daily 3/22/22   Ashly Joaquin MD   bumetanide (BUMEX) 2 MG tablet Take 0.5 tablets by mouth daily  Patient taking differently: Take 1 mg by mouth daily Pt takes 1mg bid as instructed on bottle 2/21/22   Ashly Joaquin MD   sulfamethoxazole-trimethoprim (BACTRIM;SEPTRA) 400-80 MG per tablet Take 2 tablets by mouth 2 times daily  6/28/21   Historical Provider, MD   Cholecalciferol (VITAMIN D3) 125 MCG (5000 UT) TABS Take by mouth    Historical Provider, MD   ammonium lactate (LAC-HYDRIN) 12 % lotion ammonium lactate 12 % lotion    Historical Provider, MD   bisacodyl (DULCOLAX) 5 MG EC tablet Take by mouth    Historical Provider, MD   ONETOUCH ULTRA strip  9/14/21   Historical Provider, MD   BD PEN NEEDLE TAINA 2ND GEN 32G X 4 MM MISC  10/16/21   Historical Provider, MD   Lancets ([address] [place]) [address]  9/14/21   Historical Provider, MD   metoprolol tartrate (LOPRESSOR) 50 MG tablet 50 mg 2 times daily     Historical Provider, MD   LOKELMA 10 g PACK oral suspension  8/9/21   Historical Provider, MD   tiZANidine (ZANAFLEX) 4 MG tablet  9/30/21   Historical Provider, MD   labetalol (NORMODYNE) 200 MG tablet Take by mouth daily   Patient not taking: Reported on 3/10/2022    Historical Provider, MD   calcitRIOL (ROCALTROL) 0.25 MCG capsule Take 1 capsule by mouth daily 10/8/21   Coleen Nicholson MD   OXYGEN Inhale 2 L into the lungs daily as needed   Patient not taking: Reported on 2/21/2022    Historical Provider, MD   insulin glargine (LANTUS SOLOSTAR) 100 UNIT/ML injection pen Inject 12 Units into the skin daily At noon   Had today  Patient taking differently: Inject 12 Units into the skin daily  6/14/21   Pippa Crystal MD   oxyCODONE (ROXICODONE) 20 MG immediate release tablet Take 20 mg by mouth every 8 hours as needed for Pain. Historical Provider, MD   hydrALAZINE (APRESOLINE) 100 MG tablet Take 100 mg by mouth 3 times daily    Historical Provider, MD   simvastatin (ZOCOR) 10 MG tablet Take 10 mg by mouth daily    Historical Provider, MD   isosorbide mononitrate (IMDUR) 60 MG extended release tablet TAKE 1 TABLET DAILY 3/6/18   Cindy Minor MD   tacrolimus (PROGRAF) 1 MG capsule Take 2 capsules by mouth 2 times daily 5/1/17   Julio Tineo MD   sodium bicarbonate 650 MG tablet Take 650 mg by mouth 4 times daily     Historical Provider, MD   Multiple Vitamin (MULTI VITAMIN DAILY PO) Take by mouth daily    Historical Provider, MD   mycophenolate (CELLCEPT) 250 MG capsule Take 1,000 mg by mouth 2 times daily  6/18/15   Historical Provider, MD   insulin lispro (HUMALOG KWIKPEN) 100 UNIT/ML SOPN Inject 5 Units into the skin daily (before lunch) Patient takes with his biggest meal    Historical Provider, MD   docusate sodium (COLACE) 100 MG capsule Take 1 capsule by mouth 2 times daily as needed for Constipation. Patient taking differently: Take 100 mg by mouth daily as needed for Constipation  7/26/13   Cristino Milton MD   linagliptin (TRADJENTA) 5 MG tablet Take 5 mg by mouth daily. Historical Provider, MD   aspirin 81 MG EC tablet Take 81 mg by mouth daily. Historical Provider, MD       Allergies:  Patient has no known allergies. Social History:      The patient currently lives with wife    TOBACCO:   reports that he quit smoking about 32 years ago. His smoking use included cigarettes. He has a 20.00 pack-year smoking history. He has never used smokeless tobacco.  ETOH:   reports previous alcohol use. Family History:      ** Reviewed in detail and negative for DM, CAD, Cancer, CVA.  Positive as follows:        Problem Relation Age of Onset    Diabetes Mother     High Blood Pressure Mother     Heart Disease Mother    Zannie Cowden Diabetes Father     Cancer Father     Stroke Father     Heart Disease Father        REVIEW OF SYSTEMS:   Pertinent positives as noted in the HPI. All other systems reviewed and negative. PHYSICAL EXAM:    /60   Pulse (!) 47   Temp 97.7 °F (36.5 °C) (Oral)   Resp 18   Wt 178 lb 1.6 oz (80.8 kg)   SpO2 95%   BMI 24.84 kg/m²     General appearance:  No apparent distress, appears stated age and cooperative. HEENT:  Normal cephalic, atraumatic without obvious deformity. Pupils equal, round, and reactive to light. Extra ocular muscles intact. Conjunctivae/corneas clear. Neck: Supple, with full range of motion. No jugular venous distention. Trachea midline. Respiratory:  Normal respiratory effort. Clear to auscultation, bilaterally without Rales/Wheezes/Rhonchi. Cardiovascular:  Regular rate and rhythm   Abdomen: Soft, non-tender, non-distended with normal bowel sounds. Musculoskeletal:  No clubbing, cyanosis pos edema bilaterally. Skin: Skin color, texture, turgor normal.  No rashes or lesions. Neurologic:  Neurovascularly intact without any focal sensory/motor deficits. Cranial nerves: II-XII intact, grossly non-focal.  Psychiatric:  Alert and oriented, thought content appropriate, normal insight        Labs:     Recent Labs     06/13/22 0618 06/14/22  0947   WBC 7.6 6.5   HGB 11.1* 10.9*   HCT 37.2 36.0*    209     Recent Labs     06/13/22  0618 06/14/22  0947 06/15/22  0441    139 139   K 4.3 5.0 4.8    105 104   CO2 22 22 23   BUN 40* 45* 54*   CREATININE 2.9* 2.7* 2.8*   CALCIUM 9.3 9.3 9.1   PHOS 3.2  --   --      No results for input(s): AST, ALT, BILIDIR, BILITOT, ALKPHOS in the last 72 hours. Recent Labs     06/14/22  0947   INR 1.2     No results for input(s): Kendall [place] in the last 72 hours.     Urinalysis:      Lab Results   Component Value Date    NITRU Negative 04/10/2021    WBCUA 0-1 04/10/2021    BACTERIA RARE 04/10/2021    RBCUA 0-1 04/10/2021 RBCUA NONE 05/18/2013    BLOODU Negative 04/10/2021    SPECGRAV 1.020 04/10/2021    GLUCOSEU Negative 04/10/2021       Radiology:     CXR: I have reviewed the CXR with the following interpretation:     XR CHEST (2 VW)   Final Result   Coarsened pulmonary markings which likely relate to fibrosis. See above. ASSESSMENT:    Active Hospital Problems    Diagnosis Date Noted    Atrial fibrillation [facility]) [I48.91]      Priority: Medium    Acute diastolic heart failure (HCC) [I50.31] 06/14/2022     Priority: Medium   ii dm  PH   renal transplant   sob   depression   ii dm   iron def anemia   pulmonary cachexia   SOB    PLAN:  CARD  NEPHROLOGY      DVT Prophylaxis: *HEPARIN  Diet: ADULT DIET; Regular; 5 carb choices (75 gm/meal); Low Sodium (2 gm); Low Potassium (Less than 3000 mg/day); Low Phosphorus (Less than 1000 mg)  Code Status: Full Code    PT/OT Eval Status: *ORDERED    Dispo - *HOME    Electronically signed by Stephie Thurston DO on 6/15/2022 at 7:33 PM FACOI       Thank you GUIDO GERONIMO III, DO for the opportunity to be involved in this patient's care.  If you have any questions or concerns please feel free to contact me at [phone]

## 2022-06-15 NOTE — CONSULTS
pressure on admission was 128/62 and he was tachycardic at 106 bpm and afebrile with no hypoxia on room air. EKG per Dr. Kings Booker showed no atrial fib, no acuteb changes    Chest x-ray showed coarsened pulmonary markings related to fibrosis. Potassium was 5 with a BUN of 45 and a creatinine of 2.7, proBNP 26,284 (baseline 1006-25,179) and troponin 177-139 (baseline 1 14-1 45), WBC 6.5 and H&H 10.9 and 36    He was slightly tachycardic pitting edema and he was treated with IV Bumex and is now being diuresed. A  history review includes:  1. Chronic HFpEF  1. Right heart cath September 2020 (): RA 13, PA 91/25 (48), PCW 22, CI 2.5/2.1 (F/T) -referred to [facility]  2. Negative amyloid scan at [facility]  3. TTE 4/2021: Normal biventricular size and systolic function. Inadequate for PASP. EF 60 to 65% and stage I diastolic dysfunction and normal right ventricular structure and function  4. Seen at [facility] CHF clinic per my referral  2. October 2021 Lexiscan MPI normal (Dr. Susana Quintanilla)  3. CKD status post kidney transplant in 2014 (underwent invasive evaluation of the transplanted renal artery at [facility] which was unremarkable)  1. Now with CKD 4 baseline creatinine 2.7-3.0  4. Type 2 diabetes on insulin  5. Mixed lung disease.  Was seen by [facility] in April 2020.  1. Chest CT without contrast showed mild aortic and coronary calcification and a dilated main PA to 3.9 cm.  There were no stigmata of interstitial lung disease but there was mild diffuse mosaic attenuation of the parenchyma and eventration of the right hemidiaphragm  2. PFTs showed a TLC of 48% and DLCO 48% predicted  6. Hypertension   7. Left heart cardiac catheterization 2013, reportedly normal  8. Pulmonary hypertension who group 2  9. Obstructive sleep apnea  10. Hyperlipidemia  11. Former smoker  15. Tootie-en-Y gastric bypass in 2010  13. COVID-19 infection  14. April admission at [facility] for CHF, 2022  15.  Atrial fibrillation March 2022 with the [facility]  16. Pericardial effusion  17. Chronically elevated troponin  18. Carpal tunnel release, cholecystectomy,  19. 2D echo March 31, 2022    - Exam indication: Re-evaluation of known heart failure with a change in clinical   status without change in med/diet   - The left ventricle is normal in size. There is moderate septal left ventricular   hypertrophy. Left ventricular systolic function is normal. EF = 57 ± 5% (2D   biplane)   - The right ventricle is normal in size. Right ventricular systolic function is   normal.   - The left atrial cavity is severel dilated  - There is a moderate pericardial effusion adjacent to the left ventricle   measuring 1.5 cm. Unable to determine respiratory variation d/t AF. Unable to   visualize IVC. No 2D signs of tamponade. - Estimated right ventricular systolic pressure is 26 mmHg consistent with normal   pulmonary artery pressures. Estimated right atrial pressure is 3 mmHg (although   IVC not seen). ** Reported gradients are an average of 5 consecutive beats d/t AF.   - Exam was compared with the prior CC echocardiographic exam performed on   4/3/2020. Patient is now in A-fib. Interval developement of pericardial effusion. 21. Right heart cath March 31, 2020 negative at the [facility] 26 or suspected cardiomyopathy with or without heart failure; AUC score = 7.     CAD Presentation: Symptoms Unlikely to be Ischemic     Angina Classification (within 2 weeks): No Angina     Heart Failure     71M w HFpEF (LVEF 67%, G2DD), pulmonary HTN, ESRD s/p ER (2014) c/b CKD Stage 3,   IDDM, HTN, HLD, who was admitted with ADHF. Access Point           Sheath Size Hemostasis Method   Right Internal Jugular 7F Short    Manual Hold       +-------------------+   DIAGNOSTIC FINDINGS   +-------------------+     Coronary Anatomy: Coronary Angio Not Done     Injection Site(s): none     LMT: _ LMT Status: Not Applicable.      LAD:   _ LAD Status: Not Applicable. LCX: _ LCX Status: Not Applicable. RAMUS: _ Ramus Status: Not Applicable. RCA:   _ RCA Status: Not Applicable. +------------+   HEMODYNAMICS   +------------+     General:   ASC AO      126.00/65.00   ASC AO Mean 85.00   RA A Wave   8.00   RA V Wave   6.00   RA Mean     6.00   RV          49.00/5.00   PA          47.00/12.00               (27.00)   PCWP A Wave 13.00   PCWP V Wave 14.00   PCWP Mean   11.00     Cardiac Outputs:   Condition # Baseline   DUARTE CO     6.15   DUARTE CI     2.83   DUARTE HR     78.00   DUARTE SV     78.85   DUARTE SVI    36.28   TD CO       5.70   TD CI       2.62   TD HR       77.00   TD SV       74.03   TD SVI      34.03     Vascular Resistance:   Condition # Baseline   Method      Thermo   PVR         2.80   SVR         1108.00   PVR/SVR     0.00     Oximetry:   HB        12.20   O2 Sat PA 66.20   O2 Sat AO 91.00       +----------------------------------------+   HEMODYNAMIC CONDITIONS (SENSIS ENTERED):   +----------------------------------------+     General HEMO:    1            1               1              1   Condition #   Site             Right Atrium Right Ventricle Main Pulmonary Pulmonary Capillary                                                 Artery         Wedge   Systolic A Wave  8 mmHg       52 mmHg         43 mmHg        95 mmHg   Diastolic V Wave 6 mmHg       5 mmHg          12 mmHg        13 mmHg   ED/Mean          5 mmHg       7 mmHg          26 mmHg        10 mmHg     Valve Gradients:     Oximetry: # Site                  O2-Saturation O2-Cont. Sample Time          PO2   1 Main Pulmonary Artery 66.2          110. 8    3/31/2022 5:51:48 PM   1 Aorta                 91. 0          152. 8    3/31/2022 5:51:53 PM     Cardiac Outputs:   Condition #            1                                 1   CO-Method              THERMO                            DUARTE   Injection Site         Right Atrium                      Main Pulmonary Artery   Sample Site   [facility] ([address] [place]) [facility]  9/14/21   Historical Provider, MD   metoprolol tartrate (LOPRESSOR) 50 MG tablet 50 mg 2 times daily     Historical Provider, MD   LOKELMA 10 g PACK oral suspension  8/9/21   Historical Provider, MD   tiZANidine (ZANAFLEX) 4 MG tablet  9/30/21   Historical Provider, MD   labetalol (NORMODYNE) 200 MG tablet Take by mouth daily   Patient not taking: Reported on 3/10/2022    Historical Provider, MD   calcitRIOL (ROCALTROL) 0.25 MCG capsule Take 1 capsule by mouth daily 10/8/21   Hector Pierson MD   OXYGEN Inhale 2 L into the lungs daily as needed   Patient not taking: Reported on 2/21/2022    Historical Provider, MD   insulin glargine (LANTUS SOLOSTAR) 100 UNIT/ML injection pen Inject 12 Units into the skin daily At noon   Had today  Patient taking differently: Inject 12 Units into the skin daily  6/14/21   Morro Rios MD   oxyCODONE (ROXICODONE) 20 MG immediate release tablet Take 20 mg by mouth every 8 hours as needed for Pain.      Historical Provider, MD   hydrALAZINE (APRESOLINE) 100 MG tablet Take 100 mg by mouth 3 times daily    Historical Provider, MD   simvastatin (ZOCOR) 10 MG tablet Take 10 mg by mouth daily    Historical Provider, MD   isosorbide mononitrate (IMDUR) 60 MG extended release tablet TAKE 1 TABLET DAILY 3/6/18   Danielle Cui MD   tacrolimus (PROGRAF) 1 MG capsule Take 2 capsules by mouth 2 times daily 5/1/17   Morro Rios MD   sodium bicarbonate 650 MG tablet Take 650 mg by mouth 4 times daily     Historical Provider, MD   Multiple Vitamin (MULTI VITAMIN DAILY PO) Take by mouth daily    Historical Provider, MD   mycophenolate (CELLCEPT) 250 MG capsule Take 1,000 mg by mouth 2 times daily  6/18/15   Historical Provider, MD   insulin lispro (HUMALOG KWIKPEN) 100 UNIT/ML SOPN Inject 5 Units into the skin daily (before lunch) Patient takes with his biggest meal    Historical Provider, MD   docusate sodium (COLACE) 100 MG capsule Take 1 capsule by mouth 2 times daily as needed for Constipation. Patient taking differently: Take 100 mg by mouth daily as needed for Constipation  7/26/13   Dante Mcconnell MD   linagliptin (TRADJENTA) 5 MG tablet Take 5 mg by mouth daily. Historical Provider, MD   aspirin 81 MG EC tablet Take 81 mg by mouth daily.     Historical Provider, MD       Current Medications:    Current Facility-Administered Medications: docusate sodium (COLACE) capsule 100 mg, 100 mg, Oral, Nightly  pantoprazole (PROTONIX) tablet 40 mg, 40 mg, Oral, QAM AC  glucose chewable tablet 16 g, 4 tablet, Oral, PRN  dextrose bolus 10% 125 mL, 125 mL, IntraVENous, PRN **OR** dextrose bolus 10% 250 mL, 250 mL, IntraVENous, PRN  glucagon (rDNA) injection 1 mg, 1 mg, IntraMUSCular, PRN  dextrose 5 % solution, 100 mL/hr, IntraVENous, PRN  insulin lispro (HUMALOG) injection vial 0-10 Units, 0-10 Units, SubCUTAneous, 4x Daily AC & HS  amLODIPine (NORVASC) tablet 5 mg, 5 mg, Oral, Daily  ammonium lactate (LAC-HYDRIN) 12 % lotion, , Topical, PRN  aspirin EC tablet 81 mg, 81 mg, Oral, Daily  calcitRIOL (ROCALTROL) capsule 0.25 mcg, 0.25 mcg, Oral, Daily  hydrALAZINE (APRESOLINE) tablet 100 mg, 100 mg, Oral, TID  insulin glargine-yfgn (SEMGLEE-YFGN) injection vial 12 Units, 12 Units, SubCUTAneous, Lunch  isosorbide mononitrate (IMDUR) extended release tablet 60 mg, 60 mg, Oral, Daily  metoprolol tartrate (LOPRESSOR) tablet 50 mg, 50 mg, Oral, BID  mycophenolate (CELLCEPT) capsule 1,000 mg, 1,000 mg, Oral, BID  oxyCODONE HCl (OXY-IR) immediate release tablet 20 mg, 20 mg, Oral, Q8H PRN  sodium bicarbonate tablet 650 mg, 650 mg, Oral, 4x Daily  sulfamethoxazole-trimethoprim (BACTRIM;SEPTRA) 400-80 MG per tablet 1 tablet, 1 tablet, Oral, BID  tacrolimus (PROGRAF) capsule 2 mg, 2 mg, Oral, BID  sodium chloride flush 0.9 % injection 5-40 mL, 5-40 mL, IntraVENous, 2 times per day  sodium chloride flush 0.9 % injection 10 mL, 10 mL, IntraVENous, PRN  0.9 % sodium chloride infusion, , IntraVENous, PRN  ondansetron (ZOFRAN-ODT) disintegrating tablet 4 mg, 4 mg, Oral, Q8H PRN **OR** ondansetron (ZOFRAN) injection 4 mg, 4 mg, IntraVENous, Q6H PRN  magnesium hydroxide (MILK OF MAGNESIA) 400 MG/5ML suspension 30 mL, 30 mL, Oral, Daily PRN  acetaminophen (TYLENOL) tablet 650 mg, 650 mg, Oral, Q6H PRN **OR** acetaminophen (TYLENOL) suppository 650 mg, 650 mg, Rectal, Q6H PRN  heparin (porcine) injection 5,000 Units, 5,000 Units, SubCUTAneous, 3 times per day  bumetanide (BUMEX) injection 1 mg, 1 mg, IntraVENous, BID    Allergies:  Patient has no known allergies. Social History: Former smoker, quit in [facility] and denies alcohol and illicit drugs and is       Family History:   Family History   Problem Relation Age of Onset    Diabetes Mother     High Blood Pressure Mother     Heart Disease Mother     Diabetes Father     Cancer Father     Stroke Father     Heart Disease Father        REVIEW OF SYSTEMS:     · Constitutional: Denies fatigue, fevers, chills or night sweats  · Eyes: Denies visual changes or drainage  · ENT: Denies headaches or hearing loss. No mouth sores or sore throat. No epistaxis   · Cardiovascular: Denies chest pain, pressure or palpitations. No lower extremity swelling. · Respiratory: Denies PINEDO, cough, orthopnea or PND. No hemoptysis   · Gastrointestinal: Denies hematemesis or anorexia. No hematochezia or melena but admits to poor appetite  · Genitourinary: Denies urgency, dysuria or hematuria. · Musculoskeletal: Denies gait disturbance, weakness or joint complaints  · Integumentary: Denies rash, hives or pruritis   · Neurological: Denies dizziness, headaches or seizures. No numbness or tingling  · Psychiatric: Denies anxiety or depression. · Endocrine: Denies temperature intolerance. No recent weight change. .  · Hematologic/Lymphatic: Denies abnormal bruising or bleeding.  No swollen lymph nodes    PHYSICAL EXAM:   /63   Pulse 55   Temp 98.6 °F (37 °C) (Oral)   Resp 18   Wt 178 lb 1.6 oz (80.8 kg)   SpO2 95%   BMI 24.84 kg/m²   CONST:  Well developed, well nourished who appears of stated age. Awake, alert and cooperative. No apparent distress. HEENT:   Head- Normocephalic, atraumatic   Eyes- Conjunctivae pink, anicteric  Throat- Oral mucosa pink and moist  Neck-  No stridor, trachea midline, no jugular venous distention. No carotid bruit. CHEST: Chest symmetrical and non-tender to palpation. No accessory muscle use or intercostal retractions  RESPIRATORY: Lung sounds - clear throughout fields   CARDIOVASCULAR:     Heart Inspection- shows no noted pulsations  Heart Palpation- no heaves or thrills; PMI is non-displaced   Heart Ausculation-irregularly irregular rate and rhythm, no murmur. No s3, s4 or rub   PV: Trace lower extremity edema on the left lower extremity and none on the right. No varicosities. Pedal pulses palpable, no clubbing or cyanosis   ABDOMEN: Soft, non-tender to light palpation. Bowel sounds present. No palpable masses no organomegaly; no abdominal bruit  MS: Good muscle strength and tone. No atrophy or abnormal movements. : Deferred  SKIN: Warm and dry no statis dermatitis or ulcers   NEURO / PSYCH: Oriented to person, place and time. Speech clear and appropriate. Follows all commands.  Pleasant affect     DATA:    ECG / Tele strips: Atrial fibrillation  Diagnostic:    No intake or output data in the 24 hours ending 06/15/22 0848    Labs:   CBC:   Recent Labs     06/13/22 0618 06/14/22  0947   WBC 7.6 6.5   HGB 11.1* 10.9*   HCT 37.2 36.0*    209     BMP:   Recent Labs     06/14/22  0947 06/15/22  0441    139   K 5.0 4.8   CO2 22 23   BUN 45* 54*   CREATININE 2.7* 2.8*   LABGLOM 28 27   CALCIUM 9.3 9.1     Mag:   Recent Labs     06/15/22  0441   MG 2.1     Phos:   Recent Labs     06/13/22 0618   PHOS 3.2     TFT:   Lab Results   Component Value Date    TSH 0.996 06/03/2014    FT3 1.6 (L) 05/22/2013    T4FREE 0.91 05/22/2013      HgA1c:   Lab Results   Component Value Date    LABA1C 4.9 11/01/2021     No results found for: EAG  proBNP:   Recent Labs     06/14/22  0947   PROBNP 26,284*     PT/INR:   Recent Labs     06/14/22  0947   PROTIME 13.5*   INR 1.2     APTT:  Recent Labs     06/14/22  0947   APTT 21.2*     CARDIAC ENZYMES:  Recent Labs     06/14/22  0947 06/14/22  1858   TROPHS 177* 139*     FASTING LIPID PANEL:  Lab Results   Component Value Date    CHOL 122 06/15/2022    HDL 58 06/15/2022    LDLCALC 52 06/15/2022    TRIG 59 06/15/2022     LIVER PROFILE:No results for input(s): AST, ALT, LABALBU in the last 72 hours. Impression and plan by Dr. Suly Tejada  Electronically signed by CHAI Leslie CNP on 6/15/2022 at 8:48 AM      I have personally seen and evaluated the patient. I personally obtained the history and performed the physical exam.  I personally reviewed all of the above labs, history, review of systems, and data. All of the assessments and recommendations are from me. All of the above cardiac medical decisions are from me. Please see my additional contributions to the history, physical exam, assessment, and recommendations below. History of chief complaint:  He was reclining nearly flat in bed. He was comfortable and in no distress. He denies ever having any chest discomfort. He states that he did have a slight bit of ankle edema prior to admission. He presented with 3 days of increase in his dyspnea. Cardiology has been consulted for elevated troponins. Review of systems:     Heart: as above   Lungs: as above   Eyes: denies changes in vision or discharge. Ears: denies changes in hearing or pain. Nose: denies epistaxis or masses   Throat: denies sore throat or trouble swallowing. Neuro: denies numbness, tingling, tremors. Skin: denies rashes or itching.    : denies hematuria, dysuria   GI: denies vomiting, diarrhea   Psych: denies mood changed, anxiety, depression. Physical exam:  /60   Pulse (!) 47   Temp 97.7 °F (36.5 °C) (Oral)   Resp 18   Wt 178 lb 1.6 oz (80.8 kg)   SpO2 95%   BMI 24.84 kg/m²   Constitutional: A&O x3, communicates well, no acute distress. Eyes: extraocular muscles intact, PERRL. Normal lids & conjunctiva. No icterus. ENT: clear, no bleeding. No external masses. Lips normal formation. Neck: supple, full ROM, ? JVD, no bruits, no lymphadenopathy. No masses. trachea midline. Heart: regular rate & rhythm, normal S1 & S2, no abnormal murmurs. No heave. Lungs: Poor air movement. .  No accessory muscles. Abd: soft, non-tender. Normal bowel sounds. Neuro: Full ROM X 4, EOMI, no tremors. EXT: Trace bilateral lower extremity edema  Skin: warm, dry, intact. Good turgor. Psych: A&O x 3, normal behavior, not anxious. Patient seen and examined. Chart, labs & data reviewed. A:  1. Chronically elevated troponins. No ischemic cardiac symptoms. 2. Chronic renal insufficiency. Status post transplant. 3. Mild hypervolemia. Right heart catheterization at [facility] in March demonstrates normal cardiac output, normal filling pressures. Borderline pulmonary hypertension in the setting of chronic lung disease with normal cardiac output and normal LV filling pressures. Volume due to chronic renal insufficiency. 4. Moderate sized pericardial effusion with no tamponade in the setting of chronic renal sufficiency. 5. Moderate asymmetric septal hypertrophy. 6. COVID in March. 7. Hypercholesterolemia  8. Sleep apnea. Noncompliant. 9. Hypertension. 10. Chronic mixed lung disease. 11. Diabetes. Rec:  1. Continue current cardiac medications. 2. Will defer volume management to nephrology. 3. No additional cardiac testing is needed at this time. 4. Cardiology will sign off.     Electronically signed by Lizz Bloom DO on 6/15/2022 at 5:19 PM    Note: This report was completed using computerized voice recognition software. Every effort has been made to ensure accuracy, however; and invert and computerized transcription errors may be present.

## 2022-06-15 NOTE — PROGRESS NOTES
Nutrition Education      Counseled patient on heart healthy/cardiac diet. Provided educational handouts and sample meal plans. Educated patient on foods high and low in sodium.          Anju Donato, CHAI, LD  Contact Number: [phone]

## 2022-06-16 VITALS
OXYGEN SATURATION: 98 % | TEMPERATURE: 98.2 F | SYSTOLIC BLOOD PRESSURE: 115 MMHG | BODY MASS INDEX: 24.83 KG/M2 | DIASTOLIC BLOOD PRESSURE: 57 MMHG | HEART RATE: 58 BPM | WEIGHT: 178 LBS | RESPIRATION RATE: 16 BRPM

## 2022-06-16 LAB
ANION GAP SERPL CALCULATED.3IONS-SCNC: 17 MMOL/L (ref 7–16)
BUN BLDV-MCNC: 61 MG/DL (ref 6–23)
CALCIUM SERPL-MCNC: 9.1 MG/DL (ref 8.6–10.2)
CHLORIDE BLD-SCNC: 101 MMOL/L (ref 98–107)
CO2: 22 MMOL/L (ref 22–29)
CREAT SERPL-MCNC: 3.2 MG/DL (ref 0.7–1.2)
GFR AFRICAN AMERICAN: 23
GFR NON-AFRICAN AMERICAN: 23 ML/MIN/1.73
GLUCOSE BLD-MCNC: 41 MG/DL (ref 74–99)
MAGNESIUM: 2 MG/DL (ref 1.6–2.6)
METER GLUCOSE: 66 MG/DL (ref 74–99)
METER GLUCOSE: 78 MG/DL (ref 74–99)
METER GLUCOSE: 93 MG/DL (ref 74–99)
PARATHYROID HORMONE INTACT: 154 PG/ML (ref 15–65)
PHOSPHORUS: 3.9 MG/DL (ref 2.5–4.5)
POTASSIUM SERPL-SCNC: 4.4 MMOL/L (ref 3.5–5)
PRO-BNP: ABNORMAL PG/ML (ref 0–125)
SODIUM BLD-SCNC: 140 MMOL/L (ref 132–146)

## 2022-06-16 PROCEDURE — 96372 THER/PROPH/DIAG INJ SC/IM: CPT

## 2022-06-16 PROCEDURE — 6360000002 HC RX W HCPCS: Performed by: FAMILY MEDICINE

## 2022-06-16 PROCEDURE — 83970 ASSAY OF PARATHORMONE: CPT

## 2022-06-16 PROCEDURE — 84100 ASSAY OF PHOSPHORUS: CPT

## 2022-06-16 PROCEDURE — 82962 GLUCOSE BLOOD TEST: CPT

## 2022-06-16 PROCEDURE — G0378 HOSPITAL OBSERVATION PER HR: HCPCS

## 2022-06-16 PROCEDURE — 83735 ASSAY OF MAGNESIUM: CPT

## 2022-06-16 PROCEDURE — 6370000000 HC RX 637 (ALT 250 FOR IP): Performed by: FAMILY MEDICINE

## 2022-06-16 PROCEDURE — 80048 BASIC METABOLIC PNL TOTAL CA: CPT

## 2022-06-16 PROCEDURE — 83880 ASSAY OF NATRIURETIC PEPTIDE: CPT

## 2022-06-16 PROCEDURE — S5553 INSULIN LONG ACTING 5 U: HCPCS | Performed by: FAMILY MEDICINE

## 2022-06-16 PROCEDURE — 6370000000 HC RX 637 (ALT 250 FOR IP): Performed by: INTERNAL MEDICINE

## 2022-06-16 PROCEDURE — 36415 COLL VENOUS BLD VENIPUNCTURE: CPT

## 2022-06-16 RX ORDER — BUMETANIDE 1 MG/1
1 TABLET ORAL 2 TIMES DAILY
Status: DISCONTINUED | OUTPATIENT
Start: 2022-06-17 | End: 2022-06-16 | Stop reason: HOSPADM

## 2022-06-16 RX ORDER — BUMETANIDE 1 MG/1
1 TABLET ORAL 2 TIMES DAILY
Qty: 60 TABLET | Refills: 1 | Status: SHIPPED | OUTPATIENT
Start: 2022-06-17

## 2022-06-16 RX ADMIN — HEPARIN SODIUM 5000 UNITS: 10000 INJECTION INTRAVENOUS; SUBCUTANEOUS at 06:29

## 2022-06-16 RX ADMIN — OXYCODONE HYDROCHLORIDE 20 MG: 10 TABLET ORAL at 17:11

## 2022-06-16 RX ADMIN — TACROLIMUS 2 MG: 1 CAPSULE ORAL at 10:43

## 2022-06-16 RX ADMIN — AMLODIPINE BESYLATE 5 MG: 5 TABLET ORAL at 10:43

## 2022-06-16 RX ADMIN — SODIUM BICARBONATE 650 MG: 650 TABLET ORAL at 10:43

## 2022-06-16 RX ADMIN — MYCOPHENOLATE MOFETIL 1000 MG: 250 CAPSULE ORAL at 10:43

## 2022-06-16 RX ADMIN — METOPROLOL TARTRATE 50 MG: 50 TABLET, FILM COATED ORAL at 10:44

## 2022-06-16 RX ADMIN — HYDRALAZINE HYDROCHLORIDE 100 MG: 50 TABLET, FILM COATED ORAL at 10:43

## 2022-06-16 RX ADMIN — PANTOPRAZOLE SODIUM 40 MG: 40 TABLET, DELAYED RELEASE ORAL at 06:17

## 2022-06-16 RX ADMIN — OXYCODONE HYDROCHLORIDE 20 MG: 10 TABLET ORAL at 10:44

## 2022-06-16 RX ADMIN — ISOSORBIDE MONONITRATE 60 MG: 60 TABLET, EXTENDED RELEASE ORAL at 10:43

## 2022-06-16 RX ADMIN — SULFAMETHOXAZOLE AND TRIMETHOPRIM 1 TABLET: 400; 80 TABLET ORAL at 10:44

## 2022-06-16 RX ADMIN — ASPIRIN 81 MG: 81 TABLET, COATED ORAL at 10:43

## 2022-06-16 RX ADMIN — CALCITRIOL CAPSULES 0.25 MCG 0.25 MCG: 0.25 CAPSULE ORAL at 10:43

## 2022-06-16 RX ADMIN — HYDRALAZINE HYDROCHLORIDE 100 MG: 50 TABLET, FILM COATED ORAL at 17:12

## 2022-06-16 RX ADMIN — INSULIN GLARGINE-YFGN 12 UNITS: 100 INJECTION, SOLUTION SUBCUTANEOUS at 12:23

## 2022-06-16 ASSESSMENT — PAIN SCALES - GENERAL
PAINLEVEL_OUTOF10: 0
PAINLEVEL_OUTOF10: 7

## 2022-06-16 ASSESSMENT — PAIN DESCRIPTION - LOCATION: LOCATION: BACK

## 2022-06-16 ASSESSMENT — PAIN DESCRIPTION - DESCRIPTORS: DESCRIPTORS: ACHING

## 2022-06-16 NOTE — PROGRESS NOTES
[facility]  Nephrology Progress Note    Patient's Name: Feroz Tan    History of Present Illness from 6/15 consult note:    Millie Coelho is a 70 y.o. male with a past medical history of hypertension, hyperlipidemia, pulmonary hypertension, CHF, and atrial fibrillation. He presented to the ED on 6/14 with complaints of shortness of breath. Vital signs at presentation to the ED include temperature 98.9, pulse 106, /62, and he was 92% on room air. Lab data at presentation to the ED include BUN 45, creatinine 2.7, proBNP 26,284, troponin 177, and hemoglobin 10.9. Chest x-ray showed \"coarsened pulmonary markings which likely relate to fibrosis. \"  We were consulted to see the patient for chronic renal failure, heart failure. Patient is known to our service. He has a history of renal transplant from [facility] in 2014. He has a recent baseline creatinine of 2.4-2.8. At present, patient was seen and examined. He reports that he came in due to shortness of breath. He also explained that he had recently lost weight, as his appetite has been so-so prior to admission. He denies any current chest pain or shortness of breath. He denies any abdominal pain, nausea, or vomiting. He denies any fevers. He reports that prior to admission he was urinating okay. He has a visitor at the bedside. \"    Subjective:     6/16: Patient was seen and examined. He reports that he feels \"good. \"  He reports that his appetite is good. He denies any current chest pain or shortness of breath. He denies any abdominal pain or nausea.     PMH:    Past Medical History:   Diagnosis Date    Anemia     Iron deficiency    Atrial fibrillation (HCC)     CHF (congestive heart failure) (HCC) 03/12/2008    Chronic diastolic congestive heart failure ([facility] 03/12/2008    Chronic foot pain     Chronic knee pain     BILATERAL    Constipation     Depression     DM (diabetes mellitus) ([facility]     ESRD (end stage renal disease) ([facility]     H/O kidney transplant 12/12/2014    Hyperlipidemia     Hypertension     Immunosuppression ([facility] 3/12/2008    Kidney disease     Pulmonary hypertension ([facility]      Patient Active Problem List   Diagnosis    GERD (gastroesophageal reflux disease)    CKD (chronic kidney disease) stage 4, GFR 15-29 ml/min ([facility])    Mixed hyperlipidemia    Diabetes mellitus, type 2 [facility]    HTN (hypertension), benign    Junctional bradycardia    Anemia in stage 3 chronic kidney disease ([facility]    CAD (coronary artery disease), native coronary artery    Acute CHF (congestive heart failure) ([facility])    Chest pain    Respiratory failure ([facility])    Acute kidney injury ([facility]    Vomiting and diarrhea    Acute kidney injury superimposed on CKD ([facility])    Chronic diastolic congestive heart failure ([facility]    H/O kidney transplant    Pulmonary hypertension ([facility]    Immunosuppression ([facility]    Pneumonia due to COVID-19 virus    Acute renal insufficiency    GI bleed    Acute blood loss anemia    Acute combined systolic and diastolic CHF, NYHA class 1 ([facility])    Chronic kidney disease    Anemia of chronic disease    Hyperkalemia    Essential hypertension    Type 2 diabetes mellitus, with long-term current use of insulin ([facility])    Obesity (BMI 30-39. 9)    Acute decompensated heart failure ([facility])    Acute diastolic heart failure ([facility])    Atrial fibrillation ([facility])     Meds:     docusate sodium  100 mg Oral Nightly    pantoprazole  40 mg Oral QAM AC    insulin lispro  0-10 Units SubCUTAneous 4x Daily AC & HS    epoetin rowan-epbx  10,000 Units SubCUTAneous Once    amLODIPine  5 mg Oral Daily    aspirin  81 mg Oral Daily    calcitRIOL  0.25 mcg Oral Daily    hydrALAZINE  100 mg Oral TID    insulin glargine-yfgn  12 Units SubCUTAneous Lunch    isosorbide mononitrate  60 mg Oral Daily    metoprolol tartrate  50 mg Oral BID    mycophenolate  1,000 mg Oral BID    sodium bicarbonate  650 mg Oral 4x Daily    sulfamethoxazole-trimethoprim  1 tablet Oral BID    tacrolimus  2 mg Oral BID    sodium chloride flush  5-40 mL IntraVENous 2 times per day    heparin (porcine)  5,000 Units SubCUTAneous 3 times per day    bumetanide  1 mg IntraVENous BID      dextrose      sodium chloride       Meds prn:     glucose, dextrose bolus **OR** dextrose bolus, glucagon (rDNA), dextrose, ammonium lactate, oxyCODONE, sodium chloride flush, sodium chloride, ondansetron **OR** ondansetron, magnesium hydroxide, acetaminophen **OR** acetaminophen    Meds prior to admission:     No current facility-administered medications on file prior to encounter.      Current Outpatient Medications on File Prior to Encounter   Medication Sig Dispense Refill    amLODIPine (NORVASC) 5 MG tablet Take 1 tablet by mouth daily 90 tablet 1    bumetanide (BUMEX) 2 MG tablet Take 1 tablet by mouth daily 30 tablet 3    bumetanide (BUMEX) 2 MG tablet Take 0.5 tablets by mouth daily (Patient taking differently: Take 1 mg by mouth daily Pt takes 1mg bid as instructed on bottle) 1 tablet 1    sulfamethoxazole-trimethoprim (BACTRIM;SEPTRA) 400-80 MG per tablet Take 2 tablets by mouth 2 times daily       Cholecalciferol (VITAMIN D3) 125 MCG (5000 UT) TABS Take by mouth      ammonium lactate (LAC-HYDRIN) 12 % lotion ammonium lactate 12 % lotion      bisacodyl (DULCOLAX) 5 MG EC tablet Take by mouth      ONETOUCH ULTRA strip       BD PEN NEEDLE TAINA 2ND GEN 32G X 4 MM MISC       Lancets (ONETOUCH DELICA PLUS [id number]) MISC       metoprolol tartrate (LOPRESSOR) 50 MG tablet 50 mg 2 times daily       LOKELMA 10 g PACK oral suspension  (Patient not taking: Reported on 2/28/2022)      tiZANidine (ZANAFLEX) 4 MG tablet  (Patient not taking: Reported on 3/10/2022)      labetalol (NORMODYNE) 200 MG tablet Take by mouth daily  (Patient not taking: Reported on 3/10/2022)      calcitRIOL (ROCALTROL) 0.25 MCG capsule Take 1 capsule by mouth daily 30 capsule 3    OXYGEN Inhale 2 L into the lungs daily as needed  (Patient not taking: Reported on 2/21/2022)      insulin glargine (LANTUS SOLOSTAR) 100 UNIT/ML injection pen Inject 12 Units into the skin daily At noon   Had today (Patient taking differently: Inject 12 Units into the skin daily ) 5 pen 3    oxyCODONE (ROXICODONE) 20 MG immediate release tablet Take 20 mg by mouth every 8 hours as needed for Pain.  hydrALAZINE (APRESOLINE) 100 MG tablet Take 100 mg by mouth 3 times daily      simvastatin (ZOCOR) 10 MG tablet Take 10 mg by mouth daily      isosorbide mononitrate (IMDUR) 60 MG extended release tablet TAKE 1 TABLET DAILY 90 tablet 3    tacrolimus (PROGRAF) 1 MG capsule Take 2 capsules by mouth 2 times daily 60 capsule 3    sodium bicarbonate 650 MG tablet Take 650 mg by mouth 4 times daily       Multiple Vitamin (MULTI VITAMIN DAILY PO) Take by mouth daily      mycophenolate (CELLCEPT) 250 MG capsule Take 1,000 mg by mouth 2 times daily       insulin lispro (HUMALOG KWIKPEN) 100 UNIT/ML SOPN Inject 5 Units into the skin daily (before lunch) Patient takes with his biggest meal      docusate sodium (COLACE) 100 MG capsule Take 1 capsule by mouth 2 times daily as needed for Constipation. (Patient taking differently: Take 100 mg by mouth daily as needed for Constipation ) 20 capsule 0    linagliptin (TRADJENTA) 5 MG tablet Take 5 mg by mouth daily.  aspirin 81 MG EC tablet Take 81 mg by mouth daily. Allergies:    Patient has no known allergies. Social History:     reports that he quit smoking about 32 years ago. His smoking use included cigarettes. He has a 20.00 pack-year smoking history. He has never used smokeless tobacco. He reports previous alcohol use. He reports that he does not use drugs.     Family History:         Problem Relation Age of Onset    Diabetes Mother     High Blood Pressure Mother     Heart Disease Mother     Diabetes Father     Cancer Father     Stroke Father    [facility] Heart Disease Father      Physical Exam:    Patient Vitals for the past 24 hrs:   BP Temp Temp src Pulse Resp SpO2 Weight   06/16/22 0600 -- -- -- -- -- -- 178 lb (80.7 kg)   06/15/22 2131 -- -- -- -- 18 -- --   06/15/22 2101 118/61 98 °F (36.7 °C) Oral 95 16 100 % --   06/15/22 1637 100/60 97.7 °F (36.5 °C) Oral (!) 47 18 -- --   06/15/22 1430 (!) 106/57 -- -- (!) 47 18 -- --   06/15/22 1012 -- -- -- -- 18 -- --   06/15/22 0950 (!) 152/60 -- -- -- -- -- --   06/15/22 0900 (!) 152/60 -- -- 60 -- -- --       Intake/Output Summary (Last 24 hours) at 6/16/2022 0851  Last data filed at 6/15/2022 1429  Gross per 24 hour   Intake 360 ml   Output --   Net 360 ml     General: Awake, alert, no acute distress  Neck: No JVD noted  Lungs: Clear bilaterally upper, diminished to the bases bilaterally. Unlabored  CV: Regular rate and rhythm. No rub  Abd: Soft, nontender, nondistended. Active bowel sounds  Skin: Warm and dry. No rash on exposed extremities  Ext: trace BLE edema   Neuro: Awake, answers questions appropriately    Data:    Recent Labs     06/14/22  0947   WBC 6.5   HGB 10.9*   HCT 36.0*   MCV 88.0        Recent Labs     06/14/22  0947 06/15/22  0441 06/16/22  0440    139 140   K 5.0 4.8 4.4    104 101   CO2 22 23 22   CREATININE 2.7* 2.8* 3.2*   BUN 45* 54* 61*   LABGLOM 28 27 23   GLUCOSE 161* 94 41*   CALCIUM 9.3 9.1 9.1   PHOS  --   --  3.9   MG  --  2.1 2.0     Vit D, 25-Hydroxy   Date Value Ref Range Status   10/05/2021 77 30 - 100 ng/mL Final     Comment:     <20 ng/mL. ........... Heddy  Deficient  20-30 ng/mL. ......... Heddy  Insufficient   ng/mL. ........ Heddy  Sufficient  >100 ng/mL. .......... Heddy  Toxic       PTH   Date Value Ref Range Status   06/16/2022 154 (H) 15 - 65 pg/mL Final     No results for input(s): ALT, AST, ALKPHOS, BILITOT, BILIDIR in the last 72 hours. No results for input(s): LABALBU in the last 72 hours.     Ferritin   Date Value Ref Range Status   02/21/2022 62 ng/mL Final Comment:     FERRITIN Reference Ranges:  Adult Males   20 - 60 years:    30 - 400 ng/mL  Adult females 16 - 61 years:    13 - 150 ng/mL  Adults greater than 60 years:   no established reference range  Pediatrics:                     no established reference range       Iron   Date Value Ref Range Status   06/14/2022 19 (L) 59 - 158 mcg/dL Final     TIBC   Date Value Ref Range Status   06/14/2022 220 (L) 250 - 450 mcg/dL Final     Vitamin B-12   Date Value Ref Range Status   04/28/2017 281 211 - 946 pg/mL Final     Folate   Date Value Ref Range Status   04/28/2017 7.4 4.8 - 24.2 ng/mL Final     Lab Results   Component Value Date    COLORU Yellow 04/10/2021    NITRU Negative 04/10/2021    GLUCOSEU Negative 04/10/2021    KETUA Negative 04/10/2021    UROBILINOGEN 0.2 04/10/2021    BILIRUBINUR Negative 04/10/2021     Lab Results   Component Value Date/Time    OSMOU 351 04/10/2021 08:25 PM     No components found for: URIC    No results found for: LIPIDPAN    Assessment and Plans:    1. CKD 4  History of renal transplant from [facility] in 2014- On Prograf, CellCept, Bactrim  recent baseline creatinine of 2.4-2.8  Creatinine 3.2 today  Avoid nephrotoxins/NSAIDs-adjust meds for level of renal function  Strict I&O, daily weights  Will hold Bumex 1 mg IV twice daily for today  will start Bumex 1 mg oral twice daily tomorrow  Monitor labs    2. HFpEF  proBNP 26,284 on 6/14--> 11,599 today  Echo 4/2021 EF 60 to 70%, diastolic dysfunction stage I  Strict I&O, daily weights  Will hold Bumex 1 mg IV twice daily for today  will start Bumex 1 mg oral twice daily tomorrow  Cardiology following    3. Anemia in CKD  Hemoglobin target 10-12  Hemoglobin at target- 10.9 on 6/14  Monitor H&H    4.  Hypertension in CKD  BP goal<120/80  BP at goal  Monitor on current regimen and with diuresis    5.   Secondary hyperparathyroidism of renal origin   and phosphorus 3.9 on 6/16  On calcitriol  Monitor labs    Hugo Deal, CHAI - CNP Pt seen and examined agree with above  Change to oral bumex  Follow cr  Ok for Bridger Sánchez MD

## 2022-06-16 NOTE — PROGRESS NOTES
Hospitalist Progress Note      PCP: GUIDO GERONIMO III,     Date of Admission: 6/14/2022        Hospital Course:  *70 y.o. male presented with SOB, ONSET 3 DAYS AGO, WORSE WITH EXERTION BECOMING PROGRESSIVELY WORSE, AND HE COULD NOT EAT. EDEMA AT ONE POINT WAS UP TO HIS THIGHS. HE ASLO HAD ATRIAL FIB,  THIS IS NEW FOR HIM. NO CHEST PAIN,. HAD A SIMILAR EPISODE BEFORE AND WAS ON BUMEX PER [facility].   .** NO AFIB PER CARDIOLOGY, OK TO DISCHARGE    **        Subjective: WANTS TO GO HOME          Medications:  Reviewed    Infusion Medications    dextrose      sodium chloride       Scheduled Medications    [START ON 6/17/2022] bumetanide  1 mg Oral BID    docusate sodium  100 mg Oral Nightly    pantoprazole  40 mg Oral QAM AC    insulin lispro  0-10 Units SubCUTAneous 4x Daily AC & HS    epoetin rowan-epbx  10,000 Units SubCUTAneous Once    amLODIPine  5 mg Oral Daily    aspirin  81 mg Oral Daily    calcitRIOL  0.25 mcg Oral Daily    hydrALAZINE  100 mg Oral TID    insulin glargine-yfgn  12 Units SubCUTAneous Lunch    isosorbide mononitrate  60 mg Oral Daily    metoprolol tartrate  50 mg Oral BID    mycophenolate  1,000 mg Oral BID    sodium bicarbonate  650 mg Oral 4x Daily    sulfamethoxazole-trimethoprim  1 tablet Oral BID    tacrolimus  2 mg Oral BID    sodium chloride flush  5-40 mL IntraVENous 2 times per day    heparin (porcine)  5,000 Units SubCUTAneous 3 times per day    [Held by provider] bumetanide  1 mg IntraVENous BID     PRN Meds: glucose, dextrose bolus **OR** dextrose bolus, glucagon (rDNA), dextrose, ammonium lactate, oxyCODONE, sodium chloride flush, sodium chloride, ondansetron **OR** ondansetron, magnesium hydroxide, acetaminophen **OR** acetaminophen    No intake or output data in the 24 hours ending 06/16/22 1619    Exam:    BP (!) 115/57   Pulse 58   Temp 98.2 °F (36.8 °C) (Oral)   Resp 16   Wt 178 lb (80.7 kg)   SpO2 98%   BMI 24.83 kg/m²       General appearance: No apparent distress, appears stated age and cooperative. HEENT:  Normal cephalic, atraumatic without obvious deformity. Neck: Supple, with full range of motion. No jugular venous distention. Trachea midline. Respiratory:  Normal respiratory effort. Clear to auscultation, bilaterally without Rales/Wheezes/Rhonchi. Cardiovascular:  Regular rate and rhythm   Abdomen: Soft, non-tender, non-distended with normal bowel sounds. Musculoskeletal:  No clubbing, cyanosis pos edema bilaterally. Skin: Skin color, texture, turgor normal.  No rashes or lesions. Neurologic:  Neurovascularly intact without any focal sensory/motor deficits. Cranial nerves: II-XII intact, grossly non-focal.  Psychiatric:  Alert and oriented, thought content appropriate, normal insight                  Labs:   Recent Labs     06/14/22  0947   WBC 6.5   HGB 10.9*   HCT 36.0*        Recent Labs     06/14/22  0947 06/15/22  0441 06/16/22  0440    139 140   K 5.0 4.8 4.4    104 101   CO2 22 23 22   BUN 45* 54* 61*   CREATININE 2.7* 2.8* 3.2*   CALCIUM 9.3 9.1 9.1   PHOS  --   --  3.9     No results for input(s): AST, ALT, BILIDIR, BILITOT, ALKPHOS in the last 72 hours. Recent Labs     06/14/22  0947   INR 1.2     No results for input(s): Kymberly Basques in the last 72 hours. No results for input(s): AST, ALT, ALB, BILIDIR, BILITOT, ALKPHOS in the last 72 hours. No results for input(s): LACTA in the last 72 hours.   Lab Results   Component Value Date    LABURIC 6.4 06/03/2014     Lab Results   Component Value Date    AMMONIA 20.3 04/17/2021       Assessment:  ACUTE DIASTOLIC HEART FAILURE   ii dm  PH   renal transplant   sob   depression   ii dm   iron def anemia   pulmonary cachexia   SOB     Plan:  **DC HOME    Electronically signed by Jaimie Wesley DO on 6/16/2022 at 4:19 PM [facility]

## 2022-06-16 NOTE — PLAN OF CARE
Patient currently admitted with diagnosis of Diastolic heart failure. Patient was alert and oriented during the consultation. He was engaged and asked appropriate questions throughout the education session. He is agreeable to self monitoring, improved low sodium diet, and reconnecting with the CHF clinic and utilizing same days appointments when needed. Scheduling with the CHF clinic, cardiology APRN Yes. Per PCP policy and procedure patient to call to schedule hospital follow up appointment on day of discharge. Future Appointments   Date Time Provider Alfa Moore   6/22/2022 11:30 AM [facility]   6/29/2022 11:30 AM [facility]   7/1/2022  9:30 AM [facility] - CNP [facility]   7/6/2022 11:30 AM [facility] 11 [facility]   7/7/2022  9:45 AM SE CHF ROOM 1 [facility] CHF [facility]   7/13/2022 11:30 AM [facility] 11 [facility]   7/20/2022 11:30 AM [facility] 11 [facility]   7/27/2022 11:30 AM [facility] 11 [facility]            We reviewed the introduction to Heart Failure, the HF zones, signs and symptoms to report on day 1 of onset, medications, medication compliance, the importance of obtaining daily weights, following a low sodium diet, reading food labels for the sodium content, keeping physician appointments, and smoking cessation. We discussed writing / tracking daily weights on a calendar / log because a 5 pound gain in 1 week can sneak up if you are not tracking it. Contributing risk factors for Heart Failure are identified as none. I advised patient they can reduce the risk for Heart Failure exacerbations by modifying / controlling the risk factors.  We discussed self-managed care which includes the following:  to take medications as prescribed, report any intolerable side effects of medications to the cardiologist / doctor, do not just stop taking the medication; follow a cardiac heart healthy / low sodium diet; weigh yourself daily, exercise regularly- per doctor recommendation and not to smoke or use an excess amount of alcohol. We discussed calling the cardiologist / doctor with a weight gain of 3 pounds in one day or a total of 5 pounds or more in one week. Also, if you should have a significant weight loss of 3# or more in one day to call the doctor, they may need to decrease or hold the diuretic dose. On days you feels nauseated and not eating / drinking, having emesis or diarrhea,  informed to call the cardiologist  / doctor, they may need to decrease or hold diuretic to avoid dehydration. I stressed the importance of informing their cardiologist the first day of onset of any of the signs and symptoms in the \"Yellow Zone\" of the HF Zones. Patient verbalizes understanding. Greater than 30 minutes was spent educating patient. The Heart Failure Booklet given to the patient with additional patient education addressing:  · What is Heart Failure? · Things You Can Do to Live Well with HF  · How to Take Your Medications  · How to Eat Less Salt  · Garland its Salt? · Exercising Well with Heart Failure  · Signs and symptoms of HF to report  · Weight Yourself Each Day  · Home Self Management- activity, weight tracking, taking medications as prescribed, meals /diet planning (sodium and fluid restriction), how to read food labels, keeping physician follow ups, smoking cessation, follow the Heart Failure Zones  · The Heart Failure zones  · Every Dose Every Day      Instructed  to call 911 if you have any of the following symptoms:    ·    Struggling to breathe unrelieved with rest,  ·    Having chest pain  ·    Have confusion or cant think clearly      The patient is ordered:  Diet: ADULT DIET; Regular; 5 carb choices (75 gm/meal); Low Sodium (2 gm); Low Potassium (Less than 3000 mg/day);  Low Phosphorus (Less than 1000 mg)   Sodium controlled diet Yes  Fluid restriction daily ordered (fluid restriction recommended if patient is hyponatremic and/or diuretic is initiated or increased) No  FR:   Daily Weights:   Patient Vitals for the past 96 hrs (Last 3 readings):   Weight   22 0600 178 lb (80.7 kg)   06/15/22 0600 178 lb 1.6 oz (80.8 kg)   22 0931 190 lb (86.2 kg)     I/O:     Intake/Output Summary (Last 24 hours) at 2022 1122  Last data filed at 6/15/2022 1429  Gross per 24 hour   Intake 0 ml   Output --   Net 0 ml            Rayo Sánchez RN BSN, RN  Heart Failure Navigator        CONGESTIVE HEART FAILURE (CHF) AHA GUIDELINES  (Must be completed for Primary Diagnosis CHF or History of CHF)    Discharge Plan:  I placed the Heart Failure Home Instructions in patient's discharge instructions. Per Heart Failure [facility], the patient should have a follow-up appointment made within 7 days of discharge.     New Diagnosis No    ECHO Results most recent:  Lab Results   Component Value Date    LVEF 75 10/07/2021    LVEFMODE Echo 2015                                        Social History     Tobacco Use   Smoking Status Former Smoker    Packs/day: 1.00    Years: 20.00    Pack years: 20.00    Types: Cigarettes    Quit date: 1990    Years since quittin.4   Smokeless Tobacco Never Used        Immunization History   Administered Date(s) Administered    Td, unspecified formulation 2013          Angiotensin-Converting-Enzyme (ACE) inhibitor ordered:  [] Yes  [x] No (specify contraindication):    [] Contraindicated  [] Hypotensive patient who was at immediate risk of cardiogenic shock  [] Hospitalized patient who experienced marked azotemia  [] Other Contraindications  [x] Not Eligible  [] Not Tolerant  [] Patient Reason  [] System Reason  [] Other Reason    Angiotensin II receptor blockers (ARB) ordered:  [] Yes  [x] No (specify contraindication):    [] Contraindicated  [] Hypotensive patient who was at immediate risk of cardiogenic shock  [] Hospitalized patient who experienced marked azotemia  [] Other Contraindications    ARNI - Angiotensin Receptor Neprilysin Inhibitor ordered:  [] Yes  [x] No (specify contraindication):    [] ACE inhibitor use within the prior 36 hours  [] Allergy  [] Hyperkalemia  [] Hypotension  [] Renal dysfunction defined as creatinine > 2.5 mg/dL in men or > 2.0 mg/dL in women  [] Other Contraindications  [] Not Eligible  [] Not Tolerant  [] Patient Reason  []System Reason  []Other Reason      Beta Blocker (Carvedilol, Metoprolol Succinate, or Bisoprolol) ordered:    [x] Yes  Lopressor  [] No (specify contraindication):    [] Contraindicated  [] Asthma  [] Fluid Overload  [] Low Blood Pressure  [] Patient recently treated with an intravenous positive inotropic agent  [] Other Contraindications  [] Not Eligible  [] Not Tolerant  [] Patient Reason  [] System Reason    SGLT2 Inhibitor ordered:  [] Yes  [x] No (specify contraindication):    [] Contraindicated  [] Patient currently on dialysis  [] Ketoacidosis  [] Known hypersensitivity to the medication  [] Type I diabetes (not approved for use in patients with Type I diabetes due to increased risk of ketoacidosis)  [] Other Contraindications  [] Not Eligible  [] Not Tolerant  [] Patient Reason  [x] System Reason  [] Other Reason    Aldosterone Antagonist ordered:  [] Yes  [x] No (specify contraindication):    [] Contraindicated  [] Allergy due to aldosterone receptor antagonist  [] Hyperkalemia  [] Renal dysfunction defined as creatinine >2.5 mg/dL in men or >2.0 mg/dL in women.   [] Other contraindications  [] Not Eligible  [] Not Tolerant  [] Patient Reason  [] System Reason  [] Other Reason

## 2022-06-16 NOTE — PLAN OF CARE
Problem: Chronic Conditions and Co-morbidities  Goal: Patient's chronic conditions and co-morbidity symptoms are monitored and maintained or improved  Outcome: Completed     Problem: Discharge Planning  Goal: Discharge to home or other facility with appropriate resources  Outcome: Completed     Problem: Safety - Adult  Goal: Free from fall injury  Outcome: Completed     Problem: ABCDS Injury Assessment  Goal: Absence of physical injury  Outcome: Completed     Problem: Cardiovascular - Adult  Goal: Maintains optimal cardiac output and hemodynamic stability  Outcome: Completed     Problem: Metabolic/Fluid and Electrolytes - Adult  Goal: Hemodynamic stability and optimal renal function maintained  Outcome: Completed     Problem: Pain  Goal: Verbalizes/displays adequate comfort level or baseline comfort level  Outcome: Completed

## 2022-06-20 ENCOUNTER — HOSPITAL ENCOUNTER (OUTPATIENT)
Age: 72
Discharge: HOME | End: 2022-06-20
Payer: MEDICARE

## 2022-06-20 LAB
ANION GAP SERPL CALCULATED.3IONS-SCNC: 13 MMOL/L (ref 7–16)
ANISOCYTOSIS: ABNORMAL
BASOPHILS ABSOLUTE: 0.01 E9/L (ref 0–0.2)
BASOPHILS RELATIVE PERCENT: 0.4 % (ref 0–2)
BUN BLDV-MCNC: 55 MG/DL (ref 6–23)
BURR CELLS: ABNORMAL
CALCIUM SERPL-MCNC: 9.2 MG/DL (ref 8.6–10.2)
CHLORIDE BLD-SCNC: 104 MMOL/L (ref 98–107)
CO2: 24 MMOL/L (ref 22–29)
CREAT SERPL-MCNC: 3.3 MG/DL (ref 0.7–1.2)
EOSINOPHILS ABSOLUTE: 0.08 E9/L (ref 0.05–0.5)
EOSINOPHILS RELATIVE PERCENT: 2.9 % (ref 0–6)
GFR AFRICAN AMERICAN: 22
GFR NON-AFRICAN AMERICAN: 22 ML/MIN/1.73
GLUCOSE BLD-MCNC: 66 MG/DL (ref 74–99)
HCT VFR BLD CALC: 36.9 % (ref 37–54)
HEMOGLOBIN: 11 G/DL (ref 12.5–16.5)
IMMATURE GRANULOCYTES #: 0.01 E9/L
IMMATURE GRANULOCYTES %: 0.4 % (ref 0–5)
LYMPHOCYTES ABSOLUTE: 0.44 E9/L (ref 1.5–4)
LYMPHOCYTES RELATIVE PERCENT: 15.9 % (ref 20–42)
MCH RBC QN AUTO: 26.5 PG (ref 26–35)
MCHC RBC AUTO-ENTMCNC: 29.8 % (ref 32–34.5)
MCV RBC AUTO: 88.9 FL (ref 80–99.9)
MONOCYTES ABSOLUTE: 0.42 E9/L (ref 0.1–0.95)
MONOCYTES RELATIVE PERCENT: 15.2 % (ref 2–12)
NEUTROPHILS ABSOLUTE: 1.81 E9/L (ref 1.8–7.3)
NEUTROPHILS RELATIVE PERCENT: 65.2 % (ref 43–80)
OVALOCYTES: ABNORMAL
PDW BLD-RTO: 14.9 FL (ref 11.5–15)
PHOSPHORUS: 3.5 MG/DL (ref 2.5–4.5)
PLATELET # BLD: 228 E9/L (ref 130–450)
PMV BLD AUTO: 9.2 FL (ref 7–12)
POIKILOCYTES: ABNORMAL
POTASSIUM SERPL-SCNC: 4.5 MMOL/L (ref 3.5–5)
RBC # BLD: 4.15 E12/L (ref 3.8–5.8)
SCHISTOCYTES: ABNORMAL
SODIUM BLD-SCNC: 141 MMOL/L (ref 132–146)
WBC # BLD: 2.8 E9/L (ref 4.5–11.5)

## 2022-06-20 PROCEDURE — 85025 COMPLETE CBC W/AUTO DIFF WBC: CPT

## 2022-06-20 PROCEDURE — 36415 COLL VENOUS BLD VENIPUNCTURE: CPT

## 2022-06-20 PROCEDURE — 84100 ASSAY OF PHOSPHORUS: CPT

## 2022-06-20 PROCEDURE — 80048 BASIC METABOLIC PNL TOTAL CA: CPT

## 2022-06-22 ENCOUNTER — HOSPITAL ENCOUNTER (OUTPATIENT)
Dept: INFUSION THERAPY | Age: 72
Setting detail: INFUSION SERIES
Discharge: HOME | End: 2022-06-22

## 2022-06-24 DIAGNOSIS — D50.8 IRON DEFICIENCY ANEMIA SECONDARY TO INADEQUATE DIETARY IRON INTAKE: ICD-10-CM

## 2022-06-24 RX ORDER — EPINEPHRINE 1 MG/ML
0.3 INJECTION, SOLUTION, CONCENTRATE INTRAVENOUS PRN
Status: CANCELLED | OUTPATIENT
Start: 2022-06-24

## 2022-06-24 RX ORDER — SODIUM CHLORIDE 9 MG/ML
5-250 INJECTION, SOLUTION INTRAVENOUS PRN
Status: CANCELLED | OUTPATIENT
Start: 2022-06-24

## 2022-06-24 RX ORDER — SODIUM CHLORIDE 0.9 % (FLUSH) 0.9 %
5-40 SYRINGE (ML) INJECTION PRN
Status: CANCELLED | OUTPATIENT
Start: 2022-06-24

## 2022-06-24 RX ORDER — SODIUM CHLORIDE 9 MG/ML
INJECTION, SOLUTION INTRAVENOUS CONTINUOUS
Status: CANCELLED | OUTPATIENT
Start: 2022-06-24

## 2022-06-24 RX ORDER — METHYLPREDNISOLONE SODIUM SUCCINATE 125 MG/2ML
125 INJECTION, POWDER, LYOPHILIZED, FOR SOLUTION INTRAMUSCULAR; INTRAVENOUS ONCE
Status: CANCELLED
Start: 2022-06-24 | End: 2022-06-24

## 2022-06-24 RX ORDER — HEPARIN SODIUM (PORCINE) LOCK FLUSH IV SOLN 100 UNIT/ML 100 UNIT/ML
500 SOLUTION INTRAVENOUS PRN
Status: CANCELLED | OUTPATIENT
Start: 2022-06-24

## 2022-06-24 RX ORDER — DIPHENHYDRAMINE HYDROCHLORIDE 50 MG/ML
50 INJECTION INTRAMUSCULAR; INTRAVENOUS
Status: CANCELLED | OUTPATIENT
Start: 2022-06-24

## 2022-06-29 ENCOUNTER — HOSPITAL ENCOUNTER (OUTPATIENT)
Dept: INFUSION THERAPY | Age: 72
Setting detail: INFUSION SERIES
End: 2022-06-29

## 2022-06-29 ENCOUNTER — OFFICE VISIT (OUTPATIENT)
Dept: CARDIOLOGY CLINIC | Age: 72
End: 2022-06-29
Payer: MEDICARE

## 2022-06-29 VITALS
HEIGHT: 71 IN | RESPIRATION RATE: 18 BRPM | BODY MASS INDEX: 25.79 KG/M2 | WEIGHT: 184.2 LBS | DIASTOLIC BLOOD PRESSURE: 70 MMHG | SYSTOLIC BLOOD PRESSURE: 138 MMHG | OXYGEN SATURATION: 98 % | HEART RATE: 80 BPM

## 2022-06-29 DIAGNOSIS — G47.33 OSA (OBSTRUCTIVE SLEEP APNEA): Primary | ICD-10-CM

## 2022-06-29 DIAGNOSIS — I50.33 ACUTE ON CHRONIC HEART FAILURE WITH PRESERVED EJECTION FRACTION (HFPEF) (HCC): ICD-10-CM

## 2022-06-29 PROCEDURE — 1123F ACP DISCUSS/DSCN MKR DOCD: CPT | Performed by: NURSE PRACTITIONER

## 2022-06-29 PROCEDURE — 99214 OFFICE O/P EST MOD 30 MIN: CPT | Performed by: NURSE PRACTITIONER

## 2022-06-29 PROCEDURE — 93000 ELECTROCARDIOGRAM COMPLETE: CPT | Performed by: INTERNAL MEDICINE

## 2022-06-29 NOTE — PATIENT INSTRUCTIONS
1. Continue current cardiac medications     2. Follow up with CHF clinic as scheduled - sooner if needed     3. Consider cardioMEMS implant     4. Referral for sleep study    5. Follow with Dr. Margarita Payan in 6 months (seeing [facility] in 3 months) - sooner if needed    6. Weigh yourself daily    -Stay Hydrated    -Diet should sodium restricted to 2 grams    -Again watch your daily weight trends and if you gain water weight please follow below instructions.    -If you gain 3-5 pounds in 2-3 days OR notice that you are retaining fluid in anyway just like you did before then take an extra dose of your water pill (Bumetanide/Bumex) every day until you lose the weight or feel better.     -If you notice that you have taken more than 2 extra doses in 1 week then please call and let us know. -If at any time you feel that you are retaining fluid, your medications are not working, or you feel ill in anyway, then please call us for either same day appointment or the next day, and for instructions. Our goal is to keep you out of the emergency room and the hospital and we have ways to do it. You just need to call us in a timely manner.     -If you become sick for other reasons, and notice that you are not urinating as much, the urine is very dark, you have significant diarrhea or vomiting, then please DO NOT take your water pill and CALL US immediately.

## 2022-06-29 NOTE — PROGRESS NOTES
[facility]  Office Visit         Reason for Visit: Heart Failure    Primary Cardiologist: Dr. Lauren Son        History of Present Illness:     Mr. Aida Guzmán is a 70year old male with a PMHx of chronic HFpEF, pulmonary hypertension (CpC-PH, WHO Group 2), ESRD s/p renal transplant now with CKD stage IV, HTN, HLD, T2DM, chronic anemia, OLINDA. He presents today in hospital follow-up, since discharge from the hospital he has been compliant with all of his current cardiac medications. He reports good urinary response to oral bumetanide with clear yellow urine production and stable weights. He attempts to monitor his diet for sodium content however occasionally does use can products products but attempts to rinse them. He denies dyspnea with exertion, shortness of breath, or decline in overall functional capacity. He denies orthopnea, PND, nocturnal cough or hemoptysis. He has a history of OLINDA, however has not been treated for many years and no longer has device at home. He denies abdominal distention or bloating, early satiety, anorexia/change in appetite, unintentional weight loss. He does lower extremity edema. He denies exertional lightheadedness. He denies palpitations, syncope or near syncope. Review of systems is negative for chest pain, pressure, discomfort. When ambulating on an incline, He does not leg claudication. History is negative for neurological symptoms including transient loss of vision, asymmetric weakness, aphasia, dysphasia, numbness, tingling. Past medical, surgical, family and social histories have been reviewed. Any changes in the past medical history, social history or family history have been made and are reflected in the electronic medical record.        Patient Active Problem List    Diagnosis Date Noted    Atrial fibrillation [facility])      Priority: Medium    Acute diastolic heart failure ([facility] 06/14/2022     Priority: Medium    Iron deficiency anemia secondary to inadequate dietary iron intake 06/24/2022     Diagnosis updated to problem list by Jaylene Ochoa, [address] [facility], [facility] 6/24/2022 7:15 AM, due to new billing requirements, based on transcribed order from Dr. Juan A Kumar.  Acute decompensated heart failure ([facility] 10/02/2021    Acute combined systolic and diastolic CHF, NYHA class 1 ([facility] 06/11/2021    Chronic kidney disease 06/11/2021    Anemia of chronic disease 06/11/2021    Hyperkalemia 06/11/2021    Essential hypertension 06/11/2021    Type 2 diabetes mellitus, with long-term current use of insulin ([facility] 06/11/2021    Obesity (BMI 30-39.9) 06/11/2021    Acute blood loss anemia 04/27/2021    Acute renal insufficiency 04/26/2021    GI bleed 04/26/2021    Pneumonia due to COVID-19 virus 04/19/2021    Acute kidney injury ([facility] 04/10/2021    Vomiting and diarrhea 04/10/2021    Acute kidney injury superimposed on CKD ([address] 04/10/2021    Pulmonary hypertension ([facility]     Respiratory failure ([facility] 04/27/2017    Acute CHF (congestive heart failure) ([facility] 07/24/2015    Chest pain 07/24/2015    H/O kidney transplant 12/12/2014    CKD (chronic kidney disease) stage 4, GFR 15-29 ml/min ([facility]) 06/02/2014    Mixed hyperlipidemia 06/02/2014    Diabetes mellitus, type 2 ([address] 06/02/2014    HTN (hypertension), benign 06/02/2014    Junctional bradycardia 06/02/2014    Anemia in stage 3 chronic kidney disease ([address] 06/02/2014     Diagnosis updated to problem list by Jaylene Ochoa, [facility] 2/7/2019 11:23 AM, due to new billing requirements, based on transcribed order from Dr. Juan A Kumar.  CAD (coronary artery disease), native coronary artery 06/02/2014    GERD (gastroesophageal reflux disease) 12/02/2010    Chronic diastolic congestive heart failure ([address] 03/12/2008    Immunosuppression ([facility] 03/12/2008     A  history review includes:  1. Chronic HFpEF  1. Right heart cath September 2020 ([facility]): RA 13, PA 91/25 (48), PCW 22, CI 2.5/2.1 (F/T) -referred to [facility]  2.  Negative amyloid scan at [facility]  3. TTE 4/2021: Normal biventricular size and systolic function.  Inadequate for PASP. EF 60 to 65% and stage I diastolic dysfunction and normal right ventricular structure and function  4. Seen at [facility] CHF clinic per my referral  2. October 2021 Lexiscan MPI normal (Dr. Sherice Su)  3. CKD status post kidney transplant in 2014 (underwent invasive evaluation of the transplanted renal artery at [facility] which was unremarkable)  1. Now with CKD 4 baseline creatinine 2.7-3.0  4. Type 2 diabetes on insulin  5. Mixed lung disease.  Was seen by [facility] in April 2020.  1. Chest CT without contrast showed mild aortic and coronary calcification and a dilated main PA to 3.9 cm.  There were no stigmata of interstitial lung disease but there was mild diffuse mosaic attenuation of the parenchyma and eventration of the right hemidiaphragm  2. PFTs showed a TLC of 48% and DLCO 48% predicted  6. Hypertension   7. Left heart cardiac catheterization 2013, reportedly normal  8. Pulmonary hypertension who group 2  9. Obstructive sleep apnea  10. Hyperlipidemia  11. Former smoker  15. Tootie-en-Y gastric bypass in 2010  13. COVID-19 infection  14. April admission at [facility] for CHF, 2022  15. Atrial fibrillation March 2022 with the [facility]  16. Pericardial effusion  17. Chronically elevated troponin  18. Carpal tunnel release, cholecystectomy,  19. 2D echo March 31, 2022  - Exam indication: Re-evaluation of known heart failure with a change in clinical   status without change in med/diet   - The left ventricle is normal in size. There is moderate septal left ventricular   hypertrophy. Left ventricular systolic function is normal. EF = 57 ± 5% (2D   biplane)   - The right ventricle is normal in size. Right ventricular systolic function is   normal.   - The left atrial cavity is severel dilated  - There is a moderate pericardial effusion adjacent to the left ventricle   measuring 1.5 cm. Unable to determine respiratory variation d/t AF. Unable to   visualize IVC. No 2D signs of tamponade. - Estimated right ventricular systolic pressure is 26 mmHg consistent with normal   pulmonary artery pressures. Estimated right atrial pressure is 3 mmHg (although   IVC not seen). ** Reported gradients are an average of 5 consecutive beats d/t AF.   - Exam was compared with the prior  echocardiographic exam performed on   4/3/2020. Patient is now in A-fib. Interval developement of pericardial effusion.         20.  Right heart cath March 31, 2022, [facility]  +------------+   HEMODYNAMICS   +------------+   General:   ASC AO      126.00/65.00   ASC AO Mean 85.00   RA A Wave   8.00   RA V Wave   6.00   RA Mean     6.00   RV          49.00/5.00   PA          47.00/12.00               (27.00)   PCWP A Wave 13.00   PCWP V Wave 14.00   PCWP Mean   11.00   Cardiac Outputs:   Condition # Baseline   DUARTE CO     6.15   DUARTE CI     2.83   DUARTE HR     78.00   DUARTE SV     78.85   DUARTE SVI    36.28   TD CO       5.70   TD CI       2.62   TD HR       77.00   TD SV       74.03   TD SVI      34.03   Vascular Resistance:   Condition # Baseline   Method      Thermo   PVR         2.80   SVR         1108.00   PVR/SVR     0.00   Oximetry:   HB        12.20   O2 Sat PA 66.20   O2 Sat AO 91.00   +----------------------------------------+   HEMODYNAMIC CONDITIONS (SENSIS ENTERED):   +----------------------------------------+   General HEMO:    1            1               1              1   Condition #   Site             Right Atrium Right Ventricle Main Pulmonary Pulmonary Capillary                                                 Artery         Wedge   Systolic A Wave  8 mmHg       52 mmHg         43 mmHg        40 mmHg   Diastolic V Wave 6 mmHg       5 mmHg          12 mmHg        13 mmHg   ED/Mean          5 mmHg       7 mmHg          26 mmHg        10 mmHg   Valve Gradients:   Oximetry: # Site                  O2-Saturation O2-Cont. Sample Time          PO2   1 Main Pulmonary Artery 66.2          110. 8    3/31/2022 5:51:48 PM   1 Aorta                 91. 0          152. 8    3/31/2022 5:51:53 PM   Cardiac Outputs:   Condition #            1                                 1   CO-Method              THERMO                            DUARTE   Injection Site         Right Atrium                      Main Pulmonary Artery   Sample Site            Main Pulmonary Artery Main Distal Aorta   Cardiac Output         5.70 l/min                        6.15 l/min   Heart Rate             77 bpm                            78 bpm   CO Sample Time         3/31/2022 5:59:46 PM              3/31/2022 6:01:28 PM   Cardiac Index          2.62 l/min/m²                     2.83 l/min/m²   Inherited Flag (1=Yes)                                   0     Impression:   - normal filling pressures (RAP 6, PCWP 11)   - borderline pulmonary HTN (mPAP 27, PCWP 11, PVR 2.8)   - normal cardiac output (CI 2.6 by TD, 2.8 by Duarte)   Recommended Treatment: Medical Therapy. Plan:   - continue medical therapy for heart failure     21.  Ectopic atrial rhythm       Past Medical History:   Diagnosis Date    Anemia     Iron deficiency    Atrial fibrillation (HCC)     CHF (congestive heart failure) (HCC) 03/12/2008    Chronic diastolic congestive heart failure ([address] 03/12/2008    Chronic foot pain     Chronic knee pain     BILATERAL    Constipation     Depression     DM (diabetes mellitus) ([address]     ESRD (end stage renal disease) ([address]     H/O kidney transplant 12/12/2014    Hyperlipidemia     Hypertension     Immunosuppression ([address] 3/12/2008    Kidney disease     Pulmonary hypertension ([address]            Past Surgical History:   Procedure Laterality Date    BACK SURGERY  1982    BLADDER SURGERY      CARPAL TUNNEL RELEASE Left 06/23/2014    CHOLECYSTECTOMY, LAPAROSCOPIC  05/21/2013    COLONOSCOPY      DIAGNOSTIC CARDIAC CATH LAB PROCEDURE 01/15/2013    NORMAL    ECHO COMPL W DOP COLOR FLOW  05/22/2013         ENDOSCOPY, COLON, DIAGNOSTIC      FOOT SURGERY      BONE REMOVED    KIDNEY BIOPSY  06/09/2015    [facility] ; ALSO 4/14/2016, 4/5/2017    KIDNEY TRANSPLANT  12/12/2014    @ [facility]    EDUARDO-EN-Y GASTRIC BYPASS  06/29/2010    Laparoscopic / Dr. Ney Jonas  12/21/2007    Dr. [place] N/A 4/28/2021    EGD in OR 12--COVID performed by Liss Guardado MD at [address]         No Known Allergies      Outpatient Medications Marked as Taking for the 6/29/22 encounter (Office Visit) with CHAI Hurt - CNP   Medication Sig Dispense Refill    bumetanide (BUMEX) 1 MG tablet Take 1 tablet by mouth 2 times daily 60 tablet 1    amLODIPine (NORVASC) 5 MG tablet Take 1 tablet by mouth daily 90 tablet 1    sulfamethoxazole-trimethoprim (BACTRIM;SEPTRA) 400-80 MG per tablet Take 2 tablets by mouth 2 times daily       Cholecalciferol (VITAMIN D3) 125 MCG (5000 UT) TABS Take by mouth      ammonium lactate (LAC-HYDRIN) 12 % lotion ammonium lactate 12 % lotion      bisacodyl (DULCOLAX) 5 MG EC tablet Take by mouth      ONETOUCH ULTRA strip       BD PEN NEEDLE TAINA 2ND GEN 32G X 4 MM MISC       Lancets (ONETOUCH DELICA PLUS ELOHYA93S) MISC       metoprolol tartrate (LOPRESSOR) 50 MG tablet 50 mg 2 times daily       tiZANidine (ZANAFLEX) 4 MG tablet       calcitRIOL (ROCALTROL) 0.25 MCG capsule Take 1 capsule by mouth daily 30 capsule 3    insulin glargine (LANTUS SOLOSTAR) 100 UNIT/ML injection pen Inject 12 Units into the skin daily At noon   Had today (Patient taking differently: Inject 12 Units into the skin daily ) 5 pen 3    oxyCODONE (ROXICODONE) 20 MG immediate release tablet Take 20 mg by mouth every 8 hours as needed for Pain.        hydrALAZINE (APRESOLINE) 100 MG tablet Take 100 mg by mouth 3 times daily      simvastatin (ZOCOR) 10 MG tablet Take 10 mg by mouth daily  isosorbide mononitrate (IMDUR) 60 MG extended release tablet TAKE 1 TABLET DAILY 90 tablet 3    tacrolimus (PROGRAF) 1 MG capsule Take 2 capsules by mouth 2 times daily 60 capsule 3    sodium bicarbonate 650 MG tablet Take 650 mg by mouth 4 times daily       Multiple Vitamin (MULTI VITAMIN DAILY PO) Take by mouth daily      mycophenolate (CELLCEPT) 250 MG capsule Take 1,000 mg by mouth 2 times daily       insulin lispro (HUMALOG KWIKPEN) 100 UNIT/ML SOPN Inject 5 Units into the skin daily (before lunch) Patient takes with his biggest meal      docusate sodium (COLACE) 100 MG capsule Take 1 capsule by mouth 2 times daily as needed for Constipation. (Patient taking differently: Take 100 mg by mouth daily as needed for Constipation ) 20 capsule 0    linagliptin (TRADJENTA) 5 MG tablet Take 5 mg by mouth daily.  aspirin 81 MG EC tablet Take 81 mg by mouth daily. Review of Systems:   Cardiac: As per HPI  General: No fever, chills, rigors  Pulmonary: As per HPI  HEENT: No visual disturbances, difficult swallowing  GI: No nausea, vomiting, abdominal pain  : No dysuria or hematuria  Endocrine: No thyroid disease or diabetes  Musculoskeletal: TREVINO x 4, no focal motor deficits  Skin: Intact, no rashes  Neuro/Psych: No headache or seizures          Weights: Wt Readings from Last 3 Encounters:   06/29/22 184 lb 3.2 oz (83.6 kg)   06/16/22 178 lb (80.7 kg)   03/21/22 206 lb (93.4 kg)             Physical Examination:     /70 (Site: Right Upper Arm, Position: Sitting, Cuff Size: Medium Adult)   Pulse 80   Resp 18   Ht 5' 11\" (1.803 m)   Wt 184 lb 3.2 oz (83.6 kg)   SpO2 98%   BMI 25.69 kg/m²      CONSTITUTIONAL: Alert and oriented times 3, no acute distress and cooperative to examination with proper mood and affect. SKIN: Skin color, texture, turgor normal. No rashes or lesions. LYMPH: no cervical nodes, no inguinal nodes  HEENT: Head is normocephalic, atraumatic. EOMI, PERRLA.   NECK: Supple, symmetrical, trachea midline, no adenopathy, thyroid symmetric, not enlarged and no tenderness, skin normal.  CHEST/LUNGS: chest symmetric with normal A/P diameter, normal respiratory rate and rhythm, lungs clear to auscultation without wheezes, rales or rhonchi. No accessory muscle use. Scars None   CARDIOVASCULAR: Heart sounds are normal.  Regular rate and rhythm without murmur, gallop or rub. Normal S1 and S2. Carotid and femoral pulses 2+/4 and equal bilaterally. ABDOMEN: Normal shape. No and Laparoscopic scar(s) present. Normal bowel sounds. No bruits. soft, nondistended, no masses or organomegaly. no evidence of hernia. Percussion: Normal without hepatosplenomegally. Tenderness: absent. RECTAL: deferred, not clinically indicated  NEUROLOGIC: There are no focalizing motor or sensory deficits. CN II-XII are grossly intact. Sdai Founds EXTREMITIES: no cyanosis, no clubbing. Trace bilateral lower extremity edema. Warm and well perfused. All the following diagnostics were personally reviewed and interpreted by me.        LAB DATA:     6/15/2022 04:41 6/16/2022 04:40 6/20/2022 07:35   Sodium 139 140 141   Potassium 4.8 4.4 4.5   Chloride 104 101 104   CO2 23 22 24   BUN,BUNPL 54 (H) 61 (H) 55 (H)   Creatinine 2.8 (H) 3.2 (H) 3.3 (H)   Anion Gap 12 17 (H) 13   GFR Non- 27 23 22   GFR African American 27 23 22   Magnesium 2.1 2.0    GLUCOSE, FASTING,GF 94 41 (L) 66 (L)   CALCIUM, SERUM, 554397 9.1 9.1 9.2   Phosphorus  3.9 3.5   Meter Glucose      Pro-BNP  11,599 (H)    Hemoglobin A1C 5.1     PTH  154 (H)    WBC   2.8 (L)   RBC   4.15   Hemoglobin Quant   11.0 (L)   Hematocrit   36.9 (L)   MCV   88.9   MCH   26.5   MCHC   29.8 (L)   MPV   9.2   RDW   14.9   Platelet Count   163           IMAGING:    CXR (6/14/2022)  FINDINGS:  Coarsened pulmonary markings suggestive of fibrosis which has been present on multiple prior studies.  Normal heart and pulmonary vascularity.  Neither  costophrenic angle is blunted. Impression  Coarsened pulmonary markings which likely relate to fibrosis. CARDIAC TESTING:    NM Stress (10/7/2021)  FINDINGS:   The overall quality of the study was good. Left ventricular cavity size was noted to be normal.  Rotational analog analysis demonstrated soft tissue attenuation. The gated SPECT stress imaging in the short, vertical long, and  horizontal long axis demonstrated normal homogeneous tracer  distribution throughout the myocardium. The resting images demonstrated no change. Gated SPECT left ventricular ejection fraction was calculated to be  75% with normal myocardial wall motion. Impression:  1. The myocardial perfusion imaging was normal with soft tissue attenuation. 2. Gated SPECT left ventricular ejection fraction was calculated to be 75% with normal myocardial wall motion. TTE (4/13/2021)   Summary:   Normal left ventricular chamber size. Normal left ventricular systolic function. Visually estimated LVEF is 60-65 %. No wall motion abnormalities. Stage I diastolic function. Normal right ventricle structure and function. No significant valvular abnormalities. RHC (3/31/2022)  Impression:   normal filling pressures (RAP 6, PCWP 11)   borderline pulmonary HTN (mPAP 27, PCWP 11, PVR 2.8)   normal cardiac output (CI 2.6 by TD, 2.8 by Jack)   Recommended Treatment: Medical Therapy. TTE (3/30/2022)  CONCLUSIONS:   - Exam indication: Re-evaluation of known heart failure with a change in clinical   status without change in med/diet   - The left ventricle is normal in size. There is moderate septal left ventricular   hypertrophy. Left ventricular systolic function is normal. EF = 57 ± 5% (2D   biplane)   - The right ventricle is normal in size. Right ventricular systolic function is   normal.   - The left atrial cavity is severely dilated. - There is a moderate pericardial effusion adjacent to the left ventricle   measuring 1.5 cm.  Unable to determine respiratory variation d/t AF. Unable to   visualize IVC. No 2D signs of tamponade. - Estimated right ventricular systolic pressure is 26 mmHg consistent with normal   pulmonary artery pressures. Estimated right atrial pressure is 3 mmHg (although   IVC not seen). ** Reported gradients are an average of 5 consecutive beats d/t AF.   - Exam was compared with the prior  echocardiographic exam performed on   4/3/2020. Patient is now in A-fib. Interval developement of pericardial effusion. EKG  Sinus rhythm  PAC      ASSESSMENT:  1. Chronic HFpEF  2. ACC stage C / NYHA class III  3. Euvolemic  4. Nonischemic stress 10/7/2021  5. Pulmonary HTN (CpC-PH, WHO group 2) with preserved RV per TTE 4/2021  6. HTN  7. ESRD s/p renal transplant (12/2014) now with CKD stage IV  8. Hx of hyperkalemia - on Lokelma   9. HLD - on statin therapy   10. T2DM  11. Chronic anemia with some iron deficiency complement  12. OLINDA - untreated   13. GERD  14. COVID -19 infection (4/26/21)  15. RSV bronchitis (10/2/2021)      PLAN:  1. Continue current cardiac medications     2. Follow up with CHF clinic as scheduled - sooner if needed     3. Consider cardioMEMS implant     4. Referral for sleep study    5. Follow with Dr. Chao Lepe in 6 months (seeing [facility] in 3 months) - sooner if needed    6. Weigh yourself daily    -Stay Hydrated    -Diet should sodium restricted to 2 grams    -Again watch your daily weight trends and if you gain water weight please follow below instructions.    -If you gain 3-5 pounds in 2-3 days OR notice that you are retaining fluid in anyway just like you did before then take an extra dose of your water pill (Bumetanide/Bumex) every day until you lose the weight or feel better.     -If you notice that you have taken more than 2 extra doses in 1 week then please call and let us know.     -If at any time you feel that you are retaining fluid, your medications are not working, or you feel ill in anyway, then please call us for either same day appointment or the next day, and for instructions. Our goal is to keep you out of the emergency room and the hospital and we have ways to do it. You just need to call us in a timely manner.     -If you become sick for other reasons, and notice that you are not urinating as much, the urine is very dark, you have significant diarrhea or vomiting, then please DO NOT take your water pill and CALL US immediately.             Sheridan Bravo APRN-CNP  [id number] [facility]

## 2022-07-01 ENCOUNTER — HOSPITAL ENCOUNTER (OUTPATIENT)
Dept: INFUSION THERAPY | Age: 72
Setting detail: INFUSION SERIES
Discharge: HOME | End: 2022-07-01
Payer: MEDICARE

## 2022-07-01 VITALS
DIASTOLIC BLOOD PRESSURE: 79 MMHG | HEART RATE: 68 BPM | RESPIRATION RATE: 20 BRPM | SYSTOLIC BLOOD PRESSURE: 144 MMHG | OXYGEN SATURATION: 100 % | TEMPERATURE: 98.3 F

## 2022-07-01 DIAGNOSIS — D63.1 ANEMIA IN STAGE 3 CHRONIC KIDNEY DISEASE, UNSPECIFIED WHETHER STAGE 3A OR 3B CKD (HCC): Primary | ICD-10-CM

## 2022-07-01 DIAGNOSIS — N18.30 ANEMIA IN STAGE 3 CHRONIC KIDNEY DISEASE, UNSPECIFIED WHETHER STAGE 3A OR 3B CKD (HCC): Primary | ICD-10-CM

## 2022-07-01 PROCEDURE — 96365 THER/PROPH/DIAG IV INF INIT: CPT

## 2022-07-01 PROCEDURE — 6360000002 HC RX W HCPCS: Performed by: INTERNAL MEDICINE

## 2022-07-01 PROCEDURE — 96366 THER/PROPH/DIAG IV INF ADDON: CPT

## 2022-07-01 PROCEDURE — 2580000003 HC RX 258: Performed by: INTERNAL MEDICINE

## 2022-07-01 RX ORDER — SODIUM CHLORIDE 9 MG/ML
5-250 INJECTION, SOLUTION INTRAVENOUS PRN
Status: CANCELLED | OUTPATIENT
Start: 2022-07-08

## 2022-07-01 RX ORDER — HEPARIN SODIUM (PORCINE) LOCK FLUSH IV SOLN 100 UNIT/ML 100 UNIT/ML
500 SOLUTION INTRAVENOUS PRN
Status: CANCELLED | OUTPATIENT
Start: 2022-07-08

## 2022-07-01 RX ORDER — DIPHENHYDRAMINE HYDROCHLORIDE 50 MG/ML
50 INJECTION INTRAMUSCULAR; INTRAVENOUS
Status: CANCELLED | OUTPATIENT
Start: 2022-07-08

## 2022-07-01 RX ORDER — EPINEPHRINE 1 MG/ML
0.3 INJECTION, SOLUTION, CONCENTRATE INTRAVENOUS PRN
Status: CANCELLED | OUTPATIENT
Start: 2022-07-08

## 2022-07-01 RX ORDER — SODIUM CHLORIDE 9 MG/ML
5-250 INJECTION, SOLUTION INTRAVENOUS PRN
Status: DISCONTINUED | OUTPATIENT
Start: 2022-07-01 | End: 2022-07-02 | Stop reason: HOSPADM

## 2022-07-01 RX ORDER — METHYLPREDNISOLONE SODIUM SUCCINATE 125 MG/2ML
125 INJECTION, POWDER, LYOPHILIZED, FOR SOLUTION INTRAMUSCULAR; INTRAVENOUS ONCE
Status: CANCELLED
Start: 2022-07-08 | End: 2022-07-08

## 2022-07-01 RX ORDER — SODIUM CHLORIDE 0.9 % (FLUSH) 0.9 %
5-40 SYRINGE (ML) INJECTION PRN
Status: DISCONTINUED | OUTPATIENT
Start: 2022-07-01 | End: 2022-07-02 | Stop reason: HOSPADM

## 2022-07-01 RX ORDER — SODIUM CHLORIDE 0.9 % (FLUSH) 0.9 %
5-40 SYRINGE (ML) INJECTION PRN
Status: CANCELLED | OUTPATIENT
Start: 2022-07-08

## 2022-07-01 RX ORDER — SODIUM CHLORIDE 9 MG/ML
INJECTION, SOLUTION INTRAVENOUS CONTINUOUS
Status: CANCELLED | OUTPATIENT
Start: 2022-07-08

## 2022-07-01 RX ADMIN — SODIUM CHLORIDE, PRESERVATIVE FREE 10 ML: 5 INJECTION INTRAVENOUS at 10:30

## 2022-07-01 RX ADMIN — SODIUM CHLORIDE 30 ML: 9 INJECTION, SOLUTION INTRAVENOUS at 12:36

## 2022-07-01 RX ADMIN — SODIUM CHLORIDE 25 MG: 9 INJECTION, SOLUTION INTRAVENOUS at 10:33

## 2022-07-01 RX ADMIN — SODIUM CHLORIDE 100 MG: 9 INJECTION, SOLUTION INTRAVENOUS at 11:31

## 2022-07-01 NOTE — PROGRESS NOTES
Patient tolerated ferrlecit infusion well. Remained on unit for about 17 minutes after infusion-declined to stay any longer and said he has had it recently before and had no issues. Patient alert and oriented x3. No distress noted. Vital signs stable. Patient denies any new or worsening pain. Offered patient education and/or discharge material.  Patient declined. Patient denies any needs. All questions answered.

## 2022-07-06 ENCOUNTER — HOSPITAL ENCOUNTER (OUTPATIENT)
Dept: INFUSION THERAPY | Age: 72
Setting detail: INFUSION SERIES
Discharge: HOME | End: 2022-07-06
Payer: MEDICARE

## 2022-07-06 VITALS
DIASTOLIC BLOOD PRESSURE: 59 MMHG | OXYGEN SATURATION: 100 % | SYSTOLIC BLOOD PRESSURE: 141 MMHG | TEMPERATURE: 98.7 F | RESPIRATION RATE: 20 BRPM | HEART RATE: 61 BPM

## 2022-07-06 DIAGNOSIS — N18.30 ANEMIA IN STAGE 3 CHRONIC KIDNEY DISEASE, UNSPECIFIED WHETHER STAGE 3A OR 3B CKD (HCC): Primary | ICD-10-CM

## 2022-07-06 DIAGNOSIS — D63.1 ANEMIA IN STAGE 3 CHRONIC KIDNEY DISEASE, UNSPECIFIED WHETHER STAGE 3A OR 3B CKD (HCC): Primary | ICD-10-CM

## 2022-07-06 PROCEDURE — 2580000003 HC RX 258: Performed by: INTERNAL MEDICINE

## 2022-07-06 PROCEDURE — 6360000002 HC RX W HCPCS: Performed by: INTERNAL MEDICINE

## 2022-07-06 PROCEDURE — 96365 THER/PROPH/DIAG IV INF INIT: CPT

## 2022-07-06 RX ORDER — SODIUM CHLORIDE 9 MG/ML
5-250 INJECTION, SOLUTION INTRAVENOUS PRN
Status: CANCELLED | OUTPATIENT
Start: 2022-07-12

## 2022-07-06 RX ORDER — SODIUM CHLORIDE 0.9 % (FLUSH) 0.9 %
5-40 SYRINGE (ML) INJECTION PRN
Status: CANCELLED | OUTPATIENT
Start: 2022-07-12

## 2022-07-06 RX ORDER — SODIUM CHLORIDE 0.9 % (FLUSH) 0.9 %
5-40 SYRINGE (ML) INJECTION PRN
Status: DISCONTINUED | OUTPATIENT
Start: 2022-07-06 | End: 2022-07-07 | Stop reason: HOSPADM

## 2022-07-06 RX ORDER — DIPHENHYDRAMINE HYDROCHLORIDE 50 MG/ML
50 INJECTION INTRAMUSCULAR; INTRAVENOUS
Status: CANCELLED | OUTPATIENT
Start: 2022-07-12

## 2022-07-06 RX ORDER — HEPARIN SODIUM (PORCINE) LOCK FLUSH IV SOLN 100 UNIT/ML 100 UNIT/ML
500 SOLUTION INTRAVENOUS PRN
Status: CANCELLED | OUTPATIENT
Start: 2022-07-12

## 2022-07-06 RX ORDER — EPINEPHRINE 1 MG/ML
0.3 INJECTION, SOLUTION, CONCENTRATE INTRAVENOUS PRN
Status: CANCELLED | OUTPATIENT
Start: 2022-07-12

## 2022-07-06 RX ORDER — SODIUM CHLORIDE 9 MG/ML
INJECTION, SOLUTION INTRAVENOUS CONTINUOUS
Status: CANCELLED | OUTPATIENT
Start: 2022-07-12

## 2022-07-06 RX ORDER — METHYLPREDNISOLONE SODIUM SUCCINATE 125 MG/2ML
125 INJECTION, POWDER, LYOPHILIZED, FOR SOLUTION INTRAMUSCULAR; INTRAVENOUS ONCE
Status: CANCELLED
Start: 2022-07-12 | End: 2022-07-12

## 2022-07-06 RX ORDER — SODIUM CHLORIDE 9 MG/ML
5-250 INJECTION, SOLUTION INTRAVENOUS PRN
Status: DISCONTINUED | OUTPATIENT
Start: 2022-07-06 | End: 2022-07-07 | Stop reason: HOSPADM

## 2022-07-06 RX ADMIN — SODIUM CHLORIDE, PRESERVATIVE FREE 10 ML: 5 INJECTION INTRAVENOUS at 09:05

## 2022-07-06 RX ADMIN — SODIUM CHLORIDE 30 ML: 9 INJECTION, SOLUTION INTRAVENOUS at 10:04

## 2022-07-06 RX ADMIN — SODIUM CHLORIDE 125 MG: 9 INJECTION, SOLUTION INTRAVENOUS at 09:05

## 2022-07-06 NOTE — PROGRESS NOTES
Patient tolerated ferrlecit infusion well. Patient alert and oriented x3. No distress noted. Vital signs stable. Patient denies any new or worsening pain. Offered patient education and/or discharge material.  Patient declined. Patient denies any needs. All questions answered.

## 2022-07-07 ENCOUNTER — HOSPITAL ENCOUNTER (OUTPATIENT)
Dept: OTHER | Age: 72
Setting detail: THERAPIES SERIES
Discharge: HOME | End: 2022-07-07
Payer: MEDICARE

## 2022-07-07 VITALS
SYSTOLIC BLOOD PRESSURE: 156 MMHG | OXYGEN SATURATION: 97 % | HEART RATE: 80 BPM | BODY MASS INDEX: 25.52 KG/M2 | WEIGHT: 183 LBS | RESPIRATION RATE: 20 BRPM | DIASTOLIC BLOOD PRESSURE: 66 MMHG

## 2022-07-07 LAB
ANION GAP SERPL CALCULATED.3IONS-SCNC: 11 MMOL/L (ref 7–16)
BUN BLDV-MCNC: 39 MG/DL (ref 6–23)
CALCIUM SERPL-MCNC: 9.4 MG/DL (ref 8.6–10.2)
CHLORIDE BLD-SCNC: 105 MMOL/L (ref 98–107)
CO2: 26 MMOL/L (ref 22–29)
CREAT SERPL-MCNC: 2.8 MG/DL (ref 0.7–1.2)
GFR AFRICAN AMERICAN: 27
GFR NON-AFRICAN AMERICAN: 27 ML/MIN/1.73
GLUCOSE BLD-MCNC: 91 MG/DL (ref 74–99)
POTASSIUM SERPL-SCNC: 4.3 MMOL/L (ref 3.5–5)
PRO-BNP: 2355 PG/ML (ref 0–125)
SODIUM BLD-SCNC: 142 MMOL/L (ref 132–146)

## 2022-07-07 PROCEDURE — 99214 OFFICE O/P EST MOD 30 MIN: CPT

## 2022-07-07 PROCEDURE — 80048 BASIC METABOLIC PNL TOTAL CA: CPT

## 2022-07-07 PROCEDURE — 36415 COLL VENOUS BLD VENIPUNCTURE: CPT

## 2022-07-07 PROCEDURE — 83880 ASSAY OF NATRIURETIC PEPTIDE: CPT

## 2022-07-07 RX ORDER — BUMETANIDE 0.25 MG/ML
2 INJECTION, SOLUTION INTRAMUSCULAR; INTRAVENOUS ONCE
Status: DISCONTINUED | OUTPATIENT
Start: 2022-07-07 | End: 2022-07-07

## 2022-07-07 RX ORDER — SODIUM CHLORIDE 0.9 % (FLUSH) 0.9 %
10 SYRINGE (ML) INJECTION 2 TIMES DAILY
Status: DISCONTINUED | OUTPATIENT
Start: 2022-07-07 | End: 2022-07-07

## 2022-07-07 NOTE — PROGRESS NOTES
Congestive Heart Failure [facility]   1950          Referring Provider: Dr Loleta Babinski  Primary Care Physician: Dr Queenie Reilly  Cardiologist: Dr Loleta Babinski  Nephrologist: Dr Clinton        History of Present Illness:     Calos Man is a 70 y.o. male with a history of HFpEF, most recent EF 75% on 10-7-21. Patient Story:    He does  Have some dyspnea with exertion, shortness of breath, or decline in overall functional capacity. He does not have orthopnea, PND, nocturnal cough or hemoptysis. He does not have abdominal distention or bloating, early satiety, anorexia/change in appetite. He usually has a good urinary response to  oral diuretic. States sometimes are better than others. He has trace  lower extremity edema. He denies lightheadedness, dizziness. He denies palpitations, syncope or near syncope. He does not complain of chest pain, pressure, discomfort. No Known Allergies      Prior to Visit Medications    Medication Sig Taking?  Authorizing Provider   bumetanide (BUMEX) 1 MG tablet Take 1 tablet by mouth 2 times daily  Bertha Cottrell DO   amLODIPine (NORVASC) 5 MG tablet Take 1 tablet by mouth daily  Burak Morales MD   sulfamethoxazole-trimethoprim (BACTRIM;SEPTRA) 400-80 MG per tablet Take 2 tablets by mouth 2 times daily   Historical Provider, MD   Cholecalciferol (VITAMIN D3) 125 MCG (5000 UT) TABS Take 1 tablet by mouth daily   Historical Provider, MD   ammonium lactate (LAC-HYDRIN) 12 % lotion ammonium lactate 12 % lotion  Historical Provider, MD   bisacodyl (DULCOLAX) 5 MG EC tablet Take 5 mg by mouth daily   Historical Provider, MD   [facility] ULTRA strip   Historical Provider, MD   BD PEN NEEDLE TAINA 2ND GEN 32G X 4 MM [address] [facility]   Historical Provider, MD   Lancets (Ana Maria Gaffney) [address] [facility]   Historical Provider, MD   metoprolol tartrate (LOPRESSOR) 50 MG tablet 50 mg 2 times daily   Historical Provider, MD   tiZANidine (Katheryn Chambers) 4 MG tablet Take 4 mg by mouth as needed   Historical Provider, MD   calcitRIOL (ROCALTROL) 0.25 MCG capsule Take 1 capsule by mouth daily  Mohamud Dean MD   insulin glargine (LANTUS SOLOSTAR) 100 UNIT/ML injection pen Inject 12 Units into the skin daily At noon   Had today  Patient taking differently: Inject 12 Units into the skin daily   Danni rFanco MD   oxyCODONE (ROXICODONE) 20 MG immediate release tablet Take 20 mg by mouth every 8 hours as needed for Pain. Historical Provider, MD   hydrALAZINE (APRESOLINE) 100 MG tablet Take 100 mg by mouth 3 times daily  Historical Provider, MD   simvastatin (ZOCOR) 10 MG tablet Take 10 mg by mouth daily  Historical Provider, MD   isosorbide mononitrate (IMDUR) 60 MG extended release tablet TAKE 1 TABLET DAILY  Zeina Hutchinson MD   tacrolimus (PROGRAF) 1 MG capsule Take 2 capsules by mouth 2 times daily  Danni Franco MD   sodium bicarbonate 650 MG tablet Take 650 mg by mouth 4 times daily   Historical Provider, MD   Multiple Vitamin (MULTI VITAMIN DAILY PO) Take by mouth daily  Historical Provider, MD   mycophenolate (CELLCEPT) 250 MG capsule Take 1,000 mg by mouth 2 times daily   Historical Provider, MD   insulin lispro (HUMALOG KWIKPEN) 100 UNIT/ML SOPN Inject 5 Units into the skin daily (before lunch) Patient takes with his biggest meal  Historical Provider, MD   docusate sodium (COLACE) 100 MG capsule Take 1 capsule by mouth 2 times daily as needed for Constipation. Patient taking differently: Take 100 mg by mouth daily as needed for Constipation   Naomi Houston MD   linagliptin (TRADJENTA) 5 MG tablet Take 5 mg by mouth daily. Historical Provider, MD   aspirin 81 MG EC tablet Take 81 mg by mouth daily. Historical Provider, MD             Guideline directed medical:  ARNI/ACE I/ARB: No  Beta blocker:   Yes  Aldosterone antagonist:  No        Physical Examination:     BP (!) 156/66   Pulse 80   Resp 20   Wt 183 lb (83 kg)   SpO2 97%   BMI 25.52 kg/m² Assessment  Charting Type: Shift assessment (CHF clinic)    Neurological  Level of Consciousness: Alert (0)              Respiratory  Respiratory Pattern: Regular  Respiratory Depth: Normal  Respiratory Quality/Effort: Dyspnea with exertion  Chest Assessment: Chest expansion symmetrical  L Breath Sounds: Clear,Diminished  R Breath Sounds: Clear,Diminished              Cardiac  Cardiac Regularity: Regular  Cardiac Rhythm: Sinus rhythm    Rhythm Interpretation  Heart Rate: 80         Gastrointestinal  Abdominal (WDL): Within Defined Limits               Peripheral Vascular  Peripheral Vascular (WDL): Exceptions to WDL  Edema: Right lower extremity,Left lower extremity  RLE Edema: Trace  LLE Edema: Trace                        Psychosocial  Psychosocial (WDL): Within Defined Limits                        Heart Rate: 80                     LAB DATA:    Last 3 BMP      Sodium (mmol/L)   Date Value   06/20/2022 141   06/16/2022 140   06/15/2022 139     Potassium (mmol/L)   Date Value   06/20/2022 4.5   06/16/2022 4.4   06/15/2022 4.8     Potassium reflex Magnesium (mmol/L)   Date Value   06/12/2021 5.4 (H)   06/11/2021 5.2 (H)   04/26/2021 4.9     Chloride (mmol/L)   Date Value   06/20/2022 104   06/16/2022 101   06/15/2022 104     CO2 (mmol/L)   Date Value   06/20/2022 24   06/16/2022 22   06/15/2022 23     BUN (mg/dL)   Date Value   06/20/2022 55 (H)   06/16/2022 61 (H)   06/15/2022 54 (H)     Glucose (mg/dL)   Date Value   06/20/2022 66 (L)   06/16/2022 41 (L)   06/15/2022 94   02/21/2012 124 (H)   09/23/2011 140 (H)   07/05/2011 153 (H)     Calcium (mg/dL)   Date Value   06/20/2022 9.2   06/16/2022 9.1   06/15/2022 9.1       Last 3 BNP       Pro-BNP (pg/mL)   Date Value   06/16/2022 11,599 (H)   06/14/2022 26,284 (H)   03/21/2022 13,649 (H)          CBC:   No results for input(s): WBC, HGB, PLT in the last 72 hours.   BMP:    No results for input(s): NA, K, CL, CO2, BUN, CREATININE, GLUCOSE in the last 72 hours. Hepatic: No results for input(s): AST, ALT, ALB, BILITOT, ALKPHOS in the last 72 hours. Troponin: No results for input(s): TROPONINI in the last 72 hours. BNP: No results for input(s): BNP in the last 72 hours. Lipids: No results for input(s): CHOL, HDL in the last 72 hours. Invalid input(s): LDLCALCU  INR: No results for input(s): INR in the last 72 hours. WEIGHTS:    Wt Readings from Last 3 Encounters:   07/07/22 183 lb (83 kg)   06/29/22 184 lb 3.2 oz (83.6 kg)   06/16/22 178 lb (80.7 kg)         TELEMETRY:  Cardiac Regularity: Regular  Cardiac Rhythm/Interpretation:SR        ASSESSMENT:  Balbina Garcia weight is down 23lbs from prior visit 3/21/22. Patient states he was in [facility] in April and they took off 18lbs. States he is going to be getting cardiomems placed soon. Interventions completed this visit:  IV diuretics given no  Lab work obtained yes,BMP/BNP  Reviewed currently prescribed medications with patient, educated on importance of compliance and answered any questions regarding their medication  Educated on signs and symptoms of HF  Educated on low sodium diet    PLAN:  Scheduled to follow up in CHF clinic on   Future Appointments   Date Time Provider Alfa Moore   7/13/2022  9:00 AM SE INFUSION SVCS CHAIR 3 YZ INF SER [facility]   8/3/2022  7:30 AM [facility] [place] [place] [place] [facility] [place] [facility]   10/9/2022  9:00 PM SEB SLEEP LAB BEDROOM 2 [facility] SLEEP [place] HOD     Given clinic phone number and aware of signs and symptoms to call with any HF change in symptoms. States he continues to try to follow the low sodium diet.

## 2022-07-13 ENCOUNTER — APPOINTMENT (OUTPATIENT)
Dept: INFUSION THERAPY | Age: 72
End: 2022-07-13
Payer: MEDICARE

## 2022-07-13 ENCOUNTER — HOSPITAL ENCOUNTER (OUTPATIENT)
Dept: INFUSION THERAPY | Age: 72
Setting detail: INFUSION SERIES
Discharge: HOME | End: 2022-07-13
Payer: MEDICARE

## 2022-07-13 VITALS
DIASTOLIC BLOOD PRESSURE: 73 MMHG | TEMPERATURE: 98.4 F | SYSTOLIC BLOOD PRESSURE: 152 MMHG | HEART RATE: 63 BPM | RESPIRATION RATE: 18 BRPM | OXYGEN SATURATION: 100 %

## 2022-07-13 DIAGNOSIS — D63.1 ANEMIA IN STAGE 3 CHRONIC KIDNEY DISEASE, UNSPECIFIED WHETHER STAGE 3A OR 3B CKD (HCC): Primary | ICD-10-CM

## 2022-07-13 DIAGNOSIS — N18.30 ANEMIA IN STAGE 3 CHRONIC KIDNEY DISEASE, UNSPECIFIED WHETHER STAGE 3A OR 3B CKD (HCC): Primary | ICD-10-CM

## 2022-07-13 PROCEDURE — 2580000003 HC RX 258: Performed by: INTERNAL MEDICINE

## 2022-07-13 PROCEDURE — 96365 THER/PROPH/DIAG IV INF INIT: CPT

## 2022-07-13 PROCEDURE — 6360000002 HC RX W HCPCS: Performed by: INTERNAL MEDICINE

## 2022-07-13 RX ORDER — SODIUM CHLORIDE 0.9 % (FLUSH) 0.9 %
5-40 SYRINGE (ML) INJECTION PRN
Status: DISCONTINUED | OUTPATIENT
Start: 2022-07-13 | End: 2022-07-14 | Stop reason: HOSPADM

## 2022-07-13 RX ORDER — HEPARIN SODIUM (PORCINE) LOCK FLUSH IV SOLN 100 UNIT/ML 100 UNIT/ML
500 SOLUTION INTRAVENOUS PRN
Status: CANCELLED | OUTPATIENT
Start: 2022-07-20

## 2022-07-13 RX ORDER — SODIUM CHLORIDE 9 MG/ML
5-250 INJECTION, SOLUTION INTRAVENOUS PRN
Status: DISCONTINUED | OUTPATIENT
Start: 2022-07-13 | End: 2022-07-14 | Stop reason: HOSPADM

## 2022-07-13 RX ORDER — SODIUM CHLORIDE 9 MG/ML
5-250 INJECTION, SOLUTION INTRAVENOUS PRN
Status: CANCELLED | OUTPATIENT
Start: 2022-07-20

## 2022-07-13 RX ORDER — SODIUM CHLORIDE 0.9 % (FLUSH) 0.9 %
5-40 SYRINGE (ML) INJECTION PRN
Status: CANCELLED | OUTPATIENT
Start: 2022-07-20

## 2022-07-13 RX ORDER — METHYLPREDNISOLONE SODIUM SUCCINATE 125 MG/2ML
125 INJECTION, POWDER, LYOPHILIZED, FOR SOLUTION INTRAMUSCULAR; INTRAVENOUS ONCE
Status: CANCELLED
Start: 2022-07-20 | End: 2022-07-20

## 2022-07-13 RX ORDER — DIPHENHYDRAMINE HYDROCHLORIDE 50 MG/ML
50 INJECTION INTRAMUSCULAR; INTRAVENOUS
Status: CANCELLED | OUTPATIENT
Start: 2022-07-20

## 2022-07-13 RX ORDER — SODIUM CHLORIDE 9 MG/ML
INJECTION, SOLUTION INTRAVENOUS CONTINUOUS
Status: CANCELLED | OUTPATIENT
Start: 2022-07-20

## 2022-07-13 RX ORDER — EPINEPHRINE 1 MG/ML
0.3 INJECTION, SOLUTION, CONCENTRATE INTRAVENOUS PRN
Status: CANCELLED | OUTPATIENT
Start: 2022-07-20

## 2022-07-13 RX ADMIN — SODIUM CHLORIDE 110 ML/HR: 9 INJECTION, SOLUTION INTRAVENOUS at 10:50

## 2022-07-13 RX ADMIN — SODIUM CHLORIDE 125 MG: 9 INJECTION, SOLUTION INTRAVENOUS at 09:50

## 2022-07-13 RX ADMIN — SODIUM CHLORIDE, PRESERVATIVE FREE 10 ML: 5 INJECTION INTRAVENOUS at 09:48

## 2022-07-13 RX ADMIN — SODIUM CHLORIDE, PRESERVATIVE FREE 10 ML: 5 INJECTION INTRAVENOUS at 09:40

## 2022-07-20 ENCOUNTER — HOSPITAL ENCOUNTER (OUTPATIENT)
Dept: INFUSION THERAPY | Age: 72
Setting detail: INFUSION SERIES
Discharge: HOME | End: 2022-07-20
Payer: MEDICARE

## 2022-07-20 ENCOUNTER — APPOINTMENT (OUTPATIENT)
Dept: INFUSION THERAPY | Age: 72
End: 2022-07-20
Payer: MEDICARE

## 2022-07-20 VITALS
HEART RATE: 77 BPM | SYSTOLIC BLOOD PRESSURE: 134 MMHG | TEMPERATURE: 98.6 F | DIASTOLIC BLOOD PRESSURE: 79 MMHG | OXYGEN SATURATION: 100 % | RESPIRATION RATE: 16 BRPM

## 2022-07-20 DIAGNOSIS — N18.30 ANEMIA IN STAGE 3 CHRONIC KIDNEY DISEASE, UNSPECIFIED WHETHER STAGE 3A OR 3B CKD (HCC): Primary | ICD-10-CM

## 2022-07-20 DIAGNOSIS — D63.1 ANEMIA IN STAGE 3 CHRONIC KIDNEY DISEASE, UNSPECIFIED WHETHER STAGE 3A OR 3B CKD (HCC): Primary | ICD-10-CM

## 2022-07-20 PROCEDURE — 2580000003 HC RX 258: Performed by: INTERNAL MEDICINE

## 2022-07-20 PROCEDURE — 6360000002 HC RX W HCPCS: Performed by: INTERNAL MEDICINE

## 2022-07-20 PROCEDURE — 96365 THER/PROPH/DIAG IV INF INIT: CPT

## 2022-07-20 RX ORDER — SODIUM CHLORIDE 0.9 % (FLUSH) 0.9 %
5-40 SYRINGE (ML) INJECTION PRN
Status: DISCONTINUED | OUTPATIENT
Start: 2022-07-20 | End: 2022-07-21 | Stop reason: HOSPADM

## 2022-07-20 RX ORDER — SODIUM CHLORIDE 0.9 % (FLUSH) 0.9 %
5-40 SYRINGE (ML) INJECTION PRN
Status: CANCELLED | OUTPATIENT
Start: 2022-07-27

## 2022-07-20 RX ORDER — DIPHENHYDRAMINE HYDROCHLORIDE 50 MG/ML
50 INJECTION INTRAMUSCULAR; INTRAVENOUS
Status: CANCELLED | OUTPATIENT
Start: 2022-07-27

## 2022-07-20 RX ORDER — SODIUM CHLORIDE 9 MG/ML
INJECTION, SOLUTION INTRAVENOUS CONTINUOUS
Status: CANCELLED | OUTPATIENT
Start: 2022-07-27

## 2022-07-20 RX ORDER — SODIUM CHLORIDE 9 MG/ML
5-250 INJECTION, SOLUTION INTRAVENOUS PRN
Status: CANCELLED | OUTPATIENT
Start: 2022-07-27

## 2022-07-20 RX ORDER — SODIUM CHLORIDE 9 MG/ML
5-250 INJECTION, SOLUTION INTRAVENOUS PRN
Status: DISCONTINUED | OUTPATIENT
Start: 2022-07-20 | End: 2022-07-21 | Stop reason: HOSPADM

## 2022-07-20 RX ORDER — EPINEPHRINE 1 MG/ML
0.3 INJECTION, SOLUTION, CONCENTRATE INTRAVENOUS PRN
Status: CANCELLED | OUTPATIENT
Start: 2022-07-27

## 2022-07-20 RX ORDER — HEPARIN SODIUM (PORCINE) LOCK FLUSH IV SOLN 100 UNIT/ML 100 UNIT/ML
500 SOLUTION INTRAVENOUS PRN
Status: CANCELLED | OUTPATIENT
Start: 2022-07-27

## 2022-07-20 RX ORDER — METHYLPREDNISOLONE SODIUM SUCCINATE 125 MG/2ML
125 INJECTION, POWDER, LYOPHILIZED, FOR SOLUTION INTRAMUSCULAR; INTRAVENOUS ONCE
Status: CANCELLED
Start: 2022-07-27 | End: 2022-07-27

## 2022-07-20 RX ADMIN — SODIUM CHLORIDE, PRESERVATIVE FREE 10 ML: 5 INJECTION INTRAVENOUS at 14:06

## 2022-07-20 RX ADMIN — SODIUM CHLORIDE 125 MG: 9 INJECTION, SOLUTION INTRAVENOUS at 14:08

## 2022-07-27 ENCOUNTER — HOSPITAL ENCOUNTER (OUTPATIENT)
Dept: INFUSION THERAPY | Age: 72
Setting detail: INFUSION SERIES
Discharge: HOME | End: 2022-07-27
Payer: MEDICARE

## 2022-07-27 ENCOUNTER — APPOINTMENT (OUTPATIENT)
Dept: INFUSION THERAPY | Age: 72
End: 2022-07-27
Payer: MEDICARE

## 2022-07-27 VITALS
HEART RATE: 80 BPM | SYSTOLIC BLOOD PRESSURE: 138 MMHG | TEMPERATURE: 98.6 F | RESPIRATION RATE: 16 BRPM | DIASTOLIC BLOOD PRESSURE: 56 MMHG | OXYGEN SATURATION: 98 %

## 2022-07-27 DIAGNOSIS — D63.1 ANEMIA IN STAGE 3 CHRONIC KIDNEY DISEASE, UNSPECIFIED WHETHER STAGE 3A OR 3B CKD (HCC): Primary | ICD-10-CM

## 2022-07-27 DIAGNOSIS — N18.30 ANEMIA IN STAGE 3 CHRONIC KIDNEY DISEASE, UNSPECIFIED WHETHER STAGE 3A OR 3B CKD (HCC): Primary | ICD-10-CM

## 2022-07-27 PROCEDURE — 96365 THER/PROPH/DIAG IV INF INIT: CPT

## 2022-07-27 PROCEDURE — 2580000003 HC RX 258: Performed by: INTERNAL MEDICINE

## 2022-07-27 PROCEDURE — 6360000002 HC RX W HCPCS: Performed by: INTERNAL MEDICINE

## 2022-07-27 RX ORDER — SODIUM CHLORIDE 9 MG/ML
INJECTION, SOLUTION INTRAVENOUS CONTINUOUS
Status: CANCELLED | OUTPATIENT
Start: 2022-08-03

## 2022-07-27 RX ORDER — SODIUM CHLORIDE 0.9 % (FLUSH) 0.9 %
SYRINGE (ML) INJECTION
Status: DISCONTINUED
Start: 2022-07-27 | End: 2022-07-28 | Stop reason: HOSPADM

## 2022-07-27 RX ORDER — SODIUM CHLORIDE 0.9 % (FLUSH) 0.9 %
5-40 SYRINGE (ML) INJECTION PRN
Status: CANCELLED | OUTPATIENT
Start: 2022-08-03

## 2022-07-27 RX ORDER — HEPARIN SODIUM (PORCINE) LOCK FLUSH IV SOLN 100 UNIT/ML 100 UNIT/ML
500 SOLUTION INTRAVENOUS PRN
Status: CANCELLED | OUTPATIENT
Start: 2022-08-03

## 2022-07-27 RX ORDER — METHYLPREDNISOLONE SODIUM SUCCINATE 125 MG/2ML
125 INJECTION, POWDER, LYOPHILIZED, FOR SOLUTION INTRAMUSCULAR; INTRAVENOUS ONCE
Status: CANCELLED
Start: 2022-08-03 | End: 2022-08-03

## 2022-07-27 RX ORDER — SODIUM CHLORIDE 9 MG/ML
5-250 INJECTION, SOLUTION INTRAVENOUS PRN
Status: DISCONTINUED | OUTPATIENT
Start: 2022-07-27 | End: 2022-07-28 | Stop reason: HOSPADM

## 2022-07-27 RX ORDER — EPINEPHRINE 1 MG/ML
0.3 INJECTION, SOLUTION, CONCENTRATE INTRAVENOUS PRN
Status: CANCELLED | OUTPATIENT
Start: 2022-08-03

## 2022-07-27 RX ORDER — DIPHENHYDRAMINE HYDROCHLORIDE 50 MG/ML
50 INJECTION INTRAMUSCULAR; INTRAVENOUS
Status: CANCELLED | OUTPATIENT
Start: 2022-08-03

## 2022-07-27 RX ORDER — SODIUM CHLORIDE 9 MG/ML
5-250 INJECTION, SOLUTION INTRAVENOUS PRN
Status: CANCELLED | OUTPATIENT
Start: 2022-08-03

## 2022-07-27 RX ORDER — SODIUM CHLORIDE 0.9 % (FLUSH) 0.9 %
5-40 SYRINGE (ML) INJECTION PRN
Status: DISCONTINUED | OUTPATIENT
Start: 2022-07-27 | End: 2022-07-28 | Stop reason: HOSPADM

## 2022-07-27 RX ADMIN — SODIUM CHLORIDE 125 MG: 9 INJECTION, SOLUTION INTRAVENOUS at 13:24

## 2022-07-27 RX ADMIN — SODIUM CHLORIDE 110 ML/HR: 9 INJECTION, SOLUTION INTRAVENOUS at 14:20

## 2022-07-27 RX ADMIN — SODIUM CHLORIDE, PRESERVATIVE FREE 10 ML: 5 INJECTION INTRAVENOUS at 13:25

## 2022-07-27 NOTE — FLOWSHEET NOTE
Discharged to home in stable condition , tolerated infusion well, VS stable . Does not want any dc instructions , all questions answered.

## 2022-08-03 ENCOUNTER — HOSPITAL ENCOUNTER (OUTPATIENT)
Dept: OTHER | Age: 72
Setting detail: THERAPIES SERIES
Discharge: HOME | End: 2022-08-03
Payer: MEDICARE

## 2022-08-03 VITALS
BODY MASS INDEX: 26.08 KG/M2 | DIASTOLIC BLOOD PRESSURE: 74 MMHG | OXYGEN SATURATION: 97 % | SYSTOLIC BLOOD PRESSURE: 174 MMHG | HEART RATE: 66 BPM | WEIGHT: 187 LBS | RESPIRATION RATE: 18 BRPM

## 2022-08-03 LAB
ANION GAP SERPL CALCULATED.3IONS-SCNC: 10 MMOL/L (ref 7–16)
BUN BLDV-MCNC: 44 MG/DL (ref 6–23)
CALCIUM SERPL-MCNC: 9.2 MG/DL (ref 8.6–10.2)
CHLORIDE BLD-SCNC: 106 MMOL/L (ref 98–107)
CO2: 27 MMOL/L (ref 22–29)
CREAT SERPL-MCNC: 3.2 MG/DL (ref 0.7–1.2)
GFR AFRICAN AMERICAN: 23
GFR NON-AFRICAN AMERICAN: 23 ML/MIN/1.73
GLUCOSE BLD-MCNC: 71 MG/DL (ref 74–99)
POTASSIUM SERPL-SCNC: 4.5 MMOL/L (ref 3.5–5)
PRO-BNP: 3696 PG/ML (ref 0–125)
SODIUM BLD-SCNC: 143 MMOL/L (ref 132–146)

## 2022-08-03 PROCEDURE — 83880 ASSAY OF NATRIURETIC PEPTIDE: CPT

## 2022-08-03 PROCEDURE — 80048 BASIC METABOLIC PNL TOTAL CA: CPT

## 2022-08-03 PROCEDURE — 99214 OFFICE O/P EST MOD 30 MIN: CPT

## 2022-08-03 NOTE — PROGRESS NOTES
Historical Provider, MD   tiZANidine (ZANAFLEX) 4 MG tablet Take 4 mg by mouth as needed   Patient not taking: Reported on 8/3/2022  Historical Provider, MD   calcitRIOL (ROCALTROL) 0.25 MCG capsule Take 1 capsule by mouth daily  Dannie Raymond MD   insulin glargine (LANTUS SOLOSTAR) 100 UNIT/ML injection pen Inject 12 Units into the skin daily At noon   Had today  Patient taking differently: Inject 12 Units into the skin daily  Rosa Maria Dwyer MD   oxyCODONE (ROXICODONE) 20 MG immediate release tablet Take 20 mg by mouth every 8 hours as needed for Pain. Historical Provider, MD   hydrALAZINE (APRESOLINE) 100 MG tablet Take 100 mg by mouth 3 times daily  Historical Provider, MD   simvastatin (ZOCOR) 10 MG tablet Take 10 mg by mouth daily  Historical Provider, MD   isosorbide mononitrate (IMDUR) 60 MG extended release tablet TAKE 1 TABLET DAILY  Rodrigue Pritchard MD   tacrolimus (PROGRAF) 1 MG capsule Take 2 capsules by mouth 2 times daily  Rosa Maria Dwyer MD   sodium bicarbonate 650 MG tablet Take 650 mg by mouth 4 times daily   Historical Provider, MD   Multiple Vitamin (MULTI VITAMIN DAILY PO) Take by mouth daily  Historical Provider, MD   mycophenolate (CELLCEPT) 250 MG capsule Take 1,000 mg by mouth 2 times daily   Historical Provider, MD   insulin lispro (HUMALOG KWIKPEN) 100 UNIT/ML SOPN Inject 5 Units into the skin daily (before lunch) Patient takes with his biggest meal  Historical Provider, MD   docusate sodium (COLACE) 100 MG capsule Take 1 capsule by mouth 2 times daily as needed for Constipation. Patient taking differently: Take 100 mg by mouth daily as needed for Constipation   Susi Desai MD   linagliptin (TRADJENTA) 5 MG tablet Take 5 mg by mouth daily. Historical Provider, MD   aspirin 81 MG EC tablet Take 81 mg by mouth daily. Historical Provider, MD             Guideline directed medical:  ARNI/ACE I/ARB: No  Beta blocker:   Yes  Aldosterone antagonist:  No        Physical Examination:     BP (!)

## 2022-08-30 ENCOUNTER — APPOINTMENT (OUTPATIENT)
Dept: GENERAL RADIOLOGY | Age: 72
DRG: 177 | End: 2022-08-30
Payer: MEDICARE

## 2022-08-30 LAB
ALBUMIN SERPL-MCNC: 3.4 G/DL (ref 3.5–5.2)
ALP BLD-CCNC: 82 U/L (ref 40–129)
ALT SERPL-CCNC: 22 U/L (ref 0–40)
ANION GAP SERPL CALCULATED.3IONS-SCNC: 14 MMOL/L (ref 7–16)
AST SERPL-CCNC: 33 U/L (ref 0–39)
BASOPHILS ABSOLUTE: 0.01 E9/L (ref 0–0.2)
BASOPHILS RELATIVE PERCENT: 0.3 % (ref 0–2)
BILIRUB SERPL-MCNC: 0.3 MG/DL (ref 0–1.2)
BILIRUBIN URINE: NEGATIVE
BLOOD, URINE: NEGATIVE
BUN BLDV-MCNC: 44 MG/DL (ref 6–23)
BURR CELLS: ABNORMAL
CALCIUM SERPL-MCNC: 8.8 MG/DL (ref 8.6–10.2)
CHLORIDE BLD-SCNC: 105 MMOL/L (ref 98–107)
CLARITY: CLEAR
CO2: 21 MMOL/L (ref 22–29)
COLOR: YELLOW
CREAT SERPL-MCNC: 3.3 MG/DL (ref 0.7–1.2)
EOSINOPHILS ABSOLUTE: 0.05 E9/L (ref 0.05–0.5)
EOSINOPHILS RELATIVE PERCENT: 1.5 % (ref 0–6)
GFR AFRICAN AMERICAN: 22
GFR NON-AFRICAN AMERICAN: 22 ML/MIN/1.73
GLUCOSE BLD-MCNC: 174 MG/DL (ref 74–99)
GLUCOSE URINE: NEGATIVE MG/DL
HCT VFR BLD CALC: 32 % (ref 37–54)
HEMOGLOBIN: 10.2 G/DL (ref 12.5–16.5)
IMMATURE GRANULOCYTES #: 0.01 E9/L
IMMATURE GRANULOCYTES %: 0.3 % (ref 0–5)
INFLUENZA A BY PCR: NOT DETECTED
INFLUENZA B BY PCR: NOT DETECTED
KETONES, URINE: NEGATIVE MG/DL
LEUKOCYTE ESTERASE, URINE: NEGATIVE
LYMPHOCYTES ABSOLUTE: 0.51 E9/L (ref 1.5–4)
LYMPHOCYTES RELATIVE PERCENT: 15 % (ref 20–42)
MAGNESIUM: 1.9 MG/DL (ref 1.6–2.6)
MCH RBC QN AUTO: 27.1 PG (ref 26–35)
MCHC RBC AUTO-ENTMCNC: 31.9 % (ref 32–34.5)
MCV RBC AUTO: 85.1 FL (ref 80–99.9)
MONOCYTES ABSOLUTE: 0.36 E9/L (ref 0.1–0.95)
MONOCYTES RELATIVE PERCENT: 10.6 % (ref 2–12)
NEUTROPHILS ABSOLUTE: 2.47 E9/L (ref 1.8–7.3)
NEUTROPHILS RELATIVE PERCENT: 72.3 % (ref 43–80)
NITRITE, URINE: NEGATIVE
OVALOCYTES: ABNORMAL
PDW BLD-RTO: 14.1 FL (ref 11.5–15)
PH UA: 6 (ref 5–9)
PLATELET # BLD: 267 E9/L (ref 130–450)
PMV BLD AUTO: 8.9 FL (ref 7–12)
POIKILOCYTES: ABNORMAL
POLYCHROMASIA: ABNORMAL
POTASSIUM SERPL-SCNC: 4.5 MMOL/L (ref 3.5–5)
PRO-BNP: 2234 PG/ML (ref 0–125)
PROTEIN UA: NEGATIVE MG/DL
RBC # BLD: 3.76 E12/L (ref 3.8–5.8)
SARS-COV-2, NAAT: DETECTED
SODIUM BLD-SCNC: 140 MMOL/L (ref 132–146)
SPECIFIC GRAVITY UA: 1.01 (ref 1–1.03)
TOTAL PROTEIN: 6.2 G/DL (ref 6.4–8.3)
TROPONIN, HIGH SENSITIVITY: 110 NG/L (ref 0–11)
UROBILINOGEN, URINE: 0.2 E.U./DL
WBC # BLD: 3.4 E9/L (ref 4.5–11.5)

## 2022-08-30 PROCEDURE — 93005 ELECTROCARDIOGRAM TRACING: CPT | Performed by: NURSE PRACTITIONER

## 2022-08-30 PROCEDURE — 84484 ASSAY OF TROPONIN QUANT: CPT

## 2022-08-30 PROCEDURE — 99285 EMERGENCY DEPT VISIT HI MDM: CPT

## 2022-08-30 PROCEDURE — 80053 COMPREHEN METABOLIC PANEL: CPT

## 2022-08-30 PROCEDURE — 71045 X-RAY EXAM CHEST 1 VIEW: CPT

## 2022-08-30 PROCEDURE — 83880 ASSAY OF NATRIURETIC PEPTIDE: CPT

## 2022-08-30 PROCEDURE — 87502 INFLUENZA DNA AMP PROBE: CPT

## 2022-08-30 PROCEDURE — 87635 SARS-COV-2 COVID-19 AMP PRB: CPT

## 2022-08-30 PROCEDURE — 83735 ASSAY OF MAGNESIUM: CPT

## 2022-08-30 PROCEDURE — 85025 COMPLETE CBC W/AUTO DIFF WBC: CPT

## 2022-08-30 PROCEDURE — 81003 URINALYSIS AUTO W/O SCOPE: CPT

## 2022-08-30 ASSESSMENT — PAIN - FUNCTIONAL ASSESSMENT: PAIN_FUNCTIONAL_ASSESSMENT: NONE - DENIES PAIN

## 2022-08-30 NOTE — ED NOTES
Department of Emergency Medicine  FIRST PROVIDER TRIAGE NOTE             Independent MLCLARIBEL           8/30/22  6:25 PM EDT    Date of Encounter: 8/30/22   MRN: [id number]      HPI: Ed Casanova is a 67 y.o. male who presents to the ED for Shortness of Breath (Shortness of breath, generalized weakness x one week. +cough. Denies fever/chills. Kidney transplant patient. Denies chest pain. +lack of appetite. )  Patient with history significant for kidney transplant in 2014. He is also being followed by [facility] [employer] [facility] he had a cardio mems device implanted he also has congestive heart failure reports 1 week history of shortness of breath and weakness as well as fevers. Patient with COVID vaccines x4    ROS: Negative for diarrhea or urinary complaints. PE: Gen Appearance/Constitutional: alert  HEENT: NC/NT. PERRLA,  Airway patent. Initial Plan of Care: All treatment areas with department are currently occupied. Plan to order/Initiate the following while awaiting opening in ED: labs, EKG, imaging studies, and COVID-19 testing.   Initiate Treatment-Testing, Proceed toTreatment Area When Bed Available for ED Attending/MLP to Continue Care    Electronically signed by CHAI Hansen CNP   DD: 8/30/22       CHAI Hansen CNP  08/30/22 [id number]

## 2022-08-31 ENCOUNTER — HOSPITAL ENCOUNTER (INPATIENT)
Age: 72
LOS: 1 days | Discharge: HOME | DRG: 177 | End: 2022-09-01
Attending: EMERGENCY MEDICINE | Admitting: INTERNAL MEDICINE
Payer: MEDICARE

## 2022-08-31 DIAGNOSIS — R53.1 GENERAL WEAKNESS: ICD-10-CM

## 2022-08-31 DIAGNOSIS — U07.1 COVID-19 VIRUS INFECTION: Primary | ICD-10-CM

## 2022-08-31 LAB
ALBUMIN SERPL-MCNC: 3 G/DL (ref 3.5–5.2)
ALP BLD-CCNC: 65 U/L (ref 40–129)
ALT SERPL-CCNC: 20 U/L (ref 0–40)
ANION GAP SERPL CALCULATED.3IONS-SCNC: 9 MMOL/L (ref 7–16)
ANISOCYTOSIS: ABNORMAL
AST SERPL-CCNC: 36 U/L (ref 0–39)
BASOPHILS ABSOLUTE: 0.01 E9/L (ref 0–0.2)
BASOPHILS RELATIVE PERCENT: 0.4 % (ref 0–2)
BILIRUB SERPL-MCNC: 0.2 MG/DL (ref 0–1.2)
BUN BLDV-MCNC: 42 MG/DL (ref 6–23)
C-REACTIVE PROTEIN: 1.1 MG/DL (ref 0–0.4)
CALCIUM SERPL-MCNC: 8.6 MG/DL (ref 8.6–10.2)
CHLORIDE BLD-SCNC: 106 MMOL/L (ref 98–107)
CO2: 26 MMOL/L (ref 22–29)
CREAT SERPL-MCNC: 3.2 MG/DL (ref 0.7–1.2)
D DIMER: 339 NG/ML DDU
EKG ATRIAL RATE: 74 BPM
EKG P AXIS: 89 DEGREES
EKG P-R INTERVAL: 176 MS
EKG Q-T INTERVAL: 406 MS
EKG QRS DURATION: 74 MS
EKG QTC CALCULATION (BAZETT): 450 MS
EKG R AXIS: -3 DEGREES
EKG T AXIS: 97 DEGREES
EKG VENTRICULAR RATE: 74 BPM
EOSINOPHILS ABSOLUTE: 0.1 E9/L (ref 0.05–0.5)
EOSINOPHILS RELATIVE PERCENT: 3.8 % (ref 0–6)
FIBRINOGEN: 379 MG/DL (ref 200–400)
GFR AFRICAN AMERICAN: 23
GFR NON-AFRICAN AMERICAN: 23 ML/MIN/1.73
GLUCOSE BLD-MCNC: 64 MG/DL (ref 74–99)
HBA1C MFR BLD: 5.3 % (ref 4–5.6)
HCT VFR BLD CALC: 31.1 % (ref 37–54)
HEMOGLOBIN: 9.9 G/DL (ref 12.5–16.5)
IMMATURE GRANULOCYTES #: 0.02 E9/L
IMMATURE GRANULOCYTES %: 0.8 % (ref 0–5)
LACTATE DEHYDROGENASE: 270 U/L (ref 135–225)
LACTIC ACID: 0.9 MMOL/L (ref 0.5–2.2)
LYMPHOCYTES ABSOLUTE: 0.45 E9/L (ref 1.5–4)
LYMPHOCYTES RELATIVE PERCENT: 16.9 % (ref 20–42)
MCH RBC QN AUTO: 27.3 PG (ref 26–35)
MCHC RBC AUTO-ENTMCNC: 31.8 % (ref 32–34.5)
MCV RBC AUTO: 85.7 FL (ref 80–99.9)
METER GLUCOSE: 105 MG/DL (ref 74–99)
METER GLUCOSE: 115 MG/DL (ref 74–99)
METER GLUCOSE: 66 MG/DL (ref 74–99)
METER GLUCOSE: 87 MG/DL (ref 74–99)
MONOCYTES ABSOLUTE: 0.37 E9/L (ref 0.1–0.95)
MONOCYTES RELATIVE PERCENT: 13.9 % (ref 2–12)
NEUTROPHILS ABSOLUTE: 1.71 E9/L (ref 1.8–7.3)
NEUTROPHILS RELATIVE PERCENT: 64.2 % (ref 43–80)
OVALOCYTES: ABNORMAL
PDW BLD-RTO: 14 FL (ref 11.5–15)
PLATELET # BLD: 214 E9/L (ref 130–450)
PMV BLD AUTO: 8.8 FL (ref 7–12)
POIKILOCYTES: ABNORMAL
POLYCHROMASIA: ABNORMAL
POTASSIUM REFLEX MAGNESIUM: 4.4 MMOL/L (ref 3.5–5)
PROCALCITONIN: 0.06 NG/ML (ref 0–0.08)
RBC # BLD: 3.63 E12/L (ref 3.8–5.8)
SODIUM BLD-SCNC: 141 MMOL/L (ref 132–146)
TEAR DROP CELLS: ABNORMAL
TOTAL PROTEIN: 5.3 G/DL (ref 6.4–8.3)
TROPONIN, HIGH SENSITIVITY: 99 NG/L (ref 0–11)
WBC # BLD: 2.7 E9/L (ref 4.5–11.5)

## 2022-08-31 PROCEDURE — 82962 GLUCOSE BLOOD TEST: CPT

## 2022-08-31 PROCEDURE — 86140 C-REACTIVE PROTEIN: CPT

## 2022-08-31 PROCEDURE — 6360000002 HC RX W HCPCS: Performed by: PHYSICIAN ASSISTANT

## 2022-08-31 PROCEDURE — 2580000003 HC RX 258: Performed by: PHYSICIAN ASSISTANT

## 2022-08-31 PROCEDURE — 85384 FIBRINOGEN ACTIVITY: CPT

## 2022-08-31 PROCEDURE — 36415 COLL VENOUS BLD VENIPUNCTURE: CPT

## 2022-08-31 PROCEDURE — 83605 ASSAY OF LACTIC ACID: CPT

## 2022-08-31 PROCEDURE — 6370000000 HC RX 637 (ALT 250 FOR IP): Performed by: PHYSICIAN ASSISTANT

## 2022-08-31 PROCEDURE — 84145 PROCALCITONIN (PCT): CPT

## 2022-08-31 PROCEDURE — 96372 THER/PROPH/DIAG INJ SC/IM: CPT

## 2022-08-31 PROCEDURE — 85378 FIBRIN DEGRADE SEMIQUANT: CPT

## 2022-08-31 PROCEDURE — 84484 ASSAY OF TROPONIN QUANT: CPT

## 2022-08-31 PROCEDURE — 85025 COMPLETE CBC W/AUTO DIFF WBC: CPT

## 2022-08-31 PROCEDURE — 83036 HEMOGLOBIN GLYCOSYLATED A1C: CPT

## 2022-08-31 PROCEDURE — 80053 COMPREHEN METABOLIC PANEL: CPT

## 2022-08-31 PROCEDURE — 83615 LACTATE (LD) (LDH) ENZYME: CPT

## 2022-08-31 PROCEDURE — 2140000000 HC CCU INTERMEDIATE R&B

## 2022-08-31 PROCEDURE — G0378 HOSPITAL OBSERVATION PER HR: HCPCS

## 2022-08-31 RX ORDER — INSULIN LISPRO 100 [IU]/ML
0-4 INJECTION, SOLUTION INTRAVENOUS; SUBCUTANEOUS
Status: DISCONTINUED | OUTPATIENT
Start: 2022-08-31 | End: 2022-09-01 | Stop reason: HOSPADM

## 2022-08-31 RX ORDER — TACROLIMUS 1 MG/1
2 CAPSULE ORAL 2 TIMES DAILY
Status: DISCONTINUED | OUTPATIENT
Start: 2022-08-31 | End: 2022-09-01 | Stop reason: HOSPADM

## 2022-08-31 RX ORDER — SODIUM CHLORIDE 0.9 % (FLUSH) 0.9 %
5-40 SYRINGE (ML) INJECTION PRN
Status: DISCONTINUED | OUTPATIENT
Start: 2022-08-31 | End: 2022-09-01 | Stop reason: HOSPADM

## 2022-08-31 RX ORDER — PROMETHAZINE HYDROCHLORIDE 12.5 MG/1
12.5 TABLET ORAL EVERY 6 HOURS PRN
Status: DISCONTINUED | OUTPATIENT
Start: 2022-08-31 | End: 2022-09-01 | Stop reason: HOSPADM

## 2022-08-31 RX ORDER — BENZONATATE 100 MG/1
100 CAPSULE ORAL 3 TIMES DAILY PRN
Status: DISCONTINUED | OUTPATIENT
Start: 2022-08-31 | End: 2022-09-01 | Stop reason: HOSPADM

## 2022-08-31 RX ORDER — ISOSORBIDE MONONITRATE 60 MG/1
60 TABLET, EXTENDED RELEASE ORAL DAILY
Status: DISCONTINUED | OUTPATIENT
Start: 2022-08-31 | End: 2022-09-01 | Stop reason: HOSPADM

## 2022-08-31 RX ORDER — HYDRALAZINE HYDROCHLORIDE 50 MG/1
100 TABLET, FILM COATED ORAL 3 TIMES DAILY
Status: DISCONTINUED | OUTPATIENT
Start: 2022-08-31 | End: 2022-09-01 | Stop reason: HOSPADM

## 2022-08-31 RX ORDER — ONDANSETRON 2 MG/ML
4 INJECTION INTRAMUSCULAR; INTRAVENOUS EVERY 6 HOURS PRN
Status: DISCONTINUED | OUTPATIENT
Start: 2022-08-31 | End: 2022-09-01 | Stop reason: HOSPADM

## 2022-08-31 RX ORDER — ACETAMINOPHEN 325 MG/1
650 TABLET ORAL EVERY 6 HOURS PRN
Status: DISCONTINUED | OUTPATIENT
Start: 2022-08-31 | End: 2022-09-01 | Stop reason: HOSPADM

## 2022-08-31 RX ORDER — SODIUM CHLORIDE 9 MG/ML
INJECTION, SOLUTION INTRAVENOUS CONTINUOUS
Status: DISCONTINUED | OUTPATIENT
Start: 2022-08-31 | End: 2022-09-01 | Stop reason: HOSPADM

## 2022-08-31 RX ORDER — SODIUM CHLORIDE 0.9 % (FLUSH) 0.9 %
5-40 SYRINGE (ML) INJECTION EVERY 12 HOURS SCHEDULED
Status: DISCONTINUED | OUTPATIENT
Start: 2022-08-31 | End: 2022-09-01 | Stop reason: HOSPADM

## 2022-08-31 RX ORDER — METOPROLOL TARTRATE 50 MG/1
50 TABLET, FILM COATED ORAL 2 TIMES DAILY
Status: DISCONTINUED | OUTPATIENT
Start: 2022-08-31 | End: 2022-09-01 | Stop reason: HOSPADM

## 2022-08-31 RX ORDER — ENOXAPARIN SODIUM 100 MG/ML
30 INJECTION SUBCUTANEOUS DAILY
Status: DISCONTINUED | OUTPATIENT
Start: 2022-08-31 | End: 2022-09-01 | Stop reason: HOSPADM

## 2022-08-31 RX ORDER — INSULIN LISPRO 100 [IU]/ML
0-4 INJECTION, SOLUTION INTRAVENOUS; SUBCUTANEOUS NIGHTLY
Status: DISCONTINUED | OUTPATIENT
Start: 2022-08-31 | End: 2022-09-01 | Stop reason: HOSPADM

## 2022-08-31 RX ORDER — BUMETANIDE 1 MG/1
1 TABLET ORAL 2 TIMES DAILY
Status: DISCONTINUED | OUTPATIENT
Start: 2022-08-31 | End: 2022-09-01 | Stop reason: HOSPADM

## 2022-08-31 RX ORDER — ACETAMINOPHEN 650 MG/1
650 SUPPOSITORY RECTAL EVERY 6 HOURS PRN
Status: DISCONTINUED | OUTPATIENT
Start: 2022-08-31 | End: 2022-09-01 | Stop reason: HOSPADM

## 2022-08-31 RX ORDER — INSULIN LISPRO 100 [IU]/ML
5 INJECTION, SOLUTION INTRAVENOUS; SUBCUTANEOUS SEE ADMIN INSTRUCTIONS
Status: DISCONTINUED | OUTPATIENT
Start: 2022-08-31 | End: 2022-09-01 | Stop reason: HOSPADM

## 2022-08-31 RX ORDER — MYCOPHENOLATE MOFETIL 250 MG/1
1000 CAPSULE ORAL 2 TIMES DAILY
Status: DISCONTINUED | OUTPATIENT
Start: 2022-08-31 | End: 2022-09-01 | Stop reason: HOSPADM

## 2022-08-31 RX ORDER — DEXTROSE MONOHYDRATE 100 MG/ML
INJECTION, SOLUTION INTRAVENOUS CONTINUOUS PRN
Status: DISCONTINUED | OUTPATIENT
Start: 2022-08-31 | End: 2022-09-01 | Stop reason: HOSPADM

## 2022-08-31 RX ORDER — SODIUM CHLORIDE 9 MG/ML
25 INJECTION, SOLUTION INTRAVENOUS PRN
Status: DISCONTINUED | OUTPATIENT
Start: 2022-08-31 | End: 2022-09-01 | Stop reason: HOSPADM

## 2022-08-31 RX ORDER — OXYCODONE HYDROCHLORIDE 10 MG/1
20 TABLET ORAL EVERY 8 HOURS PRN
Status: DISCONTINUED | OUTPATIENT
Start: 2022-08-31 | End: 2022-09-01 | Stop reason: HOSPADM

## 2022-08-31 RX ORDER — POLYETHYLENE GLYCOL 3350 17 G/17G
17 POWDER, FOR SOLUTION ORAL DAILY PRN
Status: DISCONTINUED | OUTPATIENT
Start: 2022-08-31 | End: 2022-09-01 | Stop reason: HOSPADM

## 2022-08-31 RX ORDER — ASPIRIN 81 MG/1
81 TABLET ORAL DAILY
Status: DISCONTINUED | OUTPATIENT
Start: 2022-08-31 | End: 2022-09-01 | Stop reason: HOSPADM

## 2022-08-31 RX ADMIN — ENOXAPARIN SODIUM 30 MG: 100 INJECTION SUBCUTANEOUS at 09:00

## 2022-08-31 RX ADMIN — OXYCODONE HYDROCHLORIDE 20 MG: 10 TABLET ORAL at 18:58

## 2022-08-31 RX ADMIN — TACROLIMUS 2 MG: 1 CAPSULE ORAL at 22:10

## 2022-08-31 RX ADMIN — SODIUM CHLORIDE: 9 INJECTION, SOLUTION INTRAVENOUS at 18:48

## 2022-08-31 RX ADMIN — METOPROLOL TARTRATE 50 MG: 50 TABLET, FILM COATED ORAL at 09:00

## 2022-08-31 RX ADMIN — MYCOPHENOLATE MOFETIL 1000 MG: 250 CAPSULE ORAL at 22:20

## 2022-08-31 RX ADMIN — SODIUM CHLORIDE: 9 INJECTION, SOLUTION INTRAVENOUS at 04:26

## 2022-08-31 RX ADMIN — BUMETANIDE 1 MG: 1 TABLET ORAL at 09:00

## 2022-08-31 RX ADMIN — TACROLIMUS 2 MG: 1 CAPSULE ORAL at 12:04

## 2022-08-31 RX ADMIN — METOPROLOL TARTRATE 50 MG: 50 TABLET, FILM COATED ORAL at 03:14

## 2022-08-31 RX ADMIN — ASPIRIN 81 MG: 81 TABLET, COATED ORAL at 09:00

## 2022-08-31 RX ADMIN — OXYCODONE HYDROCHLORIDE 20 MG: 10 TABLET ORAL at 03:14

## 2022-08-31 RX ADMIN — MYCOPHENOLATE MOFETIL 1000 MG: 250 CAPSULE ORAL at 12:05

## 2022-08-31 RX ADMIN — SODIUM CHLORIDE: 9 INJECTION, SOLUTION INTRAVENOUS at 15:27

## 2022-08-31 RX ADMIN — Medication 10 ML: at 09:00

## 2022-08-31 ASSESSMENT — PAIN SCALES - GENERAL
PAINLEVEL_OUTOF10: 7
PAINLEVEL_OUTOF10: 7

## 2022-08-31 ASSESSMENT — PAIN DESCRIPTION - LOCATION
LOCATION: BACK;LEG
LOCATION: BACK

## 2022-08-31 ASSESSMENT — PAIN - FUNCTIONAL ASSESSMENT: PAIN_FUNCTIONAL_ASSESSMENT: PREVENTS OR INTERFERES SOME ACTIVE ACTIVITIES AND ADLS

## 2022-08-31 ASSESSMENT — PAIN DESCRIPTION - ORIENTATION: ORIENTATION: MID;RIGHT;LEFT

## 2022-08-31 ASSESSMENT — PAIN DESCRIPTION - DESCRIPTORS: DESCRIPTORS: THROBBING

## 2022-08-31 NOTE — ACP (ADVANCE CARE PLANNING)
Advance Care Planning   Healthcare Decision Maker:    Primary Decision Maker: Kanu Campos - [phone]    Secondary Decision Maker: Yahir Nageotte - [phone]    Supplemental (Other) Decision Maker: Celinarosie Andersen - [place] - [phone]    Click here to complete Healthcare Decision Makers including selection of the Healthcare Decision Maker Relationship (ie \"Primary\"). Today we documented Decision Maker(s) consistent with Legal Next of Kin hierarchy.        If the relationship to the patient does NOT follow our state's Next of Kin hierarchy, the patient MUST complete an ACP Document to allow him/her to act on the patient's behalf.:

## 2022-08-31 NOTE — CONSULTS
Nephrology Consult Note  Patient's Name: Mercedes Grossman  12:50 PM  8/31/2022        Reason for Consult:  CKD , Kidney transplant  Requesting Physician:  Deandre Philippe III, DO    Chief Complaint:  SOB, cough, weakness  History Obtained From:  patient and EHR    History of Present Ilness:    Mercedes Grossman is a 67 y.o. 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 He presents to ED with complaints of generalized weakness associated with cough productive of whitish phlegm. No chest pain. He did experience some intermittent chills. This has been going on over the course of the past week. Oral intake has been poor due to poor appetite. No diarrhea. No nausea or emesis. He presented to ED for further evaluation. Initial laboratory data showed blood pressure 145/68, pulse 83, temperature 97.9. Laboratory data was significant for a BUN of 42, creatinine 3.2, glucose 64, WBC 2.7, hemoglobin 9.9, platelet count 676,588. High-sensitivity troponin was 110, CRP 1.1, . Rapid COVID-19 test positive. proBNP 2234. Chest x-ray showed no acute cardiopulmonary process. Patient has been admitted to telemetry for further management.     Past Medical History:   Diagnosis Date    Anemia     Iron deficiency    Atrial fibrillation (HCC)     CHF (congestive heart failure) ([facility] [address] 03/12/2008    Chronic diastolic congestive heart failure (HCC) 03/12/2008    Chronic foot pain     Chronic knee pain     BILATERAL    Constipation     Depression     DM (diabetes mellitus) ([facility] [address]     ESRD (end stage renal disease) ([facility] [address]     H/O kidney transplant 12/12/2014    Hyperlipidemia     Hypertension     Immunosuppression ([address] 3/12/2008    Kidney disease     Pulmonary hypertension ([address]        Past Surgical History:   Procedure Laterality Date    BACK SURGERY  1982    BLADDER SURGERY      CARPAL TUNNEL RELEASE Left 06/23/2014    CHOLECYSTECTOMY, LAPAROSCOPIC  05/21/2013    COLONOSCOPY      DIAGNOSTIC CARDIAC CATH LAB PROCEDURE  01/15/2013    NORMAL    ECHO COMPL W DOP COLOR FLOW  05/22/2013         ENDOSCOPY, COLON, DIAGNOSTIC      FOOT SURGERY      BONE REMOVED    KIDNEY BIOPSY  06/09/2015    CC ; ALSO 4/14/2016, 4/5/2017    KIDNEY TRANSPLANT  12/12/2014    @ [facility]    EDUARDO-EN-Y GASTRIC BYPASS  06/29/2010    Laparoscopic / Dr. Cyndi Luna  12/21/2007    Dr. Laura Thurston 4/28/2021    EGD in OR 12--COVID performed by Lizandro Simmons MD at [facility] ENDOSCOPY       Family History   Problem Relation Age of Onset    Diabetes Mother     High Blood Pressure Mother     Heart Disease Mother     Diabetes Father     Cancer Father     Stroke Father     Heart Disease Father         reports that he quit smoking about 32 years ago. His smoking use included cigarettes. He has a 20.00 pack-year smoking history. He has never used smokeless tobacco. He reports that he does not currently use alcohol. He reports that he does not use drugs. Allergies:  Patient has no known allergies.     Current Medications:    0.9 % sodium chloride infusion, Continuous  aspirin EC tablet 81 mg, Daily  bumetanide (BUMEX) tablet 1 mg, BID  hydrALAZINE (APRESOLINE) tablet 100 mg, TID  isosorbide mononitrate (IMDUR) extended release tablet 60 mg, Daily  metoprolol tartrate (LOPRESSOR) tablet 50 mg, BID  mycophenolate (CELLCEPT) capsule 1,000 mg, BID  oxyCODONE HCl (OXY-IR) immediate release tablet 20 mg, Q8H PRN  tacrolimus (PROGRAF) capsule 2 mg, BID  glucose chewable tablet 16 g, PRN  dextrose bolus 10% 125 mL, PRN   Or  dextrose bolus 10% 250 mL, PRN  glucagon (rDNA) injection 1 mg, PRN  dextrose 10 % infusion, Continuous PRN  sodium chloride flush 0.9 % injection 5-40 mL, 2 times per day  sodium chloride flush 0.9 % injection 5-40 mL, 2.7 (L) 08/31/2022    WBC 3.4 (L) 08/30/2022    WBC 2.8 (L) 06/20/2022    HGB 9.9 (L) 08/31/2022    HGB 10.2 (L) 08/30/2022    HGB 11.0 (L) 06/20/2022    HCT 31.1 (L) 08/31/2022    HCT 32.0 (L) 08/30/2022    HCT 36.9 (L) 06/20/2022    MCV 85.7 08/31/2022     08/31/2022         Imaging:  No results found. Assessment/Plans    1. 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 Current creatinine near his baseline. Clinically he appears hypovolemic  S/p DD kidney transplant 12/12/14 [facility]  Check urine indices  Hold diuretics for now  Gentle hydration  Monitor labs and urine output  Continue antirejection meds    2. COVID-19 infection, he is fully vaccinated including boosters. Prior history of COVID-19. Not requiring oxygen at this time  Symptomatic management    3. Type 2 DM  Hypoglycemic on presentation  Adjust insulin    4. Anemia due to CKD  Check iron stores  Monitor    5.  HTN with CKD  Continue BP meds    6 H/O Chronic diastolic HF  Currently appears to be hypovolemic  Gentle hydration  Temporarily hold diuretics    Will follow  Thanks      Elver Morgan MD  12:50 PM  8/31/2022

## 2022-08-31 NOTE — H&P
[facility]  History and Physical      CHIEF COMPLAINT:    Chief Complaint   Patient presents with    Shortness of Breath     Shortness of breath, generalized weakness x one week. +cough. Denies fever/chills. Kidney transplant patient. Denies chest pain. +lack of appetite. Patient of Robert Nolasco DO presents with:  COVID-19 virus infection    History of Present Illness:   Patient is a 66-year-old male with a past medical history of anemia, A. fib on oral anticoagulation, CHF, depression, diabetes, end-stage renal disease, kidney transplant, hyperlipidemia, hypertension, pulmonary hypertension. Patient presented to the ED with complaints of shortness of breath and generalized weakness with a cough beginning approximately 1 week ago. Patient does admit to a productive cough. Patient has been vaccinated for COVID however he has not received his booster. ER work-up reveals elevated BUN/creatinine 44/3.3, BNP 2,234, elevated troponin 110, COVID-positive, WBC 3.4. Patient is admitted to telemetry unit for further testing and treatment. On evaluation he indicates he feels much better today. He is not requiring oxygen on the time of evaluation in the emergency department. His symptoms have gradually worsened over the past 1 week. No fevers chills or rigors at home. REVIEW OF SYSTEMS:  Pertinent negatives are above in HPI. 10 point ROS otherwise negative.       Past Medical History:   Diagnosis Date    Anemia     Iron deficiency    Atrial fibrillation (HCC)     CHF (congestive heart failure) ([facility] 03/12/2008    Chronic diastolic congestive heart failure ([facility] 03/12/2008    Chronic foot pain     Chronic knee pain     BILATERAL    Constipation     Depression     DM (diabetes mellitus) ([facility]     ESRD (end stage renal disease) ([facility]     H/O kidney transplant 12/12/2014    Hyperlipidemia     Hypertension     Immunosuppression ([facility] 3/12/2008    Kidney disease     Pulmonary hypertension [facility])          Past Surgical History:   Procedure Laterality Date    BACK SURGERY  1982    BLADDER SURGERY      CARPAL TUNNEL RELEASE Left 06/23/2014    CHOLECYSTECTOMY, LAPAROSCOPIC  05/21/2013    COLONOSCOPY      DIAGNOSTIC CARDIAC CATH LAB PROCEDURE  01/15/2013    NORMAL    ECHO COMPL W DOP COLOR FLOW  05/22/2013         ENDOSCOPY, COLON, DIAGNOSTIC      FOOT SURGERY      BONE REMOVED    KIDNEY BIOPSY  06/09/2015    [facility] ; ALSO 4/14/2016, 4/5/2017    KIDNEY TRANSPLANT  12/12/2014    @ [facility]    EDUARDO-EN-Y GASTRIC BYPASS  06/29/2010    Laparoscopic / Dr. Kristina Alvarez  12/21/2007    Dr. Suly Adams N/A 4/28/2021    EGD in OR 12--COVID performed by Reji Haile MD at [address] [place] [address]       Medications Prior to Admission:    Not in a hospital admission. Note that the patient's home medications were reviewed and the above list is accurate to the best of my knowledge at the time of the exam.    Allergies:    Patient has no known allergies. Social History:    reports that he quit smoking about 32 years ago. His smoking use included cigarettes. He has a 20.00 pack-year smoking history. He has never used smokeless tobacco. He reports that he does not currently use alcohol. He reports that he does not use drugs. Family History:   family history includes Cancer in his father; Diabetes in his father and mother; Heart Disease in his father and mother; High Blood Pressure in his mother; Stroke in his father.       PHYSICAL EXAM:    Vitals:  /60   Pulse 52   Temp 97.9 °F (36.6 °C) (Oral)   Resp 16   Ht 5' 11\" (1.803 m)   Wt 180 lb (81.6 kg)   SpO2 95%   BMI 25.10 kg/m²       General appearance: NAD, conversant  Eyes: Sclerae anicteric, PERRLA  HEENT: AT/NC, MMM  Neck: FROM, supple, no thyromegaly  Lymph: No cervical / supraclavicular lymphadenopathy  Lungs: Clear lung fields  CV: RRR, no MRGs, no lower extremity edema  Abdomen: Soft, non-tender; no masses or HSM, +BS  Extremities: FROM without synovitis. No clubbing or cyanosis of the hands. Skin: no rash, induration, lesions, or ulcers  Psych: Calm and cooperative. Normal judgement and insight. Normal mood and affect. Neuro: Alert and interactive, face symmetric, speech fluent. LABS:  All labs reviewed. Of note:  CBC with Differential:    Lab Results   Component Value Date/Time    WBC 3.4 08/30/2022 06:34 PM    RBC 3.76 08/30/2022 06:34 PM    HGB 10.2 08/30/2022 06:34 PM    HCT 32.0 08/30/2022 06:34 PM     08/30/2022 06:34 PM    MCV 85.1 08/30/2022 06:34 PM    MCH 27.1 08/30/2022 06:34 PM    MCHC 31.9 08/30/2022 06:34 PM    RDW 14.1 08/30/2022 06:34 PM    NRBC 0.9 02/21/2022 06:35 AM    SEGSPCT 85 06/03/2013 08:45 AM    BLASTSPCT 0.9 05/16/2022 06:40 AM    METASPCT 0.9 03/21/2022 06:54 AM    LYMPHOPCT 15.0 08/30/2022 06:34 PM    PROMYELOPCT 0.9 12/14/2021 10:45 AM    MONOPCT 10.6 08/30/2022 06:34 PM    MYELOPCT 0.9 03/21/2022 06:54 AM    BASOPCT 0.3 08/30/2022 06:34 PM    MONOSABS 0.36 08/30/2022 06:34 PM    LYMPHSABS 0.51 08/30/2022 06:34 PM    EOSABS 0.05 08/30/2022 06:34 PM    BASOSABS 0.01 08/30/2022 06:34 PM     CMP:    Lab Results   Component Value Date/Time     08/30/2022 06:34 PM    K 4.5 08/30/2022 06:34 PM    K 5.4 06/12/2021 06:28 AM     08/30/2022 06:34 PM    CO2 21 08/30/2022 06:34 PM    BUN 44 08/30/2022 06:34 PM    CREATININE 3.3 08/30/2022 06:34 PM    GFRAA 22 08/30/2022 06:34 PM    LABGLOM 22 08/30/2022 06:34 PM    GLUCOSE 174 08/30/2022 06:34 PM    GLUCOSE 124 02/21/2012 09:15 AM    PROT 6.2 08/30/2022 06:34 PM    LABALBU 3.4 08/30/2022 06:34 PM    LABALBU 3.8 07/05/2011 10:38 AM    CALCIUM 8.8 08/30/2022 06:34 PM    BILITOT 0.3 08/30/2022 06:34 PM    ALKPHOS 82 08/30/2022 06:34 PM    AST 33 08/30/2022 06:34 PM    ALT 22 08/30/2022 06:34 PM       Imaging:  CXR:  No acute cardiopulmonary process.   Mild hypoventilation and basilar atelectasis or scar.    EKG:  I've personally reviewed the patient's EKG:  NSR    Telemetry:  I've personally reviewed the patient's telemetry:      ASSESSMENT/PLAN:  Principal Problem:    COVID-19 virus infection  Active Problems:    Weakness    COVID  Resolved Problems:    * No resolved hospital problems. *    66-year-old male with a past medical history of kidney transplant, and diabetes presents to the ED with complaints of weakness and fatigue with a productive cough for the past 10 days is admitted to telemetry unit with    COVID in a vaccinated patient who is immunocompromised  -Vitamin D, zinc, vitamin C  -Monitor O2 saturations and supplement oxygen to keep saturations greater than 93%, wean as necessary, incentive spirometer, no nebulizers  Patient is currently 95% on room air-no indication for remdesivir  -Droplet plus isolation  -Tessalon perles  -Encourage ISP use/self proning  -Follow inflammatory markers  Procal - pending  Ddimer- 339  CRP - Pending    Kidney transplant  Monitor labs - BUN/Creat: 44/3.3, baseline 40/2.9  Resume anti rejection medication  IV hydration  Monitor bp resume home medications with parameters    Diabetes Mellitus-currently with hypoglycemia  - Watch blood sugars as the day progresses, encourage p.o. intake  -Monitor labs  -ISS glucose control  -Hypoglycemia protocol initiated  HgbA1c - 5.3      Medication for other comorbidities continue as appropriate dose adjustment as necessary.     DVT prophylaxis - Lovenox  PT OT  Discharge planning-if overall status is improved by tomorrow, he can be discharged home in stable condition      Code status: Full  Requires inpatient level of care

## 2022-08-31 NOTE — ED PROVIDER NOTES
HPI:  8/31/22, Time: 12:50 AM EDT         Dariel Roldan is a 67 y.o. male presenting to the ED for history of weakness and cough, beginning over 1 week ago. The complaint has been persistent, moderate in severity, and worsened by nothing. Reporting feeling weak for the past 10 days patient has been coughing for the last 4 days coughing up yellow sputum. Patient reports shortness of breath intermittently. Patient reports history of kidney transplant he is a diabetic. Patient reports no appetite. Patient reports he has been vaccinated and boosted for COVID. Patient reporting no fever or chills. Patient reporting no chest pain. He reports no calf pain or swelling. ROS:   Pertinent positives and negatives are stated within HPI, all other systems reviewed and are negative.  --------------------------------------------- PAST HISTORY ---------------------------------------------  Past Medical History:  has a past medical history of Anemia, Atrial fibrillation ([address] CHF (congestive heart failure) ([address] Chronic diastolic congestive heart failure ([address] Chronic foot pain, Chronic knee pain, Constipation, Depression, DM (diabetes mellitus) ([address] ESRD (end stage renal disease) ([address] H/O kidney transplant, Hyperlipidemia, Hypertension, Immunosuppression ([address] Kidney disease, and Pulmonary hypertension ([address] Past Surgical History:  has a past surgical history that includes back surgery (1982); Upper gastrointestinal endoscopy (12/21/2007); [place]-en-Y Gastric Bypass (06/29/2010); Cholecystectomy, laparoscopic (05/21/2013); ECHO Compl W Dop Color Flow (05/22/2013); Diagnostic Cardiac Cath Lab Procedure (01/15/2013); Foot surgery; Colonoscopy; Endoscopy, colon, diagnostic; Carpal tunnel release (Left, 06/23/2014); Kidney transplant (12/12/2014); Bladder surgery; Kidney biopsy (06/09/2015); and Upper gastrointestinal endoscopy (N/A, 4/28/2021). Social History:  reports that he quit smoking about 32 years ago. His smoking use included cigarettes. He has a 20.00 pack-year smoking history. He has never used smokeless tobacco. He reports that he does not currently use alcohol. He reports that he does not use drugs. Family History: family history includes Cancer in his father; Diabetes in his father and mother; Heart Disease in his father and mother; High Blood Pressure in his mother; Stroke in his father. The patients home medications have been reviewed. Allergies: Patient has no known allergies. ---------------------------------------------------PHYSICAL EXAM--------------------------------------    Constitutional/General: Alert and oriented x3, well appearing, non toxic in NAD  Head: Normocephalic and atraumatic  Eyes: PERRL, EOMI  Mouth: Oropharynx clear, handling secretions, no trismus  Neck: Supple, full ROM, non tender to palpation in the midline, no stridor, no crepitus, no meningeal signs  Pulmonary: Lungs clear to auscultation bilaterally, no wheezes, rales, or rhonchi. Not in respiratory distress  Cardiovascular:  Regular rate. Regular rhythm. No murmurs, gallops, or rubs. 2+ distal pulses  Chest: no chest wall tenderness  Abdomen: Soft. Non tender. Non distended. +BS. No rebound, guarding, or rigidity. No pulsatile masses appreciated. Musculoskeletal: Moves all extremities x 4. Warm and well perfused, no clubbing, cyanosis, or edema. Capillary refill <3 seconds  Skin: warm and dry. No rashes. Neurologic: GCS 15, CN 2-12 grossly intact, no focal deficits, symmetric strength 5/5 in the upper and lower extremities bilaterally  Psych: Normal Affect    -------------------------------------------------- RESULTS -------------------------------------------------  I have personally reviewed all laboratory and imaging results for this patient. Results are listed below.      LABS:  Results for orders placed or performed during the hospital encounter of 08/31/22   COVID-19, Rapid    Specimen: Nasopharyngeal Swab   Result Value Ref Range    SARS-CoV-2, NAAT DETECTED (A) Not Detected   Rapid influenza A/B antigens    Specimen: Nasopharyngeal   Result Value Ref Range    Influenza A by PCR Not Detected Not Detected    Influenza B by PCR Not Detected Not Detected   Troponin   Result Value Ref Range    Troponin, High Sensitivity 110 (H) 0 - 11 ng/L   CBC with Auto Differential   Result Value Ref Range    WBC 3.4 (L) 4.5 - 11.5 E9/L    RBC 3.76 (L) 3.80 - 5.80 E12/L    Hemoglobin 10.2 (L) 12.5 - 16.5 g/dL    Hematocrit 32.0 (L) 37.0 - 54.0 %    MCV 85.1 80.0 - 99.9 fL    MCH 27.1 26.0 - 35.0 pg    MCHC 31.9 (L) 32.0 - 34.5 %    RDW 14.1 11.5 - 15.0 fL    Platelets 867 793 - 015 E9/L    MPV 8.9 7.0 - 12.0 fL    Neutrophils % 72.3 43.0 - 80.0 %    Immature Granulocytes % 0.3 0.0 - 5.0 %    Lymphocytes % 15.0 (L) 20.0 - 42.0 %    Monocytes % 10.6 2.0 - 12.0 %    Eosinophils % 1.5 0.0 - 6.0 %    Basophils % 0.3 0.0 - 2.0 %    Neutrophils Absolute 2.47 1.80 - 7.30 E9/L    Immature Granulocytes # 0.01 E9/L    Lymphocytes Absolute 0.51 (L) 1.50 - 4.00 E9/L    Monocytes Absolute 0.36 0.10 - 0.95 E9/L    Eosinophils Absolute 0.05 0.05 - 0.50 E9/L    Basophils Absolute 0.01 0.00 - 0.20 E9/L    Polychromasia 1+     Poikilocytes 2+     Agnes Cells 2+     Ovalocytes 1+    Comprehensive Metabolic Panel   Result Value Ref Range    Sodium 140 132 - 146 mmol/L    Potassium 4.5 3.5 - 5.0 mmol/L    Chloride 105 98 - 107 mmol/L    CO2 21 (L) 22 - 29 mmol/L    Anion Gap 14 7 - 16 mmol/L    Glucose 174 (H) 74 - 99 mg/dL    BUN 44 (H) 6 - 23 mg/dL    Creatinine 3.3 (H) 0.7 - 1.2 mg/dL    GFR Non-African American 22 >=60 mL/min/1.73    GFR African American 22     Calcium 8.8 8.6 - 10.2 mg/dL    Total Protein 6.2 (L) 6.4 - 8.3 g/dL    Albumin 3.4 (L) 3.5 - 5.2 g/dL    Total Bilirubin 0.3 0.0 - 1.2 mg/dL    Alkaline Phosphatase 82 40 - 129 U/L    ALT 22 0 - 40 U/L    AST 33 0 - 39 U/L   Brain Natriuretic Peptide   Result Value Ref Range    Pro-BNP 2,234 (H) 0 - 125 pg/mL   Magnesium   Result Value Ref Range    Magnesium 1.9 1.6 - 2.6 mg/dL   Urinalysis   Result Value Ref Range    Color, UA Yellow Straw/Yellow    Clarity, UA Clear Clear    Glucose, Ur Negative Negative mg/dL    Bilirubin Urine Negative Negative    Ketones, Urine Negative Negative mg/dL    Specific Gravity, UA 1.010 1.005 - 1.030    Blood, Urine Negative Negative    pH, UA 6.0 5.0 - 9.0    Protein, UA Negative Negative mg/dL    Urobilinogen, Urine 0.2 <2.0 E.U./dL    Nitrite, Urine Negative Negative    Leukocyte Esterase, Urine Negative Negative   EKG 12 Lead   Result Value Ref Range    Ventricular Rate 74 BPM    Atrial Rate 74 BPM    P-R Interval 176 ms    QRS Duration 74 ms    Q-T Interval 406 ms    QTc Calculation (Bazett) 450 ms    P Axis 89 degrees    R Axis -3 degrees    T Axis 97 degrees       RADIOLOGY:  Interpreted by Radiologist.  XR CHEST PORTABLE   Final Result   No acute cardiopulmonary process on portable exam.      Mild hypoventilation and basilar atelectasis or scar. EKG: This EKG is signed and interpreted by me. Rate: 74  Rhythm: Sinus  Interpretation: non-specific EKG  Comparison: stable as compared to patient's most recent EKG      ------------------------- NURSING NOTES AND VITALS REVIEWED ---------------------------   The nursing notes within the ED encounter and vital signs as below have been reviewed by myself. BP (!) 167/59   Pulse 67   Temp 97.9 °F (36.6 °C) (Oral)   Resp 18   Ht 5' 11\" (1.803 m)   Wt 180 lb (81.6 kg)   SpO2 98%   BMI 25.10 kg/m²   Oxygen Saturation Interpretation: Normal    The patients available past medical records and past encounters were reviewed. ------------------------------ ED COURSE/MEDICAL DECISION MAKING----------------------  Medications   0.9 % sodium chloride infusion (has no administration in time range)             Medical Decision Making:    Patient presenting here because of feeling weak.   Patient reporting coughing for the past 4 days. Patient reports no appetite he reports he has been vaccinated for COVID. He does have a history of renal transplant. Patient reporting no chest pain he does report shortness of breath. Patient labs noted reviewed as well as EKG. Patient was ordered IV fluids here. Plan will be to admit for further evaluation and treatment. Re-Evaluations:             Re-evaluation. Patients symptoms show no change  Patient was made aware of findings and plan. Consultations:                 Critical Care: This patient's ED course included: a personal history and physicial eaxmination    This patient has been closely monitored during their ED course. Counseling: The emergency provider has spoken with the patient and discussed todays results, in addition to providing specific details for the plan of care and counseling regarding the diagnosis and prognosis. Questions are answered at this time and they are agreeable with the plan.       --------------------------------- IMPRESSION AND DISPOSITION ---------------------------------    IMPRESSION  1. COVID-19 virus infection    2. General weakness        DISPOSITION  Disposition: Admit to telemetry  Patient condition is stable        NOTE: This report was transcribed using voice recognition software.  Every effort was made to ensure accuracy; however, inadvertent computerized transcription errors may be present          Nilesh Pickering MD  08/31/22 [id number]

## 2022-08-31 NOTE — CARE COORDINATION
CLARENCE transition of care. Pt presented to [facility] ER secondary to shortness of breath, cough, and generalized weakness. Admitted with Covid. Met with pt at bedside to discuss d/c planning. Pt lives alone in a bilevel home 4 steps. PTA pt is independent with ADLs and driving. Reports no hx of DME or [facility]ARA, has used [facility] in the past but does not recall name of company used. PCP is Dr. Tejas Hawkins and uses [facility] for long term meds and [address] [place] [address] for short term meds. Pt plans to d/c to home when medically stable. Pt will call his son for a ride at d/c.  CM/SW to follow. Becki Doran RN, CM.

## 2022-08-31 NOTE — Clinical Note
Discharge Plan[de-identified] Other/[facility])   Telemetry/Cardiac Monitoring Required?: Yes

## 2022-09-01 VITALS
BODY MASS INDEX: 25.2 KG/M2 | HEART RATE: 51 BPM | RESPIRATION RATE: 18 BRPM | HEIGHT: 71 IN | WEIGHT: 180 LBS | TEMPERATURE: 98.3 F | DIASTOLIC BLOOD PRESSURE: 59 MMHG | OXYGEN SATURATION: 99 % | SYSTOLIC BLOOD PRESSURE: 141 MMHG

## 2022-09-01 LAB
ALBUMIN SERPL-MCNC: 3 G/DL (ref 3.5–5.2)
ALP BLD-CCNC: 66 U/L (ref 40–129)
ALT SERPL-CCNC: 17 U/L (ref 0–40)
ANION GAP SERPL CALCULATED.3IONS-SCNC: 10 MMOL/L (ref 7–16)
ANISOCYTOSIS: ABNORMAL
APTT: 27.7 SEC (ref 24.5–35.1)
AST SERPL-CCNC: 26 U/L (ref 0–39)
BASOPHILS ABSOLUTE: 0.01 E9/L (ref 0–0.2)
BASOPHILS RELATIVE PERCENT: 0.4 % (ref 0–2)
BILIRUB SERPL-MCNC: 0.3 MG/DL (ref 0–1.2)
BUN BLDV-MCNC: 37 MG/DL (ref 6–23)
BURR CELLS: ABNORMAL
C-REACTIVE PROTEIN: 1.7 MG/DL (ref 0–0.4)
CALCIUM SERPL-MCNC: 8.7 MG/DL (ref 8.6–10.2)
CHLORIDE BLD-SCNC: 106 MMOL/L (ref 98–107)
CO2: 23 MMOL/L (ref 22–29)
CREAT SERPL-MCNC: 2.4 MG/DL (ref 0.7–1.2)
EOSINOPHILS ABSOLUTE: 0.12 E9/L (ref 0.05–0.5)
EOSINOPHILS RELATIVE PERCENT: 4.5 % (ref 0–6)
GFR AFRICAN AMERICAN: 32
GFR NON-AFRICAN AMERICAN: 32 ML/MIN/1.73
GLUCOSE BLD-MCNC: 55 MG/DL (ref 74–99)
HCT VFR BLD CALC: 34.7 % (ref 37–54)
HEMOGLOBIN: 10.9 G/DL (ref 12.5–16.5)
IMMATURE GRANULOCYTES #: 0.02 E9/L
IMMATURE GRANULOCYTES %: 0.8 % (ref 0–5)
INR BLD: 1.1
LYMPHOCYTES ABSOLUTE: 0.54 E9/L (ref 1.5–4)
LYMPHOCYTES RELATIVE PERCENT: 20.4 % (ref 20–42)
MCH RBC QN AUTO: 27.9 PG (ref 26–35)
MCHC RBC AUTO-ENTMCNC: 31.4 % (ref 32–34.5)
MCV RBC AUTO: 88.7 FL (ref 80–99.9)
METER GLUCOSE: 113 MG/DL (ref 74–99)
METER GLUCOSE: 118 MG/DL (ref 74–99)
METER GLUCOSE: 119 MG/DL (ref 74–99)
METER GLUCOSE: 59 MG/DL (ref 74–99)
MONOCYTES ABSOLUTE: 0.29 E9/L (ref 0.1–0.95)
MONOCYTES RELATIVE PERCENT: 10.9 % (ref 2–12)
NEUTROPHILS ABSOLUTE: 1.67 E9/L (ref 1.8–7.3)
NEUTROPHILS RELATIVE PERCENT: 63 % (ref 43–80)
OVALOCYTES: ABNORMAL
PDW BLD-RTO: 14.1 FL (ref 11.5–15)
PLATELET # BLD: 250 E9/L (ref 130–450)
PMV BLD AUTO: 8.9 FL (ref 7–12)
POIKILOCYTES: ABNORMAL
POTASSIUM REFLEX MAGNESIUM: 4.8 MMOL/L (ref 3.5–5)
PROTHROMBIN TIME: 12.1 SEC (ref 9.3–12.4)
RBC # BLD: 3.91 E12/L (ref 3.8–5.8)
SODIUM BLD-SCNC: 139 MMOL/L (ref 132–146)
TOTAL PROTEIN: 5.4 G/DL (ref 6.4–8.3)
VITAMIN D 25-HYDROXY: 78 NG/ML (ref 30–100)
WBC # BLD: 2.7 E9/L (ref 4.5–11.5)

## 2022-09-01 PROCEDURE — 6370000000 HC RX 637 (ALT 250 FOR IP): Performed by: PHYSICIAN ASSISTANT

## 2022-09-01 PROCEDURE — G0378 HOSPITAL OBSERVATION PER HR: HCPCS

## 2022-09-01 PROCEDURE — 86140 C-REACTIVE PROTEIN: CPT

## 2022-09-01 PROCEDURE — 80053 COMPREHEN METABOLIC PANEL: CPT

## 2022-09-01 PROCEDURE — 6360000002 HC RX W HCPCS: Performed by: PHYSICIAN ASSISTANT

## 2022-09-01 PROCEDURE — 82962 GLUCOSE BLOOD TEST: CPT

## 2022-09-01 PROCEDURE — 97165 OT EVAL LOW COMPLEX 30 MIN: CPT

## 2022-09-01 PROCEDURE — 2580000003 HC RX 258: Performed by: PHYSICIAN ASSISTANT

## 2022-09-01 PROCEDURE — 97161 PT EVAL LOW COMPLEX 20 MIN: CPT

## 2022-09-01 PROCEDURE — 82306 VITAMIN D 25 HYDROXY: CPT

## 2022-09-01 PROCEDURE — 96372 THER/PROPH/DIAG INJ SC/IM: CPT

## 2022-09-01 PROCEDURE — 85025 COMPLETE CBC W/AUTO DIFF WBC: CPT

## 2022-09-01 PROCEDURE — 85730 THROMBOPLASTIN TIME PARTIAL: CPT

## 2022-09-01 PROCEDURE — 85610 PROTHROMBIN TIME: CPT

## 2022-09-01 PROCEDURE — 36415 COLL VENOUS BLD VENIPUNCTURE: CPT

## 2022-09-01 RX ORDER — BENZONATATE 100 MG/1
100 CAPSULE ORAL 3 TIMES DAILY PRN
Qty: 21 CAPSULE | Refills: 0 | Status: SHIPPED | OUTPATIENT
Start: 2022-09-01 | End: 2022-09-08

## 2022-09-01 RX ORDER — DEXAMETHASONE 6 MG/1
6 TABLET ORAL
Qty: 5 TABLET | Refills: 0 | Status: SHIPPED | OUTPATIENT
Start: 2022-09-01 | End: 2022-09-06

## 2022-09-01 RX ADMIN — ASPIRIN 81 MG: 81 TABLET, COATED ORAL at 09:27

## 2022-09-01 RX ADMIN — MYCOPHENOLATE MOFETIL 1000 MG: 250 CAPSULE ORAL at 09:26

## 2022-09-01 RX ADMIN — TACROLIMUS 2 MG: 1 CAPSULE ORAL at 09:26

## 2022-09-01 RX ADMIN — SODIUM CHLORIDE: 9 INJECTION, SOLUTION INTRAVENOUS at 04:55

## 2022-09-01 RX ADMIN — METOPROLOL TARTRATE 50 MG: 50 TABLET, FILM COATED ORAL at 09:26

## 2022-09-01 RX ADMIN — Medication 10 ML: at 09:27

## 2022-09-01 RX ADMIN — ISOSORBIDE MONONITRATE 60 MG: 60 TABLET, EXTENDED RELEASE ORAL at 09:26

## 2022-09-01 RX ADMIN — ENOXAPARIN SODIUM 30 MG: 100 INJECTION SUBCUTANEOUS at 09:27

## 2022-09-01 RX ADMIN — OXYCODONE HYDROCHLORIDE 20 MG: 10 TABLET ORAL at 09:34

## 2022-09-01 RX ADMIN — HYDRALAZINE HYDROCHLORIDE 100 MG: 50 TABLET, FILM COATED ORAL at 09:26

## 2022-09-01 RX ADMIN — BUMETANIDE 1 MG: 1 TABLET ORAL at 09:26

## 2022-09-01 ASSESSMENT — PAIN DESCRIPTION - ORIENTATION: ORIENTATION: MID;RIGHT;LEFT

## 2022-09-01 ASSESSMENT — PAIN DESCRIPTION - DESCRIPTORS: DESCRIPTORS: THROBBING

## 2022-09-01 ASSESSMENT — PAIN SCALES - GENERAL: PAINLEVEL_OUTOF10: 7

## 2022-09-01 ASSESSMENT — PAIN - FUNCTIONAL ASSESSMENT: PAIN_FUNCTIONAL_ASSESSMENT: PREVENTS OR INTERFERES SOME ACTIVE ACTIVITIES AND ADLS

## 2022-09-01 ASSESSMENT — PAIN DESCRIPTION - LOCATION: LOCATION: BACK;LEG

## 2022-09-01 NOTE — PLAN OF CARE
Problem: Chronic Conditions and Co-morbidities  Goal: Patient's chronic conditions and co-morbidity symptoms are monitored and maintained or improved  Outcome: Completed     Problem: Discharge Planning  Goal: Discharge to home or other facility with appropriate resources  Outcome: Completed     Problem: Safety - Adult  Goal: Free from fall injury  Outcome: Completed     Problem: Cardiovascular - Adult  Goal: Maintains optimal cardiac output and hemodynamic stability  Outcome: Completed     Problem: Metabolic/Fluid and Electrolytes - Adult  Goal: Hemodynamic stability and optimal renal function maintained  Outcome: Completed

## 2022-09-01 NOTE — PLAN OF CARE
Patient's chart updated to reflect:      . - HF care plan, HF education points and HF discharge instructions.  -Orders: 2 gram sodium diet, daily weights, I/O.  -PCP and/or Cardiologist appointment to be scheduled within 7 days of hospital discharge.  -History of HF, not primary admission Dx. Patient admitted for treatment of IMPRESSION  1. COVID-19 virus infection    2.  General weakness      Bao Fernández RN RN, BSN  Heart Failure Navigator

## 2022-09-01 NOTE — PROGRESS NOTES
Physical Therapy    Physical Therapy Initial Assessment     Name: Jose Maria Ramirez  : 1950  MRN: [id number]      Date of Service: 2022    Evaluating PT:  Breonna Martini PT, DPT  [id number]     Room #:  4206/5795-X  Diagnosis:  General weakness [R53.1]  COVID [U07.1]  COVID-19 virus infection [U07.1]  PMHx/PSHx:   has a past medical history of Anemia, Atrial fibrillation ([facility] CHF (congestive heart failure) ([facility] Chronic diastolic congestive heart failure ([facility] Chronic foot pain, Chronic knee pain, Constipation, Depression, DM (diabetes mellitus) ([facility] ESRD (end stage renal disease) ([facility] [facility] H/O kidney transplant, Hyperlipidemia, Hypertension, Immunosuppression ([facility] Kidney disease, and Pulmonary hypertension ([facility] Procedure/Surgery:  NA  Precautions:  Low fall risk   Equipment Needs:  NA    SUBJECTIVE:    Pt lives with his wife in a bi-level story home with 1 step to enter. Bedroom and bathroom are on the 2nd level with 5 steps and rail. Pt ambulated with no AD PTA. OBJECTIVE:   Initial Evaluation  Date:    AM-PAC 6 Clicks 98/84   Was pt agreeable to Eval/treatment? Yes    Does pt have pain? None    Bed Mobility  Rolling: NT   Supine to sit: NT   Sit to supine: NT   Scooting: NT    Transfers Sit to stand: Independent   Stand to sit: Independent   Stand pivot: Independent    Ambulation    50 feet with no AD Independent    Stair negotiation: ascended and descended  NT d/t COVID isolation   ROM BUE:  Per OT eval  BLE:  WFL   Strength BUE:  Per OT eval   BLE:  WFL   Balance Sitting EOB:  NT  Dynamic Standing:  Independent      Pt is A & O x 4  Sensation:  Pt reports intermittent numbness and tingling to B feet/hands d/t neuropathy   Edema:  Unremarkable    Vitals:   At rest:  SpO2 95%, HR 70 bpm  After activity:  SpO2 95%, HR 71 bpm    Therapeutic Exercises:    NA    Patient education  Pt educated on PT role    Patient response to education:   Pt verbalized understanding Pt demonstrated skill Pt requires further education in this area   Yes  Yes  no     ASSESSMENT:    Conditions Requiring Skilled Therapeutic Intervention:None         Comments:  Pt received in bathroom and agreeable to PT evaluation. Vitals monitored during session. Pt ambulating around room without assistance required. Oxygen and HR monitored during ambulation around room. Remained WNL. Pt in bedsdie chair on exit. No acute PT needs identified at this time. Pt left with call button in reach, lines attached, and needs met. Treatment:  Patient practiced and was instructed in the following treatment:    None     Pt's/ family goals   1. Home     Prognosis is NA for reaching above PT goals. Patient and or family understand(s) diagnosis, prognosis, and plan of care. Yes     PHYSICAL THERAPY PLAN OF CARE:    PT will be discontinued. No acute PT needs identified. Please re-consult if needed. Referring provider/PT Order:  JESUS Allen  Diagnosis:  General weakness [R53.1]  COVID [U07.1]  COVID-19 virus infection [U07.1]  Specific instructions for next treatment:  None     Current Treatment Recommendations:  None     Time in  0750  Time out  0805    Total Treatment Time  0 minutes     Evaluation Time includes thorough review of current medical information, gathering information on past medical history/social history and prior level of function, completion of standardized testing/informal observation of tasks, assessment of data and education on plan of care and goals.     CPT codes:  [x] Low Complexity PT evaluation 20766  [] Moderate Complexity PT evaluation 96811  [] High Complexity PT evaluation 66383  [] PT Re-evaluation 47602  [] Gait training 32327 -- minutes  [] Manual therapy [id number] -- minutes  [] Therapeutic activities 08125 - minutes  [] Therapeutic exercises 60899 - minutes  [] Neuromuscular reeducation 61548 -- minutes     Sabi Richards, PT, DPT  [id number]

## 2022-09-01 NOTE — PROGRESS NOTES
[facility]  Nephrology Progress Note    Patient's Name: Alex Sotelo    History of Present Illness from 8/31 Consult Note:    Francoise Russell is a 67 y.o. 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 He presents to ED with complaints of generalized weakness associated with cough productive of whitish phlegm. No chest pain. He did experience some intermittent chills. This has been going on over the course of the past week. Oral intake has been poor due to poor appetite. No diarrhea. No nausea or emesis. He presented to ED for further evaluation. Initial laboratory data showed blood pressure 145/68, pulse 83, temperature 97.9. Laboratory data was significant for a BUN of 42, creatinine 3.2, glucose 64, WBC 2.7, hemoglobin 9.9, platelet count 728,825. High-sensitivity troponin was 110, CRP 1.1, . Rapid COVID-19 test positive. proBNP 2234. Chest x-ray showed no acute cardiopulmonary process. Patient has been admitted to telemetry for further management. \"    Subjective:    9/1: Patient was not personally seen or examined due to COVID isolation. Discussed with the bedside nurse, who reports that the patient is doing well and is for possible discharge today. No issues noted overnight.      PMH:    Past Medical History:   Diagnosis Date    Anemia     Iron deficiency    Atrial fibrillation (HCC)     CHF (congestive heart failure) ([facility] [address] 03/12/2008    Chronic diastolic congestive heart failure ([facility] [address] 03/12/2008    Chronic foot pain     Chronic knee pain     BILATERAL    Constipation     Depression     DM (diabetes mellitus) ([facility] [address]     ESRD (end stage renal disease) ([facility] [address]     H/O kidney transplant 12/12/2014    Hyperlipidemia     Hypertension     Immunosuppression ([address] 3/12/2008    Kidney disease     Pulmonary hypertension [facility])        Patient Active Problem List   Diagnosis    GERD (gastroesophageal reflux disease)    CKD (chronic kidney disease) stage 4, GFR 15-29 ml/min (HCC)    Mixed hyperlipidemia    Diabetes mellitus, type 2 (HCC)    HTN (hypertension), benign    Junctional bradycardia    Anemia in stage 3 chronic kidney disease (HCC)    CAD (coronary artery disease), native coronary artery    Acute CHF (congestive heart failure) (HCC)    Chest pain    Respiratory failure (HCC)    Acute kidney injury ([address]    Vomiting and diarrhea    Acute kidney injury superimposed on CKD ([facility])    Chronic diastolic congestive heart failure ([facility] [address]    H/O kidney transplant    Pulmonary hypertension ([facility])    Immunosuppression ([facility] [address]    Pneumonia due to COVID-19 virus    Acute renal insufficiency    GI bleed    Acute blood loss anemia    Acute combined systolic and diastolic CHF, NYHA class 1 (HCC)    Chronic kidney disease    Anemia of chronic disease    Hyperkalemia    Essential hypertension    Type 2 diabetes mellitus, with long-term current use of insulin (HCC)    Obesity (BMI 30-39. 9)    Acute decompensated heart failure (HCC)    Acute diastolic heart failure (HCC)    Atrial fibrillation (HCC)    Iron deficiency anemia secondary to inadequate dietary iron intake    COVID-19 virus infection    Weakness    COVID       Meds:     aspirin  81 mg Oral Daily    bumetanide  1 mg Oral BID    hydrALAZINE  100 mg Oral TID    isosorbide mononitrate  60 mg Oral Daily    metoprolol tartrate  50 mg Oral BID    mycophenolate  1,000 mg Oral BID    tacrolimus  2 mg Oral BID    sodium chloride flush  5-40 mL IntraVENous 2 times per day    enoxaparin  30 mg SubCUTAneous Daily    insulin lispro  5 Units SubCUTAneous See Admin Instructions    insulin lispro  0-4 Units SubCUTAneous TID     insulin lispro  0-4 Units SubCUTAneous Nightly        sodium chloride 100 mL/hr at 09/01/22 0455    dextrose      sodium chloride         Meds prn:     oxyCODONE, glucose, dextrose bolus **OR** dextrose bolus, glucagon (rDNA), dextrose, sodium chloride flush, sodium chloride, promethazine **OR** ondansetron, polyethylene glycol, acetaminophen **OR** acetaminophen, benzonatate    Meds prior to admission:     No current facility-administered medications on file prior to encounter.      Current Outpatient Medications on File Prior to Encounter   Medication Sig Dispense Refill    bumetanide (BUMEX) 1 MG tablet Take 1 tablet by mouth 2 times daily 60 tablet 1    amLODIPine (NORVASC) 5 MG tablet Take 1 tablet by mouth daily (Patient not taking: No sig reported) 90 tablet 1    sulfamethoxazole-trimethoprim (BACTRIM;SEPTRA) 400-80 MG per tablet Take 2 tablets by mouth 2 times daily       Cholecalciferol (VITAMIN D3) 125 MCG (5000 UT) TABS Take 1 tablet by mouth daily       ammonium lactate (LAC-HYDRIN) 12 % lotion ammonium lactate 12 % lotion (Patient not taking: Reported on 8/31/2022)      bisacodyl (DULCOLAX) 5 MG EC tablet Take 5 mg by mouth daily       ONETOUCH ULTRA strip       BD PEN NEEDLE TAINA 2ND GEN 32G X 4 MM MISC       Lancets (ONETOUCH DELICA PLUS [id number]) MISC       metoprolol tartrate (LOPRESSOR) 50 MG tablet 50 mg 2 times daily  (Patient not taking: No sig reported)      tiZANidine (ZANAFLEX) 4 MG tablet Take 4 mg by mouth as needed  (Patient not taking: No sig reported)      calcitRIOL (ROCALTROL) 0.25 MCG capsule Take 1 capsule by mouth daily 30 capsule 3    insulin glargine (LANTUS SOLOSTAR) 100 UNIT/ML injection pen Inject 12 Units into the skin daily At noon   Had today (Patient taking differently: Inject 12 Units into the skin daily) 5 pen 3    oxyCODONE (ROXICODONE) 20 MG immediate release tablet Take 20 mg by mouth every 8 hours as needed for Pain.       hydrALAZINE (APRESOLINE) 100 MG tablet Take 100 mg by mouth 3 times daily      simvastatin (ZOCOR) 10 MG tablet Take 10 mg by mouth daily      isosorbide mononitrate (IMDUR) 60 MG extended release tablet TAKE 1 TABLET DAILY 90 tablet 3    tacrolimus (PROGRAF) 1 MG capsule Take 2 capsules by mouth 2 times daily 60 capsule 3    sodium bicarbonate 650 MG tablet Take 650 mg by mouth 4 times daily       Multiple Vitamin (MULTI VITAMIN DAILY PO) Take by mouth daily      mycophenolate (CELLCEPT) 250 MG capsule Take 1,000 mg by mouth 2 times daily       insulin lispro (HUMALOG KWIKPEN) 100 UNIT/ML SOPN Inject 5 Units into the skin daily (before lunch) Patient takes with his biggest meal      docusate sodium (COLACE) 100 MG capsule Take 1 capsule by mouth 2 times daily as needed for Constipation. (Patient not taking: Reported on 8/31/2022) 20 capsule 0    linagliptin (TRADJENTA) 5 MG tablet Take 5 mg by mouth daily. aspirin 81 MG EC tablet Take 81 mg by mouth daily. Allergies:    Patient has no known allergies. Social History:     reports that he quit smoking about 32 years ago. His smoking use included cigarettes. He has a 20.00 pack-year smoking history. He has never used smokeless tobacco. He reports that he does not currently use alcohol. He reports that he does not use drugs.     Family History:         Problem Relation Age of Onset    Diabetes Mother     High Blood Pressure Mother     Heart Disease Mother     Diabetes Father     Cancer Father     Stroke Father     Heart Disease Father        Physical Exam:      Patient Vitals for the past 24 hrs:   BP Temp Temp src Pulse Resp SpO2   09/01/22 0934 -- -- -- -- 18 --   09/01/22 0818 131/63 97.9 °F (36.6 °C) Oral 58 18 100 %   08/31/22 2200 120/61 98.4 °F (36.9 °C) Oral 51 17 100 %   08/31/22 1858 -- -- -- -- 18 --   08/31/22 1630 (!) 163/59 98.6 °F (37 °C) Oral 56 18 100 %   08/31/22 1600 128/64 -- -- 52 18 98 %   08/31/22 1500 (!) 140/60 98.5 °F (36.9 °C) -- 51 18 99 %         Intake/Output Summary (Last 24 hours) at 9/1/2022 1106  Last data filed at 9/1/2022 0654  Gross per 24 hour   Intake --   Output 650 ml   Net -650 ml     Due to the current efforts to prevent transmission of COVID-19 and also the need to preserve PPE for other caregivers, a face-to-face encounter with the patient was not performed. That being said, all relevant records and diagnostic tests were reviewed, including laboratory results and imaging. Please reference any relevant documentation elsewhere. Care will be coordinated with the primary service. Data:    Recent Labs     08/30/22 1834 08/31/22 0548 09/01/22  0538   WBC 3.4* 2.7* 2.7*   HGB 10.2* 9.9* 10.9*   HCT 32.0* 31.1* 34.7*   MCV 85.1 85.7 88.7    214 250       Recent Labs     08/30/22 1834 08/31/22 0548 09/01/22  0538    141 139   K 4.5 4.4 4.8    106 106   CO2 21* 26 23   CREATININE 3.3* 3.2* 2.4*   BUN 44* 42* 37*   LABGLOM 22 23 32   GLUCOSE 174* 64* 55*   CALCIUM 8.8 8.6 8.7   MG 1.9  --   --        Vit D, 25-Hydroxy   Date Value Ref Range Status   09/01/2022 78 30 - 100 ng/mL Final     Comment:     <20 ng/mL. ........... Sade Valadez Deficient  20-30 ng/mL. ......... Sade Valadez Insufficient   ng/mL. ........ Sade Valadez Sufficient  >100 ng/mL. .......... Sade Valadez Toxic         PTH   Date Value Ref Range Status   06/16/2022 154 (H) 15 - 65 pg/mL Final       Recent Labs     08/30/22 1834 08/31/22  0548 09/01/22  0538   ALT 22 20 17   AST 33 36 26   ALKPHOS 82 65 66   BILITOT 0.3 0.2 0.3       Recent Labs     08/30/22 1834 08/31/22  0548 09/01/22  0538   LABALBU 3.4* 3.0* 3.0*       Ferritin   Date Value Ref Range Status   02/21/2022 62 ng/mL Final     Comment:     FERRITIN Reference Ranges:  Adult Males   20 - 60 years:    30 - 400 ng/mL  Adult females 16 - 60 years:    15 - 150 ng/mL  Adults greater than 60 years:   no established reference range  Pediatrics:                     no established reference range       Iron   Date Value Ref Range Status   06/14/2022 19 (L) 59 - 158 mcg/dL Final     TIBC   Date Value Ref Range Status   06/14/2022 220 (L) 250 - 450 mcg/dL Final       Vitamin B-12   Date Value Ref Range Status   04/28/2017 281 310 - 103 pg/mL Final       Folate   Date Value Ref Range Status   04/28/2017 7.4 4.8 - 24.2 ng/mL Final       Lab Results   Component Value Date/Time    COLORU Yellow 08/30/2022 06:34 PM    NITRU Negative 08/30/2022 06:34 PM    GLUCOSEU Negative 08/30/2022 06:34 PM    KETUA Negative 08/30/2022 06:34 PM    UROBILINOGEN 0.2 08/30/2022 06:34 PM    BILIRUBINUR Negative 08/30/2022 06:34 PM       Lab Results   Component Value Date/Time    OSMOU 351 04/10/2021 08:25 PM       No components found for: URIC    No results found for: LIPIDPAN    Assessment and Plans:    CKD 4  Chronic allograft nephropathy  Baseline creatinine 2.7-3  S/p history of DD kidney transplant 12/12/2014 at [facility]  UA spec grav 1.01, negative protein/ketones/nitrites/bili/glucose/blood  Creatinine peaked at 3.3 on 8/30--> 2.4 today  On Bumex 1 mg oral twice daily  On mycophenolate, tacrolimus  Strict I&O, daily weights  Monitor labs    2. COVID  COVID-positive on 8/30  On room air  In COVID isolation  Management per primary service    3. Hypertension in CKD I-IV  BP goal<120/80  BP near goal  Monitor on current regimen    4. Anemia in CKD  Hemoglobin target 10-12  Hemoglobin 10.9-at target  Transfuse for hemoglobin<7  Check iron studies  Monitor labs    5. Type 2 diabetes mellitus  Hemoglobin A1c 5.3% on 8/31  On insulin lispro  Management per primary service    6. History of CHF  Echo 4/2021 EF 60 to 30%, diastolic dysfunction stage I  On Bumex 1 mg oral twice daily  Strict I&O  Monitor volume status    Brian Worthy, APRN - CNP    Face to face encounter  Patient seen and examined all key components of the physical performed independently , case discussed with NP, all pertinent labs and radiologic tests personally reviewed agree with above.       Ambrocio Nguyen MD

## 2022-09-01 NOTE — PROGRESS NOTES
Occupational Therapy  OCCUPATIONAL THERAPY INITIAL EVALUATION    [address] [place] [address]      Date:2022  Patient Name: Beto Wesley  MRN: [id number]  : 1950  Room: [address] [place] [address]    Evaluating OT: Juliann Thompson OTR/L #[id number]     Referring Provider: JESUS Washburn  Specific Provider Orders/Date: OT eval and treat 22    AM PAC:   G-code:  509 [address]  Recommended Adaptive Equipment: none    Diagnosis: General weakness [R53.1]  COVID [U07.1]  COVID-19 virus infection [U07.1]   Pt admitted to hospital with weakness, SOB and cough     Past Medical History:   Past Medical History:   Diagnosis Date    Anemia     Iron deficiency    Atrial fibrillation (HCC)     CHF (congestive heart failure) ([facility] [address] 2008    Chronic diastolic congestive heart failure ([facility] [address] 2008    Chronic foot pain     Chronic knee pain     BILATERAL    Constipation     Depression     DM (diabetes mellitus) ([facility] [address]     ESRD (end stage renal disease) ([facility] [address]     H/O kidney transplant 2014    Hyperlipidemia     Hypertension     Immunosuppression ([facility] [address] 3/12/2008    Kidney disease     Pulmonary hypertension ([facility] [address]       Precautions: Droplet plus (COVID-19)     Home Living: Pt lives with wife in a bi-level home with 5 steps up and 1 hand rail. Bathroom setup: tub/shower combo    Equipment owned: none     Prior Level of Function:  Independent  with ADLs , Independent  with IADLs; ambulated without AD   Driving: yes   Occupation: na     Pain Level: Pt denies pain this session     Cognition: oriented x 4  Additional Comments: Pleasant and cooperative.   Followed commands     Sensory:   Hearing: wfl  Vision: wfl    Glasses: yes [] no [] reading []      UE Assessment:  Hand Dominance: Right [x]  Left []     Strength ROM Additional Info:    RUE  5/5 wfl good  and wfl FMC/dexterity noted during ADL tasks     LUE 5/5 wfl good  and wfl FMC/dexterity noted during ADL tasks   Sensation: BLE neuropathy   Tone: wfl  Edema: none noted    Functional Assessment:   Current Status  Comments   Feeding  indep    Grooming  indep    Upper Body Dressing indep     Lower Body Dressing indep    Bathing indep    Toileting  indep    Bed Mobility  NT    Functional Transfers indep    Functional Mobility indep Ambulated in room and hallway without AD     Sit balance: indep  Stand balance: indep  Endurance/Activity tolerance: G    Vitals:    O2 92-95%, 70-79 bpm                              Comments: Upon arrival pt agreeable to OT session. At end of session pt seated in chair with all devices within reach, all lines and tubes intact.        Assessment of current deficits   Functional mobility []  ROM [] Strength []  Cognition []  ADLs []   IADLs [] Safety Awareness [] Endurance []  Fine Motor Coordination [] Balance [] Vision/perception [] Sensation []   Gross Motor Coordination []     Eval Complexity: low    (Evaluation time includes thorough review of current medical information, gathering information on past medical history/social history and prior level of function, completion of standardized testing/informal observation of tasks, assessment of data, and development of POC/Goals.)    Treatment frequency: evaluation only    Plan of Care:  ADL retraining []   Equipment needs []   Neuromuscular re-education [] Energy Conservation Techniques []  Functional Transfer training [] Patient and/or Family Education []  Functional Mobility training []  Environmental Modifications []  Cognitive re-training []   Compensatory techniques for ADLs []  Splinting Needs []   Positioning to improve overall function []  Other: []      Rehab Potential: Good    Patient / Family Goal: Return home    Short term goals  Time Frame: Evaluation only - Skilled OT services not indicated    Patient and/or family understands diagnosis, prognosis and plan of care: yes    [] Malnutrition indicators have been identified and nursing has been notified to ensure a dietitian consult is ordered.      Time in: 740  Time out: 755  Total Time: 15 minutes    Leonie Moore OTR/L #[id number]

## 2022-09-02 ENCOUNTER — CARE COORDINATION (OUTPATIENT)
Dept: CASE MANAGEMENT | Age: 72
End: 2022-09-02

## 2022-09-02 RX ORDER — FAMOTIDINE 20 MG/1
20 TABLET, FILM COATED ORAL DAILY
COMMUNITY

## 2022-09-02 RX ORDER — FERROUS SULFATE 325(65) MG
325 TABLET ORAL
COMMUNITY

## 2022-09-02 RX ORDER — PREDNISONE 1 MG/1
5 TABLET ORAL DAILY
COMMUNITY

## 2022-09-02 NOTE — DISCHARGE SUMMARY
[facility]   Discharge summary   Patient ID:  Melvina Foster  [id number]  3 y.o.  1950    Admit date: 8/31/2022    Discharge date and time: 9/1/2022    Admission Diagnoses:   Patient Active Problem List   Diagnosis    GERD (gastroesophageal reflux disease)    CKD (chronic kidney disease) stage 4, GFR 15-29 ml/min (HCC)    Mixed hyperlipidemia    Diabetes mellitus, type 2 (HCC)    HTN (hypertension), benign    Junctional bradycardia    Anemia in stage 3 chronic kidney disease (HCC)    CAD (coronary artery disease), native coronary artery    Acute CHF (congestive heart failure) ([facility])    Chest pain    Respiratory failure (HCC)    Acute kidney injury ([address]    Vomiting and diarrhea    Acute kidney injury superimposed on CKD ([facility]    Chronic diastolic congestive heart failure ([facility] [address]    H/O kidney transplant    Pulmonary hypertension ([facility] [address]    Immunosuppression ([facility] [address]    Pneumonia due to COVID-19 virus    Acute renal insufficiency    GI bleed    Acute blood loss anemia    Acute combined systolic and diastolic CHF, NYHA class 1 (HCC)    Chronic kidney disease    Anemia of chronic disease    Hyperkalemia    Essential hypertension    Type 2 diabetes mellitus, with long-term current use of insulin (HCC)    Obesity (BMI 30-39. 9)    Acute decompensated heart failure (HCC)    Acute diastolic heart failure (HCC)    Atrial fibrillation (HCC)    Iron deficiency anemia secondary to inadequate dietary iron intake    COVID-19 virus infection    Weakness    COVID       Discharge Diagnoses:     Consults: nephrology    Procedures: None    Hospital Course:  The patient is a 67 y.o. male of [facility]        22-year-old male with a past medical history of kidney transplant, and diabetes presents to the ED with complaints of weakness and fatigue with a productive cough for the past 10 days is admitted to telemetry unit with     COVID in a vaccinated patient who is immunocompromised  -Vitamin D, zinc, vitamin C  -Monitor O2 saturations and supplement oxygen to keep saturations greater than 93%, wean as necessary, incentive spirometer, no nebulizers  Patient is currently 95% on room air-no indication for remdesivir  -Droplet plus isolation  -Tessalon perles  -Encourage ISP use/self proning  -Follow inflammatory markers  Procal - pending  Ddimer- 339  CRP - Pending     Kidney transplant  Monitor labs - BUN/Creat: 44/3.3, baseline 40/2.9  Resume anti rejection medication  IV hydration  Monitor bp resume home medications with parameters     Diabetes Mellitus-currently with hypoglycemia  - Watch blood sugars as the day progresses, encourage p.o. intake  -Monitor labs  -ISS glucose control  -Hypoglycemia protocol initiated  HgbA1c - 5.3       Recent Labs     08/30/22  1834 08/31/22  0548 09/01/22  0538   WBC 3.4* 2.7* 2.7*   HGB 10.2* 9.9* 10.9*   HCT 32.0* 31.1* 34.7*    214 250       Recent Labs     08/30/22  1834 08/31/22  0548 09/01/22  0538    141 139   K 4.5 4.4 4.8    106 106   CO2 21* 26 23   BUN 44* 42* 37*   CREATININE 3.3* 3.2* 2.4*   CALCIUM 8.8 8.6 8.7       No results found. Discharge Exam:    HEENT: NCAT,  PERRLA, No JVD  Heart:  RRR, no murmurs, gallops, or rubs.   Lungs:  CTA bilaterally, no wheeze, rales or rhonchi  Abd: bowel sounds present, nontender, nondistended, no masses  Extrem:  No clubbing, cyanosis, or edema    Disposition: home     Patient Condition at Discharge: Stable    Patient Instructions:      Medication List        START taking these medications      benzonatate 100 MG capsule  Commonly known as: TESSALON  Take 1 capsule by mouth 3 times daily as needed for Cough     dexamethasone 6 MG tablet  Commonly known as: Decadron  Take 1 tablet by mouth daily (with breakfast) for 5 days            CONTINUE taking these medications      aspirin 81 MG EC tablet     BD Pen Needle Ruby 2nd Gen 32G X 4 MM Misc  Generic drug: Insulin Pen Needle     bisacodyl 5 MG EC tablet  Commonly known as: DULCOLAX     bumetanide 1 MG tablet  Commonly known as: BUMEX  Take 1 tablet by mouth 2 times daily     calcitRIOL 0.25 MCG capsule  Commonly known as: ROCALTROL  Take 1 capsule by mouth daily     HumaLOG KwikPen 100 UNIT/ML Sopn  Generic drug: insulin lispro (1 Unit Dial)     hydrALAZINE 100 MG tablet  Commonly known as: APRESOLINE     isosorbide mononitrate 60 MG extended release tablet  Commonly known as: IMDUR  TAKE 1 TABLET DAILY     linagliptin 5 MG tablet  Commonly known as: TRADJENTA     MULTI VITAMIN DAILY PO     mycophenolate 250 MG capsule  Commonly known as: CELLCEPT     OneTouch Delica Plus [id number] Misc     OneTouch Ultra strip  Generic drug: blood glucose test strips     oxyCODONE 20 MG immediate release tablet  Commonly known as: ROXICODONE     simvastatin 10 MG tablet  Commonly known as: ZOCOR     sodium bicarbonate 650 MG tablet     sulfamethoxazole-trimethoprim 400-80 MG per tablet  Commonly known as: BACTRIM;SEPTRA     tacrolimus 1 MG capsule  Commonly known as: PROGRAF  Take 2 capsules by mouth 2 times daily     Vitamin D3 125 MCG (5000 UT) Tabs            ASK your doctor about these medications      amLODIPine 5 MG tablet  Commonly known as: NORVASC  Take 1 tablet by mouth daily     ammonium lactate 12 % lotion  Commonly known as: LAC-HYDRIN     docusate sodium 100 MG capsule  Commonly known as: Colace  Take 1 capsule by mouth 2 times daily as needed for Constipation.      Lantus SoloStar 100 UNIT/ML injection pen  Generic drug: insulin glargine  Inject 12 Units into the skin daily At noon   Had today     metoprolol tartrate 50 MG tablet  Commonly known as: LOPRESSOR     tiZANidine 4 MG tablet  Commonly known as: Benita Hartman               Where to Get Your Medications        These medications were sent to [facility], [address] [place] [address]., [place] [address]      Phone: [phone]   benzonatate 100 MG capsule  dexamethasone 6 MG tablet       Activity: activity as tolerated  Diet: cardiac diet and diabetic diet    Pt has been advised to: Follow-up with GUIDO GERONIMO III, DO in 1 week.   Follow-up with consultants as recommended by them    Note that over 30 minutes was spent in preparing discharge papers, discussing discharge with patient, medication review, etc.    Signed:  Susie Ozuna MD  9/1/2022

## 2022-09-02 NOTE — CARE COORDINATION
-Pt called back to review his medications.  -Complete medication review done, however, [facility] did not reflect that pt is on maintenance Prednisone 5 mg daily for his kidney transplant regimen. This was added to his list at this time. -CTN noted pt was discharged home on Decadron 6 mg. CTN inquired who prescribes his Prednisone and pt began to become agitated stating that he has been on the Prednisone for 8 years this Dec and that he will be on it for life because of his transplant. CTN voiced understanding, but inquired who the prescriber was so that this CTN could reach out to that office to clarify what the pt should do at this time regarding which steroid to be on. -CTN noted in Care Everywhere that pt goes to the [facility] transplant clinic. Noted Chandler Salcido NP listed. Pt states, \"Yeah, that's it. \"  -CTN let the pt know that this CTN will need to contact the [facility] transplant clinic and clarify. -CTN attempted to contact Adriel Thomas at the [facility] transplant clinic. Was forwarded to a VM that states that she was out of the office until May. -CTN called the main phone number at the transplant clinic @ [phone] and spoke with Ko Purdy. She states that Ramesh Pop is in the clinic today and would transfer this CTN to another line.  -No answer at the line that CTN was transferred to. Ko Purdy states that she will send a message to Ramesh Pop regarding this issue and have someone f/u with this CTN once clarified. CTN provided contact information asking for a resolution today. She voiced understanding and took this CTN's contact information down.  -Will await a return call back.

## 2022-09-02 NOTE — CARE COORDINATION
COVID-19 Monitoring Transitions of Care Call  Call within 2 business days of discharge: Yes    Patient: Elan Hill Patient : 1950   MRN: [id number]  Reason for Admission: COVID-19  Discharge Date: 22 RARS: Readmission Risk Score: 20      Last Discharge [facility]       Date Complaint Diagnosis Description Type Department Provider    22 Shortness of Breath COVID-19 virus infection . .. ED to Hosp-Admission (Discharged) (ADMITTED) Courtney Claire MD; Jovanny Duncan,... Challenges to be reviewed by the provider   Additional needs identified to be addressed with provider: No  none                 Provider was not contacted for any issues needing escalation. Method of communication with provider: none. Discussed COVID-19 related testing which was: available at this time. Test results were: positive. Patient informed of results, if available? Pt previously aware of +COVID-19 results (+22). Current Symptoms: no new symptoms  no worsening symptoms    Called and spoke with pt for an initial COVID-19 monitoring care transition call post hospital discharge. Pt admitted on 22 with COVID-19 (+22). Pt presented to ED with c/o weakness, fatigue, productive cough, sob, and decreased oral intake. Pt states, \"I'm not feeling too bad today! \" Pt is denying any weakness, cough, sob, chest congestion, wheezing, chest tightness, or fever/chills. Pt stated that he was on the way to the pharmacy with his wife to  his 2 new rxs (Decadron and Tessalon Pearls). Pt does not have a pulse oximeter or IS at home. Pt was encouraged to be mindful of doing cough and deep breathing exercises frequently during the day. Pt voiced understanding. Pt has not yet contacted his pcp regarding a 1 wk f/u appt, but states he intends on doing this and declined any assistance from this CTN in obtaining an appt. Pt is active with the CHF clinic and has a f/u scheduled on 22.  Pt to call this CTN back with med list to review, as pt did not have this information with him. Pt voiced no needs/concerns at this time. He was agreeable to continued outreaches. Reviewed New or Changed Meds: patient declined. Pt states that he is currently in the car driving and did not have a list available. Pt states that he would like to call this CTN back later on today and review. Contact information provided. Pt is on the way currently to  2 new rxs given to him at discharge. Reviewed these medications and administration instructions. Pt voiced understanding. Do you have what you need at home? Durable Medical Equipment ordered at discharge: None  Home Health/Outpatient orders at discharge: none  Was patient discharged with a pulse oximeter? No     Patient education provided: Reviewed appropriate site of care based on symptoms and resources available to patient including: PCP  When to call 911. Follow up appointment scheduled within 7 days of discharge: no. If no appointment scheduled, scheduling offered:  Offered assistance, but pt declined stating he prefers to call to schedule on his own. Future Appointments   Date Time Provider Alfa Moore   9/7/2022  7:30 AM [facility] ROOM 1 [facility]   10/9/2022  9:00 PM [facility] SLEEP LAB BEDROOM 2 [facility]       Interventions: Scheduled appointment with PCP-Pt will call to schedule a 1 wk f/u with his pcp. Pt declined assistance. Obtained and reviewed discharge summary and/or continuity of care documents  Assessment and support for treatment adherence and medication management-Unable to review medications at this time. Pt declined. Pt stated he would call CTN back to review later when he was home. Reviewed discharge instructions, medical action plan and red flags with patient who verbalized understanding. Plan for follow-up call in 7-10 days based on severity of symptoms and risk factors.   Plan for next call: symptom management-Any

## 2022-09-07 ENCOUNTER — HOSPITAL ENCOUNTER (OUTPATIENT)
Dept: OTHER | Age: 72
Setting detail: THERAPIES SERIES
Discharge: HOME | End: 2022-09-07
Payer: MEDICARE

## 2022-09-07 VITALS
RESPIRATION RATE: 18 BRPM | HEART RATE: 68 BPM | WEIGHT: 186 LBS | BODY MASS INDEX: 25.94 KG/M2 | DIASTOLIC BLOOD PRESSURE: 76 MMHG | OXYGEN SATURATION: 97 % | SYSTOLIC BLOOD PRESSURE: 154 MMHG

## 2022-09-07 LAB
ANION GAP SERPL CALCULATED.3IONS-SCNC: 11 MMOL/L (ref 7–16)
BUN BLDV-MCNC: 44 MG/DL (ref 6–23)
CALCIUM SERPL-MCNC: 9.1 MG/DL (ref 8.6–10.2)
CHLORIDE BLD-SCNC: 106 MMOL/L (ref 98–107)
CO2: 23 MMOL/L (ref 22–29)
CREAT SERPL-MCNC: 3.3 MG/DL (ref 0.7–1.2)
GFR AFRICAN AMERICAN: 22
GFR NON-AFRICAN AMERICAN: 22 ML/MIN/1.73
GLUCOSE BLD-MCNC: 77 MG/DL (ref 74–99)
POTASSIUM SERPL-SCNC: 5 MMOL/L (ref 3.5–5)
PRO-BNP: 5981 PG/ML (ref 0–125)
SODIUM BLD-SCNC: 140 MMOL/L (ref 132–146)

## 2022-09-07 PROCEDURE — 99214 OFFICE O/P EST MOD 30 MIN: CPT

## 2022-09-07 PROCEDURE — 80048 BASIC METABOLIC PNL TOTAL CA: CPT

## 2022-09-07 PROCEDURE — 36415 COLL VENOUS BLD VENIPUNCTURE: CPT

## 2022-09-07 PROCEDURE — 83880 ASSAY OF NATRIURETIC PEPTIDE: CPT

## 2022-09-07 NOTE — PROGRESS NOTES
Congestive Heart Failure [facility]   1950          Referring Provider: Dr Luly Mackey  Primary Care Physician: Dr Alexandra Vieira  Cardiologist: Dr Luly Mackey  Nephrologist: Dr Clinton        History of Present Illness:     Geo Her is a 67 y.o. male with a history of HFpEF, most recent EF 75% on 10-7-21. Patient Story:    He does  Have some dyspnea with exertion, shortness of breath, or decline in overall functional capacity. He does not have orthopnea, PND, nocturnal cough or hemoptysis. He does not have abdominal distention or bloating, early satiety, anorexia/change in appetite. He usually has a good urinary response to  oral diuretic. States sometimes are better than others. He has no lower extremity edema. He denies lightheadedness, dizziness. He denies palpitations, syncope or near syncope. He does not complain of chest pain, pressure, discomfort. No Known Allergies      Prior to Visit Medications    Medication Sig Taking?  Authorizing Provider   ferrous sulfate (IRON 325) 325 (65 Fe) MG tablet Take 325 mg by mouth daily (with breakfast)  Historical Provider, MD   predniSONE (DELTASONE) 5 MG tablet Take 5 mg by mouth daily  Historical Provider, MD   famotidine (PEPCID) 20 MG tablet Take 20 mg by mouth daily  Historical Provider, MD   benzonatate (TESSALON) 100 MG capsule Take 1 capsule by mouth 3 times daily as needed for Cough  Hal Wallace MD   bumetanide (BUMEX) 1 MG tablet Take 1 tablet by mouth 2 times daily  Yobany Kee DO   sulfamethoxazole-trimethoprim (BACTRIM;SEPTRA) 400-80 MG per tablet Take 2 tablets by mouth 2 times daily   Historical Provider, MD   Cholecalciferol (VITAMIN D3) 125 MCG (5000 UT) TABS Take 1 tablet by mouth daily   Historical Provider, MD   bisacodyl (DULCOLAX) 5 MG EC tablet Take 5 mg by mouth daily as needed  Historical Provider, MD Angeline Calixto strip   Historical Provider, MD   BD PEN NEEDLE TAINA 2ND GEN 32G X 4 MM MISC   Historical Provider, MD   Lancets ([address]) [address] [facility]   Historical Provider, MD   calcitRIOL (ROCALTROL) 0.25 MCG capsule Take 1 capsule by mouth daily  Manjinder Larose MD   insulin glargine (LANTUS SOLOSTAR) 100 UNIT/ML injection pen Inject 12 Units into the skin daily At noon   Had today  Patient taking differently: Inject 12 Units into the skin daily  Mickey Benjamin MD   oxyCODONE (ROXICODONE) 20 MG immediate release tablet Take 20 mg by mouth every 8 hours as needed for Pain. Historical Provider, MD   hydrALAZINE (APRESOLINE) 100 MG tablet Take 100 mg by mouth 3 times daily  Historical Provider, MD   simvastatin (ZOCOR) 10 MG tablet Take 10 mg by mouth daily  Historical Provider, MD   isosorbide mononitrate (IMDUR) 60 MG extended release tablet TAKE 1 TABLET DAILY  Azma Castro MD   tacrolimus (PROGRAF) 1 MG capsule Take 2 capsules by mouth 2 times daily  Mickey Benjamin MD   sodium bicarbonate 650 MG tablet Take 650 mg by mouth 4 times daily   Historical Provider, MD   Multiple Vitamin (MULTI VITAMIN DAILY PO) Take by mouth daily  Historical Provider, MD   mycophenolate (CELLCEPT) 250 MG capsule Take 750 mg by mouth 2 times daily  Historical Provider, MD   insulin lispro (HUMALOG KWIKPEN) 100 UNIT/ML SOPN Inject 5 Units into the skin daily (before lunch) Patient takes with his biggest meal  Historical Provider, MD   docusate sodium (COLACE) 100 MG capsule Take 1 capsule by mouth 2 times daily as needed for Constipation. Savi Iniguez MD   linagliptin (TRADJENTA) 5 MG tablet Take 5 mg by mouth daily. Historical Provider, MD   aspirin 81 MG EC tablet Take 81 mg by mouth daily.   Historical Provider, MD             Guideline directed medical:  ARNI/ACE I/ARB: No  Beta blocker:  no  Aldosterone antagonist:  No        Physical Examination:     BP (!) 154/76 Comment: pt did NOT take meds yet  Pulse 68   Resp 18   Wt 186 lb (84.4 kg)   SpO2 97%   BMI 25.94 kg/m² Assessment  Charting Type: Shift assessment (CHF clinic)                   Respiratory  Respiratory Pattern: Regular  Respiratory Depth: Normal  Respiratory Quality/Effort: Dyspnea with exertion  Chest Assessment: Chest expansion symmetrical  L Breath Sounds: Clear, Diminished  R Breath Sounds: Clear, Diminished              Cardiac  Cardiac Regularity: Regular  Cardiac Rhythm: Sinus rhythm    Rhythm Interpretation  Heart Rate: 68         Gastrointestinal  Abdominal (WDL): Within Defined Limits               Peripheral Vascular  Peripheral Vascular (WDL): Within Defined Limits  Edema: None  RLE Edema: None  LLE Edema: None                        Psychosocial  Psychosocial (WDL): Within Defined Limits                        Heart Rate: 68                     LAB DATA:    Last 3 BMP      Sodium (mmol/L)   Date Value   09/01/2022 139   08/31/2022 141   08/30/2022 140     Potassium (mmol/L)   Date Value   08/30/2022 4.5   08/03/2022 4.5   07/07/2022 4.3     Potassium reflex Magnesium (mmol/L)   Date Value   09/01/2022 4.8   08/31/2022 4.4   06/12/2021 5.4 (H)     Chloride (mmol/L)   Date Value   09/01/2022 106   08/31/2022 106   08/30/2022 105     CO2 (mmol/L)   Date Value   09/01/2022 23   08/31/2022 26   08/30/2022 21 (L)     BUN (mg/dL)   Date Value   09/01/2022 37 (H)   08/31/2022 42 (H)   08/30/2022 44 (H)     Glucose (mg/dL)   Date Value   09/01/2022 55 (L)   08/31/2022 64 (L)   08/30/2022 174 (H)   02/21/2012 124 (H)   09/23/2011 140 (H)   07/05/2011 153 (H)     Calcium (mg/dL)   Date Value   09/01/2022 8.7   08/31/2022 8.6   08/30/2022 8.8       Last 3 BNP       Pro-BNP (pg/mL)   Date Value   08/30/2022 2,234 (H)   08/03/2022 3,696 (H)   07/07/2022 2,355 (H)          CBC:   No results for input(s): WBC, HGB, PLT in the last 72 hours. BMP:    No results for input(s): NA, K, CL, CO2, BUN, CREATININE, GLUCOSE in the last 72 hours.   Hepatic: No results for input(s): AST, ALT, ALB, BILITOT, ALKPHOS in the last 72 hours. Troponin: No results for input(s): TROPONINI in the last 72 hours. BNP: No results for input(s): BNP in the last 72 hours. Lipids: No results for input(s): CHOL, HDL in the last 72 hours. Invalid input(s): LDLCALCU  INR: No results for input(s): INR in the last 72 hours. WEIGHTS:    Wt Readings from Last 3 Encounters:   09/07/22 186 lb (84.4 kg)   08/30/22 180 lb (81.6 kg)   08/03/22 187 lb (84.8 kg)         TELEMETRY:  Cardiac Regularity: Regular  Cardiac Rhythm/Interpretation:SR        ASSESSMENT:  Yumiko Stephens weight is stable from last CHF visit on 8/3/22. Pt admits has cardiomems now and also admits Dr. Gabi Brennan is working him up for dialysis. Pt denies any discomfort and will call for earlier appt. If needed. [facility] since [facility]. Interventions completed this visit:  IV diuretics given no  Lab work obtained yes,BMP/BNP  Reviewed currently prescribed medications with patient, educated on importance of compliance and answered any questions regarding their medication  Educated on signs and symptoms of HF  Educated on low sodium diet    PLAN:  Scheduled to follow up in CHF clinic on   Future Appointments   Date Time Provider Alfa Moore   10/9/2022  9:00 PM [facility] SLEEP LAB BEDROOM 2 [facility] SLEEP [facility]   10/19/2022  7:30 AM [facility] CHF ROOM 1 [place] [address]     Given clinic phone number and aware of signs and symptoms to call with any HF change in symptoms.

## 2022-09-09 ENCOUNTER — CARE COORDINATION (OUTPATIENT)
Dept: CASE MANAGEMENT | Age: 72
End: 2022-09-09

## 2022-10-19 ENCOUNTER — HOSPITAL ENCOUNTER (OUTPATIENT)
Dept: OTHER | Age: 72
Setting detail: THERAPIES SERIES
Discharge: HOME | End: 2022-10-19
Payer: MEDICARE

## 2022-10-19 VITALS
DIASTOLIC BLOOD PRESSURE: 67 MMHG | BODY MASS INDEX: 26.08 KG/M2 | HEART RATE: 74 BPM | WEIGHT: 187 LBS | SYSTOLIC BLOOD PRESSURE: 143 MMHG | OXYGEN SATURATION: 99 % | RESPIRATION RATE: 18 BRPM

## 2022-10-19 LAB
ANION GAP SERPL CALCULATED.3IONS-SCNC: 11 MMOL/L (ref 7–16)
BUN BLDV-MCNC: 42 MG/DL (ref 6–23)
CALCIUM SERPL-MCNC: 9.4 MG/DL (ref 8.6–10.2)
CHLORIDE BLD-SCNC: 102 MMOL/L (ref 98–107)
CO2: 27 MMOL/L (ref 22–29)
CREAT SERPL-MCNC: 3.3 MG/DL (ref 0.7–1.2)
GFR SERPL CREATININE-BSD FRML MDRD: 19 ML/MIN/1.73
GLUCOSE BLD-MCNC: 117 MG/DL (ref 74–99)
POTASSIUM SERPL-SCNC: 4.3 MMOL/L (ref 3.5–5)
PRO-BNP: 4734 PG/ML (ref 0–125)
SODIUM BLD-SCNC: 140 MMOL/L (ref 132–146)

## 2022-10-19 PROCEDURE — 99214 OFFICE O/P EST MOD 30 MIN: CPT

## 2022-10-19 PROCEDURE — 80048 BASIC METABOLIC PNL TOTAL CA: CPT

## 2022-10-19 PROCEDURE — 83880 ASSAY OF NATRIURETIC PEPTIDE: CPT

## 2022-10-19 NOTE — PROGRESS NOTES
Congestive Heart Failure [facility]   1950          Referring Provider: Dr Sohail Pollard  Primary Care Physician: Dr Keely Mazariegos  Cardiologist: Dr Sohail Pollard  Nephrologist: Dr Clinton        History of Present Illness:     Gavin Orantes is a 67 y.o. male with a history of HFpEF, most recent EF 75% on 10-7-21. Patient Story:    He does  Have some dyspnea with exertion, shortness of breath, or decline in overall functional capacity. He does not have orthopnea, PND, nocturnal cough or hemoptysis. He does not have abdominal distention or bloating, early satiety, anorexia/change in appetite. He usually has a good urinary response to  oral diuretic. States sometimes are better than others. He has no lower extremity edema. He denies lightheadedness, dizziness. He denies palpitations, syncope or near syncope. He does not complain of chest pain, pressure, discomfort. No Known Allergies      Prior to Visit Medications    Medication Sig Taking?  Authorizing Provider   ferrous sulfate (IRON 325) 325 (65 Fe) MG tablet Take 325 mg by mouth daily (with breakfast)  Patient not taking: Reported on 10/19/2022  Historical Provider, MD   predniSONE (DELTASONE) 5 MG tablet Take 5 mg by mouth daily  Historical Provider, MD   famotidine (PEPCID) 20 MG tablet Take 20 mg by mouth daily  Historical Provider, MD   bumetanide (BUMEX) 1 MG tablet Take 1 tablet by mouth 2 times daily  Patient taking differently: Take 2 mg by mouth 2 times daily  Liseth Arias, DO   sulfamethoxazole-trimethoprim (BACTRIM;SEPTRA) 400-80 MG per tablet Take 2 tablets by mouth 2 times daily   Historical Provider, MD   Cholecalciferol (VITAMIN D3) 125 MCG (5000 UT) TABS Take 1 tablet by mouth daily   Historical Provider, MD   bisacodyl (DULCOLAX) 5 MG EC tablet Take 5 mg by mouth daily as needed  Historical Provider, MD   [address] [facility] [address]   Historical Provider, MD   BD PEN NEEDLE TAINA 2ND GEN 32G X 4 MM [facility]   Historical Provider, MD   [facility] [address] [facility]   Historical Provider, MD   calcitRIOL (ROCALTROL) 0.25 MCG capsule Take 1 capsule by mouth daily  Kimberly Bess MD   insulin glargine (LANTUS SOLOSTAR) 100 UNIT/ML injection pen Inject 12 Units into the skin daily At noon   Had today  Patient taking differently: Inject 12 Units into the skin daily  Vivian Nazario MD   oxyCODONE (ROXICODONE) 20 MG immediate release tablet Take 20 mg by mouth every 8 hours as needed for Pain. Historical Provider, MD   hydrALAZINE (APRESOLINE) 100 MG tablet Take 100 mg by mouth 3 times daily  Historical Provider, MD   simvastatin (ZOCOR) 10 MG tablet Take 10 mg by mouth daily  Historical Provider, MD   isosorbide mononitrate (IMDUR) 60 MG extended release tablet TAKE 1 TABLET DAILY  Laith Melton MD   tacrolimus (PROGRAF) 1 MG capsule Take 2 capsules by mouth 2 times daily  Vivian Nazario MD   sodium bicarbonate 650 MG tablet Take 650 mg by mouth 4 times daily   Historical Provider, MD   Multiple Vitamin (MULTI VITAMIN DAILY PO) Take by mouth daily  Historical Provider, MD   mycophenolate (CELLCEPT) 250 MG capsule Take 750 mg by mouth 2 times daily  Historical Provider, MD   insulin lispro (HUMALOG KWIKPEN) 100 UNIT/ML SOPN Inject 5 Units into the skin daily (before lunch) Patient takes with his biggest meal  Historical Provider, MD   docusate sodium (COLACE) 100 MG capsule Take 1 capsule by mouth 2 times daily as needed for Constipation. Tomeka Wade MD   linagliptin (TRADJENTA) 5 MG tablet Take 5 mg by mouth daily. Historical Provider, MD   aspirin 81 MG EC tablet Take 81 mg by mouth daily.   Historical Provider, MD             Guideline directed medical:  ARNI/ACE I/ARB: No  Beta blocker:  no  Aldosterone antagonist:  No        Physical Examination:     BP (!) 143/67   Pulse 74   Resp 18   Wt 187 lb (84.8 kg)   SpO2 99%   BMI 26.08 kg/m²     Assessment  Charting Type: Shift assessment (CHF clinic)                   Respiratory  Respiratory Pattern: Regular  Respiratory Depth: Normal  Respiratory Quality/Effort: Dyspnea with exertion  Chest Assessment: Chest expansion symmetrical  L Breath Sounds: Clear  R Breath Sounds: Clear              Cardiac  Cardiac Regularity: Regular  Cardiac Rhythm: Sinus rhythm    Rhythm Interpretation  Heart Rate: 74         Gastrointestinal  Abdominal (WDL): Within Defined Limits               Peripheral Vascular  Peripheral Vascular (WDL): Within Defined Limits  Edema: None  RLE Edema: None  LLE Edema: None                        Psychosocial  Psychosocial (WDL): Within Defined Limits                        Heart Rate: 74                     LAB DATA:    Last 3 BMP      Sodium (mmol/L)   Date Value   09/07/2022 140   09/01/2022 139   08/31/2022 141     Potassium (mmol/L)   Date Value   09/07/2022 5.0   08/30/2022 4.5   08/03/2022 4.5     Potassium reflex Magnesium (mmol/L)   Date Value   09/01/2022 4.8   08/31/2022 4.4   06/12/2021 5.4 (H)     Chloride (mmol/L)   Date Value   09/07/2022 106   09/01/2022 106   08/31/2022 106     CO2 (mmol/L)   Date Value   09/07/2022 23   09/01/2022 23   08/31/2022 26     BUN (mg/dL)   Date Value   09/07/2022 44 (H)   09/01/2022 37 (H)   08/31/2022 42 (H)     Glucose (mg/dL)   Date Value   09/07/2022 77   09/01/2022 55 (L)   08/31/2022 64 (L)   02/21/2012 124 (H)   09/23/2011 140 (H)   07/05/2011 153 (H)     Calcium (mg/dL)   Date Value   09/07/2022 9.1   09/01/2022 8.7   08/31/2022 8.6       Last 3 BNP       Pro-BNP (pg/mL)   Date Value   09/07/2022 5,981 (H)   08/30/2022 2,234 (H)   08/03/2022 3,696 (H)          CBC:   No results for input(s): WBC, HGB, PLT in the last 72 hours. BMP:    No results for input(s): NA, K, CL, CO2, BUN, CREATININE, GLUCOSE in the last 72 hours. Hepatic: No results for input(s): AST, ALT, ALB, BILITOT, ALKPHOS in the last 72 hours. Troponin: No results for input(s): TROPONINI in the last 72 hours.   BNP: No results for input(s): BNP in the last 72 hours. Lipids: No results for input(s): CHOL, HDL in the last 72 hours. Invalid input(s): LDLCALCU  INR: No results for input(s): INR in the last 72 hours. WEIGHTS:    Wt Readings from Last 3 Encounters:   10/19/22 187 lb (84.8 kg)   09/07/22 186 lb (84.4 kg)   08/30/22 180 lb (81.6 kg)         TELEMETRY:  Cardiac Regularity: Regular  Cardiac Rhythm/Interpretation:SR        ASSESSMENT:  Valentino Finer weight is stable from last CHF visit on 9/7/22. Pt admits has cardiomems now and also admits Dr. Melissa Hallman is working him up for dialysis. Pt denies any discomfort and will call for earlier appt. If needed. Interventions completed this visit:  IV diuretics given no  Lab work obtained yes,BMP/BNP  Reviewed currently prescribed medications with patient, educated on importance of compliance and answered any questions regarding their medication  Educated on signs and symptoms of HF  Educated on low sodium diet    PLAN:  Scheduled to follow up in CHF clinic on   Future Appointments   Date Time Provider Alfa Moore   11/22/2022  7:30 AM [facility] CHF ROOM 1 [address]     Given clinic phone number and aware of signs and symptoms to call with any HF change in symptoms.

## 2022-11-22 ENCOUNTER — HOSPITAL ENCOUNTER (OUTPATIENT)
Dept: OTHER | Age: 72
Setting detail: THERAPIES SERIES
Discharge: HOME | End: 2022-11-22

## 2022-12-15 NOTE — CARE COORDINATION
How is he doing today? Does he have a temperature, is he coughing, or any other symptoms? Shamika Valle  April 30, 2021    Initial Referral Reason: Poor Appetite and CKD stage 4     Patient Care Team:  Jimbo Sahu DO as PCP - Aye Clinton MD as Consulting Physician (Nephrology)  Alvina Antonio MD as Consulting Physician (Cardiology)  Liam Horne RN as Care Transitions Nurse    Past Medical History:    Current Outpatient Medications   Medication Sig Dispense Refill    pantoprazole (PROTONIX) 40 MG tablet Take 1 tablet by mouth 2 times daily (before meals) 60 tablet 3    metoprolol succinate (TOPROL XL) 100 MG extended release tablet Take 1 tablet by mouth nightly (Patient taking differently: Take 50 mg by mouth nightly ) 30 tablet 3    furosemide (LASIX) 40 MG tablet Take 1 tablet by mouth daily Monitor weight and swelling may take additional dose as needed 60 tablet 1    tiZANidine (ZANAFLEX) 4 MG tablet Take 4 mg by mouth daily      simvastatin (ZOCOR) 10 MG tablet Take 10 mg by mouth daily      isosorbide mononitrate (IMDUR) 60 MG extended release tablet TAKE 1 TABLET DAILY 90 tablet 3    hydrALAZINE (APRESOLINE) 50 MG tablet Take 1 tablet by mouth every 8 hours (Patient taking differently: Take 100 mg by mouth every 8 hours ) 90 tablet 3    tacrolimus (PROGRAF) 1 MG capsule Take 2 capsules by mouth 2 times daily 60 capsule 3    prednisoLONE 5 MG TABS Take 5 mg by mouth daily       sodium bicarbonate 650 MG tablet Take 650 mg by mouth daily      Multiple Vitamin (MULTI VITAMIN DAILY PO) Take by mouth daily      Cholecalciferol (VITAMIN D3) 5000 UNITS TABS Take by mouth daily      LANTUS SOLOSTAR 100 UNIT/ML SOPN Inject 17 Units into the skin daily At noon   Had today      mycophenolate (CELLCEPT) 250 MG capsule   Take 1,000 mg by mouth 2 times daily       insulin lispro (HUMALOG KWIKPEN) 100 UNIT/ML SOPN Inject 5 Units into the skin daily (before lunch) Patient takes with his biggest meal      docusate sodium (COLACE) 100 MG capsule Take 1 capsule by mouth 2 times daily as needed for Constipation. (Patient taking differently: Take 100 mg by mouth daily as needed for Constipation ) 20 capsule 0    linagliptin (TRADJENTA) 5 MG tablet Take 5 mg by mouth daily.  aspirin 81 MG EC tablet Take 81 mg by mouth daily. No current facility-administered medications for this visit. Biochemical Data, Medical Tests and Procedures:    Lab Results   Component Value Date    LABA1C 5.5 06/03/2014     No results found for: EAG    Lab Results   Component Value Date    CREATININE 2.1 (H) 04/29/2021    BUN 21 04/29/2021     04/29/2021    K 4.8 04/29/2021     04/29/2021    CO2 21 (L) 04/29/2021         Anthropometric Measurements:    Height: 71 inches (180.3 cm)  Weight: 216 lb (98.3 kg)  BMI: 30.23 (obesity class I)  IBW: 172 lb (78.18 kg) +-10%  %IBW: 125.6%     Physical Exam Findings:  Deferred    Nutrition Interview: RD called pt and spoke with pt's wife, [place]. RD explained reason for call and role in care. RD noted patient was admitted at [facility] 4/26/21-4/29/21. Per chart review, Dietitian consulted inpatient and saw patient on 4/27/21- this RD reviewed nutrition recommendations. Claudell Saba, RD, LD noted: Recommend and start Glucerna BID and Chadwick wound healing supplement BID when diet advances to help meet increased nutritional needs and to help assist with proper wound healing. [place] states she was not aware of these recommendations and she will go buy both supplements for patient. This RD explained the importance of good nutrition for wound healing. Explained the body needs extra calories and protein to heal. [facility] states patient's appetite is much better since he has been home. She explains patient ate chicken noodle soup for dinner last night and this morning for breakfast he ate scrambled eggs with toast and coffee.  RD acknowledged this and explained the importance of eating balanced meals consistently throughout the day to help manage BS and meet estimated nutrition needs. Reiterated the importance of supplementing with both Glucerna and Chadwick to help patient better meet estimated nutrition needs and promote wound healing. [place] verbalizes understanding and has no nutrition related questions at this time. RD offered to follow up in a few weeks, [place] prefers to call RD in the future with any nutrition questions or concerns. RD provided contact information. Nutrition Diagnosis:  #1 Problem Inadequate energy intake       Etiology related to poor appetite, increased nutrient needs (wound healing)       Signs/Symptoms as evidenced by poor PO intake, need for supplementation     Nutrition Intervention:     Estimated Needs  diabetic diet and renal diet providing 2000 kcals to promote wt maintenance ([address] based on CBW). Estimated daily CHO Needs: 275 g (based on 45-65% total calorie intake)  Estimated daily Protein Needs:  g (based on 1.0-1.2 g/kg based on CBW)  Estimated daily Fluid Needs: 64 oz. Follow Up: RD provided contact information. Encouraged pt to call RD in future with any nutrition related questions or concerns. RD will assist with patient return call.      [address] [facility], [address]

## 2022-12-19 ENCOUNTER — HOSPITAL ENCOUNTER (OUTPATIENT)
Dept: OTHER | Age: 72
Setting detail: THERAPIES SERIES
Discharge: HOME | End: 2022-12-19

## 2022-12-22 ENCOUNTER — TELEPHONE (OUTPATIENT)
Dept: CASE MANAGEMENT | Age: 72
End: 2022-12-22

## 2022-12-22 NOTE — TELEPHONE ENCOUNTER
I called the patient and he confirmed his CT lung screening at [facility] on 12/23/2022 at 10:00 am.  I reminded the patient to arrive at 9:30 am, enter through the main entrance, and register. Patient confirmed.           Electronically signed by Vazquez Thibodeaux on 12/22/22 at 2:11 PM EST

## 2022-12-23 ENCOUNTER — HOSPITAL ENCOUNTER (OUTPATIENT)
Dept: ULTRASOUND IMAGING | Age: 72
End: 2022-12-23
Payer: MEDICARE

## 2022-12-23 ENCOUNTER — APPOINTMENT (OUTPATIENT)
Dept: INTERVENTIONAL RADIOLOGY/VASCULAR | Age: 72
End: 2022-12-23
Payer: MEDICARE

## 2022-12-23 ENCOUNTER — HOSPITAL ENCOUNTER (OUTPATIENT)
Dept: CT IMAGING | Age: 72
End: 2022-12-23
Payer: MEDICARE

## 2022-12-23 DIAGNOSIS — Z12.2 ENCOUNTER FOR SCREENING FOR MALIGNANT NEOPLASM OF RESPIRATORY ORGANS: ICD-10-CM

## 2022-12-23 DIAGNOSIS — Z13.6 SCREENING FOR ISCHEMIC HEART DISEASE: ICD-10-CM

## 2022-12-23 DIAGNOSIS — I73.9 PERIPHERAL VASCULAR DISEASE, UNSPECIFIED (HCC): ICD-10-CM

## 2022-12-23 DIAGNOSIS — Z87.891 PERSONAL HISTORY OF TOBACCO USE: ICD-10-CM

## 2022-12-23 PROCEDURE — 71271 CT THORAX LUNG CANCER SCR C-: CPT

## 2022-12-28 ENCOUNTER — TELEPHONE (OUTPATIENT)
Dept: CASE MANAGEMENT | Age: 72
End: 2022-12-28

## 2022-12-28 NOTE — TELEPHONE ENCOUNTER
No call, encounter opened to process CT Lung Screening. CT Lung Screen: 12/23/2022    Impression   There is no suspicious pulmonary mass. Ground-glass vague opacity seen within the right upper lobe which could   represent residual postinflammatory scarring in a patient with previous COVID   pneumonia or less likely but not excluded focal pneumonia       Chronic elevation right hemidiaphragm with minimal right lung base atelectasis       Minimal left lung base atelectasis. LUNG RADS:   Per ACR Lung-RADS Version 1.1       Category 2, Benign appearance or behavior. Management:  Continue annual lung screening with LDCT in 12 months. RECOMMENDATIONS:   If you would like to register your patient with the [facility], please contact the Nurse Navigator at   [phone]. Pack years: 21    Social History     Tobacco Use  Smoking Status: Former Smoker    Start Date:    Quit Date: 01/01/1990   Types: Cigarettes   Packs/Day: 1   Years: 20   Pack Years: 21   Smokeless Tobacco: Never         Results letter sent to patient via my chart or mailed.      [facility]

## 2023-01-09 ENCOUNTER — HOSPITAL ENCOUNTER (OUTPATIENT)
Dept: OTHER | Age: 73
Setting detail: THERAPIES SERIES
Discharge: HOME | End: 2023-01-09
Payer: MEDICARE

## 2023-01-09 VITALS
HEART RATE: 76 BPM | OXYGEN SATURATION: 99 % | SYSTOLIC BLOOD PRESSURE: 144 MMHG | BODY MASS INDEX: 25.8 KG/M2 | RESPIRATION RATE: 18 BRPM | WEIGHT: 185 LBS | DIASTOLIC BLOOD PRESSURE: 70 MMHG

## 2023-01-09 LAB
ANION GAP SERPL CALCULATED.3IONS-SCNC: 12 MMOL/L (ref 7–16)
BUN BLDV-MCNC: 55 MG/DL (ref 6–23)
CALCIUM SERPL-MCNC: 8.9 MG/DL (ref 8.6–10.2)
CHLORIDE BLD-SCNC: 104 MMOL/L (ref 98–107)
CO2: 23 MMOL/L (ref 22–29)
CREAT SERPL-MCNC: 3.1 MG/DL (ref 0.7–1.2)
GFR SERPL CREATININE-BSD FRML MDRD: 21 ML/MIN/1.73
GLUCOSE BLD-MCNC: 89 MG/DL (ref 74–99)
POTASSIUM SERPL-SCNC: 4.6 MMOL/L (ref 3.5–5)
PRO-BNP: 3590 PG/ML (ref 0–125)
SODIUM BLD-SCNC: 139 MMOL/L (ref 132–146)

## 2023-01-09 PROCEDURE — 83880 ASSAY OF NATRIURETIC PEPTIDE: CPT

## 2023-01-09 PROCEDURE — 99214 OFFICE O/P EST MOD 30 MIN: CPT

## 2023-01-09 PROCEDURE — 80048 BASIC METABOLIC PNL TOTAL CA: CPT

## 2023-01-09 PROCEDURE — 36415 COLL VENOUS BLD VENIPUNCTURE: CPT

## 2023-01-09 NOTE — PLAN OF CARE
Problem: Chronic Conditions and Co-morbidities  Goal: Patient's chronic conditions and co-morbidity symptoms are monitored and maintained or improved  Flowsheets (Taken 1/9/2023 4922)  Care Plan - Patient's Chronic Conditions and Co-Morbidity Symptoms are Monitored and Maintained or Improved: Monitor and assess patient's chronic conditions and comorbid symptoms for stability, deterioration, or improvement

## 2023-01-09 NOTE — PROGRESS NOTES
Congestive Heart Failure [facility]   1950          Referring Provider: Dr Miguel A Bedoya  Primary Care Physician: Dr Michele Said  Cardiologist: Dr Miguel A Bedoya  Nephrologist: Dr Clinton        History of Present Illness:     Michael Jacinto is a 67 y.o. male with a history of HFpEF, most recent EF 75% on 10-7-21. Patient Story:    He does  have some dyspnea with exertion, shortness of breath, or decline in overall functional capacity. He does not have orthopnea, PND, nocturnal cough or hemoptysis. He does not have abdominal distention or bloating, early satiety, anorexia/change in appetite. He usually has a good urinary response to  oral diuretic. States sometimes are better than others. He has no lower extremity edema. He denies lightheadedness, dizziness. He denies palpitations, syncope or near syncope. He does not complain of chest pain, pressure, discomfort. No Known Allergies      Prior to Visit Medications    Medication Sig Taking?  Authorizing Provider   ferrous sulfate (IRON 325) 325 (65 Fe) MG tablet Take 325 mg by mouth daily (with breakfast)  Patient not taking: Reported on 10/19/2022  Historical Provider, MD   predniSONE (DELTASONE) 5 MG tablet Take 5 mg by mouth daily  Historical Provider, MD   famotidine (PEPCID) 20 MG tablet Take 20 mg by mouth daily  Historical Provider, MD   bumetanide (BUMEX) 1 MG tablet Take 1 tablet by mouth 2 times daily  Patient taking differently: Take 2 mg by mouth 2 times daily  [address],    sulfamethoxazole-trimethoprim (BACTRIM;SEPTRA) 400-80 MG per tablet Take 2 tablets by mouth 2 times daily   Historical Provider, MD   Cholecalciferol (VITAMIN D3) 125 MCG (5000 UT) TABS Take 1 tablet by mouth daily   Historical Provider, MD   bisacodyl (DULCOLAX) 5 MG EC tablet Take 5 mg by mouth daily as needed  Historical Provider, MD   [address]   Historical Provider, MD   BD PEN NEEDLE TAINA 2ND GEN 32G X 4 MM [facility]   Historical Provider, MD   Lancets ([employer]) [address] [facility]   Historical Provider, MD   calcitRIOL (ROCALTROL) 0.25 MCG capsule Take 1 capsule by mouth daily  Vickie Robins MD   insulin glargine (LANTUS SOLOSTAR) 100 UNIT/ML injection pen Inject 12 Units into the skin daily At noon   Had today  Patient taking differently: Inject 12 Units into the skin daily  Peter Vasques MD   oxyCODONE (ROXICODONE) 20 MG immediate release tablet Take 20 mg by mouth every 8 hours as needed for Pain. Historical Provider, MD   hydrALAZINE (APRESOLINE) 100 MG tablet Take 100 mg by mouth 3 times daily  Historical Provider, MD   simvastatin (ZOCOR) 10 MG tablet Take 10 mg by mouth daily  Historical Provider, MD   isosorbide mononitrate (IMDUR) 60 MG extended release tablet TAKE 1 TABLET DAILY  Kirstie Murcia MD   tacrolimus (PROGRAF) 1 MG capsule Take 2 capsules by mouth 2 times daily  Peter Vasques MD   sodium bicarbonate 650 MG tablet Take 650 mg by mouth 4 times daily   Patient not taking: Reported on 1/9/2023  Historical Provider, MD   Multiple Vitamin (MULTI VITAMIN DAILY PO) Take by mouth daily  Historical Provider, MD   mycophenolate (CELLCEPT) 250 MG capsule Take 750 mg by mouth 2 times daily  Historical Provider, MD   insulin lispro (HUMALOG KWIKPEN) 100 UNIT/ML SOPN Inject 5 Units into the skin daily (before lunch) Patient takes with his biggest meal  Historical Provider, MD   docusate sodium (COLACE) 100 MG capsule Take 1 capsule by mouth 2 times daily as needed for Constipation. Nick Davis MD   linagliptin (TRADJENTA) 5 MG tablet Take 5 mg by mouth daily. Historical Provider, MD   aspirin 81 MG EC tablet Take 81 mg by mouth daily.   Historical Provider, MD             Guideline directed medical:  ARNI/ACE I/ARB: No  Beta blocker:  no  Aldosterone antagonist:  No        Physical Examination:     BP (!) 144/70 Comment: Has NOT taken meds yet  Pulse 76   Resp 18   Wt 185 lb (83.9 kg)   SpO2 99% BMI 25.80 kg/m²     Assessment  Charting Type: Shift assessment (CHF clinic)                   Respiratory  Respiratory Pattern: Regular  Respiratory Depth: Normal  Respiratory Quality/Effort: Dyspnea with exertion  Chest Assessment: Chest expansion symmetrical  L Breath Sounds: Clear  R Breath Sounds: Clear              Cardiac  Cardiac Regularity: Regular  Cardiac Rhythm: Sinus rhythm with PAC    Rhythm Interpretation  Heart Rate: 76         Gastrointestinal  Abdominal (WDL): Within Defined Limits               Peripheral Vascular  Peripheral Vascular (WDL): Within Defined Limits  Edema: None  RLE Edema: None  LLE Edema: None                        Psychosocial  Psychosocial (WDL): Within Defined Limits                        Heart Rate: 76                     LAB DATA:    Last 3 BMP      Sodium (mmol/L)   Date Value   10/19/2022 140   09/07/2022 140   09/01/2022 139     Potassium (mmol/L)   Date Value   10/19/2022 4.3   09/07/2022 5.0   08/30/2022 4.5     Potassium reflex Magnesium (mmol/L)   Date Value   09/01/2022 4.8   08/31/2022 4.4   06/12/2021 5.4 (H)     Chloride (mmol/L)   Date Value   10/19/2022 102   09/07/2022 106   09/01/2022 106     CO2 (mmol/L)   Date Value   10/19/2022 27   09/07/2022 23   09/01/2022 23     BUN (mg/dL)   Date Value   10/19/2022 42 (H)   09/07/2022 44 (H)   09/01/2022 37 (H)     Glucose (mg/dL)   Date Value   10/19/2022 117 (H)   09/07/2022 77   09/01/2022 55 (L)   02/21/2012 124 (H)   09/23/2011 140 (H)   07/05/2011 153 (H)     Calcium (mg/dL)   Date Value   10/19/2022 9.4   09/07/2022 9.1   09/01/2022 8.7       Last 3 BNP       Pro-BNP (pg/mL)   Date Value   10/19/2022 4,734 (H)   09/07/2022 5,981 (H)   08/30/2022 2,234 (H)          CBC:   No results for input(s): WBC, HGB, PLT in the last 72 hours. BMP:    No results for input(s): NA, K, CL, CO2, BUN, CREATININE, GLUCOSE in the last 72 hours.   Hepatic: No results for input(s): AST, ALT, ALB, BILITOT, ALKPHOS in the last 72 hours.  Troponin: No results for input(s): TROPONINI in the last 72 hours. BNP: No results for input(s): BNP in the last 72 hours. Lipids: No results for input(s): CHOL, HDL in the last 72 hours. Invalid input(s): LDLCALCU  INR: No results for input(s): INR in the last 72 hours. WEIGHTS:    Wt Readings from Last 3 Encounters:   01/09/23 185 lb (83.9 kg)   10/19/22 187 lb (84.8 kg)   09/07/22 186 lb (84.4 kg)         TELEMETRY:  Cardiac Regularity: Regular  Cardiac Rhythm/Interpretation:SR with multiple PAC's. ASSESSMENT:  Martha Carl weight is stable from last CHF visit on 10-19-22. Pt admits has cardiomems now that [facility] monitors. States Dr. Dahlia Vivas is working him up for dialysis. Pt denies any discomfort and will call for earlier appt. If needed. Interventions completed this visit:  IV diuretics given no  Lab work obtained yes,BMP/BNP  Reviewed currently prescribed medications with patient, educated on importance of compliance and answered any questions regarding their medication  Educated on signs and symptoms of HF  Educated on low sodium diet    PLAN:  Scheduled to follow up in CHF clinic:    Patient questioning need to continue to follow up with us here at [facility]. States that his [facility] [employer] Cardiologist monitors everything Cardiac, and Dr Dahlia Vivas does monthly lab work to keep an eye on his kidneys. No follow up appt. Given at this time. I did instruct him to talk with his Physicians to see if follow up here is warranted. Given clinic phone number and aware of signs and symptoms to call with any HF change in symptoms.

## 2023-09-12 NOTE — DISCHARGE SUMMARY
Call to patient to schedule a follow up appointment with Interventional Radiology.  Spoke with patient.  Asked patient to call and schedule CT venogram needed prior to follow up appointment then return call to writer to schedule the appointment with IR.    Writer provided patient with both numbers to call.   Patient agreed with the plan of care.    [phone].   Hospitalist Discharge Summary    Patient ID: Ronnie Saldana   Patient : 1950  Patient's PCP: Kiesha Pal III, DO    Admit Date: 2022   Admitting Physician: Shaina Urbina DO    Discharge Date:  2022   Discharge Physician: Shaina Urbina DO   Discharge Condition: Stable  Discharge Disposition: Home      Discharge Diagnoses: Active Hospital Problems    Diagnosis Date Noted    Atrial fibrillation [facility]) [I48.91]      Priority: Medium    Acute diastolic heart failure (HCC) [I50.31] 2022     Priority: Medium   ii dm  PH   renal transplant   sob   depression   ii dm   iron def anemia   pulmonary cachexia   SOB      Hospital course in brief:  70 y. o. male presented with SOB, ONSET 3 DAYS AGO, WORSE WITH EXERTION BECOMING PROGRESSIVELY WORSE, AND HE COULD NOT EAT.  EDEMA AT ONE POINT WAS UP TO HIS THIGHS. HE ASLO HAD ATRIAL FIB,  THIS IS NEW FOR HIM. NO CHEST PAIN,. HAD A SIMILAR EPISODE BEFORE AND WAS ON BUMEX PER [facility].  .** NO AFIB PER CARDIOLOGY, OK TO DISCHARGE    **      PHYSICAL EXAM:    BP (!) 115/57   Pulse 58   Temp 98.2 °F (36.8 °C) (Oral)   Resp 16   Wt 178 lb (80.7 kg)   SpO2 98%   BMI 24.83 kg/m²   General appearance:  No apparent distress, appears stated age and cooperative. HEENT:  Normal cephalic, atraumatic without obvious deformity. Neck: Supple, with full range of motion. No jugular venous distention. Trachea midline. Respiratory:  Normal respiratory effort. Clear to auscultation, bilaterally without Rales/Wheezes/Rhonchi. Cardiovascular:  Regular rate and rhythm   Abdomen: Soft, non-tender, non-distended with normal bowel sounds. Musculoskeletal:  No clubbing, cyanosis pos edema bilaterally.    Skin: Skin color, texture, turgor normal.  No rashes or lesions. Neurologic:  Neurovascularly intact without any focal sensory/motor deficits.  Cranial nerves: II-XII intact, grossly non-focal.  Psychiatric:  Alert and oriented, thought content appropriate, normal insight         Prior to Admission medications    Medication Sig Start Date End Date Taking? Authorizing Provider   bumetanide (BUMEX) 1 MG tablet Take 1 tablet by mouth 2 times daily 6/17/22  Yes Chon Badillo DO   amLODIPine (NORVASC) 5 MG tablet Take 1 tablet by mouth daily 3/22/22   Negra Ribeiro MD   sulfamethoxazole-trimethoprim (BACTRIM;SEPTRA) 400-80 MG per tablet Take 2 tablets by mouth 2 times daily  6/28/21   Historical Provider, MD   Cholecalciferol (VITAMIN D3) 125 MCG (5000 UT) TABS Take by mouth    Historical Provider, MD   ammonium lactate (LAC-HYDRIN) 12 % lotion ammonium lactate 12 % lotion    Historical Provider, MD   bisacodyl (DULCOLAX) 5 MG EC tablet Take by mouth    Historical Provider, MD   [facility] ULTRA strip  9/14/21   Historical Provider, MD   BD PEN NEEDLE TAINA 2ND GEN 32G X 4 MM MISC  10/16/21   Historical Provider, MD   Lancets ([address] [place]) [facility]  9/14/21   Historical Provider, MD   metoprolol tartrate (LOPRESSOR) 50 MG tablet 50 mg 2 times daily     Historical Provider, MD   LOKELMA 10 g PACK oral suspension  8/9/21   Historical Provider, MD   tiZANidine (ZANAFLEX) 4 MG tablet  9/30/21   Historical Provider, MD   calcitRIOL (ROCALTROL) 0.25 MCG capsule Take 1 capsule by mouth daily 10/8/21   Dereje Mathias MD   OXYGEN Inhale 2 L into the lungs daily as needed   Patient not taking: Reported on 2/21/2022    Historical Provider, MD   insulin glargine (LANTUS SOLOSTAR) 100 UNIT/ML injection pen Inject 12 Units into the skin daily At noon   Had today  Patient taking differently: Inject 12 Units into the skin daily  6/14/21   Kerri Harris MD   oxyCODONE (ROXICODONE) 20 MG immediate release tablet Take 20 mg by mouth every 8 hours as needed for Pain.      Historical Provider, MD   hydrALAZINE (APRESOLINE) 100 MG tablet Take 100 mg by mouth 3 times daily    Historical Provider, MD   simvastatin (ZOCOR) 10 MG tablet Take 10 mg by mouth daily    Historical Provider, MD   isosorbide mononitrate (IMDUR) 60 MG extended release tablet TAKE 1 TABLET DAILY 3/6/18   Lin Herzog MD   tacrolimus (PROGRAF) 1 MG capsule Take 2 capsules by mouth 2 times daily 5/1/17   Diane Barakat MD   sodium bicarbonate 650 MG tablet Take 650 mg by mouth 4 times daily     Historical Provider, MD   Multiple Vitamin (MULTI VITAMIN DAILY PO) Take by mouth daily    Historical Provider, MD   mycophenolate (CELLCEPT) 250 MG capsule Take 1,000 mg by mouth 2 times daily  6/18/15   Historical Provider, MD   insulin lispro (HUMALOG KWIKPEN) 100 UNIT/ML SOPN Inject 5 Units into the skin daily (before lunch) Patient takes with his biggest meal    Historical Provider, MD   docusate sodium (COLACE) 100 MG capsule Take 1 capsule by mouth 2 times daily as needed for Constipation. Patient taking differently: Take 100 mg by mouth daily as needed for Constipation  7/26/13   Lamine Lucas MD   linagliptin (TRADJENTA) 5 MG tablet Take 5 mg by mouth daily. Historical Provider, MD   aspirin 81 MG EC tablet Take 81 mg by mouth daily. Historical Provider, MD       Consults:   IP CONSULT TO INTERNAL MEDICINE  IP CONSULT TO HOSPITALIST  IP CONSULT TO NEPHROLOGY  IP CONSULT TO DIETITIAN  IP CONSULT TO CARDIOLOGY  IP CONSULT TO HEART FAILURE NURSE/COORDINATOR            Discharge Instructions / Follow up:    Future Appointments   Date Time Provider Alfa Moore   6/29/2022 10:00 AM CHAI Kwong - CNP [place] [facility]   7/7/2022  9:45 AM [facility] ROOM 1 [facility]       Continued appropriate risk factor modification of blood pressure, diabetes and serum lipids will remain essential to reducing risk of future atherosclerotic development    Activity: activity as tolerated    Significant labs:  CBC:   No results for input(s): WBC, RBC, HGB, HCT, MCV, RDW, PLT in the last 72 hours. BMP: No results for input(s): NA, K, CL, CO2, BUN, CREATININE, CA, MG, PHOS in the last 72 hours.   LFT:  No results for input(s): PROT, ALB, ALKPHOS, ALT, AST, BILITOT, AMYLASE, LIPASE in the last 72 hours. PT/INR: No results for input(s): INR, APTT in the last 72 hours. BNP: No results for input(s): BNP in the last 72 hours. Hgb A1C:   Lab Results   Component Value Date    LABA1C 5.1 06/15/2022     Folate and B12:   Lab Results   Component Value Date    TZJUNRZC86 812 04/28/2017   ,   Lab Results   Component Value Date    FOLATE 7.4 04/28/2017     Thyroid Studies:   Lab Results   Component Value Date    TSH 0.996 06/03/2014       Urinalysis:    Lab Results   Component Value Date    NITRU Negative 04/10/2021    WBCUA 0-1 04/10/2021    BACTERIA RARE 04/10/2021    RBCUA 0-1 04/10/2021    RBCUA NONE 05/18/2013    BLOODU Negative 04/10/2021    SPECGRAV 1.020 04/10/2021    GLUCOSEU Negative 04/10/2021       Imaging:  XR CHEST (2 VW)    Result Date: 6/14/2022  EXAMINATION: TWO XRAY VIEWS OF THE CHEST 6/14/2022 9:30 am COMPARISON: 2nd October 2021 HISTORY: ORDERING SYSTEM PROVIDED HISTORY: chf TECHNOLOGIST PROVIDED HISTORY: Reason for exam:->chf What reading provider will be dictating this exam?->CRC FINDINGS: Coarsened pulmonary markings suggestive of fibrosis which has been present on multiple prior studies. Normal heart and pulmonary vascularity. Neither costophrenic angle is blunted. Coarsened pulmonary markings which likely relate to fibrosis. See above. Discharge Medications:      Medication List      CHANGE how you take these medications    bumetanide 1 MG tablet  Commonly known as: BUMEX  Take 1 tablet by mouth 2 times daily  What changed:   · medication strength  · when to take this  · Another medication with the same name was removed. Continue taking this medication, and follow the directions you see here. docusate sodium 100 MG capsule  Commonly known as: Colace  Take 1 capsule by mouth 2 times daily as needed for Constipation.   What changed: when to take this     Lantus SoloStar 100 UNIT/ML injection pen  Generic drug: insulin glargine  Inject 12 Units into the skin daily At noon   Had today  What changed: additional instructions        CONTINUE taking these medications    amLODIPine 5 MG tablet  Commonly known as: NORVASC  Take 1 tablet by mouth daily     ammonium lactate 12 % lotion  Commonly known as: LAC-HYDRIN  Notes to patient: FOLLOW HOME REGIMEN     aspirin 81 MG EC tablet     BD Pen Needle Ruby 2nd Gen 32G X 4 MM Misc  Generic drug: Insulin Pen Needle     bisacodyl 5 MG EC tablet  Commonly known as: DULCOLAX  Notes to patient: FOLLOW HOME REGIMEN     calcitRIOL 0.25 MCG capsule  Commonly known as: ROCALTROL  Take 1 capsule by mouth daily     HumaLOG KwikPen 100 UNIT/ML Sopn  Generic drug: insulin lispro (1 Unit Dial)  Notes to patient: FOLLOW HOME REGIMEN     hydrALAZINE 100 MG tablet  Commonly known as: APRESOLINE     isosorbide mononitrate 60 MG extended release tablet  Commonly known as: IMDUR  TAKE 1 TABLET DAILY     Lokelma 10 g Pack oral suspension  Generic drug: sodium zirconium cyclosilicate  Notes to patient: FOLLOW HOME REGIMEN     metoprolol tartrate 50 MG tablet  Commonly known as: LOPRESSOR     MULTI VITAMIN DAILY PO     mycophenolate 250 MG capsule  Commonly known as: CELLCEPT     OneTouch Delica Plus [id number] Misc     OneTouch Ultra strip  Generic drug: blood glucose test strips     oxyCODONE 20 MG immediate release tablet  Commonly known as: ROXICODONE     OXYGEN     simvastatin 10 MG tablet  Commonly known as: ZOCOR     sodium bicarbonate 650 MG tablet     sulfamethoxazole-trimethoprim 400-80 MG per tablet  Commonly known as: BACTRIM;SEPTRA     tacrolimus 1 MG capsule  Commonly known as: PROGRAF  Take 2 capsules by mouth 2 times daily     tiZANidine 4 MG tablet  Commonly known as: Floyce Puller  Notes to patient: FOLLOW HOME REGIMEN     Tradjenta 5 MG tablet  Generic drug: linagliptin     Vitamin D3 125 MCG (5000 UT) Tabs        STOP taking these medications    labetalol 200 MG tablet  Commonly known as: Benjiman Setting           Where to Get Your Medications      These medications were sent to [facility], [address] [address]    Phone: [phone]   · bumetanide 1 MG tablet         Time Spent on discharge is more than 45 minutes in the examination, evaluation, counseling and review of medications and discharge plan.    +++++++++++++++++++++++++++++++++++++++++++++++++  Yobany Laureano, DO  [address], [place]  +++++++++++++++++++++++++++++++++++++++++++++++++  NOTE: This report was transcribed using voice recognition software. Every effort was made to ensure accuracy; however, inadvertent computerized transcription errors may be present.

## 2023-10-16 ENCOUNTER — APPOINTMENT (OUTPATIENT)
Dept: GENERAL RADIOLOGY | Age: 73
End: 2023-10-16
Payer: MEDICARE

## 2023-10-16 ENCOUNTER — HOSPITAL ENCOUNTER (EMERGENCY)
Age: 73
Discharge: HOME | End: 2023-10-17
Attending: EMERGENCY MEDICINE
Payer: MEDICARE

## 2023-10-16 VITALS
OXYGEN SATURATION: 99 % | DIASTOLIC BLOOD PRESSURE: 65 MMHG | BODY MASS INDEX: 25.9 KG/M2 | WEIGHT: 185 LBS | RESPIRATION RATE: 18 BRPM | SYSTOLIC BLOOD PRESSURE: 117 MMHG | HEIGHT: 71 IN | TEMPERATURE: 100 F | HEART RATE: 82 BPM

## 2023-10-16 DIAGNOSIS — E16.2 HYPOGLYCEMIA: ICD-10-CM

## 2023-10-16 DIAGNOSIS — R53.83 FATIGUE, UNSPECIFIED TYPE: Primary | ICD-10-CM

## 2023-10-16 LAB
ALBUMIN SERPL-MCNC: 3.5 G/DL (ref 3.5–5.2)
ALP SERPL-CCNC: 62 U/L (ref 40–129)
ALT SERPL-CCNC: 9 U/L (ref 0–40)
ANION GAP SERPL CALCULATED.3IONS-SCNC: 15 MMOL/L (ref 7–16)
AST SERPL-CCNC: 31 U/L (ref 0–39)
B PARAP IS1001 DNA NPH QL NAA+NON-PROBE: NOT DETECTED
B PERT DNA SPEC QL NAA+PROBE: NOT DETECTED
BASOPHILS # BLD: 0 K/UL (ref 0–0.2)
BASOPHILS NFR BLD: 0 % (ref 0–2)
BILIRUB SERPL-MCNC: 0.3 MG/DL (ref 0–1.2)
BNP SERPL-MCNC: 9249 PG/ML (ref 0–125)
BUN SERPL-MCNC: 84 MG/DL (ref 6–23)
C PNEUM DNA NPH QL NAA+NON-PROBE: NOT DETECTED
CALCIUM SERPL-MCNC: 8.9 MG/DL (ref 8.6–10.2)
CHLORIDE SERPL-SCNC: 102 MMOL/L (ref 98–107)
CO2 SERPL-SCNC: 18 MMOL/L (ref 22–29)
CREAT SERPL-MCNC: 5.7 MG/DL (ref 0.7–1.2)
EOSINOPHIL # BLD: 0 K/UL (ref 0.05–0.5)
EOSINOPHILS RELATIVE PERCENT: 0 % (ref 0–6)
ERYTHROCYTE [DISTWIDTH] IN BLOOD BY AUTOMATED COUNT: 15.9 % (ref 11.5–15)
FLUAV RNA NPH QL NAA+NON-PROBE: NOT DETECTED
FLUBV RNA NPH QL NAA+NON-PROBE: NOT DETECTED
GFR SERPL CREATININE-BSD FRML MDRD: 10 ML/MIN/1.73M2
GLUCOSE BLD-MCNC: 52 MG/DL (ref 74–99)
GLUCOSE BLD-MCNC: 79 MG/DL (ref 74–99)
GLUCOSE SERPL-MCNC: 25 MG/DL (ref 74–99)
HADV DNA NPH QL NAA+NON-PROBE: NOT DETECTED
HCOV 229E RNA NPH QL NAA+NON-PROBE: NOT DETECTED
HCOV HKU1 RNA NPH QL NAA+NON-PROBE: NOT DETECTED
HCOV NL63 RNA NPH QL NAA+NON-PROBE: NOT DETECTED
HCOV OC43 RNA NPH QL NAA+NON-PROBE: NOT DETECTED
HCT VFR BLD AUTO: 33.7 % (ref 37–54)
HGB BLD-MCNC: 10.4 G/DL (ref 12.5–16.5)
HMPV RNA NPH QL NAA+NON-PROBE: NOT DETECTED
HPIV1 RNA NPH QL NAA+NON-PROBE: NOT DETECTED
HPIV2 RNA NPH QL NAA+NON-PROBE: NOT DETECTED
HPIV3 RNA NPH QL NAA+NON-PROBE: NOT DETECTED
HPIV4 RNA NPH QL NAA+NON-PROBE: NOT DETECTED
LYMPHOCYTES NFR BLD: 0.14 K/UL (ref 1.5–4)
LYMPHOCYTES RELATIVE PERCENT: 4 % (ref 20–42)
M PNEUMO DNA NPH QL NAA+NON-PROBE: NOT DETECTED
MAGNESIUM SERPL-MCNC: 2.2 MG/DL (ref 1.6–2.6)
MCH RBC QN AUTO: 28.3 PG (ref 26–35)
MCHC RBC AUTO-ENTMCNC: 30.9 G/DL (ref 32–34.5)
MCV RBC AUTO: 91.6 FL (ref 80–99.9)
MONOCYTES NFR BLD: 0.03 K/UL (ref 0.1–0.95)
MONOCYTES NFR BLD: 1 % (ref 2–12)
NEUTROPHILS NFR BLD: 96 % (ref 43–80)
NEUTS SEG NFR BLD: 3.83 K/UL (ref 1.8–7.3)
PHOSPHATE SERPL-MCNC: 3.2 MG/DL (ref 2.5–4.5)
PLATELET # BLD AUTO: 223 K/UL (ref 130–450)
PMV BLD AUTO: 9.6 FL (ref 7–12)
POTASSIUM SERPL-SCNC: 5.4 MMOL/L (ref 3.5–5)
PROT SERPL-MCNC: 5.7 G/DL (ref 6.4–8.3)
RBC # BLD AUTO: 3.68 M/UL (ref 3.8–5.8)
RBC # BLD: ABNORMAL 10*6/UL
RSV RNA NPH QL NAA+NON-PROBE: NOT DETECTED
RV+EV RNA NPH QL NAA+NON-PROBE: NOT DETECTED
SARS-COV-2 RNA NPH QL NAA+NON-PROBE: NOT DETECTED
SODIUM SERPL-SCNC: 135 MMOL/L (ref 132–146)
SPECIMEN DESCRIPTION: NORMAL
TROPONIN I SERPL HS-MCNC: 172 NG/L (ref 0–11)
TROPONIN I SERPL HS-MCNC: 175 NG/L (ref 0–11)
WBC # BLD: ABNORMAL 10*3/UL
WBC OTHER # BLD: 4 K/UL (ref 4.5–11.5)

## 2023-10-16 PROCEDURE — 82962 GLUCOSE BLOOD TEST: CPT

## 2023-10-16 PROCEDURE — 84484 ASSAY OF TROPONIN QUANT: CPT

## 2023-10-16 PROCEDURE — 0202U NFCT DS 22 TRGT SARS-COV-2: CPT

## 2023-10-16 PROCEDURE — 83735 ASSAY OF MAGNESIUM: CPT

## 2023-10-16 PROCEDURE — 83880 ASSAY OF NATRIURETIC PEPTIDE: CPT

## 2023-10-16 PROCEDURE — 99285 EMERGENCY DEPT VISIT HI MDM: CPT

## 2023-10-16 PROCEDURE — 84100 ASSAY OF PHOSPHORUS: CPT

## 2023-10-16 PROCEDURE — 71045 X-RAY EXAM CHEST 1 VIEW: CPT

## 2023-10-16 PROCEDURE — 80053 COMPREHEN METABOLIC PANEL: CPT

## 2023-10-16 PROCEDURE — 87040 BLOOD CULTURE FOR BACTERIA: CPT

## 2023-10-16 PROCEDURE — 85025 COMPLETE CBC W/AUTO DIFF WBC: CPT

## 2023-10-16 PROCEDURE — 93005 ELECTROCARDIOGRAM TRACING: CPT | Performed by: EMERGENCY MEDICINE

## 2023-10-16 RX ORDER — DEXTROSE MONOHYDRATE 25 G/50ML
50 INJECTION, SOLUTION INTRAVENOUS PRN
Status: DISCONTINUED | OUTPATIENT
Start: 2023-10-16 | End: 2023-10-17 | Stop reason: HOSPADM

## 2023-10-16 ASSESSMENT — PATIENT HEALTH QUESTIONNAIRE - PHQ9
2. FEELING DOWN, DEPRESSED OR HOPELESS: 0
1. LITTLE INTEREST OR PLEASURE IN DOING THINGS: 0
SUM OF ALL RESPONSES TO PHQ9 QUESTIONS 1 & 2: 0
SUM OF ALL RESPONSES TO PHQ QUESTIONS 1-9: 0

## 2023-10-16 ASSESSMENT — PAIN - FUNCTIONAL ASSESSMENT: PAIN_FUNCTIONAL_ASSESSMENT: NONE - DENIES PAIN

## 2023-10-16 NOTE — ED NOTES
PT BS 52 via [address] [facility]. PT awake, verbalizing. OJ given will monitor.       Lucio Jones RN  10/16/23 1929

## 2023-10-16 NOTE — ED PROVIDER NOTES
[facility] ([place], [place])  Department of Emergency Medicine   EM Physician H&P                  Tiffany Debras 68 y.o. male PMHx of GERD, CKD, hyperlipidemia, type 2 diabetes, hypertension, anemia, CAD presents to the ED c/o fatigue. Onset: For the past week and a half, also no appetite. Location/Radiation: Overall feels rundown and not well, tired weak. Duration: Constant. Characterization: Not well, tired, fatigued, decreased activity,. Aggravating Factors: No appetite, diarrhea. Relieving Factors: None. Severity: Moderate to severe. .       Assx Sxs: Tired, fatigue, weak, no appetite, diarrhea. He Denies: chest pain, n/v, shob, numbness/tingling. .           Review of Systems   Constitutional:  Positive for activity change, appetite change and fatigue. Negative for chills and diaphoresis. Respiratory:  Negative for chest tightness and shortness of breath. Cardiovascular:  Negative for chest pain and palpitations. Gastrointestinal:  Negative for abdominal distention and abdominal pain. Physical Exam  Vitals reviewed. Constitutional:       General: He is not in acute distress. Appearance: Normal appearance. He is not ill-appearing. HENT:      Head: Normocephalic. Right Ear: External ear normal.      Left Ear: External ear normal.      Nose: Nose normal.      Mouth/Throat:      Mouth: Mucous membranes are moist.      Pharynx: Oropharynx is clear. No oropharyngeal exudate or posterior oropharyngeal erythema. Eyes:      General:         Right eye: No discharge. Left eye: No discharge. Extraocular Movements: Extraocular movements intact. Conjunctiva/sclera: Conjunctivae normal.      Pupils: Pupils are equal, round, and reactive to light. Cardiovascular:      Rate and Rhythm: Normal rate and regular rhythm. Heart sounds: No friction rub. No gallop. Pulmonary:      Effort: Pulmonary effort is normal. No respiratory distress.       Breath sounds:

## 2023-10-17 LAB
EKG ATRIAL RATE: 84 BPM
EKG Q-T INTERVAL: 376 MS
EKG QRS DURATION: 70 MS
EKG QTC CALCULATION (BAZETT): 433 MS
EKG R AXIS: 11 DEGREES
EKG T AXIS: 104 DEGREES
EKG VENTRICULAR RATE: 80 BPM

## 2023-10-17 PROCEDURE — 93010 ELECTROCARDIOGRAM REPORT: CPT | Performed by: INTERNAL MEDICINE

## 2023-10-17 ASSESSMENT — ENCOUNTER SYMPTOMS
ABDOMINAL DISTENTION: 0
ABDOMINAL PAIN: 0
CHEST TIGHTNESS: 0
SHORTNESS OF BREATH: 0

## 2023-10-17 NOTE — DISCHARGE INSTRUCTIONS
Please follow up with primary physician   Please make sure to take your medicine as prescribed   Please follow up with your kidney doctor as well   Return to ER if any new or worsening symptoms

## 2023-10-21 LAB
MICROORGANISM SPEC CULT: NORMAL
MICROORGANISM SPEC CULT: NORMAL
SERVICE CMNT-IMP: NORMAL
SERVICE CMNT-IMP: NORMAL
SPECIMEN DESCRIPTION: NORMAL
SPECIMEN DESCRIPTION: NORMAL

## 2023-10-25 ENCOUNTER — HOSPITAL ENCOUNTER (INPATIENT)
Age: 73
LOS: 33 days | DRG: 673 | End: 2023-11-27
Attending: EMERGENCY MEDICINE | Admitting: INTERNAL MEDICINE
Payer: MEDICARE

## 2023-10-25 ENCOUNTER — APPOINTMENT (OUTPATIENT)
Dept: CT IMAGING | Age: 73
DRG: 673 | End: 2023-10-25
Attending: EMERGENCY MEDICINE
Payer: MEDICARE

## 2023-10-25 ENCOUNTER — APPOINTMENT (OUTPATIENT)
Dept: GENERAL RADIOLOGY | Age: 73
DRG: 673 | End: 2023-10-25
Payer: MEDICARE

## 2023-10-25 DIAGNOSIS — E87.5 HYPERKALEMIA: ICD-10-CM

## 2023-10-25 DIAGNOSIS — Z94.0 HISTORY OF RENAL TRANSPLANT: ICD-10-CM

## 2023-10-25 DIAGNOSIS — N18.9 ACUTE RENAL FAILURE SUPERIMPOSED ON CHRONIC KIDNEY DISEASE, UNSPECIFIED ACUTE RENAL FAILURE TYPE, UNSPECIFIED CKD STAGE (HCC): Primary | ICD-10-CM

## 2023-10-25 DIAGNOSIS — N17.9 ACUTE RENAL FAILURE SUPERIMPOSED ON CHRONIC KIDNEY DISEASE, UNSPECIFIED ACUTE RENAL FAILURE TYPE, UNSPECIFIED CKD STAGE (HCC): Primary | ICD-10-CM

## 2023-10-25 DIAGNOSIS — K52.9 COLITIS: ICD-10-CM

## 2023-10-25 DIAGNOSIS — J96.01 ACUTE RESPIRATORY FAILURE WITH HYPOXIA (HCC): ICD-10-CM

## 2023-10-25 PROBLEM — Z99.2 ACUTE RENAL FAILURE SUPERIMPOSED ON CHRONIC KIDNEY DISEASE, ON CHRONIC DIALYSIS, UNSPECIFIED ACUTE RENAL FAILURE TYPE (HCC): Status: ACTIVE | Noted: 2023-10-25

## 2023-10-25 LAB
ALBUMIN SERPL-MCNC: 2.9 G/DL (ref 3.5–5.2)
ALP SERPL-CCNC: 63 U/L (ref 40–129)
ALT SERPL-CCNC: 6 U/L (ref 0–40)
AMORPH SED URNS QL MICRO: PRESENT
ANION GAP SERPL CALCULATED.3IONS-SCNC: 14 MMOL/L (ref 7–16)
ANION GAP SERPL CALCULATED.3IONS-SCNC: 15 MMOL/L (ref 7–16)
AST SERPL-CCNC: 21 U/L (ref 0–39)
B PARAP IS1001 DNA NPH QL NAA+NON-PROBE: NOT DETECTED
B PERT DNA SPEC QL NAA+PROBE: NOT DETECTED
BACTERIA URNS QL MICRO: ABNORMAL
BASOPHILS # BLD: 0 K/UL (ref 0–0.2)
BASOPHILS NFR BLD: 0 % (ref 0–2)
BILIRUB DIRECT SERPL-MCNC: 0.3 MG/DL (ref 0–0.3)
BILIRUB INDIRECT SERPL-MCNC: 0.1 MG/DL (ref 0–1)
BILIRUB SERPL-MCNC: 0.4 MG/DL (ref 0–1.2)
BILIRUB UR QL STRIP: NEGATIVE
BUN SERPL-MCNC: 109 MG/DL (ref 6–23)
BUN SERPL-MCNC: 119 MG/DL (ref 6–23)
C PNEUM DNA NPH QL NAA+NON-PROBE: NOT DETECTED
CALCIUM SERPL-MCNC: 8.5 MG/DL (ref 8.6–10.2)
CALCIUM SERPL-MCNC: 8.9 MG/DL (ref 8.6–10.2)
CHLORIDE SERPL-SCNC: 103 MMOL/L (ref 98–107)
CHLORIDE SERPL-SCNC: 106 MMOL/L (ref 98–107)
CHP ED QC CHECK: NORMAL
CK SERPL-CCNC: 21 U/L (ref 20–200)
CLARITY UR: CLEAR
CO2 SERPL-SCNC: 16 MMOL/L (ref 22–29)
CO2 SERPL-SCNC: 18 MMOL/L (ref 22–29)
COLOR UR: YELLOW
CREAT SERPL-MCNC: 6.5 MG/DL (ref 0.7–1.2)
CREAT SERPL-MCNC: 6.9 MG/DL (ref 0.7–1.2)
CREAT UR-MCNC: 110.8 MG/DL (ref 40–278)
EOSINOPHIL # BLD: 0 K/UL (ref 0.05–0.5)
EOSINOPHILS RELATIVE PERCENT: 0 % (ref 0–6)
ERYTHROCYTE [DISTWIDTH] IN BLOOD BY AUTOMATED COUNT: 15.8 % (ref 11.5–15)
FLUAV RNA NPH QL NAA+NON-PROBE: NOT DETECTED
FLUBV RNA NPH QL NAA+NON-PROBE: NOT DETECTED
GFR SERPL CREATININE-BSD FRML MDRD: 8 ML/MIN/1.73M2
GFR SERPL CREATININE-BSD FRML MDRD: 8 ML/MIN/1.73M2
GLUCOSE BLD-MCNC: 170 MG/DL
GLUCOSE BLD-MCNC: 170 MG/DL (ref 74–99)
GLUCOSE BLD-MCNC: 80 MG/DL
GLUCOSE BLD-MCNC: 80 MG/DL (ref 74–99)
GLUCOSE BLD-MCNC: 83 MG/DL
GLUCOSE BLD-MCNC: 83 MG/DL (ref 74–99)
GLUCOSE BLD-MCNC: 96 MG/DL
GLUCOSE BLD-MCNC: 96 MG/DL (ref 74–99)
GLUCOSE SERPL-MCNC: 115 MG/DL (ref 74–99)
GLUCOSE SERPL-MCNC: 89 MG/DL (ref 74–99)
GLUCOSE UR STRIP-MCNC: NEGATIVE MG/DL
HADV DNA NPH QL NAA+NON-PROBE: NOT DETECTED
HCOV 229E RNA NPH QL NAA+NON-PROBE: NOT DETECTED
HCOV HKU1 RNA NPH QL NAA+NON-PROBE: NOT DETECTED
HCOV NL63 RNA NPH QL NAA+NON-PROBE: NOT DETECTED
HCOV OC43 RNA NPH QL NAA+NON-PROBE: NOT DETECTED
HCT VFR BLD AUTO: 33.1 % (ref 37–54)
HGB BLD-MCNC: 10.3 G/DL (ref 12.5–16.5)
HGB UR QL STRIP.AUTO: NEGATIVE
HMPV RNA NPH QL NAA+NON-PROBE: NOT DETECTED
HPIV1 RNA NPH QL NAA+NON-PROBE: NOT DETECTED
HPIV2 RNA NPH QL NAA+NON-PROBE: NOT DETECTED
HPIV3 RNA NPH QL NAA+NON-PROBE: NOT DETECTED
HPIV4 RNA NPH QL NAA+NON-PROBE: NOT DETECTED
KETONES UR STRIP-MCNC: NEGATIVE MG/DL
LACTATE BLDV-SCNC: 1.4 MMOL/L (ref 0.5–1.9)
LACTATE BLDV-SCNC: 1.4 MMOL/L (ref 0.5–1.9)
LEUKOCYTE ESTERASE UR QL STRIP: NEGATIVE
LIPASE SERPL-CCNC: 11 U/L (ref 13–60)
LYMPHOCYTES NFR BLD: 0.25 K/UL (ref 1.5–4)
LYMPHOCYTES RELATIVE PERCENT: 3 % (ref 20–42)
M PNEUMO DNA NPH QL NAA+NON-PROBE: NOT DETECTED
MAGNESIUM SERPL-MCNC: 2.3 MG/DL (ref 1.6–2.6)
MCH RBC QN AUTO: 27.7 PG (ref 26–35)
MCHC RBC AUTO-ENTMCNC: 31.1 G/DL (ref 32–34.5)
MCV RBC AUTO: 89 FL (ref 80–99.9)
MONOCYTES NFR BLD: 0.25 K/UL (ref 0.1–0.95)
MONOCYTES NFR BLD: 3 % (ref 2–12)
NEUTROPHILS NFR BLD: 94 % (ref 43–80)
NEUTS SEG NFR BLD: 7.9 K/UL (ref 1.8–7.3)
NITRITE UR QL STRIP: NEGATIVE
PH UR STRIP: 5.5 [PH] (ref 5–9)
PLATELET # BLD AUTO: 248 K/UL (ref 130–450)
PMV BLD AUTO: 10.1 FL (ref 7–12)
POTASSIUM SERPL-SCNC: 5.7 MMOL/L (ref 3.5–5)
POTASSIUM SERPL-SCNC: 6 MMOL/L (ref 3.5–5)
POTASSIUM SERPL-SCNC: 6 MMOL/L (ref 3.5–5)
PROT SERPL-MCNC: 5.2 G/DL (ref 6.4–8.3)
PROT UR STRIP-MCNC: ABNORMAL MG/DL
RBC # BLD AUTO: 3.72 M/UL (ref 3.8–5.8)
RBC # BLD: ABNORMAL 10*6/UL
RBC #/AREA URNS HPF: ABNORMAL /HPF
RSV RNA NPH QL NAA+NON-PROBE: NOT DETECTED
RV+EV RNA NPH QL NAA+NON-PROBE: NOT DETECTED
SARS-COV-2 RNA NPH QL NAA+NON-PROBE: NOT DETECTED
SODIUM SERPL-SCNC: 134 MMOL/L (ref 132–146)
SODIUM SERPL-SCNC: 138 MMOL/L (ref 132–146)
SODIUM UR-SCNC: 8 MMOL/L
SP GR UR STRIP: 1.02 (ref 1–1.03)
SPECIMEN DESCRIPTION: NORMAL
UROBILINOGEN UR STRIP-ACNC: 0.2 EU/DL (ref 0–1)
UUN UR-MCNC: 560 MG/DL (ref 800–1666)
WBC #/AREA URNS HPF: ABNORMAL /HPF
WBC OTHER # BLD: 8.4 K/UL (ref 4.5–11.5)

## 2023-10-25 PROCEDURE — 6360000002 HC RX W HCPCS: Performed by: EMERGENCY MEDICINE

## 2023-10-25 PROCEDURE — 93005 ELECTROCARDIOGRAM TRACING: CPT | Performed by: EMERGENCY MEDICINE

## 2023-10-25 PROCEDURE — 96365 THER/PROPH/DIAG IV INF INIT: CPT

## 2023-10-25 PROCEDURE — 6370000000 HC RX 637 (ALT 250 FOR IP): Performed by: EMERGENCY MEDICINE

## 2023-10-25 PROCEDURE — 6370000000 HC RX 637 (ALT 250 FOR IP)

## 2023-10-25 PROCEDURE — 81001 URINALYSIS AUTO W/SCOPE: CPT

## 2023-10-25 PROCEDURE — 2500000003 HC RX 250 WO HCPCS: Performed by: EMERGENCY MEDICINE

## 2023-10-25 PROCEDURE — 80053 COMPREHEN METABOLIC PANEL: CPT

## 2023-10-25 PROCEDURE — 99285 EMERGENCY DEPT VISIT HI MDM: CPT

## 2023-10-25 PROCEDURE — 74176 CT ABD & PELVIS W/O CONTRAST: CPT

## 2023-10-25 PROCEDURE — 82550 ASSAY OF CK (CPK): CPT

## 2023-10-25 PROCEDURE — 82570 ASSAY OF URINE CREATININE: CPT

## 2023-10-25 PROCEDURE — 84132 ASSAY OF SERUM POTASSIUM: CPT

## 2023-10-25 PROCEDURE — 2060000000 HC ICU INTERMEDIATE R&B

## 2023-10-25 PROCEDURE — 84300 ASSAY OF URINE SODIUM: CPT

## 2023-10-25 PROCEDURE — 87040 BLOOD CULTURE FOR BACTERIA: CPT

## 2023-10-25 PROCEDURE — 84540 ASSAY OF URINE/UREA-N: CPT

## 2023-10-25 PROCEDURE — 2580000003 HC RX 258: Performed by: EMERGENCY MEDICINE

## 2023-10-25 PROCEDURE — 87154 CUL TYP ID BLD PTHGN 6+ TRGT: CPT

## 2023-10-25 PROCEDURE — 82962 GLUCOSE BLOOD TEST: CPT

## 2023-10-25 PROCEDURE — 0202U NFCT DS 22 TRGT SARS-COV-2: CPT

## 2023-10-25 PROCEDURE — 82248 BILIRUBIN DIRECT: CPT

## 2023-10-25 PROCEDURE — 83735 ASSAY OF MAGNESIUM: CPT

## 2023-10-25 PROCEDURE — 96375 TX/PRO/DX INJ NEW DRUG ADDON: CPT

## 2023-10-25 PROCEDURE — 85025 COMPLETE CBC W/AUTO DIFF WBC: CPT

## 2023-10-25 PROCEDURE — 87184 SC STD DISK METHOD PER PLATE: CPT

## 2023-10-25 PROCEDURE — 83690 ASSAY OF LIPASE: CPT

## 2023-10-25 PROCEDURE — 2580000003 HC RX 258

## 2023-10-25 PROCEDURE — 83605 ASSAY OF LACTIC ACID: CPT

## 2023-10-25 PROCEDURE — 80048 BASIC METABOLIC PNL TOTAL CA: CPT

## 2023-10-25 PROCEDURE — 71045 X-RAY EXAM CHEST 1 VIEW: CPT

## 2023-10-25 RX ORDER — SODIUM CHLORIDE 9 MG/ML
INJECTION, SOLUTION INTRAVENOUS PRN
Status: DISCONTINUED | OUTPATIENT
Start: 2023-10-25 | End: 2023-10-30

## 2023-10-25 RX ORDER — ACETAMINOPHEN 650 MG/1
650 SUPPOSITORY RECTAL EVERY 6 HOURS PRN
Status: DISCONTINUED | OUTPATIENT
Start: 2023-10-25 | End: 2023-10-29

## 2023-10-25 RX ORDER — PREDNISONE 5 MG/1
5 TABLET ORAL DAILY
Status: DISCONTINUED | OUTPATIENT
Start: 2023-10-26 | End: 2023-10-28

## 2023-10-25 RX ORDER — CALCITRIOL 0.25 UG/1
0.25 CAPSULE, LIQUID FILLED ORAL DAILY
Status: DISCONTINUED | OUTPATIENT
Start: 2023-10-25 | End: 2023-11-27

## 2023-10-25 RX ORDER — INSULIN GLARGINE 100 [IU]/ML
12 INJECTION, SOLUTION SUBCUTANEOUS NIGHTLY
COMMUNITY

## 2023-10-25 RX ORDER — TACROLIMUS 1 MG/1
2 CAPSULE ORAL 2 TIMES DAILY
Status: DISCONTINUED | OUTPATIENT
Start: 2023-10-25 | End: 2023-10-31

## 2023-10-25 RX ORDER — FAMOTIDINE 20 MG/1
20 TABLET, FILM COATED ORAL DAILY
Status: DISCONTINUED | OUTPATIENT
Start: 2023-10-25 | End: 2023-10-29

## 2023-10-25 RX ORDER — BUMETANIDE 1 MG/1
2 TABLET ORAL 2 TIMES DAILY
Status: DISCONTINUED | OUTPATIENT
Start: 2023-10-25 | End: 2023-11-27

## 2023-10-25 RX ORDER — ISOSORBIDE MONONITRATE 30 MG/1
60 TABLET, EXTENDED RELEASE ORAL DAILY
Status: DISCONTINUED | OUTPATIENT
Start: 2023-10-25 | End: 2023-11-27

## 2023-10-25 RX ORDER — CALCIUM GLUCONATE 94 MG/ML
1000 INJECTION, SOLUTION INTRAVENOUS ONCE
Status: DISCONTINUED | OUTPATIENT
Start: 2023-10-25 | End: 2023-10-25 | Stop reason: SDUPTHER

## 2023-10-25 RX ORDER — ATORVASTATIN CALCIUM 10 MG/1
10 TABLET, FILM COATED ORAL DAILY
Status: DISCONTINUED | OUTPATIENT
Start: 2023-10-25 | End: 2023-11-27

## 2023-10-25 RX ORDER — SODIUM CHLORIDE, SODIUM LACTATE, POTASSIUM CHLORIDE, CALCIUM CHLORIDE 600; 310; 30; 20 MG/100ML; MG/100ML; MG/100ML; MG/100ML
INJECTION, SOLUTION INTRAVENOUS CONTINUOUS
Status: DISCONTINUED | OUTPATIENT
Start: 2023-10-25 | End: 2023-10-27

## 2023-10-25 RX ORDER — LATANOPROST 50 UG/ML
1 SOLUTION/ DROPS OPHTHALMIC NIGHTLY
Status: DISCONTINUED | OUTPATIENT
Start: 2023-10-25 | End: 2023-11-27

## 2023-10-25 RX ORDER — 0.9 % SODIUM CHLORIDE 0.9 %
1000 INTRAVENOUS SOLUTION INTRAVENOUS ONCE
Status: COMPLETED | OUTPATIENT
Start: 2023-10-25 | End: 2023-10-25

## 2023-10-25 RX ORDER — ONDANSETRON 4 MG/1
4 TABLET, ORALLY DISINTEGRATING ORAL EVERY 8 HOURS PRN
Status: DISCONTINUED | OUTPATIENT
Start: 2023-10-25 | End: 2023-11-27

## 2023-10-25 RX ORDER — HYDRALAZINE HYDROCHLORIDE 50 MG/1
100 TABLET, FILM COATED ORAL 3 TIMES DAILY
Status: DISCONTINUED | OUTPATIENT
Start: 2023-10-25 | End: 2023-11-27

## 2023-10-25 RX ORDER — INSULIN LISPRO 100 [IU]/ML
0-4 INJECTION, SOLUTION INTRAVENOUS; SUBCUTANEOUS
Status: DISCONTINUED | OUTPATIENT
Start: 2023-10-25 | End: 2023-11-17

## 2023-10-25 RX ORDER — SODIUM CHLORIDE 0.9 % (FLUSH) 0.9 %
10 SYRINGE (ML) INJECTION PRN
Status: DISCONTINUED | OUTPATIENT
Start: 2023-10-25 | End: 2023-10-30

## 2023-10-25 RX ORDER — OXYCODONE HYDROCHLORIDE 10 MG/1
20 TABLET ORAL EVERY 8 HOURS PRN
Status: DISCONTINUED | OUTPATIENT
Start: 2023-10-25 | End: 2023-10-29

## 2023-10-25 RX ORDER — MYCOPHENOLATE MOFETIL 250 MG/1
750 CAPSULE ORAL 2 TIMES DAILY
Status: DISCONTINUED | OUTPATIENT
Start: 2023-10-25 | End: 2023-10-26

## 2023-10-25 RX ORDER — BUMETANIDE 2 MG/1
2 TABLET ORAL 2 TIMES DAILY
COMMUNITY

## 2023-10-25 RX ORDER — POLYETHYLENE GLYCOL 3350 17 G/17G
17 POWDER, FOR SOLUTION ORAL DAILY PRN
Status: DISCONTINUED | OUTPATIENT
Start: 2023-10-25 | End: 2023-11-23

## 2023-10-25 RX ORDER — LATANOPROST 50 UG/ML
1 SOLUTION/ DROPS OPHTHALMIC NIGHTLY
COMMUNITY

## 2023-10-25 RX ORDER — DEXTROSE MONOHYDRATE 100 MG/ML
INJECTION, SOLUTION INTRAVENOUS CONTINUOUS PRN
Status: DISCONTINUED | OUTPATIENT
Start: 2023-10-25 | End: 2023-11-25 | Stop reason: SDUPTHER

## 2023-10-25 RX ORDER — ISOSORBIDE MONONITRATE 60 MG/1
60 TABLET, EXTENDED RELEASE ORAL DAILY
COMMUNITY

## 2023-10-25 RX ORDER — SODIUM BICARBONATE 650 MG/1
650 TABLET ORAL 3 TIMES DAILY
Status: DISCONTINUED | OUTPATIENT
Start: 2023-10-25 | End: 2023-10-29

## 2023-10-25 RX ORDER — INSULIN LISPRO 100 [IU]/ML
0-4 INJECTION, SOLUTION INTRAVENOUS; SUBCUTANEOUS NIGHTLY
Status: DISCONTINUED | OUTPATIENT
Start: 2023-10-25 | End: 2023-11-17

## 2023-10-25 RX ORDER — HEPARIN SODIUM 10000 [USP'U]/ML
5000 INJECTION, SOLUTION INTRAVENOUS; SUBCUTANEOUS EVERY 8 HOURS SCHEDULED
Status: DISCONTINUED | OUTPATIENT
Start: 2023-10-25 | End: 2023-11-03

## 2023-10-25 RX ORDER — ACETAMINOPHEN 325 MG/1
650 TABLET ORAL EVERY 6 HOURS PRN
Status: DISCONTINUED | OUTPATIENT
Start: 2023-10-25 | End: 2023-10-29

## 2023-10-25 RX ORDER — ASPIRIN 81 MG/1
81 TABLET ORAL DAILY
Status: DISCONTINUED | OUTPATIENT
Start: 2023-10-25 | End: 2023-11-27

## 2023-10-25 RX ORDER — INSULIN GLARGINE 100 [IU]/ML
12 INJECTION, SOLUTION SUBCUTANEOUS NIGHTLY
Status: DISCONTINUED | OUTPATIENT
Start: 2023-10-25 | End: 2023-10-29

## 2023-10-25 RX ORDER — SODIUM CHLORIDE 0.9 % (FLUSH) 0.9 %
5-40 SYRINGE (ML) INJECTION EVERY 12 HOURS SCHEDULED
Status: DISCONTINUED | OUTPATIENT
Start: 2023-10-25 | End: 2023-10-30

## 2023-10-25 RX ORDER — METRONIDAZOLE 500 MG/100ML
500 INJECTION, SOLUTION INTRAVENOUS ONCE
Status: COMPLETED | OUTPATIENT
Start: 2023-10-25 | End: 2023-10-25

## 2023-10-25 RX ORDER — CALCIUM GLUCONATE 10 MG/ML
1000 INJECTION, SOLUTION INTRAVENOUS ONCE
Status: COMPLETED | OUTPATIENT
Start: 2023-10-25 | End: 2023-10-25

## 2023-10-25 RX ORDER — ONDANSETRON 2 MG/ML
4 INJECTION INTRAMUSCULAR; INTRAVENOUS EVERY 6 HOURS PRN
Status: DISCONTINUED | OUTPATIENT
Start: 2023-10-25 | End: 2023-11-27

## 2023-10-25 RX ADMIN — DEXTROSE MONOHYDRATE 250 ML: 100 INJECTION, SOLUTION INTRAVENOUS at 12:05

## 2023-10-25 RX ADMIN — METRONIDAZOLE 500 MG: 500 INJECTION, SOLUTION INTRAVENOUS at 16:35

## 2023-10-25 RX ADMIN — SODIUM BICARBONATE 50 MEQ: 84 INJECTION INTRAVENOUS at 12:02

## 2023-10-25 RX ADMIN — SODIUM ZIRCONIUM CYCLOSILICATE 10 G: 10 POWDER, FOR SUSPENSION ORAL at 20:05

## 2023-10-25 RX ADMIN — DOXYCYCLINE 100 MG: 100 INJECTION, POWDER, LYOPHILIZED, FOR SOLUTION INTRAVENOUS at 15:31

## 2023-10-25 RX ADMIN — SODIUM ZIRCONIUM CYCLOSILICATE 10 G: 10 POWDER, FOR SUSPENSION ORAL at 14:54

## 2023-10-25 RX ADMIN — WATER 1000 MG: 1 INJECTION INTRAMUSCULAR; INTRAVENOUS; SUBCUTANEOUS at 15:25

## 2023-10-25 RX ADMIN — CALCIUM GLUCONATE 1000 MG: 10 INJECTION, SOLUTION INTRAVENOUS at 12:01

## 2023-10-25 RX ADMIN — SODIUM BICARBONATE 650 MG: 650 TABLET ORAL at 14:54

## 2023-10-25 RX ADMIN — INSULIN HUMAN 10 UNITS: 100 INJECTION, SOLUTION PARENTERAL at 12:16

## 2023-10-25 RX ADMIN — SODIUM CHLORIDE 1000 ML: 9 INJECTION, SOLUTION INTRAVENOUS at 10:32

## 2023-10-25 RX ADMIN — SODIUM CHLORIDE, POTASSIUM CHLORIDE, SODIUM LACTATE AND CALCIUM CHLORIDE: 600; 310; 30; 20 INJECTION, SOLUTION INTRAVENOUS at 13:19

## 2023-10-25 ASSESSMENT — PAIN - FUNCTIONAL ASSESSMENT: PAIN_FUNCTIONAL_ASSESSMENT: NONE - DENIES PAIN

## 2023-10-25 NOTE — ED NOTES
Spoke with Eric John about patients potassium, she will review the chart and place orders if necessary. Stated that patient will likely get dialysis.       Janine Roman, RN  10/25/23 1943

## 2023-10-25 NOTE — ED NOTES
Nurse to nurse report given to [facility], RN     [address], [place], [address] [place] [address]  10/25/23 Russell Hair

## 2023-10-25 NOTE — ED NOTES
Call to [facility] about patients potassium. Advised  about this call.       Roxanna Watson RN  10/25/23 1939

## 2023-10-26 LAB
ALBUMIN SERPL-MCNC: 2.1 G/DL (ref 3.5–5.2)
ALP SERPL-CCNC: 53 U/L (ref 40–129)
ALT SERPL-CCNC: 7 U/L (ref 0–40)
ANION GAP SERPL CALCULATED.3IONS-SCNC: 14 MMOL/L (ref 7–16)
ANION GAP SERPL CALCULATED.3IONS-SCNC: 15 MMOL/L (ref 7–16)
AST SERPL-CCNC: 29 U/L (ref 0–39)
BASOPHILS # BLD: 0 K/UL (ref 0–0.2)
BASOPHILS NFR BLD: 0 % (ref 0–2)
BILIRUB SERPL-MCNC: 0.3 MG/DL (ref 0–1.2)
BUN SERPL-MCNC: 118 MG/DL (ref 6–23)
BUN SERPL-MCNC: 78 MG/DL (ref 6–23)
CALCIUM SERPL-MCNC: 8.3 MG/DL (ref 8.6–10.2)
CALCIUM SERPL-MCNC: 8.5 MG/DL (ref 8.6–10.2)
CHLORIDE SERPL-SCNC: 101 MMOL/L (ref 98–107)
CHLORIDE SERPL-SCNC: 105 MMOL/L (ref 98–107)
CO2 SERPL-SCNC: 16 MMOL/L (ref 22–29)
CO2 SERPL-SCNC: 20 MMOL/L (ref 22–29)
CREAT SERPL-MCNC: 4.4 MG/DL (ref 0.7–1.2)
CREAT SERPL-MCNC: 6.4 MG/DL (ref 0.7–1.2)
EOSINOPHIL # BLD: 0 K/UL (ref 0.05–0.5)
EOSINOPHILS RELATIVE PERCENT: 0 % (ref 0–6)
ERYTHROCYTE [DISTWIDTH] IN BLOOD BY AUTOMATED COUNT: 15.8 % (ref 11.5–15)
GFR SERPL CREATININE-BSD FRML MDRD: 13 ML/MIN/1.73M2
GFR SERPL CREATININE-BSD FRML MDRD: 9 ML/MIN/1.73M2
GLUCOSE BLD-MCNC: 123 MG/DL (ref 74–99)
GLUCOSE BLD-MCNC: 132 MG/DL (ref 74–99)
GLUCOSE SERPL-MCNC: 110 MG/DL (ref 74–99)
GLUCOSE SERPL-MCNC: 118 MG/DL (ref 74–99)
HAV IGM SERPL QL IA: NONREACTIVE
HBV CORE IGM SERPL QL IA: NONREACTIVE
HBV SURFACE AG SERPL QL IA: NONREACTIVE
HCT VFR BLD AUTO: 28.1 % (ref 37–54)
HCV AB SERPL QL IA: NONREACTIVE
HGB BLD-MCNC: 8.6 G/DL (ref 12.5–16.5)
IMM GRANULOCYTES # BLD AUTO: 0.15 K/UL (ref 0–0.58)
IMM GRANULOCYTES NFR BLD: 3 % (ref 0–5)
INR PPP: 1.4
LYMPHOCYTES NFR BLD: 0.06 K/UL (ref 1.5–4)
LYMPHOCYTES RELATIVE PERCENT: 1 % (ref 20–42)
MAGNESIUM SERPL-MCNC: 2.4 MG/DL (ref 1.6–2.6)
MCH RBC QN AUTO: 27.7 PG (ref 26–35)
MCHC RBC AUTO-ENTMCNC: 30.6 G/DL (ref 32–34.5)
MCV RBC AUTO: 90.4 FL (ref 80–99.9)
MONOCYTES NFR BLD: 0.23 K/UL (ref 0.1–0.95)
MONOCYTES NFR BLD: 5 % (ref 2–12)
NEUTROPHILS NFR BLD: 91 % (ref 43–80)
NEUTS SEG NFR BLD: 4.72 K/UL (ref 1.8–7.3)
PHOSPHATE SERPL-MCNC: 4.8 MG/DL (ref 2.5–4.5)
PLATELET # BLD AUTO: 191 K/UL (ref 130–450)
PMV BLD AUTO: 10 FL (ref 7–12)
POTASSIUM SERPL-SCNC: 5.4 MMOL/L (ref 3.5–5)
POTASSIUM SERPL-SCNC: 5.8 MMOL/L (ref 3.5–5)
PROT SERPL-MCNC: 4.5 G/DL (ref 6.4–8.3)
PROTHROMBIN TIME: 14.9 SEC (ref 9.3–12.4)
RBC # BLD AUTO: 3.11 M/UL (ref 3.8–5.8)
RBC # BLD: ABNORMAL 10*6/UL
SODIUM SERPL-SCNC: 135 MMOL/L (ref 132–146)
SODIUM SERPL-SCNC: 136 MMOL/L (ref 132–146)
WBC OTHER # BLD: 5.2 K/UL (ref 4.5–11.5)

## 2023-10-26 PROCEDURE — 2580000003 HC RX 258: Performed by: INTERNAL MEDICINE

## 2023-10-26 PROCEDURE — 87324 CLOSTRIDIUM AG IA: CPT

## 2023-10-26 PROCEDURE — 6370000000 HC RX 637 (ALT 250 FOR IP)

## 2023-10-26 PROCEDURE — 2580000003 HC RX 258

## 2023-10-26 PROCEDURE — 87449 NOS EACH ORGANISM AG IA: CPT

## 2023-10-26 PROCEDURE — 6370000000 HC RX 637 (ALT 250 FOR IP): Performed by: FAMILY MEDICINE

## 2023-10-26 PROCEDURE — 36415 COLL VENOUS BLD VENIPUNCTURE: CPT

## 2023-10-26 PROCEDURE — 6360000002 HC RX W HCPCS: Performed by: FAMILY MEDICINE

## 2023-10-26 PROCEDURE — 6360000002 HC RX W HCPCS: Performed by: INTERNAL MEDICINE

## 2023-10-26 PROCEDURE — 80048 BASIC METABOLIC PNL TOTAL CA: CPT

## 2023-10-26 PROCEDURE — 5A1D70Z PERFORMANCE OF URINARY FILTRATION, INTERMITTENT, LESS THAN 6 HOURS PER DAY: ICD-10-PCS | Performed by: STUDENT IN AN ORGANIZED HEALTH CARE EDUCATION/TRAINING PROGRAM

## 2023-10-26 PROCEDURE — 84100 ASSAY OF PHOSPHORUS: CPT

## 2023-10-26 PROCEDURE — 87040 BLOOD CULTURE FOR BACTERIA: CPT

## 2023-10-26 PROCEDURE — 80074 ACUTE HEPATITIS PANEL: CPT

## 2023-10-26 PROCEDURE — 85610 PROTHROMBIN TIME: CPT

## 2023-10-26 PROCEDURE — 82962 GLUCOSE BLOOD TEST: CPT

## 2023-10-26 PROCEDURE — 80053 COMPREHEN METABOLIC PANEL: CPT

## 2023-10-26 PROCEDURE — 2060000000 HC ICU INTERMEDIATE R&B

## 2023-10-26 PROCEDURE — 2580000003 HC RX 258: Performed by: FAMILY MEDICINE

## 2023-10-26 PROCEDURE — 83735 ASSAY OF MAGNESIUM: CPT

## 2023-10-26 PROCEDURE — 90935 HEMODIALYSIS ONE EVALUATION: CPT

## 2023-10-26 PROCEDURE — 85025 COMPLETE CBC W/AUTO DIFF WBC: CPT

## 2023-10-26 RX ORDER — LIDOCAINE HYDROCHLORIDE AND EPINEPHRINE 10; 10 MG/ML; UG/ML
20 INJECTION, SOLUTION INFILTRATION; PERINEURAL ONCE
Status: DISCONTINUED | OUTPATIENT
Start: 2023-10-26 | End: 2023-11-01

## 2023-10-26 RX ADMIN — SODIUM CHLORIDE, POTASSIUM CHLORIDE, SODIUM LACTATE AND CALCIUM CHLORIDE: 600; 310; 30; 20 INJECTION, SOLUTION INTRAVENOUS at 21:38

## 2023-10-26 RX ADMIN — SODIUM BICARBONATE 650 MG: 650 TABLET ORAL at 21:38

## 2023-10-26 RX ADMIN — Medication 10 ML: at 19:06

## 2023-10-26 RX ADMIN — HEPARIN SODIUM 5000 UNITS: 10000 INJECTION INTRAVENOUS; SUBCUTANEOUS at 21:39

## 2023-10-26 RX ADMIN — HYDRALAZINE HYDROCHLORIDE 100 MG: 50 TABLET, FILM COATED ORAL at 21:38

## 2023-10-26 RX ADMIN — MEROPENEM 1000 MG: 1 INJECTION, POWDER, FOR SOLUTION INTRAVENOUS at 18:56

## 2023-10-26 RX ADMIN — MYCOPHENOLATE MOFETIL 750 MG: 250 CAPSULE ORAL at 11:02

## 2023-10-26 NOTE — ED NOTES
No LR on the floor. This nurse called central multiple times they advised that they are going to bring this up when they stock.       Tia Newby RN  10/26/23 [id number]

## 2023-10-26 NOTE — ED NOTES
Pharmacy Culture Review:     Upon review of cultures, patient found to have Gram Negative rods and Gram positive cocci in 4/4 Blood cultures drawn 10/25/23     Patient received ceftriaxone/doxycycline/metronidazole in ED yesterday. Not currently ordered antibiotics. Discussed case with Moni WOOTEN, they have consulted Infectious Disease to determine further antibiotics.      Aaron Keys, nAjelicaD, [facility] Center[facility] 10/26/2023 9:44 AM  [phone]

## 2023-10-26 NOTE — H&P
[facility]  History and Physical      CHIEF COMPLAINT:    Chief Complaint   Patient presents with    Fatigue     And generalized weakness ongoing per pt w/SOB and fever of 100.6 tympanic at home. Pt hx of left kidney transplant 10 years ago. Per EMS supposed to start dialysis again. Patient of Giuseppe Cano DO presents with:  Acute renal failure superimposed on chronic kidney disease, on chronic dialysis, unspecified acute renal failure type ([address])    History of Present Illness:   Patient is a 44-year-old male with a past medical history of anemia, A-fib, CHF, diabetes, end-stage renal disease, kidney transplant, hyperlipidemia, hypertension, immunosuppression, pulmonary hypertension. Patient presented to the ED for ongoing weakness fatigue and recently had an increased temp of 100.6. Patient does have a history of a kidney transplant approximately 10 years ago at [facility]. Patient has not been able to take his antirejection's this past week. Patient follows with Dr. Benjie Siddiqui outpatient. Patient's renal function has steadily worsened over the past year. Patient's creatinine was 3.85 in August 2023 and then in September is increased to 4.88. Patient also complaining of intermittent diarrhea, and poor oral intake. ER work-up revealed elevated potassium 6.0, BUN/creatinine 109/6.5, calcium 8.5, chest x-ray possible pneumonia, CT abdomen pelvis small bilateral pleural effusion. Nephrology was consulted and patient is admitted to telemetry unit for further treatment. On evaluation he is undergoing Artline placement in ER. Blood cultures have returned positive for E. coli/Enterobacter. IV antibiotic therapy initiated    REVIEW OF SYSTEMS:  Pertinent negatives are above in HPI. 10 point ROS otherwise negative.       Past Medical History:   Diagnosis Date    Anemia     Iron deficiency    Atrial fibrillation (HCC)     CHF (congestive heart failure) ([address]) 03/12/2008    Chronic diastolic

## 2023-10-26 NOTE — ED NOTES
Called report to Denilson Wallace RN for pt to transfer to [facility] after dialysis.      Cata Barroso RN  10/26/23 5981

## 2023-10-27 LAB
25(OH)D3 SERPL-MCNC: 24.6 NG/ML (ref 30–100)
ALBUMIN SERPL-MCNC: 2 G/DL (ref 3.5–5.2)
ALP SERPL-CCNC: 55 U/L (ref 40–129)
ALT SERPL-CCNC: 9 U/L (ref 0–40)
ANION GAP SERPL CALCULATED.3IONS-SCNC: 17 MMOL/L (ref 7–16)
ANION GAP SERPL CALCULATED.3IONS-SCNC: 19 MMOL/L (ref 7–16)
AST SERPL-CCNC: 30 U/L (ref 0–39)
BASOPHILS # BLD: 0.01 K/UL (ref 0–0.2)
BASOPHILS NFR BLD: 0 % (ref 0–2)
BILIRUB SERPL-MCNC: 0.4 MG/DL (ref 0–1.2)
BUN SERPL-MCNC: 54 MG/DL (ref 6–23)
BUN SERPL-MCNC: 84 MG/DL (ref 6–23)
C DIFF GDH + TOXINS A+B STL QL IA.RAPID: NEGATIVE
CALCIUM SERPL-MCNC: 7.8 MG/DL (ref 8.6–10.2)
CALCIUM SERPL-MCNC: 7.9 MG/DL (ref 8.6–10.2)
CHLORIDE SERPL-SCNC: 100 MMOL/L (ref 98–107)
CHLORIDE SERPL-SCNC: 102 MMOL/L (ref 98–107)
CO2 SERPL-SCNC: 17 MMOL/L (ref 22–29)
CO2 SERPL-SCNC: 21 MMOL/L (ref 22–29)
CREAT SERPL-MCNC: 3.2 MG/DL (ref 0.7–1.2)
CREAT SERPL-MCNC: 4.4 MG/DL (ref 0.7–1.2)
DATE LAST DOSE: ABNORMAL
EKG ATRIAL RATE: 107 BPM
EKG P AXIS: 62 DEGREES
EKG P-R INTERVAL: 170 MS
EKG Q-T INTERVAL: 320 MS
EKG QRS DURATION: 72 MS
EKG QTC CALCULATION (BAZETT): 427 MS
EKG R AXIS: 3 DEGREES
EKG T AXIS: 162 DEGREES
EKG VENTRICULAR RATE: 107 BPM
EOSINOPHIL # BLD: 0.01 K/UL (ref 0.05–0.5)
EOSINOPHILS RELATIVE PERCENT: 0 % (ref 0–6)
ERYTHROCYTE [DISTWIDTH] IN BLOOD BY AUTOMATED COUNT: 15.8 % (ref 11.5–15)
FERRITIN SERPL-MCNC: 3505 NG/ML
FOLATE SERPL-MCNC: >20 NG/ML (ref 4.8–24.2)
GFR SERPL CREATININE-BSD FRML MDRD: 13 ML/MIN/1.73M2
GFR SERPL CREATININE-BSD FRML MDRD: 20 ML/MIN/1.73M2
GLUCOSE BLD-MCNC: 129 MG/DL (ref 74–99)
GLUCOSE BLD-MCNC: 172 MG/DL (ref 74–99)
GLUCOSE BLD-MCNC: 181 MG/DL (ref 74–99)
GLUCOSE BLD-MCNC: 190 MG/DL (ref 74–99)
GLUCOSE BLD-MCNC: 190 MG/DL (ref 74–99)
GLUCOSE SERPL-MCNC: 136 MG/DL (ref 74–99)
GLUCOSE SERPL-MCNC: 168 MG/DL (ref 74–99)
HCT VFR BLD AUTO: 27.1 % (ref 37–54)
HGB BLD-MCNC: 8.4 G/DL (ref 12.5–16.5)
IMM GRANULOCYTES # BLD AUTO: 0.11 K/UL (ref 0–0.58)
IMM GRANULOCYTES NFR BLD: 4 % (ref 0–5)
IRON SATN MFR SERPL: 38 % (ref 20–55)
IRON SERPL-MCNC: 34 UG/DL (ref 59–158)
LYMPHOCYTES NFR BLD: 0.03 K/UL (ref 1.5–4)
LYMPHOCYTES RELATIVE PERCENT: 1 % (ref 20–42)
MAGNESIUM SERPL-MCNC: 2 MG/DL (ref 1.6–2.6)
MCH RBC QN AUTO: 27.6 PG (ref 26–35)
MCHC RBC AUTO-ENTMCNC: 31 G/DL (ref 32–34.5)
MCV RBC AUTO: 89.1 FL (ref 80–99.9)
MONOCYTES NFR BLD: 0.21 K/UL (ref 0.1–0.95)
MONOCYTES NFR BLD: 8 % (ref 2–12)
NEUTROPHILS NFR BLD: 87 % (ref 43–80)
NEUTS SEG NFR BLD: 2.38 K/UL (ref 1.8–7.3)
PHOSPHATE SERPL-MCNC: 4.4 MG/DL (ref 2.5–4.5)
PLATELET # BLD AUTO: 176 K/UL (ref 130–450)
PMV BLD AUTO: 10.5 FL (ref 7–12)
POTASSIUM SERPL-SCNC: 4.5 MMOL/L (ref 3.5–5)
POTASSIUM SERPL-SCNC: 5.2 MMOL/L (ref 3.5–5)
PROT SERPL-MCNC: 4.2 G/DL (ref 6.4–8.3)
PTH-INTACT SERPL-MCNC: 307.9 PG/ML (ref 15–65)
RBC # BLD AUTO: 3.04 M/UL (ref 3.8–5.8)
RBC # BLD: ABNORMAL 10*6/UL
SODIUM SERPL-SCNC: 138 MMOL/L (ref 132–146)
SODIUM SERPL-SCNC: 138 MMOL/L (ref 132–146)
SPECIMEN DESCRIPTION: NORMAL
TIBC SERPL-MCNC: 89 UG/DL (ref 250–450)
TME LAST DOSE: ABNORMAL H
VANCOMYCIN DOSE: ABNORMAL MG
VANCOMYCIN SERPL-MCNC: <4 UG/ML (ref 5–40)
VIT B12 SERPL-MCNC: 1939 PG/ML (ref 211–946)
WBC OTHER # BLD: 2.8 K/UL (ref 4.5–11.5)

## 2023-10-27 PROCEDURE — 82607 VITAMIN B-12: CPT

## 2023-10-27 PROCEDURE — 84100 ASSAY OF PHOSPHORUS: CPT

## 2023-10-27 PROCEDURE — 83540 ASSAY OF IRON: CPT

## 2023-10-27 PROCEDURE — 82962 GLUCOSE BLOOD TEST: CPT

## 2023-10-27 PROCEDURE — 93010 ELECTROCARDIOGRAM REPORT: CPT | Performed by: INTERNAL MEDICINE

## 2023-10-27 PROCEDURE — 6370000000 HC RX 637 (ALT 250 FOR IP): Performed by: FAMILY MEDICINE

## 2023-10-27 PROCEDURE — 82306 VITAMIN D 25 HYDROXY: CPT

## 2023-10-27 PROCEDURE — 90935 HEMODIALYSIS ONE EVALUATION: CPT

## 2023-10-27 PROCEDURE — 6370000000 HC RX 637 (ALT 250 FOR IP)

## 2023-10-27 PROCEDURE — 2580000003 HC RX 258: Performed by: FAMILY MEDICINE

## 2023-10-27 PROCEDURE — 2580000003 HC RX 258: Performed by: INTERNAL MEDICINE

## 2023-10-27 PROCEDURE — 97161 PT EVAL LOW COMPLEX 20 MIN: CPT

## 2023-10-27 PROCEDURE — 80048 BASIC METABOLIC PNL TOTAL CA: CPT

## 2023-10-27 PROCEDURE — 80053 COMPREHEN METABOLIC PANEL: CPT

## 2023-10-27 PROCEDURE — P9047 ALBUMIN (HUMAN), 25%, 50ML: HCPCS | Performed by: STUDENT IN AN ORGANIZED HEALTH CARE EDUCATION/TRAINING PROGRAM

## 2023-10-27 PROCEDURE — 82728 ASSAY OF FERRITIN: CPT

## 2023-10-27 PROCEDURE — 83970 ASSAY OF PARATHORMONE: CPT

## 2023-10-27 PROCEDURE — 36415 COLL VENOUS BLD VENIPUNCTURE: CPT

## 2023-10-27 PROCEDURE — 6360000002 HC RX W HCPCS: Performed by: INTERNAL MEDICINE

## 2023-10-27 PROCEDURE — 85025 COMPLETE CBC W/AUTO DIFF WBC: CPT

## 2023-10-27 PROCEDURE — 83550 IRON BINDING TEST: CPT

## 2023-10-27 PROCEDURE — 83735 ASSAY OF MAGNESIUM: CPT

## 2023-10-27 PROCEDURE — 6360000002 HC RX W HCPCS: Performed by: FAMILY MEDICINE

## 2023-10-27 PROCEDURE — 97530 THERAPEUTIC ACTIVITIES: CPT

## 2023-10-27 PROCEDURE — 80202 ASSAY OF VANCOMYCIN: CPT

## 2023-10-27 PROCEDURE — 82746 ASSAY OF FOLIC ACID SERUM: CPT

## 2023-10-27 PROCEDURE — 6360000002 HC RX W HCPCS: Performed by: STUDENT IN AN ORGANIZED HEALTH CARE EDUCATION/TRAINING PROGRAM

## 2023-10-27 PROCEDURE — 6370000000 HC RX 637 (ALT 250 FOR IP): Performed by: INTERNAL MEDICINE

## 2023-10-27 PROCEDURE — 2060000000 HC ICU INTERMEDIATE R&B

## 2023-10-27 RX ORDER — ALBUMIN (HUMAN) 12.5 G/50ML
25 SOLUTION INTRAVENOUS ONCE
Status: COMPLETED | OUTPATIENT
Start: 2023-10-27 | End: 2023-10-27

## 2023-10-27 RX ADMIN — ISOSORBIDE MONONITRATE 60 MG: 30 TABLET, EXTENDED RELEASE ORAL at 08:36

## 2023-10-27 RX ADMIN — SODIUM BICARBONATE 650 MG: 650 TABLET ORAL at 08:35

## 2023-10-27 RX ADMIN — FAMOTIDINE 20 MG: 20 TABLET, FILM COATED ORAL at 08:36

## 2023-10-27 RX ADMIN — PREDNISONE 5 MG: 5 TABLET ORAL at 08:35

## 2023-10-27 RX ADMIN — LATANOPROST 1 DROP: 50 SOLUTION OPHTHALMIC at 20:36

## 2023-10-27 RX ADMIN — VANCOMYCIN HYDROCHLORIDE 2000 MG: 10 INJECTION, POWDER, LYOPHILIZED, FOR SOLUTION INTRAVENOUS at 09:34

## 2023-10-27 RX ADMIN — HEPARIN SODIUM 5000 UNITS: 10000 INJECTION INTRAVENOUS; SUBCUTANEOUS at 05:22

## 2023-10-27 RX ADMIN — CALCITRIOL CAPSULES 0.25 MCG 0.25 MCG: 0.25 CAPSULE ORAL at 08:43

## 2023-10-27 RX ADMIN — SODIUM BICARBONATE 650 MG: 650 TABLET ORAL at 20:37

## 2023-10-27 RX ADMIN — Medication 10 ML: at 20:36

## 2023-10-27 RX ADMIN — HYDRALAZINE HYDROCHLORIDE 100 MG: 50 TABLET, FILM COATED ORAL at 08:35

## 2023-10-27 RX ADMIN — PETROLATUM: 420 OINTMENT TOPICAL at 20:35

## 2023-10-27 RX ADMIN — ASPIRIN 81 MG: 81 TABLET, COATED ORAL at 08:35

## 2023-10-27 RX ADMIN — INSULIN GLARGINE 12 UNITS: 100 INJECTION, SOLUTION SUBCUTANEOUS at 20:35

## 2023-10-27 RX ADMIN — ALBUMIN (HUMAN) 25 G: 0.25 INJECTION, SOLUTION INTRAVENOUS at 14:46

## 2023-10-27 RX ADMIN — MEROPENEM 1000 MG: 1 INJECTION, POWDER, FOR SOLUTION INTRAVENOUS at 18:40

## 2023-10-27 RX ADMIN — ATORVASTATIN CALCIUM 10 MG: 10 TABLET, FILM COATED ORAL at 08:36

## 2023-10-27 NOTE — CARE COORDINATION
10/27. Met with the pt and his wife at the bedside to discuss transition of care. The pt lives with his wife and is independent. His PCP is Dr Jennifer Auugstin. He has a ww, BSC, cane and shower chair. He does not currently receive Outpt HD. There are 5 steps in their home to get to the BR. The discharge plan at this time is to return home with home care. They do not have a preference for home care. List provided.  Cherri Murdock RN

## 2023-10-27 NOTE — ACP (ADVANCE CARE PLANNING)
Advance Care Planning     General Advance Care Planning (ACP) Conversation    Date of Conversation: 10/25/2023  Conducted with: Patient with Decision Making Capacity    Healthcare Decision Maker:    Primary Decision Maker: Gurmeet Chu - [phone]    Secondary Decision Maker: Juni Lizarraga - [phone]    Supplemental (Other) Decision Maker: Cory Wyman Child - [phone]  Click here to complete Healthcare Decision Makers including selection of the Healthcare Decision Maker Relationship (ie \"Primary\"). Content/Action Overview:   Has ACP document(s) on file - reflects the patient's care preferences  Reviewed DNR/DNI and patient elects Full Code (Attempt Resuscitation)        Length of Voluntary ACP Conversation in minutes:  <16 minutes (Non-Billable)    Gilma Scott RN

## 2023-10-28 ENCOUNTER — APPOINTMENT (OUTPATIENT)
Dept: CT IMAGING | Age: 73
DRG: 673 | End: 2023-10-28
Payer: MEDICARE

## 2023-10-28 PROBLEM — I95.9 HYPOTENSION: Status: ACTIVE | Noted: 2023-10-28

## 2023-10-28 LAB
ALBUMIN SERPL-MCNC: 2.4 G/DL (ref 3.5–5.2)
ALP SERPL-CCNC: 55 U/L (ref 40–129)
ALT SERPL-CCNC: 14 U/L (ref 0–40)
ANION GAP SERPL CALCULATED.3IONS-SCNC: 15 MMOL/L (ref 7–16)
ANION GAP SERPL CALCULATED.3IONS-SCNC: 18 MMOL/L (ref 7–16)
AST SERPL-CCNC: 43 U/L (ref 0–39)
BASOPHILS # BLD: 0.01 K/UL (ref 0–0.2)
BASOPHILS NFR BLD: 0 % (ref 0–2)
BILIRUB SERPL-MCNC: 0.5 MG/DL (ref 0–1.2)
BUN SERPL-MCNC: 38 MG/DL (ref 6–23)
BUN SERPL-MCNC: 64 MG/DL (ref 6–23)
CALCIUM SERPL-MCNC: 7.5 MG/DL (ref 8.6–10.2)
CALCIUM SERPL-MCNC: 7.9 MG/DL (ref 8.6–10.2)
CHLORIDE SERPL-SCNC: 101 MMOL/L (ref 98–107)
CHLORIDE SERPL-SCNC: 98 MMOL/L (ref 98–107)
CO2 SERPL-SCNC: 21 MMOL/L (ref 22–29)
CO2 SERPL-SCNC: 22 MMOL/L (ref 22–29)
CREAT SERPL-MCNC: 2.3 MG/DL (ref 0.7–1.2)
CREAT SERPL-MCNC: 3.5 MG/DL (ref 0.7–1.2)
EOSINOPHIL # BLD: 0.01 K/UL (ref 0.05–0.5)
EOSINOPHILS RELATIVE PERCENT: 0 % (ref 0–6)
ERYTHROCYTE [DISTWIDTH] IN BLOOD BY AUTOMATED COUNT: 15.7 % (ref 11.5–15)
GFR SERPL CREATININE-BSD FRML MDRD: 17 ML/MIN/1.73M2
GFR SERPL CREATININE-BSD FRML MDRD: 30 ML/MIN/1.73M2
GLUCOSE BLD-MCNC: 140 MG/DL (ref 74–99)
GLUCOSE BLD-MCNC: 150 MG/DL (ref 74–99)
GLUCOSE BLD-MCNC: 178 MG/DL (ref 74–99)
GLUCOSE SERPL-MCNC: 139 MG/DL (ref 74–99)
GLUCOSE SERPL-MCNC: 141 MG/DL (ref 74–99)
HCT VFR BLD AUTO: 25.7 % (ref 37–54)
HGB BLD-MCNC: 7.8 G/DL (ref 12.5–16.5)
IMM GRANULOCYTES # BLD AUTO: 0.1 K/UL (ref 0–0.58)
IMM GRANULOCYTES NFR BLD: 3 % (ref 0–5)
LYMPHOCYTES NFR BLD: 0.09 K/UL (ref 1.5–4)
LYMPHOCYTES RELATIVE PERCENT: 3 % (ref 20–42)
MAGNESIUM SERPL-MCNC: 2 MG/DL (ref 1.6–2.6)
MCH RBC QN AUTO: 28 PG (ref 26–35)
MCHC RBC AUTO-ENTMCNC: 30.4 G/DL (ref 32–34.5)
MCV RBC AUTO: 92.1 FL (ref 80–99.9)
MICROORGANISM SPEC CULT: ABNORMAL
MICROORGANISM/AGENT SPEC: ABNORMAL
MONOCYTES NFR BLD: 0.25 K/UL (ref 0.1–0.95)
MONOCYTES NFR BLD: 8 % (ref 2–12)
NEUTROPHILS NFR BLD: 86 % (ref 43–80)
NEUTS SEG NFR BLD: 2.72 K/UL (ref 1.8–7.3)
PHOSPHATE SERPL-MCNC: 3 MG/DL (ref 2.5–4.5)
PLATELET # BLD AUTO: 182 K/UL (ref 130–450)
PMV BLD AUTO: 10.7 FL (ref 7–12)
POTASSIUM SERPL-SCNC: 3.8 MMOL/L (ref 3.5–5)
POTASSIUM SERPL-SCNC: 4.7 MMOL/L (ref 3.5–5)
PROT SERPL-MCNC: 4.3 G/DL (ref 6.4–8.3)
RBC # BLD AUTO: 2.79 M/UL (ref 3.8–5.8)
RBC # BLD: ABNORMAL 10*6/UL
SERVICE CMNT-IMP: ABNORMAL
SODIUM SERPL-SCNC: 137 MMOL/L (ref 132–146)
SODIUM SERPL-SCNC: 138 MMOL/L (ref 132–146)
SPECIMEN DESCRIPTION: ABNORMAL
WBC OTHER # BLD: 3.2 K/UL (ref 4.5–11.5)

## 2023-10-28 PROCEDURE — 80053 COMPREHEN METABOLIC PANEL: CPT

## 2023-10-28 PROCEDURE — 80197 ASSAY OF TACROLIMUS: CPT

## 2023-10-28 PROCEDURE — 2580000003 HC RX 258: Performed by: FAMILY MEDICINE

## 2023-10-28 PROCEDURE — 6370000000 HC RX 637 (ALT 250 FOR IP)

## 2023-10-28 PROCEDURE — 93005 ELECTROCARDIOGRAM TRACING: CPT | Performed by: INTERNAL MEDICINE

## 2023-10-28 PROCEDURE — 2060000000 HC ICU INTERMEDIATE R&B

## 2023-10-28 PROCEDURE — 6360000002 HC RX W HCPCS: Performed by: INTERNAL MEDICINE

## 2023-10-28 PROCEDURE — 70450 CT HEAD/BRAIN W/O DYE: CPT

## 2023-10-28 PROCEDURE — 6360000002 HC RX W HCPCS: Performed by: FAMILY MEDICINE

## 2023-10-28 PROCEDURE — 90935 HEMODIALYSIS ONE EVALUATION: CPT

## 2023-10-28 PROCEDURE — 2580000003 HC RX 258: Performed by: INTERNAL MEDICINE

## 2023-10-28 PROCEDURE — 85025 COMPLETE CBC W/AUTO DIFF WBC: CPT

## 2023-10-28 PROCEDURE — 83735 ASSAY OF MAGNESIUM: CPT

## 2023-10-28 PROCEDURE — 2500000003 HC RX 250 WO HCPCS: Performed by: INTERNAL MEDICINE

## 2023-10-28 PROCEDURE — 99223 1ST HOSP IP/OBS HIGH 75: CPT | Performed by: INTERNAL MEDICINE

## 2023-10-28 PROCEDURE — 82962 GLUCOSE BLOOD TEST: CPT

## 2023-10-28 PROCEDURE — 84100 ASSAY OF PHOSPHORUS: CPT

## 2023-10-28 PROCEDURE — 80048 BASIC METABOLIC PNL TOTAL CA: CPT

## 2023-10-28 PROCEDURE — 6370000000 HC RX 637 (ALT 250 FOR IP): Performed by: FAMILY MEDICINE

## 2023-10-28 PROCEDURE — 36415 COLL VENOUS BLD VENIPUNCTURE: CPT

## 2023-10-28 RX ORDER — DILTIAZEM HYDROCHLORIDE 5 MG/ML
20 INJECTION INTRAVENOUS ONCE
Status: COMPLETED | OUTPATIENT
Start: 2023-10-28 | End: 2023-10-28

## 2023-10-28 RX ORDER — PREDNISONE 5 MG/1
5 TABLET ORAL DAILY
Status: DISCONTINUED | OUTPATIENT
Start: 2023-10-30 | End: 2023-10-31

## 2023-10-28 RX ADMIN — MEROPENEM 1000 MG: 1 INJECTION, POWDER, FOR SOLUTION INTRAVENOUS at 17:47

## 2023-10-28 RX ADMIN — Medication 10 ML: at 20:01

## 2023-10-28 RX ADMIN — ASPIRIN 81 MG: 81 TABLET, COATED ORAL at 09:03

## 2023-10-28 RX ADMIN — HEPARIN SODIUM 5000 UNITS: 10000 INJECTION INTRAVENOUS; SUBCUTANEOUS at 20:12

## 2023-10-28 RX ADMIN — FAMOTIDINE 20 MG: 20 TABLET, FILM COATED ORAL at 09:02

## 2023-10-28 RX ADMIN — DILTIAZEM HYDROCHLORIDE 20 MG: 5 INJECTION INTRAVENOUS at 23:06

## 2023-10-28 RX ADMIN — AMIODARONE HYDROCHLORIDE 150 MG: 50 INJECTION, SOLUTION INTRAVENOUS at 20:25

## 2023-10-28 RX ADMIN — AMIODARONE HYDROCHLORIDE 1 MG/MIN: 50 INJECTION, SOLUTION INTRAVENOUS at 20:41

## 2023-10-28 RX ADMIN — ATORVASTATIN CALCIUM 10 MG: 10 TABLET, FILM COATED ORAL at 09:02

## 2023-10-28 RX ADMIN — PREDNISONE 5 MG: 5 TABLET ORAL at 09:03

## 2023-10-28 RX ADMIN — CALCITRIOL CAPSULES 0.25 MCG 0.25 MCG: 0.25 CAPSULE ORAL at 09:03

## 2023-10-28 RX ADMIN — HYDROCORTISONE SODIUM SUCCINATE 100 MG: 100 INJECTION, POWDER, FOR SOLUTION INTRAMUSCULAR; INTRAVENOUS at 20:02

## 2023-10-28 RX ADMIN — Medication 10 ML: at 09:02

## 2023-10-28 RX ADMIN — PETROLATUM: 420 OINTMENT TOPICAL at 09:04

## 2023-10-28 RX ADMIN — ISOSORBIDE MONONITRATE 60 MG: 30 TABLET, EXTENDED RELEASE ORAL at 09:03

## 2023-10-28 RX ADMIN — HEPARIN SODIUM 5000 UNITS: 10000 INJECTION INTRAVENOUS; SUBCUTANEOUS at 00:39

## 2023-10-28 RX ADMIN — HYDRALAZINE HYDROCHLORIDE 100 MG: 50 TABLET, FILM COATED ORAL at 09:03

## 2023-10-28 RX ADMIN — INSULIN GLARGINE 12 UNITS: 100 INJECTION, SOLUTION SUBCUTANEOUS at 20:01

## 2023-10-28 RX ADMIN — SODIUM BICARBONATE 650 MG: 650 TABLET ORAL at 20:02

## 2023-10-28 RX ADMIN — PETROLATUM: 420 OINTMENT TOPICAL at 20:04

## 2023-10-28 RX ADMIN — SODIUM BICARBONATE 650 MG: 650 TABLET ORAL at 09:03

## 2023-10-28 RX ADMIN — LATANOPROST 1 DROP: 50 SOLUTION OPHTHALMIC at 20:01

## 2023-10-29 ENCOUNTER — APPOINTMENT (OUTPATIENT)
Dept: GENERAL RADIOLOGY | Age: 73
DRG: 673 | End: 2023-10-29
Payer: MEDICARE

## 2023-10-29 LAB
ACB COMPLEX DNA BLD POS QL NAA+NON-PROBE: NOT DETECTED
ANION GAP SERPL CALCULATED.3IONS-SCNC: 18 MMOL/L (ref 7–16)
B FRAGILIS DNA BLD POS QL NAA+NON-PROBE: NOT DETECTED
BIOFIRE TEST COMMENT: ABNORMAL
BLACTX-M ISLT/SPM QL: NOT DETECTED
BLAIMP ISLT/SPM QL: NOT DETECTED
BLAKPC ISLT/SPM QL: NOT DETECTED
BLAOXA-48-LIKE ISLT/SPM QL: NOT DETECTED
BLAVIM ISLT/SPM QL: NOT DETECTED
BUN SERPL-MCNC: 51 MG/DL (ref 6–23)
C ALBICANS DNA BLD POS QL NAA+NON-PROBE: NOT DETECTED
C AURIS DNA BLD POS QL NAA+NON-PROBE: NOT DETECTED
C GATTII+NEOFOR DNA BLD POS QL NAA+N-PRB: NOT DETECTED
C GLABRATA DNA BLD POS QL NAA+NON-PROBE: NOT DETECTED
C KRUSEI DNA BLD POS QL NAA+NON-PROBE: NOT DETECTED
C PARAP DNA BLD POS QL NAA+NON-PROBE: NOT DETECTED
C TROPICLS DNA BLD POS QL NAA+NON-PROBE: NOT DETECTED
CALCIUM SERPL-MCNC: 7.9 MG/DL (ref 8.6–10.2)
CHLORIDE SERPL-SCNC: 101 MMOL/L (ref 98–107)
CO2 SERPL-SCNC: 18 MMOL/L (ref 22–29)
COLISTIN RES MCR-1 ISLT/SPM QL: NOT DETECTED
CREAT SERPL-MCNC: 2.8 MG/DL (ref 0.7–1.2)
E CLOAC COMP DNA BLD POS NAA+NON-PROBE: NOT DETECTED
E COLI DNA BLD POS QL NAA+NON-PROBE: DETECTED
E FAECALIS DNA BLD POS QL NAA+NON-PROBE: NOT DETECTED
E FAECIUM DNA BLD POS QL NAA+NON-PROBE: NOT DETECTED
ENTEROBACTERALES DNA BLD POS NAA+N-PRB: DETECTED
ERYTHROCYTE [DISTWIDTH] IN BLOOD BY AUTOMATED COUNT: 15.3 % (ref 11.5–15)
GFR SERPL CREATININE-BSD FRML MDRD: 23 ML/MIN/1.73M2
GLUCOSE BLD-MCNC: 175 MG/DL (ref 74–99)
GLUCOSE BLD-MCNC: 176 MG/DL (ref 74–99)
GLUCOSE BLD-MCNC: 180 MG/DL (ref 74–99)
GLUCOSE BLD-MCNC: 218 MG/DL (ref 74–99)
GLUCOSE SERPL-MCNC: 161 MG/DL (ref 74–99)
GP B STREP DNA BLD POS QL NAA+NON-PROBE: NOT DETECTED
HAEM INFLU DNA BLD POS QL NAA+NON-PROBE: NOT DETECTED
HCT VFR BLD AUTO: 19.4 % (ref 37–54)
HCT VFR BLD AUTO: 23.1 % (ref 37–54)
HGB BLD-MCNC: 6.1 G/DL (ref 12.5–16.5)
HGB BLD-MCNC: 7.4 G/DL (ref 12.5–16.5)
K OXYTOCA DNA BLD POS QL NAA+NON-PROBE: NOT DETECTED
KLEBSIELLA SP DNA BLD POS QL NAA+NON-PRB: NOT DETECTED
KLEBSIELLA SP DNA BLD POS QL NAA+NON-PRB: NOT DETECTED
L MONOCYTOG DNA BLD POS QL NAA+NON-PROBE: NOT DETECTED
MAGNESIUM SERPL-MCNC: 1.8 MG/DL (ref 1.6–2.6)
MCH RBC QN AUTO: 28.2 PG (ref 26–35)
MCHC RBC AUTO-ENTMCNC: 31.4 G/DL (ref 32–34.5)
MCV RBC AUTO: 89.8 FL (ref 80–99.9)
MICROORGANISM SPEC CULT: ABNORMAL
MICROORGANISM/AGENT SPEC: ABNORMAL
N MEN DNA BLD POS QL NAA+NON-PROBE: NOT DETECTED
P AERUGINOSA DNA BLD POS NAA+NON-PROBE: NOT DETECTED
PHOSPHATE SERPL-MCNC: 2.7 MG/DL (ref 2.5–4.5)
PLATELET # BLD AUTO: 160 K/UL (ref 130–450)
PMV BLD AUTO: 11.4 FL (ref 7–12)
POTASSIUM SERPL-SCNC: 3.5 MMOL/L (ref 3.5–5)
PROTEUS SP DNA BLD POS QL NAA+NON-PROBE: NOT DETECTED
RBC # BLD AUTO: 2.16 M/UL (ref 3.8–5.8)
RESISTANT GENE NDM BY PCR: NOT DETECTED
S AUREUS DNA BLD POS QL NAA+NON-PROBE: NOT DETECTED
S AUREUS+CONS DNA BLD POS NAA+NON-PROBE: NOT DETECTED
S EPIDERMIDIS DNA BLD POS QL NAA+NON-PRB: NOT DETECTED
S LUGDUNENSIS DNA BLD POS QL NAA+NON-PRB: NOT DETECTED
S MALTOPHILIA DNA BLD POS QL NAA+NON-PRB: NOT DETECTED
S MARCESCENS DNA BLD POS NAA+NON-PROBE: NOT DETECTED
S PNEUM DNA BLD POS QL NAA+NON-PROBE: NOT DETECTED
S PYO DNA BLD POS QL NAA+NON-PROBE: NOT DETECTED
SALMONELLA DNA BLD POS QL NAA+NON-PROBE: NOT DETECTED
SERVICE CMNT-IMP: ABNORMAL
SODIUM SERPL-SCNC: 137 MMOL/L (ref 132–146)
SPECIMEN DESCRIPTION: ABNORMAL
STREPTOCOCCUS DNA BLD POS NAA+NON-PROBE: NOT DETECTED
WBC OTHER # BLD: 3.3 K/UL (ref 4.5–11.5)

## 2023-10-29 PROCEDURE — 2580000003 HC RX 258: Performed by: STUDENT IN AN ORGANIZED HEALTH CARE EDUCATION/TRAINING PROGRAM

## 2023-10-29 PROCEDURE — 6360000002 HC RX W HCPCS: Performed by: STUDENT IN AN ORGANIZED HEALTH CARE EDUCATION/TRAINING PROGRAM

## 2023-10-29 PROCEDURE — 6360000002 HC RX W HCPCS: Performed by: FAMILY MEDICINE

## 2023-10-29 PROCEDURE — 82962 GLUCOSE BLOOD TEST: CPT

## 2023-10-29 PROCEDURE — P9016 RBC LEUKOCYTES REDUCED: HCPCS

## 2023-10-29 PROCEDURE — 71045 X-RAY EXAM CHEST 1 VIEW: CPT

## 2023-10-29 PROCEDURE — 30233N1 TRANSFUSION OF NONAUTOLOGOUS RED BLOOD CELLS INTO PERIPHERAL VEIN, PERCUTANEOUS APPROACH: ICD-10-PCS | Performed by: STUDENT IN AN ORGANIZED HEALTH CARE EDUCATION/TRAINING PROGRAM

## 2023-10-29 PROCEDURE — 6370000000 HC RX 637 (ALT 250 FOR IP): Performed by: FAMILY MEDICINE

## 2023-10-29 PROCEDURE — 6360000002 HC RX W HCPCS: Performed by: INTERNAL MEDICINE

## 2023-10-29 PROCEDURE — 36569 INSJ PICC 5 YR+ W/O IMAGING: CPT

## 2023-10-29 PROCEDURE — A4216 STERILE WATER/SALINE, 10 ML: HCPCS | Performed by: STUDENT IN AN ORGANIZED HEALTH CARE EDUCATION/TRAINING PROGRAM

## 2023-10-29 PROCEDURE — C1751 CATH, INF, PER/CENT/MIDLINE: HCPCS

## 2023-10-29 PROCEDURE — 84100 ASSAY OF PHOSPHORUS: CPT

## 2023-10-29 PROCEDURE — 6370000000 HC RX 637 (ALT 250 FOR IP): Performed by: INTERNAL MEDICINE

## 2023-10-29 PROCEDURE — 76937 US GUIDE VASCULAR ACCESS: CPT

## 2023-10-29 PROCEDURE — 05HY33Z INSERTION OF INFUSION DEVICE INTO UPPER VEIN, PERCUTANEOUS APPROACH: ICD-10-PCS | Performed by: STUDENT IN AN ORGANIZED HEALTH CARE EDUCATION/TRAINING PROGRAM

## 2023-10-29 PROCEDURE — C9113 INJ PANTOPRAZOLE SODIUM, VIA: HCPCS | Performed by: STUDENT IN AN ORGANIZED HEALTH CARE EDUCATION/TRAINING PROGRAM

## 2023-10-29 PROCEDURE — 36415 COLL VENOUS BLD VENIPUNCTURE: CPT

## 2023-10-29 PROCEDURE — 2580000003 HC RX 258: Performed by: INTERNAL MEDICINE

## 2023-10-29 PROCEDURE — 2580000003 HC RX 258: Performed by: FAMILY MEDICINE

## 2023-10-29 PROCEDURE — 80197 ASSAY OF TACROLIMUS: CPT

## 2023-10-29 PROCEDURE — 86900 BLOOD TYPING SEROLOGIC ABO: CPT

## 2023-10-29 PROCEDURE — 86850 RBC ANTIBODY SCREEN: CPT

## 2023-10-29 PROCEDURE — 99232 SBSQ HOSP IP/OBS MODERATE 35: CPT | Performed by: INTERNAL MEDICINE

## 2023-10-29 PROCEDURE — 86901 BLOOD TYPING SEROLOGIC RH(D): CPT

## 2023-10-29 PROCEDURE — 86923 COMPATIBILITY TEST ELECTRIC: CPT

## 2023-10-29 PROCEDURE — 85014 HEMATOCRIT: CPT

## 2023-10-29 PROCEDURE — 6370000000 HC RX 637 (ALT 250 FOR IP)

## 2023-10-29 PROCEDURE — 36430 TRANSFUSION BLD/BLD COMPNT: CPT

## 2023-10-29 PROCEDURE — 85018 HEMOGLOBIN: CPT

## 2023-10-29 PROCEDURE — 85027 COMPLETE CBC AUTOMATED: CPT

## 2023-10-29 PROCEDURE — 80048 BASIC METABOLIC PNL TOTAL CA: CPT

## 2023-10-29 PROCEDURE — 83735 ASSAY OF MAGNESIUM: CPT

## 2023-10-29 PROCEDURE — 6370000000 HC RX 637 (ALT 250 FOR IP): Performed by: STUDENT IN AN ORGANIZED HEALTH CARE EDUCATION/TRAINING PROGRAM

## 2023-10-29 PROCEDURE — 2000000000 HC ICU R&B

## 2023-10-29 RX ORDER — SODIUM CHLORIDE 9 MG/ML
INJECTION, SOLUTION INTRAVENOUS PRN
Status: DISCONTINUED | OUTPATIENT
Start: 2023-10-29 | End: 2023-10-29

## 2023-10-29 RX ORDER — POTASSIUM CHLORIDE 20 MEQ/1
20 TABLET, EXTENDED RELEASE ORAL ONCE
Status: COMPLETED | OUTPATIENT
Start: 2023-10-29 | End: 2023-10-29

## 2023-10-29 RX ORDER — LOPERAMIDE HYDROCHLORIDE 2 MG/1
2 CAPSULE ORAL 4 TIMES DAILY PRN
Status: DISCONTINUED | OUTPATIENT
Start: 2023-10-29 | End: 2023-11-27

## 2023-10-29 RX ORDER — ACETAMINOPHEN 325 MG/1
650 TABLET ORAL EVERY 6 HOURS PRN
Status: DISCONTINUED | OUTPATIENT
Start: 2023-10-29 | End: 2023-10-29

## 2023-10-29 RX ORDER — SODIUM CHLORIDE 0.9 % (FLUSH) 0.9 %
5-40 SYRINGE (ML) INJECTION PRN
Status: DISCONTINUED | OUTPATIENT
Start: 2023-10-29 | End: 2023-10-30

## 2023-10-29 RX ORDER — HEPARIN 100 UNIT/ML
1 SYRINGE INTRAVENOUS EVERY 12 HOURS SCHEDULED
Status: DISCONTINUED | OUTPATIENT
Start: 2023-10-29 | End: 2023-11-03

## 2023-10-29 RX ORDER — SODIUM CHLORIDE 0.9 % (FLUSH) 0.9 %
5-40 SYRINGE (ML) INJECTION EVERY 12 HOURS SCHEDULED
Status: DISCONTINUED | OUTPATIENT
Start: 2023-10-29 | End: 2023-11-27

## 2023-10-29 RX ORDER — SODIUM CHLORIDE 0.9 % (FLUSH) 0.9 %
5-40 SYRINGE (ML) INJECTION EVERY 12 HOURS SCHEDULED
Status: DISCONTINUED | OUTPATIENT
Start: 2023-10-29 | End: 2023-10-30

## 2023-10-29 RX ORDER — SODIUM CHLORIDE 0.9 % (FLUSH) 0.9 %
5-40 SYRINGE (ML) INJECTION PRN
Status: DISCONTINUED | OUTPATIENT
Start: 2023-10-29 | End: 2023-11-27

## 2023-10-29 RX ORDER — HEPARIN 100 UNIT/ML
1 SYRINGE INTRAVENOUS PRN
Status: DISCONTINUED | OUTPATIENT
Start: 2023-10-29 | End: 2023-10-29

## 2023-10-29 RX ORDER — SODIUM CHLORIDE 9 MG/ML
INJECTION, SOLUTION INTRAVENOUS PRN
Status: DISCONTINUED | OUTPATIENT
Start: 2023-10-29 | End: 2023-10-30

## 2023-10-29 RX ORDER — SODIUM CHLORIDE 0.9 % (FLUSH) 0.9 %
5-40 SYRINGE (ML) INJECTION EVERY 12 HOURS SCHEDULED
Status: DISCONTINUED | OUTPATIENT
Start: 2023-10-29 | End: 2023-11-22 | Stop reason: SDUPTHER

## 2023-10-29 RX ORDER — SODIUM BICARBONATE 650 MG/1
1300 TABLET ORAL 3 TIMES DAILY
Status: DISCONTINUED | OUTPATIENT
Start: 2023-10-29 | End: 2023-10-31

## 2023-10-29 RX ORDER — ACETAMINOPHEN 650 MG/1
650 SUPPOSITORY RECTAL EVERY 6 HOURS PRN
Status: DISCONTINUED | OUTPATIENT
Start: 2023-10-29 | End: 2023-10-29

## 2023-10-29 RX ORDER — SODIUM CHLORIDE 9 MG/ML
INJECTION, SOLUTION INTRAVENOUS PRN
Status: DISCONTINUED | OUTPATIENT
Start: 2023-10-29 | End: 2023-11-27

## 2023-10-29 RX ORDER — HEPARIN 100 UNIT/ML
3 SYRINGE INTRAVENOUS PRN
Status: DISCONTINUED | OUTPATIENT
Start: 2023-10-29 | End: 2023-11-03

## 2023-10-29 RX ADMIN — CALCITRIOL CAPSULES 0.25 MCG 0.25 MCG: 0.25 CAPSULE ORAL at 10:04

## 2023-10-29 RX ADMIN — MEROPENEM 1000 MG: 1 INJECTION, POWDER, FOR SOLUTION INTRAVENOUS at 18:36

## 2023-10-29 RX ADMIN — FAMOTIDINE 20 MG: 20 TABLET, FILM COATED ORAL at 10:04

## 2023-10-29 RX ADMIN — POTASSIUM CHLORIDE 20 MEQ: 1500 TABLET, EXTENDED RELEASE ORAL at 13:48

## 2023-10-29 RX ADMIN — AMIODARONE HYDROCHLORIDE 1 MG/MIN: 50 INJECTION, SOLUTION INTRAVENOUS at 13:47

## 2023-10-29 RX ADMIN — SODIUM BICARBONATE 650 MG: 650 TABLET ORAL at 10:05

## 2023-10-29 RX ADMIN — HYDROCORTISONE SODIUM SUCCINATE 100 MG: 100 INJECTION, POWDER, FOR SOLUTION INTRAMUSCULAR; INTRAVENOUS at 13:48

## 2023-10-29 RX ADMIN — HEPARIN SODIUM 5000 UNITS: 10000 INJECTION INTRAVENOUS; SUBCUTANEOUS at 06:07

## 2023-10-29 RX ADMIN — PETROLATUM: 420 OINTMENT TOPICAL at 10:03

## 2023-10-29 RX ADMIN — ATORVASTATIN CALCIUM 10 MG: 10 TABLET, FILM COATED ORAL at 10:04

## 2023-10-29 RX ADMIN — LATANOPROST 1 DROP: 50 SOLUTION OPHTHALMIC at 21:00

## 2023-10-29 RX ADMIN — Medication 10 ML: at 20:25

## 2023-10-29 RX ADMIN — PETROLATUM: 420 OINTMENT TOPICAL at 21:00

## 2023-10-29 RX ADMIN — SODIUM BICARBONATE 1300 MG: 650 TABLET ORAL at 13:48

## 2023-10-29 RX ADMIN — SODIUM BICARBONATE 1300 MG: 650 TABLET ORAL at 20:24

## 2023-10-29 RX ADMIN — SODIUM CHLORIDE, PRESERVATIVE FREE 40 MG: 5 INJECTION INTRAVENOUS at 20:24

## 2023-10-29 RX ADMIN — AMIODARONE HYDROCHLORIDE 0.5 MG/MIN: 50 INJECTION, SOLUTION INTRAVENOUS at 18:25

## 2023-10-30 LAB
ANION GAP SERPL CALCULATED.3IONS-SCNC: 11 MMOL/L (ref 7–16)
ANION GAP SERPL CALCULATED.3IONS-SCNC: 11 MMOL/L (ref 7–16)
ANION GAP SERPL CALCULATED.3IONS-SCNC: 13 MMOL/L (ref 7–16)
BASOPHILS # BLD: 0 K/UL (ref 0–0.2)
BASOPHILS NFR BLD: 0 % (ref 0–2)
BUN SERPL-MCNC: 63 MG/DL (ref 6–23)
BUN SERPL-MCNC: 64 MG/DL (ref 6–23)
BUN SERPL-MCNC: 68 MG/DL (ref 6–23)
CALCIUM SERPL-MCNC: 7.8 MG/DL (ref 8.6–10.2)
CALCIUM SERPL-MCNC: 7.9 MG/DL (ref 8.6–10.2)
CALCIUM SERPL-MCNC: 8.4 MG/DL (ref 8.6–10.2)
CHLORIDE SERPL-SCNC: 100 MMOL/L (ref 98–107)
CHLORIDE SERPL-SCNC: 103 MMOL/L (ref 98–107)
CHLORIDE SERPL-SCNC: 104 MMOL/L (ref 98–107)
CLOT ANGLE.KAOLIN INDUCED BLD RES TEG: 70.4 DEG (ref 53–70)
CO2 SERPL-SCNC: 25 MMOL/L (ref 22–29)
CO2 SERPL-SCNC: 26 MMOL/L (ref 22–29)
CO2 SERPL-SCNC: 26 MMOL/L (ref 22–29)
CREAT SERPL-MCNC: 3.4 MG/DL (ref 0.7–1.2)
CREAT SERPL-MCNC: 3.6 MG/DL (ref 0.7–1.2)
CREAT SERPL-MCNC: 3.9 MG/DL (ref 0.7–1.2)
CRP SERPL HS-MCNC: 27 MG/L (ref 0–5)
EKG ATRIAL RATE: 131 BPM
EKG Q-T INTERVAL: 314 MS
EKG QRS DURATION: 72 MS
EKG QTC CALCULATION (BAZETT): 480 MS
EKG R AXIS: 8 DEGREES
EKG T AXIS: 142 DEGREES
EKG VENTRICULAR RATE: 141 BPM
EOSINOPHIL # BLD: 0 K/UL (ref 0.05–0.5)
EOSINOPHILS RELATIVE PERCENT: 0 % (ref 0–6)
EPL-TEG: 0 % (ref 0–15)
ERYTHROCYTE [DISTWIDTH] IN BLOOD BY AUTOMATED COUNT: 15.6 % (ref 11.5–15)
ERYTHROCYTE [SEDIMENTATION RATE] IN BLOOD BY WESTERGREN METHOD: 14 MM/HR (ref 0–15)
FIBRINOGEN PPP-MCNC: 118 MG/DL (ref 200–400)
G-TEG: 8.1 KDYN/CM2 (ref 4.5–11)
GFR SERPL CREATININE-BSD FRML MDRD: 15 ML/MIN/1.73M2
GFR SERPL CREATININE-BSD FRML MDRD: 17 ML/MIN/1.73M2
GFR SERPL CREATININE-BSD FRML MDRD: 19 ML/MIN/1.73M2
GLUCOSE BLD-MCNC: 167 MG/DL (ref 74–99)
GLUCOSE BLD-MCNC: 171 MG/DL (ref 74–99)
GLUCOSE BLD-MCNC: 172 MG/DL (ref 74–99)
GLUCOSE BLD-MCNC: 188 MG/DL (ref 74–99)
GLUCOSE SERPL-MCNC: 169 MG/DL (ref 74–99)
GLUCOSE SERPL-MCNC: 208 MG/DL (ref 74–99)
GLUCOSE SERPL-MCNC: 211 MG/DL (ref 74–99)
HCT VFR BLD AUTO: 23.1 % (ref 37–54)
HCT VFR BLD AUTO: 23.1 % (ref 37–54)
HCT VFR BLD AUTO: 23.2 % (ref 37–54)
HCT VFR BLD AUTO: 23.7 % (ref 37–54)
HGB BLD-MCNC: 7.3 G/DL (ref 12.5–16.5)
HGB BLD-MCNC: 7.4 G/DL (ref 12.5–16.5)
HGB BLD-MCNC: 7.5 G/DL (ref 12.5–16.5)
HGB BLD-MCNC: 7.5 G/DL (ref 12.5–16.5)
INR PPP: 1.2
KINETICS TEG: 1.3 MIN (ref 1–3)
LY30 (LYSIS) TEG: 0 % (ref 0–8)
LYMPHOCYTES NFR BLD: 0 K/UL (ref 1.5–4)
LYMPHOCYTES RELATIVE PERCENT: 0 % (ref 20–42)
MA (MAX CLOT) TEG: 61.9 MM (ref 50–70)
MAGNESIUM SERPL-MCNC: 1.9 MG/DL (ref 1.6–2.6)
MAGNESIUM SERPL-MCNC: 1.9 MG/DL (ref 1.6–2.6)
MAGNESIUM SERPL-MCNC: 2 MG/DL (ref 1.6–2.6)
MCH RBC QN AUTO: 28.6 PG (ref 26–35)
MCHC RBC AUTO-ENTMCNC: 32.5 G/DL (ref 32–34.5)
MCV RBC AUTO: 88.2 FL (ref 80–99.9)
MONOCYTES NFR BLD: 0.04 K/UL (ref 0.1–0.95)
MONOCYTES NFR BLD: 1 % (ref 2–12)
NEUTROPHILS NFR BLD: 99 % (ref 43–80)
NEUTS SEG NFR BLD: 4.66 K/UL (ref 1.8–7.3)
PHOSPHATE SERPL-MCNC: 2.8 MG/DL (ref 2.5–4.5)
PHOSPHATE SERPL-MCNC: 2.8 MG/DL (ref 2.5–4.5)
PHOSPHATE SERPL-MCNC: 3 MG/DL (ref 2.5–4.5)
PLATELET # BLD AUTO: 151 K/UL (ref 130–450)
PMV BLD AUTO: 10.6 FL (ref 7–12)
POTASSIUM SERPL-SCNC: 3.6 MMOL/L (ref 3.5–5)
POTASSIUM SERPL-SCNC: 3.6 MMOL/L (ref 3.5–5)
POTASSIUM SERPL-SCNC: 4 MMOL/L (ref 3.5–5)
PROTHROMBIN TIME: 12.7 SEC (ref 9.3–12.4)
RBC # BLD AUTO: 2.62 M/UL (ref 3.8–5.8)
RBC # BLD: ABNORMAL 10*6/UL
REACTION TIME TEG: 3.9 MIN (ref 5–10)
SODIUM SERPL-SCNC: 138 MMOL/L (ref 132–146)
SODIUM SERPL-SCNC: 140 MMOL/L (ref 132–146)
SODIUM SERPL-SCNC: 141 MMOL/L (ref 132–146)
TACROLIMUS BLD-MCNC: 3.3 NG/ML
WBC OTHER # BLD: 4.7 K/UL (ref 4.5–11.5)

## 2023-10-30 PROCEDURE — C9113 INJ PANTOPRAZOLE SODIUM, VIA: HCPCS | Performed by: STUDENT IN AN ORGANIZED HEALTH CARE EDUCATION/TRAINING PROGRAM

## 2023-10-30 PROCEDURE — A4216 STERILE WATER/SALINE, 10 ML: HCPCS | Performed by: STUDENT IN AN ORGANIZED HEALTH CARE EDUCATION/TRAINING PROGRAM

## 2023-10-30 PROCEDURE — 85025 COMPLETE CBC W/AUTO DIFF WBC: CPT

## 2023-10-30 PROCEDURE — 85610 PROTHROMBIN TIME: CPT

## 2023-10-30 PROCEDURE — 86140 C-REACTIVE PROTEIN: CPT

## 2023-10-30 PROCEDURE — 85384 FIBRINOGEN ACTIVITY: CPT

## 2023-10-30 PROCEDURE — 6370000000 HC RX 637 (ALT 250 FOR IP): Performed by: INTERNAL MEDICINE

## 2023-10-30 PROCEDURE — 85018 HEMOGLOBIN: CPT

## 2023-10-30 PROCEDURE — 36592 COLLECT BLOOD FROM PICC: CPT

## 2023-10-30 PROCEDURE — 83735 ASSAY OF MAGNESIUM: CPT

## 2023-10-30 PROCEDURE — 84100 ASSAY OF PHOSPHORUS: CPT

## 2023-10-30 PROCEDURE — 2580000003 HC RX 258: Performed by: STUDENT IN AN ORGANIZED HEALTH CARE EDUCATION/TRAINING PROGRAM

## 2023-10-30 PROCEDURE — 6360000002 HC RX W HCPCS: Performed by: INTERNAL MEDICINE

## 2023-10-30 PROCEDURE — 85390 FIBRINOLYSINS SCREEN I&R: CPT

## 2023-10-30 PROCEDURE — 6360000002 HC RX W HCPCS: Performed by: STUDENT IN AN ORGANIZED HEALTH CARE EDUCATION/TRAINING PROGRAM

## 2023-10-30 PROCEDURE — 99232 SBSQ HOSP IP/OBS MODERATE 35: CPT | Performed by: INTERNAL MEDICINE

## 2023-10-30 PROCEDURE — 6370000000 HC RX 637 (ALT 250 FOR IP): Performed by: NURSE PRACTITIONER

## 2023-10-30 PROCEDURE — 2580000003 HC RX 258: Performed by: INTERNAL MEDICINE

## 2023-10-30 PROCEDURE — 99222 1ST HOSP IP/OBS MODERATE 55: CPT | Performed by: STUDENT IN AN ORGANIZED HEALTH CARE EDUCATION/TRAINING PROGRAM

## 2023-10-30 PROCEDURE — 6370000000 HC RX 637 (ALT 250 FOR IP): Performed by: STUDENT IN AN ORGANIZED HEALTH CARE EDUCATION/TRAINING PROGRAM

## 2023-10-30 PROCEDURE — 99233 SBSQ HOSP IP/OBS HIGH 50: CPT | Performed by: INTERNAL MEDICINE

## 2023-10-30 PROCEDURE — 82962 GLUCOSE BLOOD TEST: CPT

## 2023-10-30 PROCEDURE — 80048 BASIC METABOLIC PNL TOTAL CA: CPT

## 2023-10-30 PROCEDURE — 85347 COAGULATION TIME ACTIVATED: CPT

## 2023-10-30 PROCEDURE — 97166 OT EVAL MOD COMPLEX 45 MIN: CPT

## 2023-10-30 PROCEDURE — 85576 BLOOD PLATELET AGGREGATION: CPT

## 2023-10-30 PROCEDURE — 93010 ELECTROCARDIOGRAM REPORT: CPT | Performed by: INTERNAL MEDICINE

## 2023-10-30 PROCEDURE — 2000000000 HC ICU R&B

## 2023-10-30 PROCEDURE — 85652 RBC SED RATE AUTOMATED: CPT

## 2023-10-30 PROCEDURE — 85014 HEMATOCRIT: CPT

## 2023-10-30 PROCEDURE — 6370000000 HC RX 637 (ALT 250 FOR IP): Performed by: FAMILY MEDICINE

## 2023-10-30 RX ORDER — LACTOBACILLUS RHAMNOSUS GG 10B CELL
1 CAPSULE ORAL 2 TIMES DAILY
Status: DISCONTINUED | OUTPATIENT
Start: 2023-10-30 | End: 2023-11-27

## 2023-10-30 RX ORDER — DIMETHICONE, OXYBENZONE, AND PADIMATE O 2; 2.5; 6.6 G/100G; G/100G; G/100G
STICK TOPICAL PRN
Status: DISCONTINUED | OUTPATIENT
Start: 2023-10-30 | End: 2023-11-27

## 2023-10-30 RX ADMIN — SODIUM CHLORIDE, PRESERVATIVE FREE 40 MG: 5 INJECTION INTRAVENOUS at 20:21

## 2023-10-30 RX ADMIN — WATER 2000 MG: 1 INJECTION INTRAMUSCULAR; INTRAVENOUS; SUBCUTANEOUS at 12:31

## 2023-10-30 RX ADMIN — Medication 1 CAPSULE: at 20:18

## 2023-10-30 RX ADMIN — Medication 1 CAPSULE: at 13:35

## 2023-10-30 RX ADMIN — SODIUM CHLORIDE, PRESERVATIVE FREE 10 ML: 5 INJECTION INTRAVENOUS at 20:25

## 2023-10-30 RX ADMIN — PREDNISONE 5 MG: 5 TABLET ORAL at 09:26

## 2023-10-30 RX ADMIN — METOPROLOL TARTRATE 12.5 MG: 25 TABLET, FILM COATED ORAL at 20:19

## 2023-10-30 RX ADMIN — SODIUM CHLORIDE, PRESERVATIVE FREE 10 ML: 5 INJECTION INTRAVENOUS at 09:26

## 2023-10-30 RX ADMIN — SODIUM BICARBONATE 1300 MG: 650 TABLET ORAL at 20:18

## 2023-10-30 RX ADMIN — POTASSIUM BICARBONATE 40 MEQ: 782 TABLET, EFFERVESCENT ORAL at 04:01

## 2023-10-30 RX ADMIN — SODIUM BICARBONATE 1300 MG: 650 TABLET ORAL at 09:25

## 2023-10-30 RX ADMIN — SODIUM CHLORIDE, PRESERVATIVE FREE 10 ML: 5 INJECTION INTRAVENOUS at 20:26

## 2023-10-30 RX ADMIN — LATANOPROST 1 DROP: 50 SOLUTION OPHTHALMIC at 20:21

## 2023-10-30 RX ADMIN — SODIUM CHLORIDE, PRESERVATIVE FREE 40 MG: 5 INJECTION INTRAVENOUS at 09:25

## 2023-10-30 RX ADMIN — ATORVASTATIN CALCIUM 10 MG: 10 TABLET, FILM COATED ORAL at 09:26

## 2023-10-30 RX ADMIN — PETROLATUM: 420 OINTMENT TOPICAL at 09:25

## 2023-10-30 RX ADMIN — CALCITRIOL CAPSULES 0.25 MCG 0.25 MCG: 0.25 CAPSULE ORAL at 09:26

## 2023-10-30 RX ADMIN — SODIUM BICARBONATE 1300 MG: 650 TABLET ORAL at 13:35

## 2023-10-30 RX ADMIN — AMIODARONE HYDROCHLORIDE 0.5 MG/MIN: 50 INJECTION, SOLUTION INTRAVENOUS at 06:34

## 2023-10-30 RX ADMIN — PETROLATUM: 420 OINTMENT TOPICAL at 20:21

## 2023-10-30 RX ADMIN — Medication: at 09:25

## 2023-10-31 LAB
ANION GAP SERPL CALCULATED.3IONS-SCNC: 12 MMOL/L (ref 7–16)
ANION GAP SERPL CALCULATED.3IONS-SCNC: 12 MMOL/L (ref 7–16)
BASOPHILS # BLD: 0 K/UL (ref 0–0.2)
BASOPHILS NFR BLD: 0 % (ref 0–2)
BUN SERPL-MCNC: 71 MG/DL (ref 6–23)
BUN SERPL-MCNC: 72 MG/DL (ref 6–23)
CALCIUM SERPL-MCNC: 8 MG/DL (ref 8.6–10.2)
CALCIUM SERPL-MCNC: 8.2 MG/DL (ref 8.6–10.2)
CHLORIDE SERPL-SCNC: 102 MMOL/L (ref 98–107)
CHLORIDE SERPL-SCNC: 103 MMOL/L (ref 98–107)
CO2 SERPL-SCNC: 25 MMOL/L (ref 22–29)
CO2 SERPL-SCNC: 26 MMOL/L (ref 22–29)
CREAT SERPL-MCNC: 4 MG/DL (ref 0.7–1.2)
CREAT SERPL-MCNC: 4.5 MG/DL (ref 0.7–1.2)
EOSINOPHIL # BLD: 0 K/UL (ref 0.05–0.5)
EOSINOPHILS RELATIVE PERCENT: 0 % (ref 0–6)
ERYTHROCYTE [DISTWIDTH] IN BLOOD BY AUTOMATED COUNT: 15.8 % (ref 11.5–15)
GFR SERPL CREATININE-BSD FRML MDRD: 13 ML/MIN/1.73M2
GFR SERPL CREATININE-BSD FRML MDRD: 15 ML/MIN/1.73M2
GLUCOSE BLD-MCNC: 133 MG/DL (ref 74–99)
GLUCOSE BLD-MCNC: 158 MG/DL (ref 74–99)
GLUCOSE BLD-MCNC: 172 MG/DL (ref 74–99)
GLUCOSE BLD-MCNC: 176 MG/DL (ref 74–99)
GLUCOSE SERPL-MCNC: 132 MG/DL (ref 74–99)
GLUCOSE SERPL-MCNC: 177 MG/DL (ref 74–99)
HCT VFR BLD AUTO: 22.3 % (ref 37–54)
HCT VFR BLD AUTO: 23.3 % (ref 37–54)
HCT VFR BLD AUTO: 24.6 % (ref 37–54)
HGB BLD-MCNC: 6.9 G/DL (ref 12.5–16.5)
HGB BLD-MCNC: 7.3 G/DL (ref 12.5–16.5)
HGB BLD-MCNC: 7.8 G/DL (ref 12.5–16.5)
LYMPHOCYTES NFR BLD: 0.04 K/UL (ref 1.5–4)
LYMPHOCYTES RELATIVE PERCENT: 1 % (ref 20–42)
MAGNESIUM SERPL-MCNC: 1.9 MG/DL (ref 1.6–2.6)
MAGNESIUM SERPL-MCNC: 1.9 MG/DL (ref 1.6–2.6)
MCH RBC QN AUTO: 28.1 PG (ref 26–35)
MCHC RBC AUTO-ENTMCNC: 31.3 G/DL (ref 32–34.5)
MCV RBC AUTO: 89.6 FL (ref 80–99.9)
MONOCYTES NFR BLD: 0.07 K/UL (ref 0.1–0.95)
MONOCYTES NFR BLD: 2 % (ref 2–12)
NEUTROPHILS NFR BLD: 97 % (ref 43–80)
NEUTS SEG NFR BLD: 4.09 K/UL (ref 1.8–7.3)
NUCLEATED RED BLOOD CELLS: 3 PER 100 WBC
PHOSPHATE SERPL-MCNC: 2.9 MG/DL (ref 2.5–4.5)
PHOSPHATE SERPL-MCNC: 3 MG/DL (ref 2.5–4.5)
PLATELET # BLD AUTO: 148 K/UL (ref 130–450)
PMV BLD AUTO: 11.2 FL (ref 7–12)
POTASSIUM SERPL-SCNC: 3.2 MMOL/L (ref 3.5–5)
POTASSIUM SERPL-SCNC: 4.2 MMOL/L (ref 3.5–5)
RBC # BLD AUTO: 2.6 M/UL (ref 3.8–5.8)
RBC # BLD: ABNORMAL 10*6/UL
SODIUM SERPL-SCNC: 139 MMOL/L (ref 132–146)
SODIUM SERPL-SCNC: 141 MMOL/L (ref 132–146)
TACROLIMUS BLD-MCNC: 2.2 NG/ML
WBC OTHER # BLD: 4.2 K/UL (ref 4.5–11.5)

## 2023-10-31 PROCEDURE — 6370000000 HC RX 637 (ALT 250 FOR IP): Performed by: INTERNAL MEDICINE

## 2023-10-31 PROCEDURE — 6370000000 HC RX 637 (ALT 250 FOR IP): Performed by: FAMILY MEDICINE

## 2023-10-31 PROCEDURE — 36430 TRANSFUSION BLD/BLD COMPNT: CPT

## 2023-10-31 PROCEDURE — 2580000003 HC RX 258: Performed by: INTERNAL MEDICINE

## 2023-10-31 PROCEDURE — C9113 INJ PANTOPRAZOLE SODIUM, VIA: HCPCS | Performed by: STUDENT IN AN ORGANIZED HEALTH CARE EDUCATION/TRAINING PROGRAM

## 2023-10-31 PROCEDURE — 6360000002 HC RX W HCPCS: Performed by: STUDENT IN AN ORGANIZED HEALTH CARE EDUCATION/TRAINING PROGRAM

## 2023-10-31 PROCEDURE — 93306 TTE W/DOPPLER COMPLETE: CPT

## 2023-10-31 PROCEDURE — 2580000003 HC RX 258: Performed by: STUDENT IN AN ORGANIZED HEALTH CARE EDUCATION/TRAINING PROGRAM

## 2023-10-31 PROCEDURE — 80048 BASIC METABOLIC PNL TOTAL CA: CPT

## 2023-10-31 PROCEDURE — 99232 SBSQ HOSP IP/OBS MODERATE 35: CPT | Performed by: INTERNAL MEDICINE

## 2023-10-31 PROCEDURE — 97530 THERAPEUTIC ACTIVITIES: CPT

## 2023-10-31 PROCEDURE — 85025 COMPLETE CBC W/AUTO DIFF WBC: CPT

## 2023-10-31 PROCEDURE — 6360000002 HC RX W HCPCS: Performed by: INTERNAL MEDICINE

## 2023-10-31 PROCEDURE — 99232 SBSQ HOSP IP/OBS MODERATE 35: CPT | Performed by: NURSE PRACTITIONER

## 2023-10-31 PROCEDURE — 85018 HEMOGLOBIN: CPT

## 2023-10-31 PROCEDURE — 87389 HIV-1 AG W/HIV-1&-2 AB AG IA: CPT

## 2023-10-31 PROCEDURE — 84100 ASSAY OF PHOSPHORUS: CPT

## 2023-10-31 PROCEDURE — 83735 ASSAY OF MAGNESIUM: CPT

## 2023-10-31 PROCEDURE — 85014 HEMATOCRIT: CPT

## 2023-10-31 PROCEDURE — 6370000000 HC RX 637 (ALT 250 FOR IP): Performed by: STUDENT IN AN ORGANIZED HEALTH CARE EDUCATION/TRAINING PROGRAM

## 2023-10-31 PROCEDURE — 82962 GLUCOSE BLOOD TEST: CPT

## 2023-10-31 PROCEDURE — 6370000000 HC RX 637 (ALT 250 FOR IP): Performed by: NURSE PRACTITIONER

## 2023-10-31 PROCEDURE — 6360000002 HC RX W HCPCS

## 2023-10-31 PROCEDURE — A4216 STERILE WATER/SALINE, 10 ML: HCPCS | Performed by: STUDENT IN AN ORGANIZED HEALTH CARE EDUCATION/TRAINING PROGRAM

## 2023-10-31 PROCEDURE — P9016 RBC LEUKOCYTES REDUCED: HCPCS

## 2023-10-31 PROCEDURE — 2060000000 HC ICU INTERMEDIATE R&B

## 2023-10-31 RX ORDER — PREDNISONE 5 MG/1
2.5 TABLET ORAL DAILY
Status: DISCONTINUED | OUTPATIENT
Start: 2023-11-01 | End: 2023-10-31

## 2023-10-31 RX ORDER — TACROLIMUS 1 MG/1
2 CAPSULE ORAL DAILY
Status: DISCONTINUED | OUTPATIENT
Start: 2023-10-31 | End: 2023-11-02

## 2023-10-31 RX ORDER — SODIUM BICARBONATE 650 MG/1
650 TABLET ORAL 3 TIMES DAILY
Status: DISCONTINUED | OUTPATIENT
Start: 2023-10-31 | End: 2023-10-31

## 2023-10-31 RX ORDER — POTASSIUM CHLORIDE 29.8 MG/ML
40 INJECTION INTRAVENOUS
Status: COMPLETED | OUTPATIENT
Start: 2023-10-31 | End: 2023-10-31

## 2023-10-31 RX ORDER — MAGNESIUM SULFATE 1 G/100ML
1000 INJECTION INTRAVENOUS ONCE
Status: COMPLETED | OUTPATIENT
Start: 2023-10-31 | End: 2023-10-31

## 2023-10-31 RX ORDER — SODIUM CHLORIDE 9 MG/ML
INJECTION, SOLUTION INTRAVENOUS PRN
Status: DISCONTINUED | OUTPATIENT
Start: 2023-10-31 | End: 2023-11-06

## 2023-10-31 RX ORDER — SODIUM CHLORIDE, SODIUM LACTATE, POTASSIUM CHLORIDE, CALCIUM CHLORIDE 600; 310; 30; 20 MG/100ML; MG/100ML; MG/100ML; MG/100ML
INJECTION, SOLUTION INTRAVENOUS CONTINUOUS
Status: DISCONTINUED | OUTPATIENT
Start: 2023-10-31 | End: 2023-11-04

## 2023-10-31 RX ORDER — PREDNISONE 5 MG/1
5 TABLET ORAL DAILY
Status: DISCONTINUED | OUTPATIENT
Start: 2023-11-01 | End: 2023-11-16

## 2023-10-31 RX ADMIN — CALCITRIOL CAPSULES 0.25 MCG 0.25 MCG: 0.25 CAPSULE ORAL at 09:03

## 2023-10-31 RX ADMIN — PETROLATUM: 420 OINTMENT TOPICAL at 09:03

## 2023-10-31 RX ADMIN — SODIUM CHLORIDE, PRESERVATIVE FREE 10 ML: 5 INJECTION INTRAVENOUS at 09:03

## 2023-10-31 RX ADMIN — SODIUM CHLORIDE, PRESERVATIVE FREE 40 MG: 5 INJECTION INTRAVENOUS at 19:55

## 2023-10-31 RX ADMIN — Medication 1 CAPSULE: at 21:34

## 2023-10-31 RX ADMIN — Medication 1 CAPSULE: at 09:03

## 2023-10-31 RX ADMIN — MAGNESIUM SULFATE HEPTAHYDRATE 1000 MG: 1 INJECTION, SOLUTION INTRAVENOUS at 20:04

## 2023-10-31 RX ADMIN — ATORVASTATIN CALCIUM 10 MG: 10 TABLET, FILM COATED ORAL at 09:03

## 2023-10-31 RX ADMIN — SODIUM CHLORIDE, POTASSIUM CHLORIDE, SODIUM LACTATE AND CALCIUM CHLORIDE: 600; 310; 30; 20 INJECTION, SOLUTION INTRAVENOUS at 09:11

## 2023-10-31 RX ADMIN — PREDNISONE 5 MG: 5 TABLET ORAL at 09:03

## 2023-10-31 RX ADMIN — LATANOPROST 1 DROP: 50 SOLUTION OPHTHALMIC at 19:56

## 2023-10-31 RX ADMIN — SODIUM BICARBONATE 650 MG: 650 TABLET ORAL at 09:03

## 2023-10-31 RX ADMIN — WATER 2000 MG: 1 INJECTION INTRAMUSCULAR; INTRAVENOUS; SUBCUTANEOUS at 13:02

## 2023-10-31 RX ADMIN — SODIUM CHLORIDE, PRESERVATIVE FREE 10 ML: 5 INJECTION INTRAVENOUS at 20:24

## 2023-10-31 RX ADMIN — PETROLATUM: 420 OINTMENT TOPICAL at 19:56

## 2023-10-31 RX ADMIN — SODIUM CHLORIDE, PRESERVATIVE FREE 40 MG: 5 INJECTION INTRAVENOUS at 09:03

## 2023-10-31 RX ADMIN — TACROLIMUS 2 MG: 1 CAPSULE ORAL at 09:26

## 2023-10-31 RX ADMIN — POTASSIUM CHLORIDE 40 MEQ: 29.8 INJECTION, SOLUTION INTRAVENOUS at 09:09

## 2023-10-31 ASSESSMENT — PAIN SCALES - GENERAL
PAINLEVEL_OUTOF10: 0

## 2023-10-31 NOTE — CARE COORDINATION
Care Coordination:  LOS 6 day. Transfer from 74 on 10/29/23 for Hematochezia and drop in H&H 6.1 with hypotension 80/48. Surgery consulted, Renal following for ESRD s/p kidney replacement. S/p Temporary dialysis catheter insertion on 10/26 in ED, first treatment 10/26, 2nd HD 10/27 and 3rd for 10/28. ID following for Positive BC . Renal following pt for renal recovery. Spoke to wife at bedside, no hx of HD, He was at home independent. Her plan is to return home but she is concerned that he may be weak and she cannot transport him to HD if needed. She was provided a [facility] list and she will review for 4 choices. Discussed we are uncertain if he will need outpt HD, and that would be a barrier to some facilities if they are unable to transport. She verbalized understanding. Her first choice is home, and she is looking into private care givers as well. She is open to [address] if needed and has no preference. I have initiated [id number] Marino Nuñez Pt admission for Outpt hd incase pt were to need HD    Electronically signed by Laurita Libman, RN on 10/31/2023 at 10:29 AM       Addendum. Faxed to [phone], called and spoke to Jaimeever as well at  [phone]     Electronically signed by Laurita Libman, RN on 10/31/2023 at 10:47 AM     Addendum: back door referral to select to evaluate for criteria    Electronically signed by Laurita Libman, RN on 10/31/2023 at 11:43 AM         The Plan for Transition of Care is related to the following treatment goals: dc    The Patient  and wife was provided with a choice of provider and agrees   with the discharge plan. [x] Yes [] No    Freedom of choice list was provided with basic dialogue that supports the patient's individualized plan of care/goals, treatment preferences and shares the quality data associated with the providers.  [x] Yes [] No

## 2023-11-01 LAB
ABO/RH: NORMAL
ANION GAP SERPL CALCULATED.3IONS-SCNC: 14 MMOL/L (ref 7–16)
ANION GAP SERPL CALCULATED.3IONS-SCNC: 15 MMOL/L (ref 7–16)
ANTIBODY SCREEN: NEGATIVE
ARM BAND NUMBER: NORMAL
BASOPHILS # BLD: 0 K/UL (ref 0–0.2)
BASOPHILS NFR BLD: 0 % (ref 0–2)
BLOOD BANK BLOOD PRODUCT EXPIRATION DATE: NORMAL
BLOOD BANK BLOOD PRODUCT EXPIRATION DATE: NORMAL
BLOOD BANK DISPENSE STATUS: NORMAL
BLOOD BANK DISPENSE STATUS: NORMAL
BLOOD BANK ISBT PRODUCT BLOOD TYPE: 5100
BLOOD BANK ISBT PRODUCT BLOOD TYPE: 5100
BLOOD BANK PRODUCT CODE: NORMAL
BLOOD BANK PRODUCT CODE: NORMAL
BLOOD BANK SAMPLE EXPIRATION: NORMAL
BLOOD BANK UNIT TYPE AND RH: NORMAL
BLOOD BANK UNIT TYPE AND RH: NORMAL
BPU ID: NORMAL
BPU ID: NORMAL
BUN SERPL-MCNC: 73 MG/DL (ref 6–23)
BUN SERPL-MCNC: 74 MG/DL (ref 6–23)
CALCIUM SERPL-MCNC: 8.1 MG/DL (ref 8.6–10.2)
CALCIUM SERPL-MCNC: 8.1 MG/DL (ref 8.6–10.2)
CHLORIDE SERPL-SCNC: 102 MMOL/L (ref 98–107)
CHLORIDE SERPL-SCNC: 105 MMOL/L (ref 98–107)
CO2 SERPL-SCNC: 22 MMOL/L (ref 22–29)
CO2 SERPL-SCNC: 22 MMOL/L (ref 22–29)
COMPONENT: NORMAL
COMPONENT: NORMAL
CREAT SERPL-MCNC: 4.4 MG/DL (ref 0.7–1.2)
CREAT SERPL-MCNC: 4.4 MG/DL (ref 0.7–1.2)
CROSSMATCH RESULT: NORMAL
CROSSMATCH RESULT: NORMAL
EOSINOPHIL # BLD: 0 K/UL (ref 0.05–0.5)
EOSINOPHILS RELATIVE PERCENT: 0 % (ref 0–6)
ERYTHROCYTE [DISTWIDTH] IN BLOOD BY AUTOMATED COUNT: 15.3 % (ref 11.5–15)
GFR SERPL CREATININE-BSD FRML MDRD: 13 ML/MIN/1.73M2
GFR SERPL CREATININE-BSD FRML MDRD: 14 ML/MIN/1.73M2
GLUCOSE BLD-MCNC: 173 MG/DL (ref 74–99)
GLUCOSE BLD-MCNC: 181 MG/DL (ref 74–99)
GLUCOSE BLD-MCNC: 193 MG/DL (ref 74–99)
GLUCOSE BLD-MCNC: 219 MG/DL (ref 74–99)
GLUCOSE SERPL-MCNC: 188 MG/DL (ref 74–99)
GLUCOSE SERPL-MCNC: 204 MG/DL (ref 74–99)
HCT VFR BLD AUTO: 25.1 % (ref 37–54)
HCT VFR BLD AUTO: 26.4 % (ref 37–54)
HGB BLD-MCNC: 7.9 G/DL (ref 12.5–16.5)
HGB BLD-MCNC: 8.4 G/DL (ref 12.5–16.5)
HIV 1+2 AB+HIV1 P24 AG SERPL QL IA: NONREACTIVE
LYMPHOCYTES NFR BLD: 0 K/UL (ref 1.5–4)
LYMPHOCYTES RELATIVE PERCENT: 0 % (ref 20–42)
MAGNESIUM SERPL-MCNC: 2 MG/DL (ref 1.6–2.6)
MAGNESIUM SERPL-MCNC: 2.1 MG/DL (ref 1.6–2.6)
MCH RBC QN AUTO: 28.8 PG (ref 26–35)
MCHC RBC AUTO-ENTMCNC: 31.8 G/DL (ref 32–34.5)
MCV RBC AUTO: 90.4 FL (ref 80–99.9)
MICROORGANISM SPEC CULT: NORMAL
MICROORGANISM SPEC CULT: NORMAL
MONOCYTES NFR BLD: 0.15 K/UL (ref 0.1–0.95)
MONOCYTES NFR BLD: 4 % (ref 2–12)
NEUTROPHILS NFR BLD: 96 % (ref 43–80)
NEUTS SEG NFR BLD: 3.95 K/UL (ref 1.8–7.3)
PHOSPHATE SERPL-MCNC: 2.8 MG/DL (ref 2.5–4.5)
PHOSPHATE SERPL-MCNC: 2.9 MG/DL (ref 2.5–4.5)
PLATELET # BLD AUTO: 106 K/UL (ref 130–450)
PMV BLD AUTO: 11.8 FL (ref 7–12)
POTASSIUM SERPL-SCNC: 3.9 MMOL/L (ref 3.5–5)
POTASSIUM SERPL-SCNC: 3.9 MMOL/L (ref 3.5–5)
RBC # BLD AUTO: 2.92 M/UL (ref 3.8–5.8)
RBC # BLD: ABNORMAL 10*6/UL
SERVICE CMNT-IMP: NORMAL
SERVICE CMNT-IMP: NORMAL
SODIUM SERPL-SCNC: 139 MMOL/L (ref 132–146)
SODIUM SERPL-SCNC: 141 MMOL/L (ref 132–146)
SPECIMEN DESCRIPTION: NORMAL
SPECIMEN DESCRIPTION: NORMAL
TRANSFUSION STATUS: NORMAL
TRANSFUSION STATUS: NORMAL
UNIT DIVISION: 0
UNIT DIVISION: 0
UNIT ISSUE DATE/TIME: NORMAL
UNIT ISSUE DATE/TIME: NORMAL
WBC OTHER # BLD: 4.1 K/UL (ref 4.5–11.5)

## 2023-11-01 PROCEDURE — 2060000000 HC ICU INTERMEDIATE R&B

## 2023-11-01 PROCEDURE — 6370000000 HC RX 637 (ALT 250 FOR IP): Performed by: FAMILY MEDICINE

## 2023-11-01 PROCEDURE — 6360000002 HC RX W HCPCS: Performed by: STUDENT IN AN ORGANIZED HEALTH CARE EDUCATION/TRAINING PROGRAM

## 2023-11-01 PROCEDURE — 99232 SBSQ HOSP IP/OBS MODERATE 35: CPT | Performed by: NURSE PRACTITIONER

## 2023-11-01 PROCEDURE — 99232 SBSQ HOSP IP/OBS MODERATE 35: CPT | Performed by: INTERNAL MEDICINE

## 2023-11-01 PROCEDURE — 84100 ASSAY OF PHOSPHORUS: CPT

## 2023-11-01 PROCEDURE — 2580000003 HC RX 258: Performed by: STUDENT IN AN ORGANIZED HEALTH CARE EDUCATION/TRAINING PROGRAM

## 2023-11-01 PROCEDURE — 2580000003 HC RX 258: Performed by: INTERNAL MEDICINE

## 2023-11-01 PROCEDURE — 82962 GLUCOSE BLOOD TEST: CPT

## 2023-11-01 PROCEDURE — 51798 US URINE CAPACITY MEASURE: CPT

## 2023-11-01 PROCEDURE — 85014 HEMATOCRIT: CPT

## 2023-11-01 PROCEDURE — 6360000002 HC RX W HCPCS: Performed by: INTERNAL MEDICINE

## 2023-11-01 PROCEDURE — 6370000000 HC RX 637 (ALT 250 FOR IP): Performed by: INTERNAL MEDICINE

## 2023-11-01 PROCEDURE — 6370000000 HC RX 637 (ALT 250 FOR IP)

## 2023-11-01 PROCEDURE — 85018 HEMOGLOBIN: CPT

## 2023-11-01 PROCEDURE — A4216 STERILE WATER/SALINE, 10 ML: HCPCS | Performed by: STUDENT IN AN ORGANIZED HEALTH CARE EDUCATION/TRAINING PROGRAM

## 2023-11-01 PROCEDURE — 6370000000 HC RX 637 (ALT 250 FOR IP): Performed by: NURSE PRACTITIONER

## 2023-11-01 PROCEDURE — 83735 ASSAY OF MAGNESIUM: CPT

## 2023-11-01 PROCEDURE — C9113 INJ PANTOPRAZOLE SODIUM, VIA: HCPCS | Performed by: STUDENT IN AN ORGANIZED HEALTH CARE EDUCATION/TRAINING PROGRAM

## 2023-11-01 PROCEDURE — 6370000000 HC RX 637 (ALT 250 FOR IP): Performed by: STUDENT IN AN ORGANIZED HEALTH CARE EDUCATION/TRAINING PROGRAM

## 2023-11-01 PROCEDURE — 85025 COMPLETE CBC W/AUTO DIFF WBC: CPT

## 2023-11-01 PROCEDURE — 80048 BASIC METABOLIC PNL TOTAL CA: CPT

## 2023-11-01 RX ADMIN — Medication 1 CAPSULE: at 21:10

## 2023-11-01 RX ADMIN — PETROLATUM: 420 OINTMENT TOPICAL at 10:21

## 2023-11-01 RX ADMIN — PREDNISONE 5 MG: 5 TABLET ORAL at 10:18

## 2023-11-01 RX ADMIN — Medication 10 ML: at 10:22

## 2023-11-01 RX ADMIN — LATANOPROST 1 DROP: 50 SOLUTION OPHTHALMIC at 21:10

## 2023-11-01 RX ADMIN — PETROLATUM: 420 OINTMENT TOPICAL at 21:11

## 2023-11-01 RX ADMIN — Medication 10 ML: at 21:11

## 2023-11-01 RX ADMIN — METOPROLOL TARTRATE 12.5 MG: 25 TABLET, FILM COATED ORAL at 21:10

## 2023-11-01 RX ADMIN — Medication 1 CAPSULE: at 10:19

## 2023-11-01 RX ADMIN — WATER 2000 MG: 1 INJECTION INTRAMUSCULAR; INTRAVENOUS; SUBCUTANEOUS at 13:00

## 2023-11-01 RX ADMIN — METOPROLOL TARTRATE 12.5 MG: 25 TABLET, FILM COATED ORAL at 10:18

## 2023-11-01 RX ADMIN — TACROLIMUS 2 MG: 1 CAPSULE ORAL at 10:18

## 2023-11-01 RX ADMIN — CALCITRIOL CAPSULES 0.25 MCG 0.25 MCG: 0.25 CAPSULE ORAL at 10:19

## 2023-11-01 RX ADMIN — SODIUM CHLORIDE, PRESERVATIVE FREE 40 MG: 5 INJECTION INTRAVENOUS at 10:19

## 2023-11-01 RX ADMIN — SODIUM CHLORIDE, PRESERVATIVE FREE 10 ML: 5 INJECTION INTRAVENOUS at 21:10

## 2023-11-01 RX ADMIN — SODIUM CHLORIDE, POTASSIUM CHLORIDE, SODIUM LACTATE AND CALCIUM CHLORIDE: 600; 310; 30; 20 INJECTION, SOLUTION INTRAVENOUS at 05:43

## 2023-11-01 RX ADMIN — INSULIN LISPRO 1 UNITS: 100 INJECTION, SOLUTION INTRAVENOUS; SUBCUTANEOUS at 13:51

## 2023-11-01 RX ADMIN — SODIUM CHLORIDE, PRESERVATIVE FREE 40 MG: 5 INJECTION INTRAVENOUS at 21:10

## 2023-11-01 RX ADMIN — ATORVASTATIN CALCIUM 10 MG: 10 TABLET, FILM COATED ORAL at 10:19

## 2023-11-01 ASSESSMENT — PAIN SCALES - GENERAL
PAINLEVEL_OUTOF10: 0

## 2023-11-01 NOTE — CARE COORDINATION
Care Coordination:  LOS 7 day. Per Nephrology, continue to monitor for renal recovery, possible Tunneled cath as soon as Friday if HD is needed upon discharge. I spoke to pt, wife and daughter at bedside. Otx score 8/24, ptx pending. Wife has list of facilities, pt is reluctant to go to [facility]. Attempted to review with wife care that will be needed at home as well as transport for HD. Discussed HHC, no preference and agree with referral to [facility]. Referral left on . Addendum: spoke again to family, they would like referral made to [address] [place] [address]. Referral made to [facility], she will review for acceptance.   ADDENDUM: spoke to Flex Romero from [facility], they accept and follow if dc plan is home    Electronically signed by Wilfredo Abad RN on 11/1/2023 at 1:48 PM

## 2023-11-02 ENCOUNTER — APPOINTMENT (OUTPATIENT)
Dept: ULTRASOUND IMAGING | Age: 73
DRG: 673 | End: 2023-11-02
Payer: MEDICARE

## 2023-11-02 LAB
ALBUMIN SERPL-MCNC: 2.1 G/DL (ref 3.5–5.2)
ALP SERPL-CCNC: 80 U/L (ref 40–129)
ALT SERPL-CCNC: 20 U/L (ref 0–40)
ANION GAP SERPL CALCULATED.3IONS-SCNC: 10 MMOL/L (ref 7–16)
ANION GAP SERPL CALCULATED.3IONS-SCNC: 14 MMOL/L (ref 7–16)
AST SERPL-CCNC: 51 U/L (ref 0–39)
ATYPICAL LYMPHOCYTE ABSOLUTE COUNT: 0.09 K/UL (ref 0–0.46)
ATYPICAL LYMPHOCYTES: 2 % (ref 0–4)
BASOPHILS # BLD: 0 K/UL (ref 0–0.2)
BASOPHILS NFR BLD: 0 % (ref 0–2)
BILIRUB DIRECT SERPL-MCNC: <0.2 MG/DL (ref 0–0.3)
BILIRUB INDIRECT SERPL-MCNC: ABNORMAL MG/DL (ref 0–1)
BILIRUB SERPL-MCNC: 0.3 MG/DL (ref 0–1.2)
BUN SERPL-MCNC: 69 MG/DL (ref 6–23)
BUN SERPL-MCNC: 71 MG/DL (ref 6–23)
CALCIUM SERPL-MCNC: 8 MG/DL (ref 8.6–10.2)
CALCIUM SERPL-MCNC: 8.1 MG/DL (ref 8.6–10.2)
CHLORIDE SERPL-SCNC: 104 MMOL/L (ref 98–107)
CHLORIDE SERPL-SCNC: 105 MMOL/L (ref 98–107)
CO2 SERPL-SCNC: 23 MMOL/L (ref 22–29)
CO2 SERPL-SCNC: 25 MMOL/L (ref 22–29)
CREAT SERPL-MCNC: 4.2 MG/DL (ref 0.7–1.2)
CREAT SERPL-MCNC: 4.3 MG/DL (ref 0.7–1.2)
EOSINOPHIL # BLD: 0.05 K/UL (ref 0.05–0.5)
EOSINOPHILS RELATIVE PERCENT: 1 % (ref 0–6)
ERYTHROCYTE [DISTWIDTH] IN BLOOD BY AUTOMATED COUNT: 15.4 % (ref 11.5–15)
GFR SERPL CREATININE-BSD FRML MDRD: 14 ML/MIN/1.73M2
GFR SERPL CREATININE-BSD FRML MDRD: 14 ML/MIN/1.73M2
GLUCOSE BLD-MCNC: 129 MG/DL (ref 74–99)
GLUCOSE BLD-MCNC: 174 MG/DL (ref 74–99)
GLUCOSE BLD-MCNC: 228 MG/DL (ref 74–99)
GLUCOSE SERPL-MCNC: 152 MG/DL (ref 74–99)
GLUCOSE SERPL-MCNC: 156 MG/DL (ref 74–99)
HCT VFR BLD AUTO: 25 % (ref 37–54)
HCT VFR BLD AUTO: 26.1 % (ref 37–54)
HGB BLD-MCNC: 7.8 G/DL (ref 12.5–16.5)
HGB BLD-MCNC: 8.1 G/DL (ref 12.5–16.5)
LYMPHOCYTES NFR BLD: 1.6 K/UL (ref 1.5–4)
LYMPHOCYTES RELATIVE PERCENT: 34 % (ref 20–42)
MAGNESIUM SERPL-MCNC: 2 MG/DL (ref 1.6–2.6)
MAGNESIUM SERPL-MCNC: 2 MG/DL (ref 1.6–2.6)
MCH RBC QN AUTO: 28.5 PG (ref 26–35)
MCHC RBC AUTO-ENTMCNC: 31.2 G/DL (ref 32–34.5)
MCV RBC AUTO: 91.2 FL (ref 80–99.9)
METAMYELOCYTES ABSOLUTE COUNT: 0.05 K/UL (ref 0–0.12)
METAMYELOCYTES: 1 % (ref 0–1)
MONOCYTES NFR BLD: 0.24 K/UL (ref 0.1–0.95)
MONOCYTES NFR BLD: 5 % (ref 2–12)
MYELOCYTES ABSOLUTE COUNT: 0.05 K/UL
MYELOCYTES: 1 %
NEUTROPHILS NFR BLD: 56 % (ref 43–80)
NEUTS SEG NFR BLD: 2.63 K/UL (ref 1.8–7.3)
PHOSPHATE SERPL-MCNC: 2.7 MG/DL (ref 2.5–4.5)
PHOSPHATE SERPL-MCNC: 2.8 MG/DL (ref 2.5–4.5)
PLATELET # BLD AUTO: 81 K/UL (ref 130–450)
PLATELET CONFIRMATION: NORMAL
PMV BLD AUTO: 12.1 FL (ref 7–12)
POTASSIUM SERPL-SCNC: 3.8 MMOL/L (ref 3.5–5)
POTASSIUM SERPL-SCNC: 3.9 MMOL/L (ref 3.5–5)
PROT SERPL-MCNC: 3.7 G/DL (ref 6.4–8.3)
RBC # BLD AUTO: 2.74 M/UL (ref 3.8–5.8)
RBC # BLD: ABNORMAL 10*6/UL
SODIUM SERPL-SCNC: 139 MMOL/L (ref 132–146)
SODIUM SERPL-SCNC: 142 MMOL/L (ref 132–146)
WBC OTHER # BLD: 4.7 K/UL (ref 4.5–11.5)

## 2023-11-02 PROCEDURE — 2580000003 HC RX 258: Performed by: INTERNAL MEDICINE

## 2023-11-02 PROCEDURE — 99232 SBSQ HOSP IP/OBS MODERATE 35: CPT | Performed by: INTERNAL MEDICINE

## 2023-11-02 PROCEDURE — 86022 PLATELET ANTIBODIES: CPT

## 2023-11-02 PROCEDURE — 82248 BILIRUBIN DIRECT: CPT

## 2023-11-02 PROCEDURE — C9113 INJ PANTOPRAZOLE SODIUM, VIA: HCPCS | Performed by: STUDENT IN AN ORGANIZED HEALTH CARE EDUCATION/TRAINING PROGRAM

## 2023-11-02 PROCEDURE — 2700000000 HC OXYGEN THERAPY PER DAY

## 2023-11-02 PROCEDURE — 84100 ASSAY OF PHOSPHORUS: CPT

## 2023-11-02 PROCEDURE — 97535 SELF CARE MNGMENT TRAINING: CPT

## 2023-11-02 PROCEDURE — 6370000000 HC RX 637 (ALT 250 FOR IP): Performed by: STUDENT IN AN ORGANIZED HEALTH CARE EDUCATION/TRAINING PROGRAM

## 2023-11-02 PROCEDURE — 86901 BLOOD TYPING SEROLOGIC RH(D): CPT

## 2023-11-02 PROCEDURE — 93970 EXTREMITY STUDY: CPT

## 2023-11-02 PROCEDURE — 6370000000 HC RX 637 (ALT 250 FOR IP): Performed by: INTERNAL MEDICINE

## 2023-11-02 PROCEDURE — 85018 HEMOGLOBIN: CPT

## 2023-11-02 PROCEDURE — 2060000000 HC ICU INTERMEDIATE R&B

## 2023-11-02 PROCEDURE — 86920 COMPATIBILITY TEST SPIN: CPT

## 2023-11-02 PROCEDURE — 83735 ASSAY OF MAGNESIUM: CPT

## 2023-11-02 PROCEDURE — 6360000002 HC RX W HCPCS: Performed by: INTERNAL MEDICINE

## 2023-11-02 PROCEDURE — 82962 GLUCOSE BLOOD TEST: CPT

## 2023-11-02 PROCEDURE — 6360000002 HC RX W HCPCS: Performed by: STUDENT IN AN ORGANIZED HEALTH CARE EDUCATION/TRAINING PROGRAM

## 2023-11-02 PROCEDURE — 2580000003 HC RX 258: Performed by: STUDENT IN AN ORGANIZED HEALTH CARE EDUCATION/TRAINING PROGRAM

## 2023-11-02 PROCEDURE — 86900 BLOOD TYPING SEROLOGIC ABO: CPT

## 2023-11-02 PROCEDURE — 86850 RBC ANTIBODY SCREEN: CPT

## 2023-11-02 PROCEDURE — 97530 THERAPEUTIC ACTIVITIES: CPT

## 2023-11-02 PROCEDURE — 85025 COMPLETE CBC W/AUTO DIFF WBC: CPT

## 2023-11-02 PROCEDURE — 85014 HEMATOCRIT: CPT

## 2023-11-02 PROCEDURE — 86880 COOMBS TEST DIRECT: CPT

## 2023-11-02 PROCEDURE — 86922 COMPATIBILITY TEST ANTIGLOB: CPT

## 2023-11-02 PROCEDURE — 6370000000 HC RX 637 (ALT 250 FOR IP): Performed by: NURSE PRACTITIONER

## 2023-11-02 PROCEDURE — 86870 RBC ANTIBODY IDENTIFICATION: CPT

## 2023-11-02 PROCEDURE — 6360000002 HC RX W HCPCS

## 2023-11-02 PROCEDURE — 97164 PT RE-EVAL EST PLAN CARE: CPT

## 2023-11-02 PROCEDURE — A4216 STERILE WATER/SALINE, 10 ML: HCPCS | Performed by: STUDENT IN AN ORGANIZED HEALTH CARE EDUCATION/TRAINING PROGRAM

## 2023-11-02 PROCEDURE — 99232 SBSQ HOSP IP/OBS MODERATE 35: CPT | Performed by: STUDENT IN AN ORGANIZED HEALTH CARE EDUCATION/TRAINING PROGRAM

## 2023-11-02 PROCEDURE — 80053 COMPREHEN METABOLIC PANEL: CPT

## 2023-11-02 PROCEDURE — 80048 BASIC METABOLIC PNL TOTAL CA: CPT

## 2023-11-02 PROCEDURE — 6370000000 HC RX 637 (ALT 250 FOR IP): Performed by: FAMILY MEDICINE

## 2023-11-02 RX ORDER — TACROLIMUS 1 MG/1
2 CAPSULE ORAL 2 TIMES DAILY
Status: DISCONTINUED | OUTPATIENT
Start: 2023-11-02 | End: 2023-11-13

## 2023-11-02 RX ORDER — ARGATROBAN 1 MG/ML
.0625-1 INJECTION INTRAVENOUS CONTINUOUS
Status: DISCONTINUED | OUTPATIENT
Start: 2023-11-02 | End: 2023-11-06

## 2023-11-02 RX ADMIN — SODIUM CHLORIDE, PRESERVATIVE FREE 10 ML: 5 INJECTION INTRAVENOUS at 22:12

## 2023-11-02 RX ADMIN — SODIUM CHLORIDE, PRESERVATIVE FREE 10 ML: 5 INJECTION INTRAVENOUS at 08:10

## 2023-11-02 RX ADMIN — PETROLATUM: 420 OINTMENT TOPICAL at 08:10

## 2023-11-02 RX ADMIN — SODIUM CHLORIDE, PRESERVATIVE FREE 40 MG: 5 INJECTION INTRAVENOUS at 22:09

## 2023-11-02 RX ADMIN — Medication 1 CAPSULE: at 08:08

## 2023-11-02 RX ADMIN — ARGATROBAN 1 MCG/KG/MIN: 1 INJECTION INTRAVENOUS at 22:21

## 2023-11-02 RX ADMIN — ATORVASTATIN CALCIUM 10 MG: 10 TABLET, FILM COATED ORAL at 08:08

## 2023-11-02 RX ADMIN — METOPROLOL TARTRATE 12.5 MG: 25 TABLET, FILM COATED ORAL at 08:07

## 2023-11-02 RX ADMIN — SODIUM CHLORIDE, POTASSIUM CHLORIDE, SODIUM LACTATE AND CALCIUM CHLORIDE: 600; 310; 30; 20 INJECTION, SOLUTION INTRAVENOUS at 00:40

## 2023-11-02 RX ADMIN — PETROLATUM: 420 OINTMENT TOPICAL at 22:10

## 2023-11-02 RX ADMIN — CALCITRIOL CAPSULES 0.25 MCG 0.25 MCG: 0.25 CAPSULE ORAL at 08:07

## 2023-11-02 RX ADMIN — WATER 2000 MG: 1 INJECTION INTRAMUSCULAR; INTRAVENOUS; SUBCUTANEOUS at 12:23

## 2023-11-02 RX ADMIN — PREDNISONE 5 MG: 5 TABLET ORAL at 08:08

## 2023-11-02 RX ADMIN — TACROLIMUS 2 MG: 1 CAPSULE ORAL at 08:08

## 2023-11-02 RX ADMIN — Medication 10 ML: at 22:12

## 2023-11-02 RX ADMIN — SODIUM CHLORIDE, POTASSIUM CHLORIDE, SODIUM LACTATE AND CALCIUM CHLORIDE: 600; 310; 30; 20 INJECTION, SOLUTION INTRAVENOUS at 21:21

## 2023-11-02 RX ADMIN — INSULIN LISPRO 1 UNITS: 100 INJECTION, SOLUTION INTRAVENOUS; SUBCUTANEOUS at 12:23

## 2023-11-02 RX ADMIN — DIBASIC SODIUM PHOSPHATE, MONOBASIC POTASSIUM PHOSPHATE AND MONOBASIC SODIUM PHOSPHATE 1 TABLET: 852; 155; 130 TABLET ORAL at 05:53

## 2023-11-02 RX ADMIN — SODIUM CHLORIDE, PRESERVATIVE FREE 40 MG: 5 INJECTION INTRAVENOUS at 08:09

## 2023-11-02 ASSESSMENT — PAIN SCALES - GENERAL
PAINLEVEL_OUTOF10: 0

## 2023-11-02 NOTE — DISCHARGE INSTR - COC
days.     Update Admission H&P: {CHP DME Changes in IGOIJ:[id number]}    PHYSICIAN SIGNATURE:  {Esignature:[id number]}

## 2023-11-02 NOTE — CARE COORDINATION
Care Coordination:  LOS 8 day  Per Dr Beverley Parisi, pt will be outpt HD. I confirmed plan with Wesly Munoz from [facility]. She would like to be kept updated for coordination. Referral out to [facility] for acceptance. Electronically signed by Franco Thompson RN on 11/2/2023 at 11:13 AM     Addendum:   [facility] can accept clinically, but will need HD days to be MWF in order to provide transport.   Passr, transport and rhoda completed    Electronically signed by Franco Thompson RN on 11/2/2023 at 12:44 PM

## 2023-11-03 ENCOUNTER — APPOINTMENT (OUTPATIENT)
Dept: INTERVENTIONAL RADIOLOGY/VASCULAR | Age: 73
DRG: 673 | End: 2023-11-03
Payer: MEDICARE

## 2023-11-03 LAB
ANION GAP SERPL CALCULATED.3IONS-SCNC: 11 MMOL/L (ref 7–16)
ANION GAP SERPL CALCULATED.3IONS-SCNC: 13 MMOL/L (ref 7–16)
BASOPHILS # BLD: 0 K/UL (ref 0–0.2)
BASOPHILS NFR BLD: 0 % (ref 0–2)
BUN SERPL-MCNC: 73 MG/DL (ref 6–23)
BUN SERPL-MCNC: 74 MG/DL (ref 6–23)
CALCIUM SERPL-MCNC: 7.9 MG/DL (ref 8.6–10.2)
CALCIUM SERPL-MCNC: 7.9 MG/DL (ref 8.6–10.2)
CHLORIDE SERPL-SCNC: 105 MMOL/L (ref 98–107)
CHLORIDE SERPL-SCNC: 106 MMOL/L (ref 98–107)
CO2 SERPL-SCNC: 24 MMOL/L (ref 22–29)
CO2 SERPL-SCNC: 25 MMOL/L (ref 22–29)
CREAT SERPL-MCNC: 4.2 MG/DL (ref 0.7–1.2)
CREAT SERPL-MCNC: 4.5 MG/DL (ref 0.7–1.2)
EOSINOPHIL # BLD: 0 K/UL (ref 0.05–0.5)
EOSINOPHILS RELATIVE PERCENT: 0 % (ref 0–6)
ERYTHROCYTE [DISTWIDTH] IN BLOOD BY AUTOMATED COUNT: 15.5 % (ref 11.5–15)
GFR SERPL CREATININE-BSD FRML MDRD: 13 ML/MIN/1.73M2
GFR SERPL CREATININE-BSD FRML MDRD: 14 ML/MIN/1.73M2
GLUCOSE BLD-MCNC: 159 MG/DL (ref 74–99)
GLUCOSE BLD-MCNC: 161 MG/DL (ref 74–99)
GLUCOSE BLD-MCNC: 197 MG/DL (ref 74–99)
GLUCOSE SERPL-MCNC: 151 MG/DL (ref 74–99)
GLUCOSE SERPL-MCNC: 177 MG/DL (ref 74–99)
HCT VFR BLD AUTO: 27.3 % (ref 37–54)
HCT VFR BLD AUTO: 27.7 % (ref 37–54)
HGB BLD-MCNC: 8.3 G/DL (ref 12.5–16.5)
HGB BLD-MCNC: 8.6 G/DL (ref 12.5–16.5)
LYMPHOCYTES NFR BLD: 0 K/UL (ref 1.5–4)
LYMPHOCYTES RELATIVE PERCENT: 0 % (ref 20–42)
MAGNESIUM SERPL-MCNC: 2 MG/DL (ref 1.6–2.6)
MAGNESIUM SERPL-MCNC: 2.1 MG/DL (ref 1.6–2.6)
MCH RBC QN AUTO: 28.7 PG (ref 26–35)
MCHC RBC AUTO-ENTMCNC: 31 G/DL (ref 32–34.5)
MCV RBC AUTO: 92.3 FL (ref 80–99.9)
METAMYELOCYTES ABSOLUTE COUNT: 0.14 K/UL (ref 0–0.12)
METAMYELOCYTES: 2 % (ref 0–1)
MONOCYTES NFR BLD: 0.14 K/UL (ref 0.1–0.95)
MONOCYTES NFR BLD: 2 % (ref 2–12)
MYELOCYTES ABSOLUTE COUNT: 0.07 K/UL
MYELOCYTES: 1 %
NEUTROPHILS NFR BLD: 96 % (ref 43–80)
NEUTS SEG NFR BLD: 7.36 K/UL (ref 1.8–7.3)
PARTIAL THROMBOPLASTIN TIME: 85.6 SEC (ref 24.5–35.1)
PARTIAL THROMBOPLASTIN TIME: 94 SEC (ref 24.5–35.1)
PHOSPHATE SERPL-MCNC: 3.3 MG/DL (ref 2.5–4.5)
PHOSPHATE SERPL-MCNC: 4 MG/DL (ref 2.5–4.5)
PLATELET # BLD AUTO: 100 K/UL (ref 130–450)
PMV BLD AUTO: 12 FL (ref 7–12)
POTASSIUM SERPL-SCNC: 4 MMOL/L (ref 3.5–5)
POTASSIUM SERPL-SCNC: 4.1 MMOL/L (ref 3.5–5)
RBC # BLD AUTO: 3 M/UL (ref 3.8–5.8)
RBC # BLD: ABNORMAL 10*6/UL
SODIUM SERPL-SCNC: 141 MMOL/L (ref 132–146)
SODIUM SERPL-SCNC: 143 MMOL/L (ref 132–146)
WBC OTHER # BLD: 7.7 K/UL (ref 4.5–11.5)

## 2023-11-03 PROCEDURE — 85018 HEMOGLOBIN: CPT

## 2023-11-03 PROCEDURE — 6370000000 HC RX 637 (ALT 250 FOR IP): Performed by: NURSE PRACTITIONER

## 2023-11-03 PROCEDURE — 2700000000 HC OXYGEN THERAPY PER DAY

## 2023-11-03 PROCEDURE — 76937 US GUIDE VASCULAR ACCESS: CPT

## 2023-11-03 PROCEDURE — 36558 INSERT TUNNELED CV CATH: CPT

## 2023-11-03 PROCEDURE — A4216 STERILE WATER/SALINE, 10 ML: HCPCS | Performed by: STUDENT IN AN ORGANIZED HEALTH CARE EDUCATION/TRAINING PROGRAM

## 2023-11-03 PROCEDURE — 6370000000 HC RX 637 (ALT 250 FOR IP): Performed by: INTERNAL MEDICINE

## 2023-11-03 PROCEDURE — 0JH63XZ INSERTION OF TUNNELED VASCULAR ACCESS DEVICE INTO CHEST SUBCUTANEOUS TISSUE AND FASCIA, PERCUTANEOUS APPROACH: ICD-10-PCS | Performed by: INTERNAL MEDICINE

## 2023-11-03 PROCEDURE — C9113 INJ PANTOPRAZOLE SODIUM, VIA: HCPCS | Performed by: STUDENT IN AN ORGANIZED HEALTH CARE EDUCATION/TRAINING PROGRAM

## 2023-11-03 PROCEDURE — 83735 ASSAY OF MAGNESIUM: CPT

## 2023-11-03 PROCEDURE — 6360000002 HC RX W HCPCS: Performed by: INTERNAL MEDICINE

## 2023-11-03 PROCEDURE — 85025 COMPLETE CBC W/AUTO DIFF WBC: CPT

## 2023-11-03 PROCEDURE — 6370000000 HC RX 637 (ALT 250 FOR IP): Performed by: STUDENT IN AN ORGANIZED HEALTH CARE EDUCATION/TRAINING PROGRAM

## 2023-11-03 PROCEDURE — 6360000002 HC RX W HCPCS: Performed by: RADIOLOGY

## 2023-11-03 PROCEDURE — 2580000003 HC RX 258: Performed by: INTERNAL MEDICINE

## 2023-11-03 PROCEDURE — 77001 FLUOROGUIDE FOR VEIN DEVICE: CPT

## 2023-11-03 PROCEDURE — 6360000002 HC RX W HCPCS: Performed by: STUDENT IN AN ORGANIZED HEALTH CARE EDUCATION/TRAINING PROGRAM

## 2023-11-03 PROCEDURE — 97530 THERAPEUTIC ACTIVITIES: CPT

## 2023-11-03 PROCEDURE — 84100 ASSAY OF PHOSPHORUS: CPT

## 2023-11-03 PROCEDURE — 85014 HEMATOCRIT: CPT

## 2023-11-03 PROCEDURE — 2709999900 IR TUNNELED CVC PLACE WO SQ PORT/PUMP > 5 YEARS

## 2023-11-03 PROCEDURE — 2060000000 HC ICU INTERMEDIATE R&B

## 2023-11-03 PROCEDURE — 85730 THROMBOPLASTIN TIME PARTIAL: CPT

## 2023-11-03 PROCEDURE — 97535 SELF CARE MNGMENT TRAINING: CPT

## 2023-11-03 PROCEDURE — 99232 SBSQ HOSP IP/OBS MODERATE 35: CPT | Performed by: INTERNAL MEDICINE

## 2023-11-03 PROCEDURE — 2580000003 HC RX 258: Performed by: STUDENT IN AN ORGANIZED HEALTH CARE EDUCATION/TRAINING PROGRAM

## 2023-11-03 PROCEDURE — 80048 BASIC METABOLIC PNL TOTAL CA: CPT

## 2023-11-03 PROCEDURE — 6360000002 HC RX W HCPCS

## 2023-11-03 PROCEDURE — 2580000003 HC RX 258: Performed by: RADIOLOGY

## 2023-11-03 PROCEDURE — 02H633Z INSERTION OF INFUSION DEVICE INTO RIGHT ATRIUM, PERCUTANEOUS APPROACH: ICD-10-PCS | Performed by: INTERNAL MEDICINE

## 2023-11-03 PROCEDURE — 2500000003 HC RX 250 WO HCPCS: Performed by: RADIOLOGY

## 2023-11-03 PROCEDURE — 82962 GLUCOSE BLOOD TEST: CPT

## 2023-11-03 PROCEDURE — 6370000000 HC RX 637 (ALT 250 FOR IP): Performed by: FAMILY MEDICINE

## 2023-11-03 RX ORDER — FENTANYL CITRATE 50 UG/ML
INJECTION, SOLUTION INTRAMUSCULAR; INTRAVENOUS PRN
Status: COMPLETED | OUTPATIENT
Start: 2023-11-03 | End: 2023-11-03

## 2023-11-03 RX ORDER — LIDOCAINE HYDROCHLORIDE AND EPINEPHRINE BITARTRATE 20; .01 MG/ML; MG/ML
INJECTION, SOLUTION SUBCUTANEOUS PRN
Status: COMPLETED | OUTPATIENT
Start: 2023-11-03 | End: 2023-11-03

## 2023-11-03 RX ORDER — HEPARIN SODIUM 1000 [USP'U]/ML
INJECTION, SOLUTION INTRAVENOUS; SUBCUTANEOUS PRN
Status: COMPLETED | OUTPATIENT
Start: 2023-11-03 | End: 2023-11-03

## 2023-11-03 RX ORDER — HEPARIN SODIUM 10000 [USP'U]/ML
INJECTION, SOLUTION INTRAVENOUS; SUBCUTANEOUS PRN
Status: COMPLETED | OUTPATIENT
Start: 2023-11-03 | End: 2023-11-03

## 2023-11-03 RX ORDER — MIDAZOLAM HYDROCHLORIDE 2 MG/2ML
INJECTION, SOLUTION INTRAMUSCULAR; INTRAVENOUS PRN
Status: COMPLETED | OUTPATIENT
Start: 2023-11-03 | End: 2023-11-03

## 2023-11-03 RX ADMIN — SODIUM CHLORIDE, PRESERVATIVE FREE 10 ML: 5 INJECTION INTRAVENOUS at 12:37

## 2023-11-03 RX ADMIN — PETROLATUM: 420 OINTMENT TOPICAL at 12:37

## 2023-11-03 RX ADMIN — WATER 2000 MG: 1 INJECTION INTRAMUSCULAR; INTRAVENOUS; SUBCUTANEOUS at 14:51

## 2023-11-03 RX ADMIN — LATANOPROST 1 DROP: 50 SOLUTION OPHTHALMIC at 21:56

## 2023-11-03 RX ADMIN — PETROLATUM: 420 OINTMENT TOPICAL at 21:48

## 2023-11-03 RX ADMIN — METOPROLOL TARTRATE 12.5 MG: 25 TABLET, FILM COATED ORAL at 17:37

## 2023-11-03 RX ADMIN — MIDAZOLAM HYDROCHLORIDE 1 MG: 1 INJECTION, SOLUTION INTRAMUSCULAR; INTRAVENOUS at 15:22

## 2023-11-03 RX ADMIN — HEPARIN SODIUM 3900 UNITS: 1000 INJECTION, SOLUTION INTRAVENOUS; SUBCUTANEOUS at 15:35

## 2023-11-03 RX ADMIN — FENTANYL CITRATE 50 MCG: 50 INJECTION, SOLUTION INTRAMUSCULAR; INTRAVENOUS at 15:23

## 2023-11-03 RX ADMIN — Medication 5000 UNITS: at 15:27

## 2023-11-03 RX ADMIN — TACROLIMUS 2 MG: 1 CAPSULE ORAL at 21:46

## 2023-11-03 RX ADMIN — LIDOCAINE HYDROCHLORIDE,EPINEPHRINE BITARTRATE 5 ML: 20; .01 INJECTION, SOLUTION INFILTRATION; PERINEURAL at 15:22

## 2023-11-03 RX ADMIN — WATER 2000 MG: 1 INJECTION INTRAMUSCULAR; INTRAVENOUS; SUBCUTANEOUS at 12:36

## 2023-11-03 RX ADMIN — ARGATROBAN 1 MCG/KG/MIN: 1 INJECTION INTRAVENOUS at 04:24

## 2023-11-03 RX ADMIN — Medication 1 CAPSULE: at 17:37

## 2023-11-03 RX ADMIN — Medication 10 ML: at 17:38

## 2023-11-03 RX ADMIN — CALCITRIOL CAPSULES 0.25 MCG 0.25 MCG: 0.25 CAPSULE ORAL at 17:36

## 2023-11-03 RX ADMIN — TACROLIMUS 2 MG: 1 CAPSULE ORAL at 17:36

## 2023-11-03 RX ADMIN — SODIUM CHLORIDE, PRESERVATIVE FREE 40 MG: 5 INJECTION INTRAVENOUS at 21:46

## 2023-11-03 RX ADMIN — PREDNISONE 5 MG: 5 TABLET ORAL at 17:36

## 2023-11-03 RX ADMIN — SODIUM CHLORIDE, PRESERVATIVE FREE 40 MG: 5 INJECTION INTRAVENOUS at 12:36

## 2023-11-03 RX ADMIN — METOPROLOL TARTRATE 12.5 MG: 25 TABLET, FILM COATED ORAL at 21:46

## 2023-11-03 RX ADMIN — ATORVASTATIN CALCIUM 10 MG: 10 TABLET, FILM COATED ORAL at 17:36

## 2023-11-03 RX ADMIN — Medication 1 CAPSULE: at 21:46

## 2023-11-03 ASSESSMENT — PAIN SCALES - GENERAL
PAINLEVEL_OUTOF10: 0

## 2023-11-03 NOTE — CARE COORDINATION
11/03/23 CM Update:  LOS day 9 Met with pt and wife at bedside to confirm plan. PT has been accepted at [facility] [address] but will need HD days to be MWF in order for them to transport pt to dialysis. Yolanda at [facility] has been updated that pt is to go for tunneled cath today at 1pm and the requirement of MWF dialysis days for transport.   PASR transport and ELIA completed CM to follow Electronically signed by Patrick Rey RN CM on 11/3/2023 at 10:42 AM

## 2023-11-03 NOTE — PRE SEDATION
Sedation Pre-Procedure Note    Patient Name: Kat Montelongo   YOB: 1950  Room/Bed: 1727/4006-E  Medical Record Number: [id number]  Date: 11/3/2023   Time: 2:48 PM       Indication:  HD cath placement for HD    Consent: I have discussed with the patient and/or the patient representative the indication, alternatives, and the possible risks and/or complications of the planned procedure and the anesthesia methods. The patient and/or patient representative appear to understand and agree to proceed. Vital Signs:   Vitals:    11/03/23 1400   BP: 111/78   Pulse: (!) 106   Resp: 19   Temp:    SpO2: (!) 86%       Past Medical History:   has a past medical history of Anemia, Atrial fibrillation ([address]), CHF (congestive heart failure) (HCC), Chronic diastolic congestive heart failure ([address]), Chronic foot pain, Chronic knee pain, Constipation, Depression, DM (diabetes mellitus) ([address]), ESRD (end stage renal disease) ([address]), H/O kidney transplant, Hyperlipidemia, Hypertension, Immunosuppression ([address]), Kidney disease, and Pulmonary hypertension ([address] Past Surgical History:   has a past surgical history that includes back surgery (1982); Upper gastrointestinal endoscopy (12/21/2007); Tootie-en-Y Gastric Bypass (06/29/2010); Cholecystectomy, laparoscopic (05/21/2013); ECHO Compl W Dop Color Flow (05/22/2013); Diagnostic Cardiac Cath Lab Procedure (01/15/2013); Foot surgery; Colonoscopy; Endoscopy, colon, diagnostic; Carpal tunnel release (Left, 06/23/2014); Kidney transplant (12/12/2014); Bladder surgery; Kidney biopsy (06/09/2015); and Upper gastrointestinal endoscopy (N/A, 4/28/2021).     Medications:   Scheduled Meds:    tacrolimus  2 mg Oral BID    metoprolol tartrate  12.5 mg Oral BID    predniSONE  5 mg Oral Daily    cefTRIAXone (ROCEPHIN) IV  2,000 mg IntraVENous Q24H    lactobacillus  1 capsule Oral BID    sodium chloride flush  5-40 mL IntraVENous 2 times per day    sodium chloride flush  5-40 mL IntraVENous 2

## 2023-11-03 NOTE — PRE SEDATION
Sedation Pre-Procedure Note    Patient Name: Cora James   YOB: 1950  Room/Bed: 5308/4061-B  Medical Record Number: [id number]  Date: 11/3/2023   Time: 3:38 PM       Indication:  HD cath placement    Consent: I have discussed with the patient and/or the patient representative the indication, alternatives, and the possible risks and/or complications of the planned procedure and the anesthesia methods. The patient and/or patient representative appear to understand and agree to proceed. Vital Signs:   Vitals:    11/03/23 1534   BP: (!) 94/57   Pulse: 88   Resp: 14   Temp:    SpO2: 97%       Past Medical History:   has a past medical history of Anemia, Atrial fibrillation ([address]), CHF (congestive heart failure) (HCC), Chronic diastolic congestive heart failure ([address]), Chronic foot pain, Chronic knee pain, Constipation, Depression, DM (diabetes mellitus) ([address]), ESRD (end stage renal disease) ([address]), H/O kidney transplant, Hyperlipidemia, Hypertension, Immunosuppression ([address]), Kidney disease, and Pulmonary hypertension ([address]). Past Surgical History:   has a past surgical history that includes back surgery (1982); Upper gastrointestinal endoscopy (12/21/2007); Tootie-en-Y Gastric Bypass (06/29/2010); Cholecystectomy, laparoscopic (05/21/2013); ECHO Compl W Dop Color Flow (05/22/2013); Diagnostic Cardiac Cath Lab Procedure (01/15/2013); Foot surgery; Colonoscopy; Endoscopy, colon, diagnostic; Carpal tunnel release (Left, 06/23/2014); Kidney transplant (12/12/2014); Bladder surgery; Kidney biopsy (06/09/2015); and Upper gastrointestinal endoscopy (N/A, 4/28/2021).     Medications:   Scheduled Meds:    tacrolimus  2 mg Oral BID    metoprolol tartrate  12.5 mg Oral BID    predniSONE  5 mg Oral Daily    cefTRIAXone (ROCEPHIN) IV  2,000 mg IntraVENous Q24H    lactobacillus  1 capsule Oral BID    sodium chloride flush  5-40 mL IntraVENous 2 times per day    sodium chloride flush  5-40 mL IntraVENous 2 times per day

## 2023-11-04 LAB
ANION GAP SERPL CALCULATED.3IONS-SCNC: 10 MMOL/L (ref 7–16)
ANION GAP SERPL CALCULATED.3IONS-SCNC: 10 MMOL/L (ref 7–16)
BASOPHILS # BLD: 0 K/UL (ref 0–0.2)
BASOPHILS NFR BLD: 0 % (ref 0–2)
BUN SERPL-MCNC: 34 MG/DL (ref 6–23)
BUN SERPL-MCNC: 75 MG/DL (ref 6–23)
CALCIUM SERPL-MCNC: 7.8 MG/DL (ref 8.6–10.2)
CALCIUM SERPL-MCNC: 8 MG/DL (ref 8.6–10.2)
CHLORIDE SERPL-SCNC: 105 MMOL/L (ref 98–107)
CHLORIDE SERPL-SCNC: 108 MMOL/L (ref 98–107)
CO2 SERPL-SCNC: 25 MMOL/L (ref 22–29)
CO2 SERPL-SCNC: 26 MMOL/L (ref 22–29)
CREAT SERPL-MCNC: 2.5 MG/DL (ref 0.7–1.2)
CREAT SERPL-MCNC: 4.5 MG/DL (ref 0.7–1.2)
EOSINOPHIL # BLD: 0 K/UL (ref 0.05–0.5)
EOSINOPHILS RELATIVE PERCENT: 0 % (ref 0–6)
ERYTHROCYTE [DISTWIDTH] IN BLOOD BY AUTOMATED COUNT: 15.7 % (ref 11.5–15)
GFR SERPL CREATININE-BSD FRML MDRD: 13 ML/MIN/1.73M2
GFR SERPL CREATININE-BSD FRML MDRD: 27 ML/MIN/1.73M2
GLUCOSE BLD-MCNC: 105 MG/DL (ref 74–99)
GLUCOSE BLD-MCNC: 124 MG/DL (ref 74–99)
GLUCOSE BLD-MCNC: 126 MG/DL (ref 74–99)
GLUCOSE SERPL-MCNC: 120 MG/DL (ref 74–99)
GLUCOSE SERPL-MCNC: 147 MG/DL (ref 74–99)
HCT VFR BLD AUTO: 20.8 % (ref 37–54)
HCT VFR BLD AUTO: 26 % (ref 37–54)
HGB BLD-MCNC: 6.3 G/DL (ref 12.5–16.5)
HGB BLD-MCNC: 7.8 G/DL (ref 12.5–16.5)
LYMPHOCYTES NFR BLD: 0.13 K/UL (ref 1.5–4)
LYMPHOCYTES RELATIVE PERCENT: 2 % (ref 20–42)
MAGNESIUM SERPL-MCNC: 1.9 MG/DL (ref 1.6–2.6)
MAGNESIUM SERPL-MCNC: 2.2 MG/DL (ref 1.6–2.6)
MCH RBC QN AUTO: 28.3 PG (ref 26–35)
MCHC RBC AUTO-ENTMCNC: 30 G/DL (ref 32–34.5)
MCV RBC AUTO: 94.2 FL (ref 80–99.9)
MONOCYTES NFR BLD: 0.07 K/UL (ref 0.1–0.95)
MONOCYTES NFR BLD: 1 % (ref 2–12)
NEUTROPHILS NFR BLD: 97 % (ref 43–80)
NEUTS SEG NFR BLD: 7.5 K/UL (ref 1.8–7.3)
PARTIAL THROMBOPLASTIN TIME: 102.5 SEC (ref 24.5–35.1)
PARTIAL THROMBOPLASTIN TIME: 81.2 SEC (ref 24.5–35.1)
PARTIAL THROMBOPLASTIN TIME: >240 SEC (ref 24.5–35.1)
PARTIAL THROMBOPLASTIN TIME: >240 SEC (ref 24.5–35.1)
PHOSPHATE SERPL-MCNC: 3.5 MG/DL (ref 2.5–4.5)
PHOSPHATE SERPL-MCNC: 4.7 MG/DL (ref 2.5–4.5)
PLATELET # BLD AUTO: 88 K/UL (ref 130–450)
PLATELET CONFIRMATION: NORMAL
PMV BLD AUTO: 12.3 FL (ref 7–12)
POTASSIUM SERPL-SCNC: 3.8 MMOL/L (ref 3.5–5)
POTASSIUM SERPL-SCNC: 4.1 MMOL/L (ref 3.5–5)
RBC # BLD AUTO: 2.76 M/UL (ref 3.8–5.8)
SODIUM SERPL-SCNC: 141 MMOL/L (ref 132–146)
SODIUM SERPL-SCNC: 143 MMOL/L (ref 132–146)
WBC OTHER # BLD: 7.7 K/UL (ref 4.5–11.5)

## 2023-11-04 PROCEDURE — 90935 HEMODIALYSIS ONE EVALUATION: CPT

## 2023-11-04 PROCEDURE — 85025 COMPLETE CBC W/AUTO DIFF WBC: CPT

## 2023-11-04 PROCEDURE — 2700000000 HC OXYGEN THERAPY PER DAY

## 2023-11-04 PROCEDURE — 2580000003 HC RX 258: Performed by: INTERNAL MEDICINE

## 2023-11-04 PROCEDURE — 82962 GLUCOSE BLOOD TEST: CPT

## 2023-11-04 PROCEDURE — C9113 INJ PANTOPRAZOLE SODIUM, VIA: HCPCS | Performed by: STUDENT IN AN ORGANIZED HEALTH CARE EDUCATION/TRAINING PROGRAM

## 2023-11-04 PROCEDURE — 6360000002 HC RX W HCPCS: Performed by: INTERNAL MEDICINE

## 2023-11-04 PROCEDURE — 85730 THROMBOPLASTIN TIME PARTIAL: CPT

## 2023-11-04 PROCEDURE — 85014 HEMATOCRIT: CPT

## 2023-11-04 PROCEDURE — 83735 ASSAY OF MAGNESIUM: CPT

## 2023-11-04 PROCEDURE — 99232 SBSQ HOSP IP/OBS MODERATE 35: CPT | Performed by: INTERNAL MEDICINE

## 2023-11-04 PROCEDURE — A4216 STERILE WATER/SALINE, 10 ML: HCPCS | Performed by: STUDENT IN AN ORGANIZED HEALTH CARE EDUCATION/TRAINING PROGRAM

## 2023-11-04 PROCEDURE — 6360000002 HC RX W HCPCS

## 2023-11-04 PROCEDURE — P9016 RBC LEUKOCYTES REDUCED: HCPCS

## 2023-11-04 PROCEDURE — 6360000002 HC RX W HCPCS: Performed by: STUDENT IN AN ORGANIZED HEALTH CARE EDUCATION/TRAINING PROGRAM

## 2023-11-04 PROCEDURE — 36592 COLLECT BLOOD FROM PICC: CPT

## 2023-11-04 PROCEDURE — 84100 ASSAY OF PHOSPHORUS: CPT

## 2023-11-04 PROCEDURE — 80048 BASIC METABOLIC PNL TOTAL CA: CPT

## 2023-11-04 PROCEDURE — 6370000000 HC RX 637 (ALT 250 FOR IP): Performed by: STUDENT IN AN ORGANIZED HEALTH CARE EDUCATION/TRAINING PROGRAM

## 2023-11-04 PROCEDURE — 36430 TRANSFUSION BLD/BLD COMPNT: CPT

## 2023-11-04 PROCEDURE — 6370000000 HC RX 637 (ALT 250 FOR IP): Performed by: FAMILY MEDICINE

## 2023-11-04 PROCEDURE — 2580000003 HC RX 258: Performed by: STUDENT IN AN ORGANIZED HEALTH CARE EDUCATION/TRAINING PROGRAM

## 2023-11-04 PROCEDURE — 6370000000 HC RX 637 (ALT 250 FOR IP): Performed by: NURSE PRACTITIONER

## 2023-11-04 PROCEDURE — 6370000000 HC RX 637 (ALT 250 FOR IP): Performed by: INTERNAL MEDICINE

## 2023-11-04 PROCEDURE — 2060000000 HC ICU INTERMEDIATE R&B

## 2023-11-04 PROCEDURE — P9047 ALBUMIN (HUMAN), 25%, 50ML: HCPCS | Performed by: INTERNAL MEDICINE

## 2023-11-04 PROCEDURE — 85018 HEMOGLOBIN: CPT

## 2023-11-04 PROCEDURE — P9047 ALBUMIN (HUMAN), 25%, 50ML: HCPCS

## 2023-11-04 RX ORDER — ALBUMIN (HUMAN) 12.5 G/50ML
SOLUTION INTRAVENOUS
Status: COMPLETED
Start: 2023-11-04 | End: 2023-11-04

## 2023-11-04 RX ORDER — ALBUMIN (HUMAN) 12.5 G/50ML
25 SOLUTION INTRAVENOUS
Status: COMPLETED | OUTPATIENT
Start: 2023-11-04 | End: 2023-11-04

## 2023-11-04 RX ORDER — SODIUM CHLORIDE 9 MG/ML
INJECTION, SOLUTION INTRAVENOUS PRN
Status: DISCONTINUED | OUTPATIENT
Start: 2023-11-04 | End: 2023-11-06

## 2023-11-04 RX ADMIN — Medication 1 CAPSULE: at 08:41

## 2023-11-04 RX ADMIN — PETROLATUM: 420 OINTMENT TOPICAL at 08:42

## 2023-11-04 RX ADMIN — TACROLIMUS 2 MG: 1 CAPSULE ORAL at 21:23

## 2023-11-04 RX ADMIN — Medication 1 CAPSULE: at 21:26

## 2023-11-04 RX ADMIN — WATER 2000 MG: 1 INJECTION INTRAMUSCULAR; INTRAVENOUS; SUBCUTANEOUS at 12:42

## 2023-11-04 RX ADMIN — ARGATROBAN 0.8 MCG/KG/MIN: 1 INJECTION INTRAVENOUS at 03:27

## 2023-11-04 RX ADMIN — ATORVASTATIN CALCIUM 10 MG: 10 TABLET, FILM COATED ORAL at 08:40

## 2023-11-04 RX ADMIN — Medication 10 ML: at 08:42

## 2023-11-04 RX ADMIN — TACROLIMUS 2 MG: 1 CAPSULE ORAL at 08:40

## 2023-11-04 RX ADMIN — METOPROLOL TARTRATE 12.5 MG: 25 TABLET, FILM COATED ORAL at 08:40

## 2023-11-04 RX ADMIN — ALBUMIN (HUMAN) 25 G: 0.25 INJECTION, SOLUTION INTRAVENOUS at 09:20

## 2023-11-04 RX ADMIN — SODIUM CHLORIDE, PRESERVATIVE FREE 40 MG: 5 INJECTION INTRAVENOUS at 21:22

## 2023-11-04 RX ADMIN — ALBUMIN (HUMAN) 25 G: 0.25 INJECTION, SOLUTION INTRAVENOUS at 09:59

## 2023-11-04 RX ADMIN — PREDNISONE 5 MG: 5 TABLET ORAL at 08:41

## 2023-11-04 RX ADMIN — SODIUM CHLORIDE, PRESERVATIVE FREE 10 ML: 5 INJECTION INTRAVENOUS at 21:29

## 2023-11-04 RX ADMIN — SODIUM CHLORIDE, PRESERVATIVE FREE 10 ML: 5 INJECTION INTRAVENOUS at 08:42

## 2023-11-04 RX ADMIN — CALCITRIOL CAPSULES 0.25 MCG 0.25 MCG: 0.25 CAPSULE ORAL at 08:40

## 2023-11-04 RX ADMIN — LATANOPROST 1 DROP: 50 SOLUTION OPHTHALMIC at 21:28

## 2023-11-04 RX ADMIN — PETROLATUM: 420 OINTMENT TOPICAL at 21:26

## 2023-11-04 RX ADMIN — SODIUM CHLORIDE, PRESERVATIVE FREE 40 MG: 5 INJECTION INTRAVENOUS at 08:40

## 2023-11-04 ASSESSMENT — PAIN SCALES - GENERAL
PAINLEVEL_OUTOF10: 0

## 2023-11-05 LAB
ANION GAP SERPL CALCULATED.3IONS-SCNC: 11 MMOL/L (ref 7–16)
ANION GAP SERPL CALCULATED.3IONS-SCNC: 15 MMOL/L (ref 7–16)
ATYPICAL LYMPHOCYTE ABSOLUTE COUNT: 0.82 K/UL (ref 0–0.46)
ATYPICAL LYMPHOCYTES: 8 % (ref 0–4)
BASOPHILS # BLD: 0 K/UL (ref 0–0.2)
BASOPHILS NFR BLD: 0 % (ref 0–2)
BUN SERPL-MCNC: 38 MG/DL (ref 6–23)
BUN SERPL-MCNC: 42 MG/DL (ref 6–23)
CALCIUM SERPL-MCNC: 7.8 MG/DL (ref 8.6–10.2)
CALCIUM SERPL-MCNC: 8 MG/DL (ref 8.6–10.2)
CHLORIDE SERPL-SCNC: 105 MMOL/L (ref 98–107)
CHLORIDE SERPL-SCNC: 106 MMOL/L (ref 98–107)
CO2 SERPL-SCNC: 23 MMOL/L (ref 22–29)
CO2 SERPL-SCNC: 25 MMOL/L (ref 22–29)
CREAT SERPL-MCNC: 3 MG/DL (ref 0.7–1.2)
CREAT SERPL-MCNC: 3.3 MG/DL (ref 0.7–1.2)
EOSINOPHIL # BLD: 0 K/UL (ref 0.05–0.5)
EOSINOPHILS RELATIVE PERCENT: 0 % (ref 0–6)
ERYTHROCYTE [DISTWIDTH] IN BLOOD BY AUTOMATED COUNT: 16.1 % (ref 11.5–15)
GFR SERPL CREATININE-BSD FRML MDRD: 19 ML/MIN/1.73M2
GFR SERPL CREATININE-BSD FRML MDRD: 21 ML/MIN/1.73M2
GLUCOSE BLD-MCNC: 112 MG/DL (ref 74–99)
GLUCOSE BLD-MCNC: 128 MG/DL (ref 74–99)
GLUCOSE BLD-MCNC: 130 MG/DL (ref 74–99)
GLUCOSE SERPL-MCNC: 115 MG/DL (ref 74–99)
GLUCOSE SERPL-MCNC: 134 MG/DL (ref 74–99)
HCT VFR BLD AUTO: 25.2 % (ref 37–54)
HCT VFR BLD AUTO: 27.6 % (ref 37–54)
HCT VFR BLD AUTO: 27.8 % (ref 37–54)
HGB BLD-MCNC: 7.8 G/DL (ref 12.5–16.5)
HGB BLD-MCNC: 8.3 G/DL (ref 12.5–16.5)
HGB BLD-MCNC: 8.7 G/DL (ref 12.5–16.5)
LYMPHOCYTES NFR BLD: 2.96 K/UL (ref 1.5–4)
LYMPHOCYTES RELATIVE PERCENT: 29 % (ref 20–42)
MAGNESIUM SERPL-MCNC: 1.8 MG/DL (ref 1.6–2.6)
MAGNESIUM SERPL-MCNC: 2.3 MG/DL (ref 1.6–2.6)
MCH RBC QN AUTO: 28.9 PG (ref 26–35)
MCHC RBC AUTO-ENTMCNC: 31.3 G/DL (ref 32–34.5)
MCV RBC AUTO: 92.4 FL (ref 80–99.9)
MONOCYTES NFR BLD: 0.2 K/UL (ref 0.1–0.95)
MONOCYTES NFR BLD: 2 % (ref 2–12)
NEUTROPHILS NFR BLD: 61 % (ref 43–80)
NEUTS SEG NFR BLD: 6.22 K/UL (ref 1.8–7.3)
PARTIAL THROMBOPLASTIN TIME: 66.6 SEC (ref 24.5–35.1)
PF4 HEPARIN CMPLX IGG SERPL IA: 0.18 O.D. (ref 0–0.4)
PHOSPHATE SERPL-MCNC: 3.7 MG/DL (ref 2.5–4.5)
PHOSPHATE SERPL-MCNC: 4.5 MG/DL (ref 2.5–4.5)
PLATELET # BLD AUTO: 90 K/UL (ref 130–450)
PLATELET CONFIRMATION: NORMAL
PMV BLD AUTO: 12 FL (ref 7–12)
POTASSIUM SERPL-SCNC: 4 MMOL/L (ref 3.5–5)
POTASSIUM SERPL-SCNC: 4.1 MMOL/L (ref 3.5–5)
RBC # BLD AUTO: 3.01 M/UL (ref 3.8–5.8)
SEND OUT REPORT: NORMAL
SODIUM SERPL-SCNC: 142 MMOL/L (ref 132–146)
SODIUM SERPL-SCNC: 143 MMOL/L (ref 132–146)
TEST NAME: NORMAL
WBC OTHER # BLD: 10.2 K/UL (ref 4.5–11.5)

## 2023-11-05 PROCEDURE — 2700000000 HC OXYGEN THERAPY PER DAY

## 2023-11-05 PROCEDURE — 6360000002 HC RX W HCPCS: Performed by: STUDENT IN AN ORGANIZED HEALTH CARE EDUCATION/TRAINING PROGRAM

## 2023-11-05 PROCEDURE — 85025 COMPLETE CBC W/AUTO DIFF WBC: CPT

## 2023-11-05 PROCEDURE — 2060000000 HC ICU INTERMEDIATE R&B

## 2023-11-05 PROCEDURE — 6360000002 HC RX W HCPCS

## 2023-11-05 PROCEDURE — 99232 SBSQ HOSP IP/OBS MODERATE 35: CPT | Performed by: INTERNAL MEDICINE

## 2023-11-05 PROCEDURE — 6370000000 HC RX 637 (ALT 250 FOR IP): Performed by: NURSE PRACTITIONER

## 2023-11-05 PROCEDURE — 6370000000 HC RX 637 (ALT 250 FOR IP)

## 2023-11-05 PROCEDURE — 85014 HEMATOCRIT: CPT

## 2023-11-05 PROCEDURE — 36592 COLLECT BLOOD FROM PICC: CPT

## 2023-11-05 PROCEDURE — 84100 ASSAY OF PHOSPHORUS: CPT

## 2023-11-05 PROCEDURE — 2580000003 HC RX 258: Performed by: STUDENT IN AN ORGANIZED HEALTH CARE EDUCATION/TRAINING PROGRAM

## 2023-11-05 PROCEDURE — 6370000000 HC RX 637 (ALT 250 FOR IP): Performed by: STUDENT IN AN ORGANIZED HEALTH CARE EDUCATION/TRAINING PROGRAM

## 2023-11-05 PROCEDURE — 6370000000 HC RX 637 (ALT 250 FOR IP): Performed by: INTERNAL MEDICINE

## 2023-11-05 PROCEDURE — 2580000003 HC RX 258: Performed by: INTERNAL MEDICINE

## 2023-11-05 PROCEDURE — 6370000000 HC RX 637 (ALT 250 FOR IP): Performed by: FAMILY MEDICINE

## 2023-11-05 PROCEDURE — C9113 INJ PANTOPRAZOLE SODIUM, VIA: HCPCS | Performed by: STUDENT IN AN ORGANIZED HEALTH CARE EDUCATION/TRAINING PROGRAM

## 2023-11-05 PROCEDURE — 85730 THROMBOPLASTIN TIME PARTIAL: CPT

## 2023-11-05 PROCEDURE — 83735 ASSAY OF MAGNESIUM: CPT

## 2023-11-05 PROCEDURE — 85018 HEMOGLOBIN: CPT

## 2023-11-05 PROCEDURE — 6360000002 HC RX W HCPCS: Performed by: INTERNAL MEDICINE

## 2023-11-05 PROCEDURE — 80048 BASIC METABOLIC PNL TOTAL CA: CPT

## 2023-11-05 PROCEDURE — 82962 GLUCOSE BLOOD TEST: CPT

## 2023-11-05 RX ORDER — HYDROCORTISONE 25 MG/G
CREAM TOPICAL 2 TIMES DAILY
Status: DISCONTINUED | OUTPATIENT
Start: 2023-11-05 | End: 2023-11-27

## 2023-11-05 RX ORDER — SODIUM CHLORIDE 9 MG/ML
INJECTION, SOLUTION INTRAVENOUS PRN
Status: DISCONTINUED | OUTPATIENT
Start: 2023-11-05 | End: 2023-11-05

## 2023-11-05 RX ORDER — MAGNESIUM SULFATE IN WATER 40 MG/ML
2000 INJECTION, SOLUTION INTRAVENOUS ONCE
Status: COMPLETED | OUTPATIENT
Start: 2023-11-05 | End: 2023-11-05

## 2023-11-05 RX ORDER — CEFDINIR 300 MG/1
300 CAPSULE ORAL DAILY
Status: DISCONTINUED | OUTPATIENT
Start: 2023-11-06 | End: 2023-11-12

## 2023-11-05 RX ORDER — POTASSIUM CHLORIDE 20 MEQ/1
20 TABLET, EXTENDED RELEASE ORAL ONCE
Status: COMPLETED | OUTPATIENT
Start: 2023-11-05 | End: 2023-11-05

## 2023-11-05 RX ADMIN — TACROLIMUS 2 MG: 1 CAPSULE ORAL at 09:27

## 2023-11-05 RX ADMIN — SODIUM CHLORIDE, PRESERVATIVE FREE 10 ML: 5 INJECTION INTRAVENOUS at 09:28

## 2023-11-05 RX ADMIN — HYDROCORTISONE: 25 CREAM TOPICAL at 20:15

## 2023-11-05 RX ADMIN — POTASSIUM CHLORIDE 20 MEQ: 1500 TABLET, EXTENDED RELEASE ORAL at 06:24

## 2023-11-05 RX ADMIN — METOPROLOL TARTRATE 12.5 MG: 25 TABLET, FILM COATED ORAL at 09:26

## 2023-11-05 RX ADMIN — ATORVASTATIN CALCIUM 10 MG: 10 TABLET, FILM COATED ORAL at 09:26

## 2023-11-05 RX ADMIN — PETROLATUM: 420 OINTMENT TOPICAL at 09:28

## 2023-11-05 RX ADMIN — TACROLIMUS 2 MG: 1 CAPSULE ORAL at 20:14

## 2023-11-05 RX ADMIN — MAGNESIUM SULFATE HEPTAHYDRATE 2000 MG: 40 INJECTION, SOLUTION INTRAVENOUS at 06:24

## 2023-11-05 RX ADMIN — Medication 1 CAPSULE: at 20:15

## 2023-11-05 RX ADMIN — LATANOPROST 1 DROP: 50 SOLUTION OPHTHALMIC at 23:03

## 2023-11-05 RX ADMIN — SODIUM CHLORIDE, PRESERVATIVE FREE 40 MG: 5 INJECTION INTRAVENOUS at 20:16

## 2023-11-05 RX ADMIN — WATER 2000 MG: 1 INJECTION INTRAMUSCULAR; INTRAVENOUS; SUBCUTANEOUS at 12:47

## 2023-11-05 RX ADMIN — Medication 10 ML: at 10:41

## 2023-11-05 RX ADMIN — SODIUM CHLORIDE, PRESERVATIVE FREE 40 MG: 5 INJECTION INTRAVENOUS at 09:25

## 2023-11-05 RX ADMIN — HYDROCORTISONE: 25 CREAM TOPICAL at 10:41

## 2023-11-05 RX ADMIN — Medication 1 CAPSULE: at 09:26

## 2023-11-05 RX ADMIN — SODIUM CHLORIDE, PRESERVATIVE FREE 10 ML: 5 INJECTION INTRAVENOUS at 20:16

## 2023-11-05 RX ADMIN — PETROLATUM: 420 OINTMENT TOPICAL at 20:16

## 2023-11-05 RX ADMIN — CALCITRIOL CAPSULES 0.25 MCG 0.25 MCG: 0.25 CAPSULE ORAL at 09:27

## 2023-11-05 RX ADMIN — PREDNISONE 5 MG: 5 TABLET ORAL at 09:30

## 2023-11-05 ASSESSMENT — PAIN SCALES - GENERAL
PAINLEVEL_OUTOF10: 0

## 2023-11-06 LAB
ABO/RH: NORMAL
ANION GAP SERPL CALCULATED.3IONS-SCNC: 13 MMOL/L (ref 7–16)
ANION GAP SERPL CALCULATED.3IONS-SCNC: 13 MMOL/L (ref 7–16)
ANTIBODY IDENTIFICATION: NORMAL
ANTIBODY IDENTIFICATION: NORMAL
ANTIBODY SCREEN: POSITIVE
ARM BAND NUMBER: NORMAL
BASOPHILS # BLD: 0 K/UL (ref 0–0.2)
BASOPHILS NFR BLD: 0 % (ref 0–2)
BLOOD BANK BLOOD PRODUCT EXPIRATION DATE: NORMAL
BLOOD BANK COMMENT: NORMAL
BLOOD BANK COMMENT: NORMAL
BLOOD BANK DISPENSE STATUS: NORMAL
BLOOD BANK ISBT PRODUCT BLOOD TYPE: 5100
BLOOD BANK PRODUCT CODE: NORMAL
BLOOD BANK SAMPLE EXPIRATION: NORMAL
BLOOD BANK UNIT TYPE AND RH: NORMAL
BPU ID: NORMAL
BUN SERPL-MCNC: 20 MG/DL (ref 6–23)
BUN SERPL-MCNC: 45 MG/DL (ref 6–23)
CALCIUM SERPL-MCNC: 7.2 MG/DL (ref 8.6–10.2)
CALCIUM SERPL-MCNC: 7.6 MG/DL (ref 8.6–10.2)
CHLORIDE SERPL-SCNC: 102 MMOL/L (ref 98–107)
CHLORIDE SERPL-SCNC: 107 MMOL/L (ref 98–107)
CO2 SERPL-SCNC: 22 MMOL/L (ref 22–29)
CO2 SERPL-SCNC: 26 MMOL/L (ref 22–29)
COMPONENT: NORMAL
CREAT SERPL-MCNC: 2.2 MG/DL (ref 0.7–1.2)
CREAT SERPL-MCNC: 3.6 MG/DL (ref 0.7–1.2)
CROSSMATCH RESULT: NORMAL
DAT, POLYSPECIFIC: NEGATIVE
EOSINOPHIL # BLD: 0 K/UL (ref 0.05–0.5)
EOSINOPHILS RELATIVE PERCENT: 0 % (ref 0–6)
ERYTHROCYTE [DISTWIDTH] IN BLOOD BY AUTOMATED COUNT: 15.8 % (ref 11.5–15)
ERYTHROCYTE [DISTWIDTH] IN BLOOD BY AUTOMATED COUNT: 15.9 % (ref 11.5–15)
GFR SERPL CREATININE-BSD FRML MDRD: 17 ML/MIN/1.73M2
GFR SERPL CREATININE-BSD FRML MDRD: 30 ML/MIN/1.73M2
GLUCOSE BLD-MCNC: 111 MG/DL (ref 74–99)
GLUCOSE BLD-MCNC: 82 MG/DL (ref 74–99)
GLUCOSE BLD-MCNC: 99 MG/DL (ref 74–99)
GLUCOSE SERPL-MCNC: 110 MG/DL (ref 74–99)
GLUCOSE SERPL-MCNC: 99 MG/DL (ref 74–99)
HCT VFR BLD AUTO: 26.8 % (ref 37–54)
HCT VFR BLD AUTO: 27.5 % (ref 37–54)
HCT VFR BLD AUTO: 29.2 % (ref 37–54)
HGB BLD-MCNC: 8.2 G/DL (ref 12.5–16.5)
HGB BLD-MCNC: 8.6 G/DL (ref 12.5–16.5)
HGB BLD-MCNC: 9.1 G/DL (ref 12.5–16.5)
LYMPHOCYTES NFR BLD: 0 K/UL (ref 1.5–4)
LYMPHOCYTES RELATIVE PERCENT: 0 % (ref 20–42)
MAGNESIUM SERPL-MCNC: 2 MG/DL (ref 1.6–2.6)
MAGNESIUM SERPL-MCNC: 2.2 MG/DL (ref 1.6–2.6)
MCH RBC QN AUTO: 28.3 PG (ref 26–35)
MCH RBC QN AUTO: 28.8 PG (ref 26–35)
MCHC RBC AUTO-ENTMCNC: 30.6 G/DL (ref 32–34.5)
MCHC RBC AUTO-ENTMCNC: 31.2 G/DL (ref 32–34.5)
MCV RBC AUTO: 91 FL (ref 80–99.9)
MCV RBC AUTO: 94 FL (ref 80–99.9)
METAMYELOCYTES ABSOLUTE COUNT: 0.11 K/UL (ref 0–0.12)
METAMYELOCYTES: 2 % (ref 0–1)
MONOCYTES NFR BLD: 0.11 K/UL (ref 0.1–0.95)
MONOCYTES NFR BLD: 2 % (ref 2–12)
NEUTROPHILS NFR BLD: 97 % (ref 43–80)
NEUTS SEG NFR BLD: 5.89 K/UL (ref 1.8–7.3)
NUCLEATED RED BLOOD CELLS: 1 PER 100 WBC
PARTIAL THROMBOPLASTIN TIME: 59.4 SEC (ref 24.5–35.1)
PARTIAL THROMBOPLASTIN TIME: 60.6 SEC (ref 24.5–35.1)
PARTIAL THROMBOPLASTIN TIME: >240 SEC (ref 24.5–35.1)
PHOSPHATE SERPL-MCNC: 2.8 MG/DL (ref 2.5–4.5)
PHOSPHATE SERPL-MCNC: 4.8 MG/DL (ref 2.5–4.5)
PLATELET # BLD AUTO: 73 K/UL (ref 130–450)
PLATELET # BLD AUTO: 94 K/UL (ref 130–450)
PLATELET CONFIRMATION: NORMAL
PLATELET CONFIRMATION: NORMAL
PMV BLD AUTO: 11.3 FL (ref 7–12)
PMV BLD AUTO: 11.5 FL (ref 7–12)
POTASSIUM SERPL-SCNC: 4.2 MMOL/L (ref 3.5–5)
POTASSIUM SERPL-SCNC: 4.3 MMOL/L (ref 3.5–5)
RBC # BLD AUTO: 2.85 M/UL (ref 3.8–5.8)
RBC # BLD AUTO: 3.21 M/UL (ref 3.8–5.8)
RBC # BLD: ABNORMAL 10*6/UL
SODIUM SERPL-SCNC: 141 MMOL/L (ref 132–146)
SODIUM SERPL-SCNC: 142 MMOL/L (ref 132–146)
TRANSFUSION STATUS: NORMAL
UNIT DIVISION: 0
UNIT ISSUE DATE/TIME: NORMAL
WBC # BLD: ABNORMAL 10*3/UL
WBC OTHER # BLD: 6.1 K/UL (ref 4.5–11.5)
WBC OTHER # BLD: 7.2 K/UL (ref 4.5–11.5)

## 2023-11-06 PROCEDURE — 6370000000 HC RX 637 (ALT 250 FOR IP): Performed by: INTERNAL MEDICINE

## 2023-11-06 PROCEDURE — 82962 GLUCOSE BLOOD TEST: CPT

## 2023-11-06 PROCEDURE — 2580000003 HC RX 258: Performed by: STUDENT IN AN ORGANIZED HEALTH CARE EDUCATION/TRAINING PROGRAM

## 2023-11-06 PROCEDURE — 85730 THROMBOPLASTIN TIME PARTIAL: CPT

## 2023-11-06 PROCEDURE — 2580000003 HC RX 258: Performed by: INTERNAL MEDICINE

## 2023-11-06 PROCEDURE — 2700000000 HC OXYGEN THERAPY PER DAY

## 2023-11-06 PROCEDURE — 6370000000 HC RX 637 (ALT 250 FOR IP): Performed by: FAMILY MEDICINE

## 2023-11-06 PROCEDURE — 6370000000 HC RX 637 (ALT 250 FOR IP): Performed by: NURSE PRACTITIONER

## 2023-11-06 PROCEDURE — 85018 HEMOGLOBIN: CPT

## 2023-11-06 PROCEDURE — 6360000002 HC RX W HCPCS: Performed by: STUDENT IN AN ORGANIZED HEALTH CARE EDUCATION/TRAINING PROGRAM

## 2023-11-06 PROCEDURE — 99232 SBSQ HOSP IP/OBS MODERATE 35: CPT | Performed by: NURSE PRACTITIONER

## 2023-11-06 PROCEDURE — 85027 COMPLETE CBC AUTOMATED: CPT

## 2023-11-06 PROCEDURE — 80048 BASIC METABOLIC PNL TOTAL CA: CPT

## 2023-11-06 PROCEDURE — 90935 HEMODIALYSIS ONE EVALUATION: CPT

## 2023-11-06 PROCEDURE — 6360000002 HC RX W HCPCS

## 2023-11-06 PROCEDURE — A4216 STERILE WATER/SALINE, 10 ML: HCPCS | Performed by: STUDENT IN AN ORGANIZED HEALTH CARE EDUCATION/TRAINING PROGRAM

## 2023-11-06 PROCEDURE — 99233 SBSQ HOSP IP/OBS HIGH 50: CPT | Performed by: INTERNAL MEDICINE

## 2023-11-06 PROCEDURE — 6370000000 HC RX 637 (ALT 250 FOR IP)

## 2023-11-06 PROCEDURE — 6370000000 HC RX 637 (ALT 250 FOR IP): Performed by: STUDENT IN AN ORGANIZED HEALTH CARE EDUCATION/TRAINING PROGRAM

## 2023-11-06 PROCEDURE — 83735 ASSAY OF MAGNESIUM: CPT

## 2023-11-06 PROCEDURE — 85014 HEMATOCRIT: CPT

## 2023-11-06 PROCEDURE — C9113 INJ PANTOPRAZOLE SODIUM, VIA: HCPCS | Performed by: STUDENT IN AN ORGANIZED HEALTH CARE EDUCATION/TRAINING PROGRAM

## 2023-11-06 PROCEDURE — 2140000000 HC CCU INTERMEDIATE R&B

## 2023-11-06 PROCEDURE — 84100 ASSAY OF PHOSPHORUS: CPT

## 2023-11-06 PROCEDURE — 85025 COMPLETE CBC W/AUTO DIFF WBC: CPT

## 2023-11-06 RX ORDER — HEPARIN SODIUM 1000 [USP'U]/ML
40 INJECTION, SOLUTION INTRAVENOUS; SUBCUTANEOUS PRN
Status: DISCONTINUED | OUTPATIENT
Start: 2023-11-06 | End: 2023-11-09 | Stop reason: ALTCHOICE

## 2023-11-06 RX ORDER — ACETAMINOPHEN 325 MG/1
650 TABLET ORAL EVERY 4 HOURS PRN
Status: DISCONTINUED | OUTPATIENT
Start: 2023-11-06 | End: 2023-11-27

## 2023-11-06 RX ORDER — HEPARIN SODIUM 1000 [USP'U]/ML
80 INJECTION, SOLUTION INTRAVENOUS; SUBCUTANEOUS PRN
Status: DISCONTINUED | OUTPATIENT
Start: 2023-11-06 | End: 2023-11-09 | Stop reason: ALTCHOICE

## 2023-11-06 RX ORDER — CALCIUM GLUCONATE 10 MG/ML
1000 INJECTION, SOLUTION INTRAVENOUS ONCE
Status: COMPLETED | OUTPATIENT
Start: 2023-11-06 | End: 2023-11-06

## 2023-11-06 RX ORDER — MECOBALAMIN 5000 MCG
10 TABLET,DISINTEGRATING ORAL NIGHTLY
Status: DISCONTINUED | OUTPATIENT
Start: 2023-11-06 | End: 2023-11-27

## 2023-11-06 RX ORDER — HEPARIN SODIUM 10000 [USP'U]/100ML
5-30 INJECTION, SOLUTION INTRAVENOUS CONTINUOUS
Status: DISCONTINUED | OUTPATIENT
Start: 2023-11-06 | End: 2023-11-09

## 2023-11-06 RX ADMIN — Medication 10 ML: at 21:28

## 2023-11-06 RX ADMIN — Medication 10 ML: at 09:48

## 2023-11-06 RX ADMIN — LATANOPROST 1 DROP: 50 SOLUTION OPHTHALMIC at 21:28

## 2023-11-06 RX ADMIN — HYDROCORTISONE: 25 CREAM TOPICAL at 21:31

## 2023-11-06 RX ADMIN — Medication 1 CAPSULE: at 21:24

## 2023-11-06 RX ADMIN — PETROLATUM: 420 OINTMENT TOPICAL at 10:35

## 2023-11-06 RX ADMIN — SODIUM CHLORIDE, PRESERVATIVE FREE 10 ML: 5 INJECTION INTRAVENOUS at 21:28

## 2023-11-06 RX ADMIN — ATORVASTATIN CALCIUM 10 MG: 10 TABLET, FILM COATED ORAL at 10:34

## 2023-11-06 RX ADMIN — Medication 10 MG: at 02:15

## 2023-11-06 RX ADMIN — CALCIUM GLUCONATE 1000 MG: 10 INJECTION, SOLUTION INTRAVENOUS at 21:46

## 2023-11-06 RX ADMIN — METOPROLOL TARTRATE 12.5 MG: 25 TABLET, FILM COATED ORAL at 10:34

## 2023-11-06 RX ADMIN — SODIUM CHLORIDE, PRESERVATIVE FREE 40 MG: 5 INJECTION INTRAVENOUS at 10:34

## 2023-11-06 RX ADMIN — CALCITRIOL CAPSULES 0.25 MCG 0.25 MCG: 0.25 CAPSULE ORAL at 10:35

## 2023-11-06 RX ADMIN — HEPARIN SODIUM 18 UNITS/KG/HR: 10000 INJECTION, SOLUTION INTRAVENOUS at 12:43

## 2023-11-06 RX ADMIN — Medication 1 CAPSULE: at 10:33

## 2023-11-06 RX ADMIN — TACROLIMUS 2 MG: 1 CAPSULE ORAL at 21:24

## 2023-11-06 RX ADMIN — PREDNISONE 5 MG: 5 TABLET ORAL at 10:34

## 2023-11-06 RX ADMIN — Medication 10 MG: at 21:24

## 2023-11-06 RX ADMIN — TACROLIMUS 2 MG: 1 CAPSULE ORAL at 10:35

## 2023-11-06 RX ADMIN — SODIUM CHLORIDE, PRESERVATIVE FREE 10 ML: 5 INJECTION INTRAVENOUS at 08:48

## 2023-11-06 RX ADMIN — PETROLATUM: 420 OINTMENT TOPICAL at 21:31

## 2023-11-06 RX ADMIN — SODIUM CHLORIDE, PRESERVATIVE FREE 40 MG: 5 INJECTION INTRAVENOUS at 21:28

## 2023-11-06 RX ADMIN — CEFDINIR 300 MG: 300 CAPSULE ORAL at 10:33

## 2023-11-06 RX ADMIN — ACETAMINOPHEN 650 MG: 325 TABLET ORAL at 21:24

## 2023-11-06 RX ADMIN — METOPROLOL TARTRATE 12.5 MG: 25 TABLET, FILM COATED ORAL at 21:24

## 2023-11-06 ASSESSMENT — PAIN SCALES - GENERAL
PAINLEVEL_OUTOF10: 0
PAINLEVEL_OUTOF10: 6
PAINLEVEL_OUTOF10: 0

## 2023-11-06 ASSESSMENT — PAIN DESCRIPTION - LOCATION: LOCATION: SHOULDER;KNEE

## 2023-11-06 ASSESSMENT — PAIN DESCRIPTION - ORIENTATION: ORIENTATION: RIGHT;LEFT

## 2023-11-06 ASSESSMENT — PAIN DESCRIPTION - PAIN TYPE: TYPE: OTHER (COMMENT)

## 2023-11-06 ASSESSMENT — PAIN DESCRIPTION - FREQUENCY: FREQUENCY: CONTINUOUS

## 2023-11-06 ASSESSMENT — PAIN DESCRIPTION - DESCRIPTORS: DESCRIPTORS: PATIENT UNABLE TO DESCRIBE

## 2023-11-06 ASSESSMENT — PAIN DESCRIPTION - ONSET: ONSET: AWAKENED FROM SLEEP

## 2023-11-06 ASSESSMENT — PAIN - FUNCTIONAL ASSESSMENT: PAIN_FUNCTIONAL_ASSESSMENT: PREVENTS OR INTERFERES WITH ALL ACTIVE AND SOME PASSIVE ACTIVITIES

## 2023-11-06 NOTE — CARE COORDINATION
Care Coordination: LOS 12 day. S/p R IF TDC on 11/3/23, nephrology following. HD today. Plan is [facility] at discharge. Will need updated therapy evals and precert. Will need HD days to be MWF for facility to transport. [facility] is aware.  ICU team rounding,  will continue to follow for medical stability and timing of precert    Electronically signed by Linda Dodge RN on 11/6/2023 at 3:55 PM

## 2023-11-07 LAB
ANION GAP SERPL CALCULATED.3IONS-SCNC: 10 MMOL/L (ref 7–16)
BASOPHILS # BLD: 0 K/UL (ref 0–0.2)
BASOPHILS NFR BLD: 0 % (ref 0–2)
BUN SERPL-MCNC: 23 MG/DL (ref 6–23)
CA-I BLD-SCNC: 1.02 MMOL/L (ref 1.15–1.33)
CALCIUM SERPL-MCNC: 7.3 MG/DL (ref 8.6–10.2)
CHLORIDE SERPL-SCNC: 102 MMOL/L (ref 98–107)
CO2 SERPL-SCNC: 26 MMOL/L (ref 22–29)
CREAT SERPL-MCNC: 2.5 MG/DL (ref 0.7–1.2)
EKG ATRIAL RATE: 119 BPM
EKG Q-T INTERVAL: 460 MS
EKG QRS DURATION: 74 MS
EKG QTC CALCULATION (BAZETT): 513 MS
EKG R AXIS: -6 DEGREES
EKG T AXIS: 136 DEGREES
EKG VENTRICULAR RATE: 75 BPM
EOSINOPHIL # BLD: 0 K/UL (ref 0.05–0.5)
EOSINOPHILS RELATIVE PERCENT: 0 % (ref 0–6)
ERYTHROCYTE [DISTWIDTH] IN BLOOD BY AUTOMATED COUNT: 15.6 % (ref 11.5–15)
GFR SERPL CREATININE-BSD FRML MDRD: 27 ML/MIN/1.73M2
GLUCOSE BLD-MCNC: 131 MG/DL (ref 74–99)
GLUCOSE BLD-MCNC: 135 MG/DL (ref 74–99)
GLUCOSE BLD-MCNC: 137 MG/DL (ref 74–99)
GLUCOSE BLD-MCNC: 152 MG/DL (ref 74–99)
GLUCOSE SERPL-MCNC: 163 MG/DL (ref 74–99)
HCT VFR BLD AUTO: 27.3 % (ref 37–54)
HGB BLD-MCNC: 8.4 G/DL (ref 12.5–16.5)
IMM GRANULOCYTES # BLD AUTO: 0.06 K/UL (ref 0–0.58)
IMM GRANULOCYTES NFR BLD: 1 % (ref 0–5)
LYMPHOCYTES NFR BLD: 3.1 K/UL (ref 1.5–4)
LYMPHOCYTES RELATIVE PERCENT: 41 % (ref 20–42)
MAGNESIUM SERPL-MCNC: 1.9 MG/DL (ref 1.6–2.6)
MCH RBC QN AUTO: 28.4 PG (ref 26–35)
MCHC RBC AUTO-ENTMCNC: 30.8 G/DL (ref 32–34.5)
MCV RBC AUTO: 92.2 FL (ref 80–99.9)
MONOCYTES NFR BLD: 0.31 K/UL (ref 0.1–0.95)
MONOCYTES NFR BLD: 4 % (ref 2–12)
NEUTROPHILS NFR BLD: 55 % (ref 43–80)
NEUTS SEG NFR BLD: 4.19 K/UL (ref 1.8–7.3)
PARTIAL THROMBOPLASTIN TIME: >240 SEC (ref 24.5–35.1)
PHOSPHATE SERPL-MCNC: 3.4 MG/DL (ref 2.5–4.5)
PLATELET # BLD AUTO: 86 K/UL (ref 130–450)
PLATELET CONFIRMATION: NORMAL
PMV BLD AUTO: 11 FL (ref 7–12)
POTASSIUM SERPL-SCNC: 4.2 MMOL/L (ref 3.5–5)
RBC # BLD AUTO: 2.96 M/UL (ref 3.8–5.8)
SODIUM SERPL-SCNC: 138 MMOL/L (ref 132–146)
WBC OTHER # BLD: 7.7 K/UL (ref 4.5–11.5)

## 2023-11-07 PROCEDURE — 84100 ASSAY OF PHOSPHORUS: CPT

## 2023-11-07 PROCEDURE — 85025 COMPLETE CBC W/AUTO DIFF WBC: CPT

## 2023-11-07 PROCEDURE — 6360000002 HC RX W HCPCS: Performed by: STUDENT IN AN ORGANIZED HEALTH CARE EDUCATION/TRAINING PROGRAM

## 2023-11-07 PROCEDURE — 6370000000 HC RX 637 (ALT 250 FOR IP): Performed by: STUDENT IN AN ORGANIZED HEALTH CARE EDUCATION/TRAINING PROGRAM

## 2023-11-07 PROCEDURE — 6360000002 HC RX W HCPCS

## 2023-11-07 PROCEDURE — 6370000000 HC RX 637 (ALT 250 FOR IP): Performed by: INTERNAL MEDICINE

## 2023-11-07 PROCEDURE — 6370000000 HC RX 637 (ALT 250 FOR IP): Performed by: FAMILY MEDICINE

## 2023-11-07 PROCEDURE — 97530 THERAPEUTIC ACTIVITIES: CPT

## 2023-11-07 PROCEDURE — 93005 ELECTROCARDIOGRAM TRACING: CPT

## 2023-11-07 PROCEDURE — 82330 ASSAY OF CALCIUM: CPT

## 2023-11-07 PROCEDURE — 82962 GLUCOSE BLOOD TEST: CPT

## 2023-11-07 PROCEDURE — 99231 SBSQ HOSP IP/OBS SF/LOW 25: CPT | Performed by: NURSE PRACTITIONER

## 2023-11-07 PROCEDURE — 80048 BASIC METABOLIC PNL TOTAL CA: CPT

## 2023-11-07 PROCEDURE — 2140000000 HC CCU INTERMEDIATE R&B

## 2023-11-07 PROCEDURE — 2580000003 HC RX 258: Performed by: INTERNAL MEDICINE

## 2023-11-07 PROCEDURE — A4216 STERILE WATER/SALINE, 10 ML: HCPCS | Performed by: STUDENT IN AN ORGANIZED HEALTH CARE EDUCATION/TRAINING PROGRAM

## 2023-11-07 PROCEDURE — 83735 ASSAY OF MAGNESIUM: CPT

## 2023-11-07 PROCEDURE — 2700000000 HC OXYGEN THERAPY PER DAY

## 2023-11-07 PROCEDURE — 6370000000 HC RX 637 (ALT 250 FOR IP)

## 2023-11-07 PROCEDURE — 36592 COLLECT BLOOD FROM PICC: CPT

## 2023-11-07 PROCEDURE — 80197 ASSAY OF TACROLIMUS: CPT

## 2023-11-07 PROCEDURE — 6370000000 HC RX 637 (ALT 250 FOR IP): Performed by: NURSE PRACTITIONER

## 2023-11-07 PROCEDURE — 2580000003 HC RX 258: Performed by: STUDENT IN AN ORGANIZED HEALTH CARE EDUCATION/TRAINING PROGRAM

## 2023-11-07 PROCEDURE — 6360000002 HC RX W HCPCS: Performed by: INTERNAL MEDICINE

## 2023-11-07 PROCEDURE — 85730 THROMBOPLASTIN TIME PARTIAL: CPT

## 2023-11-07 PROCEDURE — C9113 INJ PANTOPRAZOLE SODIUM, VIA: HCPCS | Performed by: STUDENT IN AN ORGANIZED HEALTH CARE EDUCATION/TRAINING PROGRAM

## 2023-11-07 RX ORDER — CALCIUM GLUCONATE 10 MG/ML
1000 INJECTION, SOLUTION INTRAVENOUS ONCE
Status: COMPLETED | OUTPATIENT
Start: 2023-11-07 | End: 2023-11-07

## 2023-11-07 RX ORDER — MAGNESIUM SULFATE IN WATER 40 MG/ML
2000 INJECTION, SOLUTION INTRAVENOUS ONCE
Status: COMPLETED | OUTPATIENT
Start: 2023-11-07 | End: 2023-11-07

## 2023-11-07 RX ADMIN — Medication 10 MG: at 22:08

## 2023-11-07 RX ADMIN — MAGNESIUM SULFATE HEPTAHYDRATE 2000 MG: 40 INJECTION, SOLUTION INTRAVENOUS at 11:45

## 2023-11-07 RX ADMIN — Medication 1 CAPSULE: at 10:49

## 2023-11-07 RX ADMIN — SODIUM CHLORIDE, PRESERVATIVE FREE 40 MG: 5 INJECTION INTRAVENOUS at 22:09

## 2023-11-07 RX ADMIN — SODIUM CHLORIDE, PRESERVATIVE FREE 40 MG: 5 INJECTION INTRAVENOUS at 10:51

## 2023-11-07 RX ADMIN — PREDNISONE 5 MG: 5 TABLET ORAL at 10:51

## 2023-11-07 RX ADMIN — CALCIUM GLUCONATE 1000 MG: 10 INJECTION, SOLUTION INTRAVENOUS at 13:25

## 2023-11-07 RX ADMIN — ATORVASTATIN CALCIUM 10 MG: 10 TABLET, FILM COATED ORAL at 10:51

## 2023-11-07 RX ADMIN — HEPARIN SODIUM 12 UNITS/KG/HR: 10000 INJECTION, SOLUTION INTRAVENOUS at 06:01

## 2023-11-07 RX ADMIN — PETROLATUM: 420 OINTMENT TOPICAL at 22:09

## 2023-11-07 RX ADMIN — HYDROCORTISONE: 25 CREAM TOPICAL at 10:52

## 2023-11-07 RX ADMIN — CEFDINIR 300 MG: 300 CAPSULE ORAL at 10:50

## 2023-11-07 RX ADMIN — TACROLIMUS 2 MG: 1 CAPSULE ORAL at 10:50

## 2023-11-07 RX ADMIN — HYDROCORTISONE: 25 CREAM TOPICAL at 22:09

## 2023-11-07 RX ADMIN — TACROLIMUS 2 MG: 1 CAPSULE ORAL at 22:53

## 2023-11-07 RX ADMIN — CALCITRIOL CAPSULES 0.25 MCG 0.25 MCG: 0.25 CAPSULE ORAL at 10:50

## 2023-11-07 RX ADMIN — PETROLATUM: 420 OINTMENT TOPICAL at 10:53

## 2023-11-07 RX ADMIN — SODIUM CHLORIDE, PRESERVATIVE FREE 10 ML: 5 INJECTION INTRAVENOUS at 22:53

## 2023-11-07 RX ADMIN — HEPARIN SODIUM 12 UNITS/KG/HR: 10000 INJECTION, SOLUTION INTRAVENOUS at 07:56

## 2023-11-07 RX ADMIN — Medication 1 CAPSULE: at 22:08

## 2023-11-07 RX ADMIN — METOPROLOL TARTRATE 12.5 MG: 25 TABLET, FILM COATED ORAL at 22:08

## 2023-11-07 RX ADMIN — METOPROLOL TARTRATE 12.5 MG: 25 TABLET, FILM COATED ORAL at 10:51

## 2023-11-07 ASSESSMENT — PAIN SCALES - GENERAL: PAINLEVEL_OUTOF10: 3

## 2023-11-07 NOTE — CARE COORDINATION
11/7:  Update CM Note:  Pt presented to the Er for generalized weakness, SOB & elevated temperature from home. Pt was a transfer to MICU on 11/6. Pt is on 1L/NC at 100%, Iv Heparin gtt & Iv Protonix. Pt had a tunneled cath placed on 11/3. Pt is tentative set up with [address] [place] [address] location for M-W-F at 630am.  Cm faxed on information to Sarika Phan. Per previous CM note's pt's dc plan is to [facility]. Will need PT/OT evals to start pre-cert. ELIA, PASR, Ambulance form placed in a envelope in soft chart. Will need to call PT/OT in the AM to see. SW/CM will continue to follow.   Electronically signed by Jake Black RN on 11/7/2023 at 2:48 PM

## 2023-11-08 LAB
ANION GAP SERPL CALCULATED.3IONS-SCNC: 13 MMOL/L (ref 7–16)
ANION GAP SERPL CALCULATED.3IONS-SCNC: 7 MMOL/L (ref 7–16)
BASOPHILS # BLD: 0.01 K/UL (ref 0–0.2)
BASOPHILS NFR BLD: 0 % (ref 0–2)
BUN SERPL-MCNC: 14 MG/DL (ref 6–23)
BUN SERPL-MCNC: 31 MG/DL (ref 6–23)
CALCIUM SERPL-MCNC: 7.3 MG/DL (ref 8.6–10.2)
CALCIUM SERPL-MCNC: 7.9 MG/DL (ref 8.6–10.2)
CHLORIDE SERPL-SCNC: 101 MMOL/L (ref 98–107)
CHLORIDE SERPL-SCNC: 102 MMOL/L (ref 98–107)
CO2 SERPL-SCNC: 24 MMOL/L (ref 22–29)
CO2 SERPL-SCNC: 28 MMOL/L (ref 22–29)
CREAT SERPL-MCNC: 2.1 MG/DL (ref 0.7–1.2)
CREAT SERPL-MCNC: 3.6 MG/DL (ref 0.7–1.2)
EOSINOPHIL # BLD: 0 K/UL (ref 0.05–0.5)
EOSINOPHILS RELATIVE PERCENT: 0 % (ref 0–6)
ERYTHROCYTE [DISTWIDTH] IN BLOOD BY AUTOMATED COUNT: 15.4 % (ref 11.5–15)
GFR SERPL CREATININE-BSD FRML MDRD: 17 ML/MIN/1.73M2
GFR SERPL CREATININE-BSD FRML MDRD: 33 ML/MIN/1.73M2
GLUCOSE BLD-MCNC: 102 MG/DL (ref 74–99)
GLUCOSE BLD-MCNC: 110 MG/DL (ref 74–99)
GLUCOSE BLD-MCNC: 110 MG/DL (ref 74–99)
GLUCOSE BLD-MCNC: 112 MG/DL (ref 74–99)
GLUCOSE BLD-MCNC: 88 MG/DL (ref 74–99)
GLUCOSE SERPL-MCNC: 103 MG/DL (ref 74–99)
GLUCOSE SERPL-MCNC: 115 MG/DL (ref 74–99)
HCT VFR BLD AUTO: 25.1 % (ref 37–54)
HCT VFR BLD AUTO: 25.7 % (ref 37–54)
HGB BLD-MCNC: 7.7 G/DL (ref 12.5–16.5)
HGB BLD-MCNC: 8 G/DL (ref 12.5–16.5)
IMM GRANULOCYTES # BLD AUTO: 0.05 K/UL (ref 0–0.58)
IMM GRANULOCYTES NFR BLD: 1 % (ref 0–5)
LYMPHOCYTES NFR BLD: 2.61 K/UL (ref 1.5–4)
LYMPHOCYTES RELATIVE PERCENT: 41 % (ref 20–42)
MAGNESIUM SERPL-MCNC: 2.1 MG/DL (ref 1.6–2.6)
MAGNESIUM SERPL-MCNC: 2.4 MG/DL (ref 1.6–2.6)
MCH RBC QN AUTO: 28.7 PG (ref 26–35)
MCHC RBC AUTO-ENTMCNC: 31.1 G/DL (ref 32–34.5)
MCV RBC AUTO: 92.1 FL (ref 80–99.9)
MONOCYTES NFR BLD: 0.28 K/UL (ref 0.1–0.95)
MONOCYTES NFR BLD: 4 % (ref 2–12)
NEUTROPHILS NFR BLD: 53 % (ref 43–80)
NEUTS SEG NFR BLD: 3.36 K/UL (ref 1.8–7.3)
PARTIAL THROMBOPLASTIN TIME: 88.1 SEC (ref 24.5–35.1)
PARTIAL THROMBOPLASTIN TIME: >240 SEC (ref 24.5–35.1)
PHOSPHATE SERPL-MCNC: 3.1 MG/DL (ref 2.5–4.5)
PHOSPHATE SERPL-MCNC: 4.4 MG/DL (ref 2.5–4.5)
PLATELET # BLD AUTO: 86 K/UL (ref 130–450)
PLATELET CONFIRMATION: NORMAL
PMV BLD AUTO: 11.1 FL (ref 7–12)
POTASSIUM SERPL-SCNC: 4.3 MMOL/L (ref 3.5–5)
POTASSIUM SERPL-SCNC: 4.5 MMOL/L (ref 3.5–5)
RBC # BLD AUTO: 2.79 M/UL (ref 3.8–5.8)
SODIUM SERPL-SCNC: 136 MMOL/L (ref 132–146)
SODIUM SERPL-SCNC: 139 MMOL/L (ref 132–146)
WBC OTHER # BLD: 6.3 K/UL (ref 4.5–11.5)

## 2023-11-08 PROCEDURE — A4216 STERILE WATER/SALINE, 10 ML: HCPCS | Performed by: STUDENT IN AN ORGANIZED HEALTH CARE EDUCATION/TRAINING PROGRAM

## 2023-11-08 PROCEDURE — 85730 THROMBOPLASTIN TIME PARTIAL: CPT

## 2023-11-08 PROCEDURE — 6370000000 HC RX 637 (ALT 250 FOR IP)

## 2023-11-08 PROCEDURE — 2140000000 HC CCU INTERMEDIATE R&B

## 2023-11-08 PROCEDURE — 2580000003 HC RX 258: Performed by: INTERNAL MEDICINE

## 2023-11-08 PROCEDURE — C9113 INJ PANTOPRAZOLE SODIUM, VIA: HCPCS | Performed by: STUDENT IN AN ORGANIZED HEALTH CARE EDUCATION/TRAINING PROGRAM

## 2023-11-08 PROCEDURE — 6370000000 HC RX 637 (ALT 250 FOR IP): Performed by: STUDENT IN AN ORGANIZED HEALTH CARE EDUCATION/TRAINING PROGRAM

## 2023-11-08 PROCEDURE — 6370000000 HC RX 637 (ALT 250 FOR IP): Performed by: FAMILY MEDICINE

## 2023-11-08 PROCEDURE — 90935 HEMODIALYSIS ONE EVALUATION: CPT

## 2023-11-08 PROCEDURE — 85025 COMPLETE CBC W/AUTO DIFF WBC: CPT

## 2023-11-08 PROCEDURE — 82962 GLUCOSE BLOOD TEST: CPT

## 2023-11-08 PROCEDURE — 6360000002 HC RX W HCPCS: Performed by: STUDENT IN AN ORGANIZED HEALTH CARE EDUCATION/TRAINING PROGRAM

## 2023-11-08 PROCEDURE — 2580000003 HC RX 258: Performed by: STUDENT IN AN ORGANIZED HEALTH CARE EDUCATION/TRAINING PROGRAM

## 2023-11-08 PROCEDURE — 6360000002 HC RX W HCPCS

## 2023-11-08 PROCEDURE — 85014 HEMATOCRIT: CPT

## 2023-11-08 PROCEDURE — 6370000000 HC RX 637 (ALT 250 FOR IP): Performed by: INTERNAL MEDICINE

## 2023-11-08 PROCEDURE — 84100 ASSAY OF PHOSPHORUS: CPT

## 2023-11-08 PROCEDURE — 6370000000 HC RX 637 (ALT 250 FOR IP): Performed by: NURSE PRACTITIONER

## 2023-11-08 PROCEDURE — 2700000000 HC OXYGEN THERAPY PER DAY

## 2023-11-08 PROCEDURE — 85018 HEMOGLOBIN: CPT

## 2023-11-08 PROCEDURE — 80048 BASIC METABOLIC PNL TOTAL CA: CPT

## 2023-11-08 PROCEDURE — 83735 ASSAY OF MAGNESIUM: CPT

## 2023-11-08 RX ADMIN — PREDNISONE 5 MG: 5 TABLET ORAL at 13:29

## 2023-11-08 RX ADMIN — SODIUM CHLORIDE, PRESERVATIVE FREE 10 ML: 5 INJECTION INTRAVENOUS at 21:20

## 2023-11-08 RX ADMIN — HYDROCORTISONE: 25 CREAM TOPICAL at 21:17

## 2023-11-08 RX ADMIN — PETROLATUM: 420 OINTMENT TOPICAL at 21:16

## 2023-11-08 RX ADMIN — METOPROLOL TARTRATE 12.5 MG: 25 TABLET, FILM COATED ORAL at 21:25

## 2023-11-08 RX ADMIN — TACROLIMUS 2 MG: 1 CAPSULE ORAL at 13:29

## 2023-11-08 RX ADMIN — TACROLIMUS 2 MG: 1 CAPSULE ORAL at 21:15

## 2023-11-08 RX ADMIN — CALCITRIOL CAPSULES 0.25 MCG 0.25 MCG: 0.25 CAPSULE ORAL at 13:30

## 2023-11-08 RX ADMIN — SODIUM CHLORIDE, PRESERVATIVE FREE 40 MG: 5 INJECTION INTRAVENOUS at 13:30

## 2023-11-08 RX ADMIN — ATORVASTATIN CALCIUM 10 MG: 10 TABLET, FILM COATED ORAL at 13:31

## 2023-11-08 RX ADMIN — Medication 10 ML: at 13:32

## 2023-11-08 RX ADMIN — HYDROCORTISONE: 25 CREAM TOPICAL at 13:32

## 2023-11-08 RX ADMIN — Medication 1 CAPSULE: at 21:16

## 2023-11-08 RX ADMIN — Medication 10 ML: at 00:08

## 2023-11-08 RX ADMIN — CEFDINIR 300 MG: 300 CAPSULE ORAL at 13:30

## 2023-11-08 RX ADMIN — HEPARIN SODIUM 18 UNITS/KG/HR: 10000 INJECTION, SOLUTION INTRAVENOUS at 16:21

## 2023-11-08 RX ADMIN — Medication 10 MG: at 21:15

## 2023-11-08 RX ADMIN — LATANOPROST 1 DROP: 50 SOLUTION OPHTHALMIC at 00:08

## 2023-11-08 RX ADMIN — METOPROLOL TARTRATE 12.5 MG: 25 TABLET, FILM COATED ORAL at 13:29

## 2023-11-08 RX ADMIN — Medication 10 ML: at 21:20

## 2023-11-08 RX ADMIN — SODIUM CHLORIDE, PRESERVATIVE FREE 40 MG: 5 INJECTION INTRAVENOUS at 21:16

## 2023-11-08 RX ADMIN — SODIUM CHLORIDE, PRESERVATIVE FREE 10 ML: 5 INJECTION INTRAVENOUS at 13:31

## 2023-11-08 RX ADMIN — LATANOPROST 1 DROP: 50 SOLUTION OPHTHALMIC at 21:16

## 2023-11-08 RX ADMIN — Medication 1 CAPSULE: at 13:30

## 2023-11-08 RX ADMIN — HEPARIN SODIUM 6 UNITS/KG/HR: 10000 INJECTION, SOLUTION INTRAVENOUS at 01:18

## 2023-11-08 ASSESSMENT — PAIN SCALES - GENERAL
PAINLEVEL_OUTOF10: 3
PAINLEVEL_OUTOF10: 0

## 2023-11-08 NOTE — CARE COORDINATION
Pt remains hospitalized after cecilia presented to the Er for generalized weakness, SOB & elevated temperature from home. H/O of kidney transplant. Pt was a transfer to MICU on 11/6. Pt is on 2L/NC at 99%, Iv Heparin gtt & Iv Protonix. Oral Cefdinir. Pt had a tunneled cath placed on 11/3. Pt is tentative set up with [address] [facility] location for M-W-F at [address] [facility],8 8/24  OT [facility] 9/24 Met with patient's spouse at patient's bedside to confirm transition of care plan. She plans on patient going to [facility] when medically cleared. Carmen Ward informed of therapy notes in [facility]. Cm will let Carmen Ward know when appropriate to start precert, Patient currently on IV Heparin.  CM/SW will continue to follow

## 2023-11-09 LAB
ANION GAP SERPL CALCULATED.3IONS-SCNC: 10 MMOL/L (ref 7–16)
ANION GAP SERPL CALCULATED.3IONS-SCNC: 5 MMOL/L (ref 7–16)
BASOPHILS # BLD: 0 K/UL (ref 0–0.2)
BASOPHILS NFR BLD: 0 % (ref 0–2)
BUN SERPL-MCNC: 17 MG/DL (ref 6–23)
BUN SERPL-MCNC: 19 MG/DL (ref 6–23)
CALCIUM SERPL-MCNC: 7.3 MG/DL (ref 8.6–10.2)
CALCIUM SERPL-MCNC: 7.6 MG/DL (ref 8.6–10.2)
CHLORIDE SERPL-SCNC: 100 MMOL/L (ref 98–107)
CHLORIDE SERPL-SCNC: 102 MMOL/L (ref 98–107)
CO2 SERPL-SCNC: 26 MMOL/L (ref 22–29)
CO2 SERPL-SCNC: 30 MMOL/L (ref 22–29)
CREAT SERPL-MCNC: 2.4 MG/DL (ref 0.7–1.2)
CREAT SERPL-MCNC: 3 MG/DL (ref 0.7–1.2)
EOSINOPHIL # BLD: 0 K/UL (ref 0.05–0.5)
EOSINOPHILS RELATIVE PERCENT: 0 % (ref 0–6)
ERYTHROCYTE [DISTWIDTH] IN BLOOD BY AUTOMATED COUNT: 15.5 % (ref 11.5–15)
GFR SERPL CREATININE-BSD FRML MDRD: 22 ML/MIN/1.73M2
GFR SERPL CREATININE-BSD FRML MDRD: 27 ML/MIN/1.73M2
GLUCOSE BLD-MCNC: 116 MG/DL (ref 74–99)
GLUCOSE BLD-MCNC: 118 MG/DL (ref 74–99)
GLUCOSE BLD-MCNC: 127 MG/DL (ref 74–99)
GLUCOSE BLD-MCNC: 89 MG/DL (ref 74–99)
GLUCOSE SERPL-MCNC: 121 MG/DL (ref 74–99)
GLUCOSE SERPL-MCNC: 86 MG/DL (ref 74–99)
HCT VFR BLD AUTO: 20.4 % (ref 37–54)
HCT VFR BLD AUTO: 23.2 % (ref 37–54)
HGB BLD-MCNC: 6.3 G/DL (ref 12.5–16.5)
HGB BLD-MCNC: 7.2 G/DL (ref 12.5–16.5)
LYMPHOCYTES NFR BLD: 0.13 K/UL (ref 1.5–4)
LYMPHOCYTES RELATIVE PERCENT: 2 % (ref 20–42)
MAGNESIUM SERPL-MCNC: 2 MG/DL (ref 1.6–2.6)
MAGNESIUM SERPL-MCNC: 2.1 MG/DL (ref 1.6–2.6)
MCH RBC QN AUTO: 28.6 PG (ref 26–35)
MCHC RBC AUTO-ENTMCNC: 31 G/DL (ref 32–34.5)
MCV RBC AUTO: 92.1 FL (ref 80–99.9)
MONOCYTES NFR BLD: 0.19 K/UL (ref 0.1–0.95)
MONOCYTES NFR BLD: 3 % (ref 2–12)
NEUTROPHILS NFR BLD: 95 % (ref 43–80)
NEUTS SEG NFR BLD: 6.82 K/UL (ref 1.8–7.3)
PARTIAL THROMBOPLASTIN TIME: >240 SEC (ref 24.5–35.1)
PHOSPHATE SERPL-MCNC: 3.8 MG/DL (ref 2.5–4.5)
PHOSPHATE SERPL-MCNC: 4.2 MG/DL (ref 2.5–4.5)
PLATELET # BLD AUTO: 95 K/UL (ref 130–450)
PLATELET CONFIRMATION: NORMAL
PMV BLD AUTO: 10.9 FL (ref 7–12)
POTASSIUM SERPL-SCNC: 4.6 MMOL/L (ref 3.5–5)
POTASSIUM SERPL-SCNC: 4.6 MMOL/L (ref 3.5–5)
PROMYELOCYTES ABSOLUTE COUNT: 0.06 K/UL
PROMYELOCYTES: 1 %
RBC # BLD AUTO: 2.52 M/UL (ref 3.8–5.8)
RBC # BLD: ABNORMAL 10*6/UL
SODIUM SERPL-SCNC: 135 MMOL/L (ref 132–146)
SODIUM SERPL-SCNC: 138 MMOL/L (ref 132–146)
TACROLIMUS BLD-MCNC: 18.1 NG/ML
WBC # BLD: ABNORMAL 10*3/UL
WBC OTHER # BLD: 7.2 K/UL (ref 4.5–11.5)

## 2023-11-09 PROCEDURE — 6370000000 HC RX 637 (ALT 250 FOR IP): Performed by: INTERNAL MEDICINE

## 2023-11-09 PROCEDURE — C9113 INJ PANTOPRAZOLE SODIUM, VIA: HCPCS | Performed by: STUDENT IN AN ORGANIZED HEALTH CARE EDUCATION/TRAINING PROGRAM

## 2023-11-09 PROCEDURE — 6360000002 HC RX W HCPCS: Performed by: STUDENT IN AN ORGANIZED HEALTH CARE EDUCATION/TRAINING PROGRAM

## 2023-11-09 PROCEDURE — 2700000000 HC OXYGEN THERAPY PER DAY

## 2023-11-09 PROCEDURE — 80048 BASIC METABOLIC PNL TOTAL CA: CPT

## 2023-11-09 PROCEDURE — 2580000003 HC RX 258: Performed by: INTERNAL MEDICINE

## 2023-11-09 PROCEDURE — 36415 COLL VENOUS BLD VENIPUNCTURE: CPT

## 2023-11-09 PROCEDURE — 85025 COMPLETE CBC W/AUTO DIFF WBC: CPT

## 2023-11-09 PROCEDURE — 6370000000 HC RX 637 (ALT 250 FOR IP): Performed by: STUDENT IN AN ORGANIZED HEALTH CARE EDUCATION/TRAINING PROGRAM

## 2023-11-09 PROCEDURE — 2140000000 HC CCU INTERMEDIATE R&B

## 2023-11-09 PROCEDURE — 97530 THERAPEUTIC ACTIVITIES: CPT

## 2023-11-09 PROCEDURE — 85014 HEMATOCRIT: CPT

## 2023-11-09 PROCEDURE — 83735 ASSAY OF MAGNESIUM: CPT

## 2023-11-09 PROCEDURE — 6370000000 HC RX 637 (ALT 250 FOR IP): Performed by: FAMILY MEDICINE

## 2023-11-09 PROCEDURE — 85730 THROMBOPLASTIN TIME PARTIAL: CPT

## 2023-11-09 PROCEDURE — 6370000000 HC RX 637 (ALT 250 FOR IP): Performed by: NURSE PRACTITIONER

## 2023-11-09 PROCEDURE — A4216 STERILE WATER/SALINE, 10 ML: HCPCS | Performed by: STUDENT IN AN ORGANIZED HEALTH CARE EDUCATION/TRAINING PROGRAM

## 2023-11-09 PROCEDURE — 84100 ASSAY OF PHOSPHORUS: CPT

## 2023-11-09 PROCEDURE — 85018 HEMOGLOBIN: CPT

## 2023-11-09 PROCEDURE — 82962 GLUCOSE BLOOD TEST: CPT

## 2023-11-09 PROCEDURE — 2580000003 HC RX 258: Performed by: STUDENT IN AN ORGANIZED HEALTH CARE EDUCATION/TRAINING PROGRAM

## 2023-11-09 PROCEDURE — 6370000000 HC RX 637 (ALT 250 FOR IP)

## 2023-11-09 RX ORDER — SODIUM CHLORIDE 9 MG/ML
INJECTION, SOLUTION INTRAVENOUS PRN
Status: DISCONTINUED | OUTPATIENT
Start: 2023-11-09 | End: 2023-11-13

## 2023-11-09 RX ORDER — PANTOPRAZOLE SODIUM 40 MG/1
40 TABLET, DELAYED RELEASE ORAL
Status: DISCONTINUED | OUTPATIENT
Start: 2023-11-09 | End: 2023-11-12

## 2023-11-09 RX ADMIN — PETROLATUM: 420 OINTMENT TOPICAL at 21:27

## 2023-11-09 RX ADMIN — SODIUM CHLORIDE, PRESERVATIVE FREE 10 ML: 5 INJECTION INTRAVENOUS at 09:11

## 2023-11-09 RX ADMIN — LATANOPROST 1 DROP: 50 SOLUTION OPHTHALMIC at 21:27

## 2023-11-09 RX ADMIN — METOPROLOL TARTRATE 12.5 MG: 25 TABLET, FILM COATED ORAL at 21:26

## 2023-11-09 RX ADMIN — PREDNISONE 5 MG: 5 TABLET ORAL at 09:09

## 2023-11-09 RX ADMIN — HYDROCORTISONE: 25 CREAM TOPICAL at 09:13

## 2023-11-09 RX ADMIN — ATORVASTATIN CALCIUM 10 MG: 10 TABLET, FILM COATED ORAL at 09:08

## 2023-11-09 RX ADMIN — Medication 10 ML: at 21:26

## 2023-11-09 RX ADMIN — SODIUM CHLORIDE, PRESERVATIVE FREE 40 MG: 5 INJECTION INTRAVENOUS at 09:09

## 2023-11-09 RX ADMIN — Medication 10 ML: at 09:12

## 2023-11-09 RX ADMIN — Medication 10 MG: at 21:26

## 2023-11-09 RX ADMIN — PANTOPRAZOLE SODIUM 40 MG: 40 TABLET, DELAYED RELEASE ORAL at 17:36

## 2023-11-09 RX ADMIN — Medication 1 CAPSULE: at 21:25

## 2023-11-09 RX ADMIN — CALCITRIOL CAPSULES 0.25 MCG 0.25 MCG: 0.25 CAPSULE ORAL at 09:10

## 2023-11-09 RX ADMIN — APIXABAN 5 MG: 5 TABLET, FILM COATED ORAL at 14:09

## 2023-11-09 RX ADMIN — TACROLIMUS 2 MG: 1 CAPSULE ORAL at 09:09

## 2023-11-09 RX ADMIN — HYDROCORTISONE: 25 CREAM TOPICAL at 21:26

## 2023-11-09 RX ADMIN — APIXABAN 5 MG: 5 TABLET, FILM COATED ORAL at 21:26

## 2023-11-09 RX ADMIN — CEFDINIR 300 MG: 300 CAPSULE ORAL at 09:09

## 2023-11-09 RX ADMIN — METOPROLOL TARTRATE 12.5 MG: 25 TABLET, FILM COATED ORAL at 09:09

## 2023-11-09 RX ADMIN — TACROLIMUS 2 MG: 1 CAPSULE ORAL at 21:25

## 2023-11-09 RX ADMIN — Medication 1 CAPSULE: at 09:08

## 2023-11-09 RX ADMIN — PETROLATUM: 420 OINTMENT TOPICAL at 09:13

## 2023-11-09 NOTE — CARE COORDINATION
11/9:  Update CM Note:  Pt presented to the Er for generalized weakness, SOB & elevated temperature from home. Pt was a transfer to MICU on 11/6. Pt is on 2L/NC at 100%, Iv Protonix & started on Po Eliquis. PT 8/24 OT 10/24. Pt had a tunneled cath placed on 11/3. Cm spoke with wife at bedside. She would like her  to go to [facility] or [facility]. Cm sent referrals. Pt is tentative set up with [address] [place] [address] location for M-W-F at 630am.  Cm faxed on information to [employer]. Will need to change PASRR with new destination. ELIA, PASR, Ambulance form placed in a envelope in soft chart. SW/CM will continue to follow.   Electronically signed by Christine Chisholm RN on 11/9/2023 at 1:12 PM

## 2023-11-10 LAB
ANION GAP SERPL CALCULATED.3IONS-SCNC: 10 MMOL/L (ref 7–16)
BASOPHILS # BLD: 0 K/UL (ref 0–0.2)
BASOPHILS NFR BLD: 0 % (ref 0–2)
BUN SERPL-MCNC: 23 MG/DL (ref 6–23)
CALCIUM SERPL-MCNC: 7.8 MG/DL (ref 8.6–10.2)
CHLORIDE SERPL-SCNC: 102 MMOL/L (ref 98–107)
CO2 SERPL-SCNC: 26 MMOL/L (ref 22–29)
CREAT SERPL-MCNC: 3.3 MG/DL (ref 0.7–1.2)
EOSINOPHIL # BLD: 0 K/UL (ref 0.05–0.5)
EOSINOPHILS RELATIVE PERCENT: 0 % (ref 0–6)
ERYTHROCYTE [DISTWIDTH] IN BLOOD BY AUTOMATED COUNT: 15.1 % (ref 11.5–15)
GFR SERPL CREATININE-BSD FRML MDRD: 19 ML/MIN/1.73M2
GLUCOSE BLD-MCNC: 94 MG/DL (ref 74–99)
GLUCOSE BLD-MCNC: 97 MG/DL (ref 74–99)
GLUCOSE BLD-MCNC: 97 MG/DL (ref 74–99)
GLUCOSE BLD-MCNC: 99 MG/DL (ref 74–99)
GLUCOSE SERPL-MCNC: 91 MG/DL (ref 74–99)
HCT VFR BLD AUTO: 24.1 % (ref 37–54)
HGB BLD-MCNC: 7.5 G/DL (ref 12.5–16.5)
IMM GRANULOCYTES # BLD AUTO: 0.05 K/UL (ref 0–0.58)
IMM GRANULOCYTES NFR BLD: 1 % (ref 0–5)
LYMPHOCYTES NFR BLD: 1.93 K/UL (ref 1.5–4)
LYMPHOCYTES RELATIVE PERCENT: 33 % (ref 20–42)
MAGNESIUM SERPL-MCNC: 2.1 MG/DL (ref 1.6–2.6)
MCH RBC QN AUTO: 29.3 PG (ref 26–35)
MCHC RBC AUTO-ENTMCNC: 31.1 G/DL (ref 32–34.5)
MCV RBC AUTO: 94.1 FL (ref 80–99.9)
MONOCYTES NFR BLD: 0.29 K/UL (ref 0.1–0.95)
MONOCYTES NFR BLD: 5 % (ref 2–12)
NEUTROPHILS NFR BLD: 61 % (ref 43–80)
NEUTS SEG NFR BLD: 3.5 K/UL (ref 1.8–7.3)
PARTIAL THROMBOPLASTIN TIME: 99.2 SEC (ref 24.5–35.1)
PHOSPHATE SERPL-MCNC: 4.8 MG/DL (ref 2.5–4.5)
PLATELET # BLD AUTO: 78 K/UL (ref 130–450)
PMV BLD AUTO: 11.3 FL (ref 7–12)
POTASSIUM SERPL-SCNC: 4.6 MMOL/L (ref 3.5–5)
RBC # BLD AUTO: 2.56 M/UL (ref 3.8–5.8)
SODIUM SERPL-SCNC: 138 MMOL/L (ref 132–146)
WBC OTHER # BLD: 5.8 K/UL (ref 4.5–11.5)

## 2023-11-10 PROCEDURE — 6370000000 HC RX 637 (ALT 250 FOR IP): Performed by: NURSE PRACTITIONER

## 2023-11-10 PROCEDURE — 86900 BLOOD TYPING SEROLOGIC ABO: CPT

## 2023-11-10 PROCEDURE — 97530 THERAPEUTIC ACTIVITIES: CPT

## 2023-11-10 PROCEDURE — 2580000003 HC RX 258: Performed by: INTERNAL MEDICINE

## 2023-11-10 PROCEDURE — 86850 RBC ANTIBODY SCREEN: CPT

## 2023-11-10 PROCEDURE — 99223 1ST HOSP IP/OBS HIGH 75: CPT | Performed by: SURGERY

## 2023-11-10 PROCEDURE — 6370000000 HC RX 637 (ALT 250 FOR IP): Performed by: STUDENT IN AN ORGANIZED HEALTH CARE EDUCATION/TRAINING PROGRAM

## 2023-11-10 PROCEDURE — 2140000000 HC CCU INTERMEDIATE R&B

## 2023-11-10 PROCEDURE — 84100 ASSAY OF PHOSPHORUS: CPT

## 2023-11-10 PROCEDURE — 90935 HEMODIALYSIS ONE EVALUATION: CPT

## 2023-11-10 PROCEDURE — 82962 GLUCOSE BLOOD TEST: CPT

## 2023-11-10 PROCEDURE — 86923 COMPATIBILITY TEST ELECTRIC: CPT

## 2023-11-10 PROCEDURE — 36415 COLL VENOUS BLD VENIPUNCTURE: CPT

## 2023-11-10 PROCEDURE — 86901 BLOOD TYPING SEROLOGIC RH(D): CPT

## 2023-11-10 PROCEDURE — 2700000000 HC OXYGEN THERAPY PER DAY

## 2023-11-10 PROCEDURE — 36430 TRANSFUSION BLD/BLD COMPNT: CPT

## 2023-11-10 PROCEDURE — 85025 COMPLETE CBC W/AUTO DIFF WBC: CPT

## 2023-11-10 PROCEDURE — 83735 ASSAY OF MAGNESIUM: CPT

## 2023-11-10 PROCEDURE — 6370000000 HC RX 637 (ALT 250 FOR IP): Performed by: INTERNAL MEDICINE

## 2023-11-10 PROCEDURE — 6360000002 HC RX W HCPCS: Performed by: STUDENT IN AN ORGANIZED HEALTH CARE EDUCATION/TRAINING PROGRAM

## 2023-11-10 PROCEDURE — 6370000000 HC RX 637 (ALT 250 FOR IP): Performed by: FAMILY MEDICINE

## 2023-11-10 PROCEDURE — P9016 RBC LEUKOCYTES REDUCED: HCPCS

## 2023-11-10 PROCEDURE — 80048 BASIC METABOLIC PNL TOTAL CA: CPT

## 2023-11-10 PROCEDURE — 6370000000 HC RX 637 (ALT 250 FOR IP)

## 2023-11-10 PROCEDURE — 6360000002 HC RX W HCPCS: Performed by: INTERNAL MEDICINE

## 2023-11-10 PROCEDURE — 85730 THROMBOPLASTIN TIME PARTIAL: CPT

## 2023-11-10 RX ADMIN — PETROLATUM: 420 OINTMENT TOPICAL at 10:27

## 2023-11-10 RX ADMIN — HYDROCORTISONE: 25 CREAM TOPICAL at 22:07

## 2023-11-10 RX ADMIN — LATANOPROST 1 DROP: 50 SOLUTION OPHTHALMIC at 22:08

## 2023-11-10 RX ADMIN — TACROLIMUS 2 MG: 1 CAPSULE ORAL at 10:24

## 2023-11-10 RX ADMIN — ATORVASTATIN CALCIUM 10 MG: 10 TABLET, FILM COATED ORAL at 10:23

## 2023-11-10 RX ADMIN — Medication 1 CAPSULE: at 10:23

## 2023-11-10 RX ADMIN — Medication 10 ML: at 10:26

## 2023-11-10 RX ADMIN — SODIUM CHLORIDE, PRESERVATIVE FREE 10 ML: 5 INJECTION INTRAVENOUS at 22:06

## 2023-11-10 RX ADMIN — PANTOPRAZOLE SODIUM 40 MG: 40 TABLET, DELAYED RELEASE ORAL at 06:41

## 2023-11-10 RX ADMIN — CEFDINIR 300 MG: 300 CAPSULE ORAL at 10:23

## 2023-11-10 RX ADMIN — PETROLATUM: 420 OINTMENT TOPICAL at 22:07

## 2023-11-10 RX ADMIN — CALCITRIOL CAPSULES 0.25 MCG 0.25 MCG: 0.25 CAPSULE ORAL at 10:24

## 2023-11-10 RX ADMIN — PANTOPRAZOLE SODIUM 40 MG: 40 TABLET, DELAYED RELEASE ORAL at 18:20

## 2023-11-10 RX ADMIN — PREDNISONE 5 MG: 5 TABLET ORAL at 10:23

## 2023-11-10 RX ADMIN — SODIUM CHLORIDE, PRESERVATIVE FREE 10 ML: 5 INJECTION INTRAVENOUS at 10:24

## 2023-11-10 RX ADMIN — Medication 10 ML: at 22:05

## 2023-11-10 RX ADMIN — EPOETIN ALFA-EPBX 3000 UNITS: 3000 INJECTION, SOLUTION INTRAVENOUS; SUBCUTANEOUS at 18:20

## 2023-11-10 RX ADMIN — HYDROCORTISONE: 25 CREAM TOPICAL at 10:25

## 2023-11-10 RX ADMIN — METOPROLOL TARTRATE 12.5 MG: 25 TABLET, FILM COATED ORAL at 10:23

## 2023-11-10 NOTE — CARE COORDINATION
11/10:  Update CM Note:  Pt presented to the Er for generalized weakness, SOB & elevated temperature from home. Pt was a transfer to MICU on 11/6. Pt is on 2L/NC at 100%, Iv Protonix & started on Po Eliquis. PT 8/24 OT 10/24. Pt had a tunneled cath placed on 11/3. Cm spoke with wife at bedside & advise she will need to supply any anti-rejection medications to the facility. She choice [facility]. Adriana Peterson started pre-cert on 99/7 & it came back yesterday. Pre-cert is good until 45/25 at 1159. Per Adriana Nicholas can accept over the weekend call [phone] [address] Ext [phone] fax report to [phone]. ELIA, PASR, Ambulance form placed in a envelope in soft chart. SW/CLARENCE will continue to follow.   Electronically signed by Zakia Sanchez RN on 11/10/2023 at 1:09 PM

## 2023-11-11 PROBLEM — T82.868D: Chronic | Status: ACTIVE | Noted: 2023-11-11

## 2023-11-11 PROBLEM — I82.A11 ACUTE DEEP VEIN THROMBOSIS (DVT) OF AXILLARY VEIN OF RIGHT UPPER EXTREMITY (HCC): Status: ACTIVE | Noted: 2023-11-11

## 2023-11-11 LAB
ABO/RH: NORMAL
ANION GAP SERPL CALCULATED.3IONS-SCNC: 10 MMOL/L (ref 7–16)
ANTIBODY SCREEN: NEGATIVE
ARM BAND NUMBER: NORMAL
BASOPHILS # BLD: 0 K/UL (ref 0–0.2)
BASOPHILS NFR BLD: 0 % (ref 0–2)
BLOOD BANK BLOOD PRODUCT EXPIRATION DATE: NORMAL
BLOOD BANK DISPENSE STATUS: NORMAL
BLOOD BANK ISBT PRODUCT BLOOD TYPE: 9500
BLOOD BANK PRODUCT CODE: NORMAL
BLOOD BANK SAMPLE EXPIRATION: NORMAL
BLOOD BANK UNIT TYPE AND RH: NORMAL
BPU ID: NORMAL
BUN SERPL-MCNC: 12 MG/DL (ref 6–23)
CALCIUM SERPL-MCNC: 7.6 MG/DL (ref 8.6–10.2)
CHLORIDE SERPL-SCNC: 99 MMOL/L (ref 98–107)
CO2 SERPL-SCNC: 26 MMOL/L (ref 22–29)
COMPONENT: NORMAL
CREAT SERPL-MCNC: 2.3 MG/DL (ref 0.7–1.2)
CROSSMATCH RESULT: NORMAL
EOSINOPHIL # BLD: 0 K/UL (ref 0.05–0.5)
EOSINOPHILS RELATIVE PERCENT: 0 % (ref 0–6)
ERYTHROCYTE [DISTWIDTH] IN BLOOD BY AUTOMATED COUNT: 15.2 % (ref 11.5–15)
GFR SERPL CREATININE-BSD FRML MDRD: 30 ML/MIN/1.73M2
GLUCOSE BLD-MCNC: 117 MG/DL (ref 74–99)
GLUCOSE BLD-MCNC: 66 MG/DL (ref 74–99)
GLUCOSE BLD-MCNC: 87 MG/DL (ref 74–99)
GLUCOSE BLD-MCNC: 97 MG/DL (ref 74–99)
GLUCOSE BLD-MCNC: 97 MG/DL (ref 74–99)
GLUCOSE SERPL-MCNC: 134 MG/DL (ref 74–99)
HCT VFR BLD AUTO: 21.2 % (ref 37–54)
HCT VFR BLD AUTO: 21.9 % (ref 37–54)
HCT VFR BLD AUTO: 28.1 % (ref 37–54)
HGB BLD-MCNC: 6.9 G/DL (ref 12.5–16.5)
HGB BLD-MCNC: 7.2 G/DL (ref 12.5–16.5)
HGB BLD-MCNC: 8.9 G/DL (ref 12.5–16.5)
IMM GRANULOCYTES # BLD AUTO: 0.04 K/UL (ref 0–0.58)
IMM GRANULOCYTES NFR BLD: 1 % (ref 0–5)
LYMPHOCYTES NFR BLD: 1.45 K/UL (ref 1.5–4)
LYMPHOCYTES RELATIVE PERCENT: 30 % (ref 20–42)
MAGNESIUM SERPL-MCNC: 1.8 MG/DL (ref 1.6–2.6)
MCH RBC QN AUTO: 29.2 PG (ref 26–35)
MCHC RBC AUTO-ENTMCNC: 32.5 G/DL (ref 32–34.5)
MCV RBC AUTO: 89.8 FL (ref 80–99.9)
MONOCYTES NFR BLD: 0.26 K/UL (ref 0.1–0.95)
MONOCYTES NFR BLD: 5 % (ref 2–12)
NEUTROPHILS NFR BLD: 64 % (ref 43–80)
NEUTS SEG NFR BLD: 3.11 K/UL (ref 1.8–7.3)
PARTIAL THROMBOPLASTIN TIME: 78.9 SEC (ref 24.5–35.1)
PHOSPHATE SERPL-MCNC: 3.2 MG/DL (ref 2.5–4.5)
PLATELET # BLD AUTO: 69 K/UL (ref 130–450)
PLATELET CONFIRMATION: NORMAL
PMV BLD AUTO: 11.3 FL (ref 7–12)
POTASSIUM SERPL-SCNC: 4.1 MMOL/L (ref 3.5–5)
RBC # BLD AUTO: 2.36 M/UL (ref 3.8–5.8)
SODIUM SERPL-SCNC: 135 MMOL/L (ref 132–146)
TRANSFUSION STATUS: NORMAL
UNIT DIVISION: 0
UNIT ISSUE DATE/TIME: NORMAL
WBC OTHER # BLD: 4.9 K/UL (ref 4.5–11.5)

## 2023-11-11 PROCEDURE — 2580000003 HC RX 258: Performed by: INTERNAL MEDICINE

## 2023-11-11 PROCEDURE — 2140000000 HC CCU INTERMEDIATE R&B

## 2023-11-11 PROCEDURE — 6370000000 HC RX 637 (ALT 250 FOR IP): Performed by: STUDENT IN AN ORGANIZED HEALTH CARE EDUCATION/TRAINING PROGRAM

## 2023-11-11 PROCEDURE — 6370000000 HC RX 637 (ALT 250 FOR IP): Performed by: FAMILY MEDICINE

## 2023-11-11 PROCEDURE — 2580000003 HC RX 258: Performed by: EMERGENCY MEDICINE

## 2023-11-11 PROCEDURE — 6370000000 HC RX 637 (ALT 250 FOR IP): Performed by: INTERNAL MEDICINE

## 2023-11-11 PROCEDURE — 86850 RBC ANTIBODY SCREEN: CPT

## 2023-11-11 PROCEDURE — 85730 THROMBOPLASTIN TIME PARTIAL: CPT

## 2023-11-11 PROCEDURE — 86900 BLOOD TYPING SEROLOGIC ABO: CPT

## 2023-11-11 PROCEDURE — 86880 COOMBS TEST DIRECT: CPT

## 2023-11-11 PROCEDURE — 85014 HEMATOCRIT: CPT

## 2023-11-11 PROCEDURE — 6370000000 HC RX 637 (ALT 250 FOR IP)

## 2023-11-11 PROCEDURE — 84100 ASSAY OF PHOSPHORUS: CPT

## 2023-11-11 PROCEDURE — 2700000000 HC OXYGEN THERAPY PER DAY

## 2023-11-11 PROCEDURE — 36430 TRANSFUSION BLD/BLD COMPNT: CPT

## 2023-11-11 PROCEDURE — 82962 GLUCOSE BLOOD TEST: CPT

## 2023-11-11 PROCEDURE — 85025 COMPLETE CBC W/AUTO DIFF WBC: CPT

## 2023-11-11 PROCEDURE — 83735 ASSAY OF MAGNESIUM: CPT

## 2023-11-11 PROCEDURE — 86922 COMPATIBILITY TEST ANTIGLOB: CPT

## 2023-11-11 PROCEDURE — 85018 HEMOGLOBIN: CPT

## 2023-11-11 PROCEDURE — 86920 COMPATIBILITY TEST SPIN: CPT

## 2023-11-11 PROCEDURE — 6370000000 HC RX 637 (ALT 250 FOR IP): Performed by: NURSE PRACTITIONER

## 2023-11-11 PROCEDURE — 86870 RBC ANTIBODY IDENTIFICATION: CPT

## 2023-11-11 PROCEDURE — 36415 COLL VENOUS BLD VENIPUNCTURE: CPT

## 2023-11-11 PROCEDURE — 86901 BLOOD TYPING SEROLOGIC RH(D): CPT

## 2023-11-11 PROCEDURE — 80048 BASIC METABOLIC PNL TOTAL CA: CPT

## 2023-11-11 PROCEDURE — P9016 RBC LEUKOCYTES REDUCED: HCPCS

## 2023-11-11 PROCEDURE — 6360000002 HC RX W HCPCS: Performed by: STUDENT IN AN ORGANIZED HEALTH CARE EDUCATION/TRAINING PROGRAM

## 2023-11-11 RX ORDER — SODIUM CHLORIDE 9 MG/ML
INJECTION, SOLUTION INTRAVENOUS PRN
Status: DISCONTINUED | OUTPATIENT
Start: 2023-11-11 | End: 2023-11-11

## 2023-11-11 RX ORDER — SODIUM CHLORIDE 9 MG/ML
INJECTION, SOLUTION INTRAVENOUS PRN
Status: DISCONTINUED | OUTPATIENT
Start: 2023-11-11 | End: 2023-11-13

## 2023-11-11 RX ADMIN — Medication 1 CAPSULE: at 22:20

## 2023-11-11 RX ADMIN — PETROLATUM: 420 OINTMENT TOPICAL at 22:17

## 2023-11-11 RX ADMIN — CEFDINIR 300 MG: 300 CAPSULE ORAL at 09:33

## 2023-11-11 RX ADMIN — DEXTROSE MONOHYDRATE 125 ML: 100 INJECTION, SOLUTION INTRAVENOUS at 05:41

## 2023-11-11 RX ADMIN — SODIUM CHLORIDE, PRESERVATIVE FREE 10 ML: 5 INJECTION INTRAVENOUS at 22:15

## 2023-11-11 RX ADMIN — METOPROLOL TARTRATE 12.5 MG: 25 TABLET, FILM COATED ORAL at 22:19

## 2023-11-11 RX ADMIN — Medication 10 MG: at 22:19

## 2023-11-11 RX ADMIN — HYDROCORTISONE: 25 CREAM TOPICAL at 22:18

## 2023-11-11 RX ADMIN — TACROLIMUS 2 MG: 1 CAPSULE ORAL at 09:32

## 2023-11-11 RX ADMIN — PANTOPRAZOLE SODIUM 40 MG: 40 TABLET, DELAYED RELEASE ORAL at 17:52

## 2023-11-11 RX ADMIN — CALCITRIOL CAPSULES 0.25 MCG 0.25 MCG: 0.25 CAPSULE ORAL at 09:32

## 2023-11-11 RX ADMIN — METOPROLOL TARTRATE 12.5 MG: 25 TABLET, FILM COATED ORAL at 09:33

## 2023-11-11 RX ADMIN — ATORVASTATIN CALCIUM 10 MG: 10 TABLET, FILM COATED ORAL at 09:33

## 2023-11-11 RX ADMIN — SODIUM CHLORIDE, PRESERVATIVE FREE 10 ML: 5 INJECTION INTRAVENOUS at 09:35

## 2023-11-11 RX ADMIN — PETROLATUM: 420 OINTMENT TOPICAL at 09:34

## 2023-11-11 RX ADMIN — TACROLIMUS 2 MG: 1 CAPSULE ORAL at 22:25

## 2023-11-11 RX ADMIN — HYDROCORTISONE: 25 CREAM TOPICAL at 09:34

## 2023-11-11 RX ADMIN — PREDNISONE 5 MG: 5 TABLET ORAL at 09:32

## 2023-11-11 RX ADMIN — LATANOPROST 1 DROP: 50 SOLUTION OPHTHALMIC at 22:16

## 2023-11-11 RX ADMIN — Medication 1 CAPSULE: at 09:32

## 2023-11-11 RX ADMIN — Medication 10 ML: at 22:15

## 2023-11-11 RX ADMIN — PANTOPRAZOLE SODIUM 40 MG: 40 TABLET, DELAYED RELEASE ORAL at 09:42

## 2023-11-12 ENCOUNTER — APPOINTMENT (OUTPATIENT)
Dept: GENERAL RADIOLOGY | Age: 73
DRG: 673 | End: 2023-11-12
Payer: MEDICARE

## 2023-11-12 ENCOUNTER — APPOINTMENT (OUTPATIENT)
Dept: CT IMAGING | Age: 73
DRG: 673 | End: 2023-11-12
Payer: MEDICARE

## 2023-11-12 PROBLEM — R58 ECCHYMOSIS: Status: ACTIVE | Noted: 2023-11-12

## 2023-11-12 LAB
ABO/RH: NORMAL
ALBUMIN SERPL-MCNC: 2 G/DL (ref 3.5–5.2)
ALBUMIN SERPL-MCNC: 2 G/DL (ref 3.5–5.2)
ALP SERPL-CCNC: 100 U/L (ref 40–129)
ALP SERPL-CCNC: 99 U/L (ref 40–129)
ALT SERPL-CCNC: 6 U/L (ref 0–40)
ALT SERPL-CCNC: 6 U/L (ref 0–40)
ANION GAP SERPL CALCULATED.3IONS-SCNC: 9 MMOL/L (ref 7–16)
ANTIBODY IDENTIFICATION: NORMAL
ANTIBODY IDENTIFICATION: NORMAL
ANTIBODY SCREEN: POSITIVE
ARM BAND NUMBER: NORMAL
AST SERPL-CCNC: 47 U/L (ref 0–39)
AST SERPL-CCNC: 48 U/L (ref 0–39)
B.E.: 4 MMOL/L (ref -3–3)
BASOPHILS # BLD: 0 K/UL (ref 0–0.2)
BASOPHILS # BLD: 0 K/UL (ref 0–0.2)
BASOPHILS NFR BLD: 0 % (ref 0–2)
BASOPHILS NFR BLD: 0 % (ref 0–2)
BILIRUB DIRECT SERPL-MCNC: 0.8 MG/DL (ref 0–0.3)
BILIRUB INDIRECT SERPL-MCNC: 0.5 MG/DL (ref 0–1)
BILIRUB SERPL-MCNC: 1.3 MG/DL (ref 0–1.2)
BILIRUB SERPL-MCNC: 1.3 MG/DL (ref 0–1.2)
BLOOD BANK BLOOD PRODUCT EXPIRATION DATE: NORMAL
BLOOD BANK COMMENT: NORMAL
BLOOD BANK COMMENT: NORMAL
BLOOD BANK DISPENSE STATUS: NORMAL
BLOOD BANK ISBT PRODUCT BLOOD TYPE: 9500
BLOOD BANK PRODUCT CODE: NORMAL
BLOOD BANK SAMPLE EXPIRATION: NORMAL
BLOOD BANK UNIT TYPE AND RH: NORMAL
BNP SERPL-MCNC: ABNORMAL PG/ML (ref 0–125)
BNP SERPL-MCNC: ABNORMAL PG/ML (ref 0–125)
BPU ID: NORMAL
BUN SERPL-MCNC: 18 MG/DL (ref 6–23)
BUN SERPL-MCNC: 19 MG/DL (ref 6–23)
BUN SERPL-MCNC: 20 MG/DL (ref 6–23)
CALCIUM SERPL-MCNC: 7.5 MG/DL (ref 8.6–10.2)
CALCIUM SERPL-MCNC: 7.7 MG/DL (ref 8.6–10.2)
CALCIUM SERPL-MCNC: 7.7 MG/DL (ref 8.6–10.2)
CHLORIDE SERPL-SCNC: 101 MMOL/L (ref 98–107)
CHLORIDE SERPL-SCNC: 101 MMOL/L (ref 98–107)
CHLORIDE SERPL-SCNC: 102 MMOL/L (ref 98–107)
CLOT ANGLE.KAOLIN INDUCED BLD RES TEG: 51.9 DEG (ref 53–70)
CO2 SERPL-SCNC: 25 MMOL/L (ref 22–29)
CO2 SERPL-SCNC: 25 MMOL/L (ref 22–29)
CO2 SERPL-SCNC: 27 MMOL/L (ref 22–29)
COHB: 1.5 % (ref 0–1.5)
COMMENT: ABNORMAL
COMPONENT: NORMAL
CREAT SERPL-MCNC: 3.2 MG/DL (ref 0.7–1.2)
CREAT SERPL-MCNC: 3.2 MG/DL (ref 0.7–1.2)
CREAT SERPL-MCNC: 3.3 MG/DL (ref 0.7–1.2)
CRITICAL: ABNORMAL
CROSSMATCH RESULT: NORMAL
D DIMER: 460 NG/ML DDU (ref 0–232)
DAT, POLYSPECIFIC: NEGATIVE
DATE ANALYZED: ABNORMAL
DATE OF COLLECTION: ABNORMAL
EOSINOPHIL # BLD: 0 K/UL (ref 0.05–0.5)
EOSINOPHIL # BLD: 0 K/UL (ref 0.05–0.5)
EOSINOPHILS RELATIVE PERCENT: 0 % (ref 0–6)
EOSINOPHILS RELATIVE PERCENT: 0 % (ref 0–6)
EPL-TEG: 0 % (ref 0–15)
ERYTHROCYTE [DISTWIDTH] IN BLOOD BY AUTOMATED COUNT: 15.8 % (ref 11.5–15)
ERYTHROCYTE [DISTWIDTH] IN BLOOD BY AUTOMATED COUNT: 16 % (ref 11.5–15)
FIBRINOGEN PPP-MCNC: 133 MG/DL (ref 200–400)
FIBRINOGEN PPP-MCNC: 49 MG/DL (ref 200–400)
G-TEG: 3.8 KDYN/CM2 (ref 4.5–11)
GFR SERPL CREATININE-BSD FRML MDRD: 19 ML/MIN/1.73M2
GLUCOSE BLD-MCNC: 102 MG/DL (ref 74–99)
GLUCOSE BLD-MCNC: 74 MG/DL (ref 74–99)
GLUCOSE BLD-MCNC: 80 MG/DL (ref 74–99)
GLUCOSE BLD-MCNC: 81 MG/DL (ref 74–99)
GLUCOSE BLD-MCNC: 82 MG/DL (ref 74–99)
GLUCOSE BLD-MCNC: 87 MG/DL (ref 74–99)
GLUCOSE BLD-MCNC: 90 MG/DL (ref 74–99)
GLUCOSE BLD-MCNC: 92 MG/DL (ref 74–99)
GLUCOSE SERPL-MCNC: 68 MG/DL (ref 74–99)
GLUCOSE SERPL-MCNC: 75 MG/DL (ref 74–99)
GLUCOSE SERPL-MCNC: 76 MG/DL (ref 74–99)
HCO3: 29.6 MMOL/L (ref 22–26)
HCT VFR BLD AUTO: 24.8 % (ref 37–54)
HCT VFR BLD AUTO: 25.8 % (ref 37–54)
HCT VFR BLD AUTO: 31.7 % (ref 37–54)
HGB BLD-MCNC: 8 G/DL (ref 12.5–16.5)
HGB BLD-MCNC: 8.3 G/DL (ref 12.5–16.5)
HGB BLD-MCNC: 9 G/DL (ref 12.5–16.5)
HHB: 6.5 % (ref 0–5)
INR PPP: 2.5
KINETICS TEG: 3.4 MIN (ref 1–3)
LAB: ABNORMAL
LACTATE BLDV-SCNC: 0.9 MMOL/L (ref 0.5–2.2)
LY30 (LYSIS) TEG: 0 % (ref 0–8)
LYMPHOCYTES NFR BLD: 0.04 K/UL (ref 1.5–4)
LYMPHOCYTES NFR BLD: 0.28 K/UL (ref 1.5–4)
LYMPHOCYTES RELATIVE PERCENT: 1 % (ref 20–42)
LYMPHOCYTES RELATIVE PERCENT: 8 % (ref 20–42)
Lab: 1030
MA (MAX CLOT) TEG: 42.9 MM (ref 50–70)
MAGNESIUM SERPL-MCNC: 1.8 MG/DL (ref 1.6–2.6)
MAGNESIUM SERPL-MCNC: 2.3 MG/DL (ref 1.6–2.6)
MCH RBC QN AUTO: 29.3 PG (ref 26–35)
MCH RBC QN AUTO: 29.4 PG (ref 26–35)
MCHC RBC AUTO-ENTMCNC: 28.4 G/DL (ref 32–34.5)
MCHC RBC AUTO-ENTMCNC: 32.2 G/DL (ref 32–34.5)
MCV RBC AUTO: 103.3 FL (ref 80–99.9)
MCV RBC AUTO: 91.5 FL (ref 80–99.9)
METHB: 0.2 % (ref 0–1.5)
MODE: ABNORMAL
MONOCYTES NFR BLD: 0.14 K/UL (ref 0.1–0.95)
MONOCYTES NFR BLD: 0.15 K/UL (ref 0.1–0.95)
MONOCYTES NFR BLD: 4 % (ref 2–12)
MONOCYTES NFR BLD: 4 % (ref 2–12)
NEUTROPHILS NFR BLD: 88 % (ref 43–80)
NEUTROPHILS NFR BLD: 96 % (ref 43–80)
NEUTS SEG NFR BLD: 3.08 K/UL (ref 1.8–7.3)
NEUTS SEG NFR BLD: 4.11 K/UL (ref 1.8–7.3)
O2 CONTENT: 12.8 ML/DL
O2 SATURATION: 93.4 % (ref 92–98.5)
O2HB: 91.8 % (ref 94–97)
OPERATOR ID: 1112
PARTIAL THROMBOPLASTIN TIME: 67.1 SEC (ref 24.5–35.1)
PARTIAL THROMBOPLASTIN TIME: 71.2 SEC (ref 24.5–35.1)
PATIENT TEMP: 37 C
PCO2: 49.3 MMHG (ref 35–45)
PH BLOOD GAS: 7.4 (ref 7.35–7.45)
PHOSPHATE SERPL-MCNC: 4.3 MG/DL (ref 2.5–4.5)
PHOSPHATE SERPL-MCNC: 4.4 MG/DL (ref 2.5–4.5)
PHOSPHATE SERPL-MCNC: 4.5 MG/DL (ref 2.5–4.5)
PLATELET # BLD AUTO: 58 K/UL (ref 130–450)
PLATELET # BLD AUTO: 72 K/UL (ref 130–450)
PLATELET CONFIRMATION: NORMAL
PLATELET CONFIRMATION: NORMAL
PMV BLD AUTO: 11.5 FL (ref 7–12)
PMV BLD AUTO: 12 FL (ref 7–12)
PO2: 64.4 MMHG (ref 75–100)
POTASSIUM SERPL-SCNC: 4.1 MMOL/L (ref 3.5–5)
POTASSIUM SERPL-SCNC: 4.3 MMOL/L (ref 3.5–5)
POTASSIUM SERPL-SCNC: 4.4 MMOL/L (ref 3.5–5)
PROCALCITONIN SERPL-MCNC: 0.31 NG/ML (ref 0–0.08)
PROLACTIN SERPL-MCNC: 29.55 NG/ML
PROT SERPL-MCNC: 4.7 G/DL (ref 6.4–8.3)
PROT SERPL-MCNC: 4.8 G/DL (ref 6.4–8.3)
PROTHROMBIN TIME: 27.5 SEC (ref 9.3–12.4)
RBC # BLD AUTO: 2.82 M/UL (ref 3.8–5.8)
RBC # BLD AUTO: 3.07 M/UL (ref 3.8–5.8)
RBC # BLD: ABNORMAL 10*6/UL
REACTION TIME TEG: 7.8 MIN (ref 5–10)
SODIUM SERPL-SCNC: 135 MMOL/L (ref 132–146)
SODIUM SERPL-SCNC: 136 MMOL/L (ref 132–146)
SODIUM SERPL-SCNC: 137 MMOL/L (ref 132–146)
SOURCE, BLOOD GAS: ABNORMAL
THB: 9.9 G/DL (ref 11.5–16.5)
TIME ANALYZED: 1033
TRANSFUSION STATUS: NORMAL
TROPONIN I SERPL HS-MCNC: 251 NG/L (ref 0–11)
UNIT DIVISION: 0
UNIT ISSUE DATE/TIME: NORMAL
WBC OTHER # BLD: 3.5 K/UL (ref 4.5–11.5)
WBC OTHER # BLD: 4.3 K/UL (ref 4.5–11.5)

## 2023-11-12 PROCEDURE — 85018 HEMOGLOBIN: CPT

## 2023-11-12 PROCEDURE — 36430 TRANSFUSION BLD/BLD COMPNT: CPT

## 2023-11-12 PROCEDURE — 93005 ELECTROCARDIOGRAM TRACING: CPT | Performed by: INTERNAL MEDICINE

## 2023-11-12 PROCEDURE — 85014 HEMATOCRIT: CPT

## 2023-11-12 PROCEDURE — 2580000003 HC RX 258: Performed by: INTERNAL MEDICINE

## 2023-11-12 PROCEDURE — 84145 PROCALCITONIN (PCT): CPT

## 2023-11-12 PROCEDURE — 4A03X5D MEASUREMENT OF ARTERIAL FLOW, INTRACRANIAL, EXTERNAL APPROACH: ICD-10-PCS | Performed by: INTERNAL MEDICINE

## 2023-11-12 PROCEDURE — 85379 FIBRIN DEGRADATION QUANT: CPT

## 2023-11-12 PROCEDURE — 85025 COMPLETE CBC W/AUTO DIFF WBC: CPT

## 2023-11-12 PROCEDURE — 70498 CT ANGIOGRAPHY NECK: CPT

## 2023-11-12 PROCEDURE — 71045 X-RAY EXAM CHEST 1 VIEW: CPT

## 2023-11-12 PROCEDURE — 6370000000 HC RX 637 (ALT 250 FOR IP): Performed by: STUDENT IN AN ORGANIZED HEALTH CARE EDUCATION/TRAINING PROGRAM

## 2023-11-12 PROCEDURE — 85610 PROTHROMBIN TIME: CPT

## 2023-11-12 PROCEDURE — 86927 PLASMA FRESH FROZEN: CPT

## 2023-11-12 PROCEDURE — 2000000000 HC ICU R&B

## 2023-11-12 PROCEDURE — 99222 1ST HOSP IP/OBS MODERATE 55: CPT | Performed by: STUDENT IN AN ORGANIZED HEALTH CARE EDUCATION/TRAINING PROGRAM

## 2023-11-12 PROCEDURE — 82248 BILIRUBIN DIRECT: CPT

## 2023-11-12 PROCEDURE — 80053 COMPREHEN METABOLIC PANEL: CPT

## 2023-11-12 PROCEDURE — 85390 FIBRINOLYSINS SCREEN I&R: CPT

## 2023-11-12 PROCEDURE — 82962 GLUCOSE BLOOD TEST: CPT

## 2023-11-12 PROCEDURE — C9113 INJ PANTOPRAZOLE SODIUM, VIA: HCPCS | Performed by: INTERNAL MEDICINE

## 2023-11-12 PROCEDURE — 85384 FIBRINOGEN ACTIVITY: CPT

## 2023-11-12 PROCEDURE — 6360000002 HC RX W HCPCS: Performed by: INTERNAL MEDICINE

## 2023-11-12 PROCEDURE — 6360000004 HC RX CONTRAST MEDICATION: Performed by: RADIOLOGY

## 2023-11-12 PROCEDURE — 74018 RADEX ABDOMEN 1 VIEW: CPT

## 2023-11-12 PROCEDURE — 6370000000 HC RX 637 (ALT 250 FOR IP): Performed by: INTERNAL MEDICINE

## 2023-11-12 PROCEDURE — 82805 BLOOD GASES W/O2 SATURATION: CPT

## 2023-11-12 PROCEDURE — 83880 ASSAY OF NATRIURETIC PEPTIDE: CPT

## 2023-11-12 PROCEDURE — 80048 BASIC METABOLIC PNL TOTAL CA: CPT

## 2023-11-12 PROCEDURE — 30233M1 TRANSFUSION OF NONAUTOLOGOUS PLASMA CRYOPRECIPITATE INTO PERIPHERAL VEIN, PERCUTANEOUS APPROACH: ICD-10-PCS | Performed by: INTERNAL MEDICINE

## 2023-11-12 PROCEDURE — 83735 ASSAY OF MAGNESIUM: CPT

## 2023-11-12 PROCEDURE — 6360000002 HC RX W HCPCS: Performed by: STUDENT IN AN ORGANIZED HEALTH CARE EDUCATION/TRAINING PROGRAM

## 2023-11-12 PROCEDURE — 36600 WITHDRAWAL OF ARTERIAL BLOOD: CPT

## 2023-11-12 PROCEDURE — 83605 ASSAY OF LACTIC ACID: CPT

## 2023-11-12 PROCEDURE — P9012 CRYOPRECIPITATE EACH UNIT: HCPCS

## 2023-11-12 PROCEDURE — 85576 BLOOD PLATELET AGGREGATION: CPT

## 2023-11-12 PROCEDURE — 99291 CRITICAL CARE FIRST HOUR: CPT | Performed by: INTERNAL MEDICINE

## 2023-11-12 PROCEDURE — 84484 ASSAY OF TROPONIN QUANT: CPT

## 2023-11-12 PROCEDURE — 6370000000 HC RX 637 (ALT 250 FOR IP): Performed by: NURSE PRACTITIONER

## 2023-11-12 PROCEDURE — 2700000000 HC OXYGEN THERAPY PER DAY

## 2023-11-12 PROCEDURE — 85730 THROMBOPLASTIN TIME PARTIAL: CPT

## 2023-11-12 PROCEDURE — 6370000000 HC RX 637 (ALT 250 FOR IP)

## 2023-11-12 PROCEDURE — 84100 ASSAY OF PHOSPHORUS: CPT

## 2023-11-12 PROCEDURE — 71275 CT ANGIOGRAPHY CHEST: CPT

## 2023-11-12 PROCEDURE — 70450 CT HEAD/BRAIN W/O DYE: CPT

## 2023-11-12 PROCEDURE — 84146 ASSAY OF PROLACTIN: CPT

## 2023-11-12 PROCEDURE — 85347 COAGULATION TIME ACTIVATED: CPT

## 2023-11-12 PROCEDURE — 70496 CT ANGIOGRAPHY HEAD: CPT

## 2023-11-12 RX ORDER — LEVETIRACETAM 500 MG/5ML
500 INJECTION, SOLUTION, CONCENTRATE INTRAVENOUS EVERY 12 HOURS
Status: DISCONTINUED | OUTPATIENT
Start: 2023-11-12 | End: 2023-11-27

## 2023-11-12 RX ORDER — SODIUM CHLORIDE 9 MG/ML
INJECTION, SOLUTION INTRAVENOUS PRN
Status: DISCONTINUED | OUTPATIENT
Start: 2023-11-12 | End: 2023-11-13

## 2023-11-12 RX ORDER — HYDRALAZINE HYDROCHLORIDE 20 MG/ML
10 INJECTION INTRAMUSCULAR; INTRAVENOUS EVERY 4 HOURS PRN
Status: DISCONTINUED | OUTPATIENT
Start: 2023-11-12 | End: 2023-11-18

## 2023-11-12 RX ORDER — LEVETIRACETAM 500 MG/5ML
1500 INJECTION, SOLUTION, CONCENTRATE INTRAVENOUS ONCE
Status: COMPLETED | OUTPATIENT
Start: 2023-11-12 | End: 2023-11-12

## 2023-11-12 RX ORDER — MAGNESIUM SULFATE IN WATER 40 MG/ML
2000 INJECTION, SOLUTION INTRAVENOUS ONCE
Status: COMPLETED | OUTPATIENT
Start: 2023-11-12 | End: 2023-11-12

## 2023-11-12 RX ORDER — LABETALOL HYDROCHLORIDE 5 MG/ML
10 INJECTION, SOLUTION INTRAVENOUS EVERY 4 HOURS PRN
Status: DISCONTINUED | OUTPATIENT
Start: 2023-11-12 | End: 2023-11-18

## 2023-11-12 RX ADMIN — LATANOPROST 1 DROP: 50 SOLUTION OPHTHALMIC at 20:00

## 2023-11-12 RX ADMIN — IOPAMIDOL 60 ML: 755 INJECTION, SOLUTION INTRAVENOUS at 11:20

## 2023-11-12 RX ADMIN — PETROLATUM: 420 OINTMENT TOPICAL at 19:56

## 2023-11-12 RX ADMIN — Medication 1 CAPSULE: at 19:55

## 2023-11-12 RX ADMIN — DICLOFENAC SODIUM 2 G: 10 GEL TOPICAL at 19:56

## 2023-11-12 RX ADMIN — TACROLIMUS 2 MG: 1 CAPSULE ORAL at 19:51

## 2023-11-12 RX ADMIN — MEROPENEM 1000 MG: 1 INJECTION, POWDER, FOR SOLUTION INTRAVENOUS at 14:26

## 2023-11-12 RX ADMIN — LEVETIRACETAM 500 MG: 100 INJECTION, SOLUTION INTRAVENOUS at 19:53

## 2023-11-12 RX ADMIN — VANCOMYCIN HYDROCHLORIDE 1750 MG: 10 INJECTION, POWDER, LYOPHILIZED, FOR SOLUTION INTRAVENOUS at 14:19

## 2023-11-12 RX ADMIN — ACETAMINOPHEN 650 MG: 325 TABLET ORAL at 20:05

## 2023-11-12 RX ADMIN — MAGNESIUM SULFATE HEPTAHYDRATE 2000 MG: 40 INJECTION, SOLUTION INTRAVENOUS at 13:09

## 2023-11-12 RX ADMIN — LEVETIRACETAM 1500 MG: 100 INJECTION, SOLUTION INTRAVENOUS at 12:04

## 2023-11-12 RX ADMIN — SODIUM CHLORIDE, PRESERVATIVE FREE 40 MG: 5 INJECTION INTRAVENOUS at 16:36

## 2023-11-12 RX ADMIN — METOPROLOL TARTRATE 12.5 MG: 25 TABLET, FILM COATED ORAL at 19:52

## 2023-11-12 ASSESSMENT — PAIN SCALES - GENERAL
PAINLEVEL_OUTOF10: 0
PAINLEVEL_OUTOF10: 0

## 2023-11-12 NOTE — H&P (VIEW-ONLY)
500 mg IV q12hrs for possible seizure  -obtain EEG r/o seizure  -panculture rule out sepsis  -follow repeat labs, DIC panel, lactic acid, procalcitonin, coagulation studies  -check TEG (low fibrinogen), transfuse accordingly  -may benefit from thoracentesis R, send for fluid analysis  -d/c cefdinir, start vancomycin, meropenem renally dosed (Dr. Pepito Zamarripa informed, okay with plan)  -check BG q4hrs  -PCD for DVT prophylaxis  -continue Prograf and prednisone, plan to resume mycophenolate mofetil on discharge per Nephro  -Chest CT showing: chronic large 12 x 2 mm vertical oriented the fragment of a catheter in a segmental artery of the left lower lobe, not seen in CT chest in 2021, Dr. Teresa Simmons aware and will review imaging  -continue HD, MWF, received contrast today, Dr. Mary Anne Hernandez informed and will have his regular dialysis tomorrow (MWF)  -continue Retacrit, calcitriol for anemia  -keep NPO  -monitor neurologic and respiratory status, low threshold for intubation  -talked to family (DNR-CCA for now, okay for reintubation as necessary)    ICU PROPHYLAXIS:  Stress ulcer:  [x] PPI Agent  [] A5Acdpy [] Sucralfate  [] Other:  VTE:   [] Enoxaparin  [] Unfract. Heparin Subcut  [] EPC Cuffs  PCDs for now 2/2 anemia, thrombocytopenia    NUTRITION:  [x] NPO [] Tube Feeding (Specify: ) [] TPN  [] PO (Diet: Diet NPO Exceptions are: Sips of Water with Meds)    INSULIN DRIP:   [x] No   [] Yes    CONSULTATION NEEDED:   [x] No   [] Yes    FAMILY UPDATED:    [] No   [x] Yes    TRANSFER OUT OF ICU:   [x] No   [] Yes    Vane Car MD, PGY-3  Attending Physician: Dr. Teresa Simmons  11/12/2023, 3:01 PM       [facility]  Department of Pulmonary, Critical Care and Sleep Medicine  [address] [place] [address]  Department of Internal Medicine      During multidisciplinary team rounds Rell Shepard is a 68 y.o. male was seen, examined and discussed.  This is confirmation that I have personally seen and

## 2023-11-13 ENCOUNTER — TELEPHONE (OUTPATIENT)
Dept: VASCULAR SURGERY | Age: 73
End: 2023-11-13

## 2023-11-13 PROBLEM — K62.5 RECTAL BLEEDING: Status: ACTIVE | Noted: 2023-11-13

## 2023-11-13 PROBLEM — K52.9 COLITIS: Status: ACTIVE | Noted: 2023-11-13

## 2023-11-13 LAB
ANION GAP SERPL CALCULATED.3IONS-SCNC: 4 MMOL/L (ref 7–16)
ANION GAP SERPL CALCULATED.3IONS-SCNC: 9 MMOL/L (ref 7–16)
ARM BAND NUMBER: NORMAL
BASOPHILS # BLD: 0 K/UL (ref 0–0.2)
BASOPHILS NFR BLD: 0 % (ref 0–2)
BLOOD BANK BLOOD PRODUCT EXPIRATION DATE: NORMAL
BLOOD BANK DISPENSE STATUS: NORMAL
BLOOD BANK ISBT PRODUCT BLOOD TYPE: 5100
BLOOD BANK PRODUCT CODE: NORMAL
BLOOD BANK UNIT TYPE AND RH: NORMAL
BPU ID: NORMAL
BUN SERPL-MCNC: 10 MG/DL (ref 6–23)
BUN SERPL-MCNC: 22 MG/DL (ref 6–23)
CALCIUM SERPL-MCNC: 7 MG/DL (ref 8.6–10.2)
CALCIUM SERPL-MCNC: 7.8 MG/DL (ref 8.6–10.2)
CHLORIDE SERPL-SCNC: 101 MMOL/L (ref 98–107)
CHLORIDE SERPL-SCNC: 103 MMOL/L (ref 98–107)
CLOT ANGLE.KAOLIN INDUCED BLD RES TEG: 58.2 DEG (ref 53–70)
CO2 SERPL-SCNC: 25 MMOL/L (ref 22–29)
CO2 SERPL-SCNC: 29 MMOL/L (ref 22–29)
COMPONENT: NORMAL
CREAT SERPL-MCNC: 1.9 MG/DL (ref 0.7–1.2)
CREAT SERPL-MCNC: 3.6 MG/DL (ref 0.7–1.2)
DATE LAST DOSE: NORMAL
DATE LAST DOSE: NORMAL
EKG ATRIAL RATE: 54 BPM
EKG Q-T INTERVAL: 444 MS
EKG QRS DURATION: 74 MS
EKG QTC CALCULATION (BAZETT): 446 MS
EKG R AXIS: 1 DEGREES
EKG T AXIS: 88 DEGREES
EKG VENTRICULAR RATE: 61 BPM
EOSINOPHIL # BLD: 0.01 K/UL (ref 0.05–0.5)
EOSINOPHILS RELATIVE PERCENT: 0 % (ref 0–6)
EPL-TEG: 0 % (ref 0–15)
ERYTHROCYTE [DISTWIDTH] IN BLOOD BY AUTOMATED COUNT: 16.2 % (ref 11.5–15)
G-TEG: 5 KDYN/CM2 (ref 4.5–11)
GFR SERPL CREATININE-BSD FRML MDRD: 17 ML/MIN/1.73M2
GFR SERPL CREATININE-BSD FRML MDRD: 37 ML/MIN/1.73M2
GLUCOSE BLD-MCNC: 105 MG/DL (ref 74–99)
GLUCOSE BLD-MCNC: 65 MG/DL (ref 74–99)
GLUCOSE BLD-MCNC: 71 MG/DL (ref 74–99)
GLUCOSE BLD-MCNC: 74 MG/DL (ref 74–99)
GLUCOSE BLD-MCNC: 74 MG/DL (ref 74–99)
GLUCOSE BLD-MCNC: 75 MG/DL (ref 74–99)
GLUCOSE BLD-MCNC: 77 MG/DL (ref 74–99)
GLUCOSE BLD-MCNC: 77 MG/DL (ref 74–99)
GLUCOSE BLD-MCNC: 79 MG/DL (ref 74–99)
GLUCOSE BLD-MCNC: 81 MG/DL (ref 74–99)
GLUCOSE BLD-MCNC: 82 MG/DL (ref 74–99)
GLUCOSE BLD-MCNC: 87 MG/DL (ref 74–99)
GLUCOSE SERPL-MCNC: 70 MG/DL (ref 74–99)
GLUCOSE SERPL-MCNC: 82 MG/DL (ref 74–99)
HAPTOGLOB SERPL-MCNC: <10 MG/DL (ref 30–200)
HCT VFR BLD AUTO: 26.6 % (ref 37–54)
HCT VFR BLD AUTO: 27.1 % (ref 37–54)
HGB BLD-MCNC: 8.3 G/DL (ref 12.5–16.5)
HGB BLD-MCNC: 8.5 G/DL (ref 12.5–16.5)
IMM GRANULOCYTES # BLD AUTO: <0.03 K/UL (ref 0–0.58)
IMM GRANULOCYTES NFR BLD: 1 % (ref 0–5)
KINETICS TEG: 2.4 MIN (ref 1–3)
LDH SERPL-CCNC: 387 U/L (ref 135–225)
LY30 (LYSIS) TEG: 0 % (ref 0–8)
LYMPHOCYTES NFR BLD: 1.23 K/UL (ref 1.5–4)
LYMPHOCYTES RELATIVE PERCENT: 32 % (ref 20–42)
MA (MAX CLOT) TEG: 50 MM (ref 50–70)
MAGNESIUM SERPL-MCNC: 1.9 MG/DL (ref 1.6–2.6)
MAGNESIUM SERPL-MCNC: 2.3 MG/DL (ref 1.6–2.6)
MCH RBC QN AUTO: 29.8 PG (ref 26–35)
MCHC RBC AUTO-ENTMCNC: 31.4 G/DL (ref 32–34.5)
MCV RBC AUTO: 95.1 FL (ref 80–99.9)
MONOCYTES NFR BLD: 0.27 K/UL (ref 0.1–0.95)
MONOCYTES NFR BLD: 7 % (ref 2–12)
NEUTROPHILS NFR BLD: 61 % (ref 43–80)
NEUTS SEG NFR BLD: 2.36 K/UL (ref 1.8–7.3)
PARTIAL THROMBOPLASTIN TIME: 62.2 SEC (ref 24.5–35.1)
PATH REV BLD -IMP: NORMAL
PHOSPHATE SERPL-MCNC: 2.6 MG/DL (ref 2.5–4.5)
PHOSPHATE SERPL-MCNC: 5.1 MG/DL (ref 2.5–4.5)
PLATELET # BLD AUTO: 61 K/UL (ref 130–450)
PLATELET CONFIRMATION: NORMAL
PMV BLD AUTO: 11 FL (ref 7–12)
POTASSIUM SERPL-SCNC: 4.4 MMOL/L (ref 3.5–5)
POTASSIUM SERPL-SCNC: 4.5 MMOL/L (ref 3.5–5)
RBC # BLD AUTO: 2.85 M/UL (ref 3.8–5.8)
REACTION TIME TEG: 6.9 MIN (ref 5–10)
SODIUM SERPL-SCNC: 135 MMOL/L (ref 132–146)
SODIUM SERPL-SCNC: 136 MMOL/L (ref 132–146)
TME LAST DOSE: NORMAL H
TME LAST DOSE: NORMAL H
TRANSFUSION STATUS: NORMAL
UNIT DIVISION: 0
UNIT ISSUE DATE/TIME: NORMAL
VANCOMYCIN DOSE: NORMAL MG
VANCOMYCIN DOSE: NORMAL MG
VANCOMYCIN SERPL-MCNC: 11 UG/ML (ref 5–40)
VANCOMYCIN SERPL-MCNC: 17.3 UG/ML (ref 5–40)
WBC OTHER # BLD: 3.9 K/UL (ref 4.5–11.5)

## 2023-11-13 PROCEDURE — 80202 ASSAY OF VANCOMYCIN: CPT

## 2023-11-13 PROCEDURE — 6360000002 HC RX W HCPCS

## 2023-11-13 PROCEDURE — 51798 US URINE CAPACITY MEASURE: CPT

## 2023-11-13 PROCEDURE — C9113 INJ PANTOPRAZOLE SODIUM, VIA: HCPCS | Performed by: INTERNAL MEDICINE

## 2023-11-13 PROCEDURE — 99291 CRITICAL CARE FIRST HOUR: CPT | Performed by: INTERNAL MEDICINE

## 2023-11-13 PROCEDURE — 90935 HEMODIALYSIS ONE EVALUATION: CPT

## 2023-11-13 PROCEDURE — 6370000000 HC RX 637 (ALT 250 FOR IP): Performed by: INTERNAL MEDICINE

## 2023-11-13 PROCEDURE — 6360000002 HC RX W HCPCS: Performed by: INTERNAL MEDICINE

## 2023-11-13 PROCEDURE — 80197 ASSAY OF TACROLIMUS: CPT

## 2023-11-13 PROCEDURE — 85014 HEMATOCRIT: CPT

## 2023-11-13 PROCEDURE — 2580000003 HC RX 258: Performed by: INTERNAL MEDICINE

## 2023-11-13 PROCEDURE — 6370000000 HC RX 637 (ALT 250 FOR IP): Performed by: STUDENT IN AN ORGANIZED HEALTH CARE EDUCATION/TRAINING PROGRAM

## 2023-11-13 PROCEDURE — 85347 COAGULATION TIME ACTIVATED: CPT

## 2023-11-13 PROCEDURE — 6370000000 HC RX 637 (ALT 250 FOR IP): Performed by: NURSE PRACTITIONER

## 2023-11-13 PROCEDURE — 6370000000 HC RX 637 (ALT 250 FOR IP)

## 2023-11-13 PROCEDURE — 87040 BLOOD CULTURE FOR BACTERIA: CPT

## 2023-11-13 PROCEDURE — 85018 HEMOGLOBIN: CPT

## 2023-11-13 PROCEDURE — 82962 GLUCOSE BLOOD TEST: CPT

## 2023-11-13 PROCEDURE — 85390 FIBRINOLYSINS SCREEN I&R: CPT

## 2023-11-13 PROCEDURE — 85730 THROMBOPLASTIN TIME PARTIAL: CPT

## 2023-11-13 PROCEDURE — 85025 COMPLETE CBC W/AUTO DIFF WBC: CPT

## 2023-11-13 PROCEDURE — 83010 ASSAY OF HAPTOGLOBIN QUANT: CPT

## 2023-11-13 PROCEDURE — 80048 BASIC METABOLIC PNL TOTAL CA: CPT

## 2023-11-13 PROCEDURE — 2580000003 HC RX 258: Performed by: EMERGENCY MEDICINE

## 2023-11-13 PROCEDURE — 2000000000 HC ICU R&B

## 2023-11-13 PROCEDURE — 36415 COLL VENOUS BLD VENIPUNCTURE: CPT

## 2023-11-13 PROCEDURE — 83735 ASSAY OF MAGNESIUM: CPT

## 2023-11-13 PROCEDURE — 6370000000 HC RX 637 (ALT 250 FOR IP): Performed by: FAMILY MEDICINE

## 2023-11-13 PROCEDURE — 85576 BLOOD PLATELET AGGREGATION: CPT

## 2023-11-13 PROCEDURE — 83615 LACTATE (LD) (LDH) ENZYME: CPT

## 2023-11-13 PROCEDURE — 2700000000 HC OXYGEN THERAPY PER DAY

## 2023-11-13 PROCEDURE — 85384 FIBRINOGEN ACTIVITY: CPT

## 2023-11-13 PROCEDURE — 84100 ASSAY OF PHOSPHORUS: CPT

## 2023-11-13 RX ORDER — TACROLIMUS 1 MG/1
1 CAPSULE ORAL 2 TIMES DAILY
Status: DISCONTINUED | OUTPATIENT
Start: 2023-11-13 | End: 2023-11-14

## 2023-11-13 RX ORDER — ANTICOAGULANT SODIUM CITRATE SOLUTION 4 G/100ML
3.9 SOLUTION INTRAVENOUS PRN
Status: DISCONTINUED | OUTPATIENT
Start: 2023-11-13 | End: 2023-11-27

## 2023-11-13 RX ORDER — MAGNESIUM SULFATE 1 G/100ML
1000 INJECTION INTRAVENOUS ONCE
Status: COMPLETED | OUTPATIENT
Start: 2023-11-13 | End: 2023-11-13

## 2023-11-13 RX ADMIN — LEVETIRACETAM 500 MG: 100 INJECTION, SOLUTION INTRAVENOUS at 20:48

## 2023-11-13 RX ADMIN — VANCOMYCIN HYDROCHLORIDE 1500 MG: 10 INJECTION, POWDER, LYOPHILIZED, FOR SOLUTION INTRAVENOUS at 18:25

## 2023-11-13 RX ADMIN — ATORVASTATIN CALCIUM 10 MG: 10 TABLET, FILM COATED ORAL at 09:26

## 2023-11-13 RX ADMIN — TACROLIMUS 1 MG: 1 CAPSULE ORAL at 09:25

## 2023-11-13 RX ADMIN — SODIUM CHLORIDE, PRESERVATIVE FREE 40 MG: 5 INJECTION INTRAVENOUS at 04:02

## 2023-11-13 RX ADMIN — SODIUM CHLORIDE, PRESERVATIVE FREE 40 MG: 5 INJECTION INTRAVENOUS at 15:17

## 2023-11-13 RX ADMIN — DEXTROSE MONOHYDRATE 125 ML: 100 INJECTION, SOLUTION INTRAVENOUS at 01:42

## 2023-11-13 RX ADMIN — LEVETIRACETAM 500 MG: 100 INJECTION, SOLUTION INTRAVENOUS at 09:25

## 2023-11-13 RX ADMIN — LATANOPROST 1 DROP: 50 SOLUTION OPHTHALMIC at 20:48

## 2023-11-13 RX ADMIN — CALCITRIOL CAPSULES 0.25 MCG 0.25 MCG: 0.25 CAPSULE ORAL at 09:25

## 2023-11-13 RX ADMIN — Medication 10 MG: at 20:45

## 2023-11-13 RX ADMIN — PETROLATUM: 420 OINTMENT TOPICAL at 20:47

## 2023-11-13 RX ADMIN — METOPROLOL TARTRATE 12.5 MG: 25 TABLET, FILM COATED ORAL at 20:49

## 2023-11-13 RX ADMIN — HYDROCORTISONE: 25 CREAM TOPICAL at 20:47

## 2023-11-13 RX ADMIN — MEROPENEM 500 MG: 500 INJECTION, POWDER, FOR SOLUTION INTRAVENOUS at 15:19

## 2023-11-13 RX ADMIN — METOPROLOL TARTRATE 12.5 MG: 25 TABLET, FILM COATED ORAL at 09:25

## 2023-11-13 RX ADMIN — HYDRALAZINE HYDROCHLORIDE 100 MG: 50 TABLET, FILM COATED ORAL at 20:46

## 2023-11-13 RX ADMIN — SODIUM CHLORIDE, PRESERVATIVE FREE 10 ML: 5 INJECTION INTRAVENOUS at 09:26

## 2023-11-13 RX ADMIN — HYDROCORTISONE: 25 CREAM TOPICAL at 12:03

## 2023-11-13 RX ADMIN — Medication 1 CAPSULE: at 20:46

## 2023-11-13 RX ADMIN — PREDNISONE 5 MG: 5 TABLET ORAL at 09:26

## 2023-11-13 RX ADMIN — SODIUM CHLORIDE, PRESERVATIVE FREE 10 ML: 5 INJECTION INTRAVENOUS at 20:48

## 2023-11-13 RX ADMIN — MAGNESIUM SULFATE HEPTAHYDRATE 1000 MG: 1 INJECTION, SOLUTION INTRAVENOUS at 20:25

## 2023-11-13 RX ADMIN — EPOETIN ALFA-EPBX 3000 UNITS: 3000 INJECTION, SOLUTION INTRAVENOUS; SUBCUTANEOUS at 18:23

## 2023-11-13 RX ADMIN — PETROLATUM: 420 OINTMENT TOPICAL at 09:23

## 2023-11-13 RX ADMIN — DICLOFENAC SODIUM 2 G: 10 GEL TOPICAL at 09:24

## 2023-11-13 RX ADMIN — DICLOFENAC SODIUM 2 G: 10 GEL TOPICAL at 20:47

## 2023-11-13 RX ADMIN — Medication 1 CAPSULE: at 09:26

## 2023-11-13 RX ADMIN — TACROLIMUS 1 MG: 1 CAPSULE ORAL at 20:46

## 2023-11-13 ASSESSMENT — PAIN SCALES - GENERAL: PAINLEVEL_OUTOF10: 0

## 2023-11-13 NOTE — CARE COORDINATION
Care Coordination: LOS 19 day. Transfer from  on 11/12/23. RRT for AMS and R Facial droop. Precert obtained for [facility] through 11/13/23. Pt was set up for [address] [place] [address] 630 am.   Leora, transport and passr completed. 4 ltr nc,.  Therapy to follow when medically appropriate    Electronically signed by Fina Sotelo RN on 11/13/2023 at 2:04 PM

## 2023-11-13 NOTE — ADT AUTH CERT
11/8/23   Last Updated by Dwaine Goins RN on 11/13/2023 1517     Review Status Created By   In Wei Toth RN       Review Type Associated Date   -- 11/13/2023      Criteria Review   DATE: 11/8/23     PERTINENT UPDATES:  Pt seen and examined on hd  No complaints  Pt lethargic  Having bleeding from tdc site  Heparin held     VITALS:  98.1, 18, 68, 148/67, 93/50, 98% RA     ABNL/PERTINENT LABS/RADIOLOGY/DIAGNOSTIC STUDIES:  11/08/23 04:14  BUN,BUNPL: 31 (H)  Creatinine: 3.6 (H)  Est, Glom Filt Rate: 17 (L)  Glucose, Random: 115 (H)  CALCIUM, SERUM, 542716: 7.9 (L)  RBC: 2.79 (L)  Hemoglobin Quant: 8.0 (L)  Hematocrit: 25.7 (L)  MCHC: 31.1 (L)  RDW: 15.4 (H)  Platelet Count: 86 (L)     11/08/23 07:00  PTT: >240.0 (H)     11/08/23 13:30  PTT: 88.1 (H)     11/08/23 18:00  Creatinine: 2.1 (H)  Est, Glom Filt Rate: 33 (L)  Glucose, Random: 103 (H)  CALCIUM, SERUM, 453258: 7.3 (L)  Hemoglobin Quant: 7.7 (L)  Hematocrit: 25.1 (L)        PHYSICAL EXAM:  General: Awake, alert, no acute distress  Neck: No JVD noted  Lungs: Clear bilaterally upper, diminished to the bases bilaterally. Unlabored  CV: Regular rate and rhythm. No rub  Abd: Soft, nontender, nondistended. Active bowel sounds  Skin: Warm and dry. No rash on exposed extremities  Ext: No edema   Neuro: Awake, answers questions appropriately        MD CONSULTS/ASSESSMENT AND PLAN:  Nephrology Progress:  Assessment and Plans:     1.          History of ESRD s/p kidney transplant  History of renal transplant 12/2014 at [facility] on Bactrim, CellCept, tacrolimus  Recent worsening/ALYSON of renal transplant function in part in the setting of decreased effective renal perfusion with decreased oral intake and intermittent diarrhea  Previous baseline creatinine 3-3.3  Cr 3.85 in August 2023, Cr 4.61 in September, Cr 4.88 in October, Cr 5.7 in october  Creatinine 6.9 with  on 10/25--> Cr 3.6 today   S/P R IJ TDC on 11/3/23 in IR   Strict I&O, daily

## 2023-11-14 ENCOUNTER — APPOINTMENT (OUTPATIENT)
Dept: GENERAL RADIOLOGY | Age: 73
DRG: 673 | End: 2023-11-14
Payer: MEDICARE

## 2023-11-14 ENCOUNTER — APPOINTMENT (OUTPATIENT)
Dept: NEUROLOGY | Age: 73
DRG: 673 | End: 2023-11-14
Payer: MEDICARE

## 2023-11-14 LAB
ALBUMIN SERPL-MCNC: 2.1 G/DL (ref 3.5–5.2)
ALP SERPL-CCNC: 131 U/L (ref 40–129)
ALT SERPL-CCNC: 10 U/L (ref 0–40)
ANION GAP SERPL CALCULATED.3IONS-SCNC: 7 MMOL/L (ref 7–16)
ANION GAP SERPL CALCULATED.3IONS-SCNC: 7 MMOL/L (ref 7–16)
AST SERPL-CCNC: 61 U/L (ref 0–39)
BASOPHILS # BLD: 0 K/UL (ref 0–0.2)
BASOPHILS NFR BLD: 0 % (ref 0–2)
BILIRUB DIRECT SERPL-MCNC: 0.8 MG/DL (ref 0–0.3)
BILIRUB INDIRECT SERPL-MCNC: 0.4 MG/DL (ref 0–1)
BILIRUB SERPL-MCNC: 1.2 MG/DL (ref 0–1.2)
BUN SERPL-MCNC: 13 MG/DL (ref 6–23)
BUN SERPL-MCNC: 6 MG/DL (ref 6–23)
CALCIUM SERPL-MCNC: 7.4 MG/DL (ref 8.6–10.2)
CALCIUM SERPL-MCNC: 7.4 MG/DL (ref 8.6–10.2)
CHLORIDE SERPL-SCNC: 103 MMOL/L (ref 98–107)
CHLORIDE SERPL-SCNC: 104 MMOL/L (ref 98–107)
CO2 SERPL-SCNC: 26 MMOL/L (ref 22–29)
CO2 SERPL-SCNC: 27 MMOL/L (ref 22–29)
CREAT SERPL-MCNC: 1.4 MG/DL (ref 0.7–1.2)
CREAT SERPL-MCNC: 2.3 MG/DL (ref 0.7–1.2)
EOSINOPHIL # BLD: 0.14 K/UL (ref 0.05–0.5)
EOSINOPHILS RELATIVE PERCENT: 3 % (ref 0–6)
ERYTHROCYTE [DISTWIDTH] IN BLOOD BY AUTOMATED COUNT: 15.9 % (ref 11.5–15)
FIBRINOGEN PPP-MCNC: 96 MG/DL (ref 200–400)
GFR SERPL CREATININE-BSD FRML MDRD: 29 ML/MIN/1.73M2
GFR SERPL CREATININE-BSD FRML MDRD: 55 ML/MIN/1.73M2
GLUCOSE BLD-MCNC: 101 MG/DL (ref 74–99)
GLUCOSE BLD-MCNC: 67 MG/DL (ref 74–99)
GLUCOSE BLD-MCNC: 76 MG/DL (ref 74–99)
GLUCOSE BLD-MCNC: 83 MG/DL (ref 74–99)
GLUCOSE SERPL-MCNC: 64 MG/DL (ref 74–99)
GLUCOSE SERPL-MCNC: 77 MG/DL (ref 74–99)
HCT VFR BLD AUTO: 28.9 % (ref 37–54)
HGB BLD-MCNC: 9.1 G/DL (ref 12.5–16.5)
INR PPP: 2
LYMPHOCYTES NFR BLD: 1.55 K/UL (ref 1.5–4)
LYMPHOCYTES RELATIVE PERCENT: 33 % (ref 20–42)
MAGNESIUM SERPL-MCNC: 1.9 MG/DL (ref 1.6–2.6)
MAGNESIUM SERPL-MCNC: 2.1 MG/DL (ref 1.6–2.6)
MCH RBC QN AUTO: 29.4 PG (ref 26–35)
MCHC RBC AUTO-ENTMCNC: 31.5 G/DL (ref 32–34.5)
MCV RBC AUTO: 93.5 FL (ref 80–99.9)
MONOCYTES NFR BLD: 0.24 K/UL (ref 0.1–0.95)
MONOCYTES NFR BLD: 5 % (ref 2–12)
NEUTROPHILS NFR BLD: 58 % (ref 43–80)
NEUTS SEG NFR BLD: 2.73 K/UL (ref 1.8–7.3)
NUCLEATED RED BLOOD CELLS: 1 PER 100 WBC
PARTIAL THROMBOPLASTIN TIME: 60.4 SEC (ref 24.5–35.1)
PHOSPHATE SERPL-MCNC: 2.2 MG/DL (ref 2.5–4.5)
PHOSPHATE SERPL-MCNC: 3.3 MG/DL (ref 2.5–4.5)
PLATELET # BLD AUTO: 67 K/UL (ref 130–450)
PLATELET CONFIRMATION: NORMAL
PMV BLD AUTO: 11.1 FL (ref 7–12)
POTASSIUM SERPL-SCNC: 4.1 MMOL/L (ref 3.5–5)
POTASSIUM SERPL-SCNC: 4.6 MMOL/L (ref 3.5–5)
PROMYELOCYTES ABSOLUTE COUNT: 0.05 K/UL
PROMYELOCYTES: 1 %
PROT SERPL-MCNC: 5.2 G/DL (ref 6.4–8.3)
PROTHROMBIN TIME: 21.9 SEC (ref 9.3–12.4)
RBC # BLD AUTO: 3.09 M/UL (ref 3.8–5.8)
RBC # BLD: ABNORMAL 10*6/UL
SODIUM SERPL-SCNC: 136 MMOL/L (ref 132–146)
SODIUM SERPL-SCNC: 138 MMOL/L (ref 132–146)
WBC OTHER # BLD: 4.7 K/UL (ref 4.5–11.5)

## 2023-11-14 PROCEDURE — 82248 BILIRUBIN DIRECT: CPT

## 2023-11-14 PROCEDURE — 6370000000 HC RX 637 (ALT 250 FOR IP): Performed by: INTERNAL MEDICINE

## 2023-11-14 PROCEDURE — 6370000000 HC RX 637 (ALT 250 FOR IP): Performed by: NURSE PRACTITIONER

## 2023-11-14 PROCEDURE — 83735 ASSAY OF MAGNESIUM: CPT

## 2023-11-14 PROCEDURE — 90935 HEMODIALYSIS ONE EVALUATION: CPT

## 2023-11-14 PROCEDURE — 85384 FIBRINOGEN ACTIVITY: CPT

## 2023-11-14 PROCEDURE — 95816 EEG AWAKE AND DROWSY: CPT

## 2023-11-14 PROCEDURE — C9113 INJ PANTOPRAZOLE SODIUM, VIA: HCPCS | Performed by: INTERNAL MEDICINE

## 2023-11-14 PROCEDURE — 2580000003 HC RX 258: Performed by: INTERNAL MEDICINE

## 2023-11-14 PROCEDURE — 2700000000 HC OXYGEN THERAPY PER DAY

## 2023-11-14 PROCEDURE — 82962 GLUCOSE BLOOD TEST: CPT

## 2023-11-14 PROCEDURE — 80048 BASIC METABOLIC PNL TOTAL CA: CPT

## 2023-11-14 PROCEDURE — 6370000000 HC RX 637 (ALT 250 FOR IP): Performed by: STUDENT IN AN ORGANIZED HEALTH CARE EDUCATION/TRAINING PROGRAM

## 2023-11-14 PROCEDURE — 6370000000 HC RX 637 (ALT 250 FOR IP): Performed by: FAMILY MEDICINE

## 2023-11-14 PROCEDURE — 99232 SBSQ HOSP IP/OBS MODERATE 35: CPT | Performed by: NURSE PRACTITIONER

## 2023-11-14 PROCEDURE — 85025 COMPLETE CBC W/AUTO DIFF WBC: CPT

## 2023-11-14 PROCEDURE — 95816 EEG AWAKE AND DROWSY: CPT | Performed by: PSYCHIATRY & NEUROLOGY

## 2023-11-14 PROCEDURE — 99291 CRITICAL CARE FIRST HOUR: CPT | Performed by: INTERNAL MEDICINE

## 2023-11-14 PROCEDURE — 71045 X-RAY EXAM CHEST 1 VIEW: CPT

## 2023-11-14 PROCEDURE — 85730 THROMBOPLASTIN TIME PARTIAL: CPT

## 2023-11-14 PROCEDURE — A4216 STERILE WATER/SALINE, 10 ML: HCPCS | Performed by: INTERNAL MEDICINE

## 2023-11-14 PROCEDURE — 2000000000 HC ICU R&B

## 2023-11-14 PROCEDURE — 6360000002 HC RX W HCPCS: Performed by: INTERNAL MEDICINE

## 2023-11-14 PROCEDURE — 2580000003 HC RX 258: Performed by: EMERGENCY MEDICINE

## 2023-11-14 PROCEDURE — 80053 COMPREHEN METABOLIC PANEL: CPT

## 2023-11-14 PROCEDURE — 85610 PROTHROMBIN TIME: CPT

## 2023-11-14 PROCEDURE — 6370000000 HC RX 637 (ALT 250 FOR IP)

## 2023-11-14 PROCEDURE — 84100 ASSAY OF PHOSPHORUS: CPT

## 2023-11-14 PROCEDURE — 51798 US URINE CAPACITY MEASURE: CPT

## 2023-11-14 RX ORDER — TACROLIMUS 1 MG/1
2 CAPSULE ORAL 2 TIMES DAILY
Status: DISCONTINUED | OUTPATIENT
Start: 2023-11-14 | End: 2023-11-27

## 2023-11-14 RX ADMIN — DEXTROSE MONOHYDRATE 125 ML: 100 INJECTION, SOLUTION INTRAVENOUS at 18:36

## 2023-11-14 RX ADMIN — CALCITRIOL CAPSULES 0.25 MCG 0.25 MCG: 0.25 CAPSULE ORAL at 09:08

## 2023-11-14 RX ADMIN — SODIUM CHLORIDE, PRESERVATIVE FREE 40 MG: 5 INJECTION INTRAVENOUS at 03:18

## 2023-11-14 RX ADMIN — LEVETIRACETAM 500 MG: 100 INJECTION, SOLUTION INTRAVENOUS at 09:09

## 2023-11-14 RX ADMIN — LEVETIRACETAM 500 MG: 100 INJECTION, SOLUTION INTRAVENOUS at 21:09

## 2023-11-14 RX ADMIN — Medication 10 ML: at 09:11

## 2023-11-14 RX ADMIN — LATANOPROST 1 DROP: 50 SOLUTION OPHTHALMIC at 21:12

## 2023-11-14 RX ADMIN — SODIUM CHLORIDE, PRESERVATIVE FREE 10 ML: 5 INJECTION INTRAVENOUS at 21:12

## 2023-11-14 RX ADMIN — PREDNISONE 5 MG: 5 TABLET ORAL at 09:06

## 2023-11-14 RX ADMIN — DICLOFENAC SODIUM 2 G: 10 GEL TOPICAL at 21:10

## 2023-11-14 RX ADMIN — DICLOFENAC SODIUM 2 G: 10 GEL TOPICAL at 09:10

## 2023-11-14 RX ADMIN — MEROPENEM 500 MG: 500 INJECTION, POWDER, FOR SOLUTION INTRAVENOUS at 18:34

## 2023-11-14 RX ADMIN — SODIUM CHLORIDE, PRESERVATIVE FREE 10 ML: 5 INJECTION INTRAVENOUS at 09:11

## 2023-11-14 RX ADMIN — ATORVASTATIN CALCIUM 10 MG: 10 TABLET, FILM COATED ORAL at 09:08

## 2023-11-14 RX ADMIN — PETROLATUM: 420 OINTMENT TOPICAL at 09:11

## 2023-11-14 RX ADMIN — SODIUM CHLORIDE, PRESERVATIVE FREE 40 MG: 5 INJECTION INTRAVENOUS at 14:29

## 2023-11-14 RX ADMIN — PETROLATUM: 420 OINTMENT TOPICAL at 21:11

## 2023-11-14 RX ADMIN — TACROLIMUS 2 MG: 1 CAPSULE ORAL at 09:09

## 2023-11-14 RX ADMIN — Medication 1 CAPSULE: at 09:08

## 2023-11-14 RX ADMIN — HYDROCORTISONE: 25 CREAM TOPICAL at 21:11

## 2023-11-14 RX ADMIN — HYDRALAZINE HYDROCHLORIDE 100 MG: 50 TABLET, FILM COATED ORAL at 09:09

## 2023-11-14 RX ADMIN — HYDROCORTISONE: 25 CREAM TOPICAL at 09:09

## 2023-11-14 ASSESSMENT — PAIN SCALES - GENERAL
PAINLEVEL_OUTOF10: 0

## 2023-11-14 NOTE — PROCEDURES
[address] Report    MRN: [id number]  PATIENT NAME: Meek Ends OF REPORT: 2023    DATE OF SERVICE: 2023  PHYSICIAN NAME: Stacie Alcazar DO  Referring Physician: Dr Nelly Ramires      Patient's : 1950  Patient's Age: 68 y.o. Gender: male    PROCEDURE: Routine EEG with video      Clinical Interpretation: This abnormal study showed evidence of:    A mild nonspecific encephalopathy    No seizures or epileptiform discharges were noted during this study. ____________________________  Electronically signed by: Stacie Alcazar DO, 2023 9:44 AM      Patient Clinical Information   Reason for Study: Patient undergoing evaluation for altered mental status  Patient State: Stuporous  Primary neurological diagnosis: Altered mental status  Primary indication for monitoring: Diagnosis of nonconvulsive seizures    Pertinent Medications and Treatments    levetiracetam     Sedatives administered: No  Intubated: No  Pharmacological paralytic: No    Reporting Period  Start of Study: 2023   End of Study:  2023       EEG Description  Digital video and scalp EEG monitoring was performed using the standard protocol for this laboratory. Scalp electrodes were applied in the international 10/20 system. Multiple digital montage arrangements were utilized for evaluation. EKG and video were recorded. Frequent myogenic artifact is noted throughout much of the study. Background:      Occipital rhythm (posterior dominant rhythm or PDR): Present   Frequency: 7 Hz  Voltage: Low  Organization: fair   Reactivity to eye opening/closure: fair     Drowsiness: Absent  Sleep: Absent    Technical and Activation Procedures:  Hyperventilation: Not done  Photic stimulation: Not done  Reactivity to stimulation: Yes    Abnormalities:    I. Seizures? No    II. Rhythmic or Periodic Patterns? No    III. Other Abnormalities?   No

## 2023-11-15 ENCOUNTER — APPOINTMENT (OUTPATIENT)
Dept: GENERAL RADIOLOGY | Age: 73
DRG: 673 | End: 2023-11-15
Payer: MEDICARE

## 2023-11-15 LAB
AADO2: 551.5 MMHG
ABSOLUTE BANDS #: NORMAL K/UL (ref 0–1)
ABSOLUTE PLASMA CELLS: NORMAL K/UL
AMMONIA PLAS-SCNC: 19 UMOL/L (ref 16–60)
ANION GAP SERPL CALCULATED.3IONS-SCNC: 5 MMOL/L (ref 7–16)
ANION GAP SERPL CALCULATED.3IONS-SCNC: 6 MMOL/L (ref 7–16)
ANION GAP SERPL CALCULATED.3IONS-SCNC: NORMAL MMOL/L (ref 9–17)
ATYPICAL LYMPHOCYTE ABSOLUTE COUNT: NORMAL K/UL
ATYPICAL LYMPHOCYTES: NORMAL %
B PARAP IS1001 DNA NPH QL NAA+NON-PROBE: NOT DETECTED
B PERT DNA SPEC QL NAA+PROBE: NOT DETECTED
B.E.: 1.6 MMOL/L (ref -3–3)
B.E.: 3.2 MMOL/L (ref -3–3)
BANDS: NORMAL %
BASOPHILS # BLD: 0 K/UL (ref 0–0.2)
BASOPHILS # BLD: NORMAL K/UL (ref 0–0.2)
BASOPHILS NFR BLD: 0 % (ref 0–2)
BASOPHILS NFR BLD: NORMAL % (ref 0–2)
BLASTS ABSOLUTE COUNT: NORMAL K/UL
BLASTS: NORMAL %
BUN SERPL-MCNC: 4 MG/DL (ref 6–23)
BUN SERPL-MCNC: 8 MG/DL (ref 6–23)
BUN SERPL-MCNC: NORMAL MG/DL (ref 8–23)
BUN/CREAT SERPL: NORMAL (ref 9–20)
C PNEUM DNA NPH QL NAA+NON-PROBE: NOT DETECTED
CALCIUM SERPL-MCNC: 7.4 MG/DL (ref 8.6–10.2)
CALCIUM SERPL-MCNC: 7.5 MG/DL (ref 8.6–10.2)
CALCIUM SERPL-MCNC: NORMAL MG/DL (ref 8.6–10.4)
CHLORIDE SERPL-SCNC: 101 MMOL/L (ref 98–107)
CHLORIDE SERPL-SCNC: 103 MMOL/L (ref 98–107)
CHLORIDE SERPL-SCNC: NORMAL MMOL/L (ref 98–107)
CO2 SERPL-SCNC: 27 MMOL/L (ref 22–29)
CO2 SERPL-SCNC: 28 MMOL/L (ref 22–29)
CO2 SERPL-SCNC: NORMAL MMOL/L (ref 20–31)
COHB: 0.7 % (ref 0–1.5)
COHB: 0.7 % (ref 0–1.5)
CREAT SERPL-MCNC: 1.2 MG/DL (ref 0.7–1.2)
CREAT SERPL-MCNC: 1.8 MG/DL (ref 0.7–1.2)
CREAT SERPL-MCNC: NORMAL MG/DL (ref 0.7–1.2)
CRITICAL NOTIFICATION DATE/TIME: NORMAL
CRITICAL: ABNORMAL
CRITICAL: ABNORMAL
DATE ANALYZED: ABNORMAL
DATE ANALYZED: ABNORMAL
DATE LAST DOSE: NORMAL
DATE LAST DOSE: NORMAL
DATE OF COLLECTION: ABNORMAL
DATE OF COLLECTION: ABNORMAL
EOSINOPHIL # BLD: 0 K/UL (ref 0.05–0.5)
EOSINOPHIL # BLD: NORMAL K/UL (ref 0–0.4)
EOSINOPHILS RELATIVE PERCENT: 0 % (ref 0–6)
EOSINOPHILS RELATIVE PERCENT: NORMAL % (ref 1–4)
ERYTHROCYTE [DISTWIDTH] IN BLOOD BY AUTOMATED COUNT: 15.9 % (ref 11.5–15)
ERYTHROCYTE [DISTWIDTH] IN BLOOD BY AUTOMATED COUNT: NORMAL % (ref 11.8–14.4)
FIO2: 100 %
FLUAV RNA NPH QL NAA+NON-PROBE: NOT DETECTED
FLUBV RNA NPH QL NAA+NON-PROBE: NOT DETECTED
GFR SERPL CREATININE-BSD FRML MDRD: 40 ML/MIN/1.73M2
GFR SERPL CREATININE-BSD FRML MDRD: >60 ML/MIN/1.73M2
GFR SERPL CREATININE-BSD FRML MDRD: NORMAL ML/MIN/1.73M2
GLUCOSE BLD-MCNC: 123 MG/DL (ref 74–99)
GLUCOSE BLD-MCNC: 66 MG/DL (ref 74–99)
GLUCOSE BLD-MCNC: 70 MG/DL (ref 74–99)
GLUCOSE BLD-MCNC: 74 MG/DL (ref 74–99)
GLUCOSE SERPL-MCNC: 62 MG/DL (ref 74–99)
GLUCOSE SERPL-MCNC: 65 MG/DL (ref 74–99)
GLUCOSE SERPL-MCNC: NORMAL MG/DL (ref 70–99)
HADV DNA NPH QL NAA+NON-PROBE: NOT DETECTED
HCO3: 27.3 MMOL/L (ref 22–26)
HCO3: 28.5 MMOL/L (ref 22–26)
HCOV 229E RNA NPH QL NAA+NON-PROBE: NOT DETECTED
HCOV HKU1 RNA NPH QL NAA+NON-PROBE: NOT DETECTED
HCOV NL63 RNA NPH QL NAA+NON-PROBE: NOT DETECTED
HCOV OC43 RNA NPH QL NAA+NON-PROBE: NOT DETECTED
HCT VFR BLD AUTO: 27.2 % (ref 37–54)
HCT VFR BLD AUTO: NORMAL % (ref 40.7–50.3)
HGB BLD-MCNC: 8.4 G/DL (ref 12.5–16.5)
HGB BLD-MCNC: NORMAL G/DL (ref 13–17)
HHB: 3.5 % (ref 0–5)
HHB: 9.2 % (ref 0–5)
HMPV RNA NPH QL NAA+NON-PROBE: NOT DETECTED
HPIV1 RNA NPH QL NAA+NON-PROBE: NOT DETECTED
HPIV2 RNA NPH QL NAA+NON-PROBE: NOT DETECTED
HPIV3 RNA NPH QL NAA+NON-PROBE: NOT DETECTED
HPIV4 RNA NPH QL NAA+NON-PROBE: NOT DETECTED
IMM GRANULOCYTES # BLD AUTO: <0.03 K/UL (ref 0–0.58)
IMM GRANULOCYTES # BLD AUTO: NORMAL K/UL (ref 0–0.3)
IMM GRANULOCYTES NFR BLD: 1 % (ref 0–5)
IMM GRANULOCYTES NFR BLD: NORMAL %
INR PPP: 2.1
INR PPP: NORMAL
LAB: ABNORMAL
LAB: ABNORMAL
LYMPHOCYTES NFR BLD: 1.12 K/UL (ref 1.5–4)
LYMPHOCYTES NFR BLD: NORMAL K/UL (ref 1–4.8)
LYMPHOCYTES RELATIVE PERCENT: 36 % (ref 20–42)
LYMPHOCYTES RELATIVE PERCENT: NORMAL % (ref 24–44)
Lab: 1930
Lab: 2043
M PNEUMO DNA NPH QL NAA+NON-PROBE: NOT DETECTED
MAGNESIUM SERPL-MCNC: 1.8 MG/DL (ref 1.6–2.6)
MAGNESIUM SERPL-MCNC: 1.9 MG/DL (ref 1.6–2.6)
MAGNESIUM SERPL-MCNC: NORMAL MG/DL (ref 1.6–2.6)
MCH RBC QN AUTO: 28.8 PG (ref 26–35)
MCH RBC QN AUTO: NORMAL PG (ref 25.2–33.5)
MCHC RBC AUTO-ENTMCNC: 30.9 G/DL (ref 32–34.5)
MCHC RBC AUTO-ENTMCNC: NORMAL G/DL (ref 28.4–34.8)
MCV RBC AUTO: 93.2 FL (ref 80–99.9)
MCV RBC AUTO: NORMAL FL (ref 82.6–102.9)
METAMYELOCYTES ABSOLUTE COUNT: NORMAL K/UL
METAMYELOCYTES: NORMAL %
METHB: 0.1 % (ref 0–1.5)
METHB: 0.1 % (ref 0–1.5)
MODE: ABNORMAL
MODE: ABNORMAL
MONOCYTES NFR BLD: 0.16 K/UL (ref 0.1–0.95)
MONOCYTES NFR BLD: 5 % (ref 2–12)
MONOCYTES NFR BLD: NORMAL % (ref 1–7)
MONOCYTES NFR BLD: NORMAL K/UL (ref 0.1–0.8)
MYELOCYTES ABSOLUTE COUNT: NORMAL K/UL
MYELOCYTES: NORMAL %
NEUTROPHILS NFR BLD: 59 % (ref 43–80)
NEUTROPHILS NFR BLD: NORMAL % (ref 36–66)
NEUTS SEG NFR BLD: 1.84 K/UL (ref 1.8–7.3)
NEUTS SEG NFR BLD: NORMAL K/UL (ref 1.8–7.7)
NRBC BLD-RTO: NORMAL PER 100 WBC
NUCLEATED RED BLOOD CELLS: NORMAL PER 100 WBC
O2 CONTENT: 12.8 ML/DL
O2 CONTENT: 13.9 ML/DL
O2 DELIVERY DEVICE: 4
O2 SATURATION: 90.7 % (ref 92–98.5)
O2 SATURATION: 96.5 % (ref 92–98.5)
O2HB: 90 % (ref 94–97)
O2HB: 95.7 % (ref 94–97)
OPERATOR ID: 1768
OPERATOR ID: 405
PARTIAL THROMBOPLASTIN TIME: 62 SEC (ref 24.5–35.1)
PARTIAL THROMBOPLASTIN TIME: NORMAL SEC (ref 20.5–30.5)
PATIENT TEMP: 37 C
PATIENT TEMP: 37 C
PCO2: 46.6 MMHG (ref 35–45)
PCO2: 47.8 MMHG (ref 35–45)
PEEP/CPAP: 8 CMH2O
PFO2: 0.9 MMHG/%
PH BLOOD GAS: 7.37 (ref 7.35–7.45)
PH BLOOD GAS: 7.4 (ref 7.35–7.45)
PHOSPHATE SERPL-MCNC: 2 MG/DL (ref 2.5–4.5)
PHOSPHATE SERPL-MCNC: 2.8 MG/DL (ref 2.5–4.5)
PHOSPHATE SERPL-MCNC: NORMAL MG/DL (ref 2.5–4.5)
PLASMA CELLS: NORMAL %
PLATELET # BLD AUTO: 42 K/UL (ref 130–450)
PLATELET # BLD AUTO: NORMAL K/UL (ref 138–453)
PLATELET CONFIRMATION: NORMAL
PLATELET ESTIMATE: NORMAL
PLATELET, FLUORESCENCE: NORMAL K/UL (ref 138–453)
PLATELETS.RETICULATED NFR BLD AUTO: NORMAL % (ref 1.1–10.3)
PMV BLD AUTO: 11.3 FL (ref 7–12)
PMV BLD AUTO: NORMAL FL (ref 8.1–13.5)
PO2: 58.8 MMHG (ref 75–100)
PO2: 89.9 MMHG (ref 75–100)
POC HCO3: 28.9 MMOL/L (ref 22–26)
POC O2 SATURATION: 99.5 % (ref 92–98.5)
POC PCO2: 45.8 MM HG (ref 35–45)
POC PH: 7.41 (ref 7.35–7.45)
POC PO2: 164.8 MM HG (ref 60–80)
POSITIVE BASE EXCESS, ART: 3.7 MMOL/L (ref 0–3)
POTASSIUM SERPL-SCNC: 3.8 MMOL/L (ref 3.5–5)
POTASSIUM SERPL-SCNC: 4 MMOL/L (ref 3.5–5)
POTASSIUM SERPL-SCNC: NORMAL MMOL/L (ref 3.7–5.3)
PROMYELOCYTES ABSOLUTE COUNT: NORMAL K/UL
PROMYELOCYTES: NORMAL %
PROTHROMBIN TIME: 22.2 SEC (ref 9.3–12.4)
PROTHROMBIN TIME: NORMAL SEC (ref 9.1–12.3)
PS: 18 CMH20
RBC # BLD AUTO: 2.92 M/UL (ref 3.8–5.8)
RBC # BLD AUTO: NORMAL M/UL (ref 4.21–5.77)
RBC # BLD: NORMAL 10*6/UL
RI(T): 6.13
RSV RNA NPH QL NAA+NON-PROBE: NOT DETECTED
RV+EV RNA NPH QL NAA+NON-PROBE: NOT DETECTED
SARS-COV-2 RNA NPH QL NAA+NON-PROBE: DETECTED
SODIUM SERPL-SCNC: 134 MMOL/L (ref 132–146)
SODIUM SERPL-SCNC: 136 MMOL/L (ref 132–146)
SODIUM SERPL-SCNC: NORMAL MMOL/L (ref 135–144)
SOURCE, BLOOD GAS: ABNORMAL
SOURCE, BLOOD GAS: ABNORMAL
SPECIMEN DESCRIPTION: ABNORMAL
TACROLIMUS BLD-MCNC: 8.4 NG/ML
THB: 10.1 G/DL (ref 11.5–16.5)
THB: 10.2 G/DL (ref 11.5–16.5)
TIME ANALYZED: 1934
TIME ANALYZED: 2044
TME LAST DOSE: NORMAL H
TME LAST DOSE: NORMAL H
TSH SERPL DL<=0.05 MIU/L-ACNC: 3.77 UIU/ML (ref 0.27–4.2)
VANCOMYCIN DOSE: NORMAL MG
VANCOMYCIN DOSE: NORMAL MG
VANCOMYCIN SERPL-MCNC: 18.9 UG/ML (ref 5–40)
VANCOMYCIN SERPL-MCNC: NORMAL UG/ML
WBC # BLD: NORMAL 10*3/UL
WBC OTHER # BLD: 3.1 K/UL (ref 4.5–11.5)
WBC OTHER # BLD: NORMAL K/UL (ref 3.5–11.3)

## 2023-11-15 PROCEDURE — 85730 THROMBOPLASTIN TIME PARTIAL: CPT

## 2023-11-15 PROCEDURE — C9113 INJ PANTOPRAZOLE SODIUM, VIA: HCPCS | Performed by: INTERNAL MEDICINE

## 2023-11-15 PROCEDURE — 2580000003 HC RX 258: Performed by: INTERNAL MEDICINE

## 2023-11-15 PROCEDURE — 6360000002 HC RX W HCPCS: Performed by: STUDENT IN AN ORGANIZED HEALTH CARE EDUCATION/TRAINING PROGRAM

## 2023-11-15 PROCEDURE — 99232 SBSQ HOSP IP/OBS MODERATE 35: CPT | Performed by: NURSE PRACTITIONER

## 2023-11-15 PROCEDURE — 2580000003 HC RX 258: Performed by: STUDENT IN AN ORGANIZED HEALTH CARE EDUCATION/TRAINING PROGRAM

## 2023-11-15 PROCEDURE — 82803 BLOOD GASES ANY COMBINATION: CPT

## 2023-11-15 PROCEDURE — 2700000000 HC OXYGEN THERAPY PER DAY

## 2023-11-15 PROCEDURE — 85610 PROTHROMBIN TIME: CPT

## 2023-11-15 PROCEDURE — 74018 RADEX ABDOMEN 1 VIEW: CPT

## 2023-11-15 PROCEDURE — 97530 THERAPEUTIC ACTIVITIES: CPT

## 2023-11-15 PROCEDURE — 97535 SELF CARE MNGMENT TRAINING: CPT

## 2023-11-15 PROCEDURE — 6360000002 HC RX W HCPCS: Performed by: INTERNAL MEDICINE

## 2023-11-15 PROCEDURE — 80202 ASSAY OF VANCOMYCIN: CPT

## 2023-11-15 PROCEDURE — 99291 CRITICAL CARE FIRST HOUR: CPT | Performed by: INTERNAL MEDICINE

## 2023-11-15 PROCEDURE — 90935 HEMODIALYSIS ONE EVALUATION: CPT

## 2023-11-15 PROCEDURE — 2580000003 HC RX 258: Performed by: EMERGENCY MEDICINE

## 2023-11-15 PROCEDURE — 71045 X-RAY EXAM CHEST 1 VIEW: CPT

## 2023-11-15 PROCEDURE — A4216 STERILE WATER/SALINE, 10 ML: HCPCS | Performed by: INTERNAL MEDICINE

## 2023-11-15 PROCEDURE — 5A09557 ASSISTANCE WITH RESPIRATORY VENTILATION, GREATER THAN 96 CONSECUTIVE HOURS, CONTINUOUS POSITIVE AIRWAY PRESSURE: ICD-10-PCS

## 2023-11-15 PROCEDURE — 2709999900 HC NON-CHARGEABLE SUPPLY

## 2023-11-15 PROCEDURE — 6370000000 HC RX 637 (ALT 250 FOR IP): Performed by: FAMILY MEDICINE

## 2023-11-15 PROCEDURE — 85025 COMPLETE CBC W/AUTO DIFF WBC: CPT

## 2023-11-15 PROCEDURE — 84443 ASSAY THYROID STIM HORMONE: CPT

## 2023-11-15 PROCEDURE — 83735 ASSAY OF MAGNESIUM: CPT

## 2023-11-15 PROCEDURE — 94640 AIRWAY INHALATION TREATMENT: CPT

## 2023-11-15 PROCEDURE — 84100 ASSAY OF PHOSPHORUS: CPT

## 2023-11-15 PROCEDURE — 36600 WITHDRAWAL OF ARTERIAL BLOOD: CPT

## 2023-11-15 PROCEDURE — 6370000000 HC RX 637 (ALT 250 FOR IP): Performed by: NURSE PRACTITIONER

## 2023-11-15 PROCEDURE — 94660 CPAP INITIATION&MGMT: CPT

## 2023-11-15 PROCEDURE — 82805 BLOOD GASES W/O2 SATURATION: CPT

## 2023-11-15 PROCEDURE — 80048 BASIC METABOLIC PNL TOTAL CA: CPT

## 2023-11-15 PROCEDURE — 82140 ASSAY OF AMMONIA: CPT

## 2023-11-15 PROCEDURE — 0202U NFCT DS 22 TRGT SARS-COV-2: CPT

## 2023-11-15 PROCEDURE — 82962 GLUCOSE BLOOD TEST: CPT

## 2023-11-15 PROCEDURE — 2000000000 HC ICU R&B

## 2023-11-15 RX ORDER — ALBUTEROL SULFATE 2.5 MG/3ML
2.5 SOLUTION RESPIRATORY (INHALATION) EVERY 6 HOURS
Status: DISCONTINUED | OUTPATIENT
Start: 2023-11-15 | End: 2023-11-21

## 2023-11-15 RX ORDER — MAGNESIUM SULFATE IN WATER 40 MG/ML
2000 INJECTION, SOLUTION INTRAVENOUS ONCE
Status: COMPLETED | OUTPATIENT
Start: 2023-11-15 | End: 2023-11-16

## 2023-11-15 RX ORDER — SODIUM CHLORIDE, SODIUM LACTATE, POTASSIUM CHLORIDE, AND CALCIUM CHLORIDE .6; .31; .03; .02 G/100ML; G/100ML; G/100ML; G/100ML
500 INJECTION, SOLUTION INTRAVENOUS ONCE
Status: DISCONTINUED | OUTPATIENT
Start: 2023-11-15 | End: 2023-11-15

## 2023-11-15 RX ORDER — BUDESONIDE 0.5 MG/2ML
1 INHALANT ORAL 2 TIMES DAILY
Status: DISCONTINUED | OUTPATIENT
Start: 2023-11-15 | End: 2023-11-27

## 2023-11-15 RX ORDER — MIDODRINE HYDROCHLORIDE 5 MG/1
5 TABLET ORAL
Status: DISCONTINUED | OUTPATIENT
Start: 2023-11-15 | End: 2023-11-15

## 2023-11-15 RX ORDER — ARFORMOTEROL TARTRATE 15 UG/2ML
15 SOLUTION RESPIRATORY (INHALATION)
Status: DISCONTINUED | OUTPATIENT
Start: 2023-11-15 | End: 2023-11-27

## 2023-11-15 RX ADMIN — PETROLATUM: 420 OINTMENT TOPICAL at 21:13

## 2023-11-15 RX ADMIN — LATANOPROST 1 DROP: 50 SOLUTION OPHTHALMIC at 21:13

## 2023-11-15 RX ADMIN — SODIUM CHLORIDE, PRESERVATIVE FREE 40 MG: 5 INJECTION INTRAVENOUS at 14:42

## 2023-11-15 RX ADMIN — PETROLATUM: 420 OINTMENT TOPICAL at 08:22

## 2023-11-15 RX ADMIN — DICLOFENAC SODIUM 2 G: 10 GEL TOPICAL at 21:13

## 2023-11-15 RX ADMIN — Medication 1 CAPSULE: at 21:18

## 2023-11-15 RX ADMIN — BUDESONIDE INHALATION 1000 MCG: 0.5 SUSPENSION RESPIRATORY (INHALATION) at 21:01

## 2023-11-15 RX ADMIN — HYDRALAZINE HYDROCHLORIDE 100 MG: 50 TABLET, FILM COATED ORAL at 21:14

## 2023-11-15 RX ADMIN — WATER 125 MG: 1 INJECTION INTRAMUSCULAR; INTRAVENOUS; SUBCUTANEOUS at 21:18

## 2023-11-15 RX ADMIN — ALBUTEROL SULFATE 2.5 MG: 2.5 SOLUTION RESPIRATORY (INHALATION) at 21:01

## 2023-11-15 RX ADMIN — DEXTROSE MONOHYDRATE 250 ML: 100 INJECTION, SOLUTION INTRAVENOUS at 08:19

## 2023-11-15 RX ADMIN — TACROLIMUS 2 MG: 1 CAPSULE ORAL at 21:17

## 2023-11-15 RX ADMIN — LEVETIRACETAM 500 MG: 100 INJECTION, SOLUTION INTRAVENOUS at 21:19

## 2023-11-15 RX ADMIN — Medication 10 ML: at 21:12

## 2023-11-15 RX ADMIN — SODIUM CHLORIDE, PRESERVATIVE FREE 40 MG: 5 INJECTION INTRAVENOUS at 05:10

## 2023-11-15 RX ADMIN — MEROPENEM 500 MG: 500 INJECTION, POWDER, FOR SOLUTION INTRAVENOUS at 17:42

## 2023-11-15 RX ADMIN — EPOETIN ALFA-EPBX 3000 UNITS: 3000 INJECTION, SOLUTION INTRAVENOUS; SUBCUTANEOUS at 21:23

## 2023-11-15 RX ADMIN — SODIUM CHLORIDE, PRESERVATIVE FREE 10 ML: 5 INJECTION INTRAVENOUS at 21:12

## 2023-11-15 RX ADMIN — MAGNESIUM SULFATE HEPTAHYDRATE 2000 MG: 40 INJECTION, SOLUTION INTRAVENOUS at 21:07

## 2023-11-15 RX ADMIN — DICLOFENAC SODIUM 2 G: 10 GEL TOPICAL at 08:22

## 2023-11-15 RX ADMIN — ARFORMOTEROL TARTRATE 15 MCG: 15 SOLUTION RESPIRATORY (INHALATION) at 21:01

## 2023-11-15 RX ADMIN — HYDROCORTISONE: 25 CREAM TOPICAL at 21:14

## 2023-11-15 ASSESSMENT — PAIN SCALES - GENERAL
PAINLEVEL_OUTOF10: 0

## 2023-11-16 ENCOUNTER — APPOINTMENT (OUTPATIENT)
Dept: CT IMAGING | Age: 73
DRG: 673 | End: 2023-11-16
Payer: MEDICARE

## 2023-11-16 ENCOUNTER — APPOINTMENT (OUTPATIENT)
Dept: GENERAL RADIOLOGY | Age: 73
DRG: 673 | End: 2023-11-16
Payer: MEDICARE

## 2023-11-16 LAB
AADO2: 116.5 MMHG
ALBUMIN SERPL-MCNC: 2 G/DL (ref 3.5–5.2)
ALP SERPL-CCNC: 176 U/L (ref 40–129)
ALT SERPL-CCNC: 13 U/L (ref 0–40)
ANION GAP SERPL CALCULATED.3IONS-SCNC: 9 MMOL/L (ref 7–16)
AST SERPL-CCNC: 78 U/L (ref 0–39)
B.E.: 0.8 MMOL/L (ref -3–3)
BASOPHILS # BLD: 0 K/UL (ref 0–0.2)
BASOPHILS # BLD: 0.01 K/UL (ref 0–0.2)
BASOPHILS NFR BLD: 0 % (ref 0–2)
BASOPHILS NFR BLD: 0 % (ref 0–2)
BILIRUB DIRECT SERPL-MCNC: 0.8 MG/DL (ref 0–0.3)
BILIRUB INDIRECT SERPL-MCNC: 0.3 MG/DL (ref 0–1)
BILIRUB SERPL-MCNC: 1.1 MG/DL (ref 0–1.2)
BNP SERPL-MCNC: ABNORMAL PG/ML (ref 0–125)
BUN SERPL-MCNC: 8 MG/DL (ref 6–23)
CALCIUM SERPL-MCNC: 7.6 MG/DL (ref 8.6–10.2)
CHLORIDE SERPL-SCNC: 102 MMOL/L (ref 98–107)
CLOT ANGLE.KAOLIN INDUCED BLD RES TEG: 59.8 DEG (ref 53–70)
CO2 SERPL-SCNC: 25 MMOL/L (ref 22–29)
COHB: 0.2 % (ref 0–1.5)
CREAT SERPL-MCNC: 1.5 MG/DL (ref 0.7–1.2)
CRITICAL: ABNORMAL
CRP SERPL HS-MCNC: 26 MG/L (ref 0–5)
D DIMER: 850 NG/ML DDU (ref 0–232)
DATE ANALYZED: ABNORMAL
DATE OF COLLECTION: ABNORMAL
EOSINOPHIL # BLD: 0 K/UL (ref 0.05–0.5)
EOSINOPHIL # BLD: 0 K/UL (ref 0.05–0.5)
EOSINOPHILS RELATIVE PERCENT: 0 % (ref 0–6)
EOSINOPHILS RELATIVE PERCENT: 0 % (ref 0–6)
EPL-TEG: 0 % (ref 0–15)
ERYTHROCYTE [DISTWIDTH] IN BLOOD BY AUTOMATED COUNT: 16.1 % (ref 11.5–15)
ERYTHROCYTE [DISTWIDTH] IN BLOOD BY AUTOMATED COUNT: 16.3 % (ref 11.5–15)
FIO2: 80 %
G-TEG: 5 KDYN/CM2 (ref 4.5–11)
GFR SERPL CREATININE-BSD FRML MDRD: 47 ML/MIN/1.73M2
GLUCOSE BLD-MCNC: 118 MG/DL (ref 74–99)
GLUCOSE BLD-MCNC: 208 MG/DL (ref 74–99)
GLUCOSE SERPL-MCNC: 134 MG/DL (ref 74–99)
HCO3: 26.1 MMOL/L (ref 22–26)
HCT VFR BLD AUTO: 29 % (ref 37–54)
HCT VFR BLD AUTO: 29.4 % (ref 37–54)
HGB BLD-MCNC: 8.8 G/DL (ref 12.5–16.5)
HGB BLD-MCNC: 9.1 G/DL (ref 12.5–16.5)
HHB: 0.7 % (ref 0–5)
IMM GRANULOCYTES # BLD AUTO: 0.09 K/UL (ref 0–0.58)
IMM GRANULOCYTES NFR BLD: 2 % (ref 0–5)
INR PPP: 1.9
INR PPP: 2
KINETICS TEG: 2.3 MIN (ref 1–3)
LAB: ABNORMAL
LY30 (LYSIS) TEG: 0 % (ref 0–8)
LYMPHOCYTES NFR BLD: 0.04 K/UL (ref 1.5–4)
LYMPHOCYTES NFR BLD: 1.13 K/UL (ref 1.5–4)
LYMPHOCYTES RELATIVE PERCENT: 1 % (ref 20–42)
LYMPHOCYTES RELATIVE PERCENT: 23 % (ref 20–42)
Lab: 918
MA (MAX CLOT) TEG: 50 MM (ref 50–70)
MAGNESIUM SERPL-MCNC: 2.4 MG/DL (ref 1.6–2.6)
MCH RBC QN AUTO: 28.7 PG (ref 26–35)
MCH RBC QN AUTO: 28.9 PG (ref 26–35)
MCHC RBC AUTO-ENTMCNC: 30.3 G/DL (ref 32–34.5)
MCHC RBC AUTO-ENTMCNC: 31 G/DL (ref 32–34.5)
MCV RBC AUTO: 93.3 FL (ref 80–99.9)
MCV RBC AUTO: 94.5 FL (ref 80–99.9)
METHB: 0.4 % (ref 0–1.5)
MODE: ABNORMAL
MONOCYTES NFR BLD: 0.04 K/UL (ref 0.1–0.95)
MONOCYTES NFR BLD: 0.13 K/UL (ref 0.1–0.95)
MONOCYTES NFR BLD: 1 % (ref 2–12)
MONOCYTES NFR BLD: 3 % (ref 2–12)
NEUTROPHILS NFR BLD: 73 % (ref 43–80)
NEUTROPHILS NFR BLD: 98 % (ref 43–80)
NEUTS SEG NFR BLD: 3.59 K/UL (ref 1.8–7.3)
NEUTS SEG NFR BLD: 4.72 K/UL (ref 1.8–7.3)
O2 CONTENT: 14.7 ML/DL
O2 SATURATION: 99.3 % (ref 92–98.5)
O2HB: 98.7 % (ref 94–97)
OPERATOR ID: 7490
PARTIAL THROMBOPLASTIN TIME: 56.3 SEC (ref 24.5–35.1)
PATIENT TEMP: 37 C
PCO2: 44.9 MMHG (ref 35–45)
PEEP/CPAP: 8 CMH2O
PFO2: 4.84 MMHG/%
PH BLOOD GAS: 7.38 (ref 7.35–7.45)
PHOSPHATE SERPL-MCNC: 2.8 MG/DL (ref 2.5–4.5)
PIP: 18 CMH2O
PLATELET # BLD AUTO: 49 K/UL (ref 130–450)
PLATELET CONFIRMATION: NORMAL
PLATELET CONFIRMATION: NORMAL
PLATELET, FLUORESCENCE: 55 K/UL (ref 130–450)
PMV BLD AUTO: 10.7 FL (ref 7–12)
PMV BLD AUTO: 12.6 FL (ref 7–12)
PO2: 386.8 MMHG (ref 75–100)
POTASSIUM SERPL-SCNC: 4.3 MMOL/L (ref 3.5–5)
PROCALCITONIN SERPL-MCNC: 0.3 NG/ML (ref 0–0.08)
PROT SERPL-MCNC: 5.3 G/DL (ref 6.4–8.3)
PROTHROMBIN TIME: 20.5 SEC (ref 9.3–12.4)
PROTHROMBIN TIME: 21.6 SEC (ref 9.3–12.4)
RBC # BLD AUTO: 3.07 M/UL (ref 3.8–5.8)
RBC # BLD AUTO: 3.15 M/UL (ref 3.8–5.8)
RBC # BLD: ABNORMAL 10*6/UL
REACTION TIME TEG: 6.2 MIN (ref 5–10)
RI(T): 0.3
SODIUM SERPL-SCNC: 136 MMOL/L (ref 132–146)
SOURCE, BLOOD GAS: ABNORMAL
THB: 9.8 G/DL (ref 11.5–16.5)
TIME ANALYZED: 921
WBC OTHER # BLD: 4.8 K/UL (ref 4.5–11.5)
WBC OTHER # BLD: 5 K/UL (ref 4.5–11.5)

## 2023-11-16 PROCEDURE — 99291 CRITICAL CARE FIRST HOUR: CPT | Performed by: INTERNAL MEDICINE

## 2023-11-16 PROCEDURE — 90935 HEMODIALYSIS ONE EVALUATION: CPT

## 2023-11-16 PROCEDURE — 85347 COAGULATION TIME ACTIVATED: CPT

## 2023-11-16 PROCEDURE — 6360000002 HC RX W HCPCS: Performed by: STUDENT IN AN ORGANIZED HEALTH CARE EDUCATION/TRAINING PROGRAM

## 2023-11-16 PROCEDURE — 6370000000 HC RX 637 (ALT 250 FOR IP): Performed by: INTERNAL MEDICINE

## 2023-11-16 PROCEDURE — P9047 ALBUMIN (HUMAN), 25%, 50ML: HCPCS

## 2023-11-16 PROCEDURE — 80053 COMPREHEN METABOLIC PANEL: CPT

## 2023-11-16 PROCEDURE — 6370000000 HC RX 637 (ALT 250 FOR IP)

## 2023-11-16 PROCEDURE — A4216 STERILE WATER/SALINE, 10 ML: HCPCS | Performed by: INTERNAL MEDICINE

## 2023-11-16 PROCEDURE — 2700000000 HC OXYGEN THERAPY PER DAY

## 2023-11-16 PROCEDURE — 85390 FIBRINOLYSINS SCREEN I&R: CPT

## 2023-11-16 PROCEDURE — 6360000002 HC RX W HCPCS: Performed by: INTERNAL MEDICINE

## 2023-11-16 PROCEDURE — 6360000002 HC RX W HCPCS

## 2023-11-16 PROCEDURE — 82248 BILIRUBIN DIRECT: CPT

## 2023-11-16 PROCEDURE — 6360000004 HC RX CONTRAST MEDICATION: Performed by: RADIOLOGY

## 2023-11-16 PROCEDURE — 82962 GLUCOSE BLOOD TEST: CPT

## 2023-11-16 PROCEDURE — 85384 FIBRINOGEN ACTIVITY: CPT

## 2023-11-16 PROCEDURE — 2580000003 HC RX 258: Performed by: INTERNAL MEDICINE

## 2023-11-16 PROCEDURE — 84100 ASSAY OF PHOSPHORUS: CPT

## 2023-11-16 PROCEDURE — 83880 ASSAY OF NATRIURETIC PEPTIDE: CPT

## 2023-11-16 PROCEDURE — 85025 COMPLETE CBC W/AUTO DIFF WBC: CPT

## 2023-11-16 PROCEDURE — 71045 X-RAY EXAM CHEST 1 VIEW: CPT

## 2023-11-16 PROCEDURE — 82805 BLOOD GASES W/O2 SATURATION: CPT

## 2023-11-16 PROCEDURE — 6370000000 HC RX 637 (ALT 250 FOR IP): Performed by: FAMILY MEDICINE

## 2023-11-16 PROCEDURE — 86140 C-REACTIVE PROTEIN: CPT

## 2023-11-16 PROCEDURE — 99222 1ST HOSP IP/OBS MODERATE 55: CPT

## 2023-11-16 PROCEDURE — 71275 CT ANGIOGRAPHY CHEST: CPT

## 2023-11-16 PROCEDURE — 85576 BLOOD PLATELET AGGREGATION: CPT

## 2023-11-16 PROCEDURE — 94660 CPAP INITIATION&MGMT: CPT

## 2023-11-16 PROCEDURE — 94640 AIRWAY INHALATION TREATMENT: CPT

## 2023-11-16 PROCEDURE — 2000000000 HC ICU R&B

## 2023-11-16 PROCEDURE — 84145 PROCALCITONIN (PCT): CPT

## 2023-11-16 PROCEDURE — 85379 FIBRIN DEGRADATION QUANT: CPT

## 2023-11-16 PROCEDURE — 99232 SBSQ HOSP IP/OBS MODERATE 35: CPT | Performed by: NURSE PRACTITIONER

## 2023-11-16 PROCEDURE — 83735 ASSAY OF MAGNESIUM: CPT

## 2023-11-16 PROCEDURE — C9113 INJ PANTOPRAZOLE SODIUM, VIA: HCPCS | Performed by: INTERNAL MEDICINE

## 2023-11-16 PROCEDURE — 6370000000 HC RX 637 (ALT 250 FOR IP): Performed by: NURSE PRACTITIONER

## 2023-11-16 PROCEDURE — 85730 THROMBOPLASTIN TIME PARTIAL: CPT

## 2023-11-16 PROCEDURE — 85610 PROTHROMBIN TIME: CPT

## 2023-11-16 RX ORDER — NOREPINEPHRINE BITARTRATE 0.06 MG/ML
1-100 INJECTION, SOLUTION INTRAVENOUS CONTINUOUS
Status: DISCONTINUED | OUTPATIENT
Start: 2023-11-16 | End: 2023-11-21

## 2023-11-16 RX ORDER — VITAMIN B COMPLEX
1000 TABLET ORAL DAILY
Status: DISCONTINUED | OUTPATIENT
Start: 2023-11-16 | End: 2023-11-27

## 2023-11-16 RX ORDER — ALBUMIN (HUMAN) 12.5 G/50ML
SOLUTION INTRAVENOUS
Status: COMPLETED
Start: 2023-11-16 | End: 2023-11-16

## 2023-11-16 RX ORDER — MIDODRINE HYDROCHLORIDE 10 MG/1
10 TABLET ORAL
Status: DISCONTINUED | OUTPATIENT
Start: 2023-11-16 | End: 2023-11-21

## 2023-11-16 RX ORDER — ALBUMIN (HUMAN) 12.5 G/50ML
25 SOLUTION INTRAVENOUS ONCE
Status: COMPLETED | OUTPATIENT
Start: 2023-11-16 | End: 2023-11-16

## 2023-11-16 RX ORDER — ZINC SULFATE 50(220)MG
50 CAPSULE ORAL DAILY
Status: DISCONTINUED | OUTPATIENT
Start: 2023-11-16 | End: 2023-11-27

## 2023-11-16 RX ORDER — DEXAMETHASONE SODIUM PHOSPHATE 10 MG/ML
6 INJECTION, SOLUTION INTRAMUSCULAR; INTRAVENOUS DAILY
Status: DISCONTINUED | OUTPATIENT
Start: 2023-11-16 | End: 2023-11-24

## 2023-11-16 RX ORDER — ASCORBIC ACID 500 MG
500 TABLET ORAL DAILY
Status: DISCONTINUED | OUTPATIENT
Start: 2023-11-16 | End: 2023-11-27

## 2023-11-16 RX ADMIN — Medication 50 MG: at 22:00

## 2023-11-16 RX ADMIN — TACROLIMUS 2 MG: 1 CAPSULE ORAL at 20:16

## 2023-11-16 RX ADMIN — HYDROCORTISONE: 25 CREAM TOPICAL at 08:17

## 2023-11-16 RX ADMIN — SODIUM CHLORIDE, PRESERVATIVE FREE 10 ML: 5 INJECTION INTRAVENOUS at 20:16

## 2023-11-16 RX ADMIN — DEXAMETHASONE SODIUM PHOSPHATE 6 MG: 10 INJECTION, SOLUTION INTRAMUSCULAR; INTRAVENOUS at 01:54

## 2023-11-16 RX ADMIN — Medication 10 ML: at 20:17

## 2023-11-16 RX ADMIN — HYDROCORTISONE: 25 CREAM TOPICAL at 20:17

## 2023-11-16 RX ADMIN — LEVETIRACETAM 500 MG: 100 INJECTION, SOLUTION INTRAVENOUS at 20:15

## 2023-11-16 RX ADMIN — ALBUTEROL SULFATE 2.5 MG: 2.5 SOLUTION RESPIRATORY (INHALATION) at 09:18

## 2023-11-16 RX ADMIN — LATANOPROST 1 DROP: 50 SOLUTION OPHTHALMIC at 20:22

## 2023-11-16 RX ADMIN — MEROPENEM 500 MG: 500 INJECTION, POWDER, FOR SOLUTION INTRAVENOUS at 20:15

## 2023-11-16 RX ADMIN — PETROLATUM: 420 OINTMENT TOPICAL at 20:17

## 2023-11-16 RX ADMIN — Medication 500 MG: at 20:32

## 2023-11-16 RX ADMIN — ARFORMOTEROL TARTRATE 15 MCG: 15 SOLUTION RESPIRATORY (INHALATION) at 19:22

## 2023-11-16 RX ADMIN — BUDESONIDE INHALATION 1000 MCG: 0.5 SUSPENSION RESPIRATORY (INHALATION) at 19:22

## 2023-11-16 RX ADMIN — DICLOFENAC SODIUM 2 G: 10 GEL TOPICAL at 08:11

## 2023-11-16 RX ADMIN — ARFORMOTEROL TARTRATE 15 MCG: 15 SOLUTION RESPIRATORY (INHALATION) at 09:18

## 2023-11-16 RX ADMIN — ATORVASTATIN CALCIUM 10 MG: 10 TABLET, FILM COATED ORAL at 09:43

## 2023-11-16 RX ADMIN — ALBUTEROL SULFATE 2.5 MG: 2.5 SOLUTION RESPIRATORY (INHALATION) at 03:07

## 2023-11-16 RX ADMIN — DICLOFENAC SODIUM 2 G: 10 GEL TOPICAL at 20:17

## 2023-11-16 RX ADMIN — Medication 1 CAPSULE: at 09:24

## 2023-11-16 RX ADMIN — TACROLIMUS 2 MG: 1 CAPSULE ORAL at 09:24

## 2023-11-16 RX ADMIN — MIDODRINE HYDROCHLORIDE 10 MG: 10 TABLET ORAL at 13:13

## 2023-11-16 RX ADMIN — PETROLATUM: 420 OINTMENT TOPICAL at 08:32

## 2023-11-16 RX ADMIN — ALBUTEROL SULFATE 2.5 MG: 2.5 SOLUTION RESPIRATORY (INHALATION) at 19:22

## 2023-11-16 RX ADMIN — MIDODRINE HYDROCHLORIDE 10 MG: 10 TABLET ORAL at 17:46

## 2023-11-16 RX ADMIN — ALBUMIN (HUMAN) 25 G: 12.5 SOLUTION INTRAVENOUS at 13:47

## 2023-11-16 RX ADMIN — ALBUMIN (HUMAN) 25 G: 25 SOLUTION INTRAVENOUS at 13:47

## 2023-11-16 RX ADMIN — DEXAMETHASONE SODIUM PHOSPHATE 6 MG: 10 INJECTION, SOLUTION INTRAMUSCULAR; INTRAVENOUS at 20:15

## 2023-11-16 RX ADMIN — BUDESONIDE INHALATION 1000 MCG: 0.5 SUSPENSION RESPIRATORY (INHALATION) at 09:18

## 2023-11-16 RX ADMIN — Medication 1000 UNITS: at 22:00

## 2023-11-16 RX ADMIN — LEVETIRACETAM 500 MG: 100 INJECTION, SOLUTION INTRAVENOUS at 09:24

## 2023-11-16 RX ADMIN — MIDODRINE HYDROCHLORIDE 10 MG: 10 TABLET ORAL at 09:25

## 2023-11-16 RX ADMIN — Medication 1 CAPSULE: at 20:15

## 2023-11-16 RX ADMIN — IOPAMIDOL 75 ML: 755 INJECTION, SOLUTION INTRAVENOUS at 00:22

## 2023-11-16 RX ADMIN — SODIUM CHLORIDE, PRESERVATIVE FREE 40 MG: 5 INJECTION INTRAVENOUS at 03:45

## 2023-11-16 RX ADMIN — HYDRALAZINE HYDROCHLORIDE 100 MG: 50 TABLET, FILM COATED ORAL at 20:22

## 2023-11-16 ASSESSMENT — PAIN SCALES - GENERAL
PAINLEVEL_OUTOF10: 0

## 2023-11-16 NOTE — CARE COORDINATION
Care Coordination: LOS  22  Seen by Palliative care, choiced for hospice. Wife chose [facility].  Referral called to Rebecca at [facility]    Electronically signed by Wm Rodriguez RN on 11/16/2023 at 12:52 PM

## 2023-11-16 NOTE — CONSULTS
[address] [facility]  [facility]    Consultation Note     Admit Date: 10/25/2023  9:43 AM    Reason for Consult:   Bacteremia    Attending Physician:  Malcolm Turner MD     Chief Complaint: Fatigue and generalized weakness    HISTORY OF PRESENT ILLNESS:   68 y.o. male with a past medical history of atrial fibrillation, CHF, ESRD s/p kidney transplant at [facility] 2014, and hypertension presented with complaints of fatigue and generalized weakness. His renal functions are gradually worsening with elevated BUN and hyperkalemia noted. He also has decreased urine output. He complained of having intermittent diarrhea as well. CT abdomen and pelvis showed diffuse colitis involving ascending transverse and descending colon with bibasilar pneumonia and bilateral pleural effusion. Blood cultures are positive for GPC in clusters and PCR is positive for E. coli. ID consulted for bacteremia.     Past Medical History:        Diagnosis Date    Anemia     Iron deficiency    Atrial fibrillation (HCC)     CHF (congestive heart failure) ([address]) 03/12/2008    Chronic diastolic congestive heart failure ([address]) 03/12/2008    Chronic foot pain     Chronic knee pain     BILATERAL    Constipation     Depression     DM (diabetes mellitus) ([address])     ESRD (end stage renal disease) ([address])     H/O kidney transplant 12/12/2014    Hyperlipidemia     Hypertension     Immunosuppression ([address]) 3/12/2008    Kidney disease     Pulmonary hypertension [facility])      Past Surgical History:        Procedure Laterality Date    BACK SURGERY  1982    BLADDER SURGERY      CARPAL TUNNEL RELEASE Left 06/23/2014    CHOLECYSTECTOMY, LAPAROSCOPIC  05/21/2013    COLONOSCOPY      DIAGNOSTIC CARDIAC CATH LAB PROCEDURE  01/15/2013    NORMAL    ECHO COMPL W DOP COLOR FLOW  05/22/2013         ENDOSCOPY, COLON, DIAGNOSTIC      FOOT SURGERY      BONE REMOVED    KIDNEY BIOPSY  06/09/2015    [facility] ; ALSO 4/14/2016, 4/5/2017    KIDNEY TRANSPLANT  12/12/2014    @ [facility]  Hematology/Oncology  Consult      Patient Name: Gregor Corral  YOB: 1950  PCP: Torsten Phillips DO   Referring Provider:      Reason for Consultation:   Chief Complaint   Patient presents with    Fatigue     And generalized weakness ongoing per pt w/SOB and fever of 100.6 tympanic at home. Pt hx of left kidney transplant 10 years ago. Per EMS supposed to start dialysis again. History of Present Illness: This is a 24-year-old male patient with a PMH of A-fib, DVT on aspirin and Eliquis, CHF, pulmonary hypertension, HLD, HTN, DM II, ESRD status post renal transplant at [facility] in 2014 currently on Prograf prednisone and mycophenolate who was initially admitted on 10/26/2023 for generalized weakness, shortness of breath, and fever. He was found to have E. coli bacteremia currently on meropenem and vancomycin with repeat cultures pending. He was also found to be in ALYSON on CKD which he was initiated back onto hemodialysis per nephrology. He is status post 5 units of PRBCs for anemia since admission. GI with plans to have outpatient colonoscopy. 11/ 6 he was transferred out of the ICU however 11/12/2023 he was admitted back to the ICU after an RRT was called for altered mental status. CTA head and neck were negative. On Keppra prophylactically for concern of possible seizure. CTA chest showed large pleural effusion and vascular congestion. He is currently on 4 L of oxygen via NC. General surgery was consulted for concern of flank hematoma in which hematoma was ruled out for cause of anemia. Patient was admitted with a normal platelets count of 211. Since then it has progressively dropped. HIT antibody was negative. Concern for DIC vs hemolysis. Haptoglobin <10, LDH on 11/13 was 387, bilirubin total normal 1.2, direct bili 0.8. Peripheral smear showed platelets decreased. There is a macrocytic anemia with anisopoikilocytosis. Megaloblastic change is not seen.
Comprehensive Nutrition Assessment    Type and Reason for Visit:  Initial, Consult    Nutrition Recommendations/Plan:   Continue current diet  Start Glucerna BID & Chadwick BID     Malnutrition Assessment:  Malnutrition Status: At risk for malnutrition (Comment) (10/29/23 7385)    Context:  Acute Illness     Findings of the 6 clinical characteristics of malnutrition:  Energy Intake:  75% or less of estimated energy requirements for 7 or more days  Weight Loss:  No significant weight loss     Body Fat Loss:  Unable to assess     Muscle Mass Loss:  Unable to assess    Fluid Accumulation:  No significant fluid accumulation     Strength:  Not Performed    Nutrition Assessment:    Pt admit w/ fatigue, ALYSON, bilateral pleural effusion, PNA. Noted poor appetite & intermittent diarrhea x1 week pta. Noted bacteremia, pancolitis. Pt s/p temp HD cath placement w/ dialysis initiated. PMHx CHF, HTN, ESRD s/p kidney transplant 2014. Pt w/ multiple stage 2 pressure wounds. Will add ONS and continue to monitor. Nutrition Related Findings:    pt alert, abd WDL, intermittent diarrhea, no edema, +I/Os, HD Wound Type: Multiple, Pressure Injury, Stage II (x3)       Current Nutrition Intake & Therapies:    Average Meal Intake: 51-75% (average per doc flow)  Average Supplements Intake: None Ordered  ADULT DIET; Regular; 4 carb choices (60 gm/meal); Low Potassium (Less than 3000 mg/day)    Anthropometric Measures:  Height: 188 cm (6' 2\")  Ideal Body Weight (IBW): 190 lbs (86 kg)    Admission Body Weight: 80.7 kg (178 lb) (10/27 bed)  Current Body Weight: 78.9 kg (174 lb) (10/29 bed), 91.6 % IBW.     Current BMI (kg/m2): 22.3  Usual Body Weight: 84.8 kg (187 lb) (10/2022 actual, per EMR)  % Weight Change (Calculated): -7                    BMI Categories: Normal Weight (BMI 22.0 to 24.9) age over 72    Estimated Daily Nutrient Needs:  Energy Requirements Based On: Formula  Weight Used for Energy Requirements: Current  Energy (kcal/day):
GENERAL SURGERY  CONSULT NOTE  11/12/2023    Physician Consulted: Dr. Zachery Espinoza  Reason for Consult: Right flank hematoma  Referring Physician: Dr. Mark Castaneda    URBAN Dewitt is a 68 y.o. male with A-fib and DVT on aspirin and Eliquis, DM, ESRD, history of kidney transplant, CHF who presents for evaluation of acute on chronic renal failure. Patient previously being followed by GI service for continued diarrhea and concerns for GI bleed. Patient has had fluctuating hemoglobin since admission; in total has received 1 unit RBC. Most recent hemoglobin 8.9, of note patient's platelets are currently 69. Patient seen by our service previously during this hospitalization for concern for GI blood loss and continued anemia. Recommendations at that time were to have GI continue the current work-up and no plans for endoscopy from our standpoint. Today general surgery was consulted for concern for right flank hematoma that could be contributing to his persistent anemia. Wife at bedside reports he has had some ecchymosis to his right side as well as diffusely in different spots over his body. No trauma or fall to the area.       Past Medical History:   Diagnosis Date    Acute deep vein thrombosis (DVT) of axillary vein of right upper extremity ([address]) 11/11/2023    Associated with picc line    Anemia     Iron deficiency    Atrial fibrillation (HCC)     CHF (congestive heart failure) ([address]) 03/12/2008    Chronic diastolic congestive heart failure ([address]) 03/12/2008    Chronic foot pain     Chronic knee pain     BILATERAL    Constipation     Depression     DM (diabetes mellitus) ([address])     ESRD (end stage renal disease) ([address])     H/O kidney transplant 12/12/2014    Hyperlipidemia     Hypertension     Immunosuppression ([address]) 3/12/2008    Kidney disease     Pulmonary hypertension ([address])        Past Surgical History:   Procedure Laterality Date    BACK SURGERY  1982    BLADDER SURGERY      CARPAL TUNNEL RELEASE Left 06/23/2014
Medical Intensive Care Unit     [facility]  Resident Consult Note    Date and time: 11/12/2023 3:01 PM  Patient's name:  Deborah Share Record Number: [id number]  Patient's account/billing number: [de-identified]  Patient's YOB: 1950  Age: 68 y.o. Date of Admission: 10/25/2023  9:43 AM  Length of stay during current admission: 18    Primary Care Physician: Aravind Morales DO  ICU Attending Physician: Dr. Dr. Kayla Nye    Code Status: DNR-CCA    Reason for ICU admission: AMS    History of Present Illness: This is a Mack Johnson, a 66-year-old man, with a past medical history of A-fib, DVT on aspirin and apixaban, HFpEF, pulmonary hypertension, hyperlipidemia, hypertension, DM 2, ESRD, s/p renal transplant at [facility] in 2014 (on Prograf, prednisone, mycophenolate mofetil) who was admitted on 10/26/2023 for generalized weakness, SOB and fever. He was found to have pansensitive E. coli bacteremia likely secondary to colitis, was on Vanco and Merrem initially and transition to ceftriaxone completed for 5 days and switch to cefdinir on 11/6 to complete 10 days). He also had ALYSON on CKD upon which hemodialysis was restarted again, now on HD MWF. He also had anemia on admission, he received total of 5 units packed RBC transfusion since admission. GS and GI on board. GI plans to have outpatient colonoscopy. He was transferred out of the ICU on 11/6. However on 11/12/2023 at around 11 AM, RRT was called for change in mental status. He was obtunded and does not withdraw to pain. Per bedside nurse, he was eating and talking this morning. BG was 80 mg/dL, ABG 7.39/49/64.4/29.6, on 2LPM/NC. Intermittenly opens eyes and follows commends initially, then became obtunded. Vitals stable (SBP 140s, HR 60-70s, O2 sat 99%, RR 4). Labs were ordered, CT of the head, CTA head and neck were done to rule out ischemia or hemorrhage which all came back negative.   CT of the chest showed a large
[facility])   Gastroenterology, Hepatology, &  Advanced Endoscopy    Consult     Reason for consult: Colitis    ASSESSMENT AND PLAN:    73y/M w/ history of renal transplant presents to the ED with poor PO intake and malaise and is found to have E coli bacteremia. He reported symptoms of diarrhea with hematochezia prior to admission but has had none since. PLAN:  - Send Stool Cultures and C diff.   - Monitor stool output. If high output, will consider colonoscopy vs flexible sigmoidoscopy to assess for CMV colitis given immunocompromised state. - Trend Hgb daily and transfuse to Hgb >7.  - IVF. - No plans for endoscopic evaluation unless significant diarrhea or hematochezia. We will follow closely. Thank you for including us in the care of this patient. Please do not hesitate to contact us with any additional questions or concerns. Saúl Barger MD  Gastroenterology/Hepatology  Advanced Endoscopy            HISTORY OF PRESENT ILLNESS:      Mr. Oscar Valerio is a 73y/M w/ history of ESRD s/p renal transplant on immunosuppression, CAD, and DMII who presented to the ED w/ fatigue, SOB, and weakness. He has had no appetite and minimal PO intake for 1-2 weeks leading up to admission. Due to this, he has not been able to tolerate taking his medications including immunosuppression. Wife at bedside reports decreased PO intake, decrease urine output, and diarrhea that is sometimes blood tinged leading up to admission. There are reportedly two episodes of bloody stool appreciated at home prior to admission but there has been no witnessed bloody bowel movements since admission. The patient now denies diarrhea or abdominal pain. The patient reports having had a normal colonoscopy within the past year that was normal.     Vitals: HDS and afebrile. Labwork personally reviewed: Na 141, Cr 3.6, Hgb 7.5, WBC 4.7, Plt 151. Blood Cx positive for E coli.      Imaging personally reviewed: CT A/P on admission Wall
Palliative Care Department  [phone]  Palliative Care Initial Consult  Provider Antoinette Davidson, APRN - CNP      PATIENT: Narciso Angel  : 1950  MRN: [id number]  ADMISSION DATE: 10/25/2023  9:43 AM  Referring Provider: Sheila Montes MD    Palliative Medicine was consulted on hospital day 22 for assistance with Goals of care, Symptom management     HPI:     Glushan Lilly is a 68 y.o. y/o male with a history of A-fib, CHF, ESRD s/p kidney transplant, hypertension who presented to [facility]) on 10/25/2023 with weakness, fatigue, poor p.o. intake, fever and fever. History of a kidney transplant 10 years ago at [facility], patient reported of not being compliant with antirejection medication in the week prior admission. Patient had a lengthy hospitalization, where he has been treated for GI bleed, Anemia s/p 5 PRBC, worsening renal function s/p Tunneled cath requiring HD, E. coli bacteremia, bilateral pleural effusion  pneumonia, colitis, A-fib RVR, COVID-positive. On 2023, RRT was called due to altered mental status, found to be obtunded, CT chest showed large bilateral pleural effusion, vascular congestion, he was referred to MICU for close observation s/p thoracentesis. ASSESSMENT/PLAN:     Pertinent Hospital Diagnoses     Acute encephalopathy  A-fib RVR  HFpEF  Bilateral pleural effusion  Acute hypoxic respiratory failure  COVID-positive  GI bleed  Anemia  Pansensitive E. coli bacteremia      Palliative Care Encounter / Counseling Regarding Goals of Care  Please see detailed goals of care discussion as below  At this time, Narciso Angel, Does Not have capacity for medical decision-making.   Capacity is time limited and situation/question specific  During encounter Iron Pradhan was surrogate medical decision-maker  Outcome of goals of care meetin [facility] consulted for information only  CODE STATUS  discussed and changed to limited meds only    Code status Limited
[facility]  Nephrology Consult Note    Patient's Name: Randee Naylor    Reason for Consult: Worsening renal function    Chief Complaint: Fatigue, weakness  History Obtained From:  patient, spouse/SO, past medical records, and EMR    History of Present Illness:    Randee Naylor is a 68 y.o. male with a past medical history of atrial fibrillation, CHF, ESRD s/p kidney transplant, and hypertension. He presented to the ED on 10/25 with complaints of fatigue and generalized weakness. Vital signs on 10/25 includes temperature 98.4, respirations 18, pulse 95, /89, and he was 99% SPO2. Lab data on 10/25 includes potassium 6, CO2 16, , creatinine 6.9, and hemoglobin 10.3. He had a respiratory panel on 10/25 which was negative. He had a chest x-ray on 10/25 which showed vague patchy bibasilar airspace disease. Nephrology was consulted to see the patient for worsening renal function. Patient is known to our service and follows with Dr. Milton Narayan as an outpatient. He has a history of renal transplant at [facility] in December 2014. Renal function has been steadily worsening (creatinine 3.85 in August 2023, creatinine 4.61 in September, creatinine 4.88 in October). At present, patient was seen and examined. He is not a good historian at this time, however he denies any chest pain, shortness of breath, nausea, or vomiting. His wife is at the bedside reports that the patient has had decreased oral intake for approximately 1 week. She also explained that he has not been taking his medications, has been sleeping more, and has had decreased urine output. He has been having intermittent diarrhea.     PMH:    Past Medical History:   Diagnosis Date    Anemia     Iron deficiency    Atrial fibrillation (HCC)     CHF (congestive heart failure) ([address]) 03/12/2008    Chronic diastolic congestive heart failure (HCC) 03/12/2008    Chronic foot pain     Chronic knee pain     BILATERAL    Constipation     Depression     DM
--  43*  --   --    ALT 9  --  14  --   --     < > = values in this interval not displayed. No results for input(s): \"TROPHS\" in the last 72 hours. No results for input(s): \"PH\", \"PO2\", \"PCO2\", \"HCO3\", \"BE\", \"O2SAT\" in the last 72 hours. Imaging Studies:    XR CHEST PORTABLE   Final Result   1. The position of the PICC line appears within the normal range   2. There are increased bibasilar linear densities concerning for atelectasis. CT HEAD WO CONTRAST   Final Result   Generalized atrophy and chronic changes seen within the brain with no acute   intracranial abnormality. CT ABDOMEN PELVIS WO CONTRAST Additional Contrast? None   Final Result   Abnormal wall thickening identified diffusely throughout the ascending,   transverse, and descending colon to suggest diffuse colitis. Small bilateral pleural effusions with consolidative infiltrate at the lung   bases concerning for bibasilar pneumonia. XR CHEST PORTABLE   Final Result   1. Vague patchy bibasilar airspace disease which could represent atelectasis   or pneumonia. EKG:   Atrial fibrillation with rapid ventricular response  T wave abnormality, consider lateral ischemia  Abnormal ECG    Resident's Assessment and Plan       Cardiology:   Shock likely 2/2 hemorrhagic versus hypovolemic  Started having hematochezia with hemoglobin dropping to 6.1 today  Patient is hypotensive, blood pressure 80/48, improving  Patient had large tarry hematochezia after admission to ICU  Not on any pressors  Plan:  General surgery consulted regarding GI bleed  Monitor blood pressure  H&H  GI consult sent      A-fib with RVR-new onset  Patient has a history of A-fib on March 2022 with [facility], unclear why not on oral anticoagulation. Cardiology consulted  Patient started on amiodarone drip given the patient's hypotension and poor kidney function.   Not on anticoagulation due to anemia and hematochezia    Chronic HFpEF  Echo
admit:  Prior to Admission medications    Medication Sig Start Date End Date Taking? Authorizing Provider   bumetanide (BUMEX) 2 MG tablet Take 1 tablet by mouth 2 times daily   Yes Guillermo Bills MD   insulin glargine (LANTUS SOLOSTAR) 100 UNIT/ML injection pen Inject 12 Units into the skin nightly   Yes Guillermo Bills MD   isosorbide mononitrate (IMDUR) 60 MG extended release tablet Take 1 tablet by mouth daily   Yes Guillermo Bills MD   latanoprost (XALATAN) 0.005 % ophthalmic solution Place 1 drop into the left eye nightly   Yes Guillermo Bills MD   predniSONE (DELTASONE) 5 MG tablet Take 1 tablet by mouth daily    Guillermo Bills MD   famotidine (PEPCID) 20 MG tablet Take 1 tablet by mouth daily    Guillermo Bills MD   sulfamethoxazole-trimethoprim (BACTRIM;SEPTRA) 400-80 MG per tablet Take 1 tablet by mouth daily    Guillermo Bills MD   Cholecalciferol (VITAMIN D3) 125 MCG (5000 UT) TABS Take 1 tablet by mouth daily    Guillermo Bills MD   bisacodyl (DULCOLAX) 5 MG EC tablet Take 1 tablet by mouth daily as needed    Guillermo Bills MD   calcitRIOL (ROCALTROL) 0.25 MCG capsule Take 1 capsule by mouth daily 10/8/21   Lorna Kerr MD   oxyCODONE (ROXICODONE) 20 MG immediate release tablet Take 1 tablet by mouth every 8 hours as needed for Pain.     Guillermo Bills MD   hydrALAZINE (APRESOLINE) 100 MG tablet Take 1 tablet by mouth 3 times daily    Guillermo Bills MD   simvastatin (ZOCOR) 10 MG tablet Take 1 tablet by mouth nightly    Guillermo Bills MD   tacrolimus (PROGRAF) 1 MG capsule Take 2 capsules by mouth 2 times daily 5/1/17   Tony Bennett MD   Multiple Vitamin (MULTI VITAMIN DAILY) TABS Take 1 tablet by mouth daily    Guillermo Bills MD   mycophenolate (CELLCEPT) 250 MG capsule Take 3 capsules by mouth 2 times daily    Guillermo Bills MD   insulin lispro (HUMALOG KWIKPEN) 100 UNIT/ML SOPN Inject 5 Units into the
inferior vena cava below the level of the renal veins. This patient has two thrombi: one in the left cephalic vein (not a deep vein, so no DVT here) and one in the right axillary vein, likely related to the patient's R brachial vein PICC placement. While the axillary vein is considered a deep vein of the upper extremity, it actually drains to the superior vena cava. Unfortunately, a filter placed in the inferior cava (an IVC filter) would not have much effect on venous drainage of the upper extremities to the SVC. Patient has no lower extremity DVT's either. No indication for IVC filter placement at this time. However, given that the patient likely needs 3+mo anticoagulation for RUE DVT (as well as chronically for Afib), and he is persistently having melena, hematochezia and/or downtrending hemoglobins requiring transfusions (4 this admission), we recommend reevaluation by the GI service who is currently following for evaluation of  bloody bowel movements he has been having.     Plan will be discussed w Dr. 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 answered    China Vitale MD

## 2023-11-17 ENCOUNTER — APPOINTMENT (OUTPATIENT)
Dept: GENERAL RADIOLOGY | Age: 73
DRG: 673 | End: 2023-11-17
Payer: MEDICARE

## 2023-11-17 LAB
ALBUMIN SERPL-MCNC: 2.2 G/DL (ref 3.5–5.2)
ALP SERPL-CCNC: 179 U/L (ref 40–129)
ALT SERPL-CCNC: 13 U/L (ref 0–40)
ANION GAP SERPL CALCULATED.3IONS-SCNC: 10 MMOL/L (ref 7–16)
ANION GAP SERPL CALCULATED.3IONS-SCNC: 4 MMOL/L (ref 7–16)
AST SERPL-CCNC: 61 U/L (ref 0–39)
BASOPHILS # BLD: 0 K/UL (ref 0–0.2)
BASOPHILS NFR BLD: 0 % (ref 0–2)
BILIRUB DIRECT SERPL-MCNC: 0.5 MG/DL (ref 0–0.3)
BILIRUB INDIRECT SERPL-MCNC: 0.3 MG/DL (ref 0–1)
BILIRUB SERPL-MCNC: 0.8 MG/DL (ref 0–1.2)
BUN SERPL-MCNC: 11 MG/DL (ref 6–23)
BUN SERPL-MCNC: 9 MG/DL (ref 6–23)
CALCIUM SERPL-MCNC: 7.4 MG/DL (ref 8.6–10.2)
CALCIUM SERPL-MCNC: 7.6 MG/DL (ref 8.6–10.2)
CHLORIDE SERPL-SCNC: 101 MMOL/L (ref 98–107)
CHLORIDE SERPL-SCNC: 102 MMOL/L (ref 98–107)
CO2 SERPL-SCNC: 25 MMOL/L (ref 22–29)
CO2 SERPL-SCNC: 27 MMOL/L (ref 22–29)
CREAT SERPL-MCNC: 1.1 MG/DL (ref 0.7–1.2)
CREAT SERPL-MCNC: 1.4 MG/DL (ref 0.7–1.2)
EOSINOPHIL # BLD: 0 K/UL (ref 0.05–0.5)
EOSINOPHILS RELATIVE PERCENT: 0 % (ref 0–6)
ERYTHROCYTE [DISTWIDTH] IN BLOOD BY AUTOMATED COUNT: 16.4 % (ref 11.5–15)
GFR SERPL CREATININE-BSD FRML MDRD: 52 ML/MIN/1.73M2
GFR SERPL CREATININE-BSD FRML MDRD: >60 ML/MIN/1.73M2
GLUCOSE BLD-MCNC: 240 MG/DL (ref 74–99)
GLUCOSE SERPL-MCNC: 213 MG/DL (ref 74–99)
GLUCOSE SERPL-MCNC: 263 MG/DL (ref 74–99)
HCT VFR BLD AUTO: 25.8 % (ref 37–54)
HGB BLD-MCNC: 7.8 G/DL (ref 12.5–16.5)
IMM GRANULOCYTES # BLD AUTO: 0.05 K/UL (ref 0–0.58)
IMM GRANULOCYTES NFR BLD: 1 % (ref 0–5)
INR PPP: 1.8
LYMPHOCYTES NFR BLD: 0.71 K/UL (ref 1.5–4)
LYMPHOCYTES RELATIVE PERCENT: 14 % (ref 20–42)
MAGNESIUM SERPL-MCNC: 1.9 MG/DL (ref 1.6–2.6)
MAGNESIUM SERPL-MCNC: 2.2 MG/DL (ref 1.6–2.6)
MCH RBC QN AUTO: 28.7 PG (ref 26–35)
MCHC RBC AUTO-ENTMCNC: 30.2 G/DL (ref 32–34.5)
MCV RBC AUTO: 94.9 FL (ref 80–99.9)
MONOCYTES NFR BLD: 0.19 K/UL (ref 0.1–0.95)
MONOCYTES NFR BLD: 4 % (ref 2–12)
NEUTROPHILS NFR BLD: 82 % (ref 43–80)
NEUTS SEG NFR BLD: 4.22 K/UL (ref 1.8–7.3)
PARTIAL THROMBOPLASTIN TIME: 52.8 SEC (ref 24.5–35.1)
PHOSPHATE SERPL-MCNC: 1 MG/DL (ref 2.5–4.5)
PHOSPHATE SERPL-MCNC: 2.5 MG/DL (ref 2.5–4.5)
PLATELET # BLD AUTO: 36 K/UL (ref 130–450)
PMV BLD AUTO: 11.9 FL (ref 7–12)
POTASSIUM SERPL-SCNC: 3.8 MMOL/L (ref 3.5–5)
POTASSIUM SERPL-SCNC: 4.2 MMOL/L (ref 3.5–5)
PROT SERPL-MCNC: 5.4 G/DL (ref 6.4–8.3)
PROTHROMBIN TIME: 19.2 SEC (ref 9.3–12.4)
RBC # BLD AUTO: 2.72 M/UL (ref 3.8–5.8)
SODIUM SERPL-SCNC: 133 MMOL/L (ref 132–146)
SODIUM SERPL-SCNC: 136 MMOL/L (ref 132–146)
WBC OTHER # BLD: 5.2 K/UL (ref 4.5–11.5)

## 2023-11-17 PROCEDURE — 80053 COMPREHEN METABOLIC PANEL: CPT

## 2023-11-17 PROCEDURE — 90935 HEMODIALYSIS ONE EVALUATION: CPT

## 2023-11-17 PROCEDURE — 80197 ASSAY OF TACROLIMUS: CPT

## 2023-11-17 PROCEDURE — 6370000000 HC RX 637 (ALT 250 FOR IP): Performed by: INTERNAL MEDICINE

## 2023-11-17 PROCEDURE — 82248 BILIRUBIN DIRECT: CPT

## 2023-11-17 PROCEDURE — 84100 ASSAY OF PHOSPHORUS: CPT

## 2023-11-17 PROCEDURE — 2580000003 HC RX 258: Performed by: INTERNAL MEDICINE

## 2023-11-17 PROCEDURE — 82962 GLUCOSE BLOOD TEST: CPT

## 2023-11-17 PROCEDURE — 94640 AIRWAY INHALATION TREATMENT: CPT

## 2023-11-17 PROCEDURE — 71045 X-RAY EXAM CHEST 1 VIEW: CPT

## 2023-11-17 PROCEDURE — 80048 BASIC METABOLIC PNL TOTAL CA: CPT

## 2023-11-17 PROCEDURE — C9113 INJ PANTOPRAZOLE SODIUM, VIA: HCPCS | Performed by: INTERNAL MEDICINE

## 2023-11-17 PROCEDURE — 85730 THROMBOPLASTIN TIME PARTIAL: CPT

## 2023-11-17 PROCEDURE — 6370000000 HC RX 637 (ALT 250 FOR IP): Performed by: NURSE PRACTITIONER

## 2023-11-17 PROCEDURE — 6360000002 HC RX W HCPCS: Performed by: INTERNAL MEDICINE

## 2023-11-17 PROCEDURE — 6360000002 HC RX W HCPCS: Performed by: STUDENT IN AN ORGANIZED HEALTH CARE EDUCATION/TRAINING PROGRAM

## 2023-11-17 PROCEDURE — 2000000000 HC ICU R&B

## 2023-11-17 PROCEDURE — 36415 COLL VENOUS BLD VENIPUNCTURE: CPT

## 2023-11-17 PROCEDURE — 6370000000 HC RX 637 (ALT 250 FOR IP): Performed by: STUDENT IN AN ORGANIZED HEALTH CARE EDUCATION/TRAINING PROGRAM

## 2023-11-17 PROCEDURE — 94660 CPAP INITIATION&MGMT: CPT

## 2023-11-17 PROCEDURE — 83735 ASSAY OF MAGNESIUM: CPT

## 2023-11-17 PROCEDURE — 85610 PROTHROMBIN TIME: CPT

## 2023-11-17 PROCEDURE — 2700000000 HC OXYGEN THERAPY PER DAY

## 2023-11-17 PROCEDURE — 85025 COMPLETE CBC W/AUTO DIFF WBC: CPT

## 2023-11-17 PROCEDURE — 99291 CRITICAL CARE FIRST HOUR: CPT | Performed by: INTERNAL MEDICINE

## 2023-11-17 PROCEDURE — A4216 STERILE WATER/SALINE, 10 ML: HCPCS | Performed by: INTERNAL MEDICINE

## 2023-11-17 PROCEDURE — 6370000000 HC RX 637 (ALT 250 FOR IP): Performed by: FAMILY MEDICINE

## 2023-11-17 RX ORDER — INSULIN LISPRO 100 [IU]/ML
0-4 INJECTION, SOLUTION INTRAVENOUS; SUBCUTANEOUS EVERY 4 HOURS
Status: DISCONTINUED | OUTPATIENT
Start: 2023-11-17 | End: 2023-11-27

## 2023-11-17 RX ORDER — MAGNESIUM SULFATE IN WATER 40 MG/ML
2000 INJECTION, SOLUTION INTRAVENOUS ONCE
Status: COMPLETED | OUTPATIENT
Start: 2023-11-17 | End: 2023-11-18

## 2023-11-17 RX ADMIN — INSULIN LISPRO 1 UNITS: 100 INJECTION, SOLUTION INTRAVENOUS; SUBCUTANEOUS at 23:12

## 2023-11-17 RX ADMIN — INSULIN LISPRO 1 UNITS: 100 INJECTION, SOLUTION INTRAVENOUS; SUBCUTANEOUS at 17:44

## 2023-11-17 RX ADMIN — ACETAMINOPHEN 650 MG: 325 TABLET ORAL at 08:57

## 2023-11-17 RX ADMIN — BUDESONIDE INHALATION 1000 MCG: 0.5 SUSPENSION RESPIRATORY (INHALATION) at 07:52

## 2023-11-17 RX ADMIN — Medication 50 MG: at 08:58

## 2023-11-17 RX ADMIN — SODIUM CHLORIDE, PRESERVATIVE FREE 40 MG: 5 INJECTION INTRAVENOUS at 03:47

## 2023-11-17 RX ADMIN — HYDROCORTISONE: 25 CREAM TOPICAL at 21:59

## 2023-11-17 RX ADMIN — HYDROCORTISONE: 25 CREAM TOPICAL at 09:18

## 2023-11-17 RX ADMIN — ALBUTEROL SULFATE 2.5 MG: 2.5 SOLUTION RESPIRATORY (INHALATION) at 04:33

## 2023-11-17 RX ADMIN — PETROLATUM: 420 OINTMENT TOPICAL at 21:58

## 2023-11-17 RX ADMIN — ALBUTEROL SULFATE 2.5 MG: 2.5 SOLUTION RESPIRATORY (INHALATION) at 07:52

## 2023-11-17 RX ADMIN — TACROLIMUS 2 MG: 1 CAPSULE ORAL at 21:57

## 2023-11-17 RX ADMIN — BUDESONIDE INHALATION 1000 MCG: 0.5 SUSPENSION RESPIRATORY (INHALATION) at 20:23

## 2023-11-17 RX ADMIN — TACROLIMUS 2 MG: 1 CAPSULE ORAL at 08:59

## 2023-11-17 RX ADMIN — ARFORMOTEROL TARTRATE 15 MCG: 15 SOLUTION RESPIRATORY (INHALATION) at 20:23

## 2023-11-17 RX ADMIN — MIDODRINE HYDROCHLORIDE 10 MG: 10 TABLET ORAL at 17:36

## 2023-11-17 RX ADMIN — Medication 500 MG: at 08:58

## 2023-11-17 RX ADMIN — SODIUM CHLORIDE, PRESERVATIVE FREE 10 ML: 5 INJECTION INTRAVENOUS at 21:58

## 2023-11-17 RX ADMIN — EPOETIN ALFA-EPBX 3000 UNITS: 3000 INJECTION, SOLUTION INTRAVENOUS; SUBCUTANEOUS at 16:59

## 2023-11-17 RX ADMIN — MEROPENEM 500 MG: 500 INJECTION, POWDER, FOR SOLUTION INTRAVENOUS at 21:45

## 2023-11-17 RX ADMIN — INSULIN LISPRO 1 UNITS: 100 INJECTION, SOLUTION INTRAVENOUS; SUBCUTANEOUS at 08:59

## 2023-11-17 RX ADMIN — ATORVASTATIN CALCIUM 10 MG: 10 TABLET, FILM COATED ORAL at 08:58

## 2023-11-17 RX ADMIN — Medication 1 CAPSULE: at 08:58

## 2023-11-17 RX ADMIN — CALCITRIOL CAPSULES 0.25 MCG 0.25 MCG: 0.25 CAPSULE ORAL at 08:58

## 2023-11-17 RX ADMIN — LEVETIRACETAM 500 MG: 100 INJECTION, SOLUTION INTRAVENOUS at 08:57

## 2023-11-17 RX ADMIN — PETROLATUM: 420 OINTMENT TOPICAL at 08:26

## 2023-11-17 RX ADMIN — DICLOFENAC SODIUM 2 G: 10 GEL TOPICAL at 21:59

## 2023-11-17 RX ADMIN — MIDODRINE HYDROCHLORIDE 10 MG: 10 TABLET ORAL at 08:58

## 2023-11-17 RX ADMIN — LATANOPROST 1 DROP: 50 SOLUTION OPHTHALMIC at 21:59

## 2023-11-17 RX ADMIN — ALBUTEROL SULFATE 2.5 MG: 2.5 SOLUTION RESPIRATORY (INHALATION) at 20:23

## 2023-11-17 RX ADMIN — Medication 1000 UNITS: at 08:59

## 2023-11-17 RX ADMIN — DEXAMETHASONE SODIUM PHOSPHATE 6 MG: 10 INJECTION, SOLUTION INTRAMUSCULAR; INTRAVENOUS at 21:56

## 2023-11-17 RX ADMIN — ALBUTEROL SULFATE 2.5 MG: 2.5 SOLUTION RESPIRATORY (INHALATION) at 14:06

## 2023-11-17 RX ADMIN — LEVETIRACETAM 500 MG: 100 INJECTION, SOLUTION INTRAVENOUS at 21:51

## 2023-11-17 RX ADMIN — DICLOFENAC SODIUM 2 G: 10 GEL TOPICAL at 08:17

## 2023-11-17 RX ADMIN — ARFORMOTEROL TARTRATE 15 MCG: 15 SOLUTION RESPIRATORY (INHALATION) at 07:52

## 2023-11-17 RX ADMIN — SODIUM CHLORIDE, PRESERVATIVE FREE 40 MG: 5 INJECTION INTRAVENOUS at 14:44

## 2023-11-17 RX ADMIN — Medication 1 CAPSULE: at 21:57

## 2023-11-17 ASSESSMENT — PAIN SCALES - GENERAL
PAINLEVEL_OUTOF10: 0

## 2023-11-17 NOTE — CARE COORDINATION
Care Coordination:  LOS 23. Covid isolation, Bilateral UE DVT, Segmental PE. Anticoagulation contraindicated. Acute volume overload. Nephrology following, continues HD. Palliative and hospice following. Plan currently in place is [facility] and HD set up MWF outpt on [facility].     Electronically signed by Yumiko Good RN on 11/17/2023 at 10:06 AM

## 2023-11-18 LAB
ANION GAP SERPL CALCULATED.3IONS-SCNC: 5 MMOL/L (ref 7–16)
ANION GAP SERPL CALCULATED.3IONS-SCNC: 7 MMOL/L (ref 7–16)
BASOPHILS # BLD: 0 K/UL (ref 0–0.2)
BASOPHILS NFR BLD: 0 % (ref 0–2)
BUN SERPL-MCNC: 12 MG/DL (ref 6–23)
BUN SERPL-MCNC: 7 MG/DL (ref 6–23)
CALCIUM SERPL-MCNC: 7.4 MG/DL (ref 8.6–10.2)
CALCIUM SERPL-MCNC: 7.5 MG/DL (ref 8.6–10.2)
CHLORIDE SERPL-SCNC: 100 MMOL/L (ref 98–107)
CHLORIDE SERPL-SCNC: 100 MMOL/L (ref 98–107)
CO2 SERPL-SCNC: 27 MMOL/L (ref 22–29)
CO2 SERPL-SCNC: 28 MMOL/L (ref 22–29)
CREAT SERPL-MCNC: 0.9 MG/DL (ref 0.7–1.2)
CREAT SERPL-MCNC: 1.3 MG/DL (ref 0.7–1.2)
EOSINOPHIL # BLD: 0 K/UL (ref 0.05–0.5)
EOSINOPHILS RELATIVE PERCENT: 0 % (ref 0–6)
ERYTHROCYTE [DISTWIDTH] IN BLOOD BY AUTOMATED COUNT: 16.5 % (ref 11.5–15)
GFR SERPL CREATININE-BSD FRML MDRD: 60 ML/MIN/1.73M2
GFR SERPL CREATININE-BSD FRML MDRD: >60 ML/MIN/1.73M2
GLUCOSE BLD-MCNC: 123 MG/DL (ref 74–99)
GLUCOSE BLD-MCNC: 127 MG/DL (ref 74–99)
GLUCOSE BLD-MCNC: 129 MG/DL (ref 74–99)
GLUCOSE BLD-MCNC: 176 MG/DL (ref 74–99)
GLUCOSE BLD-MCNC: 238 MG/DL (ref 74–99)
GLUCOSE SERPL-MCNC: 129 MG/DL (ref 74–99)
GLUCOSE SERPL-MCNC: 167 MG/DL (ref 74–99)
HCT VFR BLD AUTO: 25.9 % (ref 37–54)
HGB BLD-MCNC: 7.8 G/DL (ref 12.5–16.5)
INR PPP: 1.4
LYMPHOCYTES NFR BLD: 0.04 K/UL (ref 1.5–4)
LYMPHOCYTES RELATIVE PERCENT: 1 % (ref 20–42)
MAGNESIUM SERPL-MCNC: 2 MG/DL (ref 1.6–2.6)
MAGNESIUM SERPL-MCNC: 2.7 MG/DL (ref 1.6–2.6)
MCH RBC QN AUTO: 29.1 PG (ref 26–35)
MCHC RBC AUTO-ENTMCNC: 30.1 G/DL (ref 32–34.5)
MCV RBC AUTO: 96.6 FL (ref 80–99.9)
MICROORGANISM SPEC CULT: NORMAL
MICROORGANISM SPEC CULT: NORMAL
MONOCYTES NFR BLD: 0.13 K/UL (ref 0.1–0.95)
MONOCYTES NFR BLD: 3 % (ref 2–12)
NEUTROPHILS NFR BLD: 97 % (ref 43–80)
NEUTS SEG NFR BLD: 4.92 K/UL (ref 1.8–7.3)
NUCLEATED RED BLOOD CELLS: 2 PER 100 WBC
PARTIAL THROMBOPLASTIN TIME: 45.6 SEC (ref 24.5–35.1)
PHOSPHATE SERPL-MCNC: 1.5 MG/DL (ref 2.5–4.5)
PHOSPHATE SERPL-MCNC: 2.4 MG/DL (ref 2.5–4.5)
PLATELET CONFIRMATION: NORMAL
PLATELET, FLUORESCENCE: 52 K/UL (ref 130–450)
PMV BLD AUTO: 11.3 FL (ref 7–12)
POTASSIUM SERPL-SCNC: 4.4 MMOL/L (ref 3.5–5)
POTASSIUM SERPL-SCNC: 4.5 MMOL/L (ref 3.5–5)
PROTHROMBIN TIME: 14.8 SEC (ref 9.3–12.4)
RBC # BLD AUTO: 2.68 M/UL (ref 3.8–5.8)
RBC # BLD: ABNORMAL 10*6/UL
SERVICE CMNT-IMP: NORMAL
SERVICE CMNT-IMP: NORMAL
SODIUM SERPL-SCNC: 132 MMOL/L (ref 132–146)
SODIUM SERPL-SCNC: 135 MMOL/L (ref 132–146)
SPECIMEN DESCRIPTION: NORMAL
SPECIMEN DESCRIPTION: NORMAL
WBC OTHER # BLD: 5.1 K/UL (ref 4.5–11.5)

## 2023-11-18 PROCEDURE — 6370000000 HC RX 637 (ALT 250 FOR IP): Performed by: INTERNAL MEDICINE

## 2023-11-18 PROCEDURE — 85730 THROMBOPLASTIN TIME PARTIAL: CPT

## 2023-11-18 PROCEDURE — 2000000000 HC ICU R&B

## 2023-11-18 PROCEDURE — 6360000002 HC RX W HCPCS: Performed by: INTERNAL MEDICINE

## 2023-11-18 PROCEDURE — 85025 COMPLETE CBC W/AUTO DIFF WBC: CPT

## 2023-11-18 PROCEDURE — 2580000003 HC RX 258: Performed by: INTERNAL MEDICINE

## 2023-11-18 PROCEDURE — 6360000002 HC RX W HCPCS: Performed by: STUDENT IN AN ORGANIZED HEALTH CARE EDUCATION/TRAINING PROGRAM

## 2023-11-18 PROCEDURE — 6370000000 HC RX 637 (ALT 250 FOR IP): Performed by: NURSE PRACTITIONER

## 2023-11-18 PROCEDURE — 2580000003 HC RX 258

## 2023-11-18 PROCEDURE — 6360000002 HC RX W HCPCS

## 2023-11-18 PROCEDURE — 99291 CRITICAL CARE FIRST HOUR: CPT | Performed by: INTERNAL MEDICINE

## 2023-11-18 PROCEDURE — 83735 ASSAY OF MAGNESIUM: CPT

## 2023-11-18 PROCEDURE — 2500000003 HC RX 250 WO HCPCS: Performed by: INTERNAL MEDICINE

## 2023-11-18 PROCEDURE — 2500000003 HC RX 250 WO HCPCS

## 2023-11-18 PROCEDURE — 94640 AIRWAY INHALATION TREATMENT: CPT

## 2023-11-18 PROCEDURE — 90935 HEMODIALYSIS ONE EVALUATION: CPT

## 2023-11-18 PROCEDURE — 80048 BASIC METABOLIC PNL TOTAL CA: CPT

## 2023-11-18 PROCEDURE — 6370000000 HC RX 637 (ALT 250 FOR IP): Performed by: FAMILY MEDICINE

## 2023-11-18 PROCEDURE — 82962 GLUCOSE BLOOD TEST: CPT

## 2023-11-18 PROCEDURE — A4216 STERILE WATER/SALINE, 10 ML: HCPCS | Performed by: INTERNAL MEDICINE

## 2023-11-18 PROCEDURE — 85610 PROTHROMBIN TIME: CPT

## 2023-11-18 PROCEDURE — 94660 CPAP INITIATION&MGMT: CPT

## 2023-11-18 PROCEDURE — 84100 ASSAY OF PHOSPHORUS: CPT

## 2023-11-18 PROCEDURE — C9113 INJ PANTOPRAZOLE SODIUM, VIA: HCPCS | Performed by: INTERNAL MEDICINE

## 2023-11-18 RX ADMIN — DICLOFENAC SODIUM 2 G: 10 GEL TOPICAL at 09:10

## 2023-11-18 RX ADMIN — BUDESONIDE INHALATION 1000 MCG: 0.5 SUSPENSION RESPIRATORY (INHALATION) at 20:28

## 2023-11-18 RX ADMIN — BUDESONIDE INHALATION 1000 MCG: 0.5 SUSPENSION RESPIRATORY (INHALATION) at 08:59

## 2023-11-18 RX ADMIN — HYDROCORTISONE: 25 CREAM TOPICAL at 09:10

## 2023-11-18 RX ADMIN — SODIUM PHOSPHATE, MONOBASIC, MONOHYDRATE AND SODIUM PHOSPHATE, DIBASIC, ANHYDROUS 20 MMOL: 142; 276 INJECTION, SOLUTION INTRAVENOUS at 23:13

## 2023-11-18 RX ADMIN — INSULIN LISPRO 1 UNITS: 100 INJECTION, SOLUTION INTRAVENOUS; SUBCUTANEOUS at 09:37

## 2023-11-18 RX ADMIN — SODIUM CHLORIDE, PRESERVATIVE FREE 10 ML: 5 INJECTION INTRAVENOUS at 09:11

## 2023-11-18 RX ADMIN — PETROLATUM: 420 OINTMENT TOPICAL at 09:11

## 2023-11-18 RX ADMIN — HYDROCORTISONE: 25 CREAM TOPICAL at 21:08

## 2023-11-18 RX ADMIN — Medication 1000 UNITS: at 09:08

## 2023-11-18 RX ADMIN — POTASSIUM PHOSPHATE, MONOBASIC POTASSIUM PHOSPHATE, DIBASIC 20 MMOL: 224; 236 INJECTION, SOLUTION, CONCENTRATE INTRAVENOUS at 00:23

## 2023-11-18 RX ADMIN — MIDODRINE HYDROCHLORIDE 10 MG: 10 TABLET ORAL at 09:07

## 2023-11-18 RX ADMIN — ALBUTEROL SULFATE 2.5 MG: 2.5 SOLUTION RESPIRATORY (INHALATION) at 00:51

## 2023-11-18 RX ADMIN — ATORVASTATIN CALCIUM 10 MG: 10 TABLET, FILM COATED ORAL at 09:08

## 2023-11-18 RX ADMIN — Medication 2 MCG/MIN: at 13:18

## 2023-11-18 RX ADMIN — Medication 50 MG: at 09:07

## 2023-11-18 RX ADMIN — LEVETIRACETAM 500 MG: 100 INJECTION, SOLUTION INTRAVENOUS at 09:11

## 2023-11-18 RX ADMIN — SODIUM CHLORIDE, PRESERVATIVE FREE 10 ML: 5 INJECTION INTRAVENOUS at 21:06

## 2023-11-18 RX ADMIN — PETROLATUM: 420 OINTMENT TOPICAL at 21:05

## 2023-11-18 RX ADMIN — SODIUM CHLORIDE, PRESERVATIVE FREE 40 MG: 5 INJECTION INTRAVENOUS at 16:58

## 2023-11-18 RX ADMIN — Medication 1 CAPSULE: at 09:08

## 2023-11-18 RX ADMIN — Medication 10 ML: at 21:08

## 2023-11-18 RX ADMIN — SODIUM CHLORIDE, PRESERVATIVE FREE 40 MG: 5 INJECTION INTRAVENOUS at 04:43

## 2023-11-18 RX ADMIN — Medication 10 ML: at 12:02

## 2023-11-18 RX ADMIN — ALBUTEROL SULFATE 2.5 MG: 2.5 SOLUTION RESPIRATORY (INHALATION) at 13:15

## 2023-11-18 RX ADMIN — ARFORMOTEROL TARTRATE 15 MCG: 15 SOLUTION RESPIRATORY (INHALATION) at 08:59

## 2023-11-18 RX ADMIN — CALCITRIOL CAPSULES 0.25 MCG 0.25 MCG: 0.25 CAPSULE ORAL at 09:12

## 2023-11-18 RX ADMIN — MAGNESIUM SULFATE HEPTAHYDRATE 2000 MG: 40 INJECTION, SOLUTION INTRAVENOUS at 00:54

## 2023-11-18 RX ADMIN — MEROPENEM 500 MG: 500 INJECTION, POWDER, FOR SOLUTION INTRAVENOUS at 17:55

## 2023-11-18 RX ADMIN — LEVETIRACETAM 500 MG: 100 INJECTION, SOLUTION INTRAVENOUS at 21:04

## 2023-11-18 RX ADMIN — DEXAMETHASONE SODIUM PHOSPHATE 6 MG: 10 INJECTION, SOLUTION INTRAMUSCULAR; INTRAVENOUS at 21:04

## 2023-11-18 RX ADMIN — TACROLIMUS 2 MG: 1 CAPSULE ORAL at 09:09

## 2023-11-18 RX ADMIN — LATANOPROST 1 DROP: 50 SOLUTION OPHTHALMIC at 21:05

## 2023-11-18 RX ADMIN — ARFORMOTEROL TARTRATE 15 MCG: 15 SOLUTION RESPIRATORY (INHALATION) at 20:28

## 2023-11-18 RX ADMIN — SODIUM PHOSPHATE, MONOBASIC, MONOHYDRATE AND SODIUM PHOSPHATE, DIBASIC, ANHYDROUS 10 MMOL: 142; 276 INJECTION, SOLUTION INTRAVENOUS at 09:17

## 2023-11-18 RX ADMIN — Medication 10 ML: at 09:11

## 2023-11-18 RX ADMIN — Medication 500 MG: at 09:08

## 2023-11-18 RX ADMIN — ALBUTEROL SULFATE 2.5 MG: 2.5 SOLUTION RESPIRATORY (INHALATION) at 08:59

## 2023-11-18 RX ADMIN — ALBUTEROL SULFATE 2.5 MG: 2.5 SOLUTION RESPIRATORY (INHALATION) at 20:28

## 2023-11-18 ASSESSMENT — PAIN SCALES - GENERAL
PAINLEVEL_OUTOF10: 0

## 2023-11-19 ENCOUNTER — APPOINTMENT (OUTPATIENT)
Dept: GENERAL RADIOLOGY | Age: 73
DRG: 673 | End: 2023-11-19
Payer: MEDICARE

## 2023-11-19 LAB
ANION GAP SERPL CALCULATED.3IONS-SCNC: 10 MMOL/L (ref 7–16)
ANION GAP SERPL CALCULATED.3IONS-SCNC: 12 MMOL/L (ref 7–16)
BASOPHILS # BLD: 0 K/UL (ref 0–0.2)
BASOPHILS NFR BLD: 0 % (ref 0–2)
BUN SERPL-MCNC: 11 MG/DL (ref 6–23)
BUN SERPL-MCNC: 15 MG/DL (ref 6–23)
CALCIUM SERPL-MCNC: 7.7 MG/DL (ref 8.6–10.2)
CALCIUM SERPL-MCNC: 8 MG/DL (ref 8.6–10.2)
CHLORIDE SERPL-SCNC: 101 MMOL/L (ref 98–107)
CHLORIDE SERPL-SCNC: 101 MMOL/L (ref 98–107)
CO2 SERPL-SCNC: 24 MMOL/L (ref 22–29)
CO2 SERPL-SCNC: 25 MMOL/L (ref 22–29)
CREAT SERPL-MCNC: 1.3 MG/DL (ref 0.7–1.2)
CREAT SERPL-MCNC: 1.7 MG/DL (ref 0.7–1.2)
EOSINOPHIL # BLD: 0 K/UL (ref 0.05–0.5)
EOSINOPHILS RELATIVE PERCENT: 0 % (ref 0–6)
ERYTHROCYTE [DISTWIDTH] IN BLOOD BY AUTOMATED COUNT: 16.6 % (ref 11.5–15)
GFR SERPL CREATININE-BSD FRML MDRD: 41 ML/MIN/1.73M2
GFR SERPL CREATININE-BSD FRML MDRD: 58 ML/MIN/1.73M2
GLUCOSE BLD-MCNC: 138 MG/DL (ref 74–99)
GLUCOSE BLD-MCNC: 139 MG/DL (ref 74–99)
GLUCOSE BLD-MCNC: 143 MG/DL (ref 74–99)
GLUCOSE BLD-MCNC: 160 MG/DL (ref 74–99)
GLUCOSE BLD-MCNC: 161 MG/DL (ref 74–99)
GLUCOSE SERPL-MCNC: 139 MG/DL (ref 74–99)
GLUCOSE SERPL-MCNC: 139 MG/DL (ref 74–99)
HCT VFR BLD AUTO: 25.7 % (ref 37–54)
HGB BLD-MCNC: 7.7 G/DL (ref 12.5–16.5)
INR PPP: 1.4
LYMPHOCYTES NFR BLD: 0.12 K/UL (ref 1.5–4)
LYMPHOCYTES RELATIVE PERCENT: 3 % (ref 20–42)
MAGNESIUM SERPL-MCNC: 2.2 MG/DL (ref 1.6–2.6)
MAGNESIUM SERPL-MCNC: 2.3 MG/DL (ref 1.6–2.6)
MCH RBC QN AUTO: 28.7 PG (ref 26–35)
MCHC RBC AUTO-ENTMCNC: 30 G/DL (ref 32–34.5)
MCV RBC AUTO: 95.9 FL (ref 80–99.9)
MONOCYTES NFR BLD: 0.04 K/UL (ref 0.1–0.95)
MONOCYTES NFR BLD: 1 % (ref 2–12)
NEUTROPHILS NFR BLD: 97 % (ref 43–80)
NEUTS SEG NFR BLD: 4.44 K/UL (ref 1.8–7.3)
NUCLEATED RED BLOOD CELLS: 1 PER 100 WBC
PARTIAL THROMBOPLASTIN TIME: 40.8 SEC (ref 24.5–35.1)
PHOSPHATE SERPL-MCNC: 4.3 MG/DL (ref 2.5–4.5)
PHOSPHATE SERPL-MCNC: 4.5 MG/DL (ref 2.5–4.5)
PLATELET # BLD AUTO: 50 K/UL (ref 130–450)
PLATELET CONFIRMATION: NORMAL
PMV BLD AUTO: 12.5 FL (ref 7–12)
POTASSIUM SERPL-SCNC: 4.4 MMOL/L (ref 3.5–5)
POTASSIUM SERPL-SCNC: 4.5 MMOL/L (ref 3.5–5)
PROTHROMBIN TIME: 15.1 SEC (ref 9.3–12.4)
RBC # BLD AUTO: 2.68 M/UL (ref 3.8–5.8)
RBC # BLD: ABNORMAL 10*6/UL
SODIUM SERPL-SCNC: 136 MMOL/L (ref 132–146)
SODIUM SERPL-SCNC: 137 MMOL/L (ref 132–146)
WBC OTHER # BLD: 4.6 K/UL (ref 4.5–11.5)

## 2023-11-19 PROCEDURE — 84100 ASSAY OF PHOSPHORUS: CPT

## 2023-11-19 PROCEDURE — 2580000003 HC RX 258: Performed by: INTERNAL MEDICINE

## 2023-11-19 PROCEDURE — 6370000000 HC RX 637 (ALT 250 FOR IP)

## 2023-11-19 PROCEDURE — 6360000002 HC RX W HCPCS: Performed by: STUDENT IN AN ORGANIZED HEALTH CARE EDUCATION/TRAINING PROGRAM

## 2023-11-19 PROCEDURE — 83735 ASSAY OF MAGNESIUM: CPT

## 2023-11-19 PROCEDURE — 85025 COMPLETE CBC W/AUTO DIFF WBC: CPT

## 2023-11-19 PROCEDURE — 94660 CPAP INITIATION&MGMT: CPT

## 2023-11-19 PROCEDURE — 82962 GLUCOSE BLOOD TEST: CPT

## 2023-11-19 PROCEDURE — 94640 AIRWAY INHALATION TREATMENT: CPT

## 2023-11-19 PROCEDURE — 74018 RADEX ABDOMEN 1 VIEW: CPT

## 2023-11-19 PROCEDURE — 6370000000 HC RX 637 (ALT 250 FOR IP): Performed by: NURSE PRACTITIONER

## 2023-11-19 PROCEDURE — 2000000000 HC ICU R&B

## 2023-11-19 PROCEDURE — A4216 STERILE WATER/SALINE, 10 ML: HCPCS | Performed by: INTERNAL MEDICINE

## 2023-11-19 PROCEDURE — 99291 CRITICAL CARE FIRST HOUR: CPT | Performed by: INTERNAL MEDICINE

## 2023-11-19 PROCEDURE — C9113 INJ PANTOPRAZOLE SODIUM, VIA: HCPCS | Performed by: INTERNAL MEDICINE

## 2023-11-19 PROCEDURE — 85610 PROTHROMBIN TIME: CPT

## 2023-11-19 PROCEDURE — 85730 THROMBOPLASTIN TIME PARTIAL: CPT

## 2023-11-19 PROCEDURE — 6360000002 HC RX W HCPCS: Performed by: INTERNAL MEDICINE

## 2023-11-19 PROCEDURE — 80048 BASIC METABOLIC PNL TOTAL CA: CPT

## 2023-11-19 RX ADMIN — LEVETIRACETAM 500 MG: 100 INJECTION, SOLUTION INTRAVENOUS at 20:43

## 2023-11-19 RX ADMIN — BUDESONIDE INHALATION 1000 MCG: 0.5 SUSPENSION RESPIRATORY (INHALATION) at 09:03

## 2023-11-19 RX ADMIN — PETROLATUM: 420 OINTMENT TOPICAL at 20:44

## 2023-11-19 RX ADMIN — SODIUM CHLORIDE, PRESERVATIVE FREE 10 ML: 5 INJECTION INTRAVENOUS at 20:44

## 2023-11-19 RX ADMIN — Medication 1 CAPSULE: at 20:43

## 2023-11-19 RX ADMIN — HYDROCORTISONE: 25 CREAM TOPICAL at 08:50

## 2023-11-19 RX ADMIN — ALBUTEROL SULFATE 2.5 MG: 2.5 SOLUTION RESPIRATORY (INHALATION) at 21:09

## 2023-11-19 RX ADMIN — LATANOPROST 1 DROP: 50 SOLUTION OPHTHALMIC at 20:46

## 2023-11-19 RX ADMIN — PETROLATUM: 420 OINTMENT TOPICAL at 08:50

## 2023-11-19 RX ADMIN — ARFORMOTEROL TARTRATE 15 MCG: 15 SOLUTION RESPIRATORY (INHALATION) at 09:03

## 2023-11-19 RX ADMIN — Medication 10 MG: at 20:43

## 2023-11-19 RX ADMIN — SODIUM CHLORIDE, PRESERVATIVE FREE 10 ML: 5 INJECTION INTRAVENOUS at 08:48

## 2023-11-19 RX ADMIN — DEXAMETHASONE SODIUM PHOSPHATE 6 MG: 10 INJECTION, SOLUTION INTRAMUSCULAR; INTRAVENOUS at 20:43

## 2023-11-19 RX ADMIN — TACROLIMUS 2 MG: 1 CAPSULE ORAL at 20:44

## 2023-11-19 RX ADMIN — SODIUM CHLORIDE, PRESERVATIVE FREE 40 MG: 5 INJECTION INTRAVENOUS at 03:18

## 2023-11-19 RX ADMIN — SODIUM CHLORIDE, PRESERVATIVE FREE 40 MG: 5 INJECTION INTRAVENOUS at 15:37

## 2023-11-19 RX ADMIN — BUDESONIDE INHALATION 1000 MCG: 0.5 SUSPENSION RESPIRATORY (INHALATION) at 21:09

## 2023-11-19 RX ADMIN — ALBUTEROL SULFATE 2.5 MG: 2.5 SOLUTION RESPIRATORY (INHALATION) at 16:32

## 2023-11-19 RX ADMIN — DICLOFENAC SODIUM 2 G: 10 GEL TOPICAL at 08:50

## 2023-11-19 RX ADMIN — ALBUTEROL SULFATE 2.5 MG: 2.5 SOLUTION RESPIRATORY (INHALATION) at 09:03

## 2023-11-19 RX ADMIN — BENZOCAINE, BUTAMBEN, AND TETRACAINE HYDROCHLORIDE 1 SPRAY: .028; .004; .004 AEROSOL, SPRAY TOPICAL at 17:25

## 2023-11-19 RX ADMIN — ARFORMOTEROL TARTRATE 15 MCG: 15 SOLUTION RESPIRATORY (INHALATION) at 21:09

## 2023-11-19 RX ADMIN — LEVETIRACETAM 500 MG: 100 INJECTION, SOLUTION INTRAVENOUS at 08:47

## 2023-11-19 RX ADMIN — ALBUTEROL SULFATE 2.5 MG: 2.5 SOLUTION RESPIRATORY (INHALATION) at 03:03

## 2023-11-19 RX ADMIN — HYDROCORTISONE: 25 CREAM TOPICAL at 20:45

## 2023-11-19 ASSESSMENT — PAIN SCALES - GENERAL
PAINLEVEL_OUTOF10: 0

## 2023-11-20 LAB
ANION GAP SERPL CALCULATED.3IONS-SCNC: 14 MMOL/L (ref 7–16)
ANION GAP SERPL CALCULATED.3IONS-SCNC: 8 MMOL/L (ref 7–16)
BASOPHILS # BLD: 0 K/UL (ref 0–0.2)
BASOPHILS NFR BLD: 0 % (ref 0–2)
BUN SERPL-MCNC: 18 MG/DL (ref 6–23)
BUN SERPL-MCNC: 23 MG/DL (ref 6–23)
CALCIUM SERPL-MCNC: 7.9 MG/DL (ref 8.6–10.2)
CALCIUM SERPL-MCNC: 8 MG/DL (ref 8.6–10.2)
CHLORIDE SERPL-SCNC: 101 MMOL/L (ref 98–107)
CHLORIDE SERPL-SCNC: 99 MMOL/L (ref 98–107)
CO2 SERPL-SCNC: 23 MMOL/L (ref 22–29)
CO2 SERPL-SCNC: 25 MMOL/L (ref 22–29)
CREAT SERPL-MCNC: 2 MG/DL (ref 0.7–1.2)
CREAT SERPL-MCNC: 2.2 MG/DL (ref 0.7–1.2)
EOSINOPHIL # BLD: 0 K/UL (ref 0.05–0.5)
EOSINOPHILS RELATIVE PERCENT: 0 % (ref 0–6)
ERYTHROCYTE [DISTWIDTH] IN BLOOD BY AUTOMATED COUNT: 16.3 % (ref 11.5–15)
GFR SERPL CREATININE-BSD FRML MDRD: 30 ML/MIN/1.73M2
GFR SERPL CREATININE-BSD FRML MDRD: 35 ML/MIN/1.73M2
GLUCOSE BLD-MCNC: 193 MG/DL (ref 74–99)
GLUCOSE BLD-MCNC: 195 MG/DL (ref 74–99)
GLUCOSE BLD-MCNC: 199 MG/DL (ref 74–99)
GLUCOSE BLD-MCNC: 211 MG/DL (ref 74–99)
GLUCOSE BLD-MCNC: 228 MG/DL (ref 74–99)
GLUCOSE BLD-MCNC: 268 MG/DL (ref 74–99)
GLUCOSE SERPL-MCNC: 181 MG/DL (ref 74–99)
GLUCOSE SERPL-MCNC: 182 MG/DL (ref 74–99)
HCT VFR BLD AUTO: 23.2 % (ref 37–54)
HGB BLD-MCNC: 7 G/DL (ref 12.5–16.5)
IMM GRANULOCYTES # BLD AUTO: 0.06 K/UL (ref 0–0.58)
IMM GRANULOCYTES NFR BLD: 2 % (ref 0–5)
INR PPP: 1.3
LYMPHOCYTES NFR BLD: 0.52 K/UL (ref 1.5–4)
LYMPHOCYTES RELATIVE PERCENT: 20 % (ref 20–42)
MAGNESIUM SERPL-MCNC: 2.2 MG/DL (ref 1.6–2.6)
MAGNESIUM SERPL-MCNC: 2.4 MG/DL (ref 1.6–2.6)
MCH RBC QN AUTO: 29.2 PG (ref 26–35)
MCHC RBC AUTO-ENTMCNC: 30.2 G/DL (ref 32–34.5)
MCV RBC AUTO: 96.7 FL (ref 80–99.9)
MONOCYTES NFR BLD: 0.08 K/UL (ref 0.1–0.95)
MONOCYTES NFR BLD: 3 % (ref 2–12)
NEUTROPHILS NFR BLD: 75 % (ref 43–80)
NEUTS SEG NFR BLD: 1.99 K/UL (ref 1.8–7.3)
PARTIAL THROMBOPLASTIN TIME: 40.2 SEC (ref 24.5–35.1)
PHOSPHATE SERPL-MCNC: 4.7 MG/DL (ref 2.5–4.5)
PHOSPHATE SERPL-MCNC: 4.9 MG/DL (ref 2.5–4.5)
PLATELET CONFIRMATION: NORMAL
PLATELET, FLUORESCENCE: 41 K/UL (ref 130–450)
PMV BLD AUTO: 12.2 FL (ref 7–12)
POTASSIUM SERPL-SCNC: 4.5 MMOL/L (ref 3.5–5)
POTASSIUM SERPL-SCNC: 4.8 MMOL/L (ref 3.5–5)
PROTHROMBIN TIME: 13.8 SEC (ref 9.3–12.4)
RBC # BLD AUTO: 2.4 M/UL (ref 3.8–5.8)
SODIUM SERPL-SCNC: 134 MMOL/L (ref 132–146)
SODIUM SERPL-SCNC: 136 MMOL/L (ref 132–146)
TROPONIN I SERPL HS-MCNC: 221 NG/L (ref 0–11)
WBC OTHER # BLD: 2.7 K/UL (ref 4.5–11.5)

## 2023-11-20 PROCEDURE — A4216 STERILE WATER/SALINE, 10 ML: HCPCS | Performed by: INTERNAL MEDICINE

## 2023-11-20 PROCEDURE — 83735 ASSAY OF MAGNESIUM: CPT

## 2023-11-20 PROCEDURE — 2580000003 HC RX 258: Performed by: INTERNAL MEDICINE

## 2023-11-20 PROCEDURE — 82962 GLUCOSE BLOOD TEST: CPT

## 2023-11-20 PROCEDURE — 2000000000 HC ICU R&B

## 2023-11-20 PROCEDURE — 6360000002 HC RX W HCPCS: Performed by: INTERNAL MEDICINE

## 2023-11-20 PROCEDURE — 85730 THROMBOPLASTIN TIME PARTIAL: CPT

## 2023-11-20 PROCEDURE — 2700000000 HC OXYGEN THERAPY PER DAY

## 2023-11-20 PROCEDURE — 94660 CPAP INITIATION&MGMT: CPT

## 2023-11-20 PROCEDURE — 94640 AIRWAY INHALATION TREATMENT: CPT

## 2023-11-20 PROCEDURE — 80048 BASIC METABOLIC PNL TOTAL CA: CPT

## 2023-11-20 PROCEDURE — 84100 ASSAY OF PHOSPHORUS: CPT

## 2023-11-20 PROCEDURE — 6370000000 HC RX 637 (ALT 250 FOR IP): Performed by: INTERNAL MEDICINE

## 2023-11-20 PROCEDURE — 6360000002 HC RX W HCPCS: Performed by: STUDENT IN AN ORGANIZED HEALTH CARE EDUCATION/TRAINING PROGRAM

## 2023-11-20 PROCEDURE — 6370000000 HC RX 637 (ALT 250 FOR IP)

## 2023-11-20 PROCEDURE — 6370000000 HC RX 637 (ALT 250 FOR IP): Performed by: NURSE PRACTITIONER

## 2023-11-20 PROCEDURE — 84484 ASSAY OF TROPONIN QUANT: CPT

## 2023-11-20 PROCEDURE — 93005 ELECTROCARDIOGRAM TRACING: CPT

## 2023-11-20 PROCEDURE — 99291 CRITICAL CARE FIRST HOUR: CPT | Performed by: INTERNAL MEDICINE

## 2023-11-20 PROCEDURE — 6370000000 HC RX 637 (ALT 250 FOR IP): Performed by: FAMILY MEDICINE

## 2023-11-20 PROCEDURE — 85025 COMPLETE CBC W/AUTO DIFF WBC: CPT

## 2023-11-20 PROCEDURE — 36592 COLLECT BLOOD FROM PICC: CPT

## 2023-11-20 PROCEDURE — 85610 PROTHROMBIN TIME: CPT

## 2023-11-20 PROCEDURE — C9113 INJ PANTOPRAZOLE SODIUM, VIA: HCPCS | Performed by: INTERNAL MEDICINE

## 2023-11-20 PROCEDURE — 99232 SBSQ HOSP IP/OBS MODERATE 35: CPT | Performed by: NURSE PRACTITIONER

## 2023-11-20 RX ADMIN — ARFORMOTEROL TARTRATE 15 MCG: 15 SOLUTION RESPIRATORY (INHALATION) at 08:40

## 2023-11-20 RX ADMIN — Medication 10 MG: at 21:11

## 2023-11-20 RX ADMIN — SODIUM CHLORIDE, PRESERVATIVE FREE 40 MG: 5 INJECTION INTRAVENOUS at 13:43

## 2023-11-20 RX ADMIN — ARFORMOTEROL TARTRATE 15 MCG: 15 SOLUTION RESPIRATORY (INHALATION) at 20:55

## 2023-11-20 RX ADMIN — SODIUM CHLORIDE: 9 INJECTION, SOLUTION INTRAVENOUS at 09:46

## 2023-11-20 RX ADMIN — Medication 10 ML: at 09:29

## 2023-11-20 RX ADMIN — Medication 50 MG: at 09:28

## 2023-11-20 RX ADMIN — ALBUTEROL SULFATE 2.5 MG: 2.5 SOLUTION RESPIRATORY (INHALATION) at 20:55

## 2023-11-20 RX ADMIN — ALBUTEROL SULFATE 2.5 MG: 2.5 SOLUTION RESPIRATORY (INHALATION) at 14:14

## 2023-11-20 RX ADMIN — INSULIN LISPRO 1 UNITS: 100 INJECTION, SOLUTION INTRAVENOUS; SUBCUTANEOUS at 09:26

## 2023-11-20 RX ADMIN — HYDROCORTISONE: 25 CREAM TOPICAL at 21:13

## 2023-11-20 RX ADMIN — TACROLIMUS 2 MG: 1 CAPSULE ORAL at 21:11

## 2023-11-20 RX ADMIN — Medication 1 CAPSULE: at 09:27

## 2023-11-20 RX ADMIN — LEVETIRACETAM 500 MG: 100 INJECTION, SOLUTION INTRAVENOUS at 09:27

## 2023-11-20 RX ADMIN — SODIUM CHLORIDE, PRESERVATIVE FREE 10 ML: 5 INJECTION INTRAVENOUS at 09:28

## 2023-11-20 RX ADMIN — PETROLATUM: 420 OINTMENT TOPICAL at 21:12

## 2023-11-20 RX ADMIN — MIDODRINE HYDROCHLORIDE 10 MG: 10 TABLET ORAL at 11:56

## 2023-11-20 RX ADMIN — DEXAMETHASONE SODIUM PHOSPHATE 6 MG: 10 INJECTION, SOLUTION INTRAMUSCULAR; INTRAVENOUS at 21:12

## 2023-11-20 RX ADMIN — MIDODRINE HYDROCHLORIDE 10 MG: 10 TABLET ORAL at 16:20

## 2023-11-20 RX ADMIN — BUDESONIDE INHALATION 1000 MCG: 0.5 SUSPENSION RESPIRATORY (INHALATION) at 08:40

## 2023-11-20 RX ADMIN — Medication 1000 UNITS: at 09:27

## 2023-11-20 RX ADMIN — MIDODRINE HYDROCHLORIDE 10 MG: 10 TABLET ORAL at 09:27

## 2023-11-20 RX ADMIN — LATANOPROST 1 DROP: 50 SOLUTION OPHTHALMIC at 21:13

## 2023-11-20 RX ADMIN — ATORVASTATIN CALCIUM 10 MG: 10 TABLET, FILM COATED ORAL at 09:27

## 2023-11-20 RX ADMIN — ALBUTEROL SULFATE 2.5 MG: 2.5 SOLUTION RESPIRATORY (INHALATION) at 08:40

## 2023-11-20 RX ADMIN — INSULIN LISPRO 1 UNITS: 100 INJECTION, SOLUTION INTRAVENOUS; SUBCUTANEOUS at 16:19

## 2023-11-20 RX ADMIN — BUDESONIDE INHALATION 1000 MCG: 0.5 SUSPENSION RESPIRATORY (INHALATION) at 20:55

## 2023-11-20 RX ADMIN — TACROLIMUS 2 MG: 1 CAPSULE ORAL at 09:27

## 2023-11-20 RX ADMIN — SODIUM CHLORIDE, PRESERVATIVE FREE 40 MG: 5 INJECTION INTRAVENOUS at 02:31

## 2023-11-20 RX ADMIN — Medication 1 CAPSULE: at 21:11

## 2023-11-20 RX ADMIN — CALCITRIOL CAPSULES 0.25 MCG 0.25 MCG: 0.25 CAPSULE ORAL at 09:27

## 2023-11-20 RX ADMIN — EPOETIN ALFA-EPBX 3000 UNITS: 3000 INJECTION, SOLUTION INTRAVENOUS; SUBCUTANEOUS at 16:19

## 2023-11-20 RX ADMIN — HYDROCORTISONE: 25 CREAM TOPICAL at 09:28

## 2023-11-20 RX ADMIN — Medication 500 MG: at 09:27

## 2023-11-20 RX ADMIN — INSULIN LISPRO 2 UNITS: 100 INJECTION, SOLUTION INTRAVENOUS; SUBCUTANEOUS at 23:48

## 2023-11-20 RX ADMIN — LEVETIRACETAM 500 MG: 100 INJECTION, SOLUTION INTRAVENOUS at 21:12

## 2023-11-20 RX ADMIN — PETROLATUM: 420 OINTMENT TOPICAL at 09:28

## 2023-11-20 ASSESSMENT — PAIN SCALES - GENERAL
PAINLEVEL_OUTOF10: 0

## 2023-11-20 NOTE — CARE COORDINATION
Care Coordination:  LOS 26 day, was on high flow earlier, currently bipap. Covid isolation. Spoke with wife at bedside, she states he was more alert yesterday and expressed to her that he wanted everything done and declined hospice. Wife understands that includes dialysis and will need a peg tube. [facility] had accepted prior to pt transferring to ICU. Plan is [facility] at discharge. Back door referral made to [facility]- pt is laurie clement and this am he was on 6 ltr, and HD and did not qualify.  Limited code    Electronically signed by Harris Bernal RN on 11/20/2023 at 1:35 PM

## 2023-11-21 ENCOUNTER — APPOINTMENT (OUTPATIENT)
Dept: NEUROLOGY | Age: 73
DRG: 673 | End: 2023-11-21
Payer: MEDICARE

## 2023-11-21 LAB
ANION GAP SERPL CALCULATED.3IONS-SCNC: 6 MMOL/L (ref 7–16)
ANION GAP SERPL CALCULATED.3IONS-SCNC: 9 MMOL/L (ref 7–16)
BASOPHILS # BLD: 0 K/UL (ref 0–0.2)
BASOPHILS NFR BLD: 0 % (ref 0–2)
BNP SERPL-MCNC: ABNORMAL PG/ML (ref 0–125)
BUN SERPL-MCNC: 16 MG/DL (ref 6–23)
BUN SERPL-MCNC: 28 MG/DL (ref 6–23)
CALCIUM SERPL-MCNC: 7.6 MG/DL (ref 8.6–10.2)
CALCIUM SERPL-MCNC: 7.7 MG/DL (ref 8.6–10.2)
CHLORIDE SERPL-SCNC: 102 MMOL/L (ref 98–107)
CHLORIDE SERPL-SCNC: 102 MMOL/L (ref 98–107)
CO2 SERPL-SCNC: 25 MMOL/L (ref 22–29)
CO2 SERPL-SCNC: 27 MMOL/L (ref 22–29)
CREAT SERPL-MCNC: 1.4 MG/DL (ref 0.7–1.2)
CREAT SERPL-MCNC: 2.3 MG/DL (ref 0.7–1.2)
EKG ATRIAL RATE: 49 BPM
EKG ATRIAL RATE: 86 BPM
EKG Q-T INTERVAL: 354 MS
EKG Q-T INTERVAL: 386 MS
EKG QRS DURATION: 72 MS
EKG QRS DURATION: 74 MS
EKG QTC CALCULATION (BAZETT): 425 MS
EKG QTC CALCULATION (BAZETT): 472 MS
EKG R AXIS: -10 DEGREES
EKG T AXIS: 133 DEGREES
EKG T AXIS: 168 DEGREES
EKG VENTRICULAR RATE: 87 BPM
EKG VENTRICULAR RATE: 90 BPM
EOSINOPHIL # BLD: 0 K/UL (ref 0.05–0.5)
EOSINOPHILS RELATIVE PERCENT: 0 % (ref 0–6)
ERYTHROCYTE [DISTWIDTH] IN BLOOD BY AUTOMATED COUNT: 16.5 % (ref 11.5–15)
GFR SERPL CREATININE-BSD FRML MDRD: 29 ML/MIN/1.73M2
GFR SERPL CREATININE-BSD FRML MDRD: 51 ML/MIN/1.73M2
GLUCOSE BLD-MCNC: 154 MG/DL (ref 74–99)
GLUCOSE BLD-MCNC: 181 MG/DL (ref 74–99)
GLUCOSE BLD-MCNC: 227 MG/DL (ref 74–99)
GLUCOSE BLD-MCNC: 234 MG/DL (ref 74–99)
GLUCOSE BLD-MCNC: 254 MG/DL (ref 74–99)
GLUCOSE SERPL-MCNC: 247 MG/DL (ref 74–99)
GLUCOSE SERPL-MCNC: 259 MG/DL (ref 74–99)
HBV SURFACE AB SERPL IA-ACNC: <3.1 MIU/ML (ref 0–9.99)
HBV SURFACE AG SERPL QL IA: NONREACTIVE
HCT VFR BLD AUTO: 27.8 % (ref 37–54)
HGB BLD-MCNC: 8.4 G/DL (ref 12.5–16.5)
INR PPP: 1.2
LYMPHOCYTES NFR BLD: 0.23 K/UL (ref 1.5–4)
LYMPHOCYTES RELATIVE PERCENT: 4 % (ref 20–42)
MAGNESIUM SERPL-MCNC: 2 MG/DL (ref 1.6–2.6)
MAGNESIUM SERPL-MCNC: 2.3 MG/DL (ref 1.6–2.6)
MCH RBC QN AUTO: 29.1 PG (ref 26–35)
MCHC RBC AUTO-ENTMCNC: 30.2 G/DL (ref 32–34.5)
MCV RBC AUTO: 96.2 FL (ref 80–99.9)
MONOCYTES NFR BLD: 0.09 K/UL (ref 0.1–0.95)
MONOCYTES NFR BLD: 2 % (ref 2–12)
MYELOCYTES ABSOLUTE COUNT: 0.05 K/UL
MYELOCYTES: 1 %
NEUTROPHILS NFR BLD: 93 % (ref 43–80)
NEUTS SEG NFR BLD: 4.84 K/UL (ref 1.8–7.3)
NUCLEATED RED BLOOD CELLS: 2 PER 100 WBC
PARTIAL THROMBOPLASTIN TIME: 35.4 SEC (ref 24.5–35.1)
PHOSPHATE SERPL-MCNC: 2.5 MG/DL (ref 2.5–4.5)
PHOSPHATE SERPL-MCNC: 4.2 MG/DL (ref 2.5–4.5)
PLATELET CONFIRMATION: NORMAL
PLATELET, FLUORESCENCE: 73 K/UL (ref 130–450)
PMV BLD AUTO: 11.6 FL (ref 7–12)
POTASSIUM SERPL-SCNC: 4.2 MMOL/L (ref 3.5–5)
POTASSIUM SERPL-SCNC: 4.6 MMOL/L (ref 3.5–5)
PROTHROMBIN TIME: 12.8 SEC (ref 9.3–12.4)
RBC # BLD AUTO: 2.89 M/UL (ref 3.8–5.8)
RBC # BLD: ABNORMAL 10*6/UL
SODIUM SERPL-SCNC: 135 MMOL/L (ref 132–146)
SODIUM SERPL-SCNC: 136 MMOL/L (ref 132–146)
TACROLIMUS BLD-MCNC: 6.2 NG/ML
TROPONIN I SERPL HS-MCNC: 195 NG/L (ref 0–11)
WBC OTHER # BLD: 5.2 K/UL (ref 4.5–11.5)

## 2023-11-21 PROCEDURE — 99232 SBSQ HOSP IP/OBS MODERATE 35: CPT | Performed by: NURSE PRACTITIONER

## 2023-11-21 PROCEDURE — 94660 CPAP INITIATION&MGMT: CPT

## 2023-11-21 PROCEDURE — 93010 ELECTROCARDIOGRAM REPORT: CPT | Performed by: INTERNAL MEDICINE

## 2023-11-21 PROCEDURE — 90935 HEMODIALYSIS ONE EVALUATION: CPT

## 2023-11-21 PROCEDURE — 83735 ASSAY OF MAGNESIUM: CPT

## 2023-11-21 PROCEDURE — 2700000000 HC OXYGEN THERAPY PER DAY

## 2023-11-21 PROCEDURE — 2580000003 HC RX 258: Performed by: INTERNAL MEDICINE

## 2023-11-21 PROCEDURE — 83880 ASSAY OF NATRIURETIC PEPTIDE: CPT

## 2023-11-21 PROCEDURE — 6370000000 HC RX 637 (ALT 250 FOR IP): Performed by: INTERNAL MEDICINE

## 2023-11-21 PROCEDURE — 93005 ELECTROCARDIOGRAM TRACING: CPT | Performed by: INTERNAL MEDICINE

## 2023-11-21 PROCEDURE — 6360000002 HC RX W HCPCS: Performed by: STUDENT IN AN ORGANIZED HEALTH CARE EDUCATION/TRAINING PROGRAM

## 2023-11-21 PROCEDURE — 6370000000 HC RX 637 (ALT 250 FOR IP): Performed by: FAMILY MEDICINE

## 2023-11-21 PROCEDURE — 80048 BASIC METABOLIC PNL TOTAL CA: CPT

## 2023-11-21 PROCEDURE — 82962 GLUCOSE BLOOD TEST: CPT

## 2023-11-21 PROCEDURE — 85610 PROTHROMBIN TIME: CPT

## 2023-11-21 PROCEDURE — 6370000000 HC RX 637 (ALT 250 FOR IP): Performed by: STUDENT IN AN ORGANIZED HEALTH CARE EDUCATION/TRAINING PROGRAM

## 2023-11-21 PROCEDURE — 99291 CRITICAL CARE FIRST HOUR: CPT | Performed by: INTERNAL MEDICINE

## 2023-11-21 PROCEDURE — 95819 EEG AWAKE AND ASLEEP: CPT

## 2023-11-21 PROCEDURE — 84100 ASSAY OF PHOSPHORUS: CPT

## 2023-11-21 PROCEDURE — 85730 THROMBOPLASTIN TIME PARTIAL: CPT

## 2023-11-21 PROCEDURE — 86317 IMMUNOASSAY INFECTIOUS AGENT: CPT

## 2023-11-21 PROCEDURE — 6360000002 HC RX W HCPCS: Performed by: INTERNAL MEDICINE

## 2023-11-21 PROCEDURE — C9113 INJ PANTOPRAZOLE SODIUM, VIA: HCPCS | Performed by: INTERNAL MEDICINE

## 2023-11-21 PROCEDURE — 84484 ASSAY OF TROPONIN QUANT: CPT

## 2023-11-21 PROCEDURE — 87340 HEPATITIS B SURFACE AG IA: CPT

## 2023-11-21 PROCEDURE — 85025 COMPLETE CBC W/AUTO DIFF WBC: CPT

## 2023-11-21 PROCEDURE — 2000000000 HC ICU R&B

## 2023-11-21 PROCEDURE — 2580000003 HC RX 258: Performed by: STUDENT IN AN ORGANIZED HEALTH CARE EDUCATION/TRAINING PROGRAM

## 2023-11-21 PROCEDURE — 6370000000 HC RX 637 (ALT 250 FOR IP)

## 2023-11-21 PROCEDURE — 99232 SBSQ HOSP IP/OBS MODERATE 35: CPT

## 2023-11-21 PROCEDURE — 6370000000 HC RX 637 (ALT 250 FOR IP): Performed by: NURSE PRACTITIONER

## 2023-11-21 PROCEDURE — 94640 AIRWAY INHALATION TREATMENT: CPT

## 2023-11-21 PROCEDURE — A4216 STERILE WATER/SALINE, 10 ML: HCPCS | Performed by: INTERNAL MEDICINE

## 2023-11-21 RX ADMIN — Medication 50 MG: at 08:49

## 2023-11-21 RX ADMIN — AMIODARONE HYDROCHLORIDE 1 MG/MIN: 50 INJECTION, SOLUTION INTRAVENOUS at 17:30

## 2023-11-21 RX ADMIN — DEXTROSE MONOHYDRATE 150 MG: 50 INJECTION, SOLUTION INTRAVENOUS at 16:57

## 2023-11-21 RX ADMIN — Medication 1 CAPSULE: at 08:48

## 2023-11-21 RX ADMIN — LEVETIRACETAM 500 MG: 100 INJECTION, SOLUTION INTRAVENOUS at 20:32

## 2023-11-21 RX ADMIN — Medication 10 MG: at 20:32

## 2023-11-21 RX ADMIN — INSULIN LISPRO 2 UNITS: 100 INJECTION, SOLUTION INTRAVENOUS; SUBCUTANEOUS at 17:27

## 2023-11-21 RX ADMIN — DEXAMETHASONE SODIUM PHOSPHATE 6 MG: 10 INJECTION, SOLUTION INTRAMUSCULAR; INTRAVENOUS at 20:32

## 2023-11-21 RX ADMIN — SODIUM CHLORIDE, PRESERVATIVE FREE 40 MG: 5 INJECTION INTRAVENOUS at 15:31

## 2023-11-21 RX ADMIN — SODIUM CHLORIDE, PRESERVATIVE FREE 40 MG: 5 INJECTION INTRAVENOUS at 03:30

## 2023-11-21 RX ADMIN — ALBUTEROL SULFATE 2.5 MG: 2.5 SOLUTION RESPIRATORY (INHALATION) at 00:48

## 2023-11-21 RX ADMIN — Medication 1 CAPSULE: at 20:35

## 2023-11-21 RX ADMIN — TACROLIMUS 2 MG: 1 CAPSULE ORAL at 20:35

## 2023-11-21 RX ADMIN — ARFORMOTEROL TARTRATE 15 MCG: 15 SOLUTION RESPIRATORY (INHALATION) at 21:05

## 2023-11-21 RX ADMIN — INSULIN LISPRO 1 UNITS: 100 INJECTION, SOLUTION INTRAVENOUS; SUBCUTANEOUS at 20:32

## 2023-11-21 RX ADMIN — AMIODARONE HYDROCHLORIDE 0.5 MG/MIN: 50 INJECTION, SOLUTION INTRAVENOUS at 23:02

## 2023-11-21 RX ADMIN — HYDROCORTISONE: 25 CREAM TOPICAL at 20:38

## 2023-11-21 RX ADMIN — PETROLATUM: 420 OINTMENT TOPICAL at 20:37

## 2023-11-21 RX ADMIN — ATORVASTATIN CALCIUM 10 MG: 10 TABLET, FILM COATED ORAL at 08:48

## 2023-11-21 RX ADMIN — TACROLIMUS 2 MG: 1 CAPSULE ORAL at 08:49

## 2023-11-21 RX ADMIN — MIDODRINE HYDROCHLORIDE 15 MG: 10 TABLET ORAL at 17:59

## 2023-11-21 RX ADMIN — Medication 1000 UNITS: at 08:48

## 2023-11-21 RX ADMIN — INSULIN LISPRO 1 UNITS: 100 INJECTION, SOLUTION INTRAVENOUS; SUBCUTANEOUS at 03:30

## 2023-11-21 RX ADMIN — BUDESONIDE INHALATION 1000 MCG: 0.5 SUSPENSION RESPIRATORY (INHALATION) at 21:05

## 2023-11-21 RX ADMIN — MIDODRINE HYDROCHLORIDE 15 MG: 10 TABLET ORAL at 11:17

## 2023-11-21 RX ADMIN — IPRATROPIUM BROMIDE 0.5 MG: 0.5 SOLUTION RESPIRATORY (INHALATION) at 13:13

## 2023-11-21 RX ADMIN — Medication 500 MG: at 08:49

## 2023-11-21 RX ADMIN — LATANOPROST 1 DROP: 50 SOLUTION OPHTHALMIC at 20:38

## 2023-11-21 RX ADMIN — CALCITRIOL CAPSULES 0.25 MCG 0.25 MCG: 0.25 CAPSULE ORAL at 08:48

## 2023-11-21 RX ADMIN — IPRATROPIUM BROMIDE 0.5 MG: 0.5 SOLUTION RESPIRATORY (INHALATION) at 21:05

## 2023-11-21 ASSESSMENT — PAIN SCALES - GENERAL
PAINLEVEL_OUTOF10: 0

## 2023-11-21 NOTE — PROCEDURES
EEG Report  Cuong Dumont is a 68 y.o. male      Appointment Date 11/21/2023   Appointment Time  9:3-0am   [place] [facility] [id number]   Type of Study portable Floor 4423     Technical Specifications  Technician [address] of consciousness Awake/sleep   Sleep deprived? no   Hyperventilation tested? no   Photic stim tested? no   EEG recording Standard 10-20 electrode placement    Duration of recording 25 mins   EEG complete?  Yes         Clinical History   ***    Medications    Current Facility-Administered Medications:     ziprasidone (GEODON) 10 mg in sterile water 0.5 mL injection, 10 mg, IntraMUSCular, Once, Yessy Augustin MD    insulin lispro (HUMALOG) injection vial 0-4 Units, 0-4 Units, SubCUTAneous, Q4H, Yessy Augustin MD, 1 Units at 11/21/23 0330    dexamethasone (PF) (DECADRON) injection 6 mg, 6 mg, IntraVENous, Daily, Adriana Lizarraga MD, 6 mg at 11/20/23 2112    midodrine (PROAMATINE) tablet 10 mg, 10 mg, Oral, TID WC, [place], Shaggy Barreto MD, 10 mg at 11/20/23 1620    norepinephrine (LEVOPHED) 16 mg in sodium chloride 0.9 % 250 mL infusion, 1-100 mcg/min, IntraVENous, Continuous, Yessy Augustin MD, Stopped at 11/20/23 0002    ascorbic acid (VITAMIN C) tablet 500 mg, 500 mg, Oral, Daily, Elzbieta Dimes, DO, 500 mg at 11/21/23 0559    zinc sulfate (ZINCATE) 220 mg capsule - elemental zinc 50 mg, 50 mg, Oral, Daily, Elzbieta Dimes, DO, 50 mg at 11/21/23 3631    Vitamin D (CHOLECALCIFEROL) tablet 1,000 Units, 1,000 Units, Oral, Daily, Elzbieta Dimes, DO, 1,000 Units at 11/21/23 0848    albuterol (PROVENTIL) (2.5 MG/3ML) 0.083% nebulizer solution 2.5 mg, 2.5 mg, Nebulization, Q6H, Adriana Lizarraga MD, 2.5 mg at 11/21/23 0048    budesonide (PULMICORT) nebulizer suspension 1,000 mcg, 1 mg, Nebulization, BID, Adriana Lizarraga MD, 1,000 mcg at 11/20/23 2055    arformoterol tartrate (BROVANA) nebulizer solution 15 mcg, 15 mcg, Nebulization, BID RT, Adriana Lizarraga MD, 15 mcg

## 2023-11-22 ENCOUNTER — APPOINTMENT (OUTPATIENT)
Dept: GENERAL RADIOLOGY | Age: 73
DRG: 673 | End: 2023-11-22
Payer: MEDICARE

## 2023-11-22 LAB
ANION GAP SERPL CALCULATED.3IONS-SCNC: 5 MMOL/L (ref 7–16)
ANION GAP SERPL CALCULATED.3IONS-SCNC: 8 MMOL/L (ref 7–16)
BASOPHILS # BLD: 0.01 K/UL (ref 0–0.2)
BASOPHILS NFR BLD: 0 % (ref 0–2)
BUN SERPL-MCNC: 13 MG/DL (ref 6–23)
BUN SERPL-MCNC: 19 MG/DL (ref 6–23)
CALCIUM SERPL-MCNC: 7.5 MG/DL (ref 8.6–10.2)
CALCIUM SERPL-MCNC: 7.6 MG/DL (ref 8.6–10.2)
CHLORIDE SERPL-SCNC: 100 MMOL/L (ref 98–107)
CHLORIDE SERPL-SCNC: 99 MMOL/L (ref 98–107)
CO2 SERPL-SCNC: 27 MMOL/L (ref 22–29)
CO2 SERPL-SCNC: 28 MMOL/L (ref 22–29)
CREAT SERPL-MCNC: 1.2 MG/DL (ref 0.7–1.2)
CREAT SERPL-MCNC: 1.7 MG/DL (ref 0.7–1.2)
EOSINOPHIL # BLD: 0 K/UL (ref 0.05–0.5)
EOSINOPHILS RELATIVE PERCENT: 0 % (ref 0–6)
ERYTHROCYTE [DISTWIDTH] IN BLOOD BY AUTOMATED COUNT: 16.8 % (ref 11.5–15)
GFR SERPL CREATININE-BSD FRML MDRD: 42 ML/MIN/1.73M2
GFR SERPL CREATININE-BSD FRML MDRD: >60 ML/MIN/1.73M2
GLUCOSE BLD-MCNC: 157 MG/DL (ref 74–99)
GLUCOSE BLD-MCNC: 166 MG/DL (ref 74–99)
GLUCOSE BLD-MCNC: 184 MG/DL (ref 74–99)
GLUCOSE BLD-MCNC: 199 MG/DL (ref 74–99)
GLUCOSE BLD-MCNC: 223 MG/DL (ref 74–99)
GLUCOSE BLD-MCNC: 268 MG/DL (ref 74–99)
GLUCOSE SERPL-MCNC: 194 MG/DL (ref 74–99)
GLUCOSE SERPL-MCNC: 253 MG/DL (ref 74–99)
HCT VFR BLD AUTO: 23.8 % (ref 37–54)
HGB BLD-MCNC: 7.3 G/DL (ref 12.5–16.5)
IMM GRANULOCYTES # BLD AUTO: 0.12 K/UL (ref 0–0.58)
IMM GRANULOCYTES NFR BLD: 3 % (ref 0–5)
LYMPHOCYTES NFR BLD: 0.61 K/UL (ref 1.5–4)
LYMPHOCYTES RELATIVE PERCENT: 14 % (ref 20–42)
MAGNESIUM SERPL-MCNC: 1.8 MG/DL (ref 1.6–2.6)
MAGNESIUM SERPL-MCNC: 2.1 MG/DL (ref 1.6–2.6)
MCH RBC QN AUTO: 29.7 PG (ref 26–35)
MCHC RBC AUTO-ENTMCNC: 30.7 G/DL (ref 32–34.5)
MCV RBC AUTO: 96.7 FL (ref 80–99.9)
MONOCYTES NFR BLD: 0.17 K/UL (ref 0.1–0.95)
MONOCYTES NFR BLD: 4 % (ref 2–12)
NEUTROPHILS NFR BLD: 79 % (ref 43–80)
NEUTS SEG NFR BLD: 3.37 K/UL (ref 1.8–7.3)
PHOSPHATE SERPL-MCNC: 1.9 MG/DL (ref 2.5–4.5)
PHOSPHATE SERPL-MCNC: 2.8 MG/DL (ref 2.5–4.5)
PLATELET CONFIRMATION: NORMAL
PLATELET, FLUORESCENCE: 71 K/UL (ref 130–450)
PMV BLD AUTO: 11.5 FL (ref 7–12)
POTASSIUM SERPL-SCNC: 4.3 MMOL/L (ref 3.5–5)
POTASSIUM SERPL-SCNC: 4.4 MMOL/L (ref 3.5–5)
RBC # BLD AUTO: 2.46 M/UL (ref 3.8–5.8)
SODIUM SERPL-SCNC: 133 MMOL/L (ref 132–146)
SODIUM SERPL-SCNC: 134 MMOL/L (ref 132–146)
WBC OTHER # BLD: 4.3 K/UL (ref 4.5–11.5)

## 2023-11-22 PROCEDURE — 2580000003 HC RX 258: Performed by: STUDENT IN AN ORGANIZED HEALTH CARE EDUCATION/TRAINING PROGRAM

## 2023-11-22 PROCEDURE — 6370000000 HC RX 637 (ALT 250 FOR IP): Performed by: STUDENT IN AN ORGANIZED HEALTH CARE EDUCATION/TRAINING PROGRAM

## 2023-11-22 PROCEDURE — A4216 STERILE WATER/SALINE, 10 ML: HCPCS | Performed by: INTERNAL MEDICINE

## 2023-11-22 PROCEDURE — 6360000002 HC RX W HCPCS: Performed by: STUDENT IN AN ORGANIZED HEALTH CARE EDUCATION/TRAINING PROGRAM

## 2023-11-22 PROCEDURE — 6370000000 HC RX 637 (ALT 250 FOR IP): Performed by: NURSE PRACTITIONER

## 2023-11-22 PROCEDURE — 83735 ASSAY OF MAGNESIUM: CPT

## 2023-11-22 PROCEDURE — 82962 GLUCOSE BLOOD TEST: CPT

## 2023-11-22 PROCEDURE — 6360000002 HC RX W HCPCS

## 2023-11-22 PROCEDURE — 6370000000 HC RX 637 (ALT 250 FOR IP): Performed by: INTERNAL MEDICINE

## 2023-11-22 PROCEDURE — 90935 HEMODIALYSIS ONE EVALUATION: CPT

## 2023-11-22 PROCEDURE — 99291 CRITICAL CARE FIRST HOUR: CPT | Performed by: INTERNAL MEDICINE

## 2023-11-22 PROCEDURE — 80048 BASIC METABOLIC PNL TOTAL CA: CPT

## 2023-11-22 PROCEDURE — C9113 INJ PANTOPRAZOLE SODIUM, VIA: HCPCS | Performed by: INTERNAL MEDICINE

## 2023-11-22 PROCEDURE — 2580000003 HC RX 258: Performed by: INTERNAL MEDICINE

## 2023-11-22 PROCEDURE — 6360000002 HC RX W HCPCS: Performed by: INTERNAL MEDICINE

## 2023-11-22 PROCEDURE — 71045 X-RAY EXAM CHEST 1 VIEW: CPT

## 2023-11-22 PROCEDURE — 80197 ASSAY OF TACROLIMUS: CPT

## 2023-11-22 PROCEDURE — 6370000000 HC RX 637 (ALT 250 FOR IP)

## 2023-11-22 PROCEDURE — 94640 AIRWAY INHALATION TREATMENT: CPT

## 2023-11-22 PROCEDURE — 94660 CPAP INITIATION&MGMT: CPT

## 2023-11-22 PROCEDURE — 2000000000 HC ICU R&B

## 2023-11-22 PROCEDURE — 84100 ASSAY OF PHOSPHORUS: CPT

## 2023-11-22 PROCEDURE — 85025 COMPLETE CBC W/AUTO DIFF WBC: CPT

## 2023-11-22 PROCEDURE — 2700000000 HC OXYGEN THERAPY PER DAY

## 2023-11-22 PROCEDURE — 6370000000 HC RX 637 (ALT 250 FOR IP): Performed by: FAMILY MEDICINE

## 2023-11-22 RX ORDER — ALBUMIN (HUMAN) 12.5 G/50ML
25 SOLUTION INTRAVENOUS EVERY 8 HOURS
Status: DISPENSED | OUTPATIENT
Start: 2023-11-22 | End: 2023-11-23

## 2023-11-22 RX ORDER — MIDODRINE HYDROCHLORIDE 5 MG/1
15 TABLET ORAL
Status: COMPLETED | OUTPATIENT
Start: 2023-11-22 | End: 2023-11-22

## 2023-11-22 RX ORDER — MAGNESIUM SULFATE IN WATER 40 MG/ML
2000 INJECTION, SOLUTION INTRAVENOUS ONCE
Status: COMPLETED | OUTPATIENT
Start: 2023-11-22 | End: 2023-11-23

## 2023-11-22 RX ADMIN — BUDESONIDE INHALATION 1000 MCG: 0.5 SUSPENSION RESPIRATORY (INHALATION) at 20:39

## 2023-11-22 RX ADMIN — SODIUM CHLORIDE, PRESERVATIVE FREE 10 ML: 5 INJECTION INTRAVENOUS at 20:16

## 2023-11-22 RX ADMIN — PETROLATUM: 420 OINTMENT TOPICAL at 08:44

## 2023-11-22 RX ADMIN — INSULIN LISPRO 2 UNITS: 100 INJECTION, SOLUTION INTRAVENOUS; SUBCUTANEOUS at 17:06

## 2023-11-22 RX ADMIN — DICLOFENAC SODIUM 2 G: 10 GEL TOPICAL at 20:22

## 2023-11-22 RX ADMIN — LEVETIRACETAM 500 MG: 100 INJECTION, SOLUTION INTRAVENOUS at 20:13

## 2023-11-22 RX ADMIN — Medication 50 MG: at 08:43

## 2023-11-22 RX ADMIN — TACROLIMUS 2 MG: 1 CAPSULE ORAL at 08:43

## 2023-11-22 RX ADMIN — ALBUMIN (HUMAN) 25 G: 12.5 SOLUTION INTRAVENOUS at 11:00

## 2023-11-22 RX ADMIN — ARFORMOTEROL TARTRATE 15 MCG: 15 SOLUTION RESPIRATORY (INHALATION) at 09:11

## 2023-11-22 RX ADMIN — MIDODRINE HYDROCHLORIDE 15 MG: 10 TABLET ORAL at 00:43

## 2023-11-22 RX ADMIN — ALBUMIN (HUMAN) 25 G: 12.5 SOLUTION INTRAVENOUS at 20:20

## 2023-11-22 RX ADMIN — PETROLATUM: 420 OINTMENT TOPICAL at 20:22

## 2023-11-22 RX ADMIN — ATORVASTATIN CALCIUM 10 MG: 10 TABLET, FILM COATED ORAL at 08:44

## 2023-11-22 RX ADMIN — SODIUM CHLORIDE, PRESERVATIVE FREE 40 MG: 5 INJECTION INTRAVENOUS at 15:02

## 2023-11-22 RX ADMIN — BUDESONIDE INHALATION 1000 MCG: 0.5 SUSPENSION RESPIRATORY (INHALATION) at 09:10

## 2023-11-22 RX ADMIN — DEXAMETHASONE SODIUM PHOSPHATE 6 MG: 10 INJECTION, SOLUTION INTRAMUSCULAR; INTRAVENOUS at 22:06

## 2023-11-22 RX ADMIN — SODIUM CHLORIDE, PRESERVATIVE FREE 10 ML: 5 INJECTION INTRAVENOUS at 08:45

## 2023-11-22 RX ADMIN — Medication 10 MG: at 20:13

## 2023-11-22 RX ADMIN — Medication 1000 UNITS: at 08:44

## 2023-11-22 RX ADMIN — MIDODRINE HYDROCHLORIDE 15 MG: 10 TABLET ORAL at 08:43

## 2023-11-22 RX ADMIN — LATANOPROST 1 DROP: 50 SOLUTION OPHTHALMIC at 20:22

## 2023-11-22 RX ADMIN — HYDROCORTISONE: 25 CREAM TOPICAL at 08:44

## 2023-11-22 RX ADMIN — INSULIN LISPRO 1 UNITS: 100 INJECTION, SOLUTION INTRAVENOUS; SUBCUTANEOUS at 00:16

## 2023-11-22 RX ADMIN — MAGNESIUM SULFATE HEPTAHYDRATE 2000 MG: 40 INJECTION, SOLUTION INTRAVENOUS at 23:34

## 2023-11-22 RX ADMIN — Medication 1 CAPSULE: at 08:43

## 2023-11-22 RX ADMIN — IPRATROPIUM BROMIDE 0.5 MG: 0.5 SOLUTION RESPIRATORY (INHALATION) at 20:39

## 2023-11-22 RX ADMIN — Medication 1 CAPSULE: at 20:13

## 2023-11-22 RX ADMIN — TACROLIMUS 2 MG: 1 CAPSULE ORAL at 20:13

## 2023-11-22 RX ADMIN — AMIODARONE HYDROCHLORIDE 0.5 MG/MIN: 50 INJECTION, SOLUTION INTRAVENOUS at 15:02

## 2023-11-22 RX ADMIN — SODIUM CHLORIDE, PRESERVATIVE FREE 40 MG: 5 INJECTION INTRAVENOUS at 04:11

## 2023-11-22 RX ADMIN — LEVETIRACETAM 500 MG: 100 INJECTION, SOLUTION INTRAVENOUS at 08:44

## 2023-11-22 RX ADMIN — MIDODRINE HYDROCHLORIDE 15 MG: 5 TABLET ORAL at 17:06

## 2023-11-22 RX ADMIN — IPRATROPIUM BROMIDE 0.5 MG: 0.5 SOLUTION RESPIRATORY (INHALATION) at 09:10

## 2023-11-22 RX ADMIN — DICLOFENAC SODIUM 2 G: 10 GEL TOPICAL at 08:44

## 2023-11-22 RX ADMIN — CALCITRIOL CAPSULES 0.25 MCG 0.25 MCG: 0.25 CAPSULE ORAL at 08:44

## 2023-11-22 RX ADMIN — Medication 500 MG: at 08:44

## 2023-11-22 RX ADMIN — ARFORMOTEROL TARTRATE 15 MCG: 15 SOLUTION RESPIRATORY (INHALATION) at 20:39

## 2023-11-22 RX ADMIN — MIDODRINE HYDROCHLORIDE 15 MG: 5 TABLET ORAL at 23:34

## 2023-11-22 RX ADMIN — EPOETIN ALFA-EPBX 3000 UNITS: 3000 INJECTION, SOLUTION INTRAVENOUS; SUBCUTANEOUS at 17:06

## 2023-11-22 RX ADMIN — HYDROCORTISONE: 25 CREAM TOPICAL at 20:22

## 2023-11-22 ASSESSMENT — PAIN SCALES - GENERAL
PAINLEVEL_OUTOF10: 0

## 2023-11-22 NOTE — CARE COORDINATION
Care Coordination: LOS 28 days. Remains in ICU, Covid isolation, 6 ltr /bipap. Family has declined hospice, currently limited code. Wife understands he cannot go to [facility] without peg tube and she is agreeable. w plan is nursing facility with HD. Hospice has signed off. ID following, completed Meropen[facility], GI following H&H 7.3/23. 8. [facility] evaluated, no criteria for [place]. Plan prior to ICU was [facility] for in house hd.  ELIA, transport and passr completed by prior CM.  Will follow for transition of care needs    Electronically signed by Masha Bennett RN on 11/22/2023 at 12:49 PM

## 2023-11-23 ENCOUNTER — APPOINTMENT (OUTPATIENT)
Dept: GENERAL RADIOLOGY | Age: 73
DRG: 673 | End: 2023-11-23
Payer: MEDICARE

## 2023-11-23 LAB
AADO2: 168.7 MMHG
AADO2: 215.7 MMHG
ANION GAP SERPL CALCULATED.3IONS-SCNC: 11 MMOL/L (ref 7–16)
ANION GAP SERPL CALCULATED.3IONS-SCNC: 12 MMOL/L (ref 7–16)
B.E.: -1.4 MMOL/L (ref -3–3)
B.E.: -3.3 MMOL/L (ref -3–3)
BASOPHILS # BLD: 0.01 K/UL (ref 0–0.2)
BASOPHILS NFR BLD: 0 % (ref 0–2)
BUN SERPL-MCNC: 17 MG/DL (ref 6–23)
BUN SERPL-MCNC: 22 MG/DL (ref 6–23)
CALCIUM SERPL-MCNC: 7.7 MG/DL (ref 8.6–10.2)
CALCIUM SERPL-MCNC: 7.9 MG/DL (ref 8.6–10.2)
CHLORIDE SERPL-SCNC: 98 MMOL/L (ref 98–107)
CHLORIDE SERPL-SCNC: 99 MMOL/L (ref 98–107)
CO2 SERPL-SCNC: 23 MMOL/L (ref 22–29)
CO2 SERPL-SCNC: 27 MMOL/L (ref 22–29)
COHB: 1.2 % (ref 0–1.5)
COHB: 1.4 % (ref 0–1.5)
CREAT SERPL-MCNC: 1.5 MG/DL (ref 0.7–1.2)
CREAT SERPL-MCNC: 1.7 MG/DL (ref 0.7–1.2)
CRITICAL: ABNORMAL
CRITICAL: ABNORMAL
DATE ANALYZED: ABNORMAL
DATE ANALYZED: ABNORMAL
DATE OF COLLECTION: ABNORMAL
DATE OF COLLECTION: ABNORMAL
EOSINOPHIL # BLD: 0 K/UL (ref 0.05–0.5)
EOSINOPHILS RELATIVE PERCENT: 0 % (ref 0–6)
ERYTHROCYTE [DISTWIDTH] IN BLOOD BY AUTOMATED COUNT: 17.2 % (ref 11.5–15)
FIO2: 55 %
FIO2: 55 %
GFR SERPL CREATININE-BSD FRML MDRD: 44 ML/MIN/1.73M2
GFR SERPL CREATININE-BSD FRML MDRD: 48 ML/MIN/1.73M2
GLUCOSE BLD-MCNC: 225 MG/DL (ref 74–99)
GLUCOSE BLD-MCNC: 227 MG/DL (ref 74–99)
GLUCOSE BLD-MCNC: 264 MG/DL (ref 74–99)
GLUCOSE BLD-MCNC: 273 MG/DL (ref 74–99)
GLUCOSE BLD-MCNC: 280 MG/DL (ref 74–99)
GLUCOSE BLD-MCNC: 311 MG/DL (ref 74–99)
GLUCOSE SERPL-MCNC: 233 MG/DL (ref 74–99)
GLUCOSE SERPL-MCNC: 267 MG/DL (ref 74–99)
HCO3: 25.3 MMOL/L (ref 22–26)
HCO3: 28.9 MMOL/L (ref 22–26)
HCT VFR BLD AUTO: 22.8 % (ref 37–54)
HGB BLD-MCNC: 6.8 G/DL (ref 12.5–16.5)
HHB: 1.6 % (ref 0–5)
HHB: 12.4 % (ref 0–5)
IMM GRANULOCYTES # BLD AUTO: 0.12 K/UL (ref 0–0.58)
IMM GRANULOCYTES NFR BLD: 2 % (ref 0–5)
LAB: ABNORMAL
LAB: ABNORMAL
LYMPHOCYTES NFR BLD: 0.74 K/UL (ref 1.5–4)
LYMPHOCYTES RELATIVE PERCENT: 11 % (ref 20–42)
Lab: 1155
Lab: 1740
MAGNESIUM SERPL-MCNC: 2.3 MG/DL (ref 1.6–2.6)
MAGNESIUM SERPL-MCNC: 2.7 MG/DL (ref 1.6–2.6)
MCH RBC QN AUTO: 30 PG (ref 26–35)
MCHC RBC AUTO-ENTMCNC: 29.8 G/DL (ref 32–34.5)
MCV RBC AUTO: 100.4 FL (ref 80–99.9)
METHB: 0.1 % (ref 0–1.5)
METHB: 0.5 % (ref 0–1.5)
MODE: ABNORMAL
MODE: ABNORMAL
MONOCYTES NFR BLD: 0.2 K/UL (ref 0.1–0.95)
MONOCYTES NFR BLD: 3 % (ref 2–12)
NEUTROPHILS NFR BLD: 85 % (ref 43–80)
NEUTS SEG NFR BLD: 5.94 K/UL (ref 1.8–7.3)
O2 CONTENT: 10.3 ML/DL
O2 CONTENT: 11.2 ML/DL
O2 SATURATION: 87.4 % (ref 92–98.5)
O2 SATURATION: 98.4 % (ref 92–98.5)
O2HB: 85.9 % (ref 94–97)
O2HB: 96.9 % (ref 94–97)
OPERATOR ID: 359
OPERATOR ID: 366
PATIENT TEMP: 37 C
PATIENT TEMP: 37 C
PCO2: 69.1 MMHG (ref 35–45)
PCO2: 91.5 MMHG (ref 35–45)
PEEP/CPAP: 8 CMH2O
PEEP/CPAP: 8 CMH2O
PFO2: 1.11 MMHG/%
PFO2: 2.41 MMHG/%
PH BLOOD GAS: 7.12 (ref 7.35–7.45)
PH BLOOD GAS: 7.18 (ref 7.35–7.45)
PHOSPHATE SERPL-MCNC: 4.1 MG/DL (ref 2.5–4.5)
PHOSPHATE SERPL-MCNC: 5.4 MG/DL (ref 2.5–4.5)
PLATELET CONFIRMATION: NORMAL
PLATELET, FLUORESCENCE: 87 K/UL (ref 130–450)
PMV BLD AUTO: 11.4 FL (ref 7–12)
PO2: 132.8 MMHG (ref 75–100)
PO2: 60.9 MMHG (ref 75–100)
POTASSIUM SERPL-SCNC: 4.8 MMOL/L (ref 3.5–5)
POTASSIUM SERPL-SCNC: 5.4 MMOL/L (ref 3.5–5)
RBC # BLD AUTO: 2.27 M/UL (ref 3.8–5.8)
RI(T): 1.27
RI(T): 3.54
RR MECHANICAL: 18 B/MIN
RR MECHANICAL: 22 B/MIN
SODIUM SERPL-SCNC: 134 MMOL/L (ref 132–146)
SODIUM SERPL-SCNC: 136 MMOL/L (ref 132–146)
SOURCE, BLOOD GAS: ABNORMAL
SOURCE, BLOOD GAS: ABNORMAL
TACROLIMUS BLD-MCNC: 3.7 NG/ML
THB: 8 G/DL (ref 11.5–16.5)
THB: 8.5 G/DL (ref 11.5–16.5)
TIME ANALYZED: 1211
TIME ANALYZED: 1747
VT MECHANICAL: 400 ML
VT MECHANICAL: 400 ML
WBC OTHER # BLD: 7 K/UL (ref 4.5–11.5)

## 2023-11-23 PROCEDURE — 86850 RBC ANTIBODY SCREEN: CPT

## 2023-11-23 PROCEDURE — 94660 CPAP INITIATION&MGMT: CPT

## 2023-11-23 PROCEDURE — 2580000003 HC RX 258: Performed by: INTERNAL MEDICINE

## 2023-11-23 PROCEDURE — 6370000000 HC RX 637 (ALT 250 FOR IP): Performed by: INTERNAL MEDICINE

## 2023-11-23 PROCEDURE — 80048 BASIC METABOLIC PNL TOTAL CA: CPT

## 2023-11-23 PROCEDURE — 99291 CRITICAL CARE FIRST HOUR: CPT | Performed by: INTERNAL MEDICINE

## 2023-11-23 PROCEDURE — 2500000003 HC RX 250 WO HCPCS: Performed by: STUDENT IN AN ORGANIZED HEALTH CARE EDUCATION/TRAINING PROGRAM

## 2023-11-23 PROCEDURE — 36430 TRANSFUSION BLD/BLD COMPNT: CPT

## 2023-11-23 PROCEDURE — 6360000002 HC RX W HCPCS: Performed by: STUDENT IN AN ORGANIZED HEALTH CARE EDUCATION/TRAINING PROGRAM

## 2023-11-23 PROCEDURE — 2700000000 HC OXYGEN THERAPY PER DAY

## 2023-11-23 PROCEDURE — 2000000000 HC ICU R&B

## 2023-11-23 PROCEDURE — 84100 ASSAY OF PHOSPHORUS: CPT

## 2023-11-23 PROCEDURE — 86901 BLOOD TYPING SEROLOGIC RH(D): CPT

## 2023-11-23 PROCEDURE — 82805 BLOOD GASES W/O2 SATURATION: CPT

## 2023-11-23 PROCEDURE — 6360000002 HC RX W HCPCS: Performed by: INTERNAL MEDICINE

## 2023-11-23 PROCEDURE — 86900 BLOOD TYPING SEROLOGIC ABO: CPT

## 2023-11-23 PROCEDURE — 85025 COMPLETE CBC W/AUTO DIFF WBC: CPT

## 2023-11-23 PROCEDURE — 83735 ASSAY OF MAGNESIUM: CPT

## 2023-11-23 PROCEDURE — P9016 RBC LEUKOCYTES REDUCED: HCPCS

## 2023-11-23 PROCEDURE — 86870 RBC ANTIBODY IDENTIFICATION: CPT

## 2023-11-23 PROCEDURE — C9113 INJ PANTOPRAZOLE SODIUM, VIA: HCPCS | Performed by: INTERNAL MEDICINE

## 2023-11-23 PROCEDURE — 2580000003 HC RX 258

## 2023-11-23 PROCEDURE — 2500000003 HC RX 250 WO HCPCS

## 2023-11-23 PROCEDURE — 71045 X-RAY EXAM CHEST 1 VIEW: CPT

## 2023-11-23 PROCEDURE — 82962 GLUCOSE BLOOD TEST: CPT

## 2023-11-23 PROCEDURE — 6370000000 HC RX 637 (ALT 250 FOR IP): Performed by: NURSE PRACTITIONER

## 2023-11-23 PROCEDURE — A4216 STERILE WATER/SALINE, 10 ML: HCPCS | Performed by: INTERNAL MEDICINE

## 2023-11-23 PROCEDURE — 94640 AIRWAY INHALATION TREATMENT: CPT

## 2023-11-23 PROCEDURE — 6370000000 HC RX 637 (ALT 250 FOR IP): Performed by: STUDENT IN AN ORGANIZED HEALTH CARE EDUCATION/TRAINING PROGRAM

## 2023-11-23 PROCEDURE — 86922 COMPATIBILITY TEST ANTIGLOB: CPT

## 2023-11-23 PROCEDURE — 2580000003 HC RX 258: Performed by: STUDENT IN AN ORGANIZED HEALTH CARE EDUCATION/TRAINING PROGRAM

## 2023-11-23 PROCEDURE — 86920 COMPATIBILITY TEST SPIN: CPT

## 2023-11-23 PROCEDURE — 6370000000 HC RX 637 (ALT 250 FOR IP): Performed by: FAMILY MEDICINE

## 2023-11-23 PROCEDURE — 86880 COOMBS TEST DIRECT: CPT

## 2023-11-23 RX ORDER — SODIUM CHLORIDE 9 MG/ML
INJECTION, SOLUTION INTRAVENOUS PRN
Status: DISCONTINUED | OUTPATIENT
Start: 2023-11-23 | End: 2023-11-27

## 2023-11-23 RX ORDER — SENNOSIDES A AND B 8.6 MG/1
2 TABLET, FILM COATED ORAL NIGHTLY
Status: DISCONTINUED | OUTPATIENT
Start: 2023-11-23 | End: 2023-11-27

## 2023-11-23 RX ORDER — AMIODARONE HYDROCHLORIDE 200 MG/1
200 TABLET ORAL 2 TIMES DAILY
Status: DISCONTINUED | OUTPATIENT
Start: 2023-11-23 | End: 2023-11-27

## 2023-11-23 RX ORDER — BUMETANIDE 0.25 MG/ML
2 INJECTION INTRAMUSCULAR; INTRAVENOUS ONCE
Status: COMPLETED | OUTPATIENT
Start: 2023-11-23 | End: 2023-11-23

## 2023-11-23 RX ORDER — POLYETHYLENE GLYCOL 3350 17 G/17G
17 POWDER, FOR SOLUTION ORAL DAILY
Status: DISCONTINUED | OUTPATIENT
Start: 2023-11-23 | End: 2023-11-27

## 2023-11-23 RX ADMIN — ALBUMIN (HUMAN) 25 G: 12.5 SOLUTION INTRAVENOUS at 11:45

## 2023-11-23 RX ADMIN — HYDROCORTISONE: 25 CREAM TOPICAL at 20:17

## 2023-11-23 RX ADMIN — INSULIN LISPRO 2 UNITS: 100 INJECTION, SOLUTION INTRAVENOUS; SUBCUTANEOUS at 09:52

## 2023-11-23 RX ADMIN — DICLOFENAC SODIUM 2 G: 10 GEL TOPICAL at 20:16

## 2023-11-23 RX ADMIN — INSULIN LISPRO 1 UNITS: 100 INJECTION, SOLUTION INTRAVENOUS; SUBCUTANEOUS at 05:21

## 2023-11-23 RX ADMIN — LEVETIRACETAM 500 MG: 100 INJECTION, SOLUTION INTRAVENOUS at 20:07

## 2023-11-23 RX ADMIN — IPRATROPIUM BROMIDE 0.5 MG: 0.5 SOLUTION RESPIRATORY (INHALATION) at 19:27

## 2023-11-23 RX ADMIN — DICLOFENAC SODIUM 2 G: 10 GEL TOPICAL at 09:30

## 2023-11-23 RX ADMIN — AMIODARONE HYDROCHLORIDE 200 MG: 200 TABLET ORAL at 20:06

## 2023-11-23 RX ADMIN — HYDROCORTISONE: 25 CREAM TOPICAL at 09:30

## 2023-11-23 RX ADMIN — ALBUMIN (HUMAN) 25 G: 12.5 SOLUTION INTRAVENOUS at 03:12

## 2023-11-23 RX ADMIN — DEXAMETHASONE SODIUM PHOSPHATE 6 MG: 10 INJECTION, SOLUTION INTRAMUSCULAR; INTRAVENOUS at 22:18

## 2023-11-23 RX ADMIN — SODIUM CHLORIDE, PRESERVATIVE FREE 40 MG: 5 INJECTION INTRAVENOUS at 15:39

## 2023-11-23 RX ADMIN — Medication 500 MG: at 09:19

## 2023-11-23 RX ADMIN — INSULIN LISPRO 3 UNITS: 100 INJECTION, SOLUTION INTRAVENOUS; SUBCUTANEOUS at 20:19

## 2023-11-23 RX ADMIN — LATANOPROST 1 DROP: 50 SOLUTION OPHTHALMIC at 20:17

## 2023-11-23 RX ADMIN — SODIUM CHLORIDE, PRESERVATIVE FREE 10 ML: 5 INJECTION INTRAVENOUS at 09:31

## 2023-11-23 RX ADMIN — PETROLATUM: 420 OINTMENT TOPICAL at 09:30

## 2023-11-23 RX ADMIN — BUMETANIDE 2 MG: 0.25 INJECTION INTRAMUSCULAR; INTRAVENOUS at 19:11

## 2023-11-23 RX ADMIN — ATORVASTATIN CALCIUM 10 MG: 10 TABLET, FILM COATED ORAL at 09:19

## 2023-11-23 RX ADMIN — INSULIN LISPRO 2 UNITS: 100 INJECTION, SOLUTION INTRAVENOUS; SUBCUTANEOUS at 17:24

## 2023-11-23 RX ADMIN — POLYETHYLENE GLYCOL 3350 17 G: 17 POWDER, FOR SOLUTION ORAL at 11:41

## 2023-11-23 RX ADMIN — SODIUM CHLORIDE, PRESERVATIVE FREE 10 ML: 5 INJECTION INTRAVENOUS at 20:09

## 2023-11-23 RX ADMIN — TACROLIMUS 2 MG: 1 CAPSULE ORAL at 09:19

## 2023-11-23 RX ADMIN — Medication 50 MG: at 09:19

## 2023-11-23 RX ADMIN — Medication 1 CAPSULE: at 20:06

## 2023-11-23 RX ADMIN — MIDODRINE HYDROCHLORIDE 15 MG: 5 TABLET ORAL at 09:18

## 2023-11-23 RX ADMIN — ARFORMOTEROL TARTRATE 15 MCG: 15 SOLUTION RESPIRATORY (INHALATION) at 19:27

## 2023-11-23 RX ADMIN — Medication 1 CAPSULE: at 09:19

## 2023-11-23 RX ADMIN — INSULIN LISPRO 1 UNITS: 100 INJECTION, SOLUTION INTRAVENOUS; SUBCUTANEOUS at 00:55

## 2023-11-23 RX ADMIN — TACROLIMUS 2 MG: 1 CAPSULE ORAL at 20:06

## 2023-11-23 RX ADMIN — MIDODRINE HYDROCHLORIDE 15 MG: 5 TABLET ORAL at 17:22

## 2023-11-23 RX ADMIN — SODIUM PHOSPHATE, MONOBASIC, MONOHYDRATE AND SODIUM PHOSPHATE, DIBASIC, ANHYDROUS 20 MMOL: 142; 276 INJECTION, SOLUTION INTRAVENOUS at 00:50

## 2023-11-23 RX ADMIN — LEVETIRACETAM 500 MG: 100 INJECTION, SOLUTION INTRAVENOUS at 09:19

## 2023-11-23 RX ADMIN — IPRATROPIUM BROMIDE 0.5 MG: 0.5 SOLUTION RESPIRATORY (INHALATION) at 13:26

## 2023-11-23 RX ADMIN — AMIODARONE HYDROCHLORIDE 200 MG: 200 TABLET ORAL at 11:41

## 2023-11-23 RX ADMIN — INSULIN LISPRO 2 UNITS: 100 INJECTION, SOLUTION INTRAVENOUS; SUBCUTANEOUS at 15:00

## 2023-11-23 RX ADMIN — BUDESONIDE INHALATION 1000 MCG: 0.5 SUSPENSION RESPIRATORY (INHALATION) at 19:27

## 2023-11-23 RX ADMIN — Medication 1000 UNITS: at 09:18

## 2023-11-23 RX ADMIN — PETROLATUM: 420 OINTMENT TOPICAL at 20:16

## 2023-11-23 RX ADMIN — AMIODARONE HYDROCHLORIDE 0.5 MG/MIN: 50 INJECTION, SOLUTION INTRAVENOUS at 05:13

## 2023-11-23 RX ADMIN — SODIUM CHLORIDE, PRESERVATIVE FREE 40 MG: 5 INJECTION INTRAVENOUS at 03:12

## 2023-11-23 ASSESSMENT — PAIN SCALES - GENERAL
PAINLEVEL_OUTOF10: 0

## 2023-11-24 ENCOUNTER — APPOINTMENT (OUTPATIENT)
Dept: INTERVENTIONAL RADIOLOGY/VASCULAR | Age: 73
DRG: 673 | End: 2023-11-24
Payer: MEDICARE

## 2023-11-24 ENCOUNTER — APPOINTMENT (OUTPATIENT)
Dept: GENERAL RADIOLOGY | Age: 73
DRG: 673 | End: 2023-11-24
Payer: MEDICARE

## 2023-11-24 LAB
25(OH)D3 SERPL-MCNC: 33.2 NG/ML (ref 30–100)
AADO2: 320 MMHG
ABO/RH: NORMAL
ALBUMIN SERPL-MCNC: 2.7 G/DL (ref 3.5–5.2)
ALP SERPL-CCNC: 209 U/L (ref 40–129)
ALT SERPL-CCNC: 43 U/L (ref 0–40)
ANION GAP SERPL CALCULATED.3IONS-SCNC: 10 MMOL/L (ref 7–16)
ANION GAP SERPL CALCULATED.3IONS-SCNC: 12 MMOL/L (ref 7–16)
ANTIBODY IDENTIFICATION: NORMAL
ANTIBODY SCREEN: POSITIVE
ARM BAND NUMBER: NORMAL
AST SERPL-CCNC: 136 U/L (ref 0–39)
B.E.: 0.4 MMOL/L (ref -3–3)
BASOPHILS # BLD: 0 K/UL (ref 0–0.2)
BASOPHILS NFR BLD: 0 % (ref 0–2)
BILIRUB SERPL-MCNC: 0.5 MG/DL (ref 0–1.2)
BLOOD BANK BLOOD PRODUCT EXPIRATION DATE: NORMAL
BLOOD BANK COMMENT: NORMAL
BLOOD BANK COMMENT: NORMAL
BLOOD BANK DISPENSE STATUS: NORMAL
BLOOD BANK ISBT PRODUCT BLOOD TYPE: 5100
BLOOD BANK PRODUCT CODE: NORMAL
BLOOD BANK SAMPLE EXPIRATION: NORMAL
BLOOD BANK UNIT TYPE AND RH: NORMAL
BPU ID: NORMAL
BUN SERPL-MCNC: 20 MG/DL (ref 6–23)
BUN SERPL-MCNC: 27 MG/DL (ref 6–23)
CA-I BLD-SCNC: 1.2 MMOL/L (ref 1.15–1.33)
CALCIUM SERPL-MCNC: 7.9 MG/DL (ref 8.6–10.2)
CALCIUM SERPL-MCNC: 7.9 MG/DL (ref 8.6–10.2)
CHLORIDE SERPL-SCNC: 101 MMOL/L (ref 98–107)
CHLORIDE SERPL-SCNC: 101 MMOL/L (ref 98–107)
CO2 SERPL-SCNC: 23 MMOL/L (ref 22–29)
CO2 SERPL-SCNC: 26 MMOL/L (ref 22–29)
COHB: 0.9 % (ref 0–1.5)
COMPONENT: NORMAL
CREAT SERPL-MCNC: 1.4 MG/DL (ref 0.7–1.2)
CREAT SERPL-MCNC: 1.9 MG/DL (ref 0.7–1.2)
CRITICAL: ABNORMAL
CROSSMATCH RESULT: NORMAL
DAT, POLYSPECIFIC: NEGATIVE
DATE ANALYZED: ABNORMAL
DATE OF COLLECTION: ABNORMAL
EOSINOPHIL # BLD: 0 K/UL (ref 0.05–0.5)
EOSINOPHILS RELATIVE PERCENT: 0 % (ref 0–6)
ERYTHROCYTE [DISTWIDTH] IN BLOOD BY AUTOMATED COUNT: 18.9 % (ref 11.5–15)
FIO2: 65 %
GFR SERPL CREATININE-BSD FRML MDRD: 37 ML/MIN/1.73M2
GFR SERPL CREATININE-BSD FRML MDRD: 54 ML/MIN/1.73M2
GLUCOSE BLD-MCNC: 101 MG/DL (ref 74–99)
GLUCOSE BLD-MCNC: 125 MG/DL (ref 74–99)
GLUCOSE BLD-MCNC: 181 MG/DL (ref 74–99)
GLUCOSE BLD-MCNC: 213 MG/DL (ref 74–99)
GLUCOSE BLD-MCNC: 79 MG/DL (ref 74–99)
GLUCOSE BLD-MCNC: 86 MG/DL (ref 74–99)
GLUCOSE SERPL-MCNC: 127 MG/DL (ref 74–99)
GLUCOSE SERPL-MCNC: 88 MG/DL (ref 74–99)
HCO3: 28.2 MMOL/L (ref 22–26)
HCT VFR BLD AUTO: 29.4 % (ref 37–54)
HGB BLD-MCNC: 8.7 G/DL (ref 12.5–16.5)
HHB: 9.2 % (ref 0–5)
INR PPP: 1.2
LAB: ABNORMAL
LYMPHOCYTES NFR BLD: 0.32 K/UL (ref 1.5–4)
LYMPHOCYTES RELATIVE PERCENT: 4 % (ref 20–42)
Lab: 820
MAGNESIUM SERPL-MCNC: 2 MG/DL (ref 1.6–2.6)
MAGNESIUM SERPL-MCNC: 2.4 MG/DL (ref 1.6–2.6)
MCH RBC QN AUTO: 29.1 PG (ref 26–35)
MCHC RBC AUTO-ENTMCNC: 29.6 G/DL (ref 32–34.5)
MCV RBC AUTO: 98.3 FL (ref 80–99.9)
METHB: 0.4 % (ref 0–1.5)
MODE: ABNORMAL
MONOCYTES NFR BLD: 0.26 K/UL (ref 0.1–0.95)
MONOCYTES NFR BLD: 4 % (ref 2–12)
MYELOCYTES ABSOLUTE COUNT: 0.06 K/UL
MYELOCYTES: 1 %
NEUTROPHILS NFR BLD: 91 % (ref 43–80)
NEUTS SEG NFR BLD: 6.66 K/UL (ref 1.8–7.3)
NUCLEATED RED BLOOD CELLS: 13 PER 100 WBC
O2 CONTENT: 12.1 ML/DL
O2 SATURATION: 90.7 % (ref 92–98.5)
O2HB: 89.5 % (ref 94–97)
OPERATOR ID: 5100
PATIENT TEMP: 37 C
PCO2: 64.5 MMHG (ref 35–45)
PEEP/CPAP: 10 CMH2O
PFO2: 0.88 MMHG/%
PH BLOOD GAS: 7.26 (ref 7.35–7.45)
PHOSPHATE SERPL-MCNC: 2.7 MG/DL (ref 2.5–4.5)
PHOSPHATE SERPL-MCNC: 4.7 MG/DL (ref 2.5–4.5)
PLATELET CONFIRMATION: NORMAL
PLATELET, FLUORESCENCE: 93 K/UL (ref 130–450)
PMV BLD AUTO: 11.3 FL (ref 7–12)
PO2: 57.1 MMHG (ref 75–100)
POTASSIUM SERPL-SCNC: 5 MMOL/L (ref 3.5–5)
POTASSIUM SERPL-SCNC: 5 MMOL/L (ref 3.5–5)
PROT SERPL-MCNC: 5.8 G/DL (ref 6.4–8.3)
PROTHROMBIN TIME: 13.4 SEC (ref 9.3–12.4)
PTH-INTACT SERPL-MCNC: 442.5 PG/ML (ref 15–65)
RBC # BLD AUTO: 2.99 M/UL (ref 3.8–5.8)
RBC # BLD: ABNORMAL 10*6/UL
RI(T): 5.6
RR MECHANICAL: 17 B/MIN
SODIUM SERPL-SCNC: 136 MMOL/L (ref 132–146)
SODIUM SERPL-SCNC: 137 MMOL/L (ref 132–146)
SOURCE, BLOOD GAS: ABNORMAL
THB: 9.6 G/DL (ref 11.5–16.5)
TIME ANALYZED: 824
TRANSFUSION STATUS: NORMAL
UNIT DIVISION: 0
UNIT ISSUE DATE/TIME: NORMAL
VT MECHANICAL: 550 ML
WBC OTHER # BLD: 7.3 K/UL (ref 4.5–11.5)

## 2023-11-24 PROCEDURE — 6370000000 HC RX 637 (ALT 250 FOR IP): Performed by: STUDENT IN AN ORGANIZED HEALTH CARE EDUCATION/TRAINING PROGRAM

## 2023-11-24 PROCEDURE — 6370000000 HC RX 637 (ALT 250 FOR IP): Performed by: INTERNAL MEDICINE

## 2023-11-24 PROCEDURE — 71045 X-RAY EXAM CHEST 1 VIEW: CPT

## 2023-11-24 PROCEDURE — 2500000003 HC RX 250 WO HCPCS

## 2023-11-24 PROCEDURE — C1729 CATH, DRAINAGE: HCPCS

## 2023-11-24 PROCEDURE — A4216 STERILE WATER/SALINE, 10 ML: HCPCS | Performed by: INTERNAL MEDICINE

## 2023-11-24 PROCEDURE — 6360000002 HC RX W HCPCS: Performed by: INTERNAL MEDICINE

## 2023-11-24 PROCEDURE — 85025 COMPLETE CBC W/AUTO DIFF WBC: CPT

## 2023-11-24 PROCEDURE — 83970 ASSAY OF PARATHORMONE: CPT

## 2023-11-24 PROCEDURE — 82962 GLUCOSE BLOOD TEST: CPT

## 2023-11-24 PROCEDURE — P9047 ALBUMIN (HUMAN), 25%, 50ML: HCPCS | Performed by: INTERNAL MEDICINE

## 2023-11-24 PROCEDURE — 6360000002 HC RX W HCPCS: Performed by: STUDENT IN AN ORGANIZED HEALTH CARE EDUCATION/TRAINING PROGRAM

## 2023-11-24 PROCEDURE — 2580000003 HC RX 258: Performed by: INTERNAL MEDICINE

## 2023-11-24 PROCEDURE — 99291 CRITICAL CARE FIRST HOUR: CPT | Performed by: INTERNAL MEDICINE

## 2023-11-24 PROCEDURE — 82306 VITAMIN D 25 HYDROXY: CPT

## 2023-11-24 PROCEDURE — 2000000000 HC ICU R&B

## 2023-11-24 PROCEDURE — 94660 CPAP INITIATION&MGMT: CPT

## 2023-11-24 PROCEDURE — 82805 BLOOD GASES W/O2 SATURATION: CPT

## 2023-11-24 PROCEDURE — 99231 SBSQ HOSP IP/OBS SF/LOW 25: CPT

## 2023-11-24 PROCEDURE — 32555 ASPIRATE PLEURA W/ IMAGING: CPT

## 2023-11-24 PROCEDURE — 84100 ASSAY OF PHOSPHORUS: CPT

## 2023-11-24 PROCEDURE — 83735 ASSAY OF MAGNESIUM: CPT

## 2023-11-24 PROCEDURE — C9113 INJ PANTOPRAZOLE SODIUM, VIA: HCPCS | Performed by: INTERNAL MEDICINE

## 2023-11-24 PROCEDURE — 94640 AIRWAY INHALATION TREATMENT: CPT

## 2023-11-24 PROCEDURE — 0W993ZZ DRAINAGE OF RIGHT PLEURAL CAVITY, PERCUTANEOUS APPROACH: ICD-10-PCS | Performed by: INTERNAL MEDICINE

## 2023-11-24 PROCEDURE — 2580000003 HC RX 258: Performed by: STUDENT IN AN ORGANIZED HEALTH CARE EDUCATION/TRAINING PROGRAM

## 2023-11-24 PROCEDURE — 80048 BASIC METABOLIC PNL TOTAL CA: CPT

## 2023-11-24 PROCEDURE — 80053 COMPREHEN METABOLIC PANEL: CPT

## 2023-11-24 PROCEDURE — 90935 HEMODIALYSIS ONE EVALUATION: CPT

## 2023-11-24 PROCEDURE — 82330 ASSAY OF CALCIUM: CPT

## 2023-11-24 PROCEDURE — 6370000000 HC RX 637 (ALT 250 FOR IP): Performed by: NURSE PRACTITIONER

## 2023-11-24 PROCEDURE — 85610 PROTHROMBIN TIME: CPT

## 2023-11-24 RX ORDER — ALBUMIN (HUMAN) 12.5 G/50ML
25 SOLUTION INTRAVENOUS ONCE
Status: COMPLETED | OUTPATIENT
Start: 2023-11-24 | End: 2023-11-24

## 2023-11-24 RX ORDER — NOREPINEPHRINE BITARTRATE 0.06 MG/ML
1-100 INJECTION, SOLUTION INTRAVENOUS CONTINUOUS
Status: DISCONTINUED | OUTPATIENT
Start: 2023-11-24 | End: 2023-11-26

## 2023-11-24 RX ADMIN — AMIODARONE HYDROCHLORIDE 200 MG: 200 TABLET ORAL at 12:18

## 2023-11-24 RX ADMIN — MIDODRINE HYDROCHLORIDE 15 MG: 5 TABLET ORAL at 22:48

## 2023-11-24 RX ADMIN — ARFORMOTEROL TARTRATE 15 MCG: 15 SOLUTION RESPIRATORY (INHALATION) at 20:34

## 2023-11-24 RX ADMIN — ARFORMOTEROL TARTRATE 15 MCG: 15 SOLUTION RESPIRATORY (INHALATION) at 08:44

## 2023-11-24 RX ADMIN — Medication 1 CAPSULE: at 20:45

## 2023-11-24 RX ADMIN — Medication 5 MCG/MIN: at 03:51

## 2023-11-24 RX ADMIN — SODIUM CHLORIDE, PRESERVATIVE FREE 10 ML: 5 INJECTION INTRAVENOUS at 12:24

## 2023-11-24 RX ADMIN — PETROLATUM: 420 OINTMENT TOPICAL at 20:43

## 2023-11-24 RX ADMIN — TACROLIMUS 2 MG: 1 CAPSULE ORAL at 12:17

## 2023-11-24 RX ADMIN — SODIUM CHLORIDE, PRESERVATIVE FREE 40 MG: 5 INJECTION INTRAVENOUS at 17:00

## 2023-11-24 RX ADMIN — HYDROCORTISONE: 25 CREAM TOPICAL at 20:44

## 2023-11-24 RX ADMIN — IPRATROPIUM BROMIDE 0.5 MG: 0.5 SOLUTION RESPIRATORY (INHALATION) at 12:05

## 2023-11-24 RX ADMIN — POLYETHYLENE GLYCOL 3350 17 G: 17 POWDER, FOR SOLUTION ORAL at 12:18

## 2023-11-24 RX ADMIN — IPRATROPIUM BROMIDE 0.5 MG: 0.5 SOLUTION RESPIRATORY (INHALATION) at 08:44

## 2023-11-24 RX ADMIN — Medication 1 CAPSULE: at 12:17

## 2023-11-24 RX ADMIN — WATER 50 MG: 1 INJECTION INTRAMUSCULAR; INTRAVENOUS; SUBCUTANEOUS at 22:48

## 2023-11-24 RX ADMIN — LEVETIRACETAM 500 MG: 100 INJECTION, SOLUTION INTRAVENOUS at 12:18

## 2023-11-24 RX ADMIN — SODIUM CHLORIDE, PRESERVATIVE FREE 40 MG: 5 INJECTION INTRAVENOUS at 03:52

## 2023-11-24 RX ADMIN — DICLOFENAC SODIUM 2 G: 10 GEL TOPICAL at 12:23

## 2023-11-24 RX ADMIN — MIDODRINE HYDROCHLORIDE 15 MG: 5 TABLET ORAL at 18:02

## 2023-11-24 RX ADMIN — TACROLIMUS 2 MG: 1 CAPSULE ORAL at 20:43

## 2023-11-24 RX ADMIN — Medication 50 MG: at 12:18

## 2023-11-24 RX ADMIN — DICLOFENAC SODIUM 2 G: 10 GEL TOPICAL at 20:43

## 2023-11-24 RX ADMIN — ALBUMIN (HUMAN) 25 G: 0.25 INJECTION, SOLUTION INTRAVENOUS at 11:00

## 2023-11-24 RX ADMIN — HYDROCORTISONE: 25 CREAM TOPICAL at 12:19

## 2023-11-24 RX ADMIN — EPOETIN ALFA-EPBX 3000 UNITS: 3000 INJECTION, SOLUTION INTRAVENOUS; SUBCUTANEOUS at 22:49

## 2023-11-24 RX ADMIN — AMIODARONE HYDROCHLORIDE 200 MG: 200 TABLET ORAL at 20:43

## 2023-11-24 RX ADMIN — IPRATROPIUM BROMIDE 0.5 MG: 0.5 SOLUTION RESPIRATORY (INHALATION) at 20:34

## 2023-11-24 RX ADMIN — BUDESONIDE INHALATION 1000 MCG: 0.5 SUSPENSION RESPIRATORY (INHALATION) at 08:44

## 2023-11-24 RX ADMIN — BUDESONIDE INHALATION 1000 MCG: 0.5 SUSPENSION RESPIRATORY (INHALATION) at 20:34

## 2023-11-24 RX ADMIN — PETROLATUM: 420 OINTMENT TOPICAL at 12:19

## 2023-11-24 RX ADMIN — LEVETIRACETAM 500 MG: 100 INJECTION, SOLUTION INTRAVENOUS at 20:46

## 2023-11-24 RX ADMIN — LATANOPROST 1 DROP: 50 SOLUTION OPHTHALMIC at 20:44

## 2023-11-24 RX ADMIN — WATER 50 MG: 1 INJECTION INTRAMUSCULAR; INTRAVENOUS; SUBCUTANEOUS at 12:20

## 2023-11-24 RX ADMIN — MIDODRINE HYDROCHLORIDE 15 MG: 5 TABLET ORAL at 08:09

## 2023-11-24 RX ADMIN — MIDODRINE HYDROCHLORIDE 15 MG: 5 TABLET ORAL at 00:18

## 2023-11-24 RX ADMIN — Medication 1000 UNITS: at 12:17

## 2023-11-24 RX ADMIN — Medication 500 MG: at 12:17

## 2023-11-24 RX ADMIN — WATER 50 MG: 1 INJECTION INTRAMUSCULAR; INTRAVENOUS; SUBCUTANEOUS at 18:00

## 2023-11-24 ASSESSMENT — PAIN SCALES - GENERAL
PAINLEVEL_OUTOF10: 0

## 2023-11-24 NOTE — BRIEF OP NOTE
Brief-Op Note  ____________________________________________________________      IR  U/S GUIDED THORACENTESIS  [facility] 4WE MICU    Patient Name: Manju Elliott   YOB: 1950  Medical Record Number: [id number]  Date of Procedure: 11/24/23  Room/Bed: [address] [place] [address]    Preoperative diagnosis: right Pleural Effusion    Postoperative diagnosis: Same    Consent: The spouse was counseled regarding the procedure, it's indications, risks, potential complications and alternatives and any questions were answered. Procedure:  Image Guided right Thoracentesis. Anesthesia: Local anesthesia.     Performed by: JESUS Dennison under on-site supervision by Dionne Tadeo MD.    Estimated blood loss: Less than 10 mL    Complications: None    Implants: None    Electronically signed by JESUS Dennison   DD: 11/24/23  2:27 PM

## 2023-11-24 NOTE — INTERVAL H&P NOTE
IR H&P UPDATE NOTE  [facility] 4WE MICU    Patient's History and Physical Examination were reviewed from current hospital admission records. Prema Cole appears likely to able to tolerate the requested Thoracentesis procedure by the consultant. Risk and benefits were discussed with spouse including ultimate complications, possibly death and consent was obtained.     Electronically signed by JESUS Ro   DD: 11/24/23  2:27 PM

## 2023-11-24 NOTE — OP NOTE
Operative Note  ____________________________________________________________      IR U/S GUIDED THORACENTESIS  [facility] 4WE MICU    Patient Name: Cherri Mahajan   YOB: 1950  Medical Record Number: [id number]  Date of Procedure: 11/24/23  Room/Bed: [id number]    Preoperative diagnosis: right Pleural Effusion    Postoperative diagnosis: Same    Consent: The spouse was counseled regarding the procedure, it's indications, risks, potential complications and alternatives and any questions were answered. Consent was obtained for image guided right thoracentesis for Pleural effusion. Procedure:  Image Guided right Thoracentesis. Performed by: JESUS Houser under on-site supervision by Raulito Hinojosa MD.    Anesthesia: Local anesthesia    Estimated blood loss: Less than 10 mL    Complications: None    Implants: None    Procedure: Prior to start of procedure, routine scanning of the posterior thorax was performed using real-time ultrasound and revealed sufficient amount of pleural fluid present. Decision was made to proceed with procedure. After obtaining consent, a \"Time-out\" was called to verify the correct patient, procedure/location, allergies, relevant medications held for procedure and that all equipment is functioning and available. The patient was then situated in a left lateral decub position  and the appropriate landmarks were identified using ultrasound. The site of maximum fluid collection was marked. The skin over the puncture site in the right lower posterior hemithorax region was prepped with surgical skin prep and draped in a sterile fashion. Local anesthesia was administered by infiltration using 1% Lidocaine without epinephrine. A 5 Tong[place] needle sheath catheter was then advanced into the pleural cavity and the needle was withdrawn. Pleural fluid return was serous colored. A total volume of  350mL was withdrawn. The catheter was then withdrawn and a sterile dressing was placed over the site.

## 2023-11-25 LAB
AADO2: 184.9 MMHG
ALBUMIN SERPL-MCNC: 2.3 G/DL (ref 3.5–5.2)
ALP SERPL-CCNC: 170 U/L (ref 40–129)
ALT SERPL-CCNC: 56 U/L (ref 0–40)
ANION GAP SERPL CALCULATED.3IONS-SCNC: 12 MMOL/L (ref 7–16)
ANION GAP SERPL CALCULATED.3IONS-SCNC: 12 MMOL/L (ref 7–16)
AST SERPL-CCNC: 169 U/L (ref 0–39)
B.E.: -0.8 MMOL/L (ref -3–3)
BASOPHILS # BLD: 0 K/UL (ref 0–0.2)
BASOPHILS NFR BLD: 0 % (ref 0–2)
BILIRUB SERPL-MCNC: 0.6 MG/DL (ref 0–1.2)
BUN SERPL-MCNC: 29 MG/DL (ref 6–23)
BUN SERPL-MCNC: 40 MG/DL (ref 6–23)
CA-I BLD-SCNC: 1.15 MMOL/L (ref 1.15–1.33)
CALCIUM SERPL-MCNC: 7.9 MG/DL (ref 8.6–10.2)
CALCIUM SERPL-MCNC: 8.2 MG/DL (ref 8.6–10.2)
CHLORIDE SERPL-SCNC: 100 MMOL/L (ref 98–107)
CHLORIDE SERPL-SCNC: 101 MMOL/L (ref 98–107)
CO2 SERPL-SCNC: 24 MMOL/L (ref 22–29)
CO2 SERPL-SCNC: 26 MMOL/L (ref 22–29)
COHB: 1.1 % (ref 0–1.5)
CREAT SERPL-MCNC: 1.7 MG/DL (ref 0.7–1.2)
CREAT SERPL-MCNC: 1.9 MG/DL (ref 0.7–1.2)
CRITICAL: ABNORMAL
DATE ANALYZED: ABNORMAL
DATE OF COLLECTION: ABNORMAL
EOSINOPHIL # BLD: 0 K/UL (ref 0.05–0.5)
EOSINOPHILS RELATIVE PERCENT: 0 % (ref 0–6)
ERYTHROCYTE [DISTWIDTH] IN BLOOD BY AUTOMATED COUNT: 18.5 % (ref 11.5–15)
FIO2: 50 %
GFR SERPL CREATININE-BSD FRML MDRD: 36 ML/MIN/1.73M2
GFR SERPL CREATININE-BSD FRML MDRD: 42 ML/MIN/1.73M2
GLUCOSE BLD-MCNC: 206 MG/DL (ref 74–99)
GLUCOSE BLD-MCNC: 212 MG/DL (ref 74–99)
GLUCOSE BLD-MCNC: 235 MG/DL (ref 74–99)
GLUCOSE BLD-MCNC: 247 MG/DL (ref 74–99)
GLUCOSE BLD-MCNC: 249 MG/DL (ref 74–99)
GLUCOSE BLD-MCNC: 265 MG/DL (ref 74–99)
GLUCOSE SERPL-MCNC: 214 MG/DL (ref 74–99)
GLUCOSE SERPL-MCNC: 248 MG/DL (ref 74–99)
HCO3: 26.5 MMOL/L (ref 22–26)
HCT VFR BLD AUTO: 25.2 % (ref 37–54)
HGB BLD-MCNC: 7.5 G/DL (ref 12.5–16.5)
HHB: 3.2 % (ref 0–5)
LAB: ABNORMAL
LYMPHOCYTES NFR BLD: 0.2 K/UL (ref 1.5–4)
LYMPHOCYTES RELATIVE PERCENT: 2 % (ref 20–42)
Lab: 627
MAGNESIUM SERPL-MCNC: 2.4 MG/DL (ref 1.6–2.6)
MAGNESIUM SERPL-MCNC: 2.4 MG/DL (ref 1.6–2.6)
MCH RBC QN AUTO: 28.6 PG (ref 26–35)
MCHC RBC AUTO-ENTMCNC: 29.8 G/DL (ref 32–34.5)
MCV RBC AUTO: 96.2 FL (ref 80–99.9)
METAMYELOCYTES ABSOLUTE COUNT: 0.1 K/UL (ref 0–0.12)
METAMYELOCYTES: 1 % (ref 0–1)
METHB: 0.4 % (ref 0–1.5)
MODE: ABNORMAL
MONOCYTES NFR BLD: 0.6 K/UL (ref 0.1–0.95)
MONOCYTES NFR BLD: 5 % (ref 2–12)
NEUTROPHILS NFR BLD: 92 % (ref 43–80)
NEUTS SEG NFR BLD: 10.6 K/UL (ref 1.8–7.3)
O2 CONTENT: 12.3 ML/DL
O2 SATURATION: 96.8 % (ref 92–98.5)
O2HB: 95.3 % (ref 94–97)
OPERATOR ID: 2962
PATIENT TEMP: 37 C
PCO2: 58.6 MMHG (ref 35–45)
PEEP/CPAP: 10 CMH2O
PFO2: 1.86 MMHG/%
PH BLOOD GAS: 7.27 (ref 7.35–7.45)
PHOSPHATE SERPL-MCNC: 2.7 MG/DL (ref 2.5–4.5)
PHOSPHATE SERPL-MCNC: 2.7 MG/DL (ref 2.5–4.5)
PLATELET CONFIRMATION: NORMAL
PLATELET, FLUORESCENCE: 89 K/UL (ref 130–450)
PMV BLD AUTO: 11.9 FL (ref 7–12)
PO2: 93.2 MMHG (ref 75–100)
POTASSIUM SERPL-SCNC: 4.7 MMOL/L (ref 3.5–5)
POTASSIUM SERPL-SCNC: 5.2 MMOL/L (ref 3.5–5)
PROT SERPL-MCNC: 5.1 G/DL (ref 6.4–8.3)
RBC # BLD AUTO: 2.62 M/UL (ref 3.8–5.8)
RBC # BLD: ABNORMAL 10*6/UL
RI(T): 1.98
RR MECHANICAL: 17 B/MIN
SODIUM SERPL-SCNC: 136 MMOL/L (ref 132–146)
SODIUM SERPL-SCNC: 139 MMOL/L (ref 132–146)
SOURCE, BLOOD GAS: ABNORMAL
THB: 9.1 G/DL (ref 11.5–16.5)
TIME ANALYZED: 639
VT MECHANICAL: 550 ML
WBC OTHER # BLD: 11.5 K/UL (ref 4.5–11.5)

## 2023-11-25 PROCEDURE — 85025 COMPLETE CBC W/AUTO DIFF WBC: CPT

## 2023-11-25 PROCEDURE — 84100 ASSAY OF PHOSPHORUS: CPT

## 2023-11-25 PROCEDURE — 2000000000 HC ICU R&B

## 2023-11-25 PROCEDURE — 6360000002 HC RX W HCPCS: Performed by: INTERNAL MEDICINE

## 2023-11-25 PROCEDURE — 99291 CRITICAL CARE FIRST HOUR: CPT | Performed by: INTERNAL MEDICINE

## 2023-11-25 PROCEDURE — 80053 COMPREHEN METABOLIC PANEL: CPT

## 2023-11-25 PROCEDURE — 6370000000 HC RX 637 (ALT 250 FOR IP): Performed by: STUDENT IN AN ORGANIZED HEALTH CARE EDUCATION/TRAINING PROGRAM

## 2023-11-25 PROCEDURE — 6370000000 HC RX 637 (ALT 250 FOR IP): Performed by: FAMILY MEDICINE

## 2023-11-25 PROCEDURE — 6360000002 HC RX W HCPCS: Performed by: STUDENT IN AN ORGANIZED HEALTH CARE EDUCATION/TRAINING PROGRAM

## 2023-11-25 PROCEDURE — 2580000003 HC RX 258: Performed by: STUDENT IN AN ORGANIZED HEALTH CARE EDUCATION/TRAINING PROGRAM

## 2023-11-25 PROCEDURE — 2500000003 HC RX 250 WO HCPCS

## 2023-11-25 PROCEDURE — 6370000000 HC RX 637 (ALT 250 FOR IP): Performed by: CHIROPRACTOR

## 2023-11-25 PROCEDURE — 82330 ASSAY OF CALCIUM: CPT

## 2023-11-25 PROCEDURE — 2700000000 HC OXYGEN THERAPY PER DAY

## 2023-11-25 PROCEDURE — 2580000003 HC RX 258: Performed by: INTERNAL MEDICINE

## 2023-11-25 PROCEDURE — 82962 GLUCOSE BLOOD TEST: CPT

## 2023-11-25 PROCEDURE — A4216 STERILE WATER/SALINE, 10 ML: HCPCS | Performed by: INTERNAL MEDICINE

## 2023-11-25 PROCEDURE — 82805 BLOOD GASES W/O2 SATURATION: CPT

## 2023-11-25 PROCEDURE — 94640 AIRWAY INHALATION TREATMENT: CPT

## 2023-11-25 PROCEDURE — C9113 INJ PANTOPRAZOLE SODIUM, VIA: HCPCS | Performed by: INTERNAL MEDICINE

## 2023-11-25 PROCEDURE — 83735 ASSAY OF MAGNESIUM: CPT

## 2023-11-25 PROCEDURE — 6370000000 HC RX 637 (ALT 250 FOR IP): Performed by: INTERNAL MEDICINE

## 2023-11-25 PROCEDURE — 6370000000 HC RX 637 (ALT 250 FOR IP): Performed by: NURSE PRACTITIONER

## 2023-11-25 PROCEDURE — 94660 CPAP INITIATION&MGMT: CPT

## 2023-11-25 PROCEDURE — 80048 BASIC METABOLIC PNL TOTAL CA: CPT

## 2023-11-25 RX ORDER — DEXTROSE MONOHYDRATE 100 MG/ML
INJECTION, SOLUTION INTRAVENOUS CONTINUOUS PRN
Status: DISCONTINUED | OUTPATIENT
Start: 2023-11-25 | End: 2023-11-27

## 2023-11-25 RX ORDER — INSULIN GLARGINE 100 [IU]/ML
9 INJECTION, SOLUTION SUBCUTANEOUS NIGHTLY
Status: DISCONTINUED | OUTPATIENT
Start: 2023-11-25 | End: 2023-11-27

## 2023-11-25 RX ADMIN — LATANOPROST 1 DROP: 50 SOLUTION OPHTHALMIC at 21:31

## 2023-11-25 RX ADMIN — PETROLATUM: 420 OINTMENT TOPICAL at 21:30

## 2023-11-25 RX ADMIN — INSULIN LISPRO 1 UNITS: 100 INJECTION, SOLUTION INTRAVENOUS; SUBCUTANEOUS at 08:46

## 2023-11-25 RX ADMIN — HYDROCORTISONE: 25 CREAM TOPICAL at 08:53

## 2023-11-25 RX ADMIN — MIDODRINE HYDROCHLORIDE 15 MG: 5 TABLET ORAL at 17:17

## 2023-11-25 RX ADMIN — BUDESONIDE INHALATION 1000 MCG: 0.5 SUSPENSION RESPIRATORY (INHALATION) at 21:06

## 2023-11-25 RX ADMIN — TACROLIMUS 2 MG: 1 CAPSULE ORAL at 08:51

## 2023-11-25 RX ADMIN — SODIUM CHLORIDE: 9 INJECTION, SOLUTION INTRAVENOUS at 00:40

## 2023-11-25 RX ADMIN — Medication 50 MG: at 08:51

## 2023-11-25 RX ADMIN — SODIUM CHLORIDE, PRESERVATIVE FREE 10 ML: 5 INJECTION INTRAVENOUS at 08:51

## 2023-11-25 RX ADMIN — HYDROCORTISONE: 25 CREAM TOPICAL at 21:31

## 2023-11-25 RX ADMIN — WATER 50 MG: 1 INJECTION INTRAMUSCULAR; INTRAVENOUS; SUBCUTANEOUS at 13:37

## 2023-11-25 RX ADMIN — AMIODARONE HYDROCHLORIDE 200 MG: 200 TABLET ORAL at 21:26

## 2023-11-25 RX ADMIN — LEVETIRACETAM 500 MG: 100 INJECTION, SOLUTION INTRAVENOUS at 21:30

## 2023-11-25 RX ADMIN — INSULIN GLARGINE 9 UNITS: 100 INJECTION, SOLUTION SUBCUTANEOUS at 21:28

## 2023-11-25 RX ADMIN — WATER 50 MG: 1 INJECTION INTRAMUSCULAR; INTRAVENOUS; SUBCUTANEOUS at 04:46

## 2023-11-25 RX ADMIN — INSULIN LISPRO 1 UNITS: 100 INJECTION, SOLUTION INTRAVENOUS; SUBCUTANEOUS at 17:24

## 2023-11-25 RX ADMIN — TACROLIMUS 2 MG: 1 CAPSULE ORAL at 21:26

## 2023-11-25 RX ADMIN — INSULIN LISPRO 2 UNITS: 100 INJECTION, SOLUTION INTRAVENOUS; SUBCUTANEOUS at 13:37

## 2023-11-25 RX ADMIN — DICLOFENAC SODIUM 2 G: 10 GEL TOPICAL at 08:53

## 2023-11-25 RX ADMIN — SODIUM CHLORIDE, PRESERVATIVE FREE 10 ML: 5 INJECTION INTRAVENOUS at 21:30

## 2023-11-25 RX ADMIN — IPRATROPIUM BROMIDE 0.5 MG: 0.5 SOLUTION RESPIRATORY (INHALATION) at 08:50

## 2023-11-25 RX ADMIN — MIDODRINE HYDROCHLORIDE 15 MG: 5 TABLET ORAL at 08:51

## 2023-11-25 RX ADMIN — Medication 11 MCG/MIN: at 00:39

## 2023-11-25 RX ADMIN — PETROLATUM: 420 OINTMENT TOPICAL at 08:52

## 2023-11-25 RX ADMIN — IPRATROPIUM BROMIDE 0.5 MG: 0.5 SOLUTION RESPIRATORY (INHALATION) at 21:07

## 2023-11-25 RX ADMIN — AMIODARONE HYDROCHLORIDE 200 MG: 200 TABLET ORAL at 08:50

## 2023-11-25 RX ADMIN — Medication 500 MG: at 08:51

## 2023-11-25 RX ADMIN — SODIUM CHLORIDE, PRESERVATIVE FREE 40 MG: 5 INJECTION INTRAVENOUS at 13:43

## 2023-11-25 RX ADMIN — DICLOFENAC SODIUM 2 G: 10 GEL TOPICAL at 21:31

## 2023-11-25 RX ADMIN — ARFORMOTEROL TARTRATE 15 MCG: 15 SOLUTION RESPIRATORY (INHALATION) at 08:50

## 2023-11-25 RX ADMIN — ARFORMOTEROL TARTRATE 15 MCG: 15 SOLUTION RESPIRATORY (INHALATION) at 21:06

## 2023-11-25 RX ADMIN — SODIUM CHLORIDE, PRESERVATIVE FREE 40 MG: 5 INJECTION INTRAVENOUS at 04:45

## 2023-11-25 RX ADMIN — Medication 1 CAPSULE: at 08:51

## 2023-11-25 RX ADMIN — INSULIN LISPRO 1 UNITS: 100 INJECTION, SOLUTION INTRAVENOUS; SUBCUTANEOUS at 00:35

## 2023-11-25 RX ADMIN — IPRATROPIUM BROMIDE 0.5 MG: 0.5 SOLUTION RESPIRATORY (INHALATION) at 12:04

## 2023-11-25 RX ADMIN — CALCITRIOL CAPSULES 0.25 MCG 0.25 MCG: 0.25 CAPSULE ORAL at 08:51

## 2023-11-25 RX ADMIN — Medication 1 CAPSULE: at 21:26

## 2023-11-25 RX ADMIN — INSULIN LISPRO 1 UNITS: 100 INJECTION, SOLUTION INTRAVENOUS; SUBCUTANEOUS at 04:45

## 2023-11-25 RX ADMIN — LEVETIRACETAM 500 MG: 100 INJECTION, SOLUTION INTRAVENOUS at 08:50

## 2023-11-25 RX ADMIN — WATER 50 MG: 1 INJECTION INTRAMUSCULAR; INTRAVENOUS; SUBCUTANEOUS at 17:18

## 2023-11-25 RX ADMIN — SODIUM ZIRCONIUM CYCLOSILICATE 5 G: 5 POWDER, FOR SUSPENSION ORAL at 21:25

## 2023-11-25 RX ADMIN — Medication 1000 UNITS: at 08:51

## 2023-11-25 RX ADMIN — INSULIN LISPRO 1 UNITS: 100 INJECTION, SOLUTION INTRAVENOUS; SUBCUTANEOUS at 21:27

## 2023-11-25 RX ADMIN — WATER 50 MG: 1 INJECTION INTRAMUSCULAR; INTRAVENOUS; SUBCUTANEOUS at 21:29

## 2023-11-25 RX ADMIN — BUDESONIDE INHALATION 1000 MCG: 0.5 SUSPENSION RESPIRATORY (INHALATION) at 08:50

## 2023-11-25 ASSESSMENT — PAIN SCALES - GENERAL
PAINLEVEL_OUTOF10: 0

## 2023-11-26 LAB
AADO2: 131 MMHG
ALBUMIN SERPL-MCNC: 2.2 G/DL (ref 3.5–5.2)
ALP SERPL-CCNC: 164 U/L (ref 40–129)
ALT SERPL-CCNC: 67 U/L (ref 0–40)
ANION GAP SERPL CALCULATED.3IONS-SCNC: 11 MMOL/L (ref 7–16)
ANION GAP SERPL CALCULATED.3IONS-SCNC: 9 MMOL/L (ref 7–16)
AST SERPL-CCNC: 161 U/L (ref 0–39)
B PARAP IS1001 DNA NPH QL NAA+NON-PROBE: NOT DETECTED
B PERT DNA SPEC QL NAA+PROBE: NOT DETECTED
B.E.: 1.6 MMOL/L (ref -3–3)
BASOPHILS # BLD: 0.01 K/UL (ref 0–0.2)
BASOPHILS NFR BLD: 0 % (ref 0–2)
BILIRUB SERPL-MCNC: 0.5 MG/DL (ref 0–1.2)
BNP SERPL-MCNC: ABNORMAL PG/ML (ref 0–125)
BUN SERPL-MCNC: 46 MG/DL (ref 6–23)
BUN SERPL-MCNC: 58 MG/DL (ref 6–23)
C PNEUM DNA NPH QL NAA+NON-PROBE: NOT DETECTED
CALCIUM SERPL-MCNC: 8.1 MG/DL (ref 8.6–10.2)
CALCIUM SERPL-MCNC: 8.4 MG/DL (ref 8.6–10.2)
CHLORIDE SERPL-SCNC: 100 MMOL/L (ref 98–107)
CHLORIDE SERPL-SCNC: 102 MMOL/L (ref 98–107)
CO2 SERPL-SCNC: 26 MMOL/L (ref 22–29)
CO2 SERPL-SCNC: 27 MMOL/L (ref 22–29)
COHB: 1.2 % (ref 0–1.5)
CREAT SERPL-MCNC: 2.1 MG/DL (ref 0.7–1.2)
CREAT SERPL-MCNC: 2.3 MG/DL (ref 0.7–1.2)
CRITICAL: ABNORMAL
CRP SERPL HS-MCNC: 143 MG/L (ref 0–5)
DATE ANALYZED: ABNORMAL
DATE OF COLLECTION: ABNORMAL
EOSINOPHIL # BLD: 0 K/UL (ref 0.05–0.5)
EOSINOPHILS RELATIVE PERCENT: 0 % (ref 0–6)
ERYTHROCYTE [DISTWIDTH] IN BLOOD BY AUTOMATED COUNT: 18.3 % (ref 11.5–15)
FIO2: 40 %
FLUAV RNA NPH QL NAA+NON-PROBE: NOT DETECTED
FLUBV RNA NPH QL NAA+NON-PROBE: NOT DETECTED
GFR SERPL CREATININE-BSD FRML MDRD: 29 ML/MIN/1.73M2
GFR SERPL CREATININE-BSD FRML MDRD: 33 ML/MIN/1.73M2
GLUCOSE BLD-MCNC: 119 MG/DL (ref 74–99)
GLUCOSE BLD-MCNC: 130 MG/DL (ref 74–99)
GLUCOSE BLD-MCNC: 163 MG/DL (ref 74–99)
GLUCOSE BLD-MCNC: 187 MG/DL (ref 74–99)
GLUCOSE BLD-MCNC: 203 MG/DL (ref 74–99)
GLUCOSE BLD-MCNC: 215 MG/DL (ref 74–99)
GLUCOSE SERPL-MCNC: 147 MG/DL (ref 74–99)
GLUCOSE SERPL-MCNC: 180 MG/DL (ref 74–99)
HADV DNA NPH QL NAA+NON-PROBE: NOT DETECTED
HCO3: 27.5 MMOL/L (ref 22–26)
HCOV 229E RNA NPH QL NAA+NON-PROBE: NOT DETECTED
HCOV HKU1 RNA NPH QL NAA+NON-PROBE: NOT DETECTED
HCOV NL63 RNA NPH QL NAA+NON-PROBE: NOT DETECTED
HCOV OC43 RNA NPH QL NAA+NON-PROBE: NOT DETECTED
HCT VFR BLD AUTO: 24.2 % (ref 37–54)
HGB BLD-MCNC: 7.3 G/DL (ref 12.5–16.5)
HHB: 3.4 % (ref 0–5)
HMPV RNA NPH QL NAA+NON-PROBE: NOT DETECTED
HPIV1 RNA NPH QL NAA+NON-PROBE: NOT DETECTED
HPIV2 RNA NPH QL NAA+NON-PROBE: NOT DETECTED
HPIV3 RNA NPH QL NAA+NON-PROBE: NOT DETECTED
HPIV4 RNA NPH QL NAA+NON-PROBE: NOT DETECTED
IMM GRANULOCYTES # BLD AUTO: 0.11 K/UL (ref 0–0.58)
IMM GRANULOCYTES NFR BLD: 2 % (ref 0–5)
LAB: ABNORMAL
LYMPHOCYTES NFR BLD: 0.38 K/UL (ref 1.5–4)
LYMPHOCYTES RELATIVE PERCENT: 6 % (ref 20–42)
Lab: 904
M PNEUMO DNA NPH QL NAA+NON-PROBE: NOT DETECTED
MAGNESIUM SERPL-MCNC: 2.4 MG/DL (ref 1.6–2.6)
MAGNESIUM SERPL-MCNC: 2.4 MG/DL (ref 1.6–2.6)
MCH RBC QN AUTO: 28.5 PG (ref 26–35)
MCHC RBC AUTO-ENTMCNC: 30.2 G/DL (ref 32–34.5)
MCV RBC AUTO: 94.5 FL (ref 80–99.9)
METHB: 0.2 % (ref 0–1.5)
MODE: ABNORMAL
MONOCYTES NFR BLD: 0.13 K/UL (ref 0.1–0.95)
MONOCYTES NFR BLD: 2 % (ref 2–12)
NEUTROPHILS NFR BLD: 90 % (ref 43–80)
NEUTS SEG NFR BLD: 5.98 K/UL (ref 1.8–7.3)
O2 CONTENT: 11.8 ML/DL
O2 SATURATION: 96.6 % (ref 92–98.5)
O2HB: 95.2 % (ref 94–97)
OPERATOR ID: 5100
PATIENT TEMP: 37 C
PCO2: 50.1 MMHG (ref 35–45)
PEEP/CPAP: 10 CMH2O
PFO2: 2.16 MMHG/%
PH BLOOD GAS: 7.36 (ref 7.35–7.45)
PHOSPHATE SERPL-MCNC: 2.8 MG/DL (ref 2.5–4.5)
PHOSPHATE SERPL-MCNC: 2.9 MG/DL (ref 2.5–4.5)
PLATELET CONFIRMATION: NORMAL
PLATELET, FLUORESCENCE: 84 K/UL (ref 130–450)
PMV BLD AUTO: 11.6 FL (ref 7–12)
PO2: 86.6 MMHG (ref 75–100)
POTASSIUM SERPL-SCNC: 4.8 MMOL/L (ref 3.5–5)
POTASSIUM SERPL-SCNC: 4.9 MMOL/L (ref 3.5–5)
PROCALCITONIN SERPL-MCNC: 13.4 NG/ML (ref 0–0.08)
PROT SERPL-MCNC: 5 G/DL (ref 6.4–8.3)
RBC # BLD AUTO: 2.56 M/UL (ref 3.8–5.8)
RBC # BLD: ABNORMAL 10*6/UL
RI(T): 1.51
RR MECHANICAL: 22 B/MIN
RSV RNA NPH QL NAA+NON-PROBE: NOT DETECTED
RV+EV RNA NPH QL NAA+NON-PROBE: NOT DETECTED
SARS-COV-2 RNA NPH QL NAA+NON-PROBE: DETECTED
SODIUM SERPL-SCNC: 136 MMOL/L (ref 132–146)
SODIUM SERPL-SCNC: 139 MMOL/L (ref 132–146)
SOURCE, BLOOD GAS: ABNORMAL
SPECIMEN DESCRIPTION: ABNORMAL
THB: 8.7 G/DL (ref 11.5–16.5)
TIME ANALYZED: 908
VT MECHANICAL: 550 ML
WBC # BLD: ABNORMAL 10*3/UL
WBC OTHER # BLD: 6.6 K/UL (ref 4.5–11.5)

## 2023-11-26 PROCEDURE — A4216 STERILE WATER/SALINE, 10 ML: HCPCS | Performed by: INTERNAL MEDICINE

## 2023-11-26 PROCEDURE — 31720 CLEARANCE OF AIRWAYS: CPT

## 2023-11-26 PROCEDURE — 6360000002 HC RX W HCPCS: Performed by: STUDENT IN AN ORGANIZED HEALTH CARE EDUCATION/TRAINING PROGRAM

## 2023-11-26 PROCEDURE — 6370000000 HC RX 637 (ALT 250 FOR IP): Performed by: INTERNAL MEDICINE

## 2023-11-26 PROCEDURE — 83880 ASSAY OF NATRIURETIC PEPTIDE: CPT

## 2023-11-26 PROCEDURE — 36600 WITHDRAWAL OF ARTERIAL BLOOD: CPT

## 2023-11-26 PROCEDURE — 82805 BLOOD GASES W/O2 SATURATION: CPT

## 2023-11-26 PROCEDURE — 82962 GLUCOSE BLOOD TEST: CPT

## 2023-11-26 PROCEDURE — 6360000002 HC RX W HCPCS: Performed by: INTERNAL MEDICINE

## 2023-11-26 PROCEDURE — 6360000002 HC RX W HCPCS

## 2023-11-26 PROCEDURE — 6370000000 HC RX 637 (ALT 250 FOR IP): Performed by: FAMILY MEDICINE

## 2023-11-26 PROCEDURE — 84145 PROCALCITONIN (PCT): CPT

## 2023-11-26 PROCEDURE — 6370000000 HC RX 637 (ALT 250 FOR IP): Performed by: CHIROPRACTOR

## 2023-11-26 PROCEDURE — 0202U NFCT DS 22 TRGT SARS-COV-2: CPT

## 2023-11-26 PROCEDURE — 2580000003 HC RX 258: Performed by: INTERNAL MEDICINE

## 2023-11-26 PROCEDURE — 80053 COMPREHEN METABOLIC PANEL: CPT

## 2023-11-26 PROCEDURE — 83735 ASSAY OF MAGNESIUM: CPT

## 2023-11-26 PROCEDURE — 85025 COMPLETE CBC W/AUTO DIFF WBC: CPT

## 2023-11-26 PROCEDURE — 94660 CPAP INITIATION&MGMT: CPT

## 2023-11-26 PROCEDURE — 2000000000 HC ICU R&B

## 2023-11-26 PROCEDURE — 2580000003 HC RX 258

## 2023-11-26 PROCEDURE — 99291 CRITICAL CARE FIRST HOUR: CPT | Performed by: INTERNAL MEDICINE

## 2023-11-26 PROCEDURE — 2580000003 HC RX 258: Performed by: STUDENT IN AN ORGANIZED HEALTH CARE EDUCATION/TRAINING PROGRAM

## 2023-11-26 PROCEDURE — 6370000000 HC RX 637 (ALT 250 FOR IP): Performed by: NURSE PRACTITIONER

## 2023-11-26 PROCEDURE — 86140 C-REACTIVE PROTEIN: CPT

## 2023-11-26 PROCEDURE — 94640 AIRWAY INHALATION TREATMENT: CPT

## 2023-11-26 PROCEDURE — 84100 ASSAY OF PHOSPHORUS: CPT

## 2023-11-26 PROCEDURE — 80048 BASIC METABOLIC PNL TOTAL CA: CPT

## 2023-11-26 PROCEDURE — C9113 INJ PANTOPRAZOLE SODIUM, VIA: HCPCS | Performed by: INTERNAL MEDICINE

## 2023-11-26 PROCEDURE — 6370000000 HC RX 637 (ALT 250 FOR IP): Performed by: STUDENT IN AN ORGANIZED HEALTH CARE EDUCATION/TRAINING PROGRAM

## 2023-11-26 PROCEDURE — 2700000000 HC OXYGEN THERAPY PER DAY

## 2023-11-26 RX ORDER — NOREPINEPHRINE BITARTRATE 0.06 MG/ML
1-100 INJECTION, SOLUTION INTRAVENOUS CONTINUOUS
Status: DISCONTINUED | OUTPATIENT
Start: 2023-11-26 | End: 2023-11-26

## 2023-11-26 RX ORDER — NOREPINEPHRINE BITARTRATE 0.06 MG/ML
1-100 INJECTION, SOLUTION INTRAVENOUS CONTINUOUS
Status: DISCONTINUED | OUTPATIENT
Start: 2023-11-26 | End: 2023-11-27

## 2023-11-26 RX ADMIN — DICLOFENAC SODIUM 2 G: 10 GEL TOPICAL at 22:08

## 2023-11-26 RX ADMIN — Medication 50 MG: at 08:08

## 2023-11-26 RX ADMIN — HYDROCORTISONE: 25 CREAM TOPICAL at 08:11

## 2023-11-26 RX ADMIN — Medication 1000 UNITS: at 08:07

## 2023-11-26 RX ADMIN — WATER 50 MG: 1 INJECTION INTRAMUSCULAR; INTRAVENOUS; SUBCUTANEOUS at 04:45

## 2023-11-26 RX ADMIN — SODIUM CHLORIDE, PRESERVATIVE FREE 40 MG: 5 INJECTION INTRAVENOUS at 04:45

## 2023-11-26 RX ADMIN — ARFORMOTEROL TARTRATE 15 MCG: 15 SOLUTION RESPIRATORY (INHALATION) at 21:33

## 2023-11-26 RX ADMIN — SODIUM CHLORIDE, PRESERVATIVE FREE 10 ML: 5 INJECTION INTRAVENOUS at 22:05

## 2023-11-26 RX ADMIN — Medication 1 CAPSULE: at 22:06

## 2023-11-26 RX ADMIN — CALCITRIOL CAPSULES 0.25 MCG 0.25 MCG: 0.25 CAPSULE ORAL at 08:06

## 2023-11-26 RX ADMIN — INSULIN LISPRO 1 UNITS: 100 INJECTION, SOLUTION INTRAVENOUS; SUBCUTANEOUS at 00:43

## 2023-11-26 RX ADMIN — MIDODRINE HYDROCHLORIDE 15 MG: 5 TABLET ORAL at 08:08

## 2023-11-26 RX ADMIN — INSULIN LISPRO 1 UNITS: 100 INJECTION, SOLUTION INTRAVENOUS; SUBCUTANEOUS at 08:05

## 2023-11-26 RX ADMIN — TACROLIMUS 2 MG: 1 CAPSULE ORAL at 22:06

## 2023-11-26 RX ADMIN — AMIODARONE HYDROCHLORIDE 200 MG: 200 TABLET ORAL at 22:06

## 2023-11-26 RX ADMIN — SODIUM ZIRCONIUM CYCLOSILICATE 5 G: 5 POWDER, FOR SUSPENSION ORAL at 08:09

## 2023-11-26 RX ADMIN — WATER 50 MG: 1 INJECTION INTRAMUSCULAR; INTRAVENOUS; SUBCUTANEOUS at 08:12

## 2023-11-26 RX ADMIN — LOPERAMIDE HYDROCHLORIDE 2 MG: 2 CAPSULE ORAL at 08:09

## 2023-11-26 RX ADMIN — PETROLATUM: 420 OINTMENT TOPICAL at 08:09

## 2023-11-26 RX ADMIN — DICLOFENAC SODIUM 2 G: 10 GEL TOPICAL at 08:10

## 2023-11-26 RX ADMIN — Medication 500 MG: at 08:11

## 2023-11-26 RX ADMIN — TACROLIMUS 2 MG: 1 CAPSULE ORAL at 08:08

## 2023-11-26 RX ADMIN — IPRATROPIUM BROMIDE 0.5 MG: 0.5 SOLUTION RESPIRATORY (INHALATION) at 08:54

## 2023-11-26 RX ADMIN — BUDESONIDE INHALATION 1000 MCG: 0.5 SUSPENSION RESPIRATORY (INHALATION) at 08:53

## 2023-11-26 RX ADMIN — SODIUM CHLORIDE, PRESERVATIVE FREE 40 MG: 5 INJECTION INTRAVENOUS at 16:18

## 2023-11-26 RX ADMIN — LATANOPROST 1 DROP: 50 SOLUTION OPHTHALMIC at 22:09

## 2023-11-26 RX ADMIN — INSULIN GLARGINE 9 UNITS: 100 INJECTION, SOLUTION SUBCUTANEOUS at 22:05

## 2023-11-26 RX ADMIN — LEVETIRACETAM 500 MG: 100 INJECTION, SOLUTION INTRAVENOUS at 22:06

## 2023-11-26 RX ADMIN — BUDESONIDE INHALATION 1000 MCG: 0.5 SUSPENSION RESPIRATORY (INHALATION) at 21:33

## 2023-11-26 RX ADMIN — AMIODARONE HYDROCHLORIDE 200 MG: 200 TABLET ORAL at 08:07

## 2023-11-26 RX ADMIN — MIDODRINE HYDROCHLORIDE 15 MG: 5 TABLET ORAL at 00:43

## 2023-11-26 RX ADMIN — IPRATROPIUM BROMIDE 0.5 MG: 0.5 SOLUTION RESPIRATORY (INHALATION) at 12:06

## 2023-11-26 RX ADMIN — PETROLATUM: 420 OINTMENT TOPICAL at 22:08

## 2023-11-26 RX ADMIN — Medication 1 CAPSULE: at 08:07

## 2023-11-26 RX ADMIN — MIDODRINE HYDROCHLORIDE 15 MG: 5 TABLET ORAL at 16:18

## 2023-11-26 RX ADMIN — SODIUM CHLORIDE, PRESERVATIVE FREE 10 ML: 5 INJECTION INTRAVENOUS at 08:09

## 2023-11-26 RX ADMIN — HYDROCORTISONE: 25 CREAM TOPICAL at 22:08

## 2023-11-26 RX ADMIN — IPRATROPIUM BROMIDE 0.5 MG: 0.5 SOLUTION RESPIRATORY (INHALATION) at 21:33

## 2023-11-26 RX ADMIN — ARFORMOTEROL TARTRATE 15 MCG: 15 SOLUTION RESPIRATORY (INHALATION) at 08:53

## 2023-11-26 RX ADMIN — WATER 50 MG: 1 INJECTION INTRAMUSCULAR; INTRAVENOUS; SUBCUTANEOUS at 22:06

## 2023-11-26 ASSESSMENT — PAIN SCALES - GENERAL
PAINLEVEL_OUTOF10: 0

## 2023-11-27 VITALS
BODY MASS INDEX: 21.22 KG/M2 | SYSTOLIC BLOOD PRESSURE: 96 MMHG | HEIGHT: 74 IN | TEMPERATURE: 98.4 F | HEART RATE: 88 BPM | OXYGEN SATURATION: 92 % | WEIGHT: 165.34 LBS | DIASTOLIC BLOOD PRESSURE: 46 MMHG | RESPIRATION RATE: 27 BRPM

## 2023-11-27 LAB — GLUCOSE BLD-MCNC: 151 MG/DL (ref 74–99)

## 2023-11-27 PROCEDURE — 6370000000 HC RX 637 (ALT 250 FOR IP): Performed by: STUDENT IN AN ORGANIZED HEALTH CARE EDUCATION/TRAINING PROGRAM

## 2023-11-27 PROCEDURE — 94660 CPAP INITIATION&MGMT: CPT

## 2023-11-27 PROCEDURE — 6360000002 HC RX W HCPCS: Performed by: STUDENT IN AN ORGANIZED HEALTH CARE EDUCATION/TRAINING PROGRAM

## 2023-11-27 PROCEDURE — 82962 GLUCOSE BLOOD TEST: CPT

## 2023-11-27 RX ORDER — LORAZEPAM 2 MG/ML
0.5 INJECTION INTRAMUSCULAR
Status: DISCONTINUED | OUTPATIENT
Start: 2023-11-27 | End: 2023-11-27 | Stop reason: HOSPADM

## 2023-11-27 RX ORDER — GLYCOPYRROLATE 0.2 MG/ML
0.2 INJECTION INTRAMUSCULAR; INTRAVENOUS EVERY 4 HOURS PRN
Status: DISCONTINUED | OUTPATIENT
Start: 2023-11-27 | End: 2023-11-27 | Stop reason: HOSPADM

## 2023-11-27 RX ADMIN — LORAZEPAM 0.5 MG: 2 INJECTION, SOLUTION INTRAMUSCULAR; INTRAVENOUS at 02:59

## 2023-11-27 RX ADMIN — HYDROMORPHONE HYDROCHLORIDE 0.5 MG: 1 INJECTION, SOLUTION INTRAMUSCULAR; INTRAVENOUS; SUBCUTANEOUS at 03:00

## 2023-11-27 RX ADMIN — MIDODRINE HYDROCHLORIDE 15 MG: 5 TABLET ORAL at 01:20

## 2023-11-27 ASSESSMENT — PAIN SCALES - GENERAL: PAINLEVEL_OUTOF10: 0

## 2023-11-27 NOTE — DEATH NOTES
Death Pronouncement Note  Patient's Name: Shila Sam   Patient's YOB: 1950  MRN Number: [id number]    Admitting Provider: Mary Bolton MD  Attending Provider: Evgeny Vitale DO    Patient was examined and the following were absent: Pulses, Blood Pressure, and Respiratory effort    I declared the patient dead on  at  11/27/2023 03:12      Electronically signed by Shana Duran MD on 11/27/23 at 3:33 AM EST

## 2023-11-27 NOTE — DISCHARGE SUMMARY
notification. Viz LVO is limited to analysis of imaging data and should not be used in-lieu of full patient evaluation or relied upon to make or confirm diagnosis. COMPARISON: None. HISTORY: ORDERING SYSTEM PROVIDED HISTORY: R facial droop TECHNOLOGIST PROVIDED HISTORY: Reason for exam:->R facial droop Has a \"code stroke\" or \"stroke alert\" been called? ->No What reading provider will be dictating this exam?->CRC FINDINGS: CT HEAD: BRAIN/VENTRICLES:  No acute intracranial hemorrhage or extraaxial fluid collection. Grey-white differentiation is maintained. No evidence of mass, mass effect or midline shift. No evidence of hydrocephalus. There is mild chronic microvascular disease within the periventricular white matter ORBITS: The visualized portion of the orbits demonstrate no acute abnormality. Old right medial orbital wall fracture is noted incidentally. SINUSES:  The visualized paranasal sinuses and mastoid air cells demonstrate no acute abnormality. SOFT TISSUES/SKULL: No acute abnormality of the visualized skull or soft tissues. CTA NECK: AORTIC ARCH/ARCH VESSELS: No dissection or arterial injury. No significant stenosis of the brachiocephalic or subclavian arteries. CAROTID ARTERIES: No dissection, arterial injury, or hemodynamically significant stenosis by NASCET criteria. VERTEBRAL ARTERIES: No dissection, arterial injury, or significant stenosis. SOFT TISSUES: Large bilateral pleural effusions are noted. No cervical or superior mediastinal lymphadenopathy. The larynx and pharynx are unremarkable. No acute abnormality of the salivary and thyroid glands. BONES: No acute osseous abnormality. CTA HEAD: ANTERIOR CIRCULATION: No significant stenosis of the intracranial internal carotid, anterior cerebral, or middle cerebral arteries. No aneurysm. POSTERIOR CIRCULATION: No significant stenosis of the vertebral, basilar, or posterior cerebral arteries. No aneurysm.  OTHER: No dural venous sinus thrombosis on

## (undated) DEVICE — Z DISCONTINUED NO SUB IDED TUBING ETCO2 AD L6.5FT NSL ORAL CVD PRNG NONFLARED TIP OVR

## (undated) DEVICE — DEFENDO AIR WATER SUCTION AND BIOPSY VALVE KIT FOR  OLYMPUS: Brand: DEFENDO AIR/WATER/SUCTION AND BIOPSY VALVE

## (undated) DEVICE — GAUZE,SPONGE,POST-OP,4X3,STRL,LF: Brand: MEDLINE

## (undated) DEVICE — BITEBLOCK 54FR W/ DENT RIM BLOX